# Patient Record
Sex: MALE | Race: WHITE | Employment: OTHER | URBAN - METROPOLITAN AREA
[De-identification: names, ages, dates, MRNs, and addresses within clinical notes are randomized per-mention and may not be internally consistent; named-entity substitution may affect disease eponyms.]

---

## 2017-01-02 ENCOUNTER — HOSPITAL ENCOUNTER (OUTPATIENT)
Dept: INFUSION THERAPY | Age: 77
End: 2017-01-02

## 2017-01-13 ENCOUNTER — TELEPHONE (OUTPATIENT)
Dept: ONCOLOGY | Age: 77
End: 2017-01-13

## 2017-01-13 NOTE — TELEPHONE ENCOUNTER
Call to patient. HIPAA verified     Dr Whittaker/urology had done [facility] and found cancer cells. He would like to do a bladder bx. May CT scan scheduled. Port removed successfully. [facility] He would like to have a discussion with [facility] prior to proceeding with bx. He states you have discussed this with Adi Gamez. He remains adamant about keeping his bladder. Advised patient, [facility] out of office until 1/17.

## 2017-01-13 NOTE — TELEPHONE ENCOUNTER
Romero Salas is requesting to speak with Dr. Huber Mcneil directly, specifics declined.    [phone]

## 2017-02-20 ENCOUNTER — HOSPITAL ENCOUNTER (OUTPATIENT)
Dept: PREADMISSION TESTING | Age: 77
Discharge: HOME | End: 2017-02-20
Payer: MEDICARE

## 2017-02-20 VITALS
HEIGHT: 69 IN | HEART RATE: 70 BPM | BODY MASS INDEX: 28.14 KG/M2 | WEIGHT: 190 LBS | DIASTOLIC BLOOD PRESSURE: 73 MMHG | SYSTOLIC BLOOD PRESSURE: 160 MMHG | TEMPERATURE: 97.6 F | RESPIRATION RATE: 20 BRPM

## 2017-02-20 LAB
ANION GAP BLD CALC-SCNC: 8 MMOL/L (ref 5–15)
APPEARANCE UR: CLEAR
ATRIAL RATE: 59 BPM
BACTERIA URNS QL MICRO: NEGATIVE /HPF
BASOPHILS # BLD AUTO: 0.1 K/UL (ref 0–0.1)
BASOPHILS # BLD: 1 % (ref 0–1)
BILIRUB UR QL: NEGATIVE
BUN SERPL-MCNC: 15 MG/DL (ref 6–20)
BUN/CREAT SERPL: 19 (ref 12–20)
CALCIUM SERPL-MCNC: 9.3 MG/DL (ref 8.5–10.1)
CALCULATED P AXIS, ECG09: 99 DEGREES
CALCULATED R AXIS, ECG10: -52 DEGREES
CALCULATED T AXIS, ECG11: -19 DEGREES
CHLORIDE SERPL-SCNC: 104 MMOL/L (ref 97–108)
CO2 SERPL-SCNC: 27 MMOL/L (ref 21–32)
COLOR UR: ABNORMAL
CREAT SERPL-MCNC: 0.81 MG/DL (ref 0.7–1.3)
DIAGNOSIS, 93000: NORMAL
DIFFERENTIAL METHOD BLD: ABNORMAL
EOSINOPHIL # BLD: 0.3 K/UL (ref 0–0.4)
EOSINOPHIL NFR BLD: 4 % (ref 0–7)
EPITH CASTS URNS QL MICRO: ABNORMAL /LPF
ERYTHROCYTE [DISTWIDTH] IN BLOOD BY AUTOMATED COUNT: 15.4 % (ref 11.5–14.5)
GLUCOSE SERPL-MCNC: 80 MG/DL (ref 65–100)
GLUCOSE UR STRIP.AUTO-MCNC: NEGATIVE MG/DL
HCT VFR BLD AUTO: 40 % (ref 36.6–50.3)
HGB BLD-MCNC: 12.9 G/DL (ref 12.1–17)
HGB UR QL STRIP: ABNORMAL
HYALINE CASTS URNS QL MICRO: ABNORMAL /LPF (ref 0–5)
KETONES UR QL STRIP.AUTO: NEGATIVE MG/DL
LEUKOCYTE ESTERASE UR QL STRIP.AUTO: NEGATIVE
LYMPHOCYTES # BLD AUTO: 10 % (ref 12–49)
LYMPHOCYTES # BLD: 0.7 K/UL (ref 0.8–3.5)
MCH RBC QN AUTO: 29.5 PG (ref 26–34)
MCHC RBC AUTO-ENTMCNC: 32.3 G/DL (ref 30–36.5)
MCV RBC AUTO: 91.5 FL (ref 80–99)
MONOCYTES # BLD: 0.8 K/UL (ref 0–1)
MONOCYTES NFR BLD AUTO: 12 % (ref 5–13)
NEUTS SEG # BLD: 5 K/UL (ref 1.8–8)
NEUTS SEG NFR BLD AUTO: 73 % (ref 32–75)
NITRITE UR QL STRIP.AUTO: NEGATIVE
P-R INTERVAL, ECG05: 212 MS
PH UR STRIP: 6 [PH] (ref 5–8)
PLATELET # BLD AUTO: 119 K/UL (ref 150–400)
POTASSIUM SERPL-SCNC: 4 MMOL/L (ref 3.5–5.1)
PROT UR STRIP-MCNC: NEGATIVE MG/DL
Q-T INTERVAL, ECG07: 398 MS
QRS DURATION, ECG06: 106 MS
QTC CALCULATION (BEZET), ECG08: 394 MS
RBC # BLD AUTO: 4.37 M/UL (ref 4.1–5.7)
RBC #/AREA URNS HPF: ABNORMAL /HPF (ref 0–5)
RBC MORPH BLD: ABNORMAL
SODIUM SERPL-SCNC: 139 MMOL/L (ref 136–145)
SP GR UR REFRACTOMETRY: 1.02 (ref 1–1.03)
UROBILINOGEN UR QL STRIP.AUTO: 0.2 EU/DL (ref 0.2–1)
VENTRICULAR RATE, ECG03: 59 BPM
WBC # BLD AUTO: 6.9 K/UL (ref 4.1–11.1)
WBC URNS QL MICRO: ABNORMAL /HPF (ref 0–4)

## 2017-02-20 PROCEDURE — 93005 ELECTROCARDIOGRAM TRACING: CPT

## 2017-02-20 PROCEDURE — 80048 BASIC METABOLIC PNL TOTAL CA: CPT | Performed by: UROLOGY

## 2017-02-20 PROCEDURE — 81001 URINALYSIS AUTO W/SCOPE: CPT | Performed by: UROLOGY

## 2017-02-20 PROCEDURE — 85025 COMPLETE CBC W/AUTO DIFF WBC: CPT | Performed by: UROLOGY

## 2017-02-20 PROCEDURE — 87086 URINE CULTURE/COLONY COUNT: CPT | Performed by: UROLOGY

## 2017-02-20 PROCEDURE — 36415 COLL VENOUS BLD VENIPUNCTURE: CPT | Performed by: UROLOGY

## 2017-02-20 NOTE — PERIOP NOTES
PATIENT GIVEN SURGICAL SITE INFECTION FAQ HANDOUT AND HAND WASHING TIP SHEET. PREOP INSTRUCTIONS REVIEWED AND PATIENT VERBALIZES UNDERSTANDING OF INSTRUCTIONS. PATIENT HAS BEEN GIVEN THE OPPORTUNITY TO ASK QUESTIONS.

## 2017-02-21 LAB
BACTERIA SPEC CULT: NORMAL
CC UR VC: NORMAL
SERVICE CMNT-IMP: NORMAL

## 2017-02-21 NOTE — PERIOP NOTES
ABN EKG SENT TO [facility] FOR REVIEW. EKG REVIEWED BY DR Adela Auguste, [facility] ; OKAY FOR SURGERY. SIGNED COPY FAXED TO DR Sundeep Bazan.

## 2017-03-06 NOTE — PERIOP NOTES
PT NAME CAME UP ON PAT PHONE LIST, BUT HE HAD PAT ON 2/20/17. PT DENIES ANY NEW MEDICATIONS OR CHANGES IN HEALTH HISTORY.

## 2017-03-07 ENCOUNTER — ANESTHESIA EVENT (OUTPATIENT)
Dept: SURGERY | Age: 77
End: 2017-03-07
Payer: MEDICARE

## 2017-03-07 ENCOUNTER — HOSPITAL ENCOUNTER (OUTPATIENT)
Age: 77
Setting detail: OBSERVATION
Discharge: HOME | End: 2017-03-08
Attending: UROLOGY | Admitting: UROLOGY
Payer: MEDICARE

## 2017-03-07 ENCOUNTER — ANESTHESIA (OUTPATIENT)
Dept: SURGERY | Age: 77
End: 2017-03-07
Payer: MEDICARE

## 2017-03-07 PROCEDURE — 77030012893

## 2017-03-07 PROCEDURE — 77030027138 HC INCENT SPIROMETER -A

## 2017-03-07 PROCEDURE — 76010000138 HC OR TIME 0.5 TO 1 HR: Performed by: UROLOGY

## 2017-03-07 PROCEDURE — 76060000032 HC ANESTHESIA 0.5 TO 1 HR: Performed by: UROLOGY

## 2017-03-07 PROCEDURE — 74011250636 HC RX REV CODE- 250/636: Performed by: ANESTHESIOLOGY

## 2017-03-07 PROCEDURE — 77030032490 HC SLV COMPR SCD KNE COVD -B: Performed by: UROLOGY

## 2017-03-07 PROCEDURE — 77030005529 HC CATH URETH FOL40 BARD -A: Performed by: UROLOGY

## 2017-03-07 PROCEDURE — 88305 TISSUE EXAM BY PATHOLOGIST: CPT | Performed by: UROLOGY

## 2017-03-07 PROCEDURE — 77030011640 HC PAD GRND REM COVD -A: Performed by: UROLOGY

## 2017-03-07 PROCEDURE — 76210000006 HC OR PH I REC 0.5 TO 1 HR: Performed by: UROLOGY

## 2017-03-07 PROCEDURE — 77030010509 HC AIRWY LMA MSK TELE -A: Performed by: ANESTHESIOLOGY

## 2017-03-07 PROCEDURE — 77030010545: Performed by: UROLOGY

## 2017-03-07 PROCEDURE — 74011250636 HC RX REV CODE- 250/636

## 2017-03-07 PROCEDURE — 74011250637 HC RX REV CODE- 250/637: Performed by: UROLOGY

## 2017-03-07 PROCEDURE — 74011000258 HC RX REV CODE- 258: Performed by: UROLOGY

## 2017-03-07 PROCEDURE — 77030019927 HC TBNG IRR CYSTO BAXT -A: Performed by: UROLOGY

## 2017-03-07 PROCEDURE — 99218 HC RM OBSERVATION: CPT

## 2017-03-07 PROCEDURE — 74011000250 HC RX REV CODE- 250

## 2017-03-07 PROCEDURE — 74011250636 HC RX REV CODE- 250/636: Performed by: UROLOGY

## 2017-03-07 PROCEDURE — 77030011274 HC ELECTRD CUT LP RWOL -F: Performed by: UROLOGY

## 2017-03-07 PROCEDURE — 77030013079 HC BLNKT BAIR HGGR 3M -A: Performed by: ANESTHESIOLOGY

## 2017-03-07 PROCEDURE — 77030018846 HC SOL IRR STRL H20 ICUM -A: Performed by: UROLOGY

## 2017-03-07 RX ORDER — CEFAZOLIN SODIUM IN 0.9 % NACL 2 G/50 ML
2 INTRAVENOUS SOLUTION, PIGGYBACK (ML) INTRAVENOUS
Status: COMPLETED | OUTPATIENT
Start: 2017-03-07 | End: 2017-03-07

## 2017-03-07 RX ORDER — LIDOCAINE HYDROCHLORIDE 20 MG/ML
INJECTION, SOLUTION EPIDURAL; INFILTRATION; INTRACAUDAL; PERINEURAL AS NEEDED
Status: DISCONTINUED | OUTPATIENT
Start: 2017-03-07 | End: 2017-03-07 | Stop reason: HOSPADM

## 2017-03-07 RX ORDER — MIDAZOLAM HYDROCHLORIDE 1 MG/ML
1 INJECTION, SOLUTION INTRAMUSCULAR; INTRAVENOUS AS NEEDED
Status: DISCONTINUED | OUTPATIENT
Start: 2017-03-07 | End: 2017-03-07 | Stop reason: HOSPADM

## 2017-03-07 RX ORDER — FENTANYL CITRATE 50 UG/ML
50 INJECTION, SOLUTION INTRAMUSCULAR; INTRAVENOUS AS NEEDED
Status: DISCONTINUED | OUTPATIENT
Start: 2017-03-07 | End: 2017-03-07 | Stop reason: HOSPADM

## 2017-03-07 RX ORDER — SODIUM CHLORIDE, SODIUM LACTATE, POTASSIUM CHLORIDE, CALCIUM CHLORIDE 600; 310; 30; 20 MG/100ML; MG/100ML; MG/100ML; MG/100ML
75 INJECTION, SOLUTION INTRAVENOUS CONTINUOUS
Status: DISCONTINUED | OUTPATIENT
Start: 2017-03-07 | End: 2017-03-07 | Stop reason: HOSPADM

## 2017-03-07 RX ORDER — SODIUM CHLORIDE 0.9 % (FLUSH) 0.9 %
5-10 SYRINGE (ML) INJECTION AS NEEDED
Status: DISCONTINUED | OUTPATIENT
Start: 2017-03-07 | End: 2017-03-08 | Stop reason: HOSPADM

## 2017-03-07 RX ORDER — AMLODIPINE BESYLATE 5 MG/1
5 TABLET ORAL
Status: DISCONTINUED | OUTPATIENT
Start: 2017-03-08 | End: 2017-03-08 | Stop reason: HOSPADM

## 2017-03-07 RX ORDER — MIDAZOLAM HYDROCHLORIDE 1 MG/ML
0.5 INJECTION, SOLUTION INTRAMUSCULAR; INTRAVENOUS
Status: DISCONTINUED | OUTPATIENT
Start: 2017-03-07 | End: 2017-03-07 | Stop reason: HOSPADM

## 2017-03-07 RX ORDER — HYDROMORPHONE HYDROCHLORIDE 1 MG/ML
1 INJECTION, SOLUTION INTRAMUSCULAR; INTRAVENOUS; SUBCUTANEOUS
Status: DISCONTINUED | OUTPATIENT
Start: 2017-03-07 | End: 2017-03-08 | Stop reason: HOSPADM

## 2017-03-07 RX ORDER — OXYCODONE AND ACETAMINOPHEN 5; 325 MG/1; MG/1
1 TABLET ORAL
Status: DISCONTINUED | OUTPATIENT
Start: 2017-03-07 | End: 2017-03-08 | Stop reason: HOSPADM

## 2017-03-07 RX ORDER — ONDANSETRON 2 MG/ML
4 INJECTION INTRAMUSCULAR; INTRAVENOUS
Status: DISCONTINUED | OUTPATIENT
Start: 2017-03-07 | End: 2017-03-08 | Stop reason: HOSPADM

## 2017-03-07 RX ORDER — SODIUM CHLORIDE 0.9 % (FLUSH) 0.9 %
5-10 SYRINGE (ML) INJECTION EVERY 8 HOURS
Status: DISCONTINUED | OUTPATIENT
Start: 2017-03-07 | End: 2017-03-08 | Stop reason: HOSPADM

## 2017-03-07 RX ORDER — FUROSEMIDE 20 MG/1
20 TABLET ORAL DAILY
Status: DISCONTINUED | OUTPATIENT
Start: 2017-03-08 | End: 2017-03-08 | Stop reason: HOSPADM

## 2017-03-07 RX ORDER — ZOLPIDEM TARTRATE 5 MG/1
5 TABLET ORAL
Status: DISCONTINUED | OUTPATIENT
Start: 2017-03-07 | End: 2017-03-08 | Stop reason: HOSPADM

## 2017-03-07 RX ORDER — CIPROFLOXACIN 500 MG/1
500 TABLET ORAL 2 TIMES DAILY
Qty: 6 TAB | Refills: 0 | Status: SHIPPED | OUTPATIENT
Start: 2017-03-07 | End: 2017-06-09 | Stop reason: ALTCHOICE

## 2017-03-07 RX ORDER — HYDROMORPHONE HYDROCHLORIDE 1 MG/ML
0.2 INJECTION, SOLUTION INTRAMUSCULAR; INTRAVENOUS; SUBCUTANEOUS
Status: DISCONTINUED | OUTPATIENT
Start: 2017-03-07 | End: 2017-03-07 | Stop reason: HOSPADM

## 2017-03-07 RX ORDER — MIDAZOLAM HYDROCHLORIDE 1 MG/ML
INJECTION, SOLUTION INTRAMUSCULAR; INTRAVENOUS AS NEEDED
Status: DISCONTINUED | OUTPATIENT
Start: 2017-03-07 | End: 2017-03-07 | Stop reason: HOSPADM

## 2017-03-07 RX ORDER — SODIUM CHLORIDE 0.9 % (FLUSH) 0.9 %
5-10 SYRINGE (ML) INJECTION EVERY 8 HOURS
Status: DISCONTINUED | OUTPATIENT
Start: 2017-03-07 | End: 2017-03-07 | Stop reason: HOSPADM

## 2017-03-07 RX ORDER — ONDANSETRON 2 MG/ML
4 INJECTION INTRAMUSCULAR; INTRAVENOUS AS NEEDED
Status: DISCONTINUED | OUTPATIENT
Start: 2017-03-07 | End: 2017-03-07 | Stop reason: HOSPADM

## 2017-03-07 RX ORDER — FENTANYL CITRATE 50 UG/ML
INJECTION, SOLUTION INTRAMUSCULAR; INTRAVENOUS AS NEEDED
Status: DISCONTINUED | OUTPATIENT
Start: 2017-03-07 | End: 2017-03-07 | Stop reason: HOSPADM

## 2017-03-07 RX ORDER — PHENAZOPYRIDINE HYDROCHLORIDE 200 MG/1
200 TABLET, FILM COATED ORAL
Qty: 20 TAB | Refills: 1 | Status: ON HOLD | OUTPATIENT
Start: 2017-03-07 | End: 2017-11-19

## 2017-03-07 RX ORDER — DIPHENHYDRAMINE HYDROCHLORIDE 50 MG/ML
12.5 INJECTION, SOLUTION INTRAMUSCULAR; INTRAVENOUS AS NEEDED
Status: DISCONTINUED | OUTPATIENT
Start: 2017-03-07 | End: 2017-03-07 | Stop reason: HOSPADM

## 2017-03-07 RX ORDER — DEXTROSE MONOHYDRATE AND SODIUM CHLORIDE 5; .9 G/100ML; G/100ML
100 INJECTION, SOLUTION INTRAVENOUS CONTINUOUS
Status: DISCONTINUED | OUTPATIENT
Start: 2017-03-07 | End: 2017-03-08 | Stop reason: HOSPADM

## 2017-03-07 RX ORDER — LIDOCAINE HYDROCHLORIDE 10 MG/ML
0.1 INJECTION, SOLUTION EPIDURAL; INFILTRATION; INTRACAUDAL; PERINEURAL AS NEEDED
Status: DISCONTINUED | OUTPATIENT
Start: 2017-03-07 | End: 2017-03-07 | Stop reason: HOSPADM

## 2017-03-07 RX ORDER — SODIUM CHLORIDE 0.9 % (FLUSH) 0.9 %
5-10 SYRINGE (ML) INJECTION AS NEEDED
Status: DISCONTINUED | OUTPATIENT
Start: 2017-03-07 | End: 2017-03-07 | Stop reason: HOSPADM

## 2017-03-07 RX ORDER — DEXAMETHASONE SODIUM PHOSPHATE 4 MG/ML
INJECTION, SOLUTION INTRA-ARTICULAR; INTRALESIONAL; INTRAMUSCULAR; INTRAVENOUS; SOFT TISSUE AS NEEDED
Status: DISCONTINUED | OUTPATIENT
Start: 2017-03-07 | End: 2017-03-07 | Stop reason: HOSPADM

## 2017-03-07 RX ORDER — FINASTERIDE 5 MG/1
5 TABLET, FILM COATED ORAL DAILY
Status: DISCONTINUED | OUTPATIENT
Start: 2017-03-08 | End: 2017-03-08 | Stop reason: HOSPADM

## 2017-03-07 RX ORDER — SODIUM CHLORIDE 9 MG/ML
50 INJECTION, SOLUTION INTRAVENOUS CONTINUOUS
Status: DISCONTINUED | OUTPATIENT
Start: 2017-03-07 | End: 2017-03-07 | Stop reason: HOSPADM

## 2017-03-07 RX ORDER — TAMSULOSIN HYDROCHLORIDE 0.4 MG/1
0.4 CAPSULE ORAL
Status: DISCONTINUED | OUTPATIENT
Start: 2017-03-07 | End: 2017-03-08 | Stop reason: HOSPADM

## 2017-03-07 RX ORDER — PANTOPRAZOLE SODIUM 40 MG/1
40 TABLET, DELAYED RELEASE ORAL
Status: DISCONTINUED | OUTPATIENT
Start: 2017-03-08 | End: 2017-03-08 | Stop reason: HOSPADM

## 2017-03-07 RX ORDER — PHENYLEPHRINE HCL IN 0.9% NACL 0.4MG/10ML
SYRINGE (ML) INTRAVENOUS AS NEEDED
Status: DISCONTINUED | OUTPATIENT
Start: 2017-03-07 | End: 2017-03-07 | Stop reason: HOSPADM

## 2017-03-07 RX ORDER — CETIRIZINE HCL 10 MG
10 TABLET ORAL DAILY
Status: DISCONTINUED | OUTPATIENT
Start: 2017-03-08 | End: 2017-03-08 | Stop reason: HOSPADM

## 2017-03-07 RX ORDER — ACETAMINOPHEN 325 MG/1
650 TABLET ORAL
Status: DISCONTINUED | OUTPATIENT
Start: 2017-03-07 | End: 2017-03-08 | Stop reason: HOSPADM

## 2017-03-07 RX ORDER — OXYCODONE AND ACETAMINOPHEN 5; 325 MG/1; MG/1
1 TABLET ORAL AS NEEDED
Status: DISCONTINUED | OUTPATIENT
Start: 2017-03-07 | End: 2017-03-07 | Stop reason: HOSPADM

## 2017-03-07 RX ORDER — FENTANYL CITRATE 50 UG/ML
25 INJECTION, SOLUTION INTRAMUSCULAR; INTRAVENOUS
Status: COMPLETED | OUTPATIENT
Start: 2017-03-07 | End: 2017-03-07

## 2017-03-07 RX ORDER — PROPOFOL 10 MG/ML
INJECTION, EMULSION INTRAVENOUS AS NEEDED
Status: DISCONTINUED | OUTPATIENT
Start: 2017-03-07 | End: 2017-03-07 | Stop reason: HOSPADM

## 2017-03-07 RX ORDER — ATORVASTATIN CALCIUM 20 MG/1
20 TABLET, FILM COATED ORAL
Status: DISCONTINUED | OUTPATIENT
Start: 2017-03-07 | End: 2017-03-08 | Stop reason: HOSPADM

## 2017-03-07 RX ORDER — POTASSIUM CHLORIDE 750 MG/1
10 TABLET, FILM COATED, EXTENDED RELEASE ORAL DAILY
Status: DISCONTINUED | OUTPATIENT
Start: 2017-03-07 | End: 2017-03-08 | Stop reason: HOSPADM

## 2017-03-07 RX ORDER — MORPHINE SULFATE 2 MG/ML
2 INJECTION, SOLUTION INTRAMUSCULAR; INTRAVENOUS
Status: DISCONTINUED | OUTPATIENT
Start: 2017-03-07 | End: 2017-03-07 | Stop reason: HOSPADM

## 2017-03-07 RX ORDER — ONDANSETRON 2 MG/ML
INJECTION INTRAMUSCULAR; INTRAVENOUS AS NEEDED
Status: DISCONTINUED | OUTPATIENT
Start: 2017-03-07 | End: 2017-03-07 | Stop reason: HOSPADM

## 2017-03-07 RX ADMIN — CEFAZOLIN 2 G: 1 INJECTION, POWDER, FOR SOLUTION INTRAMUSCULAR; INTRAVENOUS; PARENTERAL at 09:22

## 2017-03-07 RX ADMIN — FENTANYL CITRATE 12.5 MCG: 50 INJECTION, SOLUTION INTRAMUSCULAR; INTRAVENOUS at 09:42

## 2017-03-07 RX ADMIN — PROPOFOL 200 MG: 10 INJECTION, EMULSION INTRAVENOUS at 09:16

## 2017-03-07 RX ADMIN — FENTANYL CITRATE 25 MCG: 50 INJECTION, SOLUTION INTRAMUSCULAR; INTRAVENOUS at 09:29

## 2017-03-07 RX ADMIN — DEXAMETHASONE SODIUM PHOSPHATE 4 MG: 4 INJECTION, SOLUTION INTRA-ARTICULAR; INTRALESIONAL; INTRAMUSCULAR; INTRAVENOUS; SOFT TISSUE at 09:28

## 2017-03-07 RX ADMIN — Medication 10 ML: at 16:49

## 2017-03-07 RX ADMIN — Medication 80 MCG: at 09:34

## 2017-03-07 RX ADMIN — FENTANYL CITRATE 25 MCG: 50 INJECTION, SOLUTION INTRAMUSCULAR; INTRAVENOUS at 10:18

## 2017-03-07 RX ADMIN — FENTANYL CITRATE 25 MCG: 50 INJECTION, SOLUTION INTRAMUSCULAR; INTRAVENOUS at 10:40

## 2017-03-07 RX ADMIN — FENTANYL CITRATE 12.5 MCG: 50 INJECTION, SOLUTION INTRAMUSCULAR; INTRAVENOUS at 09:34

## 2017-03-07 RX ADMIN — POTASSIUM CHLORIDE 10 MEQ: 750 TABLET, FILM COATED, EXTENDED RELEASE ORAL at 16:49

## 2017-03-07 RX ADMIN — Medication 10 ML: at 21:43

## 2017-03-07 RX ADMIN — Medication 40 MCG: at 09:50

## 2017-03-07 RX ADMIN — Medication 80 MCG: at 09:42

## 2017-03-07 RX ADMIN — FENTANYL CITRATE 25 MCG: 50 INJECTION, SOLUTION INTRAMUSCULAR; INTRAVENOUS at 10:25

## 2017-03-07 RX ADMIN — LIDOCAINE HYDROCHLORIDE 80 MG: 20 INJECTION, SOLUTION EPIDURAL; INFILTRATION; INTRACAUDAL; PERINEURAL at 09:16

## 2017-03-07 RX ADMIN — MIDAZOLAM HYDROCHLORIDE 2 MG: 1 INJECTION, SOLUTION INTRAMUSCULAR; INTRAVENOUS at 09:06

## 2017-03-07 RX ADMIN — FENTANYL CITRATE 25 MCG: 50 INJECTION, SOLUTION INTRAMUSCULAR; INTRAVENOUS at 10:31

## 2017-03-07 RX ADMIN — FENTANYL CITRATE 12.5 MCG: 50 INJECTION, SOLUTION INTRAMUSCULAR; INTRAVENOUS at 09:33

## 2017-03-07 RX ADMIN — SODIUM CHLORIDE, POTASSIUM CHLORIDE, SODIUM LACTATE AND CALCIUM CHLORIDE: 600; 310; 30; 20 INJECTION, SOLUTION INTRAVENOUS at 08:27

## 2017-03-07 RX ADMIN — Medication 80 MCG: at 09:46

## 2017-03-07 RX ADMIN — DEXTROSE MONOHYDRATE AND SODIUM CHLORIDE 100 ML/HR: 5; .9 INJECTION, SOLUTION INTRAVENOUS at 11:40

## 2017-03-07 RX ADMIN — FENTANYL CITRATE 12.5 MCG: 50 INJECTION, SOLUTION INTRAMUSCULAR; INTRAVENOUS at 09:39

## 2017-03-07 RX ADMIN — DEXTROSE MONOHYDRATE AND SODIUM CHLORIDE 100 ML/HR: 5; .9 INJECTION, SOLUTION INTRAVENOUS at 21:41

## 2017-03-07 RX ADMIN — Medication 80 MCG: at 09:27

## 2017-03-07 RX ADMIN — ONDANSETRON 4 MG: 2 INJECTION INTRAMUSCULAR; INTRAVENOUS at 09:28

## 2017-03-07 RX ADMIN — Medication 40 MCG: at 09:24

## 2017-03-07 NOTE — ROUTINE PROCESS
Patient: Sofie Dunne MRN: [id number]  SSN: [id number]   YOB: 1940  Age: 68 y.o. Sex: male     Patient is status post Procedure(s):  CYSTOSCOPY WITH TRANSURETHRAL RESECTION OF BLADDER TUMOR.     Surgeon(s) and Role:     * Jeremias Crabtree MD - Primary    Irrigation: Glycine 1.5%              Venous Access Device (Active)       Venous Access Device (Active)      Peripheral IV 12/20/16 Right Antecubital (Active)       Peripheral IV 03/07/17 Right (Active)   Site Assessment Clean, dry, & intact 3/7/2017 10:00 AM   Phlebitis Assessment 0 3/7/2017 10:00 AM   Infiltration Assessment 0 3/7/2017 10:00 AM   Dressing Status Clean, dry, & intact 3/7/2017 10:00 AM

## 2017-03-07 NOTE — PROGRESS NOTES
TRANSFER - IN REPORT:    Verbal report received from [address] RN(name) on Jessica Oglesby  being received from [facility]) for routine post - op      Report consisted of patients Situation, Background, Assessment and   Recommendations(SBAR). Information from the following report(s) SBAR, Kardex, OR Summary, Procedure Summary, Intake/Output, MAR and Accordion was reviewed with the receiving nurse. Opportunity for questions and clarification was provided. Assessment completed upon patients arrival to unit and care assumed.

## 2017-03-07 NOTE — OP NOTES
[address]   [address] [place] [address], [address]   OP NOTE       Name:  João Marvin   MR#:  [id number]   :  1940   Account #:  [de-identified]    Surgery Date:  2017   Date of Adm:  2017       PREOPERATIVE DIAGNOSIS: History of bladder cancer with   abnormal fluorescent in situ hybridization test and concern of recurrent   bladder cancer. POSTOPERATIVE DIAGNOSIS/FINDINGS: There was some mild   hyperemia noted at the anterior bladder wall, but no obvious solid   tumor. PROCEDURES PERFORMED     1. Cystoscopy. 2. Biopsy of reddened area anterior bladder wall. ESTIMATED BLOOD LOSS: minimal     SPECIMENS REMOVED: Biopsies taken from the left and right   anterior bladder wall as well as biopsy from the dome. COMPLICATIONS: None. DRAINS: A 22 coude catheter. COMPLICATIONS: None. INDICATIONS FOR PROCEDURE: The patient is a 77-year-old   gentleman with history of bladder cancer. Cystectomy had been   recommended previously, the patient declined and instead decided on   radiation and chemotherapy. He has seemingly done well with no   obvious radiographic tumor, but we did urine FISH test which has   come back abnormal, raising the possibility of a tumor in the bladder. I   recommended cystoscopy with bladder biopsy versus TURBT. The   patient has been hesitant as he is known to have very friable and   easily irritated bladder, but eventually he decided he did want to go   ahead. DESCRIPTION OF PROCEDURE: He was identified, escorted into the   operating room, placed supine on the cystoscopy table. Anesthesia   was induced, and he was placed in dorsal lithotomy and prepped and   draped in standard fashion. Cystoscopy was performed with a 21-  Romanian sheath and both the 30 and 70-degree lenses.  The urethra was   normal. The prostate showed moderate lateral lobe hypertrophy, but   otherwise looked normal. The bladder was then closely inspected throughout. It was of a slightly small capacity. No discrete tumor was   seen, however, there was some mild nonspecific hyperemia of the   anterior bladder wall. Both ureteral orifices were easily identified. Both   lenses were used to closely inspect the bladder. I next used the cold   cup resection forceps to take representative areas of the hyperemic   area. Biopsies were separately taken from the left anterior, right   anterior bladder wall as well as the dome. Biopsies seemed to be full-  thickness, particularly the one at the dome and the left anterior bladder   wall. The biopsy sites were cauterized and hemostasis appeared to be   excellent. I emptied and then slowly refilled the bladder, confirming   hemostasis. I felt it best to leave the Culver catheter overnight. Initial   attempt to pass a 24-Serbian was a little tight at the meatus and I   switched to a 22-Serbian coude which passed easily. I hand irrigated   the catheter and it irrigated freely with no clots or hematuria. The   balloon was inflated to 10 mL and the catheter was connected to bag   drainage. Abdomen was soft and benign. I performed a rectal exam   revealing smooth prostate without any nodules appreciated. He   tolerated the procedure well, was awakened and transported to   recovery in good condition.         Aleks Ferguson MD DPM / Anderson Mueller   D:  03/07/2017   10:06   T:  03/07/2017   15:06   Job #:  [id number]

## 2017-03-07 NOTE — PERIOP NOTES
TRANSFER - OUT REPORT:    Verbal report given to Mariama(name) on Kenji Tang  being transferred to [facility](unit) for routine progression of care       Report consisted of patients Situation, Background, Assessment and   Recommendations(SBAR). Information from the following report(s) SBAR, OR Summary, Intake/Output and MAR was reviewed with the receiving nurse. Lines:   Venous Access Device (Active)       Venous Access Device (Active)       Peripheral IV 12/20/16 Right Antecubital (Active)       Peripheral IV 03/07/17 Right (Active)   Site Assessment Clean, dry, & intact 3/7/2017  2:00 PM   Phlebitis Assessment 0 3/7/2017  2:00 PM   Infiltration Assessment 0 3/7/2017  2:00 PM   Dressing Status Clean, dry, & intact 3/7/2017  2:00 PM   Hub Color/Line Status Infusing 3/7/2017 11:22 AM        Opportunity for questions and clarification was provided.       Patient transported with:   Opendisc

## 2017-03-07 NOTE — BRIEF OP NOTE
BRIEF OPERATIVE NOTE    Date of Procedure: 3/7/2017   Preoperative Diagnosis: BLADDER TRANSITIONAL CELL CARCINOMA  Postoperative Diagnosis: BLADDER TRANSITIONAL CELL CARCINOMA    Procedure(s):  CYSTOSCOPY WITH TRANSURETHRAL RESECTION OF BLADDER TUMOR  Surgeon(s) and Role:     * Phuong Kay MD - Primary            Surgical Staff:  Circ-1: Daniel Gil RN  Circ-Relief: Umer Esteban RN  Scrub RN-1: Maliha Ruelas RN  Event Time In   Incision Start 0541   Incision Close 0945     Anesthesia: General   Estimated Blood Loss: minimal  Specimens:   ID Type Source Tests Collected by Time Destination   1 : Left Anterior Bladder Wall Preservative Bladder  Phuong Kay MD 3/7/2017 5613 Pathology   2 : Right Anterior Bladder Wall Preservative Bladder  Phuong Kay MD 3/7/2017 0081 Pathology   3 : [address] Oksana Reeves MD 3/7/2017 8253 Pathology      Findings: hyperemia anterior bladder wall  Complications: none  Implants: * No implants in log *

## 2017-03-07 NOTE — IP AVS SNAPSHOT
[address] 
[phone] Patient: Pam Zeng MRN: [id number] YZP:[id number] You are allergic to the following No active allergies Recent Documentation Height Weight BMI Smoking Status 1.753 m 86.2 kg 28.06 kg/m2 Former Smoker Unresulted Labs Order Current Status SAMPLE TO BLOOD BANK In process Emergency Contacts Name Discharge Info Relation Home Work Mobile Milvia Santos CAREGIVER [3] Daughter [21] [phone] Martha Gibbs  Other Relative [6] [phone] About your hospitalization You were admitted on:  March 7, 2017 You last received care in the:  [place] [address] You were discharged on:  March 8, 2017 Unit phone number:  [phone] Why you were hospitalized Your primary diagnosis was:  Not on File Providers Seen During Your Hospitalizations Provider Role Specialty Primary office phone Chandana Morrison MD Attending Provider Urology [phone] Your Primary Care Physician (PCP) Primary Care Physician Office Phone Office Fax Mercedez Reji [phone] Follow-up Information Follow up With Details Comments Contact Info Dago Colby MD   [address] [place] [address] [phone] Current Discharge Medication List  
  
START taking these medications Dose & Instructions Dispensing Information Comments Morning Noon Evening Bedtime  
 ciprofloxacin HCl 500 mg tablet Commonly known as:  CIPRO Your next dose is: Today, Tomorrow Other:  _________ Dose:  500 mg Take 1 Tab by mouth two (2) times a day. Quantity:  6 Tab Refills:  0 phenazopyridine 200 mg tablet Commonly known as:  PYRIDIUM  
   
 Your next dose is: Today, Tomorrow Other:  _________ Dose:  200 mg Take 1 Tab by mouth three (3) times daily as needed for Pain. Quantity:  20 Tab Refills:  1 CONTINUE these medications which have NOT CHANGED Dose & Instructions Dispensing Information Comments Morning Noon Evening Bedtime AMBIEN 5 mg tablet Generic drug:  zolpidem Your next dose is: Today, Tomorrow Other:  _________ Take  by mouth nightly as needed for Sleep. Refills:  0  
     
   
   
   
  
 amLODIPine 5 mg tablet Commonly known as:  Sallye Solitario Your next dose is: Today, Tomorrow Other:  _________ Dose:  5 mg Take 5 mg by mouth every morning. Indications: HYPERTENSION Refills:  0  
     
   
   
   
  
 finasteride 5 mg tablet Commonly known as:  PROSCAR Your next dose is: Today, Tomorrow Other:  _________ Dose:  5 mg Take 5 mg by mouth daily. Refills:  0  
     
   
   
   
  
 FISH OIL EXTRA STRENGTH PO Your next dose is: Today, Tomorrow Other:  _________ Dose:  1 Tab Take 1 Tab by mouth daily. Refills:  0  
     
   
   
   
  
 furosemide 20 mg tablet Commonly known as:  LASIX Your next dose is: Today, Tomorrow Other:  _________ Refills:  0 HYDROcodone-acetaminophen 5-325 mg per tablet Commonly known as:  Virgilio Xavier Your next dose is: Today, Tomorrow Other:  _________  
   
   
 as needed. TAKES AS NEEDED FOR SLEEP Refills:  0 LIPITOR 20 mg tablet Generic drug:  atorvastatin Your next dose is: Today, Tomorrow Other:  _________ Dose:  20 mg Take 20 mg by mouth nightly. Refills:  0  
     
   
   
   
  
 omeprazole 20 mg capsule Commonly known as:  PRILOSEC Your next dose is: Today, Tomorrow Other:  _________  Dose:  20 mg  
 Take 20 mg by mouth every morning. Refills:  0  
     
   
   
   
  
 potassium chloride 10 mEq tablet Commonly known as:  K-DUR, KLOR-CON Your next dose is: Today, Tomorrow Other:  _________ Refills:  0 SALINE MIST 0.65 % nasal spray Generic drug:  sodium chloride Your next dose is: Today, Tomorrow Other:  _________ Dose:  1 Spray 1 Spray as needed for Congestion. Refills:  0  
     
   
   
   
  
 tamsulosin 0.4 mg capsule Commonly known as:  FLOMAX Your next dose is: Today, Tomorrow Other:  _________ Dose:  0.4 mg Take 0.4 mg by mouth nightly. Refills:  0  
     
   
   
   
  
 triamcinolone acetonide 0.025 % ointment Commonly known as:  KENALOG Your next dose is: Today, Tomorrow Other:  _________ Refills:  0 ZYRTEC PO Your next dose is: Today, Tomorrow Other:  _________ Dose:  10 mg Take 10 mg by mouth every morning. Refills:  0 Where to Get Your Medications Information on where to get these meds will be given to you by the nurse or doctor. ! Ask your nurse or doctor about these medications  
  ciprofloxacin HCl 500 mg tablet  
 phenazopyridine 200 mg tablet Discharge Instructions None Discharge Orders None Introducing [facility]! Dear Alan Delarosa: Thank you for requesting a [employer] account. Our records indicate that you already have an active [employer] account. You can access your account anytime at [email or web address] Did you know that you can access your hospital and ER discharge instructions at any time in [employer]? You can also review all of your test results from your hospital stay or ER visit. Additional Information If you have questions, please visit the Frequently Asked Questions section of the [facility] website at [email or web address] Remember, MyChart is NOT to be used for urgent needs. For medical emergencies, dial 911. Now available from your iPhone and Android! General Information Please provide this summary of care documentation to your next provider. Patient Signature:  ____________________________________________________________ Date:  ____________________________________________________________  
  
Laymon Valenzuela Provider Signature:  ____________________________________________________________ Date:  ____________________________________________________________

## 2017-03-07 NOTE — PROGRESS NOTES
Primary Nurse Sincere Ellison RN and Poncho Calixto RN performed a dual skin assessment on this patient Impairment noted- see wound doc flow sheet  David score is 22

## 2017-03-08 VITALS
WEIGHT: 190 LBS | SYSTOLIC BLOOD PRESSURE: 138 MMHG | DIASTOLIC BLOOD PRESSURE: 82 MMHG | RESPIRATION RATE: 16 BRPM | HEART RATE: 64 BPM | TEMPERATURE: 95.7 F | OXYGEN SATURATION: 95 % | BODY MASS INDEX: 28.14 KG/M2 | HEIGHT: 69 IN

## 2017-03-08 PROCEDURE — 99218 HC RM OBSERVATION: CPT

## 2017-03-08 PROCEDURE — 74011000258 HC RX REV CODE- 258: Performed by: UROLOGY

## 2017-03-08 RX ORDER — BACITRACIN 500 UNIT/G
PACKET (EA) TOPICAL
Status: DISCONTINUED
Start: 2017-03-08 | End: 2017-03-08 | Stop reason: HOSPADM

## 2017-03-08 RX ADMIN — Medication 10 ML: at 06:56

## 2017-03-08 RX ADMIN — DEXTROSE MONOHYDRATE AND SODIUM CHLORIDE 100 ML/HR: 5; .9 INJECTION, SOLUTION INTRAVENOUS at 06:57

## 2017-03-08 NOTE — PROGRESS NOTES
Bedside shift change report given to Carola Ruiz RN (oncoming nurse) by Steve Segovia RN (offgoing nurse). Report included the following information SBAR, Kardex, Intake/Output and MAR.

## 2017-03-08 NOTE — PROGRESS NOTES
Bedside and Verbal shift change report given to Renzo Jackson (oncoming nurse) by Amalia Guillory (offgoing nurse). Report included the following information SBAR, Kardex, OR Summary, Intake/Output and MAR.

## 2017-05-02 ENCOUNTER — TELEPHONE (OUTPATIENT)
Dept: ONCOLOGY | Age: 77
End: 2017-05-02

## 2017-05-02 NOTE — TELEPHONE ENCOUNTER
Call to patient. Pt schedule to have 6 BCG treatments with Dr Henrik Taylor last one scheduled 5/30/17. Advised to reschedule CT scan. Encouraged to make follow up appointment with [facility] post CT scan. Thanked for call.

## 2017-05-02 NOTE — TELEPHONE ENCOUNTER
Patient is calling with concerns regarding his BCG treatment and CT scan being the day after treatment. He is wondering if he should reschedule. He would like a phone call as soon as possible.   Thank you,  Esteban

## 2017-05-03 ENCOUNTER — TELEPHONE (OUTPATIENT)
Dept: ONCOLOGY | Age: 77
End: 2017-05-03

## 2017-05-03 DIAGNOSIS — C67.8 MALIGNANT NEOPLASM OF OVERLAPPING SITES OF BLADDER (HCC): Primary | ICD-10-CM

## 2017-05-03 NOTE — TELEPHONE ENCOUNTER
Pt inquiring about needed lab work for June appointment. Call to [facility] office to obtain recent labs. None available.

## 2017-05-03 NOTE — TELEPHONE ENCOUNTER
Jose Luis De Leon with [facility] called stating patient needs a new order for CT. The old order will expried this month but the patient rescheduled appt for June.

## 2017-05-12 ENCOUNTER — TELEPHONE (OUTPATIENT)
Dept: ONCOLOGY | Age: 77
End: 2017-05-12

## 2017-05-12 NOTE — TELEPHONE ENCOUNTER
Patient called stating he would like to speak with Dulce Maria Galarza about his upcoming CT Scan. He says he currently has pneumonia and would like to discuss this with her.  Please advise [phone]

## 2017-05-17 PROBLEM — R59.1 LYMPHADENOPATHY: Status: ACTIVE | Noted: 2017-05-17

## 2017-05-17 PROBLEM — M19.90 ARTHRITIS, DEGENERATIVE: Status: ACTIVE | Noted: 2017-05-17

## 2017-05-17 PROBLEM — R35.1 NOCTURIA: Status: ACTIVE | Noted: 2017-05-17

## 2017-05-17 PROBLEM — G47.00 INSOMNIA: Status: ACTIVE | Noted: 2017-05-17

## 2017-05-17 PROBLEM — D53.1 MEGALOBLASTIC ANEMIA DUE TO VITAMIN B12 DEFICIENCY: Status: ACTIVE | Noted: 2017-05-17

## 2017-05-17 PROBLEM — N40.0 BENIGN PROSTATIC HYPERPLASIA: Status: ACTIVE | Noted: 2017-05-17

## 2017-05-23 ENCOUNTER — TELEPHONE (OUTPATIENT)
Dept: ONCOLOGY | Age: 77
End: 2017-05-23

## 2017-05-23 DIAGNOSIS — C67.8 MALIGNANT NEOPLASM OF OVERLAPPING SITES OF BLADDER (HCC): ICD-10-CM

## 2017-05-23 NOTE — TELEPHONE ENCOUNTER
Patient would like to clarify his schedule and his paperwork. Please contact him advise. Leave VM if unavailable and call back a second time if possible.

## 2017-05-24 NOTE — TELEPHONE ENCOUNTER
HIPAA verified. Pneumonia is lingering. 2nd round of ABX completed 5/27. He still has 4 more treatment of BCG with urology he needs to complete after ABX complete. Advised patient to proceed with getting lab work done for RTC on 6/6/17. He is questioning again, the timing all appointments. Call to [place] [facility] to determine if OK to proceed with CT with contrast during BCG treatments. [phone]. They are to return call.       CT scan 6/6, RTC 6/9    22 minutes

## 2017-05-24 NOTE — TELEPHONE ENCOUNTER
Spoke with Tia at [facility]. BCG treatments should not interfere with any testing or treatment we have scheduled. They will contact patient and coordinate their continuing care. He will proceed with CT scan, labs and RTC as scheduled. Call to patient HIPAA verified. Advised. Pt agreeable to plan.

## 2017-05-26 ENCOUNTER — TELEPHONE (OUTPATIENT)
Dept: ONCOLOGY | Age: 77
End: 2017-05-26

## 2017-05-26 NOTE — TELEPHONE ENCOUNTER
Pt called and wants to know the name of a Pulmonologist he can go to.  Pt would like a call back as soon as possible

## 2017-05-26 NOTE — TELEPHONE ENCOUNTER
HIPPA verified. Patient informed of pulmonologist per NP message. Patient state he was driving and couldn't write information. Patient also stated he just left his PCP and was dx with pneumonia and the PCP informed him that he didn't need to see a pulmonologist; just take the ABX prescribed by PCP. Patient stated he would still like the information about the pulmonologist NP recommended; okay per patient to leave pulmonologist information on patient's cell phone voice mail; writer left information about pulmonologist on patient's voice mail per request by patient.

## 2017-05-31 ENCOUNTER — PATIENT MESSAGE (OUTPATIENT)
Dept: ONCOLOGY | Age: 77
End: 2017-05-31

## 2017-06-02 ENCOUNTER — HOSPITAL ENCOUNTER (OUTPATIENT)
Dept: LAB | Age: 77
Discharge: HOME | End: 2017-06-02
Payer: MEDICARE

## 2017-06-02 PROCEDURE — 36415 COLL VENOUS BLD VENIPUNCTURE: CPT

## 2017-06-02 PROCEDURE — 80053 COMPREHEN METABOLIC PANEL: CPT

## 2017-06-02 PROCEDURE — 85025 COMPLETE CBC W/AUTO DIFF WBC: CPT

## 2017-06-02 NOTE — TELEPHONE ENCOUNTER
From: Bernie Early  To: Kimberlee Thorpe MD  Sent: 5/31/2017 8:33 PM EDT  Subject: Test Results Question    I have been seen several times in the last month for pneumonia. I have had several X-rays. I keep being told there isn't much change in the last month between X-rays. I requested you be sent a copy and it's my understanding that you were faxed a copy on 5-24-17. Can you check if you got a copy and give me your opinion or advice. My CT scan for you is scheduled for the 6th. I don't know if I should see a pulmonary dr. and I haven't been able to complete the BCG treatment cycle. Please advise and thank you.

## 2017-06-02 NOTE — TELEPHONE ENCOUNTER
Patient stopped by the office to follow up on questions. He brought in hand copies of various chest xrays from [facility]. Appears to be some infiltrates, but no obvious pneumonia. Has been on Doxycycline for total period of 20 days for treatment of pneumonia. Is finished that now. No fever or chills. Some sinus congestion and drainage. No pain or pressure. Not feeling poorly. Wonders if he should be referred to pulmonary. Questions whether pneumonia vaccine will interfere with CT scan. Has CT scan scheduled on 6/6/17 and office f/u on 6/9/17. Advised will check CT scans first to evaluate disease and lung function and refer as appropriate following labs. [facility] from [facility] to front to scan.

## 2017-06-03 LAB
ALBUMIN SERPL-MCNC: 3.8 G/DL (ref 3.5–4.8)
ALBUMIN/GLOB SERPL: 1.1 {RATIO} (ref 1.2–2.2)
ALP SERPL-CCNC: 90 IU/L (ref 39–117)
ALT SERPL-CCNC: 10 IU/L (ref 0–44)
AST SERPL-CCNC: 19 IU/L (ref 0–40)
BASOPHILS # BLD AUTO: 0 X10E3/UL (ref 0–0.2)
BASOPHILS NFR BLD AUTO: 0 %
BILIRUB SERPL-MCNC: 1 MG/DL (ref 0–1.2)
BUN SERPL-MCNC: 10 MG/DL (ref 8–27)
BUN/CREAT SERPL: 14 (ref 10–24)
CALCIUM SERPL-MCNC: 8.8 MG/DL (ref 8.6–10.2)
CHLORIDE SERPL-SCNC: 105 MMOL/L (ref 96–106)
CO2 SERPL-SCNC: 23 MMOL/L (ref 18–29)
CREAT SERPL-MCNC: 0.72 MG/DL (ref 0.76–1.27)
EOSINOPHIL # BLD AUTO: 0.2 X10E3/UL (ref 0–0.4)
EOSINOPHIL NFR BLD AUTO: 3 %
ERYTHROCYTE [DISTWIDTH] IN BLOOD BY AUTOMATED COUNT: 15.2 % (ref 12.3–15.4)
GLOBULIN SER CALC-MCNC: 3.4 G/DL (ref 1.5–4.5)
GLUCOSE SERPL-MCNC: 96 MG/DL (ref 65–99)
HCT VFR BLD AUTO: 41.1 % (ref 37.5–51)
HGB BLD-MCNC: 12.7 G/DL (ref 12.6–17.7)
IMM GRANULOCYTES # BLD: 0 X10E3/UL (ref 0–0.1)
IMM GRANULOCYTES NFR BLD: 0 %
LYMPHOCYTES # BLD AUTO: 0.7 X10E3/UL (ref 0.7–3.1)
LYMPHOCYTES NFR BLD AUTO: 12 %
MCH RBC QN AUTO: 27.7 PG (ref 26.6–33)
MCHC RBC AUTO-ENTMCNC: 30.9 G/DL (ref 31.5–35.7)
MCV RBC AUTO: 90 FL (ref 79–97)
MONOCYTES # BLD AUTO: 0.6 X10E3/UL (ref 0.1–0.9)
MONOCYTES NFR BLD AUTO: 10 %
NEUTROPHILS # BLD AUTO: 4.3 X10E3/UL (ref 1.4–7)
NEUTROPHILS NFR BLD AUTO: 75 %
PLATELET # BLD AUTO: 122 X10E3/UL (ref 150–379)
POTASSIUM SERPL-SCNC: 4 MMOL/L (ref 3.5–5.2)
PROT SERPL-MCNC: 7.2 G/DL (ref 6–8.5)
RBC # BLD AUTO: 4.59 X10E6/UL (ref 4.14–5.8)
SODIUM SERPL-SCNC: 140 MMOL/L (ref 134–144)
WBC # BLD AUTO: 5.7 X10E3/UL (ref 3.4–10.8)

## 2017-06-06 ENCOUNTER — HOSPITAL ENCOUNTER (OUTPATIENT)
Dept: CT IMAGING | Age: 77
Discharge: HOME | End: 2017-06-06
Payer: MEDICARE

## 2017-06-06 DIAGNOSIS — C67.8 MALIGNANT NEOPLASM OF OVERLAPPING SITES OF BLADDER (HCC): ICD-10-CM

## 2017-06-06 PROCEDURE — 74177 CT ABD & PELVIS W/CONTRAST: CPT

## 2017-06-06 PROCEDURE — 71260 CT THORAX DX C+: CPT

## 2017-06-06 PROCEDURE — 74011636320 HC RX REV CODE- 636/320

## 2017-06-06 PROCEDURE — 74011000258 HC RX REV CODE- 258

## 2017-06-06 RX ORDER — SODIUM CHLORIDE 0.9 % (FLUSH) 0.9 %
10 SYRINGE (ML) INJECTION
Status: COMPLETED | OUTPATIENT
Start: 2017-06-06 | End: 2017-06-06

## 2017-06-06 RX ADMIN — SODIUM CHLORIDE 100 ML: 900 INJECTION, SOLUTION INTRAVENOUS at 10:03

## 2017-06-06 RX ADMIN — Medication 10 ML: at 10:03

## 2017-06-06 RX ADMIN — IOPAMIDOL 100 ML: 755 INJECTION, SOLUTION INTRAVENOUS at 10:03

## 2017-06-09 ENCOUNTER — TELEPHONE (OUTPATIENT)
Dept: ONCOLOGY | Age: 77
End: 2017-06-09

## 2017-06-09 ENCOUNTER — OFFICE VISIT (OUTPATIENT)
Dept: ONCOLOGY | Age: 77
End: 2017-06-09

## 2017-06-09 VITALS
TEMPERATURE: 98.4 F | RESPIRATION RATE: 16 BRPM | DIASTOLIC BLOOD PRESSURE: 72 MMHG | HEIGHT: 69 IN | SYSTOLIC BLOOD PRESSURE: 129 MMHG | BODY MASS INDEX: 27.71 KG/M2 | OXYGEN SATURATION: 94 % | WEIGHT: 187.1 LBS | HEART RATE: 74 BPM

## 2017-06-09 DIAGNOSIS — C67.8 MALIGNANT NEOPLASM OF OVERLAPPING SITES OF BLADDER (HCC): Primary | ICD-10-CM

## 2017-06-09 DIAGNOSIS — R06.02 SHORTNESS OF BREATH: ICD-10-CM

## 2017-06-09 DIAGNOSIS — D69.6 THROMBOCYTOPENIA (HCC): ICD-10-CM

## 2017-06-09 RX ORDER — PREDNISONE 5 MG/1
TABLET ORAL
Refills: 0 | COMMUNITY
Start: 2017-04-27 | End: 2017-12-05 | Stop reason: ALTCHOICE

## 2017-06-09 RX ORDER — DOXYCYCLINE 100 MG/1
CAPSULE ORAL
COMMUNITY
Start: 2017-05-17 | End: 2017-06-09 | Stop reason: ALTCHOICE

## 2017-06-09 RX ORDER — NEOMYCIN SULFATE, POLYMYXIN B SULFATE, HYDROCORTISONE 3.5; 10000; 1 MG/ML; [USP'U]/ML; MG/ML
SOLUTION/ DROPS AURICULAR (OTIC)
Refills: 0 | Status: ON HOLD | COMMUNITY
Start: 2017-03-16 | End: 2017-11-19

## 2017-06-09 RX ORDER — CETIRIZINE HCL 10 MG
10 TABLET ORAL DAILY
COMMUNITY
Start: 2017-05-09 | End: 2018-07-10

## 2017-06-09 RX ORDER — SILODOSIN 8 MG/1
8 CAPSULE ORAL
COMMUNITY
End: 2018-05-09

## 2017-06-09 RX ORDER — AZITHROMYCIN 250 MG/1
TABLET, FILM COATED ORAL
Refills: 0 | COMMUNITY
Start: 2017-04-27 | End: 2017-06-09 | Stop reason: ALTCHOICE

## 2017-06-09 RX ORDER — ZOLPIDEM TARTRATE 10 MG/1
5 TABLET ORAL
COMMUNITY
Start: 2017-05-24 | End: 2018-07-27

## 2017-06-09 NOTE — TELEPHONE ENCOUNTER
Provider referring patient a pulmonary disease; Dr. Poly Goldberg for SOB. Appointment scheduled for 7/20 at 1:30pm; arrive at 1pm. Patient's chest CT from 6/6/17 faxed to Dr. Pang Agent office.

## 2017-06-09 NOTE — PATIENT INSTRUCTIONS
For primary care, I recommend either [facility] or [facility].

## 2017-06-09 NOTE — TELEPHONE ENCOUNTER
HIPPA verified. Patient informed of referral appointment with Dr. Sofía Daniel on 7/20 at 1:30pm; arrive at 1pm. Patient verbalized understanding ad stated he told his daughter to bring the insurace card.

## 2017-06-09 NOTE — PROGRESS NOTES
Hematology/Oncology Progress Note    DIAGNOSIS: Bladder cancer with small cell features    TREATMENT:  Carboplatin and etoposide started on 4/7/15 x 4 cycles  Radiation under the care of Dr. Freddy Timmons from 10/30/15 to 12/3/15    HISTORY OF PRESENT ILLNESS: Mr. Júnior Srinivasan is a 68 y.o. male who presents for follow-up of bladder cancer. He has a long history of bladder cancer initially treated with transurethral resection followed by BCG treatments with initial good response. Has had a number of kidney stones and has had both cystoscopy for surveillance and for investigation of stones. In December 2014 he had a cystoscopy which revealed a mass in the bladder. On biopsy this revealed small cell features. He started chemotherapy on 4/7/15. Then underwent radiation. Presents for follow-up. He is currently undergoing BCG for abnormal FISH test in the urine. He has wanted to avoid cystectomy. He has had pneumonia since I saw him last.  No hospitalizations. Aside from acute illness with pneumonia, no fevers, chills, night sweats. No weight loss. No new aches or pains. No new lumps or bumps. No nausea, vomiting, diarrhea, or constipation. No bleeding. No Known Allergies    Current Outpatient Prescriptions   Medication Sig Dispense Refill    cetirizine (ZYRTEC) 10 mg tablet       silodosin (RAPAFLO) 8 mg capsule Take 8 mg by mouth nightly.  tamsulosin (FLOMAX) 0.4 mg capsule Take 0.4 mg by mouth nightly.  furosemide (LASIX) 20 mg tablet as needed.  sodium chloride (SALINE MIST) 0.65 % nasal spray 1 Spray as needed for Congestion.  omeprazole (PRILOSEC) 20 mg capsule Take 20 mg by mouth every morning.  triamcinolone acetonide (KENALOG) 0.025 % ointment       HYDROcodone-acetaminophen (NORCO) 5-325 mg per tablet as needed. TAKES AS NEEDED FOR SLEEP  0    zolpidem (AMBIEN) 5 mg tablet Take  by mouth nightly as needed for Sleep.       amLODIPine (NORVASC) 5 mg tablet Take 5 mg by mouth every morning. Indications: HYPERTENSION      finasteride (PROSCAR) 5 mg tablet Take 5 mg by mouth daily.  atorvastatin (LIPITOR) 20 mg tablet Take 20 mg by mouth nightly.  neomycin-polymyxin-hydrocortisone (CORTISPORIN) otic solution INSTILL 4 DROPS IN AFFECTED EAR(S) 4 TIMES A DAY FOR 7 DAYS  0    predniSONE (DELTASONE) 5 mg tablet STARTING ON 4/28 - TAKE 6 TABLETS THE FIRST DAY, THEN TAKE 1 TABLET LESS EACH DAY (6,5,4,3,2,1)  0    zolpidem (AMBIEN) 10 mg tablet       phenazopyridine (PYRIDIUM) 200 mg tablet Take 1 Tab by mouth three (3) times daily as needed for Pain. 20 Tab 1    OMEGA-3 FATTY ACIDS/FISH OIL (FISH OIL EXTRA STRENGTH PO) Take 1 Tab by mouth daily.  potassium chloride (K-DUR, KLOR-CON) 10 mEq tablet        ROS  As per the HPI, otherwise a comprehensive ROS is negative. ECOG PS is 1. Emotional well being addressed and patient is coping well.     Physical Examination:   Visit Vitals    /72 (BP 1 Location: Left arm, BP Patient Position: Sitting)    Pulse 74    Temp 98.4 °F (36.9 °C)    Resp 16    Ht 5' 9\" (1.753 m)    Wt 187 lb 1.6 oz (84.9 kg)    SpO2 94%    BMI 27.63 kg/m2     General appearance - alert, well appearing, and in no distress  Mental status - oriented to person, place, and time  Mouth - mucous membranes moist, pharynx normal without lesions  Neck - supple, no significant adenopathy  Lymphatics - no palpable lymphadenopathy, no hepatosplenomegaly  Chest - clear to auscultation, no wheezes, rales or rhonchi, symmetric air entry  Heart - normal rate, regular rhythm, normal S1, S2, no murmurs, rubs, clicks or gallops  Abdomen - soft, nontender, nondistended, no masses or organomegaly, bowel sounds present, defect noted at 12 o'clock at umbilicus, soft and nontender  Neurological - normal speech, no focal findings or movement disorder noted  Musculoskeletal - no joint tenderness, deformity or swelling  Extremities - peripheral pulses normal, no pedal edema, no clubbing or cyanosis  Skin - normal coloration and turgor, no rashes, no suspicious skin lesions noted  Chest wall - port site looks good. LABS  Lab Results   Component Value Date/Time    WBC 5.7 06/02/2017 12:04 PM    HGB 12.7 06/02/2017 12:04 PM    HCT 41.1 06/02/2017 12:04 PM    PLATELET 712 25/94/9975 12:04 PM    MCV 90 06/02/2017 12:04 PM    ABS. NEUTROPHILS 4.3 06/02/2017 12:04 PM     Lab Results   Component Value Date/Time    Sodium 140 06/02/2017 12:04 PM    Potassium 4.0 06/02/2017 12:04 PM    Chloride 105 06/02/2017 12:04 PM    CO2 23 06/02/2017 12:04 PM    Glucose 96 06/02/2017 12:04 PM    BUN 10 06/02/2017 12:04 PM    Creatinine 0.72 06/02/2017 12:04 PM    GFR est  06/02/2017 12:04 PM    GFR est non-AA 90 06/02/2017 12:04 PM    Calcium 8.8 06/02/2017 12:04 PM     Lab Results   Component Value Date/Time    AST (SGOT) 19 06/02/2017 12:04 PM    Alk. phosphatase 90 06/02/2017 12:04 PM    Protein, total 7.2 06/02/2017 12:04 PM    Albumin 3.8 06/02/2017 12:04 PM    Globulin 4.6 11/22/2016 11:50 AM    A-G Ratio 1.1 06/02/2017 12:04 PM     PATHOLOGY  FNA of mediastinal node at station 7 on 9/9/15 ([id number]) with atypical cells. 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 Invasion is not convincingly demonstrated. IMAGING  CT chest/abdomen/pelvis on 6/6/17 with no sign of tumor recurrence. CT chest/abdomen/pelvis on 11/21/16 with no sign of tumor recurrence. CT abdomen/pelvis on 2/9/15 with thickening of the left anterolateral bladder wall with stranding into the perivesical fat unchanged. No other evidence of metastatic disease. Emphysema. PET on 8/18/15 with hypermetabolic mediastinal and bilateral hilar lymph nodes. This is nonspecific and could represent any number of inflammatory or neoplastic etiologies. There is otherwise normal tracer distribution. CT Chest on 7/27/15 with no change. No new disease. CT Chest/abdomen/pelvis on 5/18/15 with no change. CT chest/abdomen/pelvis on 2/24/15 with severe emphysema, multiple borderline mediastinal nodes, thyroid cyst, left sided hydroureteronephrosis due to bladder thickening, increased abdominal lymphadenopathy. ASSESSMENT  Mr. Fabby Douglas is a 68 y.o. male with bladder cancer which on biopsy revealed high grade urothelial carcinoma with small cell features. CTs reveal multiple borderline enlarged nodes throughout the chest, abdomen, and pelvis. Currently on chemotherapy with carboplatin/etoposide. Has had 4 cycles followed by radiation. Presents for follow-up. DISCUSSION/PLAN  1. Bladder cancer. He was treated with chemo and radiation. We reviewed surveillance and patterns of recurrence. He should continue to follow with Dr. Garth Varela. Will check scans again in 6 months. CT scans reviewed in detail and they are negative for recurrence. We have reviewed the overall treatments today. He has disease in his bladder. We discussed surgery extensively in the past.  Now he is upset to hear that surgery will be more difficult after having radiation therapy. Though, admits that he would still not have done the surgery. 2. Thrombocytopenia. Chronic. 3. Shortness of breath. This is chronic. Has emphysematous changes on imaging. He'd like to see a pulmonologist.  Will make referral.     He should be seen back in 6 months, but sooner if needed. Unfortunately, I will not be with the practice at that time. He will follow up with Dr. Aure Burch.     Gemma Gee MD

## 2017-06-27 ENCOUNTER — TELEPHONE (OUTPATIENT)
Dept: ONCOLOGY | Age: 77
End: 2017-06-27

## 2017-06-27 NOTE — TELEPHONE ENCOUNTER
Pt called and have questions about his labs wants to know if he has been checked for Vitamin A deficiency.  Pt states he has seen 2 eye doctors one today, and one suggested he be checked for that

## 2017-06-28 ENCOUNTER — TELEPHONE (OUTPATIENT)
Dept: ONCOLOGY | Age: 77
End: 2017-06-28

## 2017-06-28 NOTE — TELEPHONE ENCOUNTER
Call placed to patient, message left to return call. Patient will need to be referred to PCP for Vitamin A testing.

## 2017-07-27 ENCOUNTER — TELEPHONE (OUTPATIENT)
Dept: ONCOLOGY | Age: 77
End: 2017-07-27

## 2017-07-27 NOTE — TELEPHONE ENCOUNTER
ERIN verified. Agustin placed to pt. Pt. Stated he has seen two eye doctors who stated he has 20/20 vision. He has cloudy vision all the time but his vision is worse at night. He stated the doctor wants to put him on AREDS but it has all types of side effects. Pt states he would rather take a multivitamin to build up his immune system. NP notified. Okay for pt to take a multivitamin per NP. Pt advised ok to take an over the counter multivitamin daily and to call us if he continues to have symptoms. Patient verbalized understanding.

## 2017-07-27 NOTE — TELEPHONE ENCOUNTER
Radha Nation (pt) called in stated he needs to speak w/ nurse in regards to his vision. Pt stated he's been to three eye dctrs and they've all said he has 20/20 vision but he can't see at night when driving. Pt wants to know what vitamin can he take to help or what should he do.  Pt contact # [phone]

## 2017-08-31 PROBLEM — M62.838 MUSCLE SPASM OF BOTH LOWER LEGS: Status: ACTIVE | Noted: 2017-08-31

## 2017-08-31 PROBLEM — M62.838 MUSCLE SPASMS OF NECK: Status: ACTIVE | Noted: 2017-08-31

## 2017-09-06 ENCOUNTER — TELEPHONE (OUTPATIENT)
Dept: ONCOLOGY | Age: 77
End: 2017-09-06

## 2017-09-06 NOTE — TELEPHONE ENCOUNTER
Call received from patient, HIPAA verified. Patient states that he has been unable to review records on [facility], patient requests that CT scan results and labs be printed and mailed to his home for review. Would like additional lab slips mailed to home as well. Documents printed and placed in mail to patient today. Thanked for assistance.

## 2017-09-06 NOTE — TELEPHONE ENCOUNTER
Hiro Starks (pt) called in about his results from CT scan and labs. Pt stated he normally can review results in [facility] but recently hasn't been able to review. Pt stated looks at if no results have been put in. Pt also would like to know what stage cancer is in and also what's the next step. Please give pt c/b @ [phone].

## 2017-09-11 ENCOUNTER — TELEPHONE (OUTPATIENT)
Dept: ONCOLOGY | Age: 77
End: 2017-09-11

## 2017-09-11 NOTE — TELEPHONE ENCOUNTER
Call returned to patient, HIPAA verified. Patient continues to have difficulty viewing My Chart information. Number provided to call for My Chart assistance. Reminded patient that requested information was placed in mail late last week. Writer informed patient that office would be happy to forward any information requested to any providers that patient needed. Patient states that he does not currently have any upcoming appointments at this time, with [facility] or anyone else. States that he just has multiple providers and is very concerned that he cannot access his records and would like to make sure that every provider is on the same page. Would like to make sure that this office has all of the information from his other providers. Pulmonology, etc. States that he will be contacting those offices to retrieve records to bring in. Support provided. Encouraged patient to contact office with any concerns or if any further assistance could be provided. Patient thankful for assistance and listening. Thanked for return call.

## 2017-09-11 NOTE — TELEPHONE ENCOUNTER
Patient states he is still unable to see anything on My Chart. Stating that \"nothing is being uploaded anymore\". Patient states he needs to make sure records are going to [facility] for his upcoming follow up there.      Patient can be reached at [phone]

## 2017-10-23 ENCOUNTER — HOSPITAL ENCOUNTER (OUTPATIENT)
Dept: GENERAL RADIOLOGY | Age: 77
Discharge: HOME | End: 2017-10-23
Payer: MEDICARE

## 2017-10-23 DIAGNOSIS — J18.9 PNEUMONIA: ICD-10-CM

## 2017-10-23 PROCEDURE — 71020 XR CHEST PA LAT: CPT

## 2017-11-10 ENCOUNTER — TELEPHONE (OUTPATIENT)
Dept: ONCOLOGY | Age: 77
End: 2017-11-10

## 2017-11-10 NOTE — TELEPHONE ENCOUNTER
Patient called requesting a return call. Patient stated he has some questions. Please return call to discuss [phone].   marcelino

## 2017-11-13 ENCOUNTER — HOSPITAL ENCOUNTER (OUTPATIENT)
Dept: GENERAL RADIOLOGY | Age: 77
Discharge: HOME | End: 2017-11-13
Payer: MEDICARE

## 2017-11-13 DIAGNOSIS — J11.00 INFLUENZA AND PNEUMONIA: ICD-10-CM

## 2017-11-13 PROCEDURE — 71020 XR CHEST PA LAT: CPT

## 2017-11-13 NOTE — TELEPHONE ENCOUNTER
Call returned to patient, HIPAA verified. Patient has concerns after recently being treated for pneumonia by pulmonologist, does not think that scheduling cystoscopy and endoscopy prior to having upcoming CT scans done. Patient feels that he is too compromised at this time to have \"anything radical done prior to having scans\". Patient would also like to know if there is anything that he could be doing that would build his immune system. Forwarded to provider to advise.

## 2017-11-13 NOTE — TELEPHONE ENCOUNTER
Call returned to patient, HIPAA verified. Patient advised per provider ok to have CT scan and follow up with provider prior to rescheduling cystoscopy and endoscopy. Patient thankful for update will keep scheduled appointment for CT scan and provider follow up. Thanked for call.

## 2017-11-18 ENCOUNTER — APPOINTMENT (OUTPATIENT)
Dept: CT IMAGING | Age: 77
DRG: 194 | End: 2017-11-18
Attending: EMERGENCY MEDICINE
Payer: MEDICARE

## 2017-11-18 ENCOUNTER — HOSPITAL ENCOUNTER (INPATIENT)
Age: 77
LOS: 4 days | Discharge: HOME | DRG: 194 | End: 2017-11-22
Attending: EMERGENCY MEDICINE | Admitting: HOSPITALIST
Payer: MEDICARE

## 2017-11-18 DIAGNOSIS — J18.9 PNEUMONIA OF BOTH LUNGS DUE TO INFECTIOUS ORGANISM, UNSPECIFIED PART OF LUNG: Primary | ICD-10-CM

## 2017-11-18 LAB
ALBUMIN SERPL-MCNC: 2.7 G/DL (ref 3.5–5)
ALBUMIN/GLOB SERPL: 0.6 {RATIO} (ref 1.1–2.2)
ALP SERPL-CCNC: 106 U/L (ref 45–117)
ALT SERPL-CCNC: 13 U/L (ref 12–78)
ANION GAP SERPL CALC-SCNC: 11 MMOL/L (ref 5–15)
AST SERPL-CCNC: 20 U/L (ref 15–37)
BASOPHILS # BLD: 0 K/UL (ref 0–0.1)
BASOPHILS NFR BLD: 1 % (ref 0–1)
BILIRUB SERPL-MCNC: 1.1 MG/DL (ref 0.2–1)
BNP SERPL-MCNC: 425 PG/ML (ref 0–450)
BUN SERPL-MCNC: 18 MG/DL (ref 6–20)
BUN/CREAT SERPL: 18 (ref 12–20)
CALCIUM SERPL-MCNC: 8.7 MG/DL (ref 8.5–10.1)
CHLORIDE SERPL-SCNC: 104 MMOL/L (ref 97–108)
CO2 SERPL-SCNC: 21 MMOL/L (ref 21–32)
CREAT SERPL-MCNC: 1 MG/DL (ref 0.7–1.3)
EOSINOPHIL # BLD: 0.3 K/UL (ref 0–0.4)
EOSINOPHIL NFR BLD: 5 % (ref 0–7)
ERYTHROCYTE [DISTWIDTH] IN BLOOD BY AUTOMATED COUNT: 17.9 % (ref 11.5–14.5)
GLOBULIN SER CALC-MCNC: 4.9 G/DL (ref 2–4)
GLUCOSE SERPL-MCNC: 119 MG/DL (ref 65–100)
HCT VFR BLD AUTO: 36.9 % (ref 36.6–50.3)
HGB BLD-MCNC: 11.8 G/DL (ref 12.1–17)
LACTATE SERPL-SCNC: 0.9 MMOL/L (ref 0.4–2)
LYMPHOCYTES # BLD: 1 K/UL (ref 0.8–3.5)
LYMPHOCYTES NFR BLD: 20 % (ref 12–49)
MCH RBC QN AUTO: 27.4 PG (ref 26–34)
MCHC RBC AUTO-ENTMCNC: 32 G/DL (ref 30–36.5)
MCV RBC AUTO: 85.6 FL (ref 80–99)
MONOCYTES # BLD: 0.8 K/UL (ref 0–1)
MONOCYTES NFR BLD: 17 % (ref 5–13)
NEUTS SEG # BLD: 2.8 K/UL (ref 1.8–8)
NEUTS SEG NFR BLD: 57 % (ref 32–75)
PLATELET # BLD AUTO: 200 K/UL (ref 150–400)
POTASSIUM SERPL-SCNC: 3.6 MMOL/L (ref 3.5–5.1)
PROT SERPL-MCNC: 7.6 G/DL (ref 6.4–8.2)
RBC # BLD AUTO: 4.31 M/UL (ref 4.1–5.7)
SODIUM SERPL-SCNC: 136 MMOL/L (ref 136–145)
TROPONIN I SERPL-MCNC: <0.04 NG/ML
WBC # BLD AUTO: 4.9 K/UL (ref 4.1–11.1)

## 2017-11-18 PROCEDURE — 65270000032 HC RM SEMIPRIVATE

## 2017-11-18 PROCEDURE — 80053 COMPREHEN METABOLIC PANEL: CPT | Performed by: EMERGENCY MEDICINE

## 2017-11-18 PROCEDURE — 36600 WITHDRAWAL OF ARTERIAL BLOOD: CPT

## 2017-11-18 PROCEDURE — 74011636320 HC RX REV CODE- 636/320: Performed by: EMERGENCY MEDICINE

## 2017-11-18 PROCEDURE — 99285 EMERGENCY DEPT VISIT HI MDM: CPT

## 2017-11-18 PROCEDURE — 74011250636 HC RX REV CODE- 250/636: Performed by: EMERGENCY MEDICINE

## 2017-11-18 PROCEDURE — 74011000258 HC RX REV CODE- 258: Performed by: EMERGENCY MEDICINE

## 2017-11-18 PROCEDURE — 83880 ASSAY OF NATRIURETIC PEPTIDE: CPT | Performed by: EMERGENCY MEDICINE

## 2017-11-18 PROCEDURE — 36415 COLL VENOUS BLD VENIPUNCTURE: CPT | Performed by: EMERGENCY MEDICINE

## 2017-11-18 PROCEDURE — 87040 BLOOD CULTURE FOR BACTERIA: CPT | Performed by: EMERGENCY MEDICINE

## 2017-11-18 PROCEDURE — 83605 ASSAY OF LACTIC ACID: CPT | Performed by: EMERGENCY MEDICINE

## 2017-11-18 PROCEDURE — 74177 CT ABD & PELVIS W/CONTRAST: CPT

## 2017-11-18 PROCEDURE — 84484 ASSAY OF TROPONIN QUANT: CPT | Performed by: EMERGENCY MEDICINE

## 2017-11-18 PROCEDURE — 96365 THER/PROPH/DIAG IV INF INIT: CPT

## 2017-11-18 PROCEDURE — 71275 CT ANGIOGRAPHY CHEST: CPT

## 2017-11-18 PROCEDURE — 82803 BLOOD GASES ANY COMBINATION: CPT

## 2017-11-18 PROCEDURE — 85025 COMPLETE CBC W/AUTO DIFF WBC: CPT | Performed by: EMERGENCY MEDICINE

## 2017-11-18 PROCEDURE — 93005 ELECTROCARDIOGRAM TRACING: CPT

## 2017-11-18 RX ORDER — SODIUM CHLORIDE 0.9 % (FLUSH) 0.9 %
10 SYRINGE (ML) INJECTION
Status: COMPLETED | OUTPATIENT
Start: 2017-11-18 | End: 2017-11-18

## 2017-11-18 RX ORDER — SODIUM CHLORIDE 0.9 % (FLUSH) 0.9 %
5-10 SYRINGE (ML) INJECTION EVERY 8 HOURS
Status: DISCONTINUED | OUTPATIENT
Start: 2017-11-18 | End: 2017-11-22 | Stop reason: HOSPADM

## 2017-11-18 RX ORDER — PREDNISONE 20 MG/1
40 TABLET ORAL
Status: DISCONTINUED | OUTPATIENT
Start: 2017-11-18 | End: 2017-11-22 | Stop reason: HOSPADM

## 2017-11-18 RX ORDER — AMLODIPINE BESYLATE 5 MG/1
5 TABLET ORAL
Status: DISCONTINUED | OUTPATIENT
Start: 2017-11-19 | End: 2017-11-22 | Stop reason: HOSPADM

## 2017-11-18 RX ORDER — ATORVASTATIN CALCIUM 20 MG/1
20 TABLET, FILM COATED ORAL
Status: DISCONTINUED | OUTPATIENT
Start: 2017-11-18 | End: 2017-11-22 | Stop reason: HOSPADM

## 2017-11-18 RX ORDER — ZOLPIDEM TARTRATE 10 MG/1
10 TABLET ORAL
Status: DISCONTINUED | OUTPATIENT
Start: 2017-11-18 | End: 2017-11-22 | Stop reason: HOSPADM

## 2017-11-18 RX ORDER — IPRATROPIUM BROMIDE AND ALBUTEROL SULFATE 2.5; .5 MG/3ML; MG/3ML
3 SOLUTION RESPIRATORY (INHALATION)
Status: DISCONTINUED | OUTPATIENT
Start: 2017-11-19 | End: 2017-11-21

## 2017-11-18 RX ORDER — FINASTERIDE 5 MG/1
5 TABLET, FILM COATED ORAL DAILY
Status: DISCONTINUED | OUTPATIENT
Start: 2017-11-19 | End: 2017-11-22 | Stop reason: HOSPADM

## 2017-11-18 RX ORDER — TAMSULOSIN HYDROCHLORIDE 0.4 MG/1
0.4 CAPSULE ORAL
Status: DISCONTINUED | OUTPATIENT
Start: 2017-11-18 | End: 2017-11-22 | Stop reason: HOSPADM

## 2017-11-18 RX ORDER — CETIRIZINE HCL 10 MG
10 TABLET ORAL DAILY
Status: DISCONTINUED | OUTPATIENT
Start: 2017-11-19 | End: 2017-11-22 | Stop reason: HOSPADM

## 2017-11-18 RX ORDER — PANTOPRAZOLE SODIUM 40 MG/1
40 TABLET, DELAYED RELEASE ORAL
Status: DISCONTINUED | OUTPATIENT
Start: 2017-11-19 | End: 2017-11-19 | Stop reason: ALTCHOICE

## 2017-11-18 RX ORDER — ENOXAPARIN SODIUM 100 MG/ML
40 INJECTION SUBCUTANEOUS EVERY 24 HOURS
Status: DISCONTINUED | OUTPATIENT
Start: 2017-11-18 | End: 2017-11-22 | Stop reason: HOSPADM

## 2017-11-18 RX ORDER — SODIUM CHLORIDE 0.9 % (FLUSH) 0.9 %
5-10 SYRINGE (ML) INJECTION AS NEEDED
Status: DISCONTINUED | OUTPATIENT
Start: 2017-11-18 | End: 2017-11-22 | Stop reason: HOSPADM

## 2017-11-18 RX ADMIN — IOHEXOL 50 ML: 240 INJECTION, SOLUTION INTRATHECAL; INTRAVASCULAR; INTRAVENOUS; ORAL at 20:04

## 2017-11-18 RX ADMIN — SODIUM CHLORIDE 100 ML: 900 INJECTION, SOLUTION INTRAVENOUS at 20:04

## 2017-11-18 RX ADMIN — IOPAMIDOL 100 ML: 755 INJECTION, SOLUTION INTRAVENOUS at 20:04

## 2017-11-18 RX ADMIN — AZITHROMYCIN MONOHYDRATE 500 MG: 500 INJECTION, POWDER, LYOPHILIZED, FOR SOLUTION INTRAVENOUS at 23:49

## 2017-11-18 RX ADMIN — Medication 10 ML: at 20:04

## 2017-11-18 RX ADMIN — CEFTRIAXONE SODIUM 1 G: 1 INJECTION, POWDER, FOR SOLUTION INTRAMUSCULAR; INTRAVENOUS at 21:52

## 2017-11-18 NOTE — IP AVS SNAPSHOT
[address] 
[phone] Patient: Sari Rosales MRN: [id number] UQD:[id number] About your hospitalization You were admitted on:  November 18, 2017 You last received care in the:  [facility] 2N MED SURG You were discharged on:  November 22, 2017 Why you were hospitalized Your primary diagnosis was:  Not on File Your diagnoses also included:  Pneumonia Things You Need To Do (next 8 weeks) Follow up with Tami Ramires NP in 1 week(s) Phone:  [phone] Where:  [address], [address] [facility] [address] Follow up with Pj Blanc MD in 2 week(s) Phone:  [phone] Where:  [address] [facility], [address] [facility], [address] [address] Friday Dec 01, 2017 CT CHEST ABD PELV W CONT with [facility] CT ER 1 at  9:30 AM  
CONTRAST STUDY: 1. The patient should not eat solid food four hours before the appointment but should be encouraged to drink clear liquids. 2.  If you have to drink oral contrast, please pick it up any weekday prior to your appointment, if you cannot please check in 2 hrs before appt time. 3.  The patient will require IV access for contrast administration. 4.  The patient should not take Ibuprofen (Advil, Motrin, etc.) and Naproxen Sodium (Aleve, etc.)  on the day of the exam. Stopping non-steroidal anti-inflammatory agents (NSAIDs) like Ibuprofen decreases the risk of kidney damage from the x-ray contrast (dye). 5.  [facility] films/images pertaining to the area of interest with you on the day of appointment. 6.  Bring current lab work if available (within last 90 days CMP) ***If scheduled at [facility], iSTAT is not available, labs will need to be done before appointment*** 7. Check in at registration at least 30 minutes before appt time unless you were instructed to do otherwise. Where:  [facility] RAD CT ([address]) Tuesday Dec 05, 2017 Follow Up with Rogelio Larkin MD at 10:00 AM  
Where: [address] [facility] Oncology at [facility]) Discharge Orders None A check max indicates which time of day the medication should be taken. My Medications TAKE these medications as instructed Instructions Each Dose to Equal  
 Morning Noon Evening Bedtime  
 albuterol sulfate 90 mcg/actuation Aepb Your last dose was: Your next dose is: Take 1 Puff by inhalation every six (6) hours as needed. Indications: Chronic Obstructive Pulmonary Disease 1 Puff * AMBIEN 5 mg tablet Generic drug:  zolpidem Your last dose was: Your next dose is: Take  by mouth nightly as needed for Sleep. * zolpidem 10 mg tablet Commonly known as:  AMBIEN Your last dose was: Your next dose is:    
   
   
      
   
   
   
  
 amLODIPine 5 mg tablet Commonly known as:  Neptali Tran Your last dose was: Your next dose is: Take 5 mg by mouth every morning. Indications: HYPERTENSION  
 5 mg  
    
   
   
   
  
 amoxicillin-clavulanate 875-125 mg per tablet Commonly known as:  AUGMENTIN Your last dose was: Your next dose is: Take 1 Tab by mouth every twelve (12) hours. 1 Tab  
    
   
   
   
  
 cetirizine 10 mg tablet Commonly known as:  ZYRTEC Your last dose was: Your next dose is:    
   
   
      
   
   
   
  
 finasteride 5 mg tablet Commonly known as:  PROSCAR Your last dose was: Your next dose is: Take 5 mg by mouth daily. 5 mg HYDROcodone-acetaminophen 5-325 mg per tablet Commonly known as:  Mehreen Dickens Your last dose was: Your next dose is:    
   
   
 as needed. TAKES AS NEEDED FOR SLEEP  
     
   
   
   
  
 LIPITOR 20 mg tablet Generic drug:  atorvastatin Your last dose was: Your next dose is: Take 20 mg by mouth nightly. 20 mg  
    
   
   
   
  
 omeprazole 20 mg capsule Commonly known as:  PRILOSEC Your last dose was: Your next dose is: Take 20 mg by mouth every morning. 20 mg  
    
   
   
   
  
 predniSONE 5 mg tablet Commonly known as:  Ginger Greek Your last dose was: Your next dose is: STARTING ON 4/28 - TAKE 6 TABLETS THE FIRST DAY, THEN TAKE 1 TABLET LESS EACH DAY (6,5,4,3,2,1) RAPAFLO 8 mg capsule Generic drug:  silodosin Your last dose was: Your next dose is: Take 8 mg by mouth nightly. 8 mg SALINE MIST 0.65 % nasal spray Generic drug:  sodium chloride Your last dose was: Your next dose is:    
   
   
 1 Spray as needed for Congestion. 1 Spray  
    
   
   
   
  
 tamsulosin 0.4 mg capsule Commonly known as:  FLOMAX Your last dose was: Your next dose is: Take 0.4 mg by mouth nightly. 0.4 mg  
    
   
   
   
  
 triamcinolone acetonide 0.025 % ointment Commonly known as:  KENALOG Your last dose was: Your next dose is: * Notice: This list has 2 medication(s) that are the same as other medications prescribed for you. Read the directions carefully, and ask your doctor or other care provider to review them with you. Where to Get Your Medications Information on where to get these meds will be given to you by the nurse or doctor. ! Ask your nurse or doctor about these medications  
  albuterol sulfate 90 mcg/actuation Aepb  
 amoxicillin-clavulanate 875-125 mg per tablet Discharge Instructions Monitor breathing Use rescue inhaler as needed basis. Introducing [facility]! Dear Leny Blanc: Thank you for requesting a hulu account. Our records indicate that you already have an active hulu account. You can access your account anytime at [email or web address] Did you know that you can access your hospital and ER discharge instructions at any time in hulu? You can also review all of your test results from your hospital stay or ER visit. Additional Information If you have questions, please visit the Frequently Asked Questions section of the hulu website at [email or web address] Remember, hulu is NOT to be used for urgent needs. For medical emergencies, dial 911. Now available from your iPhone and Android! Unresulted Labs-Please follow up with your PCP about these lab tests Order Current Status CULTURE, BLOOD, PAIRED Preliminary result Providers Seen During Your Hospitalization Provider Specialty Primary office phone Carley Phillips MD Emergency Medicine [phone] Sabina Woods MD Internal Medicine [phone] Angel Chakraborty MD Internal Medicine [phone] Your Primary Care Physician (PCP) Primary Care Physician Office Phone Office Fax Param Turner [phone] You are allergic to the following No active allergies Recent Documentation Height Weight BMI Smoking Status 1.753 m 79.8 kg 25.99 kg/m2 Former Smoker Emergency Contacts Name Discharge Info Relation Home Work Mobile Tanya Vides CAREGIVER [3] Daughter [21] [phone] Martha Motta ([facility])  Other Relative [6] [phone] Nova Gibbs  Child [2] Patient Belongings The following personal items are in your possession at time of discharge: 
  Dental Appliances:  At bedside, Lowers, Uppers  Visual Aid: None      Home Medications: Locked (in patient drawer)   Jewelry: Ring, Watch Clothing: At bedside    Other Valuables: Cell Phone, Eyeglasses, Personal electronic devices (comment) (Apple I-Pad)  Personal Items Sent to Safe: none Please provide this summary of care documentation to your next provider. Signatures-by signing, you are acknowledging that this After Visit Summary has been reviewed with you and you have received a copy. Patient Signature:  ____________________________________________________________ Date:  ____________________________________________________________  
  
Aloma Snellen Provider Signature:  ____________________________________________________________ Date:  ____________________________________________________________

## 2017-11-18 NOTE — ED PROVIDER NOTES
HPI Comments: 68 y.o. male with past medical history significant for calculus of the kidney, COPD, contact dermatitis, eczema, GERD, HTN, arrhythmia, PUD, skin cancer, bladder cancer, kidney stones, Shepherd's esophagus, hiatal hernia, and abdominal aneurysm who presents to the ED with chief complaint of SOB. Patient reports being diagnosed with pneumonia via chest X-Ray at [facility] in October 2017. Patient reports being referred to a pulmonologist, Dr. Samira Deng MD, for his pneumonia; reports being treated with antibiotics, Breo, Albuterol nebulizer treatments, and steroid medications. Patient reports being told his most recent chest X-Ray showed his pneumonia had improved; reports no longer being on any antibiotics. Patient reports continued SOB, cough productive of \"clear white\" mucous, and a \"low grade\" fever. Patient reports checking his SpO2 at a nearby [facility]; reports it was 88%. Patient denies being on oxygen therapy at home. Patient reports a history of COPD, Shepherd's esophagus, bladder cancer; reports recently undergoing his second round of BCG treatment for his bladder cancer \"a while back. \" Patient reports his gastroenterologist is Dr. Michael Tilley MD; reports his endoscopy for his Shepherd's esophagus was postponed due to his pneumonia. Patient reports his biopsy for his bladder cancer was postponed due to his pneumonia. Patient denies chest pain, leg pain, leg swelling, and fever. There are no other acute medical concerns at this time. PCP: Raffi Estrada NP    Note written by Tari Hilario, as dictated by Júnior Reed MD 5:01 PM     The history is provided by the patient and a relative.         Past Medical History:   Diagnosis Date    Aneurysm [facility])     ABDOMINAL     Arrhythmia 3/2/2014    Stated cardiac cath for abn EKG several years ago NEG    Calculus of kidney     Cancer ([facility] [address]     Skin/Bladder    Chronic kidney disease     STONES    Chronic obstructive pulmonary disease ([facility] [address]     Contact dermatitis and other eczema, due to unspecified cause     GERD (gastroesophageal reflux disease)     Hypertension     Ill-defined condition     HIATEL HERNIA    Ill-defined condition     ECZEMA    Other unknown and unspecified cause of morbidity or mortality     Shepherd's esophagus    PUD (peptic ulcer disease)     Baretts Esophagus/antral gastritis/       Past Surgical History:   Procedure Laterality Date    ABDOMEN SURGERY PROC UNLISTED      hernia     CARDIAC SURG PROCEDURE UNLIST      Cardiac cath    HX CATARACT REMOVAL Left 11/2016    HX COLONOSCOPY      HX GI      Colonoscopy x 2-3    HX GI      COLONOSCOPY    HX HEENT      Tonsils    HX HERNIA REPAIR  8/21/13    left inguinal hernia repair    HX HERNIA REPAIR  3/14/16    Incisional by     HX OTHER SURGICAL  3/31/15    port a cath    HX OTHER SURGICAL      REMOVAL OF SKIN CA R NECK    HX UROLOGICAL  2010    Bladder bx/kidney stone removal    HX UROLOGICAL  2015    CYSTO    HX VASCULAR ACCESS      PORT R CHEST WALL    HX VASCULAR ACCESS      REMOVAL OF PORT R CHEST WALL         Family History:   Problem Relation Age of Onset    Heart Disease Mother     MS Sister     Heart Disease Sister     Psychiatric Disorder Sister      DEPRESSION    No Known Problems Sister        Social History     Social History    Marital status:      Spouse name: N/A    Number of children: N/A    Years of education: N/A     Occupational History    Not on file. Social History Main Topics    Smoking status: Former Smoker     Quit date: 2/2/2009    Smokeless tobacco: Never Used    Alcohol use No      Comment: RARELY    Drug use: No    Sexual activity: Not Currently     Other Topics Concern    Not on file     Social History Narrative         ALLERGIES: Review of patient's allergies indicates no known allergies. Review of Systems   Constitutional: Positive for fever.    Respiratory: Positive for cough and shortness of breath. Cardiovascular: Negative for chest pain and leg swelling. Musculoskeletal: Negative for myalgias. All other systems reviewed and are negative.       Vitals:    11/18/17 1647   Pulse: 86   Resp: 22   Temp: 98.1 °F (36.7 °C)   SpO2: (!) 88%   Weight: 80 kg (176 lb 5.9 oz)   Height: 5' 9\" (1.753 m)            Physical Exam   Physical Examination: General appearance - alert, elderly, mild distress, oriented to person, place, and time and normal appearing weight  Eyes - pupils equal and reactive, extraocular eye movements intact  Neck - supple, no significant adenopathy  Chest - no increased wob or accessory muscle use, diminished breath sounds b/l with coarse breath sounds b/l  Heart - normal rate, regular rhythm, normal S1, S2, no murmurs, rubs, clicks or gallops  Abdomen - soft, nontender, nondistended, no masses or organomegaly  Back exam - full range of motion, no tenderness, palpable spasm or pain on motion  Neurological - alert, oriented, normal speech, no focal findings or movement disorder noted  Musculoskeletal - no joint tenderness, deformity or swelling  Extremities - peripheral pulses normal, no pedal edema, no clubbing or cyanosis  Skin - normal coloration and turgor, no rashes, no suspicious skin lesions noted  MDM  Number of Diagnoses or Management Options     Amount and/or Complexity of Data Reviewed  Clinical lab tests: ordered and reviewed  Tests in the radiology section of CPT®: ordered and reviewed  Decide to obtain previous medical records or to obtain history from someone other than the patient: yes  Obtain history from someone other than the patient: yes (daughter)  Review and summarize past medical records: yes  Discuss the patient with other providers: yes  Independent visualization of images, tracings, or specimens: yes    Patient Progress  Patient progress: stable    ED Course       Procedures  EKG interpretation: (Preliminary)  Rhythm: normal sinus rhythm; and irregular. Rate (approx.): 89; Axis: normal; KS interval: normal; QRS interval: normal ; ST/T wave: non-specific changes; no significant changes from previous EKG    CONSULT NOTE:  9:53 PM Vale aGrcia MD spoke with Dr. Cecy Arnold MD, Consult for Hospitalist. Discussed available diagnostic tests and clinical findings. Provider is in agreement with care plans as outlined. Dr. Cecy Arnold MD will see and admit the patient. Pt and family updated on test results and plan for admission.

## 2017-11-18 NOTE — IP AVS SNAPSHOT
[address] [facility] [address] [place] [address] 
[phone] Patient: Germaine Clark MRN: [id number] LAC:[birth year] My Medications TAKE these medications as instructed Instructions Each Dose to Equal  
 Morning Noon Evening Bedtime  
 albuterol sulfate 90 mcg/actuation Aepb Your last dose was: Your next dose is: Take 1 Puff by inhalation every six (6) hours as needed. Indications: Chronic Obstructive Pulmonary Disease 1 Puff * AMBIEN 5 mg tablet Generic drug:  zolpidem Your last dose was: Your next dose is: Take  by mouth nightly as needed for Sleep. * zolpidem 10 mg tablet Commonly known as:  AMBIEN Your last dose was: Your next dose is:    
   
   
      
   
   
   
  
 amLODIPine 5 mg tablet Commonly known as:  Neptali Tran Your last dose was: Your next dose is: Take 5 mg by mouth every morning. Indications: HYPERTENSION  
 5 mg  
    
   
   
   
  
 amoxicillin-clavulanate 875-125 mg per tablet Commonly known as:  AUGMENTIN Your last dose was: Your next dose is: Take 1 Tab by mouth every twelve (12) hours. 1 Tab  
    
   
   
   
  
 cetirizine 10 mg tablet Commonly known as:  ZYRTEC Your last dose was: Your next dose is:    
   
   
      
   
   
   
  
 finasteride 5 mg tablet Commonly known as:  PROSCAR Your last dose was: Your next dose is: Take 5 mg by mouth daily. 5 mg HYDROcodone-acetaminophen 5-325 mg per tablet Commonly known as:  Mehreen Dickens Your last dose was: Your next dose is:    
   
   
 as needed. TAKES AS NEEDED FOR SLEEP  
     
   
   
   
  
 LIPITOR 20 mg tablet Generic drug:  atorvastatin Your last dose was: Your next dose is: Take 20 mg by mouth nightly. 20 mg  
    
   
   
   
  
 omeprazole 20 mg capsule Commonly known as:  PRILOSEC Your last dose was: Your next dose is: Take 20 mg by mouth every morning. 20 mg  
    
   
   
   
  
 predniSONE 5 mg tablet Commonly known as:  Janay Chill Your last dose was: Your next dose is: STARTING ON 4/28 - TAKE 6 TABLETS THE FIRST DAY, THEN TAKE 1 TABLET LESS EACH DAY (6,5,4,3,2,1) RAPAFLO 8 mg capsule Generic drug:  silodosin Your last dose was: Your next dose is: Take 8 mg by mouth nightly. 8 mg SALINE MIST 0.65 % nasal spray Generic drug:  sodium chloride Your last dose was: Your next dose is:    
   
   
 1 Spray as needed for Congestion. 1 Spray  
    
   
   
   
  
 tamsulosin 0.4 mg capsule Commonly known as:  FLOMAX Your last dose was: Your next dose is: Take 0.4 mg by mouth nightly. 0.4 mg  
    
   
   
   
  
 triamcinolone acetonide 0.025 % ointment Commonly known as:  KENALOG Your last dose was: Your next dose is: * Notice: This list has 2 medication(s) that are the same as other medications prescribed for you. Read the directions carefully, and ask your doctor or other care provider to review them with you. Where to Get Your Medications Information on where to get these meds will be given to you by the nurse or doctor. ! Ask your nurse or doctor about these medications  
  albuterol sulfate 90 mcg/actuation Aepb  
 amoxicillin-clavulanate 875-125 mg per tablet

## 2017-11-18 NOTE — ED TRIAGE NOTES
Referred from [facility] after being dx and treated for pneumonia since 10/7/17. SOB progressively getting worse with O2 sats in the high 80s.

## 2017-11-19 ENCOUNTER — APPOINTMENT (OUTPATIENT)
Dept: GENERAL RADIOLOGY | Age: 77
DRG: 194 | End: 2017-11-19
Attending: INTERNAL MEDICINE
Payer: MEDICARE

## 2017-11-19 LAB
ANION GAP SERPL CALC-SCNC: 8 MMOL/L (ref 5–15)
ARTERIAL PATENCY WRIST A: YES
ATRIAL RATE: 89 BPM
BASE DEFICIT BLD-SCNC: 3 MMOL/L
BDY SITE: ABNORMAL
BUN SERPL-MCNC: 11 MG/DL (ref 6–20)
BUN/CREAT SERPL: 15 (ref 12–20)
CALCIUM SERPL-MCNC: 8 MG/DL (ref 8.5–10.1)
CALCULATED P AXIS, ECG09: 35 DEGREES
CALCULATED R AXIS, ECG10: -55 DEGREES
CALCULATED T AXIS, ECG11: 33 DEGREES
CHLORIDE SERPL-SCNC: 106 MMOL/L (ref 97–108)
CO2 SERPL-SCNC: 24 MMOL/L (ref 21–32)
CREAT SERPL-MCNC: 0.73 MG/DL (ref 0.7–1.3)
DIAGNOSIS, 93000: NORMAL
ERYTHROCYTE [DISTWIDTH] IN BLOOD BY AUTOMATED COUNT: 18 % (ref 11.5–14.5)
GAS FLOW.O2 O2 DELIVERY SYS: ABNORMAL L/MIN
GAS FLOW.O2 SETTING OXYMISER: 4 L/M
GLUCOSE SERPL-MCNC: 103 MG/DL (ref 65–100)
HCO3 BLD-SCNC: 20.6 MMOL/L (ref 22–26)
HCT VFR BLD AUTO: 32.6 % (ref 36.6–50.3)
HGB BLD-MCNC: 10.5 G/DL (ref 12.1–17)
MCH RBC QN AUTO: 27.1 PG (ref 26–34)
MCHC RBC AUTO-ENTMCNC: 32.2 G/DL (ref 30–36.5)
MCV RBC AUTO: 84.2 FL (ref 80–99)
P-R INTERVAL, ECG05: 154 MS
PCO2 BLD: 26.8 MMHG (ref 35–45)
PH BLD: 7.49 [PH] (ref 7.35–7.45)
PLATELET # BLD AUTO: 163 K/UL (ref 150–400)
PO2 BLD: 48 MMHG (ref 80–100)
POTASSIUM SERPL-SCNC: 3.5 MMOL/L (ref 3.5–5.1)
Q-T INTERVAL, ECG07: 356 MS
QRS DURATION, ECG06: 118 MS
QTC CALCULATION (BEZET), ECG08: 433 MS
RBC # BLD AUTO: 3.87 M/UL (ref 4.1–5.7)
SAO2 % BLD: 88 % (ref 92–97)
SODIUM SERPL-SCNC: 138 MMOL/L (ref 136–145)
SPECIMEN TYPE: ABNORMAL
VENTRICULAR RATE, ECG03: 89 BPM
WBC # BLD AUTO: 4.3 K/UL (ref 4.1–11.1)

## 2017-11-19 PROCEDURE — 36415 COLL VENOUS BLD VENIPUNCTURE: CPT | Performed by: HOSPITALIST

## 2017-11-19 PROCEDURE — 87899 AGENT NOS ASSAY W/OPTIC: CPT | Performed by: HOSPITALIST

## 2017-11-19 PROCEDURE — 74011000250 HC RX REV CODE- 250: Performed by: HOSPITALIST

## 2017-11-19 PROCEDURE — 77030032490 HC SLV COMPR SCD KNE COVD -B

## 2017-11-19 PROCEDURE — 74011250636 HC RX REV CODE- 250/636: Performed by: INTERNAL MEDICINE

## 2017-11-19 PROCEDURE — 87070 CULTURE OTHR SPECIMN AEROBIC: CPT | Performed by: HOSPITALIST

## 2017-11-19 PROCEDURE — 77030029684 HC NEB SM VOL KT MONA -A

## 2017-11-19 PROCEDURE — 65270000032 HC RM SEMIPRIVATE

## 2017-11-19 PROCEDURE — 74011636637 HC RX REV CODE- 636/637: Performed by: HOSPITALIST

## 2017-11-19 PROCEDURE — 94640 AIRWAY INHALATION TREATMENT: CPT

## 2017-11-19 PROCEDURE — 80048 BASIC METABOLIC PNL TOTAL CA: CPT | Performed by: HOSPITALIST

## 2017-11-19 PROCEDURE — 77010033678 HC OXYGEN DAILY

## 2017-11-19 PROCEDURE — 85027 COMPLETE CBC AUTOMATED: CPT | Performed by: HOSPITALIST

## 2017-11-19 PROCEDURE — 74011250636 HC RX REV CODE- 250/636: Performed by: HOSPITALIST

## 2017-11-19 PROCEDURE — 71010 XR CHEST PORT: CPT

## 2017-11-19 RX ORDER — SODIUM CHLORIDE 9 MG/ML
75 INJECTION, SOLUTION INTRAVENOUS CONTINUOUS
Status: DISPENSED | OUTPATIENT
Start: 2017-11-19 | End: 2017-11-19

## 2017-11-19 RX ORDER — OMEPRAZOLE 20 MG/1
20 CAPSULE, DELAYED RELEASE ORAL DAILY
Status: DISCONTINUED | OUTPATIENT
Start: 2017-11-19 | End: 2017-11-22 | Stop reason: HOSPADM

## 2017-11-19 RX ADMIN — AMLODIPINE BESYLATE 5 MG: 5 TABLET ORAL at 07:34

## 2017-11-19 RX ADMIN — ATORVASTATIN CALCIUM 20 MG: 20 TABLET, FILM COATED ORAL at 22:07

## 2017-11-19 RX ADMIN — IPRATROPIUM BROMIDE AND ALBUTEROL SULFATE 3 ML: .5; 3 SOLUTION RESPIRATORY (INHALATION) at 20:40

## 2017-11-19 RX ADMIN — PIPERACILLIN AND TAZOBACTAM 10.12 G: 3; .375 INJECTION, POWDER, FOR SOLUTION INTRAVENOUS at 00:59

## 2017-11-19 RX ADMIN — TAMSULOSIN HYDROCHLORIDE 0.4 MG: 0.4 CAPSULE ORAL at 04:59

## 2017-11-19 RX ADMIN — IPRATROPIUM BROMIDE AND ALBUTEROL SULFATE 3 ML: .5; 3 SOLUTION RESPIRATORY (INHALATION) at 09:24

## 2017-11-19 RX ADMIN — IPRATROPIUM BROMIDE AND ALBUTEROL SULFATE 3 ML: .5; 3 SOLUTION RESPIRATORY (INHALATION) at 02:04

## 2017-11-19 RX ADMIN — TAMSULOSIN HYDROCHLORIDE 0.4 MG: 0.4 CAPSULE ORAL at 22:07

## 2017-11-19 RX ADMIN — PREDNISONE 40 MG: 20 TABLET ORAL at 07:35

## 2017-11-19 RX ADMIN — Medication 10 MG: at 08:17

## 2017-11-19 RX ADMIN — ATORVASTATIN CALCIUM 20 MG: 20 TABLET, FILM COATED ORAL at 05:00

## 2017-11-19 RX ADMIN — OMEPRAZOLE 20 MG: 20 CAPSULE, DELAYED RELEASE ORAL at 07:34

## 2017-11-19 RX ADMIN — PIPERACILLIN SODIUM AND TAZOBACTAM SODIUM 3.38 G: .375; 3 INJECTION, POWDER, LYOPHILIZED, FOR SOLUTION INTRAVENOUS at 00:17

## 2017-11-19 RX ADMIN — SODIUM CHLORIDE 75 ML/HR: 900 INJECTION, SOLUTION INTRAVENOUS at 01:00

## 2017-11-19 NOTE — PROGRESS NOTES
Pulmonary    Pt with bladder cancer, COPD and chronic interstitial lung disease followed by Dr Snehal Yu who presented with continued shortness of breath and need for O2  Chest CT with chronic changes and possible worsening infiltrates   Has been started on zosyn and zithromax as well as prednisone  He is on O2 at 4 L/min   Sputum and blood cultures are pending  He is afebrile with a normal WBC    --will check bnp, esr and LDH  --continue O2 / abx and steroids    Dr Snehal Yu to follow on 11/20    Stephanie Ceja MD

## 2017-11-19 NOTE — ROUTINE PROCESS
{[facility] BEDSIDE_Verbal report:  shift change report given  by kristen (offgoing nurse). Report included the following information Kardex, Intake/Output, MAR, Accordion, Recent Results and Med Rec Status.

## 2017-11-19 NOTE — H&P
[address]   [address], [address]   HISTORY AND PHYSICAL       Name:  Violetta Jaime   MR#:  [id number]   :  1940   Account #:  [de-identified]        Date of Adm:  2017       PRIMARY CARE PHYSICIAN: Sultana Malloy, SHAHID     PULMONOLOGIST: Dr. Devi Vega: Cough, shortness of breath. HISTORY OF PRESENT ILLNESS: The patient is a 66-year-old   gentleman who has previous history significant for esophageal   stricture, bladder cancer, status post cystoscopy and BCG treatment in   . He started feeling sick a few months ago. The patient says that he   started having cough. He was seen by his primary care physician and   was given antibiotics, which did not help. Later on he was seen at   [facility] and had another course of antibiotics, without any   significant improvement. The patient does not remember the name of   the antibiotic, but he took 2 course of Levaquin, one course of Z-  JOVON, and 1 more course of another antibiotic. He has been followed up by Dr. Linda Thorpe, who according to him, cleared him recently. However, he has   been still coughing and feels short of breath. He checked his pulse   oximetry and it was low. Finally, the patient was seen at [facility]   and was later on sent to the emergency room as his chest x-ray   revealed pneumonia. The patient denies having any choking or   aspiration. In the emergency room, a CTA was done, which revealed   worsening of bilateral lung infiltrate superimposed on severe chronic   lung disease. The patient says he takes nebulizers on as-needed   basis. His other complaint is thick postnasal discharge. The patient   says he is supposed to see an ENT physician for that. He denies   having any high-grade fever. PAST MEDICAL HISTORY     1. Significant for esophageal stricture. 2. History of bladder cancer. 3. History of COPD.    4. 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   5. History of Shepherd esophagus. 6. History of hiatal hernia. SOCIOECONOMIC HISTORY: The patient is an ex-smoker. He quit   many years ago. He was a heavy smoker. He does not drink alcohol. He lives alone. CODE STATUS: FULL CODE. MEDICATIONS PRIOR TO ADMISSION   Include   1. Prilosec 20 mg daily. 2. Amlodipine 5 mg daily. 3. He also takes finasteride 5 mg daily. 4. Lipitor 20 mg daily. 5. Claritin 10 mg daily. 6. Zolpidem 5 mg as needed. 7. Lasix p.r.n.   8. Potassium p.r.n. REVIEW OF SYSTEMS: Negative except as mentioned in history of   present illness. All systems were reviewed, no other positive finding   was noticed. PHYSICAL EXAMINATION   GENERAL APPEARANCE: The patient is a 72-year-old gentleman,   not in any acute distress. VITAL SIGNS: Reveal temperature of 98.1, blood pressure is 115/52,   pulse is 74, respiratory rate is 20, saturation on room air was 88%. HEENT: Examination reveals pupils equally reacting to light and   accommodation. NECK: Supple. There is no lymphadenopathy or JVD. CHEST: Distant air entry. No wheezing or crackles. CARDIOVASCULAR: S1 and S2 regular. No murmur. No S3.   ABDOMEN: No tenderness, no guarding, no rigidity. Bowel sounds are   active. EXTREMITIES: No pedal edema. Good peripheral pulses. No cyanosis   or clubbing. CENTRAL NERVOUS SYSTEM: Reveals the patient to be alert,   oriented, has normal strength, normal reflexes. Plantars are   downgoing. Cranial nerves are normal.   SKIN: Unremarkable. MUSCULOSKELETAL: Unremarkable. LABORATORY DATA: Revealed a white count of 4.9, hemoglobin of   11.8, hematocrit 36.9, MCV is 85.6, platelet count is 352,958. Chemistries reveal sodium 136, potassium is 3.6, chloride is 104,   bicarbonate is 21, gap of 11, glucose 119, BUN is 18, creatinine is 1,   calcium is 8.7, bilirubin is 1.1, protein 7.6, albumin is 2.7, globulin is   4.9.  ALT is 13, AST is 20, alkaline phosphatase is 106. Troponin I is   less than 0.04. ProBNP is 425. EKG shows sinus rhythm with premature supraventricular complexes   and left anterior fascicular block with LVH. CTA of the chest was done, which reveals worsening bilateral lung   infiltrates superimposed on severe chronic lung disease. Adenopathy   in the mediastinum was also seen. ASSESSMENT AND PLAN: The patient is a 60-year-old gentleman   with previous history significant for chronic obstructive pulmonary   disease diagnosed recently. He is admitted due to    1. Worsening shortness of breath with cough. CT shows bilateral  pneumonia. The patient will be started on Zosyn. He has taken multiple antibiotics   recently. Will consult Pulmonology. Add DuoNebs. Consider evaluation   for aspiration, although the patient says he does not have any choking   episodes, but can have silent microaspiration due to past history of esophegal stricture. Will ask Speech to   evaluate. 2. History of bladder cancer. 3. Will continue DuoNebs and check ABG. 4. Deep venous thrombosis prophylaxis. 5. Hypertension on Norvasc. 6. Add Prednisone for COPD. 7. Add supplemental Oxygen till ABG done.         Fantasma Curtis MD      [facility]   D:  11/18/2017   22:23   T:  11/19/2017   07:56   Job #:  [id number]

## 2017-11-19 NOTE — PROGRESS NOTES
Primary Nurse Frankie Nguyen RN and Amando Whipple RN performed a dual skin assessment on this patient No impairment noted  David score is 20

## 2017-11-19 NOTE — PROGRESS NOTES
Pt requested to have some snacks. I brought some servando crackers and peanut butter into the room for the patient.

## 2017-11-19 NOTE — PROGRESS NOTES
Hospitalist Progress Note  Davy Thomas MD  Office: [phone]      Date of Service:  2017  NAME:  Raymond Rae  :  1940  MRN:  [id number]      Admission Summary:    66-year-old gentleman who has previous history significant for esophageal   stricture, bladder cancer, status post cystoscopy and BCG treatment in   . He started feeling sick a few months ago. The patient says that he   started having cough. He was seen by his primary care physician and   was given antibiotics. The patient does not remember the name of the antibiotic, but he took 2 course of Levaquin, one course of Z-  JOVON, and 1 more course of another antibiotic. Interval history / Subjective:    Patient does not voice any concern     Assessment & Plan:     1. Bilateral Pneumonia:       Patient  Admitted for pneumonia after been seen and received treatment in outpatient basis, is here because he develop SOB associated with hypoxemia, which is this patient with copd the hypoxemia could be exacerbated. Already in rocephin/zithromax. Will replace the zithromax for a different medication  2. Bladder Ca:       Patient has been follow up as outpatient by urology  3. Hypertension. Stable clinically  4. COPD. Will further therapy, probably patient will need to oxygen, when he is discharge. Code status: full  DVT prophylaxis: heparin    Care Plan discussed with: Patient/Family and Nurse  Disposition: TBD     Hospital Problems  Date Reviewed: 2017          Codes Class Noted POA    Pneumonia ICD-10-CM: J18.9  ICD-9-CM: 486  2017 Unknown                Review of Systems:   Pertinent items are noted in HPI. Vital Signs:    Last 24hrs VS reviewed since prior progress note.  Most recent are:  Visit Vitals    /61 (BP 1 Location: Right arm, BP Patient Position: At rest)    Pulse 88    Temp 98.4 °F (36.9 °C)    Resp 16    Ht 5' 9\" (1.753 m)    Wt 79.8 kg (176 lb)    SpO2 (!) 84%    BMI 25.99 kg/m2       No intake or output data in the 24 hours ending 11/19/17 0956     Physical Examination:             Constitutional:  No acute distress, cooperative, pleasant    ENT:  Oral mucous moist, oropharynx benign. Neck supple,    Resp:  Poor area circulation. No wheezing/rhonchi/rales in right base. No accessory muscle use   CV:  Regular rhythm, normal rate, no murmurs, gallops, rubs    GI:  Soft, non distended, non tender. normoactive bowel sounds, no hepatosplenomegaly     Musculoskeletal:  No edema, warm, 2+ pulses throughout    Neurologic:  Moves all extremities. AAOx3, CN II-XII reviewed            Data Review:    Review and/or order of clinical lab test      Labs:     Recent Labs      11/19/17 0523 11/18/17   1651   WBC  4.3  4.9   HGB  10.5*  11.8*   HCT  32.6*  36.9   PLT  163  200     Recent Labs      11/19/17 0523 11/18/17   1651   NA  138  136   K  3.5  3.6   CL  106  104   CO2  24  21   BUN  11  18   CREA  0.73  1.00   GLU  103*  119*   CA  8.0*  8.7     Recent Labs      11/18/17   1651   SGOT  20   ALT  13   AP  106   TBILI  1.1*   TP  7.6   ALB  2.7*   GLOB  4.9*     No results for input(s): INR, PTP, APTT in the last 72 hours. No lab exists for component: INREXT   No results for input(s): FE, TIBC, PSAT, FERR in the last 72 hours. No results found for: FOL, RBCF   No results for input(s): PH, PCO2, PO2 in the last 72 hours.   Recent Labs      11/18/17   1651   TROIQ  <0.04     No results found for: CHOL, CHOLX, CHLST, CHOLV, HDL, LDL, LDLC, DLDLP, TGLX, TRIGL, TRIGP, CHHD, CHHDX  No results found for: [facility]  Lab Results   Component Value Date/Time    Color YELLOW/STRAW 02/20/2017 11:49 AM    Appearance CLEAR 02/20/2017 11:49 AM    Specific gravity 1.019 02/20/2017 11:49 AM    Specific gravity 1.015 04/18/2012 03:55 AM    pH (UA) 6.0 02/20/2017 11:49 AM    Protein NEGATIVE  02/20/2017 11:49 AM    Glucose NEGATIVE  02/20/2017 11:49 AM    Ketone NEGATIVE  02/20/2017 11:49 AM    Bilirubin NEGATIVE  02/20/2017 11:49 AM    Urobilinogen 0.2 02/20/2017 11:49 AM    Nitrites NEGATIVE  02/20/2017 11:49 AM    Leukocyte Esterase NEGATIVE  02/20/2017 11:49 AM    Epithelial cells FEW 02/20/2017 11:49 AM    Bacteria NEGATIVE  02/20/2017 11:49 AM    WBC 0-4 02/20/2017 11:49 AM    RBC  02/20/2017 11:49 AM         Medications Reviewed:     Current Facility-Administered Medications   Medication Dose Route Frequency    0.9% sodium chloride infusion  75 mL/hr IntraVENous CONTINUOUS    omeprazole (PRILOSEC) capsule 20 mg (Patient Supplied)  20 mg Oral DAILY    sodium chloride (NS) flush 5-10 mL  5-10 mL IntraVENous Q8H    sodium chloride (NS) flush 5-10 mL  5-10 mL IntraVENous PRN    enoxaparin (LOVENOX) injection 40 mg  40 mg SubCUTAneous Q24H    amLODIPine (NORVASC) tablet 5 mg (Patient Supplied)  5 mg Oral 7am    atorvastatin (LIPITOR) tablet 20 mg (Patient Supplied)  20 mg Oral QHS    cetirizine (ZYRTEC) tablet 10 mg (Patient Supplied)  10 mg Oral DAILY    finasteride (PROSCAR) tablet 5 mg (Patient Supplied)  5 mg Oral DAILY    zolpidem (AMBIEN) tablet 10 mg (Patient Supplied)  10 mg Oral QHS PRN    albuterol-ipratropium (DUO-NEB) 2.5 MG-0.5 MG/3 ML  3 mL Nebulization Q6H RT    tamsulosin (FLOMAX) capsule 0.4 mg (Patient Supplied)  0.4 mg Oral QHS    sodium chloride (OCEAN) 0.65 % nasal spray 1 Spray  1 Spray Both Nostrils PRN    predniSONE (DELTASONE) tablet 40 mg  40 mg Oral DAILY WITH BREAKFAST    piperacillin-tazobactam (ZOSYN) 10.125 g in NS infusion  10.125 g IntraVENous Q24H     ______________________________________________________________________  EXPECTED LENGTH OF STAY: - - -  ACTUAL LENGTH OF STAY:          1                 Donovan Johnson MD

## 2017-11-19 NOTE — ROUTINE PROCESS
TRANSFER - OUT REPORT:    Verbal report given to Omid Morillo RN(name) on Karina Kennedy  being transferred to (unit) for routine progression of care       Report consisted of patients Situation, Background, Assessment and   Recommendations(SBAR). Information from the following report(s) SBAR, Kardex, ED Summary and MAR was reviewed with the receiving nurse. Lines:   Peripheral IV 11/18/17 Left Antecubital (Active)   Site Assessment Clean, dry, & intact 11/18/2017  8:13 PM   Phlebitis Assessment 0 11/18/2017  8:13 PM   Infiltration Assessment 0 11/18/2017  8:13 PM   Dressing Status Clean, dry, & intact 11/18/2017  8:13 PM   Dressing Type Transparent 11/18/2017  8:13 PM       Peripheral IV 11/18/17 Left Forearm (Active)   Site Assessment Clean, dry, & intact 11/18/2017 11:01 PM   Phlebitis Assessment 0 11/18/2017 11:01 PM   Infiltration Assessment 0 11/18/2017 11:01 PM   Dressing Status Clean, dry, & intact 11/18/2017 11:01 PM        Opportunity for questions and clarification was provided.       Patient transported with:   Dymant

## 2017-11-20 ENCOUNTER — TELEPHONE (OUTPATIENT)
Dept: ONCOLOGY | Age: 77
End: 2017-11-20

## 2017-11-20 LAB
BNP SERPL-MCNC: 504 PG/ML (ref 0–450)
ERYTHROCYTE [SEDIMENTATION RATE] IN BLOOD: 56 MM/HR (ref 0–20)
FLUID CULTURE, SPNG2: NORMAL
LDH SERPL L TO P-CCNC: 298 U/L (ref 85–241)
ORGANISM ID, SPNG3: NORMAL
PLEASE NOTE, SPNG4: NORMAL
S PNEUM AG SPEC QL LA: NEGATIVE
SPECIMEN SOURCE: NORMAL
SPECIMEN, SPNG1: NORMAL

## 2017-11-20 PROCEDURE — 74011636637 HC RX REV CODE- 636/637: Performed by: HOSPITALIST

## 2017-11-20 PROCEDURE — 92610 EVALUATE SWALLOWING FUNCTION: CPT

## 2017-11-20 PROCEDURE — 36415 COLL VENOUS BLD VENIPUNCTURE: CPT | Performed by: INTERNAL MEDICINE

## 2017-11-20 PROCEDURE — 65270000032 HC RM SEMIPRIVATE

## 2017-11-20 PROCEDURE — 77030029684 HC NEB SM VOL KT MONA -A

## 2017-11-20 PROCEDURE — 94640 AIRWAY INHALATION TREATMENT: CPT

## 2017-11-20 PROCEDURE — 74011250636 HC RX REV CODE- 250/636: Performed by: HOSPITALIST

## 2017-11-20 PROCEDURE — G8997 SWALLOW GOAL STATUS: HCPCS

## 2017-11-20 PROCEDURE — G8996 SWALLOW CURRENT STATUS: HCPCS

## 2017-11-20 PROCEDURE — 83880 ASSAY OF NATRIURETIC PEPTIDE: CPT | Performed by: INTERNAL MEDICINE

## 2017-11-20 PROCEDURE — 77030018729 HC ELECTRD DEFIB PAD CARD -B

## 2017-11-20 PROCEDURE — 77010033678 HC OXYGEN DAILY

## 2017-11-20 PROCEDURE — 74011000250 HC RX REV CODE- 250: Performed by: HOSPITALIST

## 2017-11-20 PROCEDURE — 83615 LACTATE (LD) (LDH) ENZYME: CPT | Performed by: INTERNAL MEDICINE

## 2017-11-20 PROCEDURE — 85652 RBC SED RATE AUTOMATED: CPT | Performed by: INTERNAL MEDICINE

## 2017-11-20 PROCEDURE — G8998 SWALLOW D/C STATUS: HCPCS

## 2017-11-20 RX ORDER — FLUTICASONE PROPIONATE 50 MCG
2 SPRAY, SUSPENSION (ML) NASAL DAILY
Status: DISCONTINUED | OUTPATIENT
Start: 2017-11-20 | End: 2017-11-22 | Stop reason: HOSPADM

## 2017-11-20 RX ADMIN — IPRATROPIUM BROMIDE AND ALBUTEROL SULFATE 3 ML: .5; 3 SOLUTION RESPIRATORY (INHALATION) at 13:05

## 2017-11-20 RX ADMIN — PIPERACILLIN AND TAZOBACTAM 10.12 G: 3; .375 INJECTION, POWDER, FOR SOLUTION INTRAVENOUS at 23:40

## 2017-11-20 RX ADMIN — IPRATROPIUM BROMIDE AND ALBUTEROL SULFATE 3 ML: .5; 3 SOLUTION RESPIRATORY (INHALATION) at 01:43

## 2017-11-20 RX ADMIN — ATORVASTATIN CALCIUM 20 MG: 20 TABLET, FILM COATED ORAL at 22:29

## 2017-11-20 RX ADMIN — FINASTERIDE 5 MG: 5 TABLET, FILM COATED ORAL at 22:29

## 2017-11-20 RX ADMIN — Medication 10 MG: at 10:04

## 2017-11-20 RX ADMIN — PIPERACILLIN AND TAZOBACTAM 10.12 G: 3; .375 INJECTION, POWDER, FOR SOLUTION INTRAVENOUS at 01:16

## 2017-11-20 RX ADMIN — Medication 10 ML: at 22:30

## 2017-11-20 RX ADMIN — OMEPRAZOLE 20 MG: 20 CAPSULE, DELAYED RELEASE ORAL at 07:19

## 2017-11-20 RX ADMIN — IPRATROPIUM BROMIDE AND ALBUTEROL SULFATE 3 ML: .5; 3 SOLUTION RESPIRATORY (INHALATION) at 21:22

## 2017-11-20 RX ADMIN — Medication 10 ML: at 16:02

## 2017-11-20 RX ADMIN — PREDNISONE 40 MG: 20 TABLET ORAL at 07:19

## 2017-11-20 RX ADMIN — TAMSULOSIN HYDROCHLORIDE 0.4 MG: 0.4 CAPSULE ORAL at 22:29

## 2017-11-20 RX ADMIN — AMLODIPINE BESYLATE 5 MG: 5 TABLET ORAL at 07:19

## 2017-11-20 RX ADMIN — IPRATROPIUM BROMIDE AND ALBUTEROL SULFATE 3 ML: .5; 3 SOLUTION RESPIRATORY (INHALATION) at 07:15

## 2017-11-20 NOTE — PROGRESS NOTES
CM noted pt's admission over weekend and reviewed chart. Met with pt at the bedside and reviewed role of CM in transitions of care planning. Pt confirmed he does see NP Clive Gauthier in [place], [place] as primary care doctor but also has specialty doctors including pulmonology, gastroenterology and oncology. He expects to have a procedure soon for current diagnosis of bladder CA and biopsy needed. He confirmed he lives in his own home in [facility] and his daughter lives about a quarter mile away and his adult granddaughter, whom is a nurse here at [facility] on [address] lives next door to her. He expressed some sadness when discussing family and loss of his wife, unsure how recent this was. He describes himself as very independent, active in exercise program at gym, drives self, doesn't use any mobility equipment or home DME. He has a nebulizer that was borrowed from his daughter but he expressed if he continues to need these neb treatments he prefers to have one ordered for him for home use. He has medicare primary and [employer] from employment with [employer]. He expects to discharge home when medically cleared. He did have some questions regarding testing and CM communicated with resource nurse. CM can assist as needed. Héctor Lynn, MSW    Care Management Interventions  PCP Verified by CM: Yes (confirmed with pt)  Mode of Transport at Discharge:  Other (see comment) (family or self)  MyChart Signup: Yes  Physical Therapy Consult: No  Occupational Therapy Consult: No  Speech Therapy Consult: Yes  Current Support Network: Lives Alone, Family Lives Nearby  Confirm Follow Up Transport: Self  Plan discussed with Pt/Family/Caregiver: Yes  Freedom of Choice Offered: Yes  Discharge Location  Discharge Placement: Home

## 2017-11-20 NOTE — TELEPHONE ENCOUNTER
Call returned to patient, HIPAA verified. Patient advised of note by provider. Patient also wanted to verify that CT done in hospital would be sufficient for upcoming visit, reviewed with provider. Patient advised CT as ordered, would be sufficient for visit with provider. Thanked for return call.

## 2017-11-20 NOTE — PROGRESS NOTES
Problem: Falls - Risk of  Goal: *Absence of Falls  Document Harpal Fall Risk and appropriate interventions in the flowsheet.    Outcome: Progressing Towards Goal  Fall Risk Interventions:  Mobility Interventions: Bed/chair exit alarm

## 2017-11-20 NOTE — PROGRESS NOTES
Bedside shift change report given to Shanique Matthews (oncoming nurse) by Geeta Rivera RN (offgoing nurse). Report included the following information SBAR, MAR and Recent Results.

## 2017-11-20 NOTE — TELEPHONE ENCOUNTER
Pt is in [facility] wanted to inform Dr. Sierra Hinton he has had A ct on 11/18/2017 and if he will need anymore labs prior to his appointment on 12/05/2017.   Would like a call back at [phone]

## 2017-11-20 NOTE — PROGRESS NOTES
Hospitalist Progress Note  Norbert Kaur MD  Office: [phone]        Date of Service:  2017  NAME:  Mirza Irene  :  1940  MRN:  [id number]      Admission Summary:   72-year-old gentleman who has previous history significant for esophageal   stricture, bladder cancer, status post cystoscopy and BCG treatment in   . He started feeling sick a few months ago. The patient says that he   started having cough. He was seen by his primary care physician and   was given antibiotics. The patient does not remember the name of the antibiotic, but he took 2 course of Levaquin, one course of Z    Interval history / Subjective:     Patient very anxious, still c/o about the pulmonary physician,      Assessment & Plan:     1. Bilateral Pneumonia:       Patient remain stable, with current antibiotics treatment, with supplemental oxygen. Sputum culture with multiple microorganism, none that has been finalized   2. Bladder Ca:       Patient has been follow up as outpatient by urology  3. Hypertension. Stable clinically  4. COPD. Will further therapy, probably patient will need to oxygen, when he is discharge.     Code status: full  DVT prophylaxis: heparin    Care Plan discussed with: Patient/Family and Nurse  Disposition: TBD     Hospital Problems  Date Reviewed: 2017          Codes Class Noted POA    Pneumonia ICD-10-CM: J18.9  ICD-9-CM: 486  2017 Unknown                Review of Systems:   Pertinent items are noted in HPI. Vital Signs:    Last 24hrs VS reviewed since prior progress note.  Most recent are:  Visit Vitals    /57 (BP 1 Location: Right arm, BP Patient Position: At rest)    Pulse 85    Temp 97.9 °F (36.6 °C)    Resp 16    Ht 5' 9\" (1.753 m)    Wt 79.8 kg (176 lb)    SpO2 91%    BMI 25.99 kg/m2         Intake/Output Summary (Last 24 hours) at 17 0883  Last data filed at 17 0100   Gross per 24 hour   Intake              580 ml   Output                0 ml   Net              580 ml        Physical Examination:             Constitutional:  No acute distress, cooperative, pleasant    ENT:  Oral mucous moist, oropharynx benign. Neck supple,    Resp:  CTA bilaterally. No wheezing/rhonchi/rales. No accessory muscle use   CV:  Regular rhythm, normal rate, no murmurs, gallops, rubs    GI:  Soft, non distended, non tender. normoactive bowel sounds, no hepatosplenomegaly     Musculoskeletal:  No edema, warm, 2+ pulses throughout    Neurologic:  Moves all extremities. AAOx3, CN II-XII reviewed     Psych:  very anxious        Data Review:    Review and/or order of clinical lab test      Labs:     Recent Labs      11/19/17 0523 11/18/17   1651   WBC  4.3  4.9   HGB  10.5*  11.8*   HCT  32.6*  36.9   PLT  163  200     Recent Labs      11/19/17   0523 11/18/17   1651   NA  138  136   K  3.5  3.6   CL  106  104   CO2  24  21   BUN  11  18   CREA  0.73  1.00   GLU  103*  119*   CA  8.0*  8.7     Recent Labs      11/18/17   1651   SGOT  20   ALT  13   AP  106   TBILI  1.1*   TP  7.6   ALB  2.7*   GLOB  4.9*     No results for input(s): INR, PTP, APTT in the last 72 hours. No lab exists for component: INREXT   No results for input(s): FE, TIBC, PSAT, FERR in the last 72 hours. No results found for: FOL, RBCF   No results for input(s): PH, PCO2, PO2 in the last 72 hours.   Recent Labs      11/18/17   1651   TROIQ  <0.04     No results found for: CHOL, CHOLX, CHLST, CHOLV, HDL, LDL, LDLC, DLDLP, TGLX, TRIGL, TRIGP, CHHD, CHHDX  No results found for: [facility]  Lab Results   Component Value Date/Time    Color YELLOW/STRAW 02/20/2017 11:49 AM    Appearance CLEAR 02/20/2017 11:49 AM    Specific gravity 1.019 02/20/2017 11:49 AM    Specific gravity 1.015 04/18/2012 03:55 AM    pH (UA) 6.0 02/20/2017 11:49 AM    Protein NEGATIVE  02/20/2017 11:49 AM    Glucose NEGATIVE  02/20/2017 11:49 AM    Ketone NEGATIVE  02/20/2017 11:49 AM    Bilirubin NEGATIVE  02/20/2017 11:49 AM    Urobilinogen 0.2 02/20/2017 11:49 AM    Nitrites NEGATIVE  02/20/2017 11:49 AM    Leukocyte Esterase NEGATIVE  02/20/2017 11:49 AM    Epithelial cells FEW 02/20/2017 11:49 AM    Bacteria NEGATIVE  02/20/2017 11:49 AM    WBC 0-4 02/20/2017 11:49 AM    RBC  02/20/2017 11:49 AM         Medications Reviewed:     Current Facility-Administered Medications   Medication Dose Route Frequency    fluticasone (FLONASE) 50 mcg/actuation nasal spray 2 Spray  2 Spray Both Nostrils DAILY    omeprazole (PRILOSEC) capsule 20 mg (Patient Supplied)  20 mg Oral DAILY    sodium chloride (NS) flush 5-10 mL  5-10 mL IntraVENous Q8H    sodium chloride (NS) flush 5-10 mL  5-10 mL IntraVENous PRN    enoxaparin (LOVENOX) injection 40 mg  40 mg SubCUTAneous Q24H    amLODIPine (NORVASC) tablet 5 mg (Patient Supplied)  5 mg Oral 7am    atorvastatin (LIPITOR) tablet 20 mg (Patient Supplied)  20 mg Oral QHS    cetirizine (ZYRTEC) tablet 10 mg (Patient Supplied)  10 mg Oral DAILY    finasteride (PROSCAR) tablet 5 mg (Patient Supplied)  5 mg Oral DAILY    zolpidem (AMBIEN) tablet 10 mg (Patient Supplied)  10 mg Oral QHS PRN    albuterol-ipratropium (DUO-NEB) 2.5 MG-0.5 MG/3 ML  3 mL Nebulization Q6H RT    tamsulosin (FLOMAX) capsule 0.4 mg (Patient Supplied)  0.4 mg Oral QHS    sodium chloride (OCEAN) 0.65 % nasal spray 1 Spray  1 Spray Both Nostrils PRN    predniSONE (DELTASONE) tablet 40 mg  40 mg Oral DAILY WITH BREAKFAST    piperacillin-tazobactam (ZOSYN) 10.125 g in NS infusion  10.125 g IntraVENous Q24H     ______________________________________________________________________  EXPECTED LENGTH OF STAY: - - -  ACTUAL LENGTH OF STAY:          2                 Ronni Lozano MD

## 2017-11-20 NOTE — PROGRESS NOTES
NUTRITION COMPLETE ASSESSMENT    RECOMMENDATIONS:   1. Continue current diet order  2. Weekly weights on standing scale     Interventions/Plan:   Food/Nutrient Delivery:   (2gm Na, snacks BID) Commercial supplement (Ensure daily)        Assessment:   Reason for Assessment: [x]BPA/MST Referral (14-23# wt loss, poor appetite)    Diet: Cardiac  Supplements: none  Nutritionally Significant Medications: [x] Reviewed & Includes: azithromycin, ceftriaxone, zosyn, prednisone   Meal Intake:   Patient Vitals for the past 100 hrs:   % Diet Eaten   11/20/17 0849 100 %   11/19/17 1700 100 %     Pre-Hospitalization:  Usual Appetite: Good  Diet at Home: regular + snacks  Vitamins/Supplements: Yes (Boost 1x/day)    Current Hospitalization:   Appetite: Good  PO Ability: Independent Average po intake:%  Average supplements intake:        Subjective:  \"well with all this going on everyone is telling me to take it easy so I haven't been to the gym. I think the weight loss is muscle, I am always snacking and have a good appetite. \"    Objective:  Pt admitted for PNA. PMHx: COPD, GERD, HTN, bladder CA, kidney stones, Shepherd's esophagus, hiatal hernia, esophageal stricture. IV abx and steroids rx. Concerns for possible microaspiration with hx of stricture noted per MD. Seen by SLP today, no aspiration noted. Notable wt loss of 7% x 8 months, but not severe. Pt attributed to decreased physical activity with recurrent PNA. Wt Readings from Last 5 Encounters:   11/19/17 79.8 kg (176 lb)   06/09/17 84.9 kg (187 lb 1.6 oz)   03/07/17 86.2 kg (190 lb)   02/20/17 86.2 kg (190 lb)   12/20/16 86.2 kg (190 lb)     Feeling poorly for the past month. Pt reports eating well at home and always having snacks available. No GI issues with cancer treatment. Discussed oncology dietitian as outpatient resources as needed. Family also bringing in snacks from home, will add extra snacks between meals.  Also will liberalize diet to 2gm Na and add Ensure @ B (strawberry or vanilla - 350kcal, 20g protein) since pt drinks Boost 1x/day at home. With pt eating well will rescreen per protocol. Estimated Nutrition Needs:   Kcals/day: 2125 Kcals/day (1975-2125kcal)  Protein: 88 g (88-104g (1.1-1.3g/kg))  Fluid: 2000 ml (1ml/kcal)  Based On: [place] (x 1.3-1.4)  Weight Used: Actual wt (79.8kg)    Pt expected to meet estimated nutrient needs:  [x]   Yes     []  No [] Unable to predict at this time  Nutrition Diagnosis:   1. Unintended weight loss related to physical inactivity, increased energy expenditure as evidenced by pt reports, PNA, bladder CA     Goals:     Continued consumption of at least 75% of meals and 1-2 snacks/supplements     Monitoring & Evaluation:    - Total energy intake, Liquid meal replacement   - Weight/weight change    Previous Nutrition Goals Met:   N/A  Previous Recommendations:    N/A    Education & Discharge Needs:   [] None Identified   [x] Identified and addressed    [] Participated in care plan, discharge planning, and/or interdisciplinary rounds        Cultural, Samaritan and ethnic food preferences identified: None    Skin Integrity: [x]Intact  []Other  Edema: [x]None []Other  Last BM: 11/18  Food Allergies: [x]None []Other  Diet Restrictions: Cultural/Yarsanism Preference(s): None     Anthropometrics:    Weight Loss Metrics 11/19/2017 6/9/2017 3/7/2017 2/20/2017 12/20/2016 11/23/2016 11/16/2016   Today's Wt 176 lb 187 lb 1.6 oz 190 lb 190 lb 190 lb 196 lb 192 lb 4 oz   BMI 25.99 kg/m2 27.63 kg/m2 28.06 kg/m2 28.06 kg/m2 28.06 kg/m2 28.94 kg/m2 28.39 kg/m2      Weight Source: Other (comment) (not documented)  Height: 5' 9\" (175.3 cm),    Body mass index is 25.99 kg/(m^2).   IBW : 72.6 kg (160 lb), % IBW (Calculated): 110 %  Usual Body Weight: 86.2 kg (190 lb),      Labs:    Lab Results   Component Value Date/Time    Sodium 138 11/19/2017 05:23 AM    Potassium 3.5 11/19/2017 05:23 AM    Chloride 106 11/19/2017 05:23 AM CO2 24 11/19/2017 05:23 AM    Glucose 103 11/19/2017 05:23 AM    BUN 11 11/19/2017 05:23 AM    Creatinine 0.73 11/19/2017 05:23 AM    Calcium 8.0 11/19/2017 05:23 AM    Albumin 2.7 11/18/2017 04:51 PM     Christina Castellanos RD

## 2017-11-20 NOTE — PROGRESS NOTES
Speech Pathology bedside swallow evaluation/discharge  Patient: Aleksander Maddox (89 y.o. male)  Date: 11/20/2017  Primary Diagnosis: Pneumonia        Precautions:        ASSESSMENT :  Patient on 4L NC currently. He denies home 02 use. Based on the objective data described below, the patient presents with functional oropharyngeal swallow. Patient with timely and complete mastication os solids. Pharyngeal swallow initiation is suspected to be timely and hyolaryngeal elevation/excursion functional via palpation. Patient with no overt s/s aspiration with successive cup/straw sips of thin, puree or solids. Frequent belching noted during session. If concern is for aspiration, would consider possibility of post-prandial aspiration given history of Shepherd's esophagus and GERD. He reports he takes Prilosec for GERD. Skilled therapy provided by a speech-language pathologist is not indicated at this time. PLAN :  Recommendations:  -- Continue on a regular diet/ thin liquids. Straws ok- encourage small sips, avoid successive sips  -- strict upright positioning and remain upright after meals  -- meds 1 at a time as tolerated  -- Suspect pharyngeal swallow is wfl. Possibility that there could be post-prandial aspiration given hx of Shepherd's esophagus and GERD. Discharge Recommendations: None     SUBJECTIVE:   Patient stated I never had any trouble swallowing.     OBJECTIVE:     Past Medical History:   Diagnosis Date    Aneurysm [facility])     ABDOMINAL     Arrhythmia 3/2/2014    Stated cardiac cath for abn EKG several years ago NEG    Calculus of kidney     Cancer ([facility] [address]     Skin/Bladder    Chronic kidney disease     STONES    Chronic obstructive pulmonary disease (HCC)     Contact dermatitis and other eczema, due to unspecified cause     GERD (gastroesophageal reflux disease)     Hypertension     Ill-defined condition     HIATEL HERNIA    Ill-defined condition     ECZEMA    Other unknown and unspecified cause of morbidity or mortality     Shepherd's esophagus    PUD (peptic ulcer disease)     Baretts Esophagus/antral gastritis/     Past Surgical History:   Procedure Laterality Date    ABDOMEN SURGERY PROC UNLISTED      hernia     CARDIAC SURG PROCEDURE UNLIST      Cardiac cath    HX CATARACT REMOVAL Left 11/2016    HX COLONOSCOPY      HX GI      Colonoscopy x 2-3    HX GI      COLONOSCOPY    HX HEENT      Tonsils    HX HERNIA REPAIR  8/21/13    left inguinal hernia repair    HX HERNIA REPAIR  3/14/16    Incisional by     HX OTHER SURGICAL  3/31/15    port a cath    HX OTHER SURGICAL      REMOVAL OF SKIN CA R NECK    HX UROLOGICAL  2010    Bladder bx/kidney stone removal    HX UROLOGICAL  2015    CYSTO    HX VASCULAR ACCESS      PORT R CHEST WALL    HX VASCULAR ACCESS      REMOVAL OF PORT R CHEST WALL     Prior Level of Function/Home Situation:   Home Situation  Home Environment: Private residence  # Steps to Enter: 2 (2)  One/Two Story Residence: One story  Living Alone: Yes  Support Systems: Child(samm)  Patient Expects to be Discharged to[de-identified] Private residence  Current DME Used/Available at Home: None  Diet prior to admission: regular/thin   Current Diet:  Regular/thin    Cognitive and Communication Status:  Neurologic State: Alert  Orientation Level: Oriented X4  Cognition: Appropriate decision making  Perception: Appears intact  Perseveration: No perseveration noted  Safety/Judgement: Awareness of environment  Oral Assessment:  Oral Assessment  Labial: No impairment  Dentition: Upper & lower dentures  Oral Hygiene:  (clean, moist)  Lingual: No impairment  Velum: Unable to visualize  Mandible: No impairment  P.O. Trials:  Patient Position:  (upright in bed)  Vocal quality prior to P.O.: No impairment  Consistency Presented: Thin liquid; Solid;Puree; Ice chips  How Presented: Successive swallows;Straw;Cup/sip; Self-fed/presented     Bolus Acceptance: No impairment  Bolus Formation/Control: No impairment     Propulsion: No impairment  Oral Residue: None  Initiation of Swallow: No impairment     Aspiration Signs/Symptoms: None  Pharyngeal Phase Characteristics: No impairment, issues, or problems   Effective Modifications: None  Cues for Modifications: None       Oral Phase Severity: No impairment     NOMS:   The NOMS functional outcome measure was used to quantify this patient's level of swallowing impairment. Based on the NOMS, the patient was determined to be at level 7 for swallow function     G Codes: In compliance with CMSs Claims Based Outcome Reporting, the following G-code set was chosen for this patient based the use of the NOMS functional outcome to quantify this patient's level of swallowing impairment. Using the NOMS, the patient was determined to be at level 7 for swallow function which correlates with the CH= 0% level of severity. Based on the objective assessment provided within this note, the current, goal, and discharge g-codes are as follows:    Swallow  Swallowing:   Swallow Current Status CH= 0%   Swallow Goal Status CH= 0%   Swallow D/C Status CH= 0%        NOMS Swallowing Levels:  Level 1 (CN): NPO  Level 2 (CM): NPO but takes consistency in therapy  Level 3 (CL): Takes less than 50% of nutrition p.o. and continues with nonoral feedings; and/or safe with mod cues; and/or max diet restriction  Level 4 (CK): Safe swallow but needs mod cues; and/or mod diet restriction; and/or still requires some nonoral feeding/supplements  Level 5 (CJ): Safe swallow with min diet restriction; and/or needs min cues  Level 6 (CI): Independent with p.o.; rare cues; usually self cues; may need to avoid some foods or needs extra time  Level 7 ([address] [place] [address]): Independent for all p.o.  AGNIESZKA. (2003). National Outcomes Measurement System (NOMS): Adult Speech-Language Pathology User's Guide.        Pain:Pain Scale 1: Numeric (0 - 10)  Pain Intensity 1: 0     After treatment:   [] Patient left in no apparent distress sitting up in chair  [x] Patient left in no apparent distress in bed  [x] Call bell left within reach  [x] Nursing notified  [] Caregiver present  [] Bed alarm activated    COMMUNICATION/EDUCATION:   The patients plan of care including findings, recommendations, and recommended diet changes were discussed with: Registered Nurse.  [] Patient/family have participated as able and agree with findings and recommendations. [] Patient is unable to participate in plan of care at this time.     Thank you for this referral.  Alisa Belcher, SLP  Time Calculation: 17 mins

## 2017-11-20 NOTE — PROGRESS NOTES
Bedside shift change report given to [address] [place] [address] Avenue (oncoming nurse) by Kalin Darden RN (offgoing nurse). Report included the following information SBAR, Kardex, Intake/Output, MAR and Recent Results.

## 2017-11-21 LAB
BACTERIA SPEC CULT: NORMAL
GRAM STN SPEC: NORMAL
SERVICE CMNT-IMP: NORMAL

## 2017-11-21 PROCEDURE — 77010033678 HC OXYGEN DAILY

## 2017-11-21 PROCEDURE — 74011636637 HC RX REV CODE- 636/637: Performed by: HOSPITALIST

## 2017-11-21 PROCEDURE — 74011250636 HC RX REV CODE- 250/636: Performed by: HOSPITALIST

## 2017-11-21 PROCEDURE — 65270000032 HC RM SEMIPRIVATE

## 2017-11-21 PROCEDURE — 74011250637 HC RX REV CODE- 250/637: Performed by: INTERNAL MEDICINE

## 2017-11-21 PROCEDURE — 74011000250 HC RX REV CODE- 250: Performed by: HOSPITALIST

## 2017-11-21 PROCEDURE — 94640 AIRWAY INHALATION TREATMENT: CPT

## 2017-11-21 RX ORDER — IPRATROPIUM BROMIDE AND ALBUTEROL SULFATE 2.5; .5 MG/3ML; MG/3ML
3 SOLUTION RESPIRATORY (INHALATION)
Status: DISCONTINUED | OUTPATIENT
Start: 2017-11-21 | End: 2017-11-22 | Stop reason: HOSPADM

## 2017-11-21 RX ADMIN — PIPERACILLIN AND TAZOBACTAM 10.12 G: 3; .375 INJECTION, POWDER, FOR SOLUTION INTRAVENOUS at 23:56

## 2017-11-21 RX ADMIN — Medication 10 ML: at 22:06

## 2017-11-21 RX ADMIN — OMEPRAZOLE 20 MG: 20 CAPSULE, DELAYED RELEASE ORAL at 07:06

## 2017-11-21 RX ADMIN — ATORVASTATIN CALCIUM 20 MG: 20 TABLET, FILM COATED ORAL at 22:05

## 2017-11-21 RX ADMIN — Medication 10 MG: at 09:46

## 2017-11-21 RX ADMIN — PREDNISONE 40 MG: 20 TABLET ORAL at 07:03

## 2017-11-21 RX ADMIN — FINASTERIDE 5 MG: 5 TABLET, FILM COATED ORAL at 22:05

## 2017-11-21 RX ADMIN — IPRATROPIUM BROMIDE AND ALBUTEROL SULFATE 3 ML: .5; 3 SOLUTION RESPIRATORY (INHALATION) at 14:20

## 2017-11-21 RX ADMIN — FLUTICASONE PROPIONATE 2 SPRAY: 50 SPRAY, METERED NASAL at 09:46

## 2017-11-21 RX ADMIN — TAMSULOSIN HYDROCHLORIDE 0.4 MG: 0.4 CAPSULE ORAL at 22:05

## 2017-11-21 RX ADMIN — AMLODIPINE BESYLATE 5 MG: 5 TABLET ORAL at 07:05

## 2017-11-21 NOTE — PROGRESS NOTES
Bedside and Verbal shift change report given to [address] [place] [address] (oncoming nurse) by Evelia Blake (offgoing nurse). Report included the following information SBAR, Kardex, Intake/Output, MAR and Recent Results.

## 2017-11-21 NOTE — PROGRESS NOTES
Spiritual Care Partner Volunteer visited patient in Rm 203 on 11/21/17.   Documented by:  Chaplain Falk MDiv, MS, [place] [id number] PRA ([id number])

## 2017-11-21 NOTE — CONSULTS
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 · GERD  · HH  · Barretts  · Bladder cancer h/o BCG      RECOMMENDATIONS:   · Continue breo on discharge  · Suggest outpt pulm rehab  · Nebs PRN  · pred taper over 3-4 days  · Change zosyn to augmentin  · From PCCM standpoint can d/c  Home. Will follow up in office. Subjective: This patient has been seen and evaluated at the request of Dr. Regulo Quezada for SOB/COPD. Patient is a 68 y.o. male with h/o severe COPD on breo who was admitted  from ED with SOB and cough. Dx'd with AECOPD and PNA started on zosyn. Pt on RA and states cough better. No CP or abd pain.  No n/v/d. C/O PATRICK worse than usual.       Past Medical History:   Diagnosis Date    Aneurysm ([address]     ABDOMINAL     Arrhythmia 3/2/2014    Stated cardiac cath for abn EKG several years ago NEG    Calculus of kidney     Cancer ([facility] [address]     Skin/Bladder    Chronic kidney disease     STONES    Chronic obstructive pulmonary disease (HCC)     Contact dermatitis and other eczema, due to unspecified cause     GERD (gastroesophageal reflux disease)     Hypertension     Ill-defined condition     HIATEL HERNIA    Ill-defined condition     ECZEMA    Other unknown and unspecified cause of morbidity or mortality     Shepherd's esophagus    PUD (peptic ulcer disease)     Baretts Esophagus/antral gastritis/      Past Surgical History:   Procedure Laterality Date    ABDOMEN SURGERY PROC UNLISTED      hernia     CARDIAC SURG PROCEDURE UNLIST      Cardiac cath    HX CATARACT REMOVAL Left 2016    HX COLONOSCOPY      HX GI      Colonoscopy x 2-3    HX GI      COLONOSCOPY    HX HEENT Tonsils    HX HERNIA REPAIR  8/21/13    left inguinal hernia repair    HX HERNIA REPAIR  3/14/16    Incisional by     HX OTHER SURGICAL  3/31/15    port a cath    HX OTHER SURGICAL      REMOVAL OF SKIN CA R NECK    HX UROLOGICAL  2010    Bladder bx/kidney stone removal    HX UROLOGICAL  2015    CYSTO    HX VASCULAR ACCESS      PORT R CHEST WALL    HX VASCULAR ACCESS      REMOVAL OF PORT R CHEST WALL      Prior to Admission medications    Medication Sig Start Date End Date Taking? Authorizing Provider   cetirizine (ZYRTEC) 10 mg tablet  5/9/17   Historical Provider   predniSONE (DELTASONE) 5 mg tablet STARTING ON 4/28 - TAKE 6 TABLETS THE FIRST DAY, THEN TAKE 1 TABLET LESS EACH DAY (4,3,5,8,5,5) 4/27/17   Historical Provider   zolpidem (AMBIEN) 10 mg tablet  5/24/17   Historical Provider   silodosin (RAPAFLO) 8 mg capsule Take 8 mg by mouth nightly. Historical Provider   tamsulosin (FLOMAX) 0.4 mg capsule Take 0.4 mg by mouth nightly. Historical Provider   sodium chloride (SALINE MIST) 0.65 % nasal spray 1 Spray as needed for Congestion. Historical Provider   omeprazole (PRILOSEC) 20 mg capsule Take 20 mg by mouth every morning. 5/13/15   Historical Provider   triamcinolone acetonide (KENALOG) 0.025 % ointment  2/27/15   Historical Provider   HYDROcodone-acetaminophen (NORCO) 5-325 mg per tablet as needed. TAKES AS NEEDED FOR SLEEP 12/23/14   Historical Provider   zolpidem (AMBIEN) 5 mg tablet Take  by mouth nightly as needed for Sleep. Historical Provider   amLODIPine (NORVASC) 5 mg tablet Take 5 mg by mouth every morning. Indications: HYPERTENSION    Historical Provider   finasteride (PROSCAR) 5 mg tablet Take 5 mg by mouth daily. Historical Provider   atorvastatin (LIPITOR) 20 mg tablet Take 20 mg by mouth nightly.     Historical Provider     No Known Allergies   Social History   Substance Use Topics    Smoking status: Former Smoker     Quit date: 2/2/2009    Smokeless tobacco: Never Used    Alcohol use No      Comment: RARELY      Family History   Problem Relation Age of Onset    Heart Disease Mother     MS Sister     Heart Disease Sister     Psychiatric Disorder Sister      DEPRESSION    No Known Problems Sister         Current Facility-Administered Medications   Medication Dose Route Frequency    fluticasone (FLONASE) 50 mcg/actuation nasal spray 2 Spray  2 Spray Both Nostrils DAILY    omeprazole (PRILOSEC) capsule 20 mg (Patient Supplied)  20 mg Oral DAILY    sodium chloride (NS) flush 5-10 mL  5-10 mL IntraVENous Q8H    enoxaparin (LOVENOX) injection 40 mg  40 mg SubCUTAneous Q24H    amLODIPine (NORVASC) tablet 5 mg (Patient Supplied)  5 mg Oral 7am    atorvastatin (LIPITOR) tablet 20 mg (Patient Supplied)  20 mg Oral QHS    cetirizine (ZYRTEC) tablet 10 mg (Patient Supplied)  10 mg Oral DAILY    finasteride (PROSCAR) tablet 5 mg (Patient Supplied)  5 mg Oral DAILY    albuterol-ipratropium (DUO-NEB) 2.5 MG-0.5 MG/3 ML  3 mL Nebulization Q6H RT    tamsulosin (FLOMAX) capsule 0.4 mg (Patient Supplied)  0.4 mg Oral QHS    predniSONE (DELTASONE) tablet 40 mg  40 mg Oral DAILY WITH BREAKFAST    piperacillin-tazobactam (ZOSYN) 10.125 g in  mL infusion  10.125 g IntraVENous Q24H       Review of Systems:  A comprehensive review of systems was negative except for that written in the HPI.     Objective:   Vital Signs:    Visit Vitals    /71 (BP 1 Location: Left arm)    Pulse 64    Temp 97.9 °F (36.6 °C)    Resp 16    Ht 5' 9\" (1.753 m)    Wt 79.8 kg (176 lb)    SpO2 95%    BMI 25.99 kg/m2       O2 Device: Room air   O2 Flow Rate (L/min): 4 l/min   Temp (24hrs), Av.8 °F (36.6 °C), Min:97.8 °F (36.6 °C), Max:97.9 °F (36.6 °C)       Intake/Output:   Last shift:      701 - 1900  In: 360 [P.O.:360]  Out: 325 [Urine:325]  Last 3 shifts: 1901 - 11/21 0700  In: 480 [P.O.:480]  Out: -     Intake/Output Summary (Last 24 hours) at [address] filed at 11/21/17 0948   Gross per 24 hour   Intake              360 ml   Output              325 ml   Net               35 ml      Physical Exam:   General:  Alert, cooperative, no distress, appears stated age. Head:  Normocephalic, without obvious abnormality, atraumatic. Eyes:  Conjunctivae/corneas clear. PERRL, EOMs intact. Nose: Nares normal. Septum midline. Mucosa normal. No drainage or sinus tenderness. Throat: Lips, mucosa, and tongue normal. Teeth and gums normal.   Neck: Supple, symmetrical, trachea midline, no adenopathy, thyroid: no enlargment/tenderness/nodules, no carotid bruit and no JVD. Back:   Symmetric, no curvature. ROM normal.   Lungs:   crackles at bases no wheeze   Chest wall:  No tenderness or deformity. Heart:  Regular rate and rhythm, S1, S2 normal, no murmur, click, rub or gallop. Abdomen:   Soft, non-tender. Bowel sounds normal. No masses,  No organomegaly. Extremities: Extremities normal, atraumatic, no cyanosis or edema. Pulses: 2+ and symmetric all extremities. Skin: Skin color, texture, turgor normal. No rashes or lesions       Neurologic: Grossly nonfocal     Data review:   No results found for this or any previous visit (from the past 24 hour(s)).     Imaging:  I have personally reviewed the patients radiographs and have reviewed the reports:  CT chest with basilar fibrosis and upper and mid lung zone severe bullous emphysema        Gatito Mccauley MD

## 2017-11-21 NOTE — PROGRESS NOTES
Six Minute Walk Test Report    - Nick Broussard underwent a 6 min walk test. His initial O2  saturation was 91 % and his baseline heart rate was 97 BPM. He walked from in a circular path around the unit at a distance of 236.46 meters.  His post O2  saturation was 92 % and his post walk heart rate was 85 BPM.    - Questions should be directed Respiratory Pulmonary Services    - Shaun Tate, RRT, ACCS, NPS

## 2017-11-21 NOTE — PROGRESS NOTES
Physical Therapy Screening:  Services are not indicated at this time. An InBasket screening referral was triggered for physical therapy based on results obtained during the nursing admission assessment. The patients chart was reviewed and the patient is not appropriate for a skilled therapy evaluation at this time. Please consult physical therapy if any therapy needs arise. Thank you.     Katelyn Wolff, PT

## 2017-11-21 NOTE — PROGRESS NOTES
PNA Education    Introduced self to pat and explained my role in his care. Provided patient with educational material on pneumonia and incentive to use for airway clearance. Patient reaches volumes up to 2500 and uses good technique. Discussed signs and symptoms to report to Dr after discharge and importance of taking medication as prescribed. Informed patient I would call after discharge to follow up, patient in agreement with calls.     Will follow

## 2017-11-21 NOTE — PROGRESS NOTES
Hospitalist Progress Note  Amita Peralta MD  Office: [phone]        Date of Service:  2017  NAME:  Jian Alfaro  :  1940  MRN:  [id number]      Admission Summary:   49-year-old gentleman who has previous history significant for esophageal   stricture, bladder cancer, status post cystoscopy and BCG treatment in   . He started feeling sick a few months ago. The patient says that he   started having cough. He was seen by his primary care physician and   was given antibiotics.   The patient does not remember the name of the antibiotic, but he took 2 course of Levaquin, one course of Z    Interval history / Subjective:     Patient refer a bit better, but also c/o of his nurse been dry     Assessment & Plan:     1. Bilateral Pneumonia:       Patient remain stable, with some improvement with current antibiotics, pulse oxymetry done in several finger measure 87-90 at room air. 2. Bladder Ca:       Patient has been follow up as outpatient by urology  3. Hypertension.       Stable clinically  4. COPD.       Will do a walking oxymetry, if oxygen is needed will arrange    Code status: full  DVT prophylaxis: heparin    Care Plan discussed with: Patient/Family and Nurse  Disposition: TBD     Hospital Problems  Date Reviewed: 2017          Codes Class Noted POA    Pneumonia ICD-10-CM: J18.9  ICD-9-CM: 852  2017 Unknown                Review of Systems:   Pertinent items are noted in HPI. Vital Signs:    Last 24hrs VS reviewed since prior progress note.  Most recent are:  Visit Vitals    /71 (BP 1 Location: Left arm)    Pulse 64    Temp 97.9 °F (36.6 °C)    Resp 16    Ht 5' 9\" (1.753 m)    Wt 79.8 kg (176 lb)    SpO2 95%    BMI 25.99 kg/m2         Intake/Output Summary (Last 24 hours) at 17 0925  Last data filed at 17 1247   Gross per 24 hour   Intake              240 ml   Output                0 ml Net              240 ml        Physical Examination:             Constitutional:  No acute distress, cooperative, pleasant    ENT:  Oral mucous moist, oropharynx benign. Neck supple,    Resp:  CTA bilaterally. No wheezing/rhonchi/rales. No accessory muscle use   CV:  Regular rhythm, normal rate, no murmurs, gallops, rubs    GI:  Soft, non distended, non tender. normoactive bowel sounds, no hepatosplenomegaly     Musculoskeletal:  No edema, warm, 2+ pulses throughout    Neurologic:  Moves all extremities. AAOx3, CN II-XII reviewed            Data Review:    Review and/or order of clinical lab test      Labs:     Recent Labs      11/19/17 0523 11/18/17   1651   WBC  4.3  4.9   HGB  10.5*  11.8*   HCT  32.6*  36.9   PLT  163  200     Recent Labs      11/19/17 0523 11/18/17   1651   NA  138  136   K  3.5  3.6   CL  106  104   CO2  24  21   BUN  11  18   CREA  0.73  1.00   GLU  103*  119*   CA  8.0*  8.7     Recent Labs      11/18/17   1651   SGOT  20   ALT  13   AP  106   TBILI  1.1*   TP  7.6   ALB  2.7*   GLOB  4.9*     No results for input(s): INR, PTP, APTT in the last 72 hours. No lab exists for component: INREXT   No results for input(s): FE, TIBC, PSAT, FERR in the last 72 hours. No results found for: FOL, RBCF   No results for input(s): PH, PCO2, PO2 in the last 72 hours.   Recent Labs      11/18/17   1651   TROIQ  <0.04     No results found for: CHOL, CHOLX, CHLST, CHOLV, HDL, LDL, LDLC, DLDLP, TGLX, TRIGL, TRIGP, CHHD, CHHDX  No results found for: [facility]  Lab Results   Component Value Date/Time    Color YELLOW/STRAW 02/20/2017 11:49 AM    Appearance CLEAR 02/20/2017 11:49 AM    Specific gravity 1.019 02/20/2017 11:49 AM    Specific gravity 1.015 04/18/2012 03:55 AM    pH (UA) 6.0 02/20/2017 11:49 AM    Protein NEGATIVE  02/20/2017 11:49 AM    Glucose NEGATIVE  02/20/2017 11:49 AM    Ketone NEGATIVE  02/20/2017 11:49 AM    Bilirubin NEGATIVE  02/20/2017 11:49 AM    Urobilinogen 0.2 02/20/2017 11:49 AM    Nitrites NEGATIVE  02/20/2017 11:49 AM    Leukocyte Esterase NEGATIVE  02/20/2017 11:49 AM    Epithelial cells FEW 02/20/2017 11:49 AM    Bacteria NEGATIVE  02/20/2017 11:49 AM    WBC 0-4 02/20/2017 11:49 AM    RBC  02/20/2017 11:49 AM         Medications Reviewed:     Current Facility-Administered Medications   Medication Dose Route Frequency    fluticasone (FLONASE) 50 mcg/actuation nasal spray 2 Spray  2 Spray Both Nostrils DAILY    omeprazole (PRILOSEC) capsule 20 mg (Patient Supplied)  20 mg Oral DAILY    sodium chloride (NS) flush 5-10 mL  5-10 mL IntraVENous Q8H    sodium chloride (NS) flush 5-10 mL  5-10 mL IntraVENous PRN    enoxaparin (LOVENOX) injection 40 mg  40 mg SubCUTAneous Q24H    amLODIPine (NORVASC) tablet 5 mg (Patient Supplied)  5 mg Oral 7am    atorvastatin (LIPITOR) tablet 20 mg (Patient Supplied)  20 mg Oral QHS    cetirizine (ZYRTEC) tablet 10 mg (Patient Supplied)  10 mg Oral DAILY    finasteride (PROSCAR) tablet 5 mg (Patient Supplied)  5 mg Oral DAILY    zolpidem (AMBIEN) tablet 10 mg (Patient Supplied)  10 mg Oral QHS PRN    albuterol-ipratropium (DUO-NEB) 2.5 MG-0.5 MG/3 ML  3 mL Nebulization Q6H RT    tamsulosin (FLOMAX) capsule 0.4 mg (Patient Supplied)  0.4 mg Oral QHS    sodium chloride (OCEAN) 0.65 % nasal spray 1 Spray  1 Spray Both Nostrils PRN    predniSONE (DELTASONE) tablet 40 mg  40 mg Oral DAILY WITH BREAKFAST    piperacillin-tazobactam (ZOSYN) 10.125 g in NS infusion  10.125 g IntraVENous Q24H     ______________________________________________________________________  EXPECTED LENGTH OF STAY: 4d 12h  ACTUAL LENGTH OF STAY:          3                 Cierra Preston MD

## 2017-11-21 NOTE — PROGRESS NOTES
Problem: Falls - Risk of  Goal: *Absence of Falls  Document Harpal Fall Risk and appropriate interventions in the flowsheet.    Outcome: Progressing Towards Goal  Fall Risk Interventions:  Mobility Interventions: Communicate number of staff needed for ambulation/transfer         Medication Interventions: Teach patient to arise slowly

## 2017-11-21 NOTE — PROGRESS NOTES
Bedside shift change report given to [address] (oncoming nurse) by Marcella Garcia RN (offgoing nurse). Report included the following information SBAR, Kardex, MAR and Recent Results.

## 2017-11-22 VITALS
TEMPERATURE: 97.3 F | SYSTOLIC BLOOD PRESSURE: 147 MMHG | RESPIRATION RATE: 18 BRPM | HEART RATE: 71 BPM | BODY MASS INDEX: 26.07 KG/M2 | DIASTOLIC BLOOD PRESSURE: 81 MMHG | OXYGEN SATURATION: 99 % | HEIGHT: 69 IN | WEIGHT: 176 LBS

## 2017-11-22 PROCEDURE — 74011250637 HC RX REV CODE- 250/637: Performed by: INTERNAL MEDICINE

## 2017-11-22 PROCEDURE — 74011636637 HC RX REV CODE- 636/637: Performed by: HOSPITALIST

## 2017-11-22 RX ORDER — AMOXICILLIN AND CLAVULANATE POTASSIUM 875; 125 MG/1; MG/1
1 TABLET, FILM COATED ORAL EVERY 12 HOURS
Qty: 28 TAB | Refills: 0 | Status: SHIPPED | OUTPATIENT
Start: 2017-11-22 | End: 2018-05-09

## 2017-11-22 RX ORDER — AMOXICILLIN AND CLAVULANATE POTASSIUM 875; 125 MG/1; MG/1
1 TABLET, FILM COATED ORAL EVERY 12 HOURS
Status: DISCONTINUED | OUTPATIENT
Start: 2017-11-22 | End: 2017-11-22 | Stop reason: HOSPADM

## 2017-11-22 RX ADMIN — Medication 10 MG: at 09:12

## 2017-11-22 RX ADMIN — FLUTICASONE PROPIONATE 2 SPRAY: 50 SPRAY, METERED NASAL at 09:13

## 2017-11-22 RX ADMIN — PREDNISONE 40 MG: 20 TABLET ORAL at 07:06

## 2017-11-22 RX ADMIN — AMLODIPINE BESYLATE 5 MG: 5 TABLET ORAL at 07:06

## 2017-11-22 RX ADMIN — AMOXICILLIN AND CLAVULANATE POTASSIUM 1 TABLET: 875; 125 TABLET, FILM COATED ORAL at 10:15

## 2017-11-22 RX ADMIN — OMEPRAZOLE 20 MG: 20 CAPSULE, DELAYED RELEASE ORAL at 07:06

## 2017-11-22 RX ADMIN — Medication 10 ML: at 07:08

## 2017-11-22 NOTE — PROGRESS NOTES
All patient medications packed and returned to patient, IV removed, discharge instructions and follow up appointments discussed, patient will follow up for medical records. Prescriptions given, no questions at this time. Patient states he already received flu shot.

## 2017-11-22 NOTE — PROGRESS NOTES
Bedside shift change report given to Osman Perkins RN (oncoming nurse) by Richerd Goldmann, RN (offgoing nurse). Report included the following information SBAR, Kardex and MAR.

## 2017-11-22 NOTE — PROGRESS NOTES
Hospitalist Progress Note  Jenifer Antony MD  Office: [phone]      Date of Service:  2017  NAME:  Sid Villegas  :  1940  MRN:  [id number]      Admission Summary:   55-year-old gentleman who has previous history significant for esophageal   stricture, bladder cancer, status post cystoscopy and BCG treatment in   . He started feeling sick a few months ago. The patient says that he   started having cough. He was seen by his primary care physician and   was given antibiotics.   The patient does not remember the name of the antibiotic, but he took 2 course of Levaquin, one course of Z    Interval history / Subjective:     Patient feels better today, also patient want a machine for the nebulization, advice to speak to PCP. Assessment & Plan:     1. Bilateral Pneumonia:       Patient remain stable, patient yesterday walk with respiratory therapy keeping the oxygen above 90.   2. Bladder Ca:       Patient has been follow up as outpatient by urology  3. Hypertension.       Stable clinically  4. COPD.       Will do a walking oxymetry, if oxygen is needed will arrange    Code status: full  DVT prophylaxis: heparin        Care Plan discussed with: Patient/Family  Disposition: Home w/Family and TBD     Hospital Problems  Date Reviewed: 2017          Codes Class Noted POA    Pneumonia ICD-10-CM: J18.9  ICD-9-CM: 984  2017 Unknown                Review of Systems:   Pertinent items are noted in HPI. Vital Signs:    Last 24hrs VS reviewed since prior progress note.  Most recent are:  Visit Vitals    /81 (BP 1 Location: Right arm, BP Patient Position: At rest)    Pulse 71    Temp 97.3 °F (36.3 °C)    Resp 18    Ht 5' 9\" (1.753 m)    Wt 79.8 kg (176 lb)    SpO2 99%    BMI 25.99 kg/m2         Intake/Output Summary (Last 24 hours) at 17 0911  Last data filed at 17 0908   Gross per 24 hour   Intake 860 ml   Output                0 ml   Net              860 ml        Physical Examination:             Constitutional:  No acute distress, cooperative, pleasant    ENT:  Oral mucous moist, oropharynx benign. Neck supple,    Resp:  CTA bilaterally. No wheezing/rhonchi/rales. No accessory muscle use   CV:  Regular rhythm, normal rate, no murmurs, gallops, rubs    GI:  Soft, non distended, non tender. normoactive bowel sounds, no hepatosplenomegaly     Musculoskeletal:  No edema, warm, 2+ pulses throughout    Neurologic:  Moves all extremities. AAOx3, CN II-XII reviewed     Psych:  anxious,        Data Review:          Labs:   No results for input(s): WBC, HGB, HCT, PLT, HGBEXT, HCTEXT, PLTEXT in the last 72 hours. No results for input(s): NA, K, CL, CO2, BUN, CREA, GLU, CA, MG, PHOS, URICA in the last 72 hours. No results for input(s): SGOT, GPT, ALT, AP, TBIL, TBILI, TP, ALB, GLOB, GGT, AML, LPSE in the last 72 hours. No lab exists for component: AMYP, HLPSE  No results for input(s): INR, PTP, APTT in the last 72 hours. No lab exists for component: INREXT   No results for input(s): FE, TIBC, PSAT, FERR in the last 72 hours. No results found for: FOL, RBCF   No results for input(s): PH, PCO2, PO2 in the last 72 hours. No results for input(s): CPK, CKNDX, TROIQ in the last 72 hours.     No lab exists for component: CPKMB  No results found for: CHOL, CHOLX, CHLST, CHOLV, HDL, LDL, LDLC, DLDLP, TGLX, TRIGL, TRIGP, CHHD, CHHDX  No results found for: [facility]  Lab Results   Component Value Date/Time    Color YELLOW/STRAW 02/20/2017 11:49 AM    Appearance CLEAR 02/20/2017 11:49 AM    Specific gravity 1.019 02/20/2017 11:49 AM    Specific gravity 1.015 04/18/2012 03:55 AM    pH (UA) 6.0 02/20/2017 11:49 AM    Protein NEGATIVE  02/20/2017 11:49 AM    Glucose NEGATIVE  02/20/2017 11:49 AM    Ketone NEGATIVE  02/20/2017 11:49 AM    Bilirubin NEGATIVE  02/20/2017 11:49 AM    Urobilinogen 0.2 02/20/2017 11:49 AM    Nitrites NEGATIVE  02/20/2017 11:49 AM    Leukocyte Esterase NEGATIVE  02/20/2017 11:49 AM    Epithelial cells FEW 02/20/2017 11:49 AM    Bacteria NEGATIVE  02/20/2017 11:49 AM    WBC 0-4 02/20/2017 11:49 AM    RBC  02/20/2017 11:49 AM         Medications Reviewed:     Current Facility-Administered Medications   Medication Dose Route Frequency    amoxicillin-clavulanate (AUGMENTIN) 875-125 mg per tablet 1 Tab  1 Tab Oral Q12H    albuterol-ipratropium (DUO-NEB) 2.5 MG-0.5 MG/3 ML  3 mL Nebulization Q6H PRN    fluticasone (FLONASE) 50 mcg/actuation nasal spray 2 Spray  2 Spray Both Nostrils DAILY    omeprazole (PRILOSEC) capsule 20 mg (Patient Supplied)  20 mg Oral DAILY    sodium chloride (NS) flush 5-10 mL  5-10 mL IntraVENous Q8H    sodium chloride (NS) flush 5-10 mL  5-10 mL IntraVENous PRN    enoxaparin (LOVENOX) injection 40 mg  40 mg SubCUTAneous Q24H    amLODIPine (NORVASC) tablet 5 mg (Patient Supplied)  5 mg Oral 7am    atorvastatin (LIPITOR) tablet 20 mg (Patient Supplied)  20 mg Oral QHS    cetirizine (ZYRTEC) tablet 10 mg (Patient Supplied)  10 mg Oral DAILY    finasteride (PROSCAR) tablet 5 mg (Patient Supplied)  5 mg Oral DAILY    zolpidem (AMBIEN) tablet 10 mg (Patient Supplied)  10 mg Oral QHS PRN    tamsulosin (FLOMAX) capsule 0.4 mg (Patient Supplied)  0.4 mg Oral QHS    sodium chloride (OCEAN) 0.65 % nasal spray 1 Spray  1 Spray Both Nostrils PRN    predniSONE (DELTASONE) tablet 40 mg  40 mg Oral DAILY WITH BREAKFAST     ______________________________________________________________________  EXPECTED LENGTH OF STAY: 4d 12h  ACTUAL LENGTH OF STAY:          4                 Marii Tamayo MD

## 2017-11-22 NOTE — PROGRESS NOTES
Brief review of chart. Met with patient to address his questions about getting discs of his MRI, etc.  Referred to Ms. Fly Skelton, who will investigate. Patient is very self-responsible regarding coordinating his care. Daughter will transport home. No unmet needs identified.   Honey Elizalde, MAXIMO, CCM

## 2017-11-22 NOTE — PROGRESS NOTES
Problem: Falls - Risk of  Goal: *Absence of Falls  Document Harpal Fall Risk and appropriate interventions in the flowsheet.    Outcome: Progressing Towards Goal  Fall Risk Interventions:  Mobility Interventions: Communicate number of staff needed for ambulation/transfer         Medication Interventions: Patient to call before getting OOB         History of Falls Interventions: Evaluate medications/consider consulting pharmacy

## 2017-11-22 NOTE — PROGRESS NOTES
Problem: Falls - Risk of  Goal: *Absence of Falls  Document Harpal Fall Risk and appropriate interventions in the flowsheet.    Outcome: Progressing Towards Goal  Fall Risk Interventions:  Mobility Interventions: Communicate number of staff needed for ambulation/transfer         Medication Interventions: Teach patient to arise slowly         History of Falls Interventions: Evaluate medications/consider consulting pharmacy

## 2017-11-23 LAB
BACTERIA SPEC CULT: NORMAL
SERVICE CMNT-IMP: NORMAL

## 2017-11-23 NOTE — DISCHARGE SUMMARY
[address]   [facility] [place] [address], [address]   DISCHARGE SUMMARY       Name:  Stacey Sauceda   MR#:  [id number]   :  1940   Account #:  [de-identified]        Date of Adm:  2017       DIAGNOSES:   1. Bilateral pneumonia. 2. Bladder cancer. 3. Hypertension. 4. Chronic obstructive pulmonary disease. 5. Anxiety disorder. 6. Gastroesophageal reflux disease. 7. History of Shepherd esophageal.     CONSULT DONE: Pulmonary. STUDIES/PROCEDURES DONE: CT abdomen and pelvis. HOSPITAL COURSE: The patient is a 27-year-old patient who was   admitted on  after an episode of shortness of breath. The patient has been followed and treated on outpatient basis   receiving 2 courses of antibiotics, one of them was Levaquin and the   other Z- pack. The patient was admitted to the hospital with   shortness of breath and hypoxemia, for which the patient was started   on IV antibiotics. For the first 48 hours, the patient maintained   hypoxemic with poor response to the medical treatment. DuoNebs,   incentive spirometer, and supportive care with hydration. The patient   improved slowly, having his oxygen saturation within normal limits. I   did a 6-minute walk with the patient. During this period of time, the   patient kept the oxygen saturation at 92%. I changed the patient from   Levaquin to Augmentin with no complications. DISCHARGE MEDICATIONS:   1. Albuterol 90 mcg inhalation as needed. 2. Ambien 5 mg p.o. daily. 3. Norvasc 5 mg p.o. daily. 4. Augmentin 875 b.i.d. for 14 days. 5. Zyrtec 10 mg daily. 6. Proscar 5 mg daily. 7. Finasteride. 8. Hydrocodone 5/325 as needed for pain. 9. Lipitor 20 mg daily. 10. Saline mist.   11. RAPAFLO 8 mg daily. 12. Flomax 0.4 mg daily. DIET: Regular diet. ACTIVITY: As tolerated. FOLLOWUP: The patient to follow up with primary care physician and   Pulmonary. CONDITION AT DISCHARGE: Stable. DISPOSITION: Home with family. The patient has been advised to   monitor his shortness of breath. Follow up: with primary care physician and   be compliant with medications.          MD LISA Nunes / Anish Owens   D:  11/22/2017   17:13   T:  11/23/2017   01:09   Job #:  [id number]

## 2017-11-28 ENCOUNTER — HOSPITAL ENCOUNTER (OUTPATIENT)
Dept: LAB | Age: 77
Discharge: HOME | End: 2017-11-28
Payer: MEDICARE

## 2017-11-28 PROCEDURE — 36415 COLL VENOUS BLD VENIPUNCTURE: CPT

## 2017-11-28 PROCEDURE — 85025 COMPLETE CBC W/AUTO DIFF WBC: CPT

## 2017-11-28 PROCEDURE — 80053 COMPREHEN METABOLIC PANEL: CPT

## 2017-11-29 LAB
ALBUMIN SERPL-MCNC: 3.4 G/DL (ref 3.5–4.8)
ALBUMIN/GLOB SERPL: 1 {RATIO} (ref 1.2–2.2)
ALP SERPL-CCNC: 88 IU/L (ref 39–117)
ALT SERPL-CCNC: 9 IU/L (ref 0–44)
AST SERPL-CCNC: 16 IU/L (ref 0–40)
BASOPHILS # BLD AUTO: 0 X10E3/UL (ref 0–0.2)
BASOPHILS NFR BLD AUTO: 0 %
BILIRUB SERPL-MCNC: 1 MG/DL (ref 0–1.2)
BUN SERPL-MCNC: 15 MG/DL (ref 8–27)
BUN/CREAT SERPL: 21 (ref 10–24)
CALCIUM SERPL-MCNC: 8.7 MG/DL (ref 8.6–10.2)
CHLORIDE SERPL-SCNC: 103 MMOL/L (ref 96–106)
CO2 SERPL-SCNC: 22 MMOL/L (ref 18–29)
CREAT SERPL-MCNC: 0.73 MG/DL (ref 0.76–1.27)
EOSINOPHIL # BLD AUTO: 0.2 X10E3/UL (ref 0–0.4)
EOSINOPHIL NFR BLD AUTO: 3 %
ERYTHROCYTE [DISTWIDTH] IN BLOOD BY AUTOMATED COUNT: 18 % (ref 12.3–15.4)
GFR SERPLBLD CREATININE-BSD FMLA CKD-EPI: 103 ML/MIN/1.73
GFR SERPLBLD CREATININE-BSD FMLA CKD-EPI: 89 ML/MIN/1.73
GLOBULIN SER CALC-MCNC: 3.4 G/DL (ref 1.5–4.5)
GLUCOSE SERPL-MCNC: 82 MG/DL (ref 65–99)
HCT VFR BLD AUTO: 39 % (ref 37.5–51)
HGB BLD-MCNC: 12 G/DL (ref 12.6–17.7)
IMM GRANULOCYTES # BLD: 0 X10E3/UL (ref 0–0.1)
IMM GRANULOCYTES NFR BLD: 1 %
LYMPHOCYTES # BLD AUTO: 0.9 X10E3/UL (ref 0.7–3.1)
LYMPHOCYTES NFR BLD AUTO: 15 %
MCH RBC QN AUTO: 26.7 PG (ref 26.6–33)
MCHC RBC AUTO-ENTMCNC: 30.8 G/DL (ref 31.5–35.7)
MCV RBC AUTO: 87 FL (ref 79–97)
MONOCYTES # BLD AUTO: 0.8 X10E3/UL (ref 0.1–0.9)
MONOCYTES NFR BLD AUTO: 13 %
NEUTROPHILS # BLD AUTO: 4.2 X10E3/UL (ref 1.4–7)
NEUTROPHILS NFR BLD AUTO: 68 %
PLATELET # BLD AUTO: 171 X10E3/UL (ref 150–379)
POTASSIUM SERPL-SCNC: 4.6 MMOL/L (ref 3.5–5.2)
PROT SERPL-MCNC: 6.8 G/DL (ref 6–8.5)
RBC # BLD AUTO: 4.49 X10E6/UL (ref 4.14–5.8)
SODIUM SERPL-SCNC: 140 MMOL/L (ref 134–144)
WBC # BLD AUTO: 6.1 X10E3/UL (ref 3.4–10.8)

## 2017-12-05 ENCOUNTER — OFFICE VISIT (OUTPATIENT)
Dept: ONCOLOGY | Age: 77
End: 2017-12-05

## 2017-12-05 VITALS
HEART RATE: 83 BPM | HEIGHT: 69 IN | BODY MASS INDEX: 27.19 KG/M2 | TEMPERATURE: 98.1 F | SYSTOLIC BLOOD PRESSURE: 113 MMHG | WEIGHT: 183.6 LBS | DIASTOLIC BLOOD PRESSURE: 68 MMHG | OXYGEN SATURATION: 93 % | RESPIRATION RATE: 16 BRPM

## 2017-12-05 DIAGNOSIS — D69.6 THROMBOCYTOPENIA (HCC): ICD-10-CM

## 2017-12-05 DIAGNOSIS — C67.9 MALIGNANT NEOPLASM OF URINARY BLADDER, UNSPECIFIED SITE (HCC): Primary | ICD-10-CM

## 2017-12-05 DIAGNOSIS — R06.02 SHORTNESS OF BREATH: ICD-10-CM

## 2017-12-05 RX ORDER — PREDNISONE 10 MG/1
TABLET ORAL
Refills: 0 | COMMUNITY
Start: 2017-10-16 | End: 2017-12-05 | Stop reason: ALTCHOICE

## 2017-12-05 RX ORDER — FLUTICASONE FUROATE AND VILANTEROL TRIFENATATE 200; 25 UG/1; UG/1
POWDER RESPIRATORY (INHALATION)
COMMUNITY
Start: 2017-09-23 | End: 2018-05-09

## 2017-12-05 RX ORDER — MONTELUKAST SODIUM 10 MG/1
10 TABLET ORAL DAILY
COMMUNITY
Start: 2017-11-16

## 2017-12-05 RX ORDER — IPRATROPIUM BROMIDE AND ALBUTEROL SULFATE 2.5; .5 MG/3ML; MG/3ML
SOLUTION RESPIRATORY (INHALATION)
Refills: 1 | COMMUNITY
Start: 2017-11-09 | End: 2021-02-24

## 2017-12-05 RX ORDER — LEVOFLOXACIN 500 MG/1
TABLET, FILM COATED ORAL
Refills: 0 | COMMUNITY
Start: 2017-10-24 | End: 2017-12-05 | Stop reason: ALTCHOICE

## 2017-12-05 RX ORDER — ALBUTEROL SULFATE 90 UG/1
AEROSOL, METERED RESPIRATORY (INHALATION)
Refills: 6 | COMMUNITY
Start: 2017-11-22 | End: 2018-05-09

## 2017-12-05 NOTE — MR AVS SNAPSHOT
Visit Information Date & Time Provider Department Dept. Phone Encounter #  
 12/5/2017 10:00 AM Real Harrison  [facility] [phone] [id number] Upcoming Health Maintenance Date Due DTaP/Tdap/Td series (1 - Tdap) 3/21/1961 GLAUCOMA SCREENING Q2Y 3/21/2005 MEDICARE YEARLY EXAM 3/21/2005 Influenza Age 5 to Adult 8/1/2017 Pneumococcal 65+ High/Highest Risk (2 of 2 - PPSV23) 11/6/2018 Allergies as of 12/5/2017  Review Complete On: 12/5/2017 By: Natalya Roblero No Known Allergies Current Immunizations  Reviewed on 11/23/2016 Name Date Influenza Vaccine 10/1/2014 Pneumococcal Vaccine (Unspecified Type) 11/6/2013 Varicella Virus Vaccine 5/8/2013 Not reviewed this visit You Were Diagnosed With   
  
 Codes Comments Malignant neoplasm of urinary bladder, unspecified site [facility])    -  Primary ICD-10-CM: C67.9 ICD-9-CM: 188. 9 Vitals BP Pulse Temp Resp Height(growth percentile) Weight(growth percentile) 113/68 83 98.1 °F (36.7 °C) 16 5' 9\" (1.753 m) 183 lb 9.6 oz (83.3 kg) SpO2 BMI Smoking Status 93% 27.11 kg/m2 Former Smoker Vitals History BMI and BSA Data Body Mass Index Body Surface Area  
 27.11 kg/m 2 2.01 m 2 Preferred Pharmacy Pharmacy Name Phone [facility] [facility] [phone] Your Updated Medication List  
  
   
This list is accurate as of: 12/5/17 11:10 AM.  Always use your most recent med list.  
  
  
  
  
 * albuterol sulfate 90 mcg/actuation Aepb Take 1 Puff by inhalation every six (6) hours as needed. Indications: Chronic Obstructive Pulmonary Disease * VENTOLIN HFA 90 mcg/actuation inhaler Generic drug:  albuterol INHALE ONE PUFF INTO LUNGS EVERY SIX HOURS AS NEEDED  
  
 albuterol-ipratropium 2.5 mg-0.5 mg/3 ml Nebu Commonly known as:  DUO-NEB  
UE 1 VIAL VIA NEBULIZER EVERY 6 HOURS  
 * AMBIEN 5 mg tablet Generic drug:  zolpidem Take  by mouth nightly as needed for Sleep. * zolpidem 10 mg tablet Commonly known as:  AMBIEN  
  
 amLODIPine 5 mg tablet Commonly known as:  Haylie Ellissamra Take 5 mg by mouth every morning. Indications: HYPERTENSION  
  
 amoxicillin-clavulanate 875-125 mg per tablet Commonly known as:  AUGMENTIN Take 1 Tab by mouth every twelve (12) hours. BREO ELLIPTA 200-25 mcg/dose inhaler Generic drug:  fluticasone-vilanterol  
  
 cetirizine 10 mg tablet Commonly known as:  ZYRTEC  
  
 finasteride 5 mg tablet Commonly known as:  PROSCAR Take 5 mg by mouth daily. FLUZONE HIGH-DOSE 2017-18 (PF) Syrg injection Generic drug:  influenza vaccine 2017-18 (65 yrs+)(PF)  
TO BE ADMINISTERED BY PHARMACIST FOR IMMUNIZATION  
  
 HYDROcodone-acetaminophen 5-325 mg per tablet Commonly known as:  Faheem Dolphin  
as needed. TAKES AS NEEDED FOR SLEEP  
  
 LIPITOR 20 mg tablet Generic drug:  atorvastatin Take 20 mg by mouth nightly. montelukast 10 mg tablet Commonly known as:  SINGULAIR  
  
 omeprazole 20 mg capsule Commonly known as:  PRILOSEC Take 20 mg by mouth every morning. RAPAFLO 8 mg capsule Generic drug:  silodosin Take 8 mg by mouth nightly. SALINE MIST 0.65 % nasal spray Generic drug:  sodium chloride 1 Spray as needed for Congestion. tamsulosin 0.4 mg capsule Commonly known as:  FLOMAX Take 0.4 mg by mouth nightly. triamcinolone acetonide 0.025 % ointment Commonly known as:  KENALOG * Notice: This list has 4 medication(s) that are the same as other medications prescribed for you. Read the directions carefully, and ask your doctor or other care provider to review them with you. We Performed the Following CBC WITH AUTOMATED DIFF [18460 CPT(R)]   
 LD [67381 CPT(R)] METABOLIC PANEL, COMPREHENSIVE [10256 CPT(R)] To-Do List   
 05/05/2018 Imaging:  CT CHEST ABD PELV W CONT Introducing [facility]! Dear Buddy Smyth: Thank you for requesting a [employer] account. Our records indicate that you already have an active [employer] account. You can access your account anytime at [email or web address] Did you know that you can access your hospital and ER discharge instructions at any time in [employer]? You can also review all of your test results from your hospital stay or ER visit. Additional Information If you have questions, please visit the Frequently Asked Questions section of the [employer] website at [email or web address] Remember, [employer] is NOT to be used for urgent needs. For medical emergencies, dial 911. Now available from your iPhone and Android! Please provide this summary of care documentation to your next provider. Your primary care clinician is listed as Tami Ramires. If you have any questions after today's visit, please call [phone].

## 2017-12-05 NOTE — PROGRESS NOTES
Hematology/Oncology Progress Note    DIAGNOSIS: Bladder cancer with small cell features    TREATMENT:  Carboplatin and etoposide started on 4/7/15 x 4 cycles  Radiation under the care of Dr. Boo Henderson from 10/30/15 to 12/3/15    HISTORY OF PRESENT ILLNESS: Mr. Neha Prescott is a 68 y.o. male who presents for follow-up of bladder cancer. He has a long history of bladder cancer initially treated with transurethral resection followed by BCG treatments with initial good response. Has had a number of kidney stones and has had both cystoscopy for surveillance and for investigation of stones. In December 2014 he had a cystoscopy which revealed a mass in the bladder. On biopsy this revealed small cell features. He started chemotherapy on 4/7/15. Then underwent radiation. He had been admitted 11/18/17 with SOB. He saw Dr. Cheryle Chimera - thought to be a COPD exacerbation+ possible fibrosis. He still has a cough and congestion. He is to see Dr. London Perla with ENT for chronic sinusitis. He gets tired easy. He has a CIS found on surveillance 3/2017 and saw Dr. Selena Dinh - recommended against cystectomy and received intravesical BCG. Has completed this and is back on surveillance . He has no gross hematuria but states that he always has microscopic hematuria. He has no pain. Has no fevers, no new rashes, no new diarrhea, no HA. Aside from acute illness with pneumonia, no fevers, chills, night sweats. No weight loss. No new aches or pains. No new lumps or bumps. No nausea, vomiting, diarrhea, or constipation. No bleeding.     No Known Allergies    Current Outpatient Prescriptions   Medication Sig Dispense Refill    VENTOLIN HFA 90 mcg/actuation inhaler INHALE ONE PUFF INTO LUNGS EVERY SIX HOURS AS NEEDED  6    BREO ELLIPTA 200-25 mcg/dose inhaler       albuterol-ipratropium (DUO-NEB) 2.5 mg-0.5 mg/3 ml nebu UE 1 VIAL VIA NEBULIZER EVERY 6 HOURS  1    montelukast (SINGULAIR) 10 mg tablet       amoxicillin-clavulanate (AUGMENTIN) 875-125 mg per tablet Take 1 Tab by mouth every twelve (12) hours. 28 Tab 0    albuterol sulfate 90 mcg/actuation aepb Take 1 Puff by inhalation every six (6) hours as needed. Indications: Chronic Obstructive Pulmonary Disease 1 Inhaler 6    cetirizine (ZYRTEC) 10 mg tablet       tamsulosin (FLOMAX) 0.4 mg capsule Take 0.4 mg by mouth nightly.  sodium chloride (SALINE MIST) 0.65 % nasal spray 1 Spray as needed for Congestion.  omeprazole (PRILOSEC) 20 mg capsule Take 20 mg by mouth every morning.  triamcinolone acetonide (KENALOG) 0.025 % ointment       HYDROcodone-acetaminophen (NORCO) 5-325 mg per tablet as needed. TAKES AS NEEDED FOR SLEEP  0    zolpidem (AMBIEN) 5 mg tablet Take  by mouth nightly as needed for Sleep.  amLODIPine (NORVASC) 5 mg tablet Take 5 mg by mouth every morning. Indications: HYPERTENSION      finasteride (PROSCAR) 5 mg tablet Take 5 mg by mouth daily.  atorvastatin (LIPITOR) 20 mg tablet Take 20 mg by mouth nightly.  FLUZONE HIGH-DOSE 2017-18, PF, syrg injection TO BE ADMINISTERED BY PHARMACIST FOR IMMUNIZATION  0    zolpidem (AMBIEN) 10 mg tablet       silodosin (RAPAFLO) 8 mg capsule Take 8 mg by mouth nightly. ROS  As per the HPI, otherwise a comprehensive ROS is negative. ECOG PS is 1. Emotional well being addressed and patient is coping well.     Physical Examination:   Visit Vitals    /68    Pulse 83    Temp 98.1 °F (36.7 °C)    Resp 16    Ht 5' 9\" (1.753 m)    Wt 183 lb 9.6 oz (83.3 kg)    SpO2 93%    BMI 27.11 kg/m2     General appearance - alert, well appearing but has a dry cough, and in no distress  Mental status - oriented to person, place, and time  Mouth - mucous membranes moist, pharynx normal without lesions, has false teeth  Neck - supple, no significant adenopathy  Lymphatics - no palpable lymphadenopathy, no hepatosplenomegaly  Chest - Decreased bilaterally, no added sounds   Heart - normal rate, regular rhythm, normal S1, S2, no murmurs, rubs, clicks or gallops  Abdomen - soft, nontender, nondistended, no masses or organomegaly, bowel sounds present, defect noted at 12 o'clock at umbilicus, soft and nontender  Neurological - normal speech, no focal findings or movement disorder noted  Musculoskeletal - no joint tenderness, deformity or swelling  Extremities - peripheral pulses normal, no pedal edema, no clubbing or cyanosis  Skin - normal coloration and turgor, no rashes, no suspicious skin lesions noted  Chest wall - port site looks good. LABS  Lab Results   Component Value Date/Time    WBC 6.1 11/28/2017 01:08 PM    HGB 12.0 11/28/2017 01:08 PM    HCT 39.0 11/28/2017 01:08 PM    PLATELET 126 60/96/4911 01:08 PM    MCV 87 11/28/2017 01:08 PM    ABS. NEUTROPHILS 4.2 11/28/2017 01:08 PM     Lab Results   Component Value Date/Time    Sodium 140 11/28/2017 01:08 PM    Potassium 4.6 11/28/2017 01:08 PM    Chloride 103 11/28/2017 01:08 PM    CO2 22 11/28/2017 01:08 PM    Glucose 82 11/28/2017 01:08 PM    BUN 15 11/28/2017 01:08 PM    Creatinine 0.73 11/28/2017 01:08 PM    GFR est  11/28/2017 01:08 PM    GFR est non-AA 89 11/28/2017 01:08 PM    Calcium 8.7 11/28/2017 01:08 PM     Lab Results   Component Value Date/Time    AST (SGOT) 16 11/28/2017 01:08 PM    Alk. phosphatase 88 11/28/2017 01:08 PM    Protein, total 6.8 11/28/2017 01:08 PM    Albumin 3.4 11/28/2017 01:08 PM    Globulin 4.9 11/18/2017 04:51 PM    A-G Ratio 1.0 11/28/2017 01:08 PM     PATHOLOGY  FNA of mediastinal node at station 7 on 9/9/15 ([id number]) with atypical cells. 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 Invasion is not convincingly demonstrated. IMAGING  CT C/A/P on 11/18/17: No recurrence, worsening bilateral airspace disease  CT chest/abdomen/pelvis on 6/6/17 with no sign of tumor recurrence.    CT chest/abdomen/pelvis on 11/21/16 with no sign of tumor recurrence. CT abdomen/pelvis on 2/9/15 with thickening of the left anterolateral bladder wall with stranding into the perivesical fat unchanged. No other evidence of metastatic disease. Emphysema. PET on 8/18/15 with hypermetabolic mediastinal and bilateral hilar lymph nodes. This is nonspecific and could represent any number of inflammatory or neoplastic etiologies. There is otherwise normal tracer distribution. CT Chest on 7/27/15 with no change. No new disease. CT Chest/abdomen/pelvis on 5/18/15 with no change. CT chest/abdomen/pelvis on 2/24/15 with severe emphysema, multiple borderline mediastinal nodes, thyroid cyst, left sided hydroureteronephrosis due to bladder thickening, increased abdominal lymphadenopathy. ASSESSMENT  Mr. Josie Sheth is a 68 y.o. male with bladder cancer which on biopsy revealed high grade urothelial carcinoma with small cell features. CTs reveal multiple borderline enlarged nodes throughout the chest, abdomen, and pelvis. Currently on chemotherapy with carboplatin/etoposide. Has had 4 cycles followed by radiation. Presents for follow-up. DISCUSSION/PLAN  1. Bladder cancer with small cell features. He was treated with chemo and radiation. We reviewed surveillance and patterns of recurrence. He should continue to follow with Dr. Bar Clement. Had recurrent CIS detected in March 2017. No cystectomy recommended and received intravesical BCG. Discussed for a long time follow up and management    High chance of recurrence , important to continue both systemic and local surveillance. Will check scans again in 6 months. CT scans reviewed in detail and they are negative for recurrence. Encouraged him to set up an appointment with Dr. Bar Clement fro surveillance Cysto    2. Thrombocytopenia. resolved    3. Shortness of breath and ILD, has underlying COPD.      CT with worsening ILD    Intravesical BCG as been associated with pneumonitis though this is preceeded by a febrile illness hence does not fit clinically. Wonder if a diagnostic Bronch will help    I will reach out to Dr. Raymond Hawkins- Pulmonology    4. Shepherd's esophagus. Follow with GI as scheduled    He should be seen back in 6 months, but sooner if needed.      Candelaria Gonzáles MD

## 2017-12-07 ENCOUNTER — HOSPITAL ENCOUNTER (OUTPATIENT)
Dept: CT IMAGING | Age: 77
Discharge: HOME | End: 2017-12-07
Attending: OTOLARYNGOLOGY
Payer: MEDICARE

## 2017-12-07 DIAGNOSIS — J32.4 CHRONIC PANSINUSITIS: ICD-10-CM

## 2017-12-07 PROCEDURE — 70486 CT MAXILLOFACIAL W/O DYE: CPT

## 2017-12-08 ENCOUNTER — TELEPHONE (OUTPATIENT)
Dept: RESPIRATORY THERAPY | Age: 77
End: 2017-12-08

## 2017-12-09 DIAGNOSIS — D69.6 THROMBOCYTOPENIA (HCC): ICD-10-CM

## 2017-12-09 DIAGNOSIS — C67.8 MALIGNANT NEOPLASM OF OVERLAPPING SITES OF BLADDER (HCC): ICD-10-CM

## 2018-04-12 ENCOUNTER — TELEPHONE (OUTPATIENT)
Dept: ONCOLOGY | Age: 78
End: 2018-04-12

## 2018-04-12 NOTE — TELEPHONE ENCOUNTER
Patient called requesting a return call. Patient wanted to talk to someone about why his case is not being discussed during cancer conference. He is upset with Dr. Bea Casas office (urology) not scheduling a cystoscopy. Please return call to discuss [phone].   marcelino

## 2018-04-23 ENCOUNTER — TELEPHONE (OUTPATIENT)
Dept: ONCOLOGY | Age: 78
End: 2018-04-23

## 2018-04-23 NOTE — TELEPHONE ENCOUNTER
Received call from Dr. Sharath Lainez office, they would like to move up CT scheduled for 5/29. Wanted to check with our office to see if this was ok to schedule within the next two weeks. Per provider note, patient to have repeat scans in 6 months. Ok to reschedule. Thanked for assistance.

## 2018-05-03 ENCOUNTER — HOSPITAL ENCOUNTER (OUTPATIENT)
Dept: CT IMAGING | Age: 78
Discharge: HOME | End: 2018-05-03
Attending: INTERNAL MEDICINE
Payer: MEDICARE

## 2018-05-03 DIAGNOSIS — C67.9 MALIGNANT NEOPLASM OF URINARY BLADDER, UNSPECIFIED SITE (HCC): ICD-10-CM

## 2018-05-03 LAB — CREAT BLD-MCNC: 0.7 MG/DL (ref 0.6–1.3)

## 2018-05-03 PROCEDURE — 82565 ASSAY OF CREATININE: CPT

## 2018-05-03 PROCEDURE — 71260 CT THORAX DX C+: CPT

## 2018-05-03 PROCEDURE — 74011000258 HC RX REV CODE- 258: Performed by: INTERNAL MEDICINE

## 2018-05-03 PROCEDURE — 74177 CT ABD & PELVIS W/CONTRAST: CPT

## 2018-05-03 PROCEDURE — 74011636320 HC RX REV CODE- 636/320: Performed by: INTERNAL MEDICINE

## 2018-05-03 RX ORDER — SODIUM CHLORIDE 0.9 % (FLUSH) 0.9 %
10 SYRINGE (ML) INJECTION
Status: COMPLETED | OUTPATIENT
Start: 2018-05-03 | End: 2018-05-03

## 2018-05-03 RX ADMIN — IOPAMIDOL 100 ML: 755 INJECTION, SOLUTION INTRAVENOUS at 12:20

## 2018-05-03 RX ADMIN — SODIUM CHLORIDE 100 ML: 900 INJECTION, SOLUTION INTRAVENOUS at 12:20

## 2018-05-03 RX ADMIN — Medication 10 ML: at 12:20

## 2018-05-09 ENCOUNTER — HOSPITAL ENCOUNTER (OUTPATIENT)
Dept: PREADMISSION TESTING | Age: 78
Discharge: HOME | End: 2018-05-09
Payer: MEDICARE

## 2018-05-09 VITALS
WEIGHT: 186 LBS | SYSTOLIC BLOOD PRESSURE: 139 MMHG | HEART RATE: 90 BPM | DIASTOLIC BLOOD PRESSURE: 60 MMHG | TEMPERATURE: 98 F | BODY MASS INDEX: 27.55 KG/M2 | HEIGHT: 69 IN

## 2018-05-09 LAB
ANION GAP SERPL CALC-SCNC: 7 MMOL/L (ref 5–15)
APPEARANCE UR: CLEAR
BACTERIA URNS QL MICRO: NEGATIVE /HPF
BASOPHILS # BLD: 0.1 K/UL (ref 0–0.1)
BASOPHILS NFR BLD: 1 % (ref 0–1)
BILIRUB UR QL: NEGATIVE
BUN SERPL-MCNC: 15 MG/DL (ref 6–20)
BUN/CREAT SERPL: 18 (ref 12–20)
CALCIUM SERPL-MCNC: 8.3 MG/DL (ref 8.5–10.1)
CHLORIDE SERPL-SCNC: 109 MMOL/L (ref 97–108)
CO2 SERPL-SCNC: 24 MMOL/L (ref 21–32)
COLOR UR: NORMAL
CREAT SERPL-MCNC: 0.84 MG/DL (ref 0.7–1.3)
DIFFERENTIAL METHOD BLD: ABNORMAL
EOSINOPHIL # BLD: 0.4 K/UL (ref 0–0.4)
EOSINOPHIL NFR BLD: 7 % (ref 0–7)
EPITH CASTS URNS QL MICRO: NORMAL /LPF
ERYTHROCYTE [DISTWIDTH] IN BLOOD BY AUTOMATED COUNT: 19.3 % (ref 11.5–14.5)
GLUCOSE SERPL-MCNC: 102 MG/DL (ref 65–100)
GLUCOSE UR STRIP.AUTO-MCNC: NEGATIVE MG/DL
HCT VFR BLD AUTO: 35.9 % (ref 36.6–50.3)
HGB BLD-MCNC: 10.9 G/DL (ref 12.1–17)
HGB UR QL STRIP: NEGATIVE
HYALINE CASTS URNS QL MICRO: NORMAL /LPF (ref 0–5)
IMM GRANULOCYTES # BLD: 0 K/UL (ref 0–0.04)
IMM GRANULOCYTES NFR BLD AUTO: 0 % (ref 0–0.5)
KETONES UR QL STRIP.AUTO: NEGATIVE MG/DL
LEUKOCYTE ESTERASE UR QL STRIP.AUTO: NEGATIVE
LYMPHOCYTES # BLD: 0.9 K/UL (ref 0.8–3.5)
LYMPHOCYTES NFR BLD: 16 % (ref 12–49)
MCH RBC QN AUTO: 26.1 PG (ref 26–34)
MCHC RBC AUTO-ENTMCNC: 30.4 G/DL (ref 30–36.5)
MCV RBC AUTO: 85.9 FL (ref 80–99)
MONOCYTES # BLD: 0.6 K/UL (ref 0–1)
MONOCYTES NFR BLD: 10 % (ref 5–13)
NEUTS SEG # BLD: 3.6 K/UL (ref 1.8–8)
NEUTS SEG NFR BLD: 66 % (ref 32–75)
NITRITE UR QL STRIP.AUTO: NEGATIVE
NRBC # BLD: 0 K/UL (ref 0–0.01)
NRBC BLD-RTO: 0 PER 100 WBC
PH UR STRIP: 6 [PH] (ref 5–8)
PLATELET # BLD AUTO: 162 K/UL (ref 150–400)
PMV BLD AUTO: 11.5 FL (ref 8.9–12.9)
POTASSIUM SERPL-SCNC: 4.1 MMOL/L (ref 3.5–5.1)
PROT UR STRIP-MCNC: NEGATIVE MG/DL
RBC # BLD AUTO: 4.18 M/UL (ref 4.1–5.7)
RBC #/AREA URNS HPF: NORMAL /HPF (ref 0–5)
SODIUM SERPL-SCNC: 140 MMOL/L (ref 136–145)
SP GR UR REFRACTOMETRY: 1.01 (ref 1–1.03)
UROBILINOGEN UR QL STRIP.AUTO: 0.2 EU/DL (ref 0.2–1)
WBC # BLD AUTO: 5.5 K/UL (ref 4.1–11.1)
WBC URNS QL MICRO: NORMAL /HPF (ref 0–4)

## 2018-05-09 PROCEDURE — 85025 COMPLETE CBC W/AUTO DIFF WBC: CPT | Performed by: UROLOGY

## 2018-05-09 PROCEDURE — 87086 URINE CULTURE/COLONY COUNT: CPT | Performed by: UROLOGY

## 2018-05-09 PROCEDURE — 80048 BASIC METABOLIC PNL TOTAL CA: CPT | Performed by: UROLOGY

## 2018-05-09 PROCEDURE — 81001 URINALYSIS AUTO W/SCOPE: CPT | Performed by: UROLOGY

## 2018-05-09 RX ORDER — POTASSIUM CHLORIDE 750 MG/1
10 TABLET, FILM COATED, EXTENDED RELEASE ORAL AS NEEDED
COMMUNITY
End: 2018-08-09

## 2018-05-09 RX ORDER — FUROSEMIDE 20 MG/1
10 TABLET ORAL DAILY
COMMUNITY
End: 2018-07-27

## 2018-05-09 NOTE — PERIOP NOTES
PAT INTERVIEW COMPLETED WITH PT.  INFECTION PREVENTION INFORMATION GIVEN TO PT.  PT WAS GIVEN THE OPPORTUNITY TO ASK ADDITIONAL QUESTIONS.

## 2018-05-11 LAB
BACTERIA SPEC CULT: NORMAL
CC UR VC: NORMAL
SERVICE CMNT-IMP: NORMAL

## 2018-05-22 ENCOUNTER — ANESTHESIA EVENT (OUTPATIENT)
Dept: SURGERY | Age: 78
End: 2018-05-22
Payer: MEDICARE

## 2018-05-22 ENCOUNTER — ANESTHESIA (OUTPATIENT)
Dept: SURGERY | Age: 78
End: 2018-05-22
Payer: MEDICARE

## 2018-05-22 ENCOUNTER — HOSPITAL ENCOUNTER (OUTPATIENT)
Age: 78
Setting detail: OBSERVATION
Discharge: HOME | End: 2018-05-23
Attending: UROLOGY | Admitting: UROLOGY
Payer: MEDICARE

## 2018-05-22 DIAGNOSIS — C67.9 MALIGNANT NEOPLASM OF URINARY BLADDER, UNSPECIFIED SITE (HCC): Primary | ICD-10-CM

## 2018-05-22 PROCEDURE — 36415 COLL VENOUS BLD VENIPUNCTURE: CPT | Performed by: UROLOGY

## 2018-05-22 PROCEDURE — 77030005546 HC CATH URETH FOL 3W BARD -A: Performed by: UROLOGY

## 2018-05-22 PROCEDURE — 76210000006 HC OR PH I REC 0.5 TO 1 HR: Performed by: UROLOGY

## 2018-05-22 PROCEDURE — 74011250637 HC RX REV CODE- 250/637: Performed by: UROLOGY

## 2018-05-22 PROCEDURE — 99218 HC RM OBSERVATION: CPT

## 2018-05-22 PROCEDURE — 74011250636 HC RX REV CODE- 250/636

## 2018-05-22 PROCEDURE — 74011250637 HC RX REV CODE- 250/637

## 2018-05-22 PROCEDURE — 77030020782 HC GWN BAIR PAWS FLX 3M -B

## 2018-05-22 PROCEDURE — 94640 AIRWAY INHALATION TREATMENT: CPT

## 2018-05-22 PROCEDURE — 88305 TISSUE EXAM BY PATHOLOGIST: CPT | Performed by: UROLOGY

## 2018-05-22 PROCEDURE — 74011250636 HC RX REV CODE- 250/636: Performed by: ANESTHESIOLOGY

## 2018-05-22 PROCEDURE — 76010000149 HC OR TIME 1 TO 1.5 HR: Performed by: UROLOGY

## 2018-05-22 PROCEDURE — 74011250636 HC RX REV CODE- 250/636: Performed by: UROLOGY

## 2018-05-22 PROCEDURE — 77030011274 HC ELECTRD CUT LP RWOL -F: Performed by: UROLOGY

## 2018-05-22 PROCEDURE — 74011000250 HC RX REV CODE- 250

## 2018-05-22 PROCEDURE — 77030011640 HC PAD GRND REM COVD -A: Performed by: UROLOGY

## 2018-05-22 PROCEDURE — 74011000258 HC RX REV CODE- 258: Performed by: UROLOGY

## 2018-05-22 PROCEDURE — 76060000033 HC ANESTHESIA 1 TO 1.5 HR: Performed by: UROLOGY

## 2018-05-22 PROCEDURE — 77030032490 HC SLV COMPR SCD KNE COVD -B: Performed by: UROLOGY

## 2018-05-22 PROCEDURE — 77030018830 HC SOL IRR GLYC ICUM-A: Performed by: UROLOGY

## 2018-05-22 PROCEDURE — 77030018846 HC SOL IRR STRL H20 ICUM -A: Performed by: UROLOGY

## 2018-05-22 PROCEDURE — 74011000250 HC RX REV CODE- 250: Performed by: UROLOGY

## 2018-05-22 RX ORDER — FUROSEMIDE 20 MG/1
10 TABLET ORAL DAILY
Status: DISCONTINUED | OUTPATIENT
Start: 2018-05-23 | End: 2018-05-23 | Stop reason: HOSPADM

## 2018-05-22 RX ORDER — PHENAZOPYRIDINE HYDROCHLORIDE 200 MG/1
200 TABLET, FILM COATED ORAL
Qty: 20 TAB | Refills: 1 | Status: SHIPPED | OUTPATIENT
Start: 2018-05-22 | End: 2018-07-10

## 2018-05-22 RX ORDER — TAMSULOSIN HYDROCHLORIDE 0.4 MG/1
0.4 CAPSULE ORAL
Status: DISCONTINUED | OUTPATIENT
Start: 2018-05-22 | End: 2018-05-23 | Stop reason: HOSPADM

## 2018-05-22 RX ORDER — IPRATROPIUM BROMIDE AND ALBUTEROL SULFATE 2.5; .5 MG/3ML; MG/3ML
3 SOLUTION RESPIRATORY (INHALATION)
Status: DISCONTINUED | OUTPATIENT
Start: 2018-05-22 | End: 2018-05-23 | Stop reason: HOSPADM

## 2018-05-22 RX ORDER — DIPHENHYDRAMINE HYDROCHLORIDE 50 MG/ML
12.5 INJECTION, SOLUTION INTRAMUSCULAR; INTRAVENOUS AS NEEDED
Status: DISCONTINUED | OUTPATIENT
Start: 2018-05-22 | End: 2018-05-22 | Stop reason: HOSPADM

## 2018-05-22 RX ORDER — SODIUM CHLORIDE 0.9 % (FLUSH) 0.9 %
5-10 SYRINGE (ML) INJECTION AS NEEDED
Status: DISCONTINUED | OUTPATIENT
Start: 2018-05-22 | End: 2018-05-22 | Stop reason: HOSPADM

## 2018-05-22 RX ORDER — MIDAZOLAM HYDROCHLORIDE 1 MG/ML
0.5 INJECTION, SOLUTION INTRAMUSCULAR; INTRAVENOUS
Status: DISCONTINUED | OUTPATIENT
Start: 2018-05-22 | End: 2018-05-22 | Stop reason: HOSPADM

## 2018-05-22 RX ORDER — FENTANYL CITRATE 50 UG/ML
50 INJECTION, SOLUTION INTRAMUSCULAR; INTRAVENOUS AS NEEDED
Status: DISCONTINUED | OUTPATIENT
Start: 2018-05-22 | End: 2018-05-22 | Stop reason: HOSPADM

## 2018-05-22 RX ORDER — CEFAZOLIN SODIUM 2 G/50ML
2 SOLUTION INTRAVENOUS
Status: COMPLETED | OUTPATIENT
Start: 2018-05-22 | End: 2018-05-22

## 2018-05-22 RX ORDER — SODIUM CHLORIDE 0.9 % (FLUSH) 0.9 %
5-10 SYRINGE (ML) INJECTION EVERY 8 HOURS
Status: DISCONTINUED | OUTPATIENT
Start: 2018-05-22 | End: 2018-05-22 | Stop reason: HOSPADM

## 2018-05-22 RX ORDER — ATORVASTATIN CALCIUM 20 MG/1
20 TABLET, FILM COATED ORAL
Status: DISCONTINUED | OUTPATIENT
Start: 2018-05-22 | End: 2018-05-23 | Stop reason: HOSPADM

## 2018-05-22 RX ORDER — MORPHINE SULFATE 10 MG/ML
2 INJECTION, SOLUTION INTRAMUSCULAR; INTRAVENOUS
Status: DISCONTINUED | OUTPATIENT
Start: 2018-05-22 | End: 2018-05-22 | Stop reason: HOSPADM

## 2018-05-22 RX ORDER — SODIUM CHLORIDE 0.9 % (FLUSH) 0.9 %
5-10 SYRINGE (ML) INJECTION AS NEEDED
Status: DISCONTINUED | OUTPATIENT
Start: 2018-05-22 | End: 2018-05-23 | Stop reason: HOSPADM

## 2018-05-22 RX ORDER — SUCCINYLCHOLINE CHLORIDE 20 MG/ML
INJECTION INTRAMUSCULAR; INTRAVENOUS AS NEEDED
Status: DISCONTINUED | OUTPATIENT
Start: 2018-05-22 | End: 2018-05-22 | Stop reason: HOSPADM

## 2018-05-22 RX ORDER — ROPIVACAINE HYDROCHLORIDE 5 MG/ML
30 INJECTION, SOLUTION EPIDURAL; INFILTRATION; PERINEURAL ONCE
Status: DISCONTINUED | OUTPATIENT
Start: 2018-05-22 | End: 2018-05-22 | Stop reason: HOSPADM

## 2018-05-22 RX ORDER — MIDAZOLAM HYDROCHLORIDE 1 MG/ML
1 INJECTION, SOLUTION INTRAMUSCULAR; INTRAVENOUS AS NEEDED
Status: DISCONTINUED | OUTPATIENT
Start: 2018-05-22 | End: 2018-05-22 | Stop reason: HOSPADM

## 2018-05-22 RX ORDER — PROPOFOL 10 MG/ML
INJECTION, EMULSION INTRAVENOUS AS NEEDED
Status: DISCONTINUED | OUTPATIENT
Start: 2018-05-22 | End: 2018-05-22 | Stop reason: HOSPADM

## 2018-05-22 RX ORDER — SODIUM CHLORIDE 0.9 % (FLUSH) 0.9 %
5-10 SYRINGE (ML) INJECTION EVERY 8 HOURS
Status: DISCONTINUED | OUTPATIENT
Start: 2018-05-22 | End: 2018-05-23 | Stop reason: HOSPADM

## 2018-05-22 RX ORDER — ZOLPIDEM TARTRATE 5 MG/1
5 TABLET ORAL
Status: DISCONTINUED | OUTPATIENT
Start: 2018-05-22 | End: 2018-05-23 | Stop reason: HOSPADM

## 2018-05-22 RX ORDER — AMLODIPINE BESYLATE 5 MG/1
5 TABLET ORAL
Status: DISCONTINUED | OUTPATIENT
Start: 2018-05-22 | End: 2018-05-23 | Stop reason: HOSPADM

## 2018-05-22 RX ORDER — HYDROMORPHONE HYDROCHLORIDE 1 MG/ML
0.2 INJECTION, SOLUTION INTRAMUSCULAR; INTRAVENOUS; SUBCUTANEOUS
Status: DISCONTINUED | OUTPATIENT
Start: 2018-05-22 | End: 2018-05-22 | Stop reason: HOSPADM

## 2018-05-22 RX ORDER — FENTANYL CITRATE 50 UG/ML
INJECTION, SOLUTION INTRAMUSCULAR; INTRAVENOUS AS NEEDED
Status: DISCONTINUED | OUTPATIENT
Start: 2018-05-22 | End: 2018-05-22 | Stop reason: HOSPADM

## 2018-05-22 RX ORDER — SODIUM CHLORIDE, SODIUM LACTATE, POTASSIUM CHLORIDE, CALCIUM CHLORIDE 600; 310; 30; 20 MG/100ML; MG/100ML; MG/100ML; MG/100ML
125 INJECTION, SOLUTION INTRAVENOUS CONTINUOUS
Status: DISCONTINUED | OUTPATIENT
Start: 2018-05-22 | End: 2018-05-22 | Stop reason: HOSPADM

## 2018-05-22 RX ORDER — PHENYLEPHRINE HCL IN 0.9% NACL 0.4MG/10ML
SYRINGE (ML) INTRAVENOUS AS NEEDED
Status: DISCONTINUED | OUTPATIENT
Start: 2018-05-22 | End: 2018-05-22 | Stop reason: HOSPADM

## 2018-05-22 RX ORDER — OMEPRAZOLE 20 MG/1
20 CAPSULE, DELAYED RELEASE ORAL
Status: DISCONTINUED | OUTPATIENT
Start: 2018-05-23 | End: 2018-05-22 | Stop reason: CLARIF

## 2018-05-22 RX ORDER — FENTANYL CITRATE 50 UG/ML
25 INJECTION, SOLUTION INTRAMUSCULAR; INTRAVENOUS
Status: DISCONTINUED | OUTPATIENT
Start: 2018-05-22 | End: 2018-05-22 | Stop reason: HOSPADM

## 2018-05-22 RX ORDER — LIDOCAINE HYDROCHLORIDE 10 MG/ML
0.1 INJECTION, SOLUTION EPIDURAL; INFILTRATION; INTRACAUDAL; PERINEURAL AS NEEDED
Status: DISCONTINUED | OUTPATIENT
Start: 2018-05-22 | End: 2018-05-22 | Stop reason: HOSPADM

## 2018-05-22 RX ORDER — POTASSIUM CHLORIDE 750 MG/1
10 TABLET, FILM COATED, EXTENDED RELEASE ORAL DAILY
Status: DISCONTINUED | OUTPATIENT
Start: 2018-05-23 | End: 2018-05-23 | Stop reason: HOSPADM

## 2018-05-22 RX ORDER — ARFORMOTEROL TARTRATE 15 UG/2ML
15 SOLUTION RESPIRATORY (INHALATION)
Status: DISCONTINUED | OUTPATIENT
Start: 2018-05-22 | End: 2018-05-23 | Stop reason: HOSPADM

## 2018-05-22 RX ORDER — MONTELUKAST SODIUM 10 MG/1
10 TABLET ORAL DAILY
Status: DISCONTINUED | OUTPATIENT
Start: 2018-05-22 | End: 2018-05-23 | Stop reason: HOSPADM

## 2018-05-22 RX ORDER — SODIUM CHLORIDE, SODIUM LACTATE, POTASSIUM CHLORIDE, CALCIUM CHLORIDE 600; 310; 30; 20 MG/100ML; MG/100ML; MG/100ML; MG/100ML
75 INJECTION, SOLUTION INTRAVENOUS CONTINUOUS
Status: DISCONTINUED | OUTPATIENT
Start: 2018-05-22 | End: 2018-05-22 | Stop reason: HOSPADM

## 2018-05-22 RX ORDER — ALBUTEROL SULFATE 0.83 MG/ML
2.5 SOLUTION RESPIRATORY (INHALATION)
Status: DISCONTINUED | OUTPATIENT
Start: 2018-05-22 | End: 2018-05-23 | Stop reason: HOSPADM

## 2018-05-22 RX ORDER — FINASTERIDE 5 MG/1
5 TABLET, FILM COATED ORAL DAILY
Status: DISCONTINUED | OUTPATIENT
Start: 2018-05-22 | End: 2018-05-23 | Stop reason: HOSPADM

## 2018-05-22 RX ORDER — ATROPA BELLADONNA AND OPIUM 16.2; 6 MG/1; MG/1
SUPPOSITORY RECTAL
Status: COMPLETED
Start: 2018-05-22 | End: 2018-05-22

## 2018-05-22 RX ORDER — IPRATROPIUM BROMIDE 0.5 MG/2.5ML
0.5 SOLUTION RESPIRATORY (INHALATION)
Status: DISCONTINUED | OUTPATIENT
Start: 2018-05-22 | End: 2018-05-23 | Stop reason: HOSPADM

## 2018-05-22 RX ORDER — SODIUM CHLORIDE 9 MG/ML
25 INJECTION, SOLUTION INTRAVENOUS CONTINUOUS
Status: DISCONTINUED | OUTPATIENT
Start: 2018-05-22 | End: 2018-05-22 | Stop reason: HOSPADM

## 2018-05-22 RX ORDER — LIDOCAINE HYDROCHLORIDE 20 MG/ML
INJECTION, SOLUTION EPIDURAL; INFILTRATION; INTRACAUDAL; PERINEURAL AS NEEDED
Status: DISCONTINUED | OUTPATIENT
Start: 2018-05-22 | End: 2018-05-22 | Stop reason: HOSPADM

## 2018-05-22 RX ORDER — ONDANSETRON 2 MG/ML
4 INJECTION INTRAMUSCULAR; INTRAVENOUS AS NEEDED
Status: DISCONTINUED | OUTPATIENT
Start: 2018-05-22 | End: 2018-05-22 | Stop reason: HOSPADM

## 2018-05-22 RX ORDER — HYDROCODONE BITARTRATE AND ACETAMINOPHEN 5; 325 MG/1; MG/1
1 TABLET ORAL
Status: DISCONTINUED | OUTPATIENT
Start: 2018-05-22 | End: 2018-05-23 | Stop reason: HOSPADM

## 2018-05-22 RX ORDER — PHENAZOPYRIDINE HYDROCHLORIDE 100 MG/1
200 TABLET, FILM COATED ORAL
Status: DISCONTINUED | OUTPATIENT
Start: 2018-05-22 | End: 2018-05-23 | Stop reason: HOSPADM

## 2018-05-22 RX ORDER — CETIRIZINE HCL 10 MG
10 TABLET ORAL DAILY
Status: DISCONTINUED | OUTPATIENT
Start: 2018-05-22 | End: 2018-05-23 | Stop reason: HOSPADM

## 2018-05-22 RX ORDER — HYDROCODONE BITARTRATE AND ACETAMINOPHEN 5; 325 MG/1; MG/1
1 TABLET ORAL
Qty: 12 TAB | Refills: 0 | Status: SHIPPED | OUTPATIENT
Start: 2018-05-22 | End: 2018-07-10

## 2018-05-22 RX ORDER — SODIUM CHLORIDE, SODIUM LACTATE, POTASSIUM CHLORIDE, CALCIUM CHLORIDE 600; 310; 30; 20 MG/100ML; MG/100ML; MG/100ML; MG/100ML
INJECTION, SOLUTION INTRAVENOUS
Status: DISCONTINUED | OUTPATIENT
Start: 2018-05-22 | End: 2018-05-22 | Stop reason: HOSPADM

## 2018-05-22 RX ORDER — ATROPA BELLADONNA AND OPIUM 16.2; 6 MG/1; MG/1
1 SUPPOSITORY RECTAL ONCE
Status: COMPLETED | OUTPATIENT
Start: 2018-05-22 | End: 2018-05-22

## 2018-05-22 RX ORDER — PANTOPRAZOLE SODIUM 40 MG/1
40 TABLET, DELAYED RELEASE ORAL
Status: DISCONTINUED | OUTPATIENT
Start: 2018-05-23 | End: 2018-05-23 | Stop reason: HOSPADM

## 2018-05-22 RX ADMIN — ATROPA BELLADONNA AND OPIUM 1 SUPPOSITORY: 16.2; 6 SUPPOSITORY RECTAL at 11:29

## 2018-05-22 RX ADMIN — LIDOCAINE HYDROCHLORIDE 60 MG: 20 INJECTION, SOLUTION EPIDURAL; INFILTRATION; INTRACAUDAL; PERINEURAL at 09:51

## 2018-05-22 RX ADMIN — FENTANYL CITRATE 25 MCG: 50 INJECTION, SOLUTION INTRAMUSCULAR; INTRAVENOUS at 11:50

## 2018-05-22 RX ADMIN — CEFAZOLIN SODIUM 1 G: 1 INJECTION, POWDER, FOR SOLUTION INTRAMUSCULAR; INTRAVENOUS at 19:08

## 2018-05-22 RX ADMIN — PROPOFOL 100 MG: 10 INJECTION, EMULSION INTRAVENOUS at 09:51

## 2018-05-22 RX ADMIN — AMLODIPINE BESYLATE 5 MG: 5 TABLET ORAL at 19:00

## 2018-05-22 RX ADMIN — MONTELUKAST SODIUM 10 MG: 10 TABLET, FILM COATED ORAL at 19:00

## 2018-05-22 RX ADMIN — Medication 10 ML: at 19:09

## 2018-05-22 RX ADMIN — ONDANSETRON 4 MG: 2 INJECTION INTRAMUSCULAR; INTRAVENOUS at 11:44

## 2018-05-22 RX ADMIN — CEFAZOLIN SODIUM 2 G: 2 SOLUTION INTRAVENOUS at 09:59

## 2018-05-22 RX ADMIN — SUCCINYLCHOLINE CHLORIDE 100 MG: 20 INJECTION INTRAMUSCULAR; INTRAVENOUS at 09:52

## 2018-05-22 RX ADMIN — Medication 40 MCG: at 09:51

## 2018-05-22 RX ADMIN — LIDOCAINE HYDROCHLORIDE 100 MG: 20 INJECTION, SOLUTION EPIDURAL; INFILTRATION; INTRACAUDAL; PERINEURAL at 10:32

## 2018-05-22 RX ADMIN — FINASTERIDE 5 MG: 5 TABLET, FILM COATED ORAL at 19:00

## 2018-05-22 RX ADMIN — TAMSULOSIN HYDROCHLORIDE 0.4 MG: 0.4 CAPSULE ORAL at 22:36

## 2018-05-22 RX ADMIN — PHENAZOPYRIDINE HYDROCHLORIDE 200 MG: 100 TABLET ORAL at 11:45

## 2018-05-22 RX ADMIN — FENTANYL CITRATE 100 MCG: 50 INJECTION, SOLUTION INTRAMUSCULAR; INTRAVENOUS at 09:45

## 2018-05-22 RX ADMIN — FENTANYL CITRATE 50 MCG: 50 INJECTION, SOLUTION INTRAMUSCULAR; INTRAVENOUS at 10:06

## 2018-05-22 RX ADMIN — IPRATROPIUM BROMIDE AND ALBUTEROL SULFATE 3 ML: .5; 3 SOLUTION RESPIRATORY (INHALATION) at 20:01

## 2018-05-22 RX ADMIN — SODIUM CHLORIDE, SODIUM LACTATE, POTASSIUM CHLORIDE, CALCIUM CHLORIDE: 600; 310; 30; 20 INJECTION, SOLUTION INTRAVENOUS at 09:45

## 2018-05-22 RX ADMIN — ATORVASTATIN CALCIUM 20 MG: 20 TABLET, FILM COATED ORAL at 22:36

## 2018-05-22 RX ADMIN — IPRATROPIUM BROMIDE 0.5 MG: 0.5 SOLUTION RESPIRATORY (INHALATION) at 20:01

## 2018-05-22 RX ADMIN — Medication 40 MCG: at 10:00

## 2018-05-22 RX ADMIN — FENTANYL CITRATE 25 MCG: 50 INJECTION, SOLUTION INTRAMUSCULAR; INTRAVENOUS at 11:44

## 2018-05-22 RX ADMIN — CETIRIZINE HYDROCHLORIDE 10 MG: 10 TABLET, FILM COATED ORAL at 19:00

## 2018-05-22 NOTE — ANESTHESIA PREPROCEDURE EVALUATION
Anesthetic History   No history of anesthetic complications            Review of Systems / Medical History  Patient summary reviewed, nursing notes reviewed and pertinent labs reviewed    Pulmonary    COPD: mild               Neuro/Psych   Within defined limits           Cardiovascular    Hypertension: well controlled            Pertinent negatives: No dysrhythmias       GI/Hepatic/Renal     GERD: well controlled      PUD     Endo/Other  Within defined limits           Other Findings   Comments: bladder cancer with chemo and radiation         Physical Exam    Airway  Mallampati: II  TM Distance: > 6 cm  Neck ROM: normal range of motion   Mouth opening: Normal     Cardiovascular  Regular rate and rhythm,  S1 and S2 normal,  no murmur, click, rub, or gallop             Dental  No notable dental hx       Pulmonary  Breath sounds clear to auscultation               Abdominal  GI exam deferred       Other Findings            Anesthetic Plan    ASA: 3  Anesthesia type: general          Induction: Intravenous  Anesthetic plan and risks discussed with: Patient

## 2018-05-22 NOTE — PERIOP NOTES
TRANSFER - OUT REPORT:    Verbal report given to [facility] (name) on Frederick Goldberg  being transferred to [address] [facility] (unit) for routine post - op       Report consisted of patients Situation, Background, Assessment and   Recommendations(SBAR). Time Pre op antibiotic CFTQE:6928  Anesthesia Stop time: 2464  Culver Present on Transfer to floor: YES  Order for Culver on Chart:Yes   Discharge Prescriptions with Chart:yes    Information from the following report(s) SBAR, Kardex, OR Summary, Intake/Output, MAR, Med Rec Status and Cardiac Rhythm SR was reviewed with the receiving nurse. Opportunity for questions and clarification was provided. Is the patient on 02? NO        Is the patient on a monitor? NO    Is the nurse transporting with the patient? NO    Surgical Waiting Area notified of patient's transfer from PACU? YES      The following personal items collected during your admission accompanied patient upon transfer:   Dental Appliance: Dental Appliances: Uppers, Lowers, With patient  Vision: Visual Aid: Glasses  Hearing Aid:    Jewelry:    Clothing: Clothing:  (bag of clothes with patient)  Other Valuables:  Other Valuables: Eyeglasses (returned to patient)  Valuables sent to safe:

## 2018-05-22 NOTE — BRIEF OP NOTE
BRIEF OPERATIVE NOTE    Date of Procedure: 5/22/2018   Preoperative Diagnosis: BLADDER CANCER   Postoperative Diagnosis: BLADDER CANCER     Procedure(s):  CYSTOSCOPY, BLADDER BIOPSY X4 WITH RESECTOSCOPE  TRANSURETHRAL RESECTION OF BLADDER TUMOR  Surgeon(s) and Role:     * Conor Bynum MD - Primary         Surgical Assistant: none    Surgical Staff:  Circ-1: Amalia Matta, RN  Scrub RN-1: Rosalind Hillman RN  Event Time In   Incision Start 1002   Incision Close 1048     Anesthesia: General   Estimated Blood Loss: minimal  Specimens:   ID Type Source Tests Collected by Time Destination   1 : right lateral wall of bladder Fresh Bladder  Conor Bynum MD 5/22/2018 1010 Pathology   2 : posterior wall of bladder Fresh Bladder  Conor Bynum MD 5/22/2018 1014 Pathology   3 : dome of bladder Fresh Bladder  Conor Bynum MD 5/22/2018 1015 Pathology   4 : bladder lesion left lateral wall Fresh Bladder  Conor Bynum MD 5/22/2018 1026 Pathology      Findings: bladder lesion   Complications: none  Implants: * No implants in log *     [id number]

## 2018-05-22 NOTE — IP AVS SNAPSHOT
[facility] 
[phone] Patient: Sylvester Peña MRN: [id number] JYQ:[birth year] A check max indicates which time of day the medication should be taken. My Medications START taking these medications Instructions Each Dose to Equal  
 Morning Noon Evening Bedtime  
 phenazopyridine 200 mg tablet Commonly known as:  PYRIDIUM Your last dose was: Your next dose is: Take 1 Tab by mouth three (3) times daily as needed for Pain. 200 mg CHANGE how you take these medications Instructions Each Dose to Equal  
 Morning Noon Evening Bedtime * HYDROCODONE-ACETAMINOPHEN PO What changed:  Another medication with the same name was added. Make sure you understand how and when to take each. Your last dose was: Your next dose is: Take  by mouth. 7.5/500  
     
   
   
   
  
 * HYDROcodone-acetaminophen 5-325 mg per tablet Commonly known as:  Hurman Grist What changed: You were already taking a medication with the same name, and this prescription was added. Make sure you understand how and when to take each. Your last dose was: Your next dose is: Take 1 Tab by mouth every six (6) hours as needed for Pain. Max Daily Amount: 4 Tabs. Indications: Pain 1 Tab * Notice: This list has 2 medication(s) that are the same as other medications prescribed for you. Read the directions carefully, and ask your doctor or other care provider to review them with you. CONTINUE taking these medications Instructions Each Dose to Equal  
 Morning Noon Evening Bedtime  
 albuterol sulfate 90 mcg/actuation Aepb Your last dose was: Your next dose is: Take 1 Puff by inhalation every six (6) hours as needed. Indications: Chronic Obstructive Pulmonary Disease 1 Puff albuterol-ipratropium 2.5 mg-0.5 mg/3 ml Nebu Commonly known as:  Alcides Mckeon Your last dose was: Your next dose is:    
   
   
 UE 1 VIAL VIA NEBULIZER EVERY 6 HOURS  
     
   
   
   
  
 amLODIPine 5 mg tablet Commonly known as:  Miley Leroy Your last dose was: Your next dose is: Take 5 mg by mouth every morning. Indications: HYPERTENSION  
 5 mg  
    
   
   
   
  
 cetirizine 10 mg tablet Commonly known as:  ZYRTEC Your last dose was: Your next dose is:    
   
   
 10 mg daily. 10 mg  
    
   
   
   
  
 finasteride 5 mg tablet Commonly known as:  PROSCAR Your last dose was: Your next dose is: Take 5 mg by mouth nightly as needed. 5 mg LASIX 20 mg tablet Generic drug:  furosemide Your last dose was: Your next dose is: Take 10 mg by mouth daily. 10-20 mg as needed 10 mg  
    
   
   
   
  
 LIPITOR 20 mg tablet Generic drug:  atorvastatin Your last dose was: Your next dose is: Take 20 mg by mouth nightly. 20 mg  
    
   
   
   
  
 montelukast 10 mg tablet Commonly known as:  SINGULAIR Your last dose was: Your next dose is: Take 10 mg by mouth daily. 10 mg  
    
   
   
   
  
 omeprazole 20 mg capsule Commonly known as:  PRILOSEC Your last dose was: Your next dose is: Take 20 mg by mouth every morning. 20 mg  
    
   
   
   
  
 potassium chloride SR 10 mEq tablet Commonly known as:  KLOR-CON 10 Your last dose was: Your next dose is: Take 10 mEq by mouth as needed. Takes with lasix as needed 10 mEq SALINE MIST 0.65 % nasal squeeze bottle Generic drug:  sodium chloride Your last dose was: Your next dose is:    
   
   
 1 Spray as needed for Congestion. 1 Spray STIOLTO RESPIMAT 2.5-2.5 mcg/actuation Mist  
Generic drug:  tiotropium-olodaterol Your last dose was: Your next dose is: Take  by inhalation every morning. tamsulosin 0.4 mg capsule Commonly known as:  FLOMAX Your last dose was: Your next dose is: Take 0.4 mg by mouth nightly. 0.4 mg  
    
   
   
   
  
 triamcinolone acetonide 0.025 % ointment Commonly known as:  KENALOG Your last dose was: Your next dose is:    
   
   
      
   
   
   
  
 zolpidem 10 mg tablet Commonly known as:  AMBIEN Your last dose was: Your next dose is:    
   
   
 5 mg nightly as needed. 5 mg Where to Get Your Medications Information on where to get these meds will be given to you by the nurse or doctor. ! Ask your nurse or doctor about these medications HYDROcodone-acetaminophen 5-325 mg per tablet  
 phenazopyridine 200 mg tablet

## 2018-05-22 NOTE — IP AVS SNAPSHOT
[facility] [address] 
[phone] Patient: Chapis Santos MRN: [id number] CNI:[id number] About your hospitalization You were admitted on:  May 22, 2018 You last received care in the:  [facility] 5 You were discharged on:  May 23, 2018 Why you were hospitalized Your primary diagnosis was:  Not on File Your diagnoses also included:  Bladder Cancer (Hcc) Follow-up Information Follow up With Details Comments Contact Info Henry Lopez NP   [address] [place] [address] 
[phone] Your Scheduled Appointments Tuesday June 05, 2018 10:30 AM EDT Follow Up with Az Ji MD  
[facility] [place] [address] [address]  
[phone] Discharge Orders None A check max indicates which time of day the medication should be taken. My Medications START taking these medications Instructions Each Dose to Equal  
 Morning Noon Evening Bedtime  
 phenazopyridine 200 mg tablet Commonly known as:  PYRIDIUM Your last dose was: Your next dose is: Take 1 Tab by mouth three (3) times daily as needed for Pain. 200 mg CHANGE how you take these medications Instructions Each Dose to Equal  
 Morning Noon Evening Bedtime * HYDROCODONE-ACETAMINOPHEN PO What changed:  Another medication with the same name was added. Make sure you understand how and when to take each. Your last dose was: Your next dose is: Take  by mouth. 7.5/500  
     
   
   
   
  
 * HYDROcodone-acetaminophen 5-325 mg per tablet Commonly known as:  Charline Samuels What changed: You were already taking a medication with the same name, and this prescription was added. Make sure you understand how and when to take each. Your last dose was: Your next dose is: Take 1 Tab by mouth every six (6) hours as needed for Pain. Max Daily Amount: 4 Tabs. Indications: Pain 1 Tab * Notice: This list has 2 medication(s) that are the same as other medications prescribed for you. Read the directions carefully, and ask your doctor or other care provider to review them with you. CONTINUE taking these medications Instructions Each Dose to Equal  
 Morning Noon Evening Bedtime  
 albuterol sulfate 90 mcg/actuation Aepb Your last dose was: Your next dose is: Take 1 Puff by inhalation every six (6) hours as needed. Indications: Chronic Obstructive Pulmonary Disease 1 Puff  
    
   
   
   
  
 albuterol-ipratropium 2.5 mg-0.5 mg/3 ml Nebu Commonly known as:  Giselle Pradhan Your last dose was: Your next dose is:    
   
   
 UE 1 VIAL VIA NEBULIZER EVERY 6 HOURS  
     
   
   
   
  
 amLODIPine 5 mg tablet Commonly known as:  Ben Schuler Your last dose was: Your next dose is: Take 5 mg by mouth every morning. Indications: HYPERTENSION  
 5 mg  
    
   
   
   
  
 cetirizine 10 mg tablet Commonly known as:  ZYRTEC Your last dose was: Your next dose is:    
   
   
 10 mg daily. 10 mg  
    
   
   
   
  
 finasteride 5 mg tablet Commonly known as:  PROSCAR Your last dose was: Your next dose is: Take 5 mg by mouth nightly as needed. 5 mg LASIX 20 mg tablet Generic drug:  furosemide Your last dose was: Your next dose is: Take 10 mg by mouth daily. 10-20 mg as needed 10 mg  
    
   
   
   
  
 LIPITOR 20 mg tablet Generic drug:  atorvastatin Your last dose was: Your next dose is: Take 20 mg by mouth nightly. 20 mg  
    
   
   
   
  
 montelukast 10 mg tablet Commonly known as:  SINGULAIR  
 Your last dose was: Your next dose is: Take 10 mg by mouth daily. 10 mg  
    
   
   
   
  
 omeprazole 20 mg capsule Commonly known as:  PRILOSEC Your last dose was: Your next dose is: Take 20 mg by mouth every morning. 20 mg  
    
   
   
   
  
 potassium chloride SR 10 mEq tablet Commonly known as:  KLOR-CON 10 Your last dose was: Your next dose is: Take 10 mEq by mouth as needed. Takes with lasix as needed 10 mEq SALINE MIST 0.65 % nasal squeeze bottle Generic drug:  sodium chloride Your last dose was: Your next dose is:    
   
   
 1 Spray as needed for Congestion. 1 [place] STIOLTO RESPIMAT 2.5-2.5 mcg/actuation Mist  
Generic drug:  tiotropium-olodaterol Your last dose was: Your next dose is: Take  by inhalation every morning. tamsulosin 0.4 mg capsule Commonly known as:  FLOMAX Your last dose was: Your next dose is: Take 0.4 mg by mouth nightly. 0.4 mg  
    
   
   
   
  
 triamcinolone acetonide 0.025 % ointment Commonly known as:  KENALOG Your last dose was: Your next dose is:    
   
   
      
   
   
   
  
 zolpidem 10 mg tablet Commonly known as:  AMBIEN Your last dose was: Your next dose is:    
   
   
 5 mg nightly as needed. 5 mg Where to Get Your Medications Information on where to get these meds will be given to you by the nurse or doctor. ! Ask your nurse or doctor about these medications HYDROcodone-acetaminophen 5-325 mg per tablet  
 phenazopyridine 200 mg tablet Opioid Education  Prescription Opioids: What You Need to Know: 
 
Prescription opioids can be used to help relieve moderate-to-severe pain and are often prescribed following a surgery or injury, or for certain health conditions. These medications can be an important part of treatment but also come with serious risks. Opioids are strong pain medicines. Examples include hydrocodone, oxycodone, fentanyl, and morphine. Heroin is an example of an illegal opioid. It is important to work with your health care provider to make sure you are getting the safest, most effective care. WHAT ARE THE RISKS AND SIDE EFFECTS OF OPIOID USE? Prescription opioids carry serious risks of addiction and overdose, especially with prolonged use. An opioid overdose, often marked by slow breathing, can cause sudden death. The use of prescription opioids can have a number of side effects as well, even when taken as directed. · Tolerance-meaning you might need to take more of a medication for the same pain relief · Physical dependence-meaning you have symptoms of withdrawal when the medication is stopped. Withdrawal symptoms can include nausea, sweating, chills, diarrhea, stomach cramps, and muscle aches. Withdrawal can last up to several weeks, depending on which drug you took and how long you took it. · Increased sensitivity to pain · Constipation · Nausea, vomiting, and dry mouth · Sleepiness and dizziness · Confusion · Depression · Low levels of testosterone that can result in lower sex drive, energy, and strength · Itching and sweating RISKS ARE GREATER WITH:      
· History of drug misuse, substance use disorder, or overdose · Mental health conditions (such as depression or anxiety) · Sleep apnea · Older age (72 years or older) · Pregnancy Avoid alcohol while taking prescription opioids. Also, unless specifically advised by your health care provider, medications to avoid include: · Benzodiazepines (such as Xanax or Valium) · Muscle relaxants (such as Soma or Flexeril) · Hypnotics (such as Ambien or Lunesta) · Other prescription opioids KNOW YOUR OPTIONS Talk to your health care provider about ways to manage your pain that don't involve prescription opioids. Some of these options may actually work better and have fewer risks and side effects. Options may include: 
· Pain relievers such as acetaminophen, ibuprofen, and naproxen · Some medications that are also used for depression or seizures · Physical therapy and exercise · Counseling to help patients learn how to cope better with triggers of pain and stress. · Application of heat or cold compress · Massage therapy · Relaxation techniques Be Informed Make sure you know the name of your medication, how much and how often to take it, and its potential risks & side effects. IF YOU ARE PRESCRIBED OPIOIDS FOR PAIN: 
· Never take opioids in greater amounts or more often than prescribed. Remember the goal is not to be pain-free but to manage your pain at a tolerable level. · Follow up with your primary care provider to: · Work together to create a plan on how to manage your pain. · Talk about ways to help manage your pain that don't involve prescription opioids. · Talk about any and all concerns and side effects. · Help prevent misuse and abuse. · Never sell or share prescription opioids · Help prevent misuse and abuse. · Store prescription opioids in a secure place and out of reach of others (this may include visitors, children, friends, and family). · Safely dispose of unused/unwanted prescription opioids: Find your community drug take-back program or your pharmacy mail-back program, or flush them down the toilet, following guidance from the Food and Drug Administration (www.fda.gov/Drugs/ResourcesForYou). · Visit www.cdc.gov/drugoverdose to learn about the risks of opioid abuse and overdose. · If you believe you may be struggling with addiction, tell your health care provider and ask for guidance or call [facility] at 5-711-349-UWCI. Discharge Instructions Call Rosangela Oates ([phone]) to schedule follow-up visit in 1 - 2 weeks. No strenuous activity. Call for any questions or problems. Some blood in the urine is expected. Call if heavy bleeding or unable to urinate. Introducing [facility]! Dear Digna Leggett: Thank you for requesting a Lelong account. Our records indicate that you already have an active Lelong account. You can access your account anytime at [email or web address] Did you know that you can access your hospital and ER discharge instructions at any time in Lelong? You can also review all of your test results from your hospital stay or ER visit. Additional Information If you have questions, please visit the Frequently Asked Questions section of the Lelong website at [email or web address] Remember, Lelong is NOT to be used for urgent needs. For medical emergencies, dial 911. Now available from your iPhone and Android! Introducing Ricky Gonzalez As a [employer] patient, I wanted to make you aware of our electronic visit tool called Ricky LesterThing5. [employer] 24/7 allows you to connect within minutes with a medical provider 24 hours a day, seven days a week via a mobile device or tablet or logging into a secure website from your computer. You can access Ricky Gonzalez from anywhere in the [place]. A virtual visit might be right for you when you have a simple condition and feel like you just dont want to get out of bed, or cant get away from work for an appointment, when your regular [employer] provider is not available (evenings, weekends or holidays), or when youre out of town and need minor care.   Electronic visits cost only $49 and if the Ricky Gonzalez provider determines a prescription is needed to treat your condition, one can be electronically transmitted to a nearby pharmacy*. Please take a moment to enroll today if you have not already done so. The enrollment process is free and takes just a few minutes. To enroll, please download the InMyRoom 24/7 madison to your tablet or phone, or visit [email or web address] to enroll on your computer. And, as an [address] [place] [address] patient with a [employer] account, the results of your visits will be scanned into your electronic medical record and your primary care provider will be able to view the scanned results. We urge you to continue to see your regular InMy[facility] provider for your ongoing medical care. And while your primary care provider may not be the one available when you seek a [facility] virtual visit, the peace of mind you get from getting a real diagnosis real time can be priceless. For more information on [employer]otics, view our Frequently Asked Questions (FAQs) at [email or web address]. Sincerely, 
 
Valentino Mast MD 
Chief Medical Officer [address] [place] [address] *:  certain medications cannot be prescribed via [facility] Providers Seen During Your Hospitalization Provider Specialty Primary office phone Maria Isabel Pineda MD Urology [phone] Your Primary Care Physician (PCP) Primary Care Physician Office Phone Office Fax Tran Quiroz [phone] You are allergic to the following No active allergies Recent Documentation Weight BMI Smoking Status 84.4 kg 27.47 kg/m2 Former Smoker Emergency Contacts Name Discharge Info Relation Home Work Mobile Zainab Beard CAREGIVER [3] Daughter [[phone] Martha Motta ([facility]) N/A  AT THIS TIME [6] Other Relative [6] [phone] Patient Belongings The following personal items are in your possession at time of discharge: 
  Dental Appliances: Uppers, Lowers, With patient  Visual Aid: None             Clothing:  (bag of clothes with patient)    Other Valuables: Eyeglasses (returned to patient) Please provide this summary of care documentation to your next provider. Signatures-by signing, you are acknowledging that this After Visit Summary has been reviewed with you and you have received a copy. Patient Signature:  ____________________________________________________________ Date:  ____________________________________________________________  
  
[facility] Provider Signature:  ____________________________________________________________ Date:  ____________________________________________________________

## 2018-05-22 NOTE — DISCHARGE INSTRUCTIONS
Call Bradley Morillo ([phone]) to schedule follow-up visit in 1 - 2 weeks. No strenuous activity. Call for any questions or problems. Some blood in the urine is expected. Call if heavy bleeding or unable to urinate.

## 2018-05-22 NOTE — OP NOTES
[address]   OPERATIVE REPORT    Jose Morris  MR#: [id number]  : 1940  ACCOUNT #: [de-identified]   DATE OF SERVICE: 2018    PREOPERATIVE DIAGNOSIS:  Bladder cancer. POSTOPERATIVE DIAGNOSIS/FINDINGS:  The bladder demonstrated diffuse hyperemia and erythematous changes. The left lateral bladder wall is particularly concerning showing dystrophic calcifications and raised reddened mucosa. PROCEDURES PERFORMED:  Cystoscopy, select bladder biopsies and transurethral resection of bladder lesion measuring approximately 3 cm. SURGEON:  Annabelle Mitchell MD    ASSISTANT:  None. ANESTHESIA:  General.    ESTIMATED BLOOD LOSS:  Minimal.    SPECIMENS REMOVED:   1. Biopsy from right lateral wall. 2.  Biopsy from posterior bladder wall. 3.  Biopsy from bladder dome. 4.  Transurethral resection of a lesion in the left lateral bladder wall. COMPLICATIONS:  None. DRAINS:  22 [place] 3-way Culver catheter with the CBI port plugged. INDICATION FOR PROCEDURE:  The patient is a 77-year-old gentleman with a long history of bladder cancer. 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 Cystectomy has been offered and recommended multiple times. Instead, he opted for chemoradiation, which he finished years ago. He most recently underwent a BCG treatments almost a year ago. His postoperative biopsy/resection was delayed for various reasons and he comes in today for cystoscopic evaluation of the bladder with possible biopsy or TURBT depending on the findings. He has had a recent CT just a few weeks ago, which shows thick-walled bladder which is nonspecific, but no definitive suggestion of cancer beyond the bladder or metastases. His recent urine FISH test in the office was concerning for malignant cells. The procedure was reviewed in detail.   He has been through multiple similar procedures in the past.  We discussed things in detail and he agreed to proceed. DESCRIPTION OF PROCEDURE:  He was identified and brought to the operating room and placed supine on the cystoscopy table. Anesthesia was induced. He was placed in dorsal lithotomy and prepped and draped in standard fashion. Cystoscopy was performed with a 21-Iraqi sheath and both a 30 and 70 degree lenses. The anterior urethra was normal.  The prostate showed mild lateral lobe hypertrophy, but otherwise was normal.  The cystoscope was passed into the bladder. The bladder was closely examined with both lenses. The bladder was of relatively small capacity. There was scattered nonspecific hyperemia and erythema throughout the bladder. The left lateral bladder wall extending up to the anterior wall was most concerning. There was significant amount of embedded dystrophic calcifications and the underlying mucosa was raised and reddened, the appearance was concerning for malignancy, but could potentially just represent postinflammatory changes from all his prior treatments. I first used the cold cup resection forceps to biopsy representative areas of the right lateral posterior and dome of the bladder. These were individually labeled. All these did represent somewhat erythematous and hyperemic areas. Next, I calibrated the urethra up to 32 [place] A[place] with [employer] sounds and then passed a 24 [place] A[place] resectoscope. Initially using the cautery, I cauterized the above described biopsy sites. My attention was then turned to the lesion at the left lateral bladder wall. Using just the cold loop, I was able to dislodge some of overlying dystrophic calcifications and these were allowed to drain out of the bladder. Next, using the cutting current, I resected the above described area in its entirety. I felt as though the resection was down into muscle and close to fat in some areas.   Both ureteral orifices were a little difficult to identify, but I seemed to be well away from the region of the trigone. The resulting bladder tumor chips were cleared with the [facility] evacuator and sent for pathology. I used the cautery to cauterize the base of the lesion and surrounding mucosa and hemostasis was achieved. I emptied and then slowly refilled the bladder again, confirming hemostasis. Although there was no significant active bleeding, I did elect to place a 3-way catheter. A 22 Syrian Culver was selected. The CBI port was kept plugged. Urine return was fairly clear and the catheter irrigated freely with a Luzmaria syringe. The abdomen was soft and benign at the conclusion of the procedure.     He was awakened and transported to Recovery in good condition      Yaneth Varghese MD DPM / COURTNEY  D: 05/22/2018 11:08     T: 05/22/2018 11:50  JOB #: [id number]

## 2018-05-23 VITALS
WEIGHT: 186 LBS | HEART RATE: 86 BPM | SYSTOLIC BLOOD PRESSURE: 163 MMHG | OXYGEN SATURATION: 96 % | RESPIRATION RATE: 18 BRPM | DIASTOLIC BLOOD PRESSURE: 83 MMHG | BODY MASS INDEX: 27.47 KG/M2 | TEMPERATURE: 97.8 F

## 2018-05-23 PROCEDURE — 94664 DEMO&/EVAL PT USE INHALER: CPT

## 2018-05-23 PROCEDURE — 74011000250 HC RX REV CODE- 250: Performed by: UROLOGY

## 2018-05-23 PROCEDURE — 51798 US URINE CAPACITY MEASURE: CPT

## 2018-05-23 PROCEDURE — 74011250637 HC RX REV CODE- 250/637: Performed by: UROLOGY

## 2018-05-23 PROCEDURE — 99218 HC RM OBSERVATION: CPT

## 2018-05-23 PROCEDURE — 74011250636 HC RX REV CODE- 250/636: Performed by: UROLOGY

## 2018-05-23 PROCEDURE — 77010033678 HC OXYGEN DAILY

## 2018-05-23 PROCEDURE — 74011000258 HC RX REV CODE- 258: Performed by: UROLOGY

## 2018-05-23 PROCEDURE — 94640 AIRWAY INHALATION TREATMENT: CPT

## 2018-05-23 RX ADMIN — CEFAZOLIN SODIUM 1 G: 1 INJECTION, POWDER, FOR SOLUTION INTRAMUSCULAR; INTRAVENOUS at 09:10

## 2018-05-23 RX ADMIN — IPRATROPIUM BROMIDE AND ALBUTEROL SULFATE 3 ML: .5; 3 SOLUTION RESPIRATORY (INHALATION) at 07:40

## 2018-05-23 RX ADMIN — PHENAZOPYRIDINE HYDROCHLORIDE 200 MG: 100 TABLET ORAL at 06:25

## 2018-05-23 RX ADMIN — Medication 10 ML: at 06:26

## 2018-05-23 RX ADMIN — FINASTERIDE 5 MG: 5 TABLET, FILM COATED ORAL at 09:04

## 2018-05-23 RX ADMIN — PANTOPRAZOLE SODIUM 40 MG: 40 TABLET, DELAYED RELEASE ORAL at 06:28

## 2018-05-23 RX ADMIN — AMLODIPINE BESYLATE 5 MG: 5 TABLET ORAL at 06:26

## 2018-05-23 RX ADMIN — CEFAZOLIN SODIUM 1 G: 1 INJECTION, POWDER, FOR SOLUTION INTRAMUSCULAR; INTRAVENOUS at 02:58

## 2018-05-23 RX ADMIN — IPRATROPIUM BROMIDE AND ALBUTEROL SULFATE 3 ML: .5; 3 SOLUTION RESPIRATORY (INHALATION) at 18:14

## 2018-05-23 RX ADMIN — MONTELUKAST SODIUM 10 MG: 10 TABLET, FILM COATED ORAL at 09:04

## 2018-05-23 RX ADMIN — IPRATROPIUM BROMIDE 0.5 MG: 0.5 SOLUTION RESPIRATORY (INHALATION) at 07:40

## 2018-05-23 RX ADMIN — CETIRIZINE HYDROCHLORIDE 10 MG: 10 TABLET, FILM COATED ORAL at 09:03

## 2018-05-23 NOTE — PROGRESS NOTES
Patient refused Lasix and Potassium this a.m. States he does not take these regularly at home and does not want them now. Vital signs stable at :   Visit Vitals    /56    Pulse 83    Temp 98.2 °F (36.8 °C)    Resp 16    Wt 84.4 kg (186 lb)    SpO2 91%    BMI 27.47 kg/m2       Will continue to monitor.

## 2018-05-23 NOTE — PROGRESS NOTES
Primary Nurse Danny Bueno RN and Cresencio Brock RN performed a dual skin assessment on this patient No impairment noted  David score is 22

## 2018-05-23 NOTE — ANESTHESIA POSTPROCEDURE EVALUATION
Post-Anesthesia Evaluation and Assessment    Patient: Brown Bill MRN: [id number]  SSN: [id number]    YOB: 1940  Age: 66 y.o. Sex: male       Cardiovascular Function/Vital Signs  Visit Vitals    /56    Pulse 83    Temp 36.8 °C (98.2 °F)    Resp 16    Wt 84.4 kg (186 lb)    SpO2 91%    BMI 27.47 kg/m2       Patient is status post general anesthesia for Procedure(s):  CYSTOSCOPY, BLADDER BIOPSY X4 WITH RESECTOSCOPE  TRANSURETHRAL RESECTION OF BLADDER TUMOR. Nausea/Vomiting: None    Postoperative hydration reviewed and adequate. Pain:  Pain Scale 1: Numeric (0 - 10) (05/23/18 0300)  Pain Intensity 1: 0 (05/23/18 0300)   Managed    Neurological Status:   Neuro (WDL): Within Defined Limits (05/22/18 1600)  Neuro  LUE Motor Response: Purposeful (05/22/18 1600)  LLE Motor Response: Purposeful (05/22/18 1600)  RUE Motor Response: Purposeful (05/22/18 1600)  RLE Motor Response: Purposeful (05/22/18 1600)   At baseline    Mental Status and Level of Consciousness: Arousable    Pulmonary Status:   O2 Device: Nasal cannula (05/23/18 0737)   Adequate oxygenation and airway patent    Complications related to anesthesia: None    Post-anesthesia assessment completed.  No concerns    Signed By: Giacomo Day MD     May 23, 2018

## 2018-05-23 NOTE — PROGRESS NOTES
Spiritual Care Partner Volunteer visited patient in Rm 522 on 5/23/18. Documented by:   Chaplain Lema MDiv, MACE  [phone] PRAY ([phone])

## 2018-05-23 NOTE — PROGRESS NOTES
Culver removed around 9:15a.m. as ordered. Patient voided minimal amounts x4 over remainder of shift, with total urine output 175mL 6hrs after Culver removal.  Bladder scan completed and post-void residual = 324. RN paged Dr. Shamir Yanez to notify. Also notified evening shift RN for further follow up. Bedside shift change report given to Kyle Almonte RN (oncoming nurse) by Jamey Mauricio RN (offgoing nurse). Report included the following information SBAR, Intake/Output and MAR.

## 2018-05-23 NOTE — PROGRESS NOTES
Urology Progress Note    Patient: Kimberly Dick MRN: [id number]  SSN: [id number]    YOB: 1940  Age: 66 y.o. Sex: male        ADMITTED: 2018 to Travis Meyer MD for BLADDER CANCER   Bladder cancer [facility])  POD# 1 Day Post-Op Procedure(s):  CYSTOSCOPY, BLADDER BIOPSY X4 WITH RESECTOSCOPE  TRANSURETHRAL RESECTION OF BLADDER TUMOR    Having occasional bladder spasms. No other complaints. Good urine output - pyridium colored. Vitals: Temp (24hrs), Av °F (36.7 °C), Min:97.5 °F (36.4 °C), Max:98.3 °F (36.8 °C)                   Blood pressure 127/63, pulse (!) 103, temperature 98 °F (36.7 °C), resp. rate 16, weight 84.4 kg (186 lb), SpO2 91 %. Intake and Output:   1901 -  0700  In: 3373 [P.O.:650; I.V.:1100]  Out: 2800 [Urine:2800]            Labs:  Labs:   Lab Results   Component Value Date/Time    WBC 5.5 2018 03:55 PM    HGB 10.9 (L) 2018 03:55 PM    Creatinine 0.84 2018 03:55 PM         Assessment/Plan:   D/C esquivel. D/C home after voiding.        Signed By: Travis Meyer MD - May 23, 2018

## 2018-05-23 NOTE — PROGRESS NOTES
Bedside and Verbal shift change report given to Justin Sharma (oncoming nurse) by BRITTNY Rudolph RN (offgoing nurse). Report given with SBAR, Kardex, Intake/Output, MAR and Recent Results.

## 2018-05-23 NOTE — PROGRESS NOTES
Bedside shift change report given to Ajith (oncoming nurse) by Lenell Hashimoto (offgoing nurse). Report included the following information SBAR.

## 2018-05-24 NOTE — ROUTINE PROCESS
Bladder scanned pt post void of 50 ml for 323 ml. Dr Mckenzie Hugo called. Pt given option of having esquivel cath inserted for his comfort for discharge. If pt refuses esquivel cath, he can be discharged. If pt is unable to void in AM, pt can return to doctor's office. Pt refused esquivel cath and was discharged with instructions and prescriptions.

## 2018-05-24 NOTE — PROGRESS NOTES
Pt stated felt gas pressure, so he took his own Gas-X and stated he felt better after a small bowel movement.

## 2018-05-31 ENCOUNTER — TELEPHONE (OUTPATIENT)
Dept: ONCOLOGY | Age: 78
End: 2018-05-31

## 2018-05-31 NOTE — TELEPHONE ENCOUNTER
Pt would like a call back  To discuss his procedure done at [facility] and he need lab orders for his new appointment

## 2018-06-04 ENCOUNTER — HOSPITAL ENCOUNTER (OUTPATIENT)
Dept: LAB | Age: 78
Discharge: HOME | End: 2018-06-04
Payer: MEDICARE

## 2018-06-04 PROCEDURE — 80053 COMPREHEN METABOLIC PANEL: CPT

## 2018-06-04 PROCEDURE — 83615 LACTATE (LD) (LDH) ENZYME: CPT

## 2018-06-04 PROCEDURE — 85025 COMPLETE CBC W/AUTO DIFF WBC: CPT

## 2018-06-04 PROCEDURE — 36415 COLL VENOUS BLD VENIPUNCTURE: CPT

## 2018-06-04 NOTE — TELEPHONE ENCOUNTER
Call to patient, patient will be coming in tomorrow to discuss recent cystoscopy results from Dr. Camarillo Listen. Will have labs drawn today for tomorrows appointment.

## 2018-06-05 ENCOUNTER — OFFICE VISIT (OUTPATIENT)
Dept: ONCOLOGY | Age: 78
End: 2018-06-05

## 2018-06-05 ENCOUNTER — PATIENT OUTREACH (OUTPATIENT)
Dept: ONCOLOGY | Age: 78
End: 2018-06-05

## 2018-06-05 VITALS
HEART RATE: 83 BPM | SYSTOLIC BLOOD PRESSURE: 104 MMHG | OXYGEN SATURATION: 93 % | WEIGHT: 185 LBS | BODY MASS INDEX: 27.4 KG/M2 | DIASTOLIC BLOOD PRESSURE: 63 MMHG | HEIGHT: 69 IN | RESPIRATION RATE: 16 BRPM | TEMPERATURE: 97.9 F

## 2018-06-05 DIAGNOSIS — C67.9 MALIGNANT NEOPLASM OF URINARY BLADDER, UNSPECIFIED SITE (HCC): Primary | ICD-10-CM

## 2018-06-05 DIAGNOSIS — C67.2 MALIGNANT NEOPLASM OF LATERAL WALL OF URINARY BLADDER (HCC): ICD-10-CM

## 2018-06-05 DIAGNOSIS — R06.02 SHORTNESS OF BREATH: ICD-10-CM

## 2018-06-05 LAB
ALBUMIN SERPL-MCNC: 3.2 G/DL (ref 3.5–4.8)
ALBUMIN/GLOB SERPL: 0.7 {RATIO} (ref 1.2–2.2)
ALP SERPL-CCNC: 86 IU/L (ref 39–117)
ALT SERPL-CCNC: 8 IU/L (ref 0–44)
AST SERPL-CCNC: 16 IU/L (ref 0–40)
BASOPHILS # BLD AUTO: 0.1 X10E3/UL (ref 0–0.2)
BASOPHILS NFR BLD AUTO: 1 %
BILIRUB SERPL-MCNC: 0.6 MG/DL (ref 0–1.2)
BUN SERPL-MCNC: 17 MG/DL (ref 8–27)
BUN/CREAT SERPL: 24 (ref 10–24)
CALCIUM SERPL-MCNC: 8.7 MG/DL (ref 8.6–10.2)
CHLORIDE SERPL-SCNC: 105 MMOL/L (ref 96–106)
CO2 SERPL-SCNC: 25 MMOL/L (ref 18–29)
CREAT SERPL-MCNC: 0.7 MG/DL (ref 0.76–1.27)
EOSINOPHIL # BLD AUTO: 0.2 X10E3/UL (ref 0–0.4)
EOSINOPHIL NFR BLD AUTO: 4 %
ERYTHROCYTE [DISTWIDTH] IN BLOOD BY AUTOMATED COUNT: 18.3 % (ref 12.3–15.4)
GFR SERPLBLD CREATININE-BSD FMLA CKD-EPI: 104 ML/MIN/1.73
GFR SERPLBLD CREATININE-BSD FMLA CKD-EPI: 90 ML/MIN/1.73
GLOBULIN SER CALC-MCNC: 4.4 G/DL (ref 1.5–4.5)
GLUCOSE SERPL-MCNC: 95 MG/DL (ref 65–99)
HCT VFR BLD AUTO: 35.8 % (ref 37.5–51)
HGB BLD-MCNC: 11.4 G/DL (ref 13–17.7)
IMM GRANULOCYTES # BLD: 0 X10E3/UL (ref 0–0.1)
IMM GRANULOCYTES NFR BLD: 0 %
LDH SERPL-CCNC: 215 IU/L (ref 121–224)
LYMPHOCYTES # BLD AUTO: 0.7 X10E3/UL (ref 0.7–3.1)
LYMPHOCYTES NFR BLD AUTO: 12 %
MCH RBC QN AUTO: 26.1 PG (ref 26.6–33)
MCHC RBC AUTO-ENTMCNC: 31.8 G/DL (ref 31.5–35.7)
MCV RBC AUTO: 82 FL (ref 79–97)
MONOCYTES # BLD AUTO: 0.6 X10E3/UL (ref 0.1–0.9)
MONOCYTES NFR BLD AUTO: 9 %
NEUTROPHILS # BLD AUTO: 4.5 X10E3/UL (ref 1.4–7)
NEUTROPHILS NFR BLD AUTO: 74 %
PLATELET # BLD AUTO: 189 X10E3/UL (ref 150–379)
POTASSIUM SERPL-SCNC: 4.1 MMOL/L (ref 3.5–5.2)
PROT SERPL-MCNC: 7.6 G/DL (ref 6–8.5)
RBC # BLD AUTO: 4.36 X10E6/UL (ref 4.14–5.8)
SODIUM SERPL-SCNC: 140 MMOL/L (ref 134–144)
WBC # BLD AUTO: 6 X10E3/UL (ref 3.4–10.8)

## 2018-06-05 RX ORDER — ZOSTER VACCINE RECOMBINANT, ADJUVANTED 50 MCG/0.5
KIT INTRAMUSCULAR
Refills: 0 | COMMUNITY
Start: 2018-03-06 | End: 2018-07-10

## 2018-06-05 RX ORDER — FLUTICASONE PROPIONATE 0.5 MG/G
CREAM TOPICAL
COMMUNITY
Start: 2018-04-06 | End: 2018-07-10

## 2018-06-05 RX ORDER — AZELASTINE 1 MG/ML
SPRAY, METERED NASAL
Refills: 11 | COMMUNITY
Start: 2018-04-13 | End: 2018-07-10

## 2018-06-05 RX ORDER — IPRATROPIUM BROMIDE 21 UG/1
SPRAY, METERED NASAL
Refills: 3 | COMMUNITY
Start: 2018-04-13 | End: 2018-07-10

## 2018-06-05 RX ORDER — LEVOFLOXACIN 500 MG/1
TABLET, FILM COATED ORAL
COMMUNITY
Start: 2018-04-26 | End: 2018-06-05 | Stop reason: ALTCHOICE

## 2018-06-05 NOTE — MR AVS SNAPSHOT
[address] [address] [address] [place] [address] 
[phone] Patient: Ana Leonard MRN: [id number] Visit Information Date & Time Provider Department Dept. Phone Encounter #  
 6/5/2018 10:30 AM John Hicks  [facility] Oncology at [address] [id number] Upcoming Health Maintenance Date Due DTaP/Tdap/Td series (1 - Tdap) 3/21/1961 GLAUCOMA SCREENING Q2Y 3/21/2005 MEDICARE YEARLY EXAM 3/14/2018 Influenza Age 5 to Adult 8/1/2018 Pneumococcal 65+ High/Highest Risk (2 of 2 - PPSV23) 11/6/2018 Allergies as of 6/5/2018  Review Complete On: 6/5/2018 By: Javier Neff No Known Allergies Current Immunizations  Reviewed on 11/23/2016 Name Date Influenza Vaccine 10/1/2014 Pneumococcal Vaccine (Unspecified Type) 11/6/2013 Varicella Virus Vaccine 5/8/2013 Not reviewed this visit Vitals BP Pulse Temp Resp Height(growth percentile) Weight(growth percentile) 104/63 83 97.9 °F (36.6 °C) 16 5' 9\" (1.753 m) 185 lb (83.9 kg) SpO2 BMI Smoking Status 93% 27.32 kg/m2 Former Smoker Vitals History BMI and BSA Data Body Mass Index Body Surface Area  
 27.32 kg/m 2 2.02 m 2 Preferred Pharmacy Pharmacy Name Phone [id number] [phone] Your Updated Medication List  
  
   
This list is accurate as of 6/5/18 12:04 PM.  Always use your most recent med list.  
  
  
  
  
 albuterol sulfate 90 mcg/actuation Aepb Take 1 Puff by inhalation every six (6) hours as needed. Indications: Chronic Obstructive Pulmonary Disease  
  
 albuterol-ipratropium 2.5 mg-0.5 mg/3 ml Nebu Commonly known as:  DUO-NEB  
UE 1 VIAL VIA NEBULIZER EVERY 6 HOURS  
  
 amLODIPine 5 mg tablet Commonly known as:  Orval Sylwia Take 5 mg by mouth every morning.  Indications: HYPERTENSION  
  
 azelastine 137 mcg (0.1 %) nasal spray Commonly known as:  ASTELIN  
USE 2 SPRAYS IN EACH NOSTRIL EVERY DAY  
  
 cetirizine 10 mg tablet Commonly known as:  ZYRTEC 10 mg daily. finasteride 5 mg tablet Commonly known as:  PROSCAR Take 5 mg by mouth nightly as needed. fluticasone 0.05 % topical cream  
Commonly known as:  CUTIVATE  
  
 * HYDROCODONE-ACETAMINOPHEN PO Take  by mouth. 7.5/500  
  
 * HYDROcodone-acetaminophen 5-325 mg per tablet Commonly known as:  Janas Bern Take 1 Tab by mouth every six (6) hours as needed for Pain. Max Daily Amount: 4 Tabs. Indications: Pain  
  
 ipratropium 0.03 % nasal spray Commonly known as:  ATROVENT  
USE 2 SPRAYS IN THE NOSTRILS THREE TIMES A DAY AS NEEDED FOR RUNNY NOSE  
  
 LASIX 20 mg tablet Generic drug:  furosemide Take 10 mg by mouth daily. 10-20 mg as needed LIPITOR 20 mg tablet Generic drug:  atorvastatin Take 20 mg by mouth nightly. montelukast 10 mg tablet Commonly known as:  SINGULAIR Take 10 mg by mouth daily. omeprazole 20 mg capsule Commonly known as:  PRILOSEC Take 20 mg by mouth every morning. phenazopyridine 200 mg tablet Commonly known as:  PYRIDIUM Take 1 Tab by mouth three (3) times daily as needed for Pain.  
  
 potassium chloride SR 10 mEq tablet Commonly known as:  KLOR-CON 10 Take 10 mEq by mouth as needed. Takes with lasix as needed SALINE MIST 0.65 % nasal squeeze bottle Generic drug:  sodium chloride 1 Spray as needed for Congestion. SHINGRIX (PF) 50 mcg/0.5 mL Susr injection Generic drug:  varicella-zoster recombinant (PF)  
TO BE ADMINISTERED BY PHARMACIST FOR IMMUNIZATION  
  
 STIOLTO RESPIMAT 2.5-2.5 mcg/actuation Mist  
Generic drug:  tiotropium-olodaterol Take  by inhalation every morning. tamsulosin 0.4 mg capsule Commonly known as:  FLOMAX Take 0.4 mg by mouth nightly. triamcinolone acetonide 0.025 % ointment Commonly known as:  KENALOG  
  
 zolpidem 10 mg tablet Commonly known as:  AMBIEN  
5 mg nightly as needed. * Notice: This list has 2 medication(s) that are the same as other medications prescribed for you. Read the directions carefully, and ask your doctor or other care provider to review them with you. Introducing [facility]! Dear Nicolasa Rosa: Thank you for requesting a Taplister account. Our records indicate that you already have an active Taplister account. You can access your account anytime at [email or web address] Did you know that you can access your hospital and ER discharge instructions at any time in Taplister? You can also review all of your test results from your hospital stay or ER visit. Additional Information If you have questions, please visit the Frequently Asked Questions section of the Taplister website at [email or web address] Remember, Taplister is NOT to be used for urgent needs. For medical emergencies, dial 911. Now available from your iPhone and Android! Please provide this summary of care documentation to your next provider. Your primary care clinician is listed as Tami Ramires. If you have any questions after today's visit, please call [phone].

## 2018-06-05 NOTE — PROGRESS NOTES
[address] [facility]   Medical Oncology at [facility]   Nurse Navigator Note      Patient seen in consultation with Dr. Annmarie Nelson. His daughter is present. He verbalizes frustrations from the past medical interactions. Reports his providers do not communicate well with one another. When asked to provide an example he talks about feeling he should have had a better understanding about radiation causing bladder surgery to be more involved. Patient understands he pushed for bladder sparing treatment in the past which required radiation. He is anxious about treatment options and verbalizing his displeasure with the options being discussed as each leads to removal of his bladder. He shared he has one living child and he worries that she will be overwhelmed should he become unable to care for himself as he does now. Actions/Plan:    Active listening provided. Encouraged patient and daughter to have a discussion regarding his concerns with providers and learn more about the benefit and burden of his options so he can make the best decision for himself. Dr. Annmarie Nelson to speak with Dr. Damien Lang regarding her recommendations. NN called Dr. Jim Land office to facilitate an appointment as they have not heard back from Dr. Jim Land office. A message was sent to Dr. Jim Land , Rosendo Brown to request an appointment ASAP. NN will follow up to be sure appointment is appropriately timed.

## 2018-06-05 NOTE — PROGRESS NOTES
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 Cha Cárdenas from 10/30/15 to 12/3/15    HISTORY OF PRESENT ILLNESS: Mr. Patti Maier is a 66 y.o. male who presents for follow-up of bladder cancer. He has a long history of bladder cancer initially treated with transurethral resection followed by BCG treatments with initial good response. Has had a number of kidney stones and has had both cystoscopy for surveillance and for investigation of stones. In December 2014 he had a cystoscopy which revealed a mass in the bladder. On biopsy this revealed small cell features. He started chemotherapy on 4/7/15. Then underwent radiation. He had been admitted 11/18/17 with SOB. He saw Dr. Charl Meigs - thought to be a COPD exacerbation+ possible fibrosis. He still has a cough and congestion. He is to see Dr. Jenifer Black with ENT for chronic sinusitis. 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 Criselda Frias - recommended against cystectomy and received intravesical BCG. Has completed this and  back on surveillance . Had recurrence noted 5/2018 and TURBT was done 5/22/18. Path showed HG Urothelial cancer. No bleeding. He still has cough and SOB. He is tired. He has no gross hematuria but states that he always has microscopic hematuria. He has no pain. Has no fevers, no new rashes, no new diarrhea, no HA. No nausea, vomiting, diarrhea, or constipation. Really does not want to have a cystectomy    No Known Allergies    Current Outpatient Prescriptions   Medication Sig Dispense Refill    fluticasone (CUTIVATE) 0.05 % topical cream       HYDROCODONE-ACETAMINOPHEN PO Take  by mouth. 7.5/500      tiotropium-olodaterol (STIOLTO RESPIMAT) 2.5-2.5 mcg/actuation mist Take  by inhalation every morning.       albuterol-ipratropium (DUO-NEB) 2.5 mg-0.5 mg/3 ml nebu UE 1 VIAL VIA NEBULIZER EVERY 6 HOURS  1    montelukast (SINGULAIR) 10 mg tablet Take 10 mg by mouth daily.  cetirizine (ZYRTEC) 10 mg tablet 10 mg daily.  zolpidem (AMBIEN) 10 mg tablet 5 mg nightly as needed.  tamsulosin (FLOMAX) 0.4 mg capsule Take 0.4 mg by mouth nightly.  sodium chloride (SALINE MIST) 0.65 % nasal spray 1 Spray as needed for Congestion.  omeprazole (PRILOSEC) 20 mg capsule Take 20 mg by mouth every morning.  amLODIPine (NORVASC) 5 mg tablet Take 5 mg by mouth every morning. Indications: HYPERTENSION      finasteride (PROSCAR) 5 mg tablet Take 5 mg by mouth nightly as needed.  atorvastatin (LIPITOR) 20 mg tablet Take 20 mg by mouth nightly.  azelastine (ASTELIN) 137 mcg (0.1 %) nasal spray USE 2 SPRAYS IN EACH NOSTRIL EVERY DAY  11    ipratropium (ATROVENT) 0.03 % nasal spray USE 2 SPRAYS IN THE NOSTRILS THREE TIMES A DAY AS NEEDED FOR RUNNY NOSE  3    SHINGRIX, PF, 50 mcg/0.5 mL susr injection TO BE ADMINISTERED BY PHARMACIST FOR IMMUNIZATION  0    phenazopyridine (PYRIDIUM) 200 mg tablet Take 1 Tab by mouth three (3) times daily as needed for Pain. 20 Tab 1    HYDROcodone-acetaminophen (NORCO) 5-325 mg per tablet Take 1 Tab by mouth every six (6) hours as needed for Pain. Max Daily Amount: 4 Tabs. Indications: Pain 12 Tab 0    furosemide (LASIX) 20 mg tablet Take 10 mg by mouth daily. 10-20 mg as needed      potassium chloride SR (KLOR-CON 10) 10 mEq tablet Take 10 mEq by mouth as needed. Takes with lasix as needed      albuterol sulfate 90 mcg/actuation aepb Take 1 Puff by inhalation every six (6) hours as needed. Indications: Chronic Obstructive Pulmonary Disease 1 Inhaler 6    triamcinolone acetonide (KENALOG) 0.025 % ointment        ROS  As per the HPI, otherwise a comprehensive ROS is negative. ECOG PS is 1. Emotional well being addressed and patient is coping well.     Physical Examination:   Visit Vitals    Ht 5' 9\" (1.753 m)   Dipak 185 lb (83.9 kg)    BMI 27.32 kg/m2     General appearance - alert, well appearing  Mental status - oriented to person, place, and time  Mouth - mucous membranes moist, pharynx normal without lesions, has false teeth  Neck - supple, no significant adenopathy  Lymphatics - no palpable lymphadenopathy, no hepatosplenomegaly  Musculoskeletal - no joint tenderness, deformity or swelling  Extremities - peripheral pulses normal, no pedal edema, no clubbing or cyanosis  Skin - normal coloration and turgor, no rashes, no suspicious skin lesions noted      LABS  Lab Results   Component Value Date/Time    WBC 6.0 06/04/2018 01:01 PM    HGB 11.4 (L) 06/04/2018 01:01 PM    HCT 35.8 (L) 06/04/2018 01:01 PM    PLATELET 719 81/09/1470 01:01 PM    MCV 82 06/04/2018 01:01 PM    ABS. NEUTROPHILS 4.5 06/04/2018 01:01 PM     Lab Results   Component Value Date/Time    Sodium 140 06/04/2018 01:01 PM    Potassium 4.1 06/04/2018 01:01 PM    Chloride 105 06/04/2018 01:01 PM    CO2 25 06/04/2018 01:01 PM    Glucose 95 06/04/2018 01:01 PM    BUN 17 06/04/2018 01:01 PM    Creatinine 0.70 (L) 06/04/2018 01:01 PM    GFR est  06/04/2018 01:01 PM    GFR est non-AA 90 06/04/2018 01:01 PM    Calcium 8.7 06/04/2018 01:01 PM     Lab Results   Component Value Date/Time    AST (SGOT) 16 06/04/2018 01:01 PM    Alk. phosphatase 86 06/04/2018 01:01 PM    Protein, total 7.6 06/04/2018 01:01 PM    Albumin 3.2 (L) 06/04/2018 01:01 PM    Globulin 4.9 (H) 11/18/2017 04:51 PM    A-G Ratio 0.7 (L) 06/04/2018 01:01 PM     PATHOLOGY  FNA of mediastinal node at station 7 on 9/9/15 ([id number]) with atypical cells. 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 Invasion is not convincingly demonstrated. TURBT 5/22/18     1. Urinary bladder, right lateral wall, biopsy:   No evidence for malignancy. 2. Urinary bladder, posterior wall, biopsy:   No evidence for malignancy.    3. Urinary bladder, dome, biopsy:   Scant atypical urothelial cells, favor carcinoma in situ. 4. Urinary bladder, left lateral wall, excision:   Invasive high-grade urothelial carcinoma, invading muscularis propria. IMAGING  CT C/A/P on 5/3/18  IMPRESSION  IMPRESSION:   1. Left urinary bladder wall thickening and enhancement slightly increased. 2. Enhancing nodule in the right lobe of the prostate gland unchanged. 3. No evidence of new metastatic disease. 4. Severe emphysema with pulmonary fibrosis. 5. Calcified left lower lobe granuloma and mediastinal lymph nodes indicative of  old granulomatous disease. 6. Mediastinal adenopathy unchanged. 7. Atherosclerotic abdominal aorta with aneurysm unchanged. 8. Right renal cyst.  9. Status post left inguinal hernia repair. 10. Thoracolumbar spondylosis. ASSESSMENT  Mr. Anne Marie Theodore is a 66 y.o. male with bladder cancer which on biopsy revealed high grade urothelial carcinoma with small cell features. CTs reveal multiple borderline enlarged nodes throughout the chest, abdomen, and pelvis. Currently on chemotherapy with carboplatin/etoposide. Has had 4 cycles followed by radiation. Presents for follow-up. DISCUSSION/PLAN  1. Bladder cancer with small cell features. He was treated with chemo and radiation. Had recurrent CIS detected in March 2017. No cystectomy recommended and received intravesical BCG. Now with recurrence in the form of HGMIBC-Urothelial as diagnosed by what appears to be a complete TURBT by Dr. Danny Love on 5/22/18. See below    2. Recurrent bladder cancer- MIBC urothelial- [id number]    Reviewed pathology  I agree that he needs to undergo a cystectomy though he is quite reluctant  Discussed that having undergone prior ChemoRT he is not a candidate for bladder preservation. Indeed- the standard of care for MIBC is to start with neoadjuvant chemotherapy followed by cystectomy.    Randomized trials have demonstrated an improvement of 5% in OS and 7% in DFS at 5 years for combined modality therapy (i.e: neoadjuvant chemotherapy followed by surgery). I explained that though the absolute percentage of benefit appears modest, the original study that established this approach as the standard of care for muscle invasive disease and tested MVAC in this setting had a remarkable difference in the median OS 77 months, as compared with 46 months  [place] NEJ 2003: MVAC vs none). However there are several caveats to proceeding with multimodality care- one that his overall life expectancy is limited by his initial diagnosis of small cell cancer and two that cumulative toxicities from platinum including nerve, kidney and BM damage are a concern. There are ph II studies that have demonstrated 20% PCR rates with 2 cycles of Keytruda and if we could get that approved may be safer/ more rational alternative. I will reach out to Dr. Delicia Valdez and review those thoughts    3. Thrombocytopenia. resolved    4. Shortness of breath and ILD, has underlying COPD.     5. Shepherd's esophagus. Follow with GI as scheduled    RTC 1 week or so after he has been seen by Dr. Delicia Valdez.      Hayes Anglin MD

## 2018-06-07 ENCOUNTER — PATIENT OUTREACH (OUTPATIENT)
Dept: ONCOLOGY | Age: 78
End: 2018-06-07

## 2018-06-07 NOTE — PROGRESS NOTES
[address] [facility]   Medical Oncology at [facility]   Nurse Navigator Note      Spoke with Jeremy Payton,  for Dr. Jessa Sheriff at [facility]. Dr. Jessa Sheriff is away the rest of this week. Reviewed patient's most recent visit and Dr. Edel Estrada plan to discuss with Dr. Jessa Sheriff. Angelita Gross will print Dr. Edel Estrada notes and Dr. Iraida Rhodes notes for Dr. Jessa Sheriff. She will call patient today to schedule for office visit with Dr. Jessa Sheriff.

## 2018-06-08 ENCOUNTER — TELEPHONE (OUTPATIENT)
Dept: ONCOLOGY | Age: 78
End: 2018-06-08

## 2018-06-08 NOTE — TELEPHONE ENCOUNTER
Call to patient, HIPAA verified. Per Dr. Deidre Rao. Would like to keep scheduled appointment at this time. Patient updated and verbalized understanding. Advised our office would update if need to reschedule.

## 2018-06-08 NOTE — TELEPHONE ENCOUNTER
Pt wants to know if he still needs to keep his appointment with Dr. Kimberly Bauer on 6/15/ because he can not see Dr. Janet Loving the surgeon until 6/21/2018.  Pt would like a call back to discuss

## 2018-06-13 ENCOUNTER — TELEPHONE (OUTPATIENT)
Dept: ONCOLOGY | Age: 78
End: 2018-06-13

## 2018-06-15 ENCOUNTER — DOCUMENTATION ONLY (OUTPATIENT)
Dept: ONCOLOGY | Age: 78
End: 2018-06-15

## 2018-06-15 ENCOUNTER — OFFICE VISIT (OUTPATIENT)
Dept: ONCOLOGY | Age: 78
End: 2018-06-15

## 2018-06-15 VITALS
SYSTOLIC BLOOD PRESSURE: 135 MMHG | HEART RATE: 73 BPM | OXYGEN SATURATION: 93 % | WEIGHT: 181 LBS | RESPIRATION RATE: 16 BRPM | TEMPERATURE: 97.7 F | HEIGHT: 69 IN | DIASTOLIC BLOOD PRESSURE: 74 MMHG | BODY MASS INDEX: 26.81 KG/M2

## 2018-06-15 DIAGNOSIS — C67.2 MALIGNANT NEOPLASM OF LATERAL WALL OF URINARY BLADDER (HCC): Primary | ICD-10-CM

## 2018-06-15 DIAGNOSIS — J42 CHRONIC BRONCHITIS, UNSPECIFIED CHRONIC BRONCHITIS TYPE (HCC): ICD-10-CM

## 2018-06-15 NOTE — PROGRESS NOTES
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 Yelitza Monteiro from 10/30/15 to 12/3/15    HISTORY OF PRESENT ILLNESS: Mr. Tammy Nguyen is a 66 y.o. male who presents for follow-up of bladder cancer. He has a long history of bladder cancer initially treated with transurethral resection followed by BCG treatments with initial good response. Has had a number of kidney stones and has had both cystoscopy for surveillance and for investigation of stones. In December 2014 he had a cystoscopy which revealed a mass in the bladder. On biopsy this revealed small cell features. He started chemotherapy on 4/7/15. Then underwent radiation. He had been admitted 11/18/17 with SOB. He saw Dr. Rocco Houston - thought to be a COPD exacerbation+ possible fibrosis. He still has a cough and congestion. He is to see Dr. Maddy Elena with ENT for chronic sinusitis. 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 Lotus Ferrera - recommended against cystectomy and received intravesical BCG. Has completed this and  back on surveillance . Had recurrence noted 5/2018 and TURBT was done 5/22/18. Path showed HG Urothelial cancer. He comes again today to discuss treatment options. He is continued to have no symptoms whatsoever. No hematuria no pain during micturition. He is tired. No ongoing cough or new shortness of breath. Wonders if he needs to do anything since he feels a good. Has no fevers, no new rashes, no new diarrhea, no HA. No nausea, vomiting, diarrhea, or constipation. Daughter with him today    No Known Allergies    Current Outpatient Prescriptions   Medication Sig Dispense Refill    fluticasone (CUTIVATE) 0.05 % topical cream       tiotropium-olodaterol (STIOLTO RESPIMAT) 2.5-2.5 mcg/actuation mist Take  by inhalation every morning.       montelukast (SINGULAIR) 10 mg tablet Take 10 mg by mouth daily.  zolpidem (AMBIEN) 10 mg tablet 5 mg nightly as needed.  tamsulosin (FLOMAX) 0.4 mg capsule Take 0.4 mg by mouth nightly.  sodium chloride (SALINE MIST) 0.65 % nasal spray 1 Spray as needed for Congestion.  omeprazole (PRILOSEC) 20 mg capsule Take 20 mg by mouth every morning.  amLODIPine (NORVASC) 5 mg tablet Take 5 mg by mouth every morning. Indications: HYPERTENSION      finasteride (PROSCAR) 5 mg tablet Take 5 mg by mouth nightly as needed.  atorvastatin (LIPITOR) 20 mg tablet Take 20 mg by mouth nightly.  azelastine (ASTELIN) 137 mcg (0.1 %) nasal spray USE 2 SPRAYS IN EACH NOSTRIL EVERY DAY  11    ipratropium (ATROVENT) 0.03 % nasal spray USE 2 SPRAYS IN THE NOSTRILS THREE TIMES A DAY AS NEEDED FOR RUNNY NOSE  3    SHINGRIX, PF, 50 mcg/0.5 mL susr injection TO BE ADMINISTERED BY PHARMACIST FOR IMMUNIZATION  0    phenazopyridine (PYRIDIUM) 200 mg tablet Take 1 Tab by mouth three (3) times daily as needed for Pain. 20 Tab 1    HYDROcodone-acetaminophen (NORCO) 5-325 mg per tablet Take 1 Tab by mouth every six (6) hours as needed for Pain. Max Daily Amount: 4 Tabs. Indications: Pain 12 Tab 0    HYDROCODONE-ACETAMINOPHEN PO Take  by mouth. 7.5/500      furosemide (LASIX) 20 mg tablet Take 10 mg by mouth daily. 10-20 mg as needed      potassium chloride SR (KLOR-CON 10) 10 mEq tablet Take 10 mEq by mouth as needed. Takes with lasix as needed      albuterol-ipratropium (DUO-NEB) 2.5 mg-0.5 mg/3 ml nebu UE 1 VIAL VIA NEBULIZER EVERY 6 HOURS  1    albuterol sulfate 90 mcg/actuation aepb Take 1 Puff by inhalation every six (6) hours as needed. Indications: Chronic Obstructive Pulmonary Disease 1 Inhaler 6    cetirizine (ZYRTEC) 10 mg tablet 10 mg daily.  triamcinolone acetonide (KENALOG) 0.025 % ointment        ROS  As per the HPI, otherwise a comprehensive ROS is negative. ECOG PS is 1.   Emotional well being addressed and patient is coping well. Physical Examination:   Visit Vitals    /74    Pulse 73    Resp 16    Ht 5' 9\" (1.753 m)    Wt 181 lb (82.1 kg)    SpO2 93%    BMI 26.73 kg/m2     General appearance - alert, well appearing  Mental status - oriented to person, place, and time  Mouth - mucous membranes moist, pharynx normal without lesions, has false teeth  Neck - supple, no significant adenopathy  Lymphatics - no palpable lymphadenopathy, no hepatosplenomegaly  Musculoskeletal - no joint tenderness, deformity or swelling  Extremities - peripheral pulses normal, no pedal edema, no clubbing or cyanosis  Skin - normal coloration and turgor, no rashes, no suspicious skin lesions noted      LABS  Lab Results   Component Value Date/Time    WBC 6.0 06/04/2018 01:01 PM    HGB 11.4 (L) 06/04/2018 01:01 PM    HCT 35.8 (L) 06/04/2018 01:01 PM    PLATELET 214 46/82/8347 01:01 PM    MCV 82 06/04/2018 01:01 PM    ABS. NEUTROPHILS 4.5 06/04/2018 01:01 PM     Lab Results   Component Value Date/Time    Sodium 140 06/04/2018 01:01 PM    Potassium 4.1 06/04/2018 01:01 PM    Chloride 105 06/04/2018 01:01 PM    CO2 25 06/04/2018 01:01 PM    Glucose 95 06/04/2018 01:01 PM    BUN 17 06/04/2018 01:01 PM    Creatinine 0.70 (L) 06/04/2018 01:01 PM    GFR est  06/04/2018 01:01 PM    GFR est non-AA 90 06/04/2018 01:01 PM    Calcium 8.7 06/04/2018 01:01 PM     Lab Results   Component Value Date/Time    AST (SGOT) 16 06/04/2018 01:01 PM    Alk. phosphatase 86 06/04/2018 01:01 PM    Protein, total 7.6 06/04/2018 01:01 PM    Albumin 3.2 (L) 06/04/2018 01:01 PM    Globulin 4.9 (H) 11/18/2017 04:51 PM    A-G Ratio 0.7 (L) 06/04/2018 01:01 PM     PATHOLOGY  FNA of mediastinal node at station 7 on 9/9/15 ([id number]) with atypical cells. 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 Invasion is not convincingly demonstrated. TURBT 5/22/18     1. Urinary bladder, right lateral wall, biopsy:   No evidence for malignancy. 2. Urinary bladder, posterior wall, biopsy:   No evidence for malignancy. 3. Urinary bladder, dome, biopsy:   Scant atypical urothelial cells, favor carcinoma in situ. 4. Urinary bladder, left lateral wall, excision:   Invasive high-grade urothelial carcinoma, invading muscularis propria. IMAGING  CT C/A/P on 5/3/18  IMPRESSION  IMPRESSION:   1. Left urinary bladder wall thickening and enhancement slightly increased. 2. Enhancing nodule in the right lobe of the prostate gland unchanged. 3. No evidence of new metastatic disease. 4. Severe emphysema with pulmonary fibrosis. 5. Calcified left lower lobe granuloma and mediastinal lymph nodes indicative of  old granulomatous disease. 6. Mediastinal adenopathy unchanged. 7. Atherosclerotic abdominal aorta with aneurysm unchanged. 8. Right renal cyst.  9. Status post left inguinal hernia repair. 10. Thoracolumbar spondylosis. ASSESSMENT  Mr. Jorge Law is a 66 y.o. male with bladder cancer which on biopsy revealed high grade urothelial carcinoma with small cell features. CTs reveal multiple borderline enlarged nodes throughout the chest, abdomen, and pelvis. Received carboplatin/etoposide followed by radiation. Had been on surveillance and now has a muscle invasive transitional carcinoma of bladder    DISCUSSION/PLAN  1. Bladder cancer with small cell features. He was treated with chemo and radiation. Had recurrent CIS detected in March 2017. No cystectomy recommended and received intravesical BCG. Now with recurrence in the form of HGMIBC-Urothelial as diagnosed by what appears to be a complete TURBT by Dr. Yocasta Rodriguez on 5/22/18. See below    2.  Recurrent bladder cancer- MIBC urothelial- [id number]    Reviewed pathology and scans  I agree that he needs to undergo a cystectomy though he is quite reluctant  Discussed that having undergone prior ChemoRT he is not a candidate for bladder preservation. Indeed- the standard of care for MIBC is to start with neoadjuvant chemotherapy followed by cystectomy. Randomized trials have demonstrated an improvement of 5% in OS and 7% in DFS at 5 years for combined modality therapy (i.e: neoadjuvant chemotherapy followed by surgery). I explained that though the absolute percentage of benefit appears modest, the original study that established this approach as the standard of care for muscle invasive disease and tested MVAC in this setting had a remarkable difference in the median OS 77 months, as compared with 46 months  Ariane Butler [facility] 2003: MVAC vs none). However there are several caveats to proceeding with multimodality care- one that his overall life expectancy is limited by his initial diagnosis of small cell cancer and two that cumulative toxicities from platinum including nerve, kidney and BM damage are a concern. There are ph II studies that have demonstrated 20% PCR rates with 2 cycles of Keytruda and may be another alternative. He does not qualify for the adjuvant AMBASSADOR study due to a small cell cancer diagnosis in < 5 years. He has struggled to make decisions understandably as it is not an easy one. He has previously expressed anger at \" not having had all his options laid out\" and hence we talked about all possibilities. I stressed that whether or not he decides to go for neoadjuvant chemotherapy- I would strongly encourage him to not forgo a cystectomy. Will concerns for toxicity with recurrent exposure to Platinums it is also reasonable to just consider a cyclectomy (understanding that outcomes may be inferior)    I answered  Several questions regarding various approaches he decided to pursue NAC     I will consider split dose Cisplatin with Gemzar for 4 cycles in that case as nephro and ototoxicity are less likely    We discussed the chemotherapy regimen, it's logistics, and potential toxicities in detail.  Potential side effects include, but are not limited to, nausea, vomiting, diarrhea, taste changes, myelosuppression, infection, fatigue, allergic reactions, rash, edema, neuropathy, and rarely, death. The patient asked several well thought out questions which I answered to the best of my ability and to their apparent satisfaction. The patient has given consent for chemotherapy. He wants to meet with Dr. Riccardo Leong before he proceeds     3. Thrombocytopenia. resolved    4. Shortness of breath and ILD, has underlying C OPD. 5. Shepherd's esophagus.      Follow with GI as scheduled    RTC 1 week or so after he has been seen by Dr. Riccardo Leong for chemotherapy consenting and proceeding with NAC (Split dose Cisplatin)     Port placement once he decides    RTC C1D1 with Renzo Neighbours    I spent > 50% of this 40 min face to face encounter in counseling and care co Shola Angelo MD

## 2018-06-15 NOTE — PROGRESS NOTES
Had the opportunity to meet with patient and daughter to discuss port placement, OPIC and chemotherapy (split dose gem/cis q21 days) expectations including length of treatment. Minor Petty He is familiar to me as he has had chemotherapy in the past.  2001 [facility] educational material and chemo and you pamphlet provided and discussed. Reviewed the importance of hydration, nutrition and appropriate diet. Encouraged and discussed management of possible side effects including N/V, diarrhea, constipation, mouth tenderness, fatigue. Reviewed infection control. At home medications reviewed. Chemo consent obtained. Encourage to call with any additional questions or concerns. Contact information provided.

## 2018-06-18 ENCOUNTER — TELEPHONE (OUTPATIENT)
Dept: ONCOLOGY | Age: 78
End: 2018-06-18

## 2018-06-18 DIAGNOSIS — R11.2 CHEMOTHERAPY INDUCED NAUSEA AND VOMITING: Primary | ICD-10-CM

## 2018-06-18 DIAGNOSIS — T45.1X5A CHEMOTHERAPY INDUCED NAUSEA AND VOMITING: Primary | ICD-10-CM

## 2018-06-18 DIAGNOSIS — Z95.828 PORT CATHETER IN PLACE: ICD-10-CM

## 2018-06-18 RX ORDER — LIDOCAINE AND PRILOCAINE 25; 25 MG/G; MG/G
CREAM TOPICAL
Qty: 30 G | Refills: 0 | Status: SHIPPED | OUTPATIENT
Start: 2018-06-18 | End: 2018-07-10

## 2018-06-18 RX ORDER — DEXAMETHASONE 4 MG/1
TABLET ORAL
Qty: 30 TAB | Refills: 1 | Status: SHIPPED | OUTPATIENT
Start: 2018-06-18 | End: 2018-07-10

## 2018-06-18 RX ORDER — ONDANSETRON HYDROCHLORIDE 8 MG/1
8 TABLET, FILM COATED ORAL
Qty: 20 TAB | Refills: 2 | Status: SHIPPED | OUTPATIENT
Start: 2018-06-18 | End: 2018-07-10

## 2018-06-18 NOTE — TELEPHONE ENCOUNTER
Isidor Davidson called to schedule pt for chemo, Pt does not want to start chemo until he talks to his surgeon

## 2018-06-20 ENCOUNTER — TELEPHONE (OUTPATIENT)
Dept: ONCOLOGY | Age: 78
End: 2018-06-20

## 2018-06-20 PROBLEM — J42 CHRONIC BRONCHITIS (HCC): Status: ACTIVE | Noted: 2018-06-20

## 2018-06-20 NOTE — TELEPHONE ENCOUNTER
Patient returned call, HIPAA verified. Requested that information be printed and left for him at  for  along with most recent lab results. Requested information left for patient at . Thanked for assistance.

## 2018-06-20 NOTE — TELEPHONE ENCOUNTER
Call from patient. HIPAA verified. He would like to know what chemotherapy he has been on and what chemotherapy was recommended at this time. He was unable to write these down at this time. He will return call. Carboplatin and etoposide starting 4/7/15 for 4 cycles. Radiation in Oct - Dec 2015  We would now recommend gemcitabine and cisplatin split dose for 4 cycles. His appointment with Dr Cliff Saldana is tomorrow.

## 2018-06-22 ENCOUNTER — TELEPHONE (OUTPATIENT)
Dept: ONCOLOGY | Age: 78
End: 2018-06-22

## 2018-06-22 NOTE — TELEPHONE ENCOUNTER
Advised scheduling to cancel port placement request.  Order placed to [facility] to cancel chemotherapy at this time.

## 2018-06-22 NOTE — TELEPHONE ENCOUNTER
Pt is calling to let to Langley Collet, pt wants to cancel chemo and port placement. Pt has decided to have bladder removed.  Can call with any questions

## 2018-06-26 PROBLEM — R59.1 LYMPHADENOPATHY: Status: RESOLVED | Noted: 2017-05-17 | Resolved: 2018-06-26

## 2018-07-06 ENCOUNTER — HOSPITAL ENCOUNTER (EMERGENCY)
Age: 78
Discharge: HOME | End: 2018-07-07
Payer: MEDICARE

## 2018-07-06 VITALS
RESPIRATION RATE: 18 BRPM | DIASTOLIC BLOOD PRESSURE: 77 MMHG | HEIGHT: 70 IN | HEART RATE: 108 BPM | SYSTOLIC BLOOD PRESSURE: 160 MMHG | WEIGHT: 210 LBS | OXYGEN SATURATION: 97 % | BODY MASS INDEX: 30.06 KG/M2 | TEMPERATURE: 98.5 F

## 2018-07-06 DIAGNOSIS — M54.32 SCIATICA OF LEFT SIDE: Primary | ICD-10-CM

## 2018-07-06 PROCEDURE — 6370000000 HC RX 637 (ALT 250 FOR IP): Performed by: NURSE PRACTITIONER

## 2018-07-06 PROCEDURE — 96372 THER/PROPH/DIAG INJ SC/IM: CPT

## 2018-07-06 PROCEDURE — 99283 EMERGENCY DEPT VISIT LOW MDM: CPT

## 2018-07-06 PROCEDURE — 6360000002 HC RX W HCPCS: Performed by: NURSE PRACTITIONER

## 2018-07-06 RX ORDER — HYDROCODONE BITARTRATE AND ACETAMINOPHEN 5; 325 MG/1; MG/1
1 TABLET ORAL ONCE
Status: COMPLETED | OUTPATIENT
Start: 2018-07-07 | End: 2018-07-06

## 2018-07-06 RX ORDER — HYDROCODONE BITARTRATE AND ACETAMINOPHEN 5; 325 MG/1; MG/1
1 TABLET ORAL EVERY 6 HOURS PRN
Qty: 12 TABLET | Refills: 0 | Status: SHIPPED | OUTPATIENT
Start: 2018-07-06 | End: 2018-07-09

## 2018-07-06 RX ORDER — KETOROLAC TROMETHAMINE 30 MG/ML
30 INJECTION, SOLUTION INTRAMUSCULAR; INTRAVENOUS ONCE
Status: COMPLETED | OUTPATIENT
Start: 2018-07-07 | End: 2018-07-06

## 2018-07-06 RX ADMIN — KETOROLAC TROMETHAMINE 30 MG: 30 INJECTION, SOLUTION INTRAMUSCULAR at 23:56

## 2018-07-06 RX ADMIN — HYDROCODONE BITARTRATE AND ACETAMINOPHEN 1 TABLET: 5; 325 TABLET ORAL at 23:56

## 2018-07-06 ASSESSMENT — PAIN DESCRIPTION - PAIN TYPE: TYPE: ACUTE PAIN

## 2018-07-06 ASSESSMENT — PAIN DESCRIPTION - DESCRIPTORS: DESCRIPTORS: CONSTANT

## 2018-07-06 ASSESSMENT — PAIN DESCRIPTION - ORIENTATION: ORIENTATION: MID;LOWER

## 2018-07-06 ASSESSMENT — PAIN DESCRIPTION - LOCATION: LOCATION: BACK

## 2018-07-06 ASSESSMENT — PAIN SCALES - GENERAL
PAINLEVEL_OUTOF10: 9
PAINLEVEL_OUTOF10: 10

## 2018-07-07 NOTE — ED PROVIDER NOTES
Diabetes in his mother; Hypertension in his father, mother, and sister. The patients home medications have been reviewed. Allergies: Patient has no known allergies. ------------------------- NURSING NOTES AND VITALS REVIEWED ---------------------------   The nursing notes within the ED encounter and vital signs as below have been reviewed by myself. BP (!) 160/77   Pulse 108   Temp 98.5 °F (36.9 °C) (Oral)   Resp 18   Ht 5' 10\" (1.778 m)   Wt 210 lb (95.3 kg)   SpO2 97%   BMI 30.13 kg/m²   Oxygen Saturation Interpretation: Normal    The patients available past medical records and past encounters were reviewed. Physical exam:  Constitutional:  The patient is comfortable, well appearing, non toxic in NAD. Patient is alert and oriented x3. Head: Head is atraumatic and normocephalic. Eyes: There is no discharge from the eyes. Sclerae are normal.  ENT: The oropharynx is normal. The mouth is normal to inspection. Neck: Normal range of motion of the neck is present there is no JVD present no meningeal signs are present. Respiratory/chest: The chest is nontender breath sounds are normal  Cardiovascular: Regular rate and rhythm is noted. No murmurs. No rubs or gallops. Skin: Skin is warm and dry, Skin exam normal  Neurologic exam: The patient's [place] Coma Scale is 15. No focal motor deficits. There are no focal sensory deficits. Deep tendon reflexes are intact and present bilaterally in the lower extremities. Babinski is absent. Gait is normal. Symmetric strength and sensation in the lower extremities bilaterally. Back exam: The patient has reproducible tenderness to palpation in the paravertebral lumbar region on the left SI. There is no evidence of localized erythema nor is there any focal warmth to the back. The patient has no evidence of single vertebral tenderness or any single area of interspace tenderness.  There are no palpable deformities, lacerations, abrasions, or

## 2018-07-10 ENCOUNTER — HOSPITAL ENCOUNTER (OUTPATIENT)
Dept: PREADMISSION TESTING | Age: 78
Discharge: HOME | End: 2018-07-10
Payer: MEDICARE

## 2018-07-10 VITALS
HEIGHT: 70 IN | BODY MASS INDEX: 25.48 KG/M2 | SYSTOLIC BLOOD PRESSURE: 102 MMHG | WEIGHT: 178 LBS | DIASTOLIC BLOOD PRESSURE: 59 MMHG | HEART RATE: 74 BPM | TEMPERATURE: 97.8 F

## 2018-07-10 LAB
ABO + RH BLD: NORMAL
ALBUMIN SERPL-MCNC: 2.7 G/DL (ref 3.5–5)
ALBUMIN/GLOB SERPL: 0.5 {RATIO} (ref 1.1–2.2)
ALP SERPL-CCNC: 97 U/L (ref 45–117)
ALT SERPL-CCNC: 13 U/L (ref 12–78)
ANION GAP SERPL CALC-SCNC: 7 MMOL/L (ref 5–15)
APPEARANCE UR: ABNORMAL
APTT PPP: 26.3 SEC (ref 22.1–32)
AST SERPL-CCNC: 16 U/L (ref 15–37)
BACTERIA URNS QL MICRO: NEGATIVE /HPF
BILIRUB SERPL-MCNC: 0.8 MG/DL (ref 0.2–1)
BILIRUB UR QL: NEGATIVE
BLOOD GROUP ANTIBODIES SERPL: NORMAL
BUN SERPL-MCNC: 17 MG/DL (ref 6–20)
BUN/CREAT SERPL: 21 (ref 12–20)
CALCIUM SERPL-MCNC: 8.4 MG/DL (ref 8.5–10.1)
CAOX CRY URNS QL MICRO: ABNORMAL
CEA SERPL-MCNC: 1.5 NG/ML
CHLORIDE SERPL-SCNC: 107 MMOL/L (ref 97–108)
CO2 SERPL-SCNC: 26 MMOL/L (ref 21–32)
COLOR UR: ABNORMAL
CREAT SERPL-MCNC: 0.8 MG/DL (ref 0.7–1.3)
EPITH CASTS URNS QL MICRO: ABNORMAL /LPF
ERYTHROCYTE [DISTWIDTH] IN BLOOD BY AUTOMATED COUNT: 18.5 % (ref 11.5–14.5)
EST. AVERAGE GLUCOSE BLD GHB EST-MCNC: 108 MG/DL
GLOBULIN SER CALC-MCNC: 5.1 G/DL (ref 2–4)
GLUCOSE SERPL-MCNC: 84 MG/DL (ref 65–100)
GLUCOSE UR STRIP.AUTO-MCNC: NEGATIVE MG/DL
HBA1C MFR BLD: 5.4 % (ref 4.2–6.3)
HCT VFR BLD AUTO: 36.2 % (ref 36.6–50.3)
HGB BLD-MCNC: 11 G/DL (ref 12.1–17)
HGB UR QL STRIP: NEGATIVE
INR PPP: 1 (ref 0.9–1.1)
KETONES UR QL STRIP.AUTO: NEGATIVE MG/DL
LEUKOCYTE ESTERASE UR QL STRIP.AUTO: ABNORMAL
MAGNESIUM SERPL-MCNC: 2.3 MG/DL (ref 1.6–2.4)
MCH RBC QN AUTO: 26.8 PG (ref 26–34)
MCHC RBC AUTO-ENTMCNC: 30.4 G/DL (ref 30–36.5)
MCV RBC AUTO: 88.1 FL (ref 80–99)
NITRITE UR QL STRIP.AUTO: NEGATIVE
NRBC # BLD: 0 K/UL (ref 0–0.01)
NRBC BLD-RTO: 0 PER 100 WBC
PH UR STRIP: 6.5 [PH] (ref 5–8)
PHOSPHATE SERPL-MCNC: 3.2 MG/DL (ref 2.6–4.7)
PLATELET # BLD AUTO: 208 K/UL (ref 150–400)
PMV BLD AUTO: 12 FL (ref 8.9–12.9)
POTASSIUM SERPL-SCNC: 4.3 MMOL/L (ref 3.5–5.1)
PROT SERPL-MCNC: 7.8 G/DL (ref 6.4–8.2)
PROT UR STRIP-MCNC: ABNORMAL MG/DL
PROTHROMBIN TIME: 10.4 SEC (ref 9–11.1)
RBC # BLD AUTO: 4.11 M/UL (ref 4.1–5.7)
RBC #/AREA URNS HPF: ABNORMAL /HPF (ref 0–5)
SODIUM SERPL-SCNC: 140 MMOL/L (ref 136–145)
SP GR UR REFRACTOMETRY: 1.02 (ref 1–1.03)
SPECIMEN EXP DATE BLD: NORMAL
THERAPEUTIC RANGE,PTTT: NORMAL SECS (ref 58–77)
UROBILINOGEN UR QL STRIP.AUTO: 0.2 EU/DL (ref 0.2–1)
WBC # BLD AUTO: 7.2 K/UL (ref 4.1–11.1)
WBC URNS QL MICRO: ABNORMAL /HPF (ref 0–4)
YEAST URNS QL MICRO: PRESENT

## 2018-07-10 PROCEDURE — 86900 BLOOD TYPING SEROLOGIC ABO: CPT | Performed by: UROLOGY

## 2018-07-10 PROCEDURE — 85027 COMPLETE CBC AUTOMATED: CPT | Performed by: UROLOGY

## 2018-07-10 PROCEDURE — 82378 CARCINOEMBRYONIC ANTIGEN: CPT | Performed by: UROLOGY

## 2018-07-10 PROCEDURE — 83735 ASSAY OF MAGNESIUM: CPT | Performed by: UROLOGY

## 2018-07-10 PROCEDURE — 85610 PROTHROMBIN TIME: CPT | Performed by: UROLOGY

## 2018-07-10 PROCEDURE — 83036 HEMOGLOBIN GLYCOSYLATED A1C: CPT | Performed by: UROLOGY

## 2018-07-10 PROCEDURE — 85730 THROMBOPLASTIN TIME PARTIAL: CPT | Performed by: UROLOGY

## 2018-07-10 PROCEDURE — 80053 COMPREHEN METABOLIC PANEL: CPT | Performed by: UROLOGY

## 2018-07-10 PROCEDURE — 84100 ASSAY OF PHOSPHORUS: CPT | Performed by: UROLOGY

## 2018-07-10 PROCEDURE — 81001 URINALYSIS AUTO W/SCOPE: CPT | Performed by: UROLOGY

## 2018-07-10 PROCEDURE — 87086 URINE CULTURE/COLONY COUNT: CPT | Performed by: UROLOGY

## 2018-07-10 RX ORDER — SODIUM CHLORIDE 9 MG/ML
250 INJECTION, SOLUTION INTRAVENOUS AS NEEDED
Status: DISCONTINUED | OUTPATIENT
Start: 2018-07-10 | End: 2018-07-14 | Stop reason: HOSPADM

## 2018-07-10 RX ORDER — FLUTICASONE PROPIONATE 50 MCG
2 SPRAY, SUSPENSION (ML) NASAL DAILY
COMMUNITY
End: 2022-08-15

## 2018-07-10 NOTE — PERIOP NOTES
PATIENT GIVEN 2 PACKS OF 6 HCG WIPES  WITH INSTRUCTIONS BOTH WRITTEN AND VERBAL FOR THEIR USE. PATIENT VOICED UNDERSTANDING OF SAME AND HAD ALL QUESTIONS ADDRESSED AND ANSWERED TO THEIR SATISFACTION. PATIENT ALSO GIVEN PRE-OP INSTRUCTIONS AND VOICED UNDERSTANDING OF SAME. PATIENT GIVEN INCENTIVE SPIROMETER. INSTRUCTIONS FOR ITS USE GIVEN VERBALLY AND IN WRITING. PATIENT DEMONSTRATED UNDERSTANDING OF SAME. ERAS BOOKLET REVIEWED AND PATIENT APPEARED TO HAVE A GOOD UNDERSTANDING OF INSTRUCTIONS. DENIED ANY QUESTIONS AT THIS TIME.     Called (LM) ostomy nurse re: request for stoma marking day of surgery.

## 2018-07-11 LAB
BACTERIA SPEC CULT: ABNORMAL
CC UR VC: ABNORMAL
SERVICE CMNT-IMP: ABNORMAL

## 2018-07-12 NOTE — PERIOP NOTES
Faxed PAT testing reports to Dr. Lennox Likes. Notified [place] office RE: abnormal urine culture  Shows 8,000 colonies Alpha Streptococcus.

## 2018-07-23 ENCOUNTER — APPOINTMENT (OUTPATIENT)
Dept: GENERAL RADIOLOGY | Age: 78
DRG: 655 | End: 2018-07-23
Attending: UROLOGY
Payer: MEDICARE

## 2018-07-23 ENCOUNTER — HOSPITAL ENCOUNTER (INPATIENT)
Age: 78
LOS: 3 days | Discharge: HOME HEALTH | DRG: 655 | End: 2018-07-26
Attending: UROLOGY | Admitting: UROLOGY
Payer: MEDICARE

## 2018-07-23 ENCOUNTER — ANESTHESIA EVENT (OUTPATIENT)
Dept: SURGERY | Age: 78
DRG: 655 | End: 2018-07-23
Payer: MEDICARE

## 2018-07-23 ENCOUNTER — ANESTHESIA (OUTPATIENT)
Dept: SURGERY | Age: 78
DRG: 655 | End: 2018-07-23
Payer: MEDICARE

## 2018-07-23 DIAGNOSIS — L76.82 PAIN AT SURGICAL INCISION: Primary | ICD-10-CM

## 2018-07-23 PROCEDURE — 74011000258 HC RX REV CODE- 258

## 2018-07-23 PROCEDURE — 77030011278 HC ELECTRD LIG IMPT COVD -F: Performed by: UROLOGY

## 2018-07-23 PROCEDURE — 36556 INSERT NON-TUNNEL CV CATH: CPT

## 2018-07-23 PROCEDURE — 88331 PATH CONSLTJ SURG 1 BLK 1SPC: CPT | Performed by: UROLOGY

## 2018-07-23 PROCEDURE — 76060000039 HC ANESTHESIA 4 TO 4.5 HR: Performed by: UROLOGY

## 2018-07-23 PROCEDURE — 77030026438 HC STYL ET INTUB CARD -A: Performed by: NURSE ANESTHETIST, CERTIFIED REGISTERED

## 2018-07-23 PROCEDURE — 0TTB0ZZ RESECTION OF BLADDER, OPEN APPROACH: ICD-10-PCS | Performed by: UROLOGY

## 2018-07-23 PROCEDURE — 77030034818 HC STPLR INT GIA COVD -C: Performed by: UROLOGY

## 2018-07-23 PROCEDURE — 76010000175 HC OR TIME 4 TO 4.5 HR INTENSV-TIER 1: Performed by: UROLOGY

## 2018-07-23 PROCEDURE — A4300 CATH IMPL VASC ACCESS PORTAL: HCPCS

## 2018-07-23 PROCEDURE — 77030031139 HC SUT VCRL2 J&J -A: Performed by: UROLOGY

## 2018-07-23 PROCEDURE — 77030025240 HC RELD STPL GIA 2 COVD -C: Performed by: UROLOGY

## 2018-07-23 PROCEDURE — 77030002916 HC SUT ETHLN J&J -A: Performed by: UROLOGY

## 2018-07-23 PROCEDURE — 77030020782 HC GWN BAIR PAWS FLX 3M -B

## 2018-07-23 PROCEDURE — 74011000250 HC RX REV CODE- 250

## 2018-07-23 PROCEDURE — 0VTQ0ZZ RESECTION OF BILATERAL VAS DEFERENS, OPEN APPROACH: ICD-10-PCS | Performed by: UROLOGY

## 2018-07-23 PROCEDURE — P9045 ALBUMIN (HUMAN), 5%, 250 ML: HCPCS

## 2018-07-23 PROCEDURE — 77030002888 HC SUT CHRMC J&J -A: Performed by: UROLOGY

## 2018-07-23 PROCEDURE — C1769 GUIDE WIRE: HCPCS | Performed by: UROLOGY

## 2018-07-23 PROCEDURE — 77030011244 HC DRN WND HUBLS J&J -B: Performed by: UROLOGY

## 2018-07-23 PROCEDURE — 77030020061 HC IV BLD WRMR ADMIN SET 3M -B: Performed by: ANESTHESIOLOGY

## 2018-07-23 PROCEDURE — 74011000272 HC RX REV CODE- 272: Performed by: UROLOGY

## 2018-07-23 PROCEDURE — 88307 TISSUE EXAM BY PATHOLOGIST: CPT | Performed by: UROLOGY

## 2018-07-23 PROCEDURE — 77030018846 HC SOL IRR STRL H20 ICUM -A: Performed by: UROLOGY

## 2018-07-23 PROCEDURE — 77030002996 HC SUT SLK J&J -A: Performed by: UROLOGY

## 2018-07-23 PROCEDURE — 77030011640 HC PAD GRND REM COVD -A: Performed by: UROLOGY

## 2018-07-23 PROCEDURE — 88309 TISSUE EXAM BY PATHOLOGIST: CPT | Performed by: UROLOGY

## 2018-07-23 PROCEDURE — 77030031128 HC SUT MCRYL J&J -D: Performed by: UROLOGY

## 2018-07-23 PROCEDURE — 74011250637 HC RX REV CODE- 250/637: Performed by: UROLOGY

## 2018-07-23 PROCEDURE — 77030008684 HC TU ET CUF COVD -B: Performed by: NURSE ANESTHETIST, CERTIFIED REGISTERED

## 2018-07-23 PROCEDURE — 71045 X-RAY EXAM CHEST 1 VIEW: CPT

## 2018-07-23 PROCEDURE — 0DNB0ZZ RELEASE ILEUM, OPEN APPROACH: ICD-10-PCS | Performed by: UROLOGY

## 2018-07-23 PROCEDURE — 94760 N-INVAS EAR/PLS OXIMETRY 1: CPT

## 2018-07-23 PROCEDURE — 77030002966 HC SUT PDS J&J -A: Performed by: UROLOGY

## 2018-07-23 PROCEDURE — 07BC0ZX EXCISION OF PELVIS LYMPHATIC, OPEN APPROACH, DIAGNOSTIC: ICD-10-PCS | Performed by: UROLOGY

## 2018-07-23 PROCEDURE — 77030018836 HC SOL IRR NACL ICUM -A: Performed by: UROLOGY

## 2018-07-23 PROCEDURE — 76210000016 HC OR PH I REC 1 TO 1.5 HR: Performed by: UROLOGY

## 2018-07-23 PROCEDURE — 77030008771 HC TU NG SALEM SUMP -A: Performed by: ANESTHESIOLOGY

## 2018-07-23 PROCEDURE — 77030010516 HC APPL HEMA CLP TELE -B: Performed by: UROLOGY

## 2018-07-23 PROCEDURE — 77030005537 HC CATH URETH BARD -A: Performed by: UROLOGY

## 2018-07-23 PROCEDURE — 77030032490 HC SLV COMPR SCD KNE COVD -B: Performed by: UROLOGY

## 2018-07-23 PROCEDURE — 74011250636 HC RX REV CODE- 250/636

## 2018-07-23 PROCEDURE — 74011000250 HC RX REV CODE- 250: Performed by: ANESTHESIOLOGY

## 2018-07-23 PROCEDURE — 74011250636 HC RX REV CODE- 250/636: Performed by: UROLOGY

## 2018-07-23 PROCEDURE — 3E0T3BZ INTRODUCTION OF ANESTHETIC AGENT INTO PERIPHERAL NERVES AND PLEXI, PERCUTANEOUS APPROACH: ICD-10-PCS | Performed by: ANESTHESIOLOGY

## 2018-07-23 PROCEDURE — 77030013259 HC BG UROSTMY HOLL -A: Performed by: UROLOGY

## 2018-07-23 PROCEDURE — 0VT00ZZ RESECTION OF PROSTATE, OPEN APPROACH: ICD-10-PCS | Performed by: UROLOGY

## 2018-07-23 PROCEDURE — 77030010939 HC CLP LIG TELE -B: Performed by: UROLOGY

## 2018-07-23 PROCEDURE — 77030008467 HC STPLR SKN COVD -B: Performed by: UROLOGY

## 2018-07-23 PROCEDURE — 02HV33Z INSERTION OF INFUSION DEVICE INTO SUPERIOR VENA CAVA, PERCUTANEOUS APPROACH: ICD-10-PCS | Performed by: ANESTHESIOLOGY

## 2018-07-23 PROCEDURE — 77030019702 HC WRP THER MENM -C: Performed by: UROLOGY

## 2018-07-23 PROCEDURE — 77030018723 HC ELCTRD BLD COVD -A: Performed by: UROLOGY

## 2018-07-23 PROCEDURE — 85018 HEMOGLOBIN: CPT

## 2018-07-23 PROCEDURE — 77030013079 HC BLNKT BAIR HGGR 3M -A: Performed by: ANESTHESIOLOGY

## 2018-07-23 PROCEDURE — 88305 TISSUE EXAM BY PATHOLOGIST: CPT | Performed by: UROLOGY

## 2018-07-23 PROCEDURE — 77030013567 HC DRN WND RESERV BARD -A: Performed by: UROLOGY

## 2018-07-23 PROCEDURE — 77030012893

## 2018-07-23 PROCEDURE — 0T180ZC BYPASS BILATERAL URETERS TO ILEOCUTANEOUS, OPEN APPROACH: ICD-10-PCS | Performed by: UROLOGY

## 2018-07-23 PROCEDURE — 94762 N-INVAS EAR/PLS OXIMTRY CONT: CPT

## 2018-07-23 PROCEDURE — 0VT30ZZ RESECTION OF BILATERAL SEMINAL VESICLES, OPEN APPROACH: ICD-10-PCS | Performed by: UROLOGY

## 2018-07-23 PROCEDURE — C1751 CATH, INF, PER/CENT/MIDLINE: HCPCS

## 2018-07-23 PROCEDURE — 74011000250 HC RX REV CODE- 250: Performed by: UROLOGY

## 2018-07-23 PROCEDURE — 65270000029 HC RM PRIVATE

## 2018-07-23 PROCEDURE — 77030034696 HC CATH URETH FOL 2W BARD -A: Performed by: UROLOGY

## 2018-07-23 RX ORDER — SODIUM CHLORIDE 9 MG/ML
50 INJECTION, SOLUTION INTRAVENOUS CONTINUOUS
Status: CANCELLED | OUTPATIENT
Start: 2018-07-23 | End: 2018-07-24

## 2018-07-23 RX ORDER — MIDAZOLAM HYDROCHLORIDE 1 MG/ML
0.5 INJECTION, SOLUTION INTRAMUSCULAR; INTRAVENOUS
Status: CANCELLED | OUTPATIENT
Start: 2018-07-23

## 2018-07-23 RX ORDER — ALBUTEROL SULFATE 0.83 MG/ML
2.5 SOLUTION RESPIRATORY (INHALATION)
Status: DISCONTINUED | OUTPATIENT
Start: 2018-07-23 | End: 2018-07-26 | Stop reason: HOSPADM

## 2018-07-23 RX ORDER — KETOROLAC TROMETHAMINE 30 MG/ML
15 INJECTION, SOLUTION INTRAMUSCULAR; INTRAVENOUS EVERY 6 HOURS
Status: DISPENSED | OUTPATIENT
Start: 2018-07-23 | End: 2018-07-24

## 2018-07-23 RX ORDER — PROPOFOL 10 MG/ML
INJECTION, EMULSION INTRAVENOUS AS NEEDED
Status: DISCONTINUED | OUTPATIENT
Start: 2018-07-23 | End: 2018-07-23 | Stop reason: HOSPADM

## 2018-07-23 RX ORDER — ROCURONIUM BROMIDE 10 MG/ML
INJECTION, SOLUTION INTRAVENOUS AS NEEDED
Status: DISCONTINUED | OUTPATIENT
Start: 2018-07-23 | End: 2018-07-23 | Stop reason: HOSPADM

## 2018-07-23 RX ORDER — AMLODIPINE BESYLATE 5 MG/1
5 TABLET ORAL
Status: DISCONTINUED | OUTPATIENT
Start: 2018-07-24 | End: 2018-07-26 | Stop reason: HOSPADM

## 2018-07-23 RX ORDER — SODIUM CHLORIDE 9 MG/ML
50 INJECTION, SOLUTION INTRAVENOUS CONTINUOUS
Status: CANCELLED | OUTPATIENT
Start: 2018-07-23

## 2018-07-23 RX ORDER — GABAPENTIN 300 MG/1
600 CAPSULE ORAL ONCE
Status: COMPLETED | OUTPATIENT
Start: 2018-07-23 | End: 2018-07-23

## 2018-07-23 RX ORDER — ONDANSETRON 2 MG/ML
4 INJECTION INTRAMUSCULAR; INTRAVENOUS AS NEEDED
Status: CANCELLED | OUTPATIENT
Start: 2018-07-23

## 2018-07-23 RX ORDER — SODIUM CHLORIDE 0.9 % (FLUSH) 0.9 %
5-10 SYRINGE (ML) INJECTION EVERY 8 HOURS
Status: DISCONTINUED | OUTPATIENT
Start: 2018-07-23 | End: 2018-07-23 | Stop reason: HOSPADM

## 2018-07-23 RX ORDER — ACETAMINOPHEN 500 MG
1000 TABLET ORAL EVERY 6 HOURS
Status: DISCONTINUED | OUTPATIENT
Start: 2018-07-23 | End: 2018-07-26 | Stop reason: HOSPADM

## 2018-07-23 RX ORDER — MAGNESIUM SULFATE HEPTAHYDRATE 40 MG/ML
INJECTION, SOLUTION INTRAVENOUS AS NEEDED
Status: DISCONTINUED | OUTPATIENT
Start: 2018-07-23 | End: 2018-07-23 | Stop reason: HOSPADM

## 2018-07-23 RX ORDER — SODIUM CHLORIDE 0.9 % (FLUSH) 0.9 %
5-10 SYRINGE (ML) INJECTION AS NEEDED
Status: CANCELLED | OUTPATIENT
Start: 2018-07-23

## 2018-07-23 RX ORDER — NALOXONE HYDROCHLORIDE 0.4 MG/ML
0.4 INJECTION, SOLUTION INTRAMUSCULAR; INTRAVENOUS; SUBCUTANEOUS AS NEEDED
Status: DISCONTINUED | OUTPATIENT
Start: 2018-07-23 | End: 2018-07-26 | Stop reason: HOSPADM

## 2018-07-23 RX ORDER — FENTANYL CITRATE 50 UG/ML
25 INJECTION, SOLUTION INTRAMUSCULAR; INTRAVENOUS
Status: DISCONTINUED | OUTPATIENT
Start: 2018-07-23 | End: 2018-07-23 | Stop reason: HOSPADM

## 2018-07-23 RX ORDER — MORPHINE SULFATE 10 MG/ML
2 INJECTION, SOLUTION INTRAMUSCULAR; INTRAVENOUS
Status: CANCELLED | OUTPATIENT
Start: 2018-07-23

## 2018-07-23 RX ORDER — ALBUMIN HUMAN 50 G/1000ML
SOLUTION INTRAVENOUS AS NEEDED
Status: DISCONTINUED | OUTPATIENT
Start: 2018-07-23 | End: 2018-07-23 | Stop reason: HOSPADM

## 2018-07-23 RX ORDER — SODIUM CHLORIDE 9 MG/ML
1000 INJECTION, SOLUTION INTRAVENOUS CONTINUOUS
Status: DISCONTINUED | OUTPATIENT
Start: 2018-07-23 | End: 2018-07-23 | Stop reason: HOSPADM

## 2018-07-23 RX ORDER — ONDANSETRON 2 MG/ML
INJECTION INTRAMUSCULAR; INTRAVENOUS AS NEEDED
Status: DISCONTINUED | OUTPATIENT
Start: 2018-07-23 | End: 2018-07-23 | Stop reason: HOSPADM

## 2018-07-23 RX ORDER — PANTOPRAZOLE SODIUM 40 MG/1
40 TABLET, DELAYED RELEASE ORAL
Status: DISCONTINUED | OUTPATIENT
Start: 2018-07-24 | End: 2018-07-25

## 2018-07-23 RX ORDER — ONDANSETRON 2 MG/ML
4 INJECTION INTRAMUSCULAR; INTRAVENOUS
Status: DISCONTINUED | OUTPATIENT
Start: 2018-07-23 | End: 2018-07-26 | Stop reason: HOSPADM

## 2018-07-23 RX ORDER — IPRATROPIUM BROMIDE AND ALBUTEROL SULFATE 2.5; .5 MG/3ML; MG/3ML
3 SOLUTION RESPIRATORY (INHALATION)
Status: DISCONTINUED | OUTPATIENT
Start: 2018-07-23 | End: 2018-07-26 | Stop reason: HOSPADM

## 2018-07-23 RX ORDER — CELECOXIB 100 MG/1
100 CAPSULE ORAL 2 TIMES DAILY
Status: DISCONTINUED | OUTPATIENT
Start: 2018-07-24 | End: 2018-07-26 | Stop reason: HOSPADM

## 2018-07-23 RX ORDER — ALVIMOPAN 12 MG/1
12 CAPSULE ORAL 2 TIMES DAILY
Status: DISCONTINUED | OUTPATIENT
Start: 2018-07-24 | End: 2018-07-25

## 2018-07-23 RX ORDER — CELECOXIB 200 MG/1
200 CAPSULE ORAL ONCE
Status: COMPLETED | OUTPATIENT
Start: 2018-07-23 | End: 2018-07-23

## 2018-07-23 RX ORDER — FENTANYL CITRATE 50 UG/ML
50 INJECTION, SOLUTION INTRAMUSCULAR; INTRAVENOUS AS NEEDED
Status: CANCELLED | OUTPATIENT
Start: 2018-07-23

## 2018-07-23 RX ORDER — SODIUM CHLORIDE, SODIUM LACTATE, POTASSIUM CHLORIDE, CALCIUM CHLORIDE 600; 310; 30; 20 MG/100ML; MG/100ML; MG/100ML; MG/100ML
INJECTION, SOLUTION INTRAVENOUS
Status: DISCONTINUED | OUTPATIENT
Start: 2018-07-23 | End: 2018-07-23 | Stop reason: HOSPADM

## 2018-07-23 RX ORDER — DEXMEDETOMIDINE HYDROCHLORIDE 4 UG/ML
INJECTION, SOLUTION INTRAVENOUS AS NEEDED
Status: DISCONTINUED | OUTPATIENT
Start: 2018-07-23 | End: 2018-07-23 | Stop reason: HOSPADM

## 2018-07-23 RX ORDER — SODIUM CHLORIDE, SODIUM LACTATE, POTASSIUM CHLORIDE, CALCIUM CHLORIDE 600; 310; 30; 20 MG/100ML; MG/100ML; MG/100ML; MG/100ML
75 INJECTION, SOLUTION INTRAVENOUS CONTINUOUS
Status: CANCELLED | OUTPATIENT
Start: 2018-07-23 | End: 2018-07-24

## 2018-07-23 RX ORDER — DIPHENHYDRAMINE HCL 25 MG
25 CAPSULE ORAL
Status: DISCONTINUED | OUTPATIENT
Start: 2018-07-23 | End: 2018-07-23

## 2018-07-23 RX ORDER — ALVIMOPAN 12 MG/1
12 CAPSULE ORAL ONCE
Status: COMPLETED | OUTPATIENT
Start: 2018-07-23 | End: 2018-07-23

## 2018-07-23 RX ORDER — HEPARIN SODIUM 5000 [USP'U]/ML
5000 INJECTION, SOLUTION INTRAVENOUS; SUBCUTANEOUS EVERY 12 HOURS
Status: DISCONTINUED | OUTPATIENT
Start: 2018-07-23 | End: 2018-07-26 | Stop reason: HOSPADM

## 2018-07-23 RX ORDER — SODIUM CHLORIDE 9 MG/ML
INJECTION, SOLUTION INTRAVENOUS
Status: DISCONTINUED | OUTPATIENT
Start: 2018-07-23 | End: 2018-07-23 | Stop reason: HOSPADM

## 2018-07-23 RX ORDER — ONDANSETRON 2 MG/ML
4 INJECTION INTRAMUSCULAR; INTRAVENOUS
Status: DISCONTINUED | OUTPATIENT
Start: 2018-07-23 | End: 2018-07-23

## 2018-07-23 RX ORDER — ONDANSETRON 2 MG/ML
4 INJECTION INTRAMUSCULAR; INTRAVENOUS AS NEEDED
Status: DISCONTINUED | OUTPATIENT
Start: 2018-07-23 | End: 2018-07-23 | Stop reason: HOSPADM

## 2018-07-23 RX ORDER — OXYCODONE AND ACETAMINOPHEN 5; 325 MG/1; MG/1
1 TABLET ORAL AS NEEDED
Status: CANCELLED | OUTPATIENT
Start: 2018-07-23

## 2018-07-23 RX ORDER — SODIUM CHLORIDE 0.9 % (FLUSH) 0.9 %
5-10 SYRINGE (ML) INJECTION AS NEEDED
Status: DISCONTINUED | OUTPATIENT
Start: 2018-07-23 | End: 2018-07-23 | Stop reason: HOSPADM

## 2018-07-23 RX ORDER — SODIUM CHLORIDE 0.9 % (FLUSH) 0.9 %
5-10 SYRINGE (ML) INJECTION AS NEEDED
Status: DISCONTINUED | OUTPATIENT
Start: 2018-07-23 | End: 2018-07-23

## 2018-07-23 RX ORDER — MIDAZOLAM HYDROCHLORIDE 1 MG/ML
1 INJECTION, SOLUTION INTRAMUSCULAR; INTRAVENOUS AS NEEDED
Status: DISCONTINUED | OUTPATIENT
Start: 2018-07-23 | End: 2018-07-23 | Stop reason: HOSPADM

## 2018-07-23 RX ORDER — DEXAMETHASONE SODIUM PHOSPHATE 4 MG/ML
INJECTION, SOLUTION INTRA-ARTICULAR; INTRALESIONAL; INTRAMUSCULAR; INTRAVENOUS; SOFT TISSUE AS NEEDED
Status: DISCONTINUED | OUTPATIENT
Start: 2018-07-23 | End: 2018-07-23 | Stop reason: HOSPADM

## 2018-07-23 RX ORDER — LIDOCAINE HYDROCHLORIDE 10 MG/ML
0.1 INJECTION, SOLUTION EPIDURAL; INFILTRATION; INTRACAUDAL; PERINEURAL AS NEEDED
Status: CANCELLED | OUTPATIENT
Start: 2018-07-23

## 2018-07-23 RX ORDER — MIDAZOLAM HYDROCHLORIDE 1 MG/ML
1 INJECTION, SOLUTION INTRAMUSCULAR; INTRAVENOUS AS NEEDED
Status: CANCELLED | OUTPATIENT
Start: 2018-07-23

## 2018-07-23 RX ORDER — NEOSTIGMINE METHYLSULFATE 1 MG/ML
INJECTION INTRAVENOUS AS NEEDED
Status: DISCONTINUED | OUTPATIENT
Start: 2018-07-23 | End: 2018-07-23 | Stop reason: HOSPADM

## 2018-07-23 RX ORDER — FENTANYL CITRATE 50 UG/ML
25 INJECTION, SOLUTION INTRAMUSCULAR; INTRAVENOUS
Status: CANCELLED | OUTPATIENT
Start: 2018-07-23

## 2018-07-23 RX ORDER — OMEPRAZOLE 20 MG/1
20 CAPSULE, DELAYED RELEASE ORAL
Status: DISCONTINUED | OUTPATIENT
Start: 2018-07-24 | End: 2018-07-23 | Stop reason: CLARIF

## 2018-07-23 RX ORDER — ZOLPIDEM TARTRATE 5 MG/1
5 TABLET ORAL
Status: DISCONTINUED | OUTPATIENT
Start: 2018-07-23 | End: 2018-07-26 | Stop reason: HOSPADM

## 2018-07-23 RX ORDER — SODIUM CHLORIDE 0.9 % (FLUSH) 0.9 %
5-10 SYRINGE (ML) INJECTION EVERY 8 HOURS
Status: CANCELLED | OUTPATIENT
Start: 2018-07-23

## 2018-07-23 RX ORDER — SODIUM CHLORIDE, SODIUM LACTATE, POTASSIUM CHLORIDE, CALCIUM CHLORIDE 600; 310; 30; 20 MG/100ML; MG/100ML; MG/100ML; MG/100ML
75 INJECTION, SOLUTION INTRAVENOUS CONTINUOUS
Status: DISPENSED | OUTPATIENT
Start: 2018-07-23 | End: 2018-07-26

## 2018-07-23 RX ORDER — SODIUM CHLORIDE 0.9 % (FLUSH) 0.9 %
5-10 SYRINGE (ML) INJECTION EVERY 8 HOURS
Status: DISCONTINUED | OUTPATIENT
Start: 2018-07-23 | End: 2018-07-23

## 2018-07-23 RX ORDER — KETAMINE HYDROCHLORIDE 10 MG/ML
INJECTION, SOLUTION INTRAMUSCULAR; INTRAVENOUS AS NEEDED
Status: DISCONTINUED | OUTPATIENT
Start: 2018-07-23 | End: 2018-07-23 | Stop reason: HOSPADM

## 2018-07-23 RX ORDER — SODIUM CHLORIDE, SODIUM LACTATE, POTASSIUM CHLORIDE, CALCIUM CHLORIDE 600; 310; 30; 20 MG/100ML; MG/100ML; MG/100ML; MG/100ML
75 INJECTION, SOLUTION INTRAVENOUS CONTINUOUS
Status: DISCONTINUED | OUTPATIENT
Start: 2018-07-23 | End: 2018-07-23 | Stop reason: HOSPADM

## 2018-07-23 RX ORDER — TRAMADOL HYDROCHLORIDE 50 MG/1
50 TABLET ORAL
Status: DISCONTINUED | OUTPATIENT
Start: 2018-07-23 | End: 2018-07-26 | Stop reason: HOSPADM

## 2018-07-23 RX ORDER — SODIUM CHLORIDE, SODIUM LACTATE, POTASSIUM CHLORIDE, CALCIUM CHLORIDE 600; 310; 30; 20 MG/100ML; MG/100ML; MG/100ML; MG/100ML
75 INJECTION, SOLUTION INTRAVENOUS CONTINUOUS
Status: CANCELLED | OUTPATIENT
Start: 2018-07-23

## 2018-07-23 RX ORDER — FENTANYL CITRATE 50 UG/ML
50 INJECTION, SOLUTION INTRAMUSCULAR; INTRAVENOUS AS NEEDED
Status: DISCONTINUED | OUTPATIENT
Start: 2018-07-23 | End: 2018-07-23 | Stop reason: HOSPADM

## 2018-07-23 RX ORDER — LORAZEPAM 0.5 MG/1
0.5 TABLET ORAL
Status: DISCONTINUED | OUTPATIENT
Start: 2018-07-23 | End: 2018-07-26 | Stop reason: HOSPADM

## 2018-07-23 RX ORDER — DIPHENHYDRAMINE HYDROCHLORIDE 50 MG/ML
12.5 INJECTION, SOLUTION INTRAMUSCULAR; INTRAVENOUS AS NEEDED
Status: CANCELLED | OUTPATIENT
Start: 2018-07-23 | End: 2018-07-23

## 2018-07-23 RX ORDER — FUROSEMIDE 20 MG/1
10 TABLET ORAL DAILY
Status: DISCONTINUED | OUTPATIENT
Start: 2018-07-24 | End: 2018-07-25

## 2018-07-23 RX ORDER — GLYCOPYRROLATE 0.2 MG/ML
INJECTION INTRAMUSCULAR; INTRAVENOUS AS NEEDED
Status: DISCONTINUED | OUTPATIENT
Start: 2018-07-23 | End: 2018-07-23 | Stop reason: HOSPADM

## 2018-07-23 RX ORDER — ACETAMINOPHEN 500 MG
1000 TABLET ORAL ONCE
Status: COMPLETED | OUTPATIENT
Start: 2018-07-23 | End: 2018-07-23

## 2018-07-23 RX ORDER — SUCCINYLCHOLINE CHLORIDE 20 MG/ML
INJECTION INTRAMUSCULAR; INTRAVENOUS AS NEEDED
Status: DISCONTINUED | OUTPATIENT
Start: 2018-07-23 | End: 2018-07-23 | Stop reason: HOSPADM

## 2018-07-23 RX ORDER — LIDOCAINE HYDROCHLORIDE 20 MG/ML
INJECTION, SOLUTION EPIDURAL; INFILTRATION; INTRACAUDAL; PERINEURAL AS NEEDED
Status: DISCONTINUED | OUTPATIENT
Start: 2018-07-23 | End: 2018-07-23 | Stop reason: HOSPADM

## 2018-07-23 RX ORDER — OXYCODONE HYDROCHLORIDE 5 MG/1
5 TABLET ORAL
Status: DISCONTINUED | OUTPATIENT
Start: 2018-07-23 | End: 2018-07-25

## 2018-07-23 RX ADMIN — ROCURONIUM BROMIDE 20 MG: 10 INJECTION, SOLUTION INTRAVENOUS at 15:06

## 2018-07-23 RX ADMIN — MIDAZOLAM HYDROCHLORIDE 2 MG: 1 INJECTION, SOLUTION INTRAMUSCULAR; INTRAVENOUS at 11:22

## 2018-07-23 RX ADMIN — ACETAMINOPHEN 1000 MG: 500 TABLET, FILM COATED ORAL at 10:59

## 2018-07-23 RX ADMIN — SUCCINYLCHOLINE CHLORIDE 120 MG: 20 INJECTION INTRAMUSCULAR; INTRAVENOUS at 12:06

## 2018-07-23 RX ADMIN — GABAPENTIN 600 MG: 300 CAPSULE ORAL at 11:00

## 2018-07-23 RX ADMIN — Medication 10 ML: at 16:26

## 2018-07-23 RX ADMIN — SODIUM CHLORIDE: 9 INJECTION, SOLUTION INTRAVENOUS at 15:49

## 2018-07-23 RX ADMIN — DEXAMETHASONE SODIUM PHOSPHATE 8 MG: 4 INJECTION, SOLUTION INTRA-ARTICULAR; INTRALESIONAL; INTRAMUSCULAR; INTRAVENOUS; SOFT TISSUE at 12:20

## 2018-07-23 RX ADMIN — KETAMINE HYDROCHLORIDE 40 MG: 10 INJECTION, SOLUTION INTRAMUSCULAR; INTRAVENOUS at 12:22

## 2018-07-23 RX ADMIN — PROPOFOL 100 MG: 10 INJECTION, EMULSION INTRAVENOUS at 12:06

## 2018-07-23 RX ADMIN — GLYCOPYRROLATE 0.6 MG: 0.2 INJECTION INTRAMUSCULAR; INTRAVENOUS at 15:35

## 2018-07-23 RX ADMIN — ALVIMOPAN 12 MG: 12 CAPSULE ORAL at 11:00

## 2018-07-23 RX ADMIN — ROCURONIUM BROMIDE 20 MG: 10 INJECTION, SOLUTION INTRAVENOUS at 14:10

## 2018-07-23 RX ADMIN — MAGNESIUM SULFATE HEPTAHYDRATE 2 G: 40 INJECTION, SOLUTION INTRAVENOUS at 12:14

## 2018-07-23 RX ADMIN — FENTANYL CITRATE 100 MCG: 50 INJECTION, SOLUTION INTRAMUSCULAR; INTRAVENOUS at 11:22

## 2018-07-23 RX ADMIN — DEXMEDETOMIDINE HYDROCHLORIDE 20 MCG: 4 INJECTION, SOLUTION INTRAVENOUS at 15:29

## 2018-07-23 RX ADMIN — SODIUM CHLORIDE, SODIUM LACTATE, POTASSIUM CHLORIDE, AND CALCIUM CHLORIDE 75 ML/HR: 600; 310; 30; 20 INJECTION, SOLUTION INTRAVENOUS at 17:36

## 2018-07-23 RX ADMIN — Medication 5 ML/HR: at 12:45

## 2018-07-23 RX ADMIN — ALBUMIN HUMAN 250 ML: 50 SOLUTION INTRAVENOUS at 12:20

## 2018-07-23 RX ADMIN — NEOSTIGMINE METHYLSULFATE 4 MG: 1 INJECTION INTRAVENOUS at 15:35

## 2018-07-23 RX ADMIN — HEPARIN SODIUM 5000 UNITS: 5000 INJECTION INTRAVENOUS; SUBCUTANEOUS at 17:45

## 2018-07-23 RX ADMIN — SODIUM CHLORIDE, SODIUM LACTATE, POTASSIUM CHLORIDE, CALCIUM CHLORIDE: 600; 310; 30; 20 INJECTION, SOLUTION INTRAVENOUS at 11:47

## 2018-07-23 RX ADMIN — ONDANSETRON 4 MG: 2 INJECTION INTRAMUSCULAR; INTRAVENOUS at 15:33

## 2018-07-23 RX ADMIN — ROCURONIUM BROMIDE 10 MG: 10 INJECTION, SOLUTION INTRAVENOUS at 13:23

## 2018-07-23 RX ADMIN — ROCURONIUM BROMIDE 35 MG: 10 INJECTION, SOLUTION INTRAVENOUS at 12:20

## 2018-07-23 RX ADMIN — ROCURONIUM BROMIDE 5 MG: 10 INJECTION, SOLUTION INTRAVENOUS at 12:06

## 2018-07-23 RX ADMIN — ALBUMIN HUMAN 250 ML: 50 SOLUTION INTRAVENOUS at 15:00

## 2018-07-23 RX ADMIN — SODIUM CHLORIDE, SODIUM LACTATE, POTASSIUM CHLORIDE, AND CALCIUM CHLORIDE 75 ML/HR: 600; 310; 30; 20 INJECTION, SOLUTION INTRAVENOUS at 10:55

## 2018-07-23 RX ADMIN — CELECOXIB 200 MG: 200 CAPSULE ORAL at 11:00

## 2018-07-23 RX ADMIN — Medication 8 ML/HR: at 16:26

## 2018-07-23 RX ADMIN — LIDOCAINE HYDROCHLORIDE 60 MG: 20 INJECTION, SOLUTION EPIDURAL; INFILTRATION; INTRACAUDAL; PERINEURAL at 12:06

## 2018-07-23 NOTE — ANESTHESIA PROCEDURE NOTES
Epidural Block    Performed by: Celestine Steinberg  Authorized by: Celestine Steinberg     Pre-Procedure  Indication: at surgeon's request and post-op pain management    Preanesthetic Checklist: patient identified, risks and benefits discussed, anesthesia consent, site marked, patient being monitored, timeout performed and anesthesia consent      Epidural:   Patient position:  Seated  Prep region:  Thoracic  Prep: Chlorhexidine    Location:  T8-9    Needle and Epidural Catheter:   Needle Type:  Tuohy  Needle Gauge:  17 G  Injection Technique:  Loss of resistance using air  Attempts:  1  Catheter Size:  19 G  Events: no blood with aspiration, no cerebrospinal fluid with aspiration, no paresthesia and negative aspiration test    Test Dose:  Negative and lidocaine 1.5% w/ epi    Assessment:   Catheter Secured:  Tegaderm and tape  Insertion:  Uncomplicated  Patient tolerance:  Patient tolerated the procedure well with no immediate complications

## 2018-07-23 NOTE — ANESTHESIA PREPROCEDURE EVALUATION
Anesthetic History   No history of anesthetic complications            Review of Systems / Medical History  Patient summary reviewed, nursing notes reviewed and pertinent labs reviewed    Pulmonary    COPD      Shortness of breath      Comments: Smoking Status: Former Smoker - 36 pack years      Quit Smokin09     Neuro/Psych             Comments: Combined forms of age-related cataract of left eye       Floppy iris syndrome        Cardiovascular    Hypertension              Exercise tolerance: >4 METS     GI/Hepatic/Renal     GERD: well controlled    Renal disease: stones       Endo/Other        Cancer     Other Findings              Physical Exam    Airway  Mallampati: II  TM Distance: > 6 cm  Neck ROM: normal range of motion   Mouth opening: Normal     Cardiovascular  Regular rate and rhythm,  S1 and S2 normal,  no murmur, click, rub, or gallop  Rhythm: regular  Rate: normal         Dental    Dentition: Full upper dentures and Full lower dentures     Pulmonary  Breath sounds clear to auscultation          Prolonged expiration     Abdominal  GI exam deferred       Other Findings            Anesthetic Plan    ASA: 3  Anesthesia type: general    Monitoring Plan: CVP  Post-op pain plan if not by surgeon: indwelling epidural catheter    Induction: Intravenous  Anesthetic plan and risks discussed with: Patient      DAVINA

## 2018-07-23 NOTE — ROUTINE PROCESS
Patient: Bushra Avendaño MRN: [id number]  SSN: [id number]   YOB: 1940  Age: 66 y.o. Sex: male     Patient is status post Procedure(s):  SALVAGE CYSTECTOMY, PROSTATECTOMY WITH ILEAL CONDUIT DIVERSION  (E R A S). Surgeon(s) and Role:     * Terese Granger MD - Primary    Local/Dose/Irrigation:  50,000 units bacitracin with normal saline for irrigation              Quad Lumen 07/23/18 Left Internal jugular (Active)        Peripheral IV 07/23/18 Left Wrist (Active)          Urinary Ostomy 07/23/18 Upper;Right  Abdomen (Active)   Drainage Color Serosanguinous 7/23/2018  3:27 PM   Dressing Status New 7/23/2018  3:27 PM   Treatment Bag change; Other (Comment) 7/23/2018  3:27 PM       [facility] 07/23/18 Anterior; Lower Abdomen (Active)      Airway - Endotracheal Tube 07/23/18 (Active)        Epidural/Intrathecal 07/23/18 Thoracic spine (comment) (Active)               Dressing/Packing:  Wound Abdomen-DRESSING TYPE: 4 x 4;Special tape (comment); [place] (Medipore tape) (07/23/18 [id number])  Splint/Cast:  ]    Other:  Urostomy RUQ

## 2018-07-23 NOTE — PERIOP NOTES
Wound care nurse into max patient. Epidural and central line inserted by Dr. Piter Callejas using sterile technique. Continuously on heart monitor and pulse ox and oxygen for line insertion. Dr. Julee Stephens was in and stated no clipping needed.

## 2018-07-23 NOTE — IP AVS SNAPSHOT
[address] [facility] [address] [place] 
[phone] Patient: Cedric Johnson MRN: [id number] About your hospitalization You were admitted on:  July 23, 2018 You last received care in the:  [place] [address] You were discharged on:  July 26, 2018 Why you were hospitalized Your primary diagnosis was:  Not on File Your diagnoses also included:  Bladder Cancer (Hcc) Follow-up Information Follow up With Details Comments Contact Info [address] [facility] nurse for ostomy care. Call agency office if you have not been contacted by a liaison within 24 hours of hospital discharge. [address] 
1st Floor [facility] [address] 
[phone] Juan Pablo Shelby, SHAHID   [address] [facility] [address] 
[phone] Discharge Orders None A check max indicates which time of day the medication should be taken. My Medications START taking these medications Instructions Each Dose to Equal  
 Morning Noon Evening Bedtime  
 cephALEXin 500 mg capsule Commonly known as:  Ritesh Mujica Your last dose was: Your next dose is: Take 1 Cap by mouth four (4) times daily for 5 days. 500 mg CHANGE how you take these medications Instructions Each Dose to Equal  
 Morning Noon Evening Bedtime * HYDROCODONE-ACETAMINOPHEN PO What changed:  Another medication with the same name was added. Make sure you understand how and when to take each. Your last dose was: Your next dose is: Take  by mouth as needed. 7.5/500  
     
   
   
   
  
 * HYDROcodone-acetaminophen 5-325 mg per tablet Commonly known as:  Azeb Figueroa What changed: You were already taking a medication with the same name, and this prescription was added. Make sure you understand how and when to take each. Your last dose was: Your next dose is: Take 1 Tab by mouth every six (6) hours as needed for Pain. Max Daily Amount: 4 Tabs. Indications: Pain 1 Tab * Notice: This list has 2 medication(s) that are the same as other medications prescribed for you. Read the directions carefully, and ask your doctor or other care provider to review them with you. CONTINUE taking these medications Instructions Each Dose to Equal  
 Morning Noon Evening Bedtime  
 albuterol sulfate 90 mcg/actuation Aepb Your last dose was: Your next dose is: Take 1 Puff by inhalation every six (6) hours as needed. Indications: Chronic Obstructive Pulmonary Disease 1 Puff  
    
   
   
   
  
 albuterol-ipratropium 2.5 mg-0.5 mg/3 ml Nebu Commonly known as:  Andrew Lyle Your last dose was: Your next dose is:    
   
   
 UE 1 VIAL VIA NEBULIZER EVERY 6 HOURS  
     
   
   
   
  
 amLODIPine 5 mg tablet Commonly known as:  Ana Maria Frye Your last dose was: Your next dose is: Take 5 mg by mouth every morning. Indications: HYPERTENSION  
 5 mg FLONASE ALLERGY RELIEF 50 mcg/actuation nasal spray Generic drug:  fluticasone Your last dose was: Your next dose is: 2 Sprays by Both Nostrils route daily. 2 Spray LASIX 20 mg tablet Generic drug:  furosemide Your last dose was: Your next dose is: Take 10 mg by mouth daily. 10-20 mg as needed 10 mg  
    
   
   
   
  
 LIPITOR 20 mg tablet Generic drug:  atorvastatin Your last dose was: Your next dose is: Take 20 mg by mouth nightly. 20 mg  
    
   
   
   
  
 montelukast 10 mg tablet Commonly known as:  SINGULAIR Your last dose was: Your next dose is: Take 10 mg by mouth daily.   
 10 mg  
    
   
   
   
  
 omeprazole 20 mg capsule Commonly known as:  PRILOSEC Your last dose was: Your next dose is: Take 20 mg by mouth every morning. 20 mg  
    
   
   
   
  
 potassium chloride SR 10 mEq tablet Commonly known as:  KLOR-CON 10 Your last dose was: Your next dose is: Take 10 mEq by mouth as needed. Takes with lasix as needed 10 mEq SALINE MIST 0.65 % nasal squeeze bottle Generic drug:  sodium chloride Your last dose was: Your next dose is:    
   
   
 1 Spray as needed for Congestion. 1 Letona STIOLTO RESPIMAT 2.5-2.5 mcg/actuation Mist  
Generic drug:  tiotropium-olodaterol Your last dose was: Your next dose is: Take 2 Puffs by inhalation every morning. 2 Puff  
    
   
   
   
  
 triamcinolone acetonide 0.025 % ointment Commonly known as:  KENALOG Your last dose was: Your next dose is:    
   
   
 as needed. zolpidem 10 mg tablet Commonly known as:  AMBIEN Your last dose was: Your next dose is:    
   
   
 5 mg nightly as needed. 5 mg STOP taking these medications   
 finasteride 5 mg tablet Commonly known as:  PROSCAR  
   
  
 tamsulosin 0.4 mg capsule Commonly known as:  FLOMAX Where to Get Your Medications Information on where to get these meds will be given to you by the nurse or doctor. ! Ask your nurse or doctor about these medications  
  cephALEXin 500 mg capsule HYDROcodone-acetaminophen 5-325 mg per tablet Opioid Education Prescription Opioids: What You Need to Know: 
 
Prescription opioids can be used to help relieve moderate-to-severe pain and are often prescribed following a surgery or injury, or for certain health conditions.   These medications can be an important part of treatment but also come with serious risks. Opioids are strong pain medicines. Examples include hydrocodone, oxycodone, fentanyl, and morphine. Heroin is an example of an illegal opioid. It is important to work with your health care provider to make sure you are getting the safest, most effective care. WHAT ARE THE RISKS AND SIDE EFFECTS OF OPIOID USE? Prescription opioids carry serious risks of addiction and overdose, especially with prolonged use. An opioid overdose, often marked by slow breathing, can cause sudden death. The use of prescription opioids can have a number of side effects as well, even when taken as directed. · Tolerance-meaning you might need to take more of a medication for the same pain relief · Physical dependence-meaning you have symptoms of withdrawal when the medication is stopped. Withdrawal symptoms can include nausea, sweating, chills, diarrhea, stomach cramps, and muscle aches. Withdrawal can last up to several weeks, depending on which drug you took and how long you took it. · Increased sensitivity to pain · Constipation · Nausea, vomiting, and dry mouth · Sleepiness and dizziness · Confusion · Depression · Low levels of testosterone that can result in lower sex drive, energy, and strength · Itching and sweating RISKS ARE GREATER WITH:      
· History of drug misuse, substance use disorder, or overdose · Mental health conditions (such as depression or anxiety) · Sleep apnea · Older age (72 years or older) · Pregnancy Avoid alcohol while taking prescription opioids. Also, unless specifically advised by your health care provider, medications to avoid include: · Benzodiazepines (such as Xanax or Valium) · Muscle relaxants (such as Soma or Flexeril) · Hypnotics (such as Ambien or Lunesta) · Other prescription opioids KNOW YOUR OPTIONS Talk to your health care provider about ways to manage your pain that don't involve prescription opioids. Some of these options may actually work better and have fewer risks and side effects. Options may include: 
· Pain relievers such as acetaminophen, ibuprofen, and naproxen · Some medications that are also used for depression or seizures · Physical therapy and exercise · Counseling to help patients learn how to cope better with triggers of pain and stress. · Application of heat or cold compress · Massage therapy · Relaxation techniques Be Informed Make sure you know the name of your medication, how much and how often to take it, and its potential risks & side effects. IF YOU ARE PRESCRIBED OPIOIDS FOR PAIN: 
· Never take opioids in greater amounts or more often than prescribed. Remember the goal is not to be pain-free but to manage your pain at a tolerable level. · Follow up with your primary care provider to: · Work together to create a plan on how to manage your pain. · Talk about ways to help manage your pain that don't involve prescription opioids. · Talk about any and all concerns and side effects. · Help prevent misuse and abuse. · Never sell or share prescription opioids · Help prevent misuse and abuse. · Store prescription opioids in a secure place and out of reach of others (this may include visitors, children, friends, and family). · Safely dispose of unused/unwanted prescription opioids: Find your community drug take-back program or your pharmacy mail-back program, or flush them down the toilet, following guidance from the Food and Drug Administration (www.fda.gov/Drugs/ResourcesForYou). · Visit www.cdc.gov/drugoverdose to learn about the risks of opioid abuse and overdose. · If you believe you may be struggling with addiction, tell your health care provider and ask for guidance or call [address] [place], [place] [address]Dispatch at [phone]-VCHL. Discharge Instructions Cystectomy With Ileal Conduit: What to Expect at Home Your Recovery A cystectomy is surgery to remove part or all of the bladder. The surgery is mainly used to treat bladder cancer. After surgery, your belly will be sore, and you will probably need pain medicine for 1 to 2 weeks. You can expect your urostomy (stoma) to be swollen and tender at first. This usually improves after 2 to 3 weeks. You may notice some blood in your urine or that your urine is light pink for the first 3 weeks after surgery. This is normal. 
While you are recovering from surgery, you will also be learning to care for your stoma. You may find it helpful to meet several times with a wound ostomy continence nurse (WOCN). This nurse can teach you how to care for your stoma and use a urostomy pouch. Many people can return to work or their usual activities 4 to 6 weeks after surgery. But you will probably need 6 to 8 weeks to fully recover from the surgery. Bladder cancer surgery may affect sexual function. If a woman's uterus and ovaries are removed during the surgery, she will not be able to get pregnant, and menopause may start. She may have hot flashes and other symptoms of menopause. And if a man's prostate gland and seminal vesicles are removed, he may have problems getting an erection and will not be able to make a woman pregnant. You may feel sad or depressed, or you may worry about how your body will look after surgery. You may worry about whether the surgery will affect your ability to have sex. These concerns are common. Ask your doctor about support groups or other resources that can help you with this. Call the [facility] Michaelle Stevenson (7-954.578.4606) or visit its website at [address] [employer] for more information. This care sheet gives you a general idea about how long it will take for you to recover. But each person recovers at a different pace. Follow the steps below to get better as quickly as possible. How can you care for yourself at home? Activity 
  · Rest when you feel tired. Getting enough sleep will help you recover.  
  · Try to walk each day. Start by walking a little more than you did the day before. Bit by bit, increase the amount you walk. Walking boosts blood flow and helps prevent pneumonia and constipation.  
  · Avoid strenuous activities, such as bicycle riding, jogging, weight lifting, or aerobic exercise, until your doctor says it is okay.  
  · Avoid lifting more than 5 pounds for about 4 weeks or until your doctor says it is okay. This may include a child, grocery bags and milk containers, a heavy briefcase or backpack, cat litter or dog food bags, or a vacuum .  
  · Ask your doctor when you can drive again.  
  · You will probably be able to go back to work or your normal routine in 4 to 6 weeks. This depends on the type of work you do and if you have any further treatment.  
  · You may take a bath or shower as usual. You can bathe with the pouch on or off. Gently pat the skin around your stoma dry after bathing. Diet 
  · You can eat your normal diet. If your stomach is upset, try bland, low-fat foods like plain rice, broiled chicken, toast, and yogurt.  
  · Drink plenty of fluids to avoid becoming dehydrated. Medicines 
  · Your doctor will tell you if and when you can restart your medicines. He or she will also give you instructions about taking any new medicines.  
  · If you take blood thinners, such as warfarin (Coumadin), clopidogrel (Plavix), or aspirin, be sure to talk to your doctor. He or she will tell you if and when to start taking those medicines again. Make sure that you understand exactly what your doctor wants you to do.  
  · Take pain medicines exactly as directed. ¨ If the doctor gave you a prescription medicine for pain, take it as prescribed.  
¨ If you are not taking a prescription pain medicine, ask your doctor if you can take an over-the-counter medicine.  
  · If you think your pain medicine is making you sick to your stomach: 
¨ Take your medicine after meals (unless your doctor has told you not to). ¨ Ask your doctor for a different pain medicine.  
  · If your doctor prescribed antibiotics, take them as directed. Do not stop taking them just because you feel better. You need to take the full course of antibiotics. Incision care 
  · If you have strips of tape on the cut (incision) the doctor made, leave the tape on for a week or until it falls off.  
  · Wash the area daily with warm, soapy water, and pat it dry. Don't use hydrogen peroxide or alcohol, which can slow healing. You may cover the area with a gauze bandage if it weeps or rubs against clothing. Change the bandage every day.  
  · Keep the area clean and dry. Other instructions 
  · You may notice that your bowel movements are not regular right after your surgery. This is common. Try to avoid constipation and straining with bowel movements. You may want to take a fiber supplement every day. If you have not had a bowel movement after a couple of days, ask your doctor about taking a mild laxative.  
  · Follow your doctor's or WOCN's instructions for caring for your stoma.  
  · To control pain when you cough or sneeze, hold a pillow over your incision. Follow-up care is a key part of your treatment and safety. Be sure to make and go to all appointments, and call your doctor if you are having problems. It's also a good idea to know your test results and keep a list of the medicines you take. When should you call for help? Call 911 anytime you think you may need emergency care. For example, call if: 
  · You passed out (lost consciousness).  
  · You have chest pain, are short of breath, or cough up blood.  
 Call your doctor now or seek immediate medical care if: 
  · You have pain that does not get better after you take pain medicine.   · You have loose stitches, or your incision comes open.  
  · You are bleeding from the incision.  
  · You have signs of infection, such as: 
¨ Increased pain, swelling, warmth, or redness. ¨ Red streaks leading from the area. ¨ Pus draining from the area. ¨ A fever.  
  · You are unable to urinate.  
  · You have symptoms of a urinary tract infection. These may include: 
¨ Pain or burning when you urinate. ¨ A frequent need to urinate without being able to pass much urine. ¨ Pain in the flank, which is just below the rib cage and above the waist on either side of the back. ¨ Blood in your urine. ¨ A fever.  
  · You are sick to your stomach or cannot drink fluids.  
  · You have signs of a blood clot in your leg (called a deep vein thrombosis), such as: 
¨ Pain in your calf, back of the knee, thigh, or groin. ¨ Redness or swelling in your leg.  
 Watch closely for changes in your health, and be sure to contact your doctor if you have any problems. Where can you learn more? Go to http://jt-smita.info/. Enter O431 in the search box to learn more about \"Cystectomy With Ileal Conduit: What to Expect at Home. \" Current as of: May 12, 2017 Content Version: 11.7 © 4221-1318 [employer]. Care instructions adapted under license by [employer] (which disclaims liability or warranty for this information). If you have questions about a medical condition or this instruction, always ask your healthcare professional. [place] [address] any warranty or liability for your use of this information. Patient Discharge Instructions Ml Vega / [id number] : 1940 Admitted 2018 Discharged: 2018 Take Home Medications · It is important that you take the medication exactly as they are prescribed.   
· Keep your medication in the bottles provided by the pharmacist and keep a list of the medication names, dosages, and times to be taken in your wallet. · Do not take other medications without consulting your doctor. What to do at [facility] Recommended activity: No Lifting for 6 weeks. Follow-up with [facility]. Call for an appointment (if not already scheduled)            [phone]. Information obtained by : 
I understand that if any problems occur once I am at home I am to contact my physician. I understand and acknowledge receipt of the instructions indicated above. Physician's or R.N.'s Signature                                                                  Date/Time Patient or Representative Signature                                                          Date/Time Introducing [facility]! Dear Lisseth Barrier: Thank you for requesting a [employer] account. Our records indicate that you already have an active [employer] account. You can access your account anytime at [email or web address] Did you know that you can access your hospital and ER discharge instructions at any time in [employer]? You can also review all of your test results from your hospital stay or ER visit. Additional Information If you have questions, please visit the Frequently Asked Questions section of the [employer] website at [email or web address] Remember, [employer] is NOT to be used for urgent needs. For medical emergencies, dial 911. Now available from your iPhone and Android! Introducing Ricky Gonzalez As a [employer] patient, I wanted to make you aware of our electronic visit tool called Ricky Gonzalez. [employer] 24/7 allows you to connect within minutes with a medical provider 24 hours a day, seven days a week via a mobile device or tablet or logging into a secure website from your computer. You can access [employer] from anywhere in the [place]. A virtual visit might be right for you when you have a simple condition and feel like you just dont want to get out of bed, or cant get away from work for an appointment, when your regular [employer] provider is not available (evenings, weekends or holidays), or when youre out of town and need minor care. Electronic visits cost only $49 and if the [employer] 24/7 provider determines a prescription is needed to treat your condition, one can be electronically transmitted to a nearby pharmacy*. Please take a moment to enroll today if you have not already done so. The enrollment process is free and takes just a few minutes. To enroll, please download the "[employer]/Go[employer] madison to your tablet or phone, or visit [email or web address] to enroll on your computer. And, as an [address] [place] [address] patient with a [employer] account, the results of your visits will be scanned into your electronic medical record and your primary care provider will be able to view the scanned results. We urge you to continue to see your regular [employer] provider for your ongoing medical care. And while your primary care provider may not be the one available when you seek a [employer] virtual visit, the peace of mind you get from getting a real diagnosis real time can be priceless. For more information on [employer], view our Frequently Asked Questions (FAQs) at [email or web address]. Sincerely, 
 
Jael Hubbard MD 
Chief Medical Officer Hazel Peterson *:  certain medications cannot be prescribed via [employer] Providers Seen During Your Hospitalization Provider Specialty Primary office phone Louise Owens MD Urology [phone] Your Primary Care Physician (PCP) Primary Care Physician Office Phone Office Fax Timo Bonilla [phone] You are allergic to the following No active allergies Recent Documentation Height Weight BMI Smoking Status 1.765 m 80.4 kg 25.79 kg/m2 Former Smoker Emergency Contacts Name Discharge Info Relation Home Work Mobile Geetha White CAREGIVER [3] Daughter [[phone] Martha Motta ([facility])  Other Relative [6] [phone] Patient Belongings The following personal items are in your possession at time of discharge: 
  Dental Appliances: Lowers, Uppers  Visual Aid: Glasses             Clothing: Other (comment) (clothing) Please provide this summary of care documentation to your next provider. Signatures-by signing, you are acknowledging that this After Visit Summary has been reviewed with you and you have received a copy. Patient Signature:  ____________________________________________________________ Date:  ____________________________________________________________  
  
[facility] Provider Signature:  ____________________________________________________________ Date:  ____________________________________________________________

## 2018-07-23 NOTE — OP NOTES
[address]  OPERATIVE REPORT    Rosario Lisa  MR#: [id number]  : 1940  ACCOUNT #: [de-identified]   DATE OF SERVICE: 2018    PREOPERATIVE DIAGNOSES:  Bladder cancer. POSTOPERATIVE DIAGNOSIS:  Bladder cancer. PROCEDURE PERFORMED:  Salvage cystoprostatectomy, bilateral pelvic lymphadenectomy, ileal conduit urinary diversion, extensive lysis of adhesions. BRIEF SUMMARY:  This was a more difficult operation because of previous radiation therapy, hernia repairs and adhesions. IMPLANTS:  None. SPECIMENS REMOVED:  Bladder, ureters, lymph nodes. COMPLICATIONS:  None. SURGEON:  Kiel Rossi MD    ASSISTANT:  Adán Prescott     ESTIMATED BLOOD LOSS:  400 mL. PROCEDURE:  After anesthesia, the patient was prepped and draped in supine position. An incision was made below the umbilicus. The abdomen was entered and explored. There was a mesh hernia repair palpable beneath the umbilicus. There was a mesh hernia repair in the left inguinal region. The liver was smooth. The incision was extended to the level of the pubis. The retropubic space was dissected. The lateral pedicles were taken down with the LigaSure device. The ureters were clipped and divided at the level of the bladder and sent for frozen section. Frozens were negative. The ureters were later trimmed to try and remove as much of the radiated tissue as possible. The endopelvic fascia was cleared and opened on both sides. The dorsal venous bundle was suture ligated with 0 chromic and divided. The apex of the prostate was dissected and the urethra was freed deep in the pelvis. It was transected distal to the external sphincter and then a plane was created on the surface of the prostate gland, and this was used to dissect the prostate off the surface of the rectum, avoiding any rectal injury.   The rest of the pedicles were then taken with Hemoclips and the specimen was passed off the field.  The left ureter was brought beneath the sigmoid colon, trimmed 2 inches and spatulated. There were dense adhesions of the small intestine related to previous radiation therapy and previous intra-abdominal procedures. Lysis of adhesions was done to release the ileum from scar tissue and then a 20 cm segment of terminal ileum was isolated. The bowel was reconnected with the JANIE stapling device. The corners of the anastomosis were buried with interrupted 3-0 silk ties. The anastomosis was reinforced with silk sutures. The trap was closed with 3-0 chromic running. The ureters were connected to the conduit using a long spatulation and a running 4-0 Monocryl over 7-Irish single-J stents. The conduit was then filled under pressure and the connections did not leak. The conduit was brought through a circular incision in the right mid abdomen. Extended pelvic lymphadenectomy was done and the limits included the lateral surface of the iliac arteries, the bifurcation of the iliac artery proximally, the obturator structures inferiorly and the femoral canal distally. The abdomen was irrigated with antibiotic solution. A drain was brought out on the left side and placed in the pelvis, sutured in position. The fascia was closed with a running #1 PDS. Skin edges were closed with skin staples. The stoma was matured in a rosebud manner with interrupted 4-0 Vicryl sutures. Stents were secured with 3-0 nylon. Operating time was just under 3 hours.       MD GLADIS Torres /   D: 07/23/2018 16:33     T: 07/23/2018 17:35  JOB #: [id number]

## 2018-07-23 NOTE — PERIOP NOTES
TRANSFER - OUT REPORT:    Verbal report given to [address] RN(name) on Jesus Abreu  being transferred to [facility](unit) for routine post - op       Report consisted of patients Situation, Background, Assessment and   Recommendations(SBAR). Time Pre op antibiotic given:Y  Anesthesia Stop time: N  Culver Present on Transfer to floor:N  Order for Culver on Chart:N  Discharge Prescriptions with Chart:N    Information from the following report(s) SBAR was reviewed with the receiving nurse. Opportunity for questions and clarification was provided. Is the patient on 02? YES       L/Min 3       Other LITERS    Is the patient on a monitor? NO    Is the nurse transporting with the patient? NO    Surgical Waiting Area notified of patient's transfer from PACU?  YES      The following personal items collected during your admission accompanied patient upon transfer:   Dental Appliance: Dental Appliances: Lowers, Uppers  Vision:    Hearing Aid:    Jewelry:    Clothing: Clothing: Other (comment) (clothing)  Other Valuables:    Valuables sent to safe:

## 2018-07-23 NOTE — ANESTHESIA PROCEDURE NOTES
Central Line Placement    Performed by: Steve Campuzano  Authorized by: Steve Campuzano     Indications: vascular access, central pressure monitoring and need for vasopressors  Preanesthetic Checklist: patient identified, risks and benefits discussed, anesthesia consent, site marked, patient being monitored and timeout performed      Pre-procedure: All elements of maximal sterile barrier technique followed?  Yes    2% Chlorhexidine for cutaneous antisepsis, Hand hygiene performed prior to catheter insertion and Ultrasound guidance              Procedure:   Prep:  ChloraPrep  Location:  Internal jugular  Orientation:  Left  Patient position:  Trendelenburg  Catheter type:  Quad lumen  Catheter size:  8.5 Fr  Catheter length:  16 cm  Number of attempts:  1  Successful placement: Yes      Assessment:   Post-procedure:  Catheter secured, sterile dressing applied and sterile dressing with CHG applied  Assessment:  Blood return through all ports, guidewire removal verified and free fluid flow  Insertion:  Uncomplicated  Patient tolerance:  Patient tolerated the procedure well with no immediate complications  Placed on left because right IJ diminutive in size

## 2018-07-23 NOTE — BRIEF OP NOTE
BRIEF OPERATIVE NOTE    Date of Procedure: 7/23/2018   Preoperative Diagnosis: BLADDER CANCER  Postoperative Diagnosis: BLADDER CANCER    Procedure(s):  SALVAGE CYSTECTOMY, PROSTATECTOMY WITH ILEAL CONDUIT DIVERSION  (E R A S)  Surgeon(s) and Role:     * Kaylynn Christopher MD - Primary         Surgical Assistant: jak thayer    Surgical Staff:  Circ-1: Niyah Neil RN  Circ-Relief: David Garcia RN; Moni Casey  Circ-Intern: Javier Gil  Scrub Tech-1: Luna Velazquez  Scrub RN-Relief: Denise Camarena RN  Surg Asst-1: Zoe Salazar  Event Time In   Incision Start 1235   Incision Close 1537     Anesthesia: General   Estimated Blood Loss: 400  Specimens:   ID Type Source Tests Collected by Time Destination   1 : Right ureter ink = proximal, freeze proximal Frozen Section URETER, RIGHT  Kaylynn Christopher MD 7/23/2018 1255 Pathology   2 : Left ureter ink = proximal, freeze proximal Frozen Section URETER, LEFT  Kaylynn Christopher MD 7/23/2018 1307 Pathology   3 : Bladder & Prostate Fresh OTHER (use comments)  Kaylynn Christopher MD 7/23/2018 1335 Pathology   4 : right pelvic lymph nodes Fresh Lymph Node  Kaylynn Christopher MD 7/23/2018 1340 Pathology   5 : Left pelvic lymph nodes Fresh Lymph Node  Kaylynn Christopher MD 7/23/2018 1350 Pathology   6 : additional left uretrer Fresh URETER, LEFT  Kaylynn Christopher MD 7/23/2018 1423 Pathology   7 : Additional right ureter Fresh URETER, RIGHT  Kaylynn Christopher MD 7/23/2018 1444 Pathology      Findings: no evid mets   Complications: 0  Implants: * No implants in log *

## 2018-07-23 NOTE — WOUND CARE
Stoma Marking Note:     Type of ostomy scheduled: Ileal Conduit by Dr. Barbara Lopez    Introduced self and services to patient. Patient able to verbalize knowledge of procedure consistent with consent. Stoma site marked with surgical marker in right upper quadrant. Stoma site chosen is in the patients visual field and within the rectus muscle while avoiding the following: umbilicus, wrinkles, dips, skin folds, rib cage, iliac crest and belt/pants/underwear line. Site was assessed in the lying, sitting and standing positions. Abdominal topography have multiple folds and creases. Patient understands that the final location will be determined by the surgeon and the site is only a guideline. Pt concerns discussed: none    Plan to follow after surgery for teaching. Will likely need home health care for further teaching after discharge.     Juan M DEL CID RN [facility]  Wound Care   Office [phone]  Pager [phone]

## 2018-07-24 LAB
ANION GAP SERPL CALC-SCNC: 6 MMOL/L (ref 5–15)
BUN SERPL-MCNC: 19 MG/DL (ref 6–20)
BUN/CREAT SERPL: 22 (ref 12–20)
CALCIUM SERPL-MCNC: 8.4 MG/DL (ref 8.5–10.1)
CHLORIDE SERPL-SCNC: 108 MMOL/L (ref 97–108)
CO2 SERPL-SCNC: 23 MMOL/L (ref 21–32)
CREAT SERPL-MCNC: 0.87 MG/DL (ref 0.7–1.3)
GLUCOSE SERPL-MCNC: 128 MG/DL (ref 65–100)
HGB BLD-MCNC: 9.7 G/DL (ref 12.1–17)
MAGNESIUM SERPL-MCNC: 2.4 MG/DL (ref 1.6–2.4)
POTASSIUM SERPL-SCNC: 4.9 MMOL/L (ref 3.5–5.1)
SODIUM SERPL-SCNC: 137 MMOL/L (ref 136–145)

## 2018-07-24 PROCEDURE — 77030010541

## 2018-07-24 PROCEDURE — 74011250636 HC RX REV CODE- 250/636: Performed by: UROLOGY

## 2018-07-24 PROCEDURE — 74011000250 HC RX REV CODE- 250: Performed by: ANESTHESIOLOGY

## 2018-07-24 PROCEDURE — 85018 HEMOGLOBIN: CPT | Performed by: UROLOGY

## 2018-07-24 PROCEDURE — 74011250637 HC RX REV CODE- 250/637: Performed by: UROLOGY

## 2018-07-24 PROCEDURE — 80048 BASIC METABOLIC PNL TOTAL CA: CPT | Performed by: UROLOGY

## 2018-07-24 PROCEDURE — 77030010522

## 2018-07-24 PROCEDURE — 36415 COLL VENOUS BLD VENIPUNCTURE: CPT | Performed by: UROLOGY

## 2018-07-24 PROCEDURE — 77010033678 HC OXYGEN DAILY

## 2018-07-24 PROCEDURE — 65270000029 HC RM PRIVATE

## 2018-07-24 PROCEDURE — 77030027138 HC INCENT SPIROMETER -A

## 2018-07-24 PROCEDURE — 94760 N-INVAS EAR/PLS OXIMETRY 1: CPT

## 2018-07-24 PROCEDURE — 83735 ASSAY OF MAGNESIUM: CPT | Performed by: UROLOGY

## 2018-07-24 RX ORDER — SODIUM CHLORIDE 0.9 % (FLUSH) 0.9 %
10-30 SYRINGE (ML) INJECTION AS NEEDED
Status: DISCONTINUED | OUTPATIENT
Start: 2018-07-24 | End: 2018-07-26 | Stop reason: HOSPADM

## 2018-07-24 RX ORDER — SODIUM CHLORIDE 0.9 % (FLUSH) 0.9 %
10 SYRINGE (ML) INJECTION EVERY 24 HOURS
Status: DISCONTINUED | OUTPATIENT
Start: 2018-07-24 | End: 2018-07-26 | Stop reason: HOSPADM

## 2018-07-24 RX ORDER — SODIUM CHLORIDE 0.9 % (FLUSH) 0.9 %
10 SYRINGE (ML) INJECTION AS NEEDED
Status: DISCONTINUED | OUTPATIENT
Start: 2018-07-24 | End: 2018-07-26 | Stop reason: HOSPADM

## 2018-07-24 RX ORDER — SODIUM CHLORIDE 0.9 % (FLUSH) 0.9 %
20 SYRINGE (ML) INJECTION EVERY 24 HOURS
Status: DISCONTINUED | OUTPATIENT
Start: 2018-07-24 | End: 2018-07-26 | Stop reason: HOSPADM

## 2018-07-24 RX ORDER — SODIUM CHLORIDE 0.9 % (FLUSH) 0.9 %
10-40 SYRINGE (ML) INJECTION EVERY 8 HOURS
Status: DISCONTINUED | OUTPATIENT
Start: 2018-07-24 | End: 2018-07-26 | Stop reason: HOSPADM

## 2018-07-24 RX ADMIN — Medication 6 ML/HR: at 22:20

## 2018-07-24 RX ADMIN — ACETAMINOPHEN 1000 MG: 500 TABLET, FILM COATED ORAL at 17:03

## 2018-07-24 RX ADMIN — SODIUM CHLORIDE, SODIUM LACTATE, POTASSIUM CHLORIDE, AND CALCIUM CHLORIDE 75 ML/HR: 600; 310; 30; 20 INJECTION, SOLUTION INTRAVENOUS at 21:10

## 2018-07-24 RX ADMIN — ALVIMOPAN 12 MG: 12 CAPSULE ORAL at 08:00

## 2018-07-24 RX ADMIN — SODIUM CHLORIDE, SODIUM LACTATE, POTASSIUM CHLORIDE, AND CALCIUM CHLORIDE 75 ML/HR: 600; 310; 30; 20 INJECTION, SOLUTION INTRAVENOUS at 07:56

## 2018-07-24 RX ADMIN — Medication 10 ML: at 07:56

## 2018-07-24 RX ADMIN — ALVIMOPAN 12 MG: 12 CAPSULE ORAL at 17:04

## 2018-07-24 RX ADMIN — HEPARIN SODIUM 5000 UNITS: 5000 INJECTION INTRAVENOUS; SUBCUTANEOUS at 17:03

## 2018-07-24 RX ADMIN — FUROSEMIDE 10 MG: 20 TABLET ORAL at 08:00

## 2018-07-24 RX ADMIN — KETOROLAC TROMETHAMINE 15 MG: 30 INJECTION, SOLUTION INTRAMUSCULAR at 11:38

## 2018-07-24 RX ADMIN — ACETAMINOPHEN 1000 MG: 500 TABLET, FILM COATED ORAL at 11:38

## 2018-07-24 RX ADMIN — CELECOXIB 100 MG: 100 CAPSULE ORAL at 21:34

## 2018-07-24 RX ADMIN — Medication 20 ML: at 07:56

## 2018-07-24 RX ADMIN — ACETAMINOPHEN 1000 MG: 500 TABLET, FILM COATED ORAL at 01:48

## 2018-07-24 RX ADMIN — KETOROLAC TROMETHAMINE 15 MG: 30 INJECTION, SOLUTION INTRAMUSCULAR at 07:47

## 2018-07-24 RX ADMIN — ACETAMINOPHEN 1000 MG: 500 TABLET, FILM COATED ORAL at 07:44

## 2018-07-24 RX ADMIN — AMLODIPINE BESYLATE 5 MG: 5 TABLET ORAL at 07:47

## 2018-07-24 RX ADMIN — KETOROLAC TROMETHAMINE 15 MG: 30 INJECTION, SOLUTION INTRAMUSCULAR at 01:47

## 2018-07-24 RX ADMIN — PANTOPRAZOLE SODIUM 40 MG: 40 TABLET, DELAYED RELEASE ORAL at 07:46

## 2018-07-24 RX ADMIN — ACETAMINOPHEN 1000 MG: 500 TABLET, FILM COATED ORAL at 23:18

## 2018-07-24 NOTE — PROGRESS NOTES
Bedside and Verbal shift change report given to CAMMIE Kauffman (oncoming nurse) by Krish Conn (offgoing nurse). Report included the following information SBAR and Kardex.

## 2018-07-24 NOTE — PERIOP NOTES
Anesthesia post op pain 1 Day Post-Op   Patient is s/p surgery and with an epidural for post operative pain. Epidural site is in intact and site is dry and infusing well. Current rate is 8cc's/hr. The patient relates no pain/HA, minimal itching and no nausea/motor/sensory abnormalities. All symptoms were treated with medications per protocol. Patient currently has minimal complaints and is doing well. Plan: Continue the epidural and treat breakthrough symptoms as needed with protocol medications.

## 2018-07-24 NOTE — PROGRESS NOTES
Problem: Falls - Risk of  Goal: *Absence of Falls  Document Harpal Fall Risk and appropriate interventions in the flowsheet. Outcome: Progressing Towards Goal  Fall Risk Interventions:            Medication Interventions: Patient to call before getting OOB                  Problem: Patient Education: Go to Patient Education Activity  Goal: Patient/Family Education  Outcome: Progressing Towards Goal  Received patient from previous RN via bedside shift report. Patient is resting comfortably; A&Ox4. AVSS, NAD noted at this time. Urostomy draining. Abd sites remain CDI. Pt to get OOB more today. Epidural infusing at 6ml/hr and adjusted this aM by Dr. Liliya Young. Full assessment into epic. Patient updated on current POC- verbalized understanding. All safety precautions in place- safety maintained. Will continue to monitor.

## 2018-07-24 NOTE — WOUND CARE
WOCN Ostomy Progress Note:     First postoperative visit. Type of Ostomy: ileal conduit   Location: right upper quadrant  Date of Surgery: 7.23.18      Surgeon: Dr. Barbara Lopez    Findings:  Current appliance: 2 piece 2 1/4   Stoma color: red   Characteristics: moist and budded with 2 stents  Output: clear yellow    Teaching/Subjects covered today:  Encouraged pt to review handout given to patient/family and ask questions regarding material on next ostomy nurse visit. - General anatomy and expectations of output  - Normal & abnormal stoma characteristics  - Normal & abnormal peristomal characteristics & changes over time  - When/how to clean stoma & peristomal skin  - When/how to empty pouch    Recommendations:  1. Empty appliance when 1/3 full and PRN. Encourage patient/family to notify nurse and  assist w/ pouch emptying to promote self-care. Change appliance twice weekly and PRN for leaking ASAP. DO NOT REINFORCE LEAKS to avoid skin breakdown. Transition of Care:  Ostomy nurse to return tomorrow for teaching and pouch change. Patient will likely need home health care for further teaching after discharge. Home Health consult placed.      Juan M SÁNCHEZN RN  Wound Care  Office [phone]

## 2018-07-25 LAB
ANION GAP SERPL CALC-SCNC: 8 MMOL/L (ref 5–15)
BUN SERPL-MCNC: 24 MG/DL (ref 6–20)
BUN/CREAT SERPL: 31 (ref 12–20)
CALCIUM SERPL-MCNC: 8.2 MG/DL (ref 8.5–10.1)
CHLORIDE SERPL-SCNC: 107 MMOL/L (ref 97–108)
CO2 SERPL-SCNC: 23 MMOL/L (ref 21–32)
CREAT FLD-MCNC: 0.74 MG/DL
CREAT SERPL-MCNC: 0.78 MG/DL (ref 0.7–1.3)
GLUCOSE SERPL-MCNC: 82 MG/DL (ref 65–100)
HGB BLD-MCNC: 10 G/DL (ref 12.1–17)
HGB BLD-MCNC: 9.7 G/DL (ref 12.1–17)
MAGNESIUM SERPL-MCNC: 2 MG/DL (ref 1.6–2.4)
POTASSIUM SERPL-SCNC: 4.1 MMOL/L (ref 3.5–5.1)
SODIUM SERPL-SCNC: 138 MMOL/L (ref 136–145)
SPECIMEN SOURCE FLD: NORMAL

## 2018-07-25 PROCEDURE — 36415 COLL VENOUS BLD VENIPUNCTURE: CPT | Performed by: UROLOGY

## 2018-07-25 PROCEDURE — 82570 ASSAY OF URINE CREATININE: CPT | Performed by: UROLOGY

## 2018-07-25 PROCEDURE — 77030010520

## 2018-07-25 PROCEDURE — 74011000250 HC RX REV CODE- 250

## 2018-07-25 PROCEDURE — 83735 ASSAY OF MAGNESIUM: CPT | Performed by: UROLOGY

## 2018-07-25 PROCEDURE — 87493 C DIFF AMPLIFIED PROBE: CPT | Performed by: UROLOGY

## 2018-07-25 PROCEDURE — 85018 HEMOGLOBIN: CPT | Performed by: UROLOGY

## 2018-07-25 PROCEDURE — 74011250636 HC RX REV CODE- 250/636: Performed by: UROLOGY

## 2018-07-25 PROCEDURE — 80048 BASIC METABOLIC PNL TOTAL CA: CPT | Performed by: UROLOGY

## 2018-07-25 PROCEDURE — 74011250637 HC RX REV CODE- 250/637: Performed by: UROLOGY

## 2018-07-25 PROCEDURE — 65270000029 HC RM PRIVATE

## 2018-07-25 RX ORDER — SODIUM CHLORIDE 9 MG/ML
INJECTION INTRAMUSCULAR; INTRAVENOUS; SUBCUTANEOUS
Status: COMPLETED
Start: 2018-07-25 | End: 2018-07-25

## 2018-07-25 RX ORDER — FUROSEMIDE 20 MG/1
10 TABLET ORAL DAILY PRN
Status: DISCONTINUED | OUTPATIENT
Start: 2018-07-25 | End: 2018-07-26 | Stop reason: HOSPADM

## 2018-07-25 RX ADMIN — CELECOXIB 100 MG: 100 CAPSULE ORAL at 18:01

## 2018-07-25 RX ADMIN — Medication 20 ML: at 06:13

## 2018-07-25 RX ADMIN — ACETAMINOPHEN 1000 MG: 500 TABLET, FILM COATED ORAL at 06:11

## 2018-07-25 RX ADMIN — SODIUM CHLORIDE, SODIUM LACTATE, POTASSIUM CHLORIDE, AND CALCIUM CHLORIDE 75 ML/HR: 600; 310; 30; 20 INJECTION, SOLUTION INTRAVENOUS at 11:32

## 2018-07-25 RX ADMIN — HEPARIN SODIUM 5000 UNITS: 5000 INJECTION INTRAVENOUS; SUBCUTANEOUS at 06:12

## 2018-07-25 RX ADMIN — ACETAMINOPHEN 1000 MG: 500 TABLET, FILM COATED ORAL at 23:32

## 2018-07-25 RX ADMIN — AMLODIPINE BESYLATE 5 MG: 5 TABLET ORAL at 06:11

## 2018-07-25 RX ADMIN — Medication 40 ML: at 21:41

## 2018-07-25 RX ADMIN — PANTOPRAZOLE SODIUM 40 MG: 40 TABLET, DELAYED RELEASE ORAL at 07:07

## 2018-07-25 RX ADMIN — ACETAMINOPHEN 1000 MG: 500 TABLET, FILM COATED ORAL at 18:01

## 2018-07-25 RX ADMIN — SODIUM CHLORIDE 10 ML: 9 INJECTION INTRAMUSCULAR; INTRAVENOUS; SUBCUTANEOUS at 12:00

## 2018-07-25 RX ADMIN — CELECOXIB 100 MG: 100 CAPSULE ORAL at 11:28

## 2018-07-25 RX ADMIN — ACETAMINOPHEN 1000 MG: 500 TABLET, FILM COATED ORAL at 14:07

## 2018-07-25 RX ADMIN — HEPARIN SODIUM 5000 UNITS: 5000 INJECTION INTRAVENOUS; SUBCUTANEOUS at 18:02

## 2018-07-25 RX ADMIN — Medication 20 ML: at 07:07

## 2018-07-25 RX ADMIN — Medication 40 ML: at 14:00

## 2018-07-25 NOTE — PROGRESS NOTES
Reason for Admission:  Bladder CA. S/p salvage cystoprostatectomy and ileal  Conduit urinary diversion. RRAT Score:    16/mod              Do you (patient/family) have any concerns for transition/discharge? Pt not sure if he will go home or to his friend's home in [place] for a few days immediately after discharge. Pt is independent in self care. Plan for utilizing home health:  CM consult for home health for ostomy teaching. Pt indecisive at this time as to whether he will need home health for that reason. CM provide contact information for self and usual assigned CM. Likelihood of readmission?   low            Transition of Care Plan:  Pt and his daughter, Pool Ontiveros, [phone], will make decision later today or tomorrow about home health and where pt will be after discharge. Care Management Interventions  PCP Verified by CM: Yes (Margaret Chino NP, last office visit not known. Pt uses [facility])  Palliative Care Criteria Met (RRAT>21 & CHF Dx)?: No (RRAT score 16)  Mode of Transport at Discharge:  Other (see comment) (family)  Transition of Care Consult (CM Consult):  (Received consult for home health but pt unsure at this time which location he will be after discharge)  Current Support Network: Lives Alone  Confirm Follow Up Transport: Self  Plan discussed with Pt/Family/Caregiver: Yes  Freedom of Choice Offered: Yes  [address] Provided?: No  Discharge Location  Discharge Placement: Frank Romero RN ACM CRM

## 2018-07-25 NOTE — PROGRESS NOTES
Bedside and Verbal shift change report given to Sherry Ross RN (oncoming nurse) by Alonso Resendiz RN (offgoing nurse). Report included the following information SBAR, Kardex, OR Summary, Procedure Summary, Intake/Output, MAR and Recent Results.

## 2018-07-25 NOTE — PROGRESS NOTES
Anesthesia post op pain 2 Days Post-Op    Patient is s/p surgery  with an epidural for post operative pain. Epidural site is in intact and site is dry and infusing well. Current rate is 6cc's/hr. The patient relates no pain,  no itching and no nausea. Gait may be modestly affected, epidural being decreased to 3 cc/hr. All sympyoms were treated with medications per protocol. Patient currently has minimal complaints and is doing well. Plan: Continue the epidural and treat breakthrough symptoms as needed with protocol medictions.

## 2018-07-25 NOTE — WOUND CARE
WOCN Ostomy Progress Note:     Second postoperative visit. Type of Ostomy: Ileal Conduit  Location: right upper quadrant  Date of Surgery: 7.23.18      Surgeon: Dr. Anjana Sanz  Daughter at the bedside for ostomy education. Treatments:  Pouch removed, stoma and peristomal skin cleaned and assessed, new pouch prepared and applied. Findings:  Current appliance: 2 1/4 flat 2 piece  Stoma size: 1 1/8  Stoma color: red  Characteristics: red and moist with 2 stents  Peristomal skin: intact without erythema  Abdomen: tai drain to left quadrant and midline surgical wound dressing covering staples  Output: clear yellow with mucous    Teaching/Subjects covered today:  Encouraged pt to review handout given to patient/family and ask questions regarding material on next ostomy nurse visit. - General anatomy and expectations of output  - Normal & abnormal stoma characteristics  - Normal & abnormal peristomal characteristics & changes over time  - When/how to clean stoma & peristomal skin  - When/how to empty pouch  - Crusting technique; shaving around stoma;   - When/how to change appliance  - When to notify nurse/physician  - Dietary guidelines  - Where/how to obtain supplies  - Manipulated/practiced with clean pouch and clip  - Reviewed ADL's  - Pharmacy notification re: meds  - S&S of urinary tract infection & encouraged fluid intake  - Night-time drainage to bedside bag    Recommendations:  1. Empty appliance when 1/3 full and PRN. Encourage patient/family to notify nurse and  assist w/ pouch emptying to promote self-care. Change appliance twice weekly and PRN for leaking ASAP. DO NOT REINFORCE LEAKS to avoid skin breakdown. Transition of Care:  Ostomy nurse to return and reinforce teaching. Home Health consult placed. Discharge plan is to go home.     Pennie SÁNCHEZN RN [facility]  Wound Care  Office [phone]  Pager [phone]

## 2018-07-25 NOTE — PROGRESS NOTES
Problem: Falls - Risk of  Goal: *Absence of Falls  Document Harpal Fall Risk and appropriate interventions in the flowsheet. Fall Risk Interventions:  Mobility Interventions: Patient to call before getting OOB (Patient refused bed alarm. )   Mobility Interventions: Describe the role of a bed alarm to patient.           Medication Interventions: Patient to call before getting OOB, Teach patient to arise slowly

## 2018-07-25 NOTE — PROGRESS NOTES
Bedside shift change report given to Nohelia (oncoming nurse) by Jania Orta (offgoing nurse). Report included the following information SBAR.

## 2018-07-25 NOTE — ADVANCED PRACTICE NURSE
Distal port of centra line did not flush with ease and had no blood return. Checked line as pump was beeping occlusion. Switched IV fluid to medial 1 port.   Informed Nohelia of need for activase

## 2018-07-26 ENCOUNTER — HOME HEALTH ADMISSION (OUTPATIENT)
Dept: HOME HEALTH SERVICES | Facility: HOME HEALTH | Age: 78
End: 2018-07-26
Payer: MEDICARE

## 2018-07-26 VITALS
HEART RATE: 76 BPM | TEMPERATURE: 97.6 F | BODY MASS INDEX: 25.37 KG/M2 | HEIGHT: 70 IN | DIASTOLIC BLOOD PRESSURE: 66 MMHG | RESPIRATION RATE: 18 BRPM | OXYGEN SATURATION: 90 % | WEIGHT: 177.2 LBS | SYSTOLIC BLOOD PRESSURE: 142 MMHG

## 2018-07-26 LAB
ANION GAP SERPL CALC-SCNC: 9 MMOL/L (ref 5–15)
BUN SERPL-MCNC: 18 MG/DL (ref 6–20)
BUN/CREAT SERPL: 26 (ref 12–20)
C DIFF TOX GENS STL QL NAA+PROBE: NEGATIVE
CALCIUM SERPL-MCNC: 8.4 MG/DL (ref 8.5–10.1)
CHLORIDE SERPL-SCNC: 109 MMOL/L (ref 97–108)
CO2 SERPL-SCNC: 24 MMOL/L (ref 21–32)
CREAT SERPL-MCNC: 0.7 MG/DL (ref 0.7–1.3)
ERYTHROCYTE [DISTWIDTH] IN BLOOD BY AUTOMATED COUNT: 18.8 % (ref 11.5–14.5)
GLUCOSE SERPL-MCNC: 83 MG/DL (ref 65–100)
HCT VFR BLD AUTO: 29.7 % (ref 36.6–50.3)
HGB BLD-MCNC: 9.6 G/DL (ref 12.1–17)
MAGNESIUM SERPL-MCNC: 2.1 MG/DL (ref 1.6–2.4)
MCH RBC QN AUTO: 28.1 PG (ref 26–34)
MCHC RBC AUTO-ENTMCNC: 32.3 G/DL (ref 30–36.5)
MCV RBC AUTO: 86.8 FL (ref 80–99)
NRBC # BLD: 0 K/UL (ref 0–0.01)
NRBC BLD-RTO: 0 PER 100 WBC
PLATELET # BLD AUTO: 145 K/UL (ref 150–400)
PMV BLD AUTO: 11.1 FL (ref 8.9–12.9)
POTASSIUM SERPL-SCNC: 3.8 MMOL/L (ref 3.5–5.1)
RBC # BLD AUTO: 3.42 M/UL (ref 4.1–5.7)
SODIUM SERPL-SCNC: 142 MMOL/L (ref 136–145)
WBC # BLD AUTO: 9.3 K/UL (ref 4.1–11.1)

## 2018-07-26 PROCEDURE — 94760 N-INVAS EAR/PLS OXIMETRY 1: CPT

## 2018-07-26 PROCEDURE — 77030010541

## 2018-07-26 PROCEDURE — 74011250636 HC RX REV CODE- 250/636: Performed by: UROLOGY

## 2018-07-26 PROCEDURE — 77030011641 HC PASTE OST ADH BMS -A

## 2018-07-26 PROCEDURE — 77030010545

## 2018-07-26 PROCEDURE — 83735 ASSAY OF MAGNESIUM: CPT | Performed by: UROLOGY

## 2018-07-26 PROCEDURE — 77030008895 HC ADPT UROSTMY TU HOLL -A

## 2018-07-26 PROCEDURE — 77030010520

## 2018-07-26 PROCEDURE — 85027 COMPLETE CBC AUTOMATED: CPT | Performed by: UROLOGY

## 2018-07-26 PROCEDURE — 80048 BASIC METABOLIC PNL TOTAL CA: CPT | Performed by: UROLOGY

## 2018-07-26 PROCEDURE — 36415 COLL VENOUS BLD VENIPUNCTURE: CPT | Performed by: UROLOGY

## 2018-07-26 PROCEDURE — 74011250637 HC RX REV CODE- 250/637: Performed by: UROLOGY

## 2018-07-26 PROCEDURE — 77010033678 HC OXYGEN DAILY

## 2018-07-26 PROCEDURE — 77030010522

## 2018-07-26 RX ORDER — HYDROCODONE BITARTRATE AND ACETAMINOPHEN 5; 325 MG/1; MG/1
1 TABLET ORAL
Qty: 25 TAB | Refills: 0 | Status: SHIPPED | OUTPATIENT
Start: 2018-07-26

## 2018-07-26 RX ORDER — BACITRACIN 500 UNIT/G
PACKET (EA) TOPICAL
Status: DISCONTINUED
Start: 2018-07-26 | End: 2018-07-26 | Stop reason: HOSPADM

## 2018-07-26 RX ORDER — CEPHALEXIN 500 MG/1
500 CAPSULE ORAL 4 TIMES DAILY
Qty: 20 CAP | Refills: 0 | Status: SHIPPED | OUTPATIENT
Start: 2018-07-26 | End: 2018-07-31

## 2018-07-26 RX ADMIN — CELECOXIB 100 MG: 100 CAPSULE ORAL at 11:42

## 2018-07-26 RX ADMIN — CELECOXIB 100 MG: 100 CAPSULE ORAL at 18:35

## 2018-07-26 RX ADMIN — Medication 10 ML: at 07:01

## 2018-07-26 RX ADMIN — Medication 20 ML: at 07:01

## 2018-07-26 RX ADMIN — AMLODIPINE BESYLATE 5 MG: 5 TABLET ORAL at 06:58

## 2018-07-26 RX ADMIN — HEPARIN SODIUM 5000 UNITS: 5000 INJECTION INTRAVENOUS; SUBCUTANEOUS at 05:52

## 2018-07-26 RX ADMIN — ACETAMINOPHEN 1000 MG: 500 TABLET, FILM COATED ORAL at 16:23

## 2018-07-26 RX ADMIN — ACETAMINOPHEN 1000 MG: 500 TABLET, FILM COATED ORAL at 05:53

## 2018-07-26 NOTE — WOUND CARE
WOCN Ostomy Progress Note:      Third postoperative visit. Type of Ostomy: Ileal Conduit  Location: right upper quadrant  Date of Surgery: 7.23.18      Surgeon: Dr. Haley Zhao     Treatments:  Nickola Fling removed, stoma and peristomal skin cleaned and assessed, new pouch prepared and applied. Patient assisted.     Findings:  Current appliance: 2 1/4 flat 2 piece  Stoma size: 1 1/8  Stoma color: red  Characteristics: red and moist with 2 stents  Peristomal skin: intact without erythema  Abdomen: midline surgical wound dressing covering staples  Output: clear yellow with mucous     Teaching/Subjects covered today:   - Normal & abnormal stoma characteristics  - Normal & abnormal peristomal characteristics & changes over time  - When/how to clean stoma & peristomal skin  - When/how to empty pouch  - Crusting technique; shaving around stoma;   - When/how to change appliance  - Manipulated/practiced with clean pouch and clip     Recommendations:  1. Empty appliance when 1/3 full and PRN. Encourage patient/family to notify nurse and  assist w/ pouch emptying to promote self-care. Change appliance twice weekly and PRN for leaking ASAP. DO NOT REINFORCE LEAKS to avoid skin breakdown.     Transition of Care:  Discharge plan is to go home with home health.   Supplies provided to patient.     Trang DEL CID RN [address]  Office [phone]  Pager [phone]

## 2018-07-26 NOTE — PROGRESS NOTES
7/26/18 1110: Confirmed discharge address [address] [facility], [address] and patient cell [phone]. Sent address info to Jacinto Fair. [facility] has accepted case. Agency info added to AVS. Discharge to friend's home with no further CM needs. ----------------------------  7/26/18 0842  CM noted outstanding HH order, patient reportedly undecided about accepting services when offered yesterday. CM revisited patient today, explained need for [facility] decision in order to CM to secure agency in timely manner. Patient states that he is discharging to his friend Breanna's house in [place] ([address]), is willing to accept [facility], no agency preference. Patient to obtain exact street address and provide to this writer shortly. CM sent [facility] referral to [address]

## 2018-07-26 NOTE — PROGRESS NOTES
Bedside shift change report given to Nohelia (oncoming nurse) by Catalina Burnham (offgoing nurse). Report included the following information SBAR, Kardex and MAR.

## 2018-07-26 NOTE — PROGRESS NOTES
Patient is POD #3 under epidural with duramorph. Patient doing relatively well . Lazaro Calderon Pain under good control. none nausea and/or vomiting. Block site reveals normal exam; no erythema, swelling or tenderness. Currrent rate 3cc/hr.     Continue current postop pain regimen through today and then d/c

## 2018-07-26 NOTE — PROGRESS NOTES
vss af abd soft bowel  Open. Some formed stool today. c diff pending. Creat tai ok. Path T2. Favorable. Plan dc.

## 2018-07-26 NOTE — DISCHARGE INSTRUCTIONS
Cystectomy With Ileal Conduit: What to Expect at [address]  A cystectomy is surgery to remove part or all of the bladder. The surgery is mainly used to treat bladder cancer. After surgery, your belly will be sore, and you will probably need pain medicine for 1 to 2 weeks. You can expect your urostomy (stoma) to be swollen and tender at first. This usually improves after 2 to 3 weeks. You may notice some blood in your urine or that your urine is light pink for the first 3 weeks after surgery. This is normal.  While you are recovering from surgery, you will also be learning to care for your stoma. You may find it helpful to meet several times with a wound ostomy continence nurse (WOCN). This nurse can teach you how to care for your stoma and use a urostomy pouch. Many people can return to work or their usual activities 4 to 6 weeks after surgery. But you will probably need 6 to 8 weeks to fully recover from the surgery. Bladder cancer surgery may affect sexual function. If a woman's uterus and ovaries are removed during the surgery, she will not be able to get pregnant, and menopause may start. She may have hot flashes and other symptoms of menopause. And if a man's prostate gland and seminal vesicles are removed, he may have problems getting an erection and will not be able to make a woman pregnant. You may feel sad or depressed, or you may worry about how your body will look after surgery. You may worry about whether the surgery will affect your ability to have sex. These concerns are common. Ask your doctor about support groups or other resources that can help you with this. Call the [facility] Stevenson (2-210.645.9648) or visit its website at [address] [employer] for more information. This care sheet gives you a general idea about how long it will take for you to recover. But each person recovers at a different pace. Follow the steps below to get better as quickly as possible.   How can you care for yourself at home? Activity    · Rest when you feel tired. Getting enough sleep will help you recover.     · Try to walk each day. Start by walking a little more than you did the day before. Bit by bit, increase the amount you walk. Walking boosts blood flow and helps prevent pneumonia and constipation.     · Avoid strenuous activities, such as bicycle riding, jogging, weight lifting, or aerobic exercise, until your doctor says it is okay.     · Avoid lifting more than 5 pounds for about 4 weeks or until your doctor says it is okay. This may include a child, grocery bags and milk containers, a heavy briefcase or backpack, cat litter or dog food bags, or a vacuum .     · Ask your doctor when you can drive again.     · You will probably be able to go back to work or your normal routine in 4 to 6 weeks. This depends on the type of work you do and if you have any further treatment.     · You may take a bath or shower as usual. You can bathe with the pouch on or off. Gently pat the skin around your stoma dry after bathing. Diet    · You can eat your normal diet. If your stomach is upset, try bland, low-fat foods like plain rice, broiled chicken, toast, and yogurt.     · Drink plenty of fluids to avoid becoming dehydrated. Medicines    · Your doctor will tell you if and when you can restart your medicines. He or she will also give you instructions about taking any new medicines.     · If you take blood thinners, such as warfarin (Coumadin), clopidogrel (Plavix), or aspirin, be sure to talk to your doctor. He or she will tell you if and when to start taking those medicines again. Make sure that you understand exactly what your doctor wants you to do.     · Take pain medicines exactly as directed. ¨ If the doctor gave you a prescription medicine for pain, take it as prescribed.   ¨ If you are not taking a prescription pain medicine, ask your doctor if you can take an over-the-counter medicine.     · If you think your pain medicine is making you sick to your stomach:  ¨ Take your medicine after meals (unless your doctor has told you not to). ¨ Ask your doctor for a different pain medicine.     · If your doctor prescribed antibiotics, take them as directed. Do not stop taking them just because you feel better. You need to take the full course of antibiotics. Incision care    · If you have strips of tape on the cut (incision) the doctor made, leave the tape on for a week or until it falls off.     · Wash the area daily with warm, soapy water, and pat it dry. Don't use hydrogen peroxide or alcohol, which can slow healing. You may cover the area with a gauze bandage if it weeps or rubs against clothing. Change the bandage every day.     · Keep the area clean and dry. Other instructions    · You may notice that your bowel movements are not regular right after your surgery. This is common. Try to avoid constipation and straining with bowel movements. You may want to take a fiber supplement every day. If you have not had a bowel movement after a couple of days, ask your doctor about taking a mild laxative.     · Follow your doctor's or WOCN's instructions for caring for your stoma.     · To control pain when you cough or sneeze, hold a pillow over your incision. Follow-up care is a key part of your treatment and safety. Be sure to make and go to all appointments, and call your doctor if you are having problems. It's also a good idea to know your test results and keep a list of the medicines you take. When should you call for help? Call 911 anytime you think you may need emergency care.  For example, call if:    · You passed out (lost consciousness).     · You have chest pain, are short of breath, or cough up blood.    Call your doctor now or seek immediate medical care if:    · You have pain that does not get better after you take pain medicine.     · You have loose stitches, or your incision comes open.     · You are bleeding from the incision.     · You have signs of infection, such as:  ¨ Increased pain, swelling, warmth, or redness. ¨ Red streaks leading from the area. ¨ Pus draining from the area. ¨ A fever.     · You are unable to urinate.     · You have symptoms of a urinary tract infection. These may include:  ¨ Pain or burning when you urinate. ¨ A frequent need to urinate without being able to pass much urine. ¨ Pain in the flank, which is just below the rib cage and above the waist on either side of the back. ¨ Blood in your urine. ¨ A fever.     · You are sick to your stomach or cannot drink fluids.     · You have signs of a blood clot in your leg (called a deep vein thrombosis), such as:  ¨ Pain in your calf, back of the knee, thigh, or groin. ¨ Redness or swelling in your leg.    Watch closely for changes in your health, and be sure to contact your doctor if you have any problems. Where can you learn more? Go to http://jt-smita.info/. Enter R353 in the search box to learn more about \"Cystectomy With Ileal Conduit: What to Expect at Home. \"  Current as of: May 12, 2017  Content Version: 11.7  © 1074-3905 [employer]. Care instructions adapted under license by [employer] (which disclaims liability or warranty for this information). If you have questions about a medical condition or this instruction, always ask your healthcare professional. [place] [address] any warranty or liability for your use of this information. Patient Discharge Instructions    Michelle Castle / [id number] : 1940    Admitted 2018 Discharged: 2018     Take Home Medications       · It is important that you take the medication exactly as they are prescribed. · Keep your medication in the bottles provided by the pharmacist and keep a list of the medication names, dosages, and times to be taken in your wallet.    · Do not take other medications without consulting your doctor. What to do at Home      Recommended activity: No Lifting for 6 weeks. Follow-up with [address] [place] [address]  Urology. Call for an appointment (if not already scheduled)            [phone]. Information obtained by :  I understand that if any problems occur once I am at home I am to contact my physician. I understand and acknowledge receipt of the instructions indicated above.                                                                                                                                            Physician's or R.N.'s Signature                                                                  Date/Time                                                                                                                                              Patient or Representative Signature                                                          Date/Time

## 2018-07-26 NOTE — PROGRESS NOTES
Bedside and Verbal shift change report given to Hina Wheeler RN (oncoming nurse) by Claudia Kaufman RN and Cortney Chong RN (offgoing nurse). Report included the following information SBAR, Kardex, OR Summary, Procedure Summary, Intake/Output, MAR and Recent Results.

## 2018-07-27 ENCOUNTER — HOME CARE VISIT (OUTPATIENT)
Dept: SCHEDULING | Facility: HOME HEALTH | Age: 78
End: 2018-07-27
Payer: MEDICARE

## 2018-07-27 VITALS
DIASTOLIC BLOOD PRESSURE: 70 MMHG | HEART RATE: 73 BPM | RESPIRATION RATE: 16 BRPM | SYSTOLIC BLOOD PRESSURE: 130 MMHG | OXYGEN SATURATION: 97 % | TEMPERATURE: 98.1 F

## 2018-07-27 PROCEDURE — 3331090002 HH PPS REVENUE DEBIT

## 2018-07-27 PROCEDURE — 3331090001 HH PPS REVENUE CREDIT

## 2018-07-27 PROCEDURE — G0299 HHS/HOSPICE OF RN EA 15 MIN: HCPCS

## 2018-07-27 PROCEDURE — 400013 HH SOC

## 2018-07-28 PROCEDURE — 3331090001 HH PPS REVENUE CREDIT

## 2018-07-28 PROCEDURE — 3331090002 HH PPS REVENUE DEBIT

## 2018-07-29 PROCEDURE — 3331090001 HH PPS REVENUE CREDIT

## 2018-07-29 PROCEDURE — 3331090002 HH PPS REVENUE DEBIT

## 2018-07-30 PROCEDURE — 3331090001 HH PPS REVENUE CREDIT

## 2018-07-30 PROCEDURE — 3331090002 HH PPS REVENUE DEBIT

## 2018-07-31 ENCOUNTER — HOME CARE VISIT (OUTPATIENT)
Dept: SCHEDULING | Facility: HOME HEALTH | Age: 78
End: 2018-07-31
Payer: MEDICARE

## 2018-07-31 VITALS
HEART RATE: 74 BPM | SYSTOLIC BLOOD PRESSURE: 126 MMHG | RESPIRATION RATE: 18 BRPM | TEMPERATURE: 97.9 F | DIASTOLIC BLOOD PRESSURE: 72 MMHG | OXYGEN SATURATION: 92 %

## 2018-07-31 PROCEDURE — A4430 OST URINE PCH W B/BLTIN CONV: HCPCS

## 2018-07-31 PROCEDURE — A4406 PECTIN BASED OSTOMY PASTE: HCPCS

## 2018-07-31 PROCEDURE — 3331090001 HH PPS REVENUE CREDIT

## 2018-07-31 PROCEDURE — A4452 WATERPROOF TAPE: HCPCS

## 2018-07-31 PROCEDURE — 3331090002 HH PPS REVENUE DEBIT

## 2018-07-31 PROCEDURE — G0299 HHS/HOSPICE OF RN EA 15 MIN: HCPCS

## 2018-07-31 PROCEDURE — A4367 OSTOMY BELT: HCPCS

## 2018-08-01 PROCEDURE — 3331090001 HH PPS REVENUE CREDIT

## 2018-08-01 PROCEDURE — 3331090002 HH PPS REVENUE DEBIT

## 2018-08-02 PROCEDURE — 3331090001 HH PPS REVENUE CREDIT

## 2018-08-02 PROCEDURE — 3331090002 HH PPS REVENUE DEBIT

## 2018-08-03 ENCOUNTER — HOME CARE VISIT (OUTPATIENT)
Dept: SCHEDULING | Facility: HOME HEALTH | Age: 78
End: 2018-08-03
Payer: MEDICARE

## 2018-08-03 VITALS
SYSTOLIC BLOOD PRESSURE: 124 MMHG | OXYGEN SATURATION: 97 % | HEART RATE: 78 BPM | TEMPERATURE: 97.3 F | DIASTOLIC BLOOD PRESSURE: 70 MMHG | RESPIRATION RATE: 18 BRPM

## 2018-08-03 PROCEDURE — 3331090001 HH PPS REVENUE CREDIT

## 2018-08-03 PROCEDURE — A4432 OS PCH URINE W BAR/FANGE/TAP: HCPCS

## 2018-08-03 PROCEDURE — A4385 OST SKN BARRIER SLD EXT WEAR: HCPCS

## 2018-08-03 PROCEDURE — G0299 HHS/HOSPICE OF RN EA 15 MIN: HCPCS

## 2018-08-03 PROCEDURE — A4430 OST URINE PCH W B/BLTIN CONV: HCPCS

## 2018-08-03 PROCEDURE — 3331090002 HH PPS REVENUE DEBIT

## 2018-08-03 PROCEDURE — A4409 OST SKN BARR CONVEX <=4 SQ I: HCPCS

## 2018-08-04 PROCEDURE — 3331090001 HH PPS REVENUE CREDIT

## 2018-08-04 PROCEDURE — 3331090002 HH PPS REVENUE DEBIT

## 2018-08-05 PROCEDURE — 3331090002 HH PPS REVENUE DEBIT

## 2018-08-05 PROCEDURE — 3331090001 HH PPS REVENUE CREDIT

## 2018-08-06 PROCEDURE — 3331090001 HH PPS REVENUE CREDIT

## 2018-08-06 PROCEDURE — 3331090002 HH PPS REVENUE DEBIT

## 2018-08-07 PROCEDURE — 3331090002 HH PPS REVENUE DEBIT

## 2018-08-07 PROCEDURE — 3331090001 HH PPS REVENUE CREDIT

## 2018-08-08 ENCOUNTER — HOME CARE VISIT (OUTPATIENT)
Dept: SCHEDULING | Facility: HOME HEALTH | Age: 78
End: 2018-08-08
Payer: MEDICARE

## 2018-08-08 VITALS
OXYGEN SATURATION: 94 % | HEART RATE: 86 BPM | RESPIRATION RATE: 18 BRPM | SYSTOLIC BLOOD PRESSURE: 126 MMHG | DIASTOLIC BLOOD PRESSURE: 62 MMHG | TEMPERATURE: 98.5 F

## 2018-08-08 PROCEDURE — G0299 HHS/HOSPICE OF RN EA 15 MIN: HCPCS

## 2018-08-08 PROCEDURE — 3331090001 HH PPS REVENUE CREDIT

## 2018-08-08 PROCEDURE — 3331090002 HH PPS REVENUE DEBIT

## 2018-08-09 PROCEDURE — 3331090002 HH PPS REVENUE DEBIT

## 2018-08-09 PROCEDURE — 3331090001 HH PPS REVENUE CREDIT

## 2018-08-10 PROCEDURE — 3331090002 HH PPS REVENUE DEBIT

## 2018-08-10 PROCEDURE — 3331090001 HH PPS REVENUE CREDIT

## 2018-08-11 PROCEDURE — 3331090002 HH PPS REVENUE DEBIT

## 2018-08-11 PROCEDURE — 3331090001 HH PPS REVENUE CREDIT

## 2018-08-12 PROCEDURE — 3331090002 HH PPS REVENUE DEBIT

## 2018-08-12 PROCEDURE — 3331090001 HH PPS REVENUE CREDIT

## 2018-08-13 PROCEDURE — 3331090001 HH PPS REVENUE CREDIT

## 2018-08-13 PROCEDURE — 3331090002 HH PPS REVENUE DEBIT

## 2018-08-14 PROCEDURE — 3331090002 HH PPS REVENUE DEBIT

## 2018-08-14 PROCEDURE — 3331090001 HH PPS REVENUE CREDIT

## 2018-08-15 ENCOUNTER — HOME CARE VISIT (OUTPATIENT)
Dept: SCHEDULING | Facility: HOME HEALTH | Age: 78
End: 2018-08-15
Payer: MEDICARE

## 2018-08-15 VITALS
DIASTOLIC BLOOD PRESSURE: 66 MMHG | OXYGEN SATURATION: 95 % | TEMPERATURE: 98 F | RESPIRATION RATE: 18 BRPM | SYSTOLIC BLOOD PRESSURE: 124 MMHG | HEART RATE: 76 BPM

## 2018-08-15 PROCEDURE — G0299 HHS/HOSPICE OF RN EA 15 MIN: HCPCS

## 2018-08-15 PROCEDURE — 3331090002 HH PPS REVENUE DEBIT

## 2018-08-15 PROCEDURE — 3331090001 HH PPS REVENUE CREDIT

## 2018-08-22 NOTE — DISCHARGE SUMMARY
[address]    Ru Loser  MR#: [id number]  : 1940  ACCOUNT #: [de-identified]   ADMIT DATE: 2018  DISCHARGE DATE: 2018    ADMITTING DIAGNOSIS:  Bladder cancer. DISCHARGE DIAGNOSIS:  Bladder cancer. PROCEDURE:  Salvage cystectomy, prostatectomy, ileal conduit urinary diversion. BRIEF SUMMARY:  This  gentleman underwent the above operation on 2018. Postoperatively, his pain was well controlled. His bowel function returned on . He was started on a diet. He was considered to be ready for discharge on  for followup in the office for staple and stent removal.    CONDITION AT DISCHARGE:  Good. He was discharged on oral pain medication and followup office visit had been scheduled.     DISPOSITION:  home      MD GLADIS Greenwood/KRISTOPHER  D: 2018 12:47     T: 2018 08:15  JOB #: [id number]  CC: [address] [place] [address]

## 2018-09-24 ENCOUNTER — TELEPHONE (OUTPATIENT)
Dept: SURGERY | Age: 78
End: 2018-09-24

## 2018-09-24 NOTE — TELEPHONE ENCOUNTER
Returned patients call. HIPPA verified. Patient wanted to know where to purchase an abdominal binder. Patient stated he has checked several places and the place he did come across stated it would be 6-8 weeks before one would arrive. Patient stated he has had his bladder and prostate removed and needed a binder with the hole in the middle. Patient to order.

## 2018-09-24 NOTE — TELEPHONE ENCOUNTER
Patient said he needs a belt, to help with hernia. He said Dr. Adrian Aguila helped him the last time.    -[phone] hernia repair w/mesh

## 2018-10-15 ENCOUNTER — HOSPITAL ENCOUNTER (OUTPATIENT)
Dept: CT IMAGING | Age: 78
Discharge: HOME | End: 2018-10-15
Attending: UROLOGY
Payer: MEDICARE

## 2018-10-15 DIAGNOSIS — C67.9 BLADDER CANCER (HCC): ICD-10-CM

## 2018-10-15 PROCEDURE — 74011636320 HC RX REV CODE- 636/320: Performed by: UROLOGY

## 2018-10-15 PROCEDURE — 74011000258 HC RX REV CODE- 258: Performed by: UROLOGY

## 2018-10-15 PROCEDURE — 74177 CT ABD & PELVIS W/CONTRAST: CPT

## 2018-10-15 RX ORDER — SODIUM CHLORIDE 0.9 % (FLUSH) 0.9 %
10 SYRINGE (ML) INJECTION
Status: COMPLETED | OUTPATIENT
Start: 2018-10-15 | End: 2018-10-15

## 2018-10-15 RX ADMIN — IOPAMIDOL 100 ML: 755 INJECTION, SOLUTION INTRAVENOUS at 10:19

## 2018-10-15 RX ADMIN — SODIUM CHLORIDE 100 ML: 900 INJECTION, SOLUTION INTRAVENOUS at 10:19

## 2018-10-15 RX ADMIN — Medication 10 ML: at 10:19

## 2018-10-17 ENCOUNTER — TELEPHONE (OUTPATIENT)
Dept: SURGERY | Age: 78
End: 2018-10-17

## 2018-10-17 NOTE — TELEPHONE ENCOUNTER
I called the patient and he said he wanted to know if Dr Lili Hammonds would fix his Ileo conduit stoma, he said he trusts Dr Lili Hammonds. He said he is seeing Dr Farris tomorrow and he had a ct scan done yesterday and I let him know that the urologist usually fix them, he said he will let us know if he needs anything further. Pt in agreement.

## 2018-10-19 ENCOUNTER — HOSPITAL ENCOUNTER (OUTPATIENT)
Age: 78
Discharge: HOME | End: 2018-10-21
Payer: MEDICARE

## 2018-10-19 ENCOUNTER — HOSPITAL ENCOUNTER (OUTPATIENT)
Dept: NUCLEAR MEDICINE | Age: 78
Discharge: HOME | End: 2018-10-19
Payer: MEDICARE

## 2018-10-19 ENCOUNTER — HOSPITAL ENCOUNTER (OUTPATIENT)
Dept: GENERAL RADIOLOGY | Age: 78
Discharge: HOME | End: 2018-10-21
Payer: MEDICARE

## 2018-10-19 ENCOUNTER — HOSPITAL ENCOUNTER (OUTPATIENT)
Age: 78
Discharge: HOME | End: 2018-10-19
Payer: MEDICARE

## 2018-10-19 ENCOUNTER — HOSPITAL ENCOUNTER (OUTPATIENT)
Dept: NON INVASIVE DIAGNOSTICS | Age: 78
Discharge: HOME | End: 2018-10-19
Payer: MEDICARE

## 2018-10-19 VITALS — HEIGHT: 70 IN | BODY MASS INDEX: 32.86 KG/M2

## 2018-10-19 DIAGNOSIS — F17.200 SMOKING: ICD-10-CM

## 2018-10-19 DIAGNOSIS — Z87.891 PERSONAL HISTORY OF TOBACCO USE: ICD-10-CM

## 2018-10-19 DIAGNOSIS — Z23 IMMUNIZATION DUE: ICD-10-CM

## 2018-10-19 DIAGNOSIS — R06.02 SHORT OF BREATH ON EXERTION: ICD-10-CM

## 2018-10-19 DIAGNOSIS — E11.9 TYPE 2 DIABETES MELLITUS WITHOUT COMPLICATION, WITHOUT LONG-TERM CURRENT USE OF INSULIN (HCC): ICD-10-CM

## 2018-10-19 LAB
ALBUMIN SERPL-MCNC: 4.4 G/DL (ref 3.5–5.2)
ALP BLD-CCNC: 74 U/L (ref 40–129)
ALT SERPL-CCNC: 20 U/L (ref 0–40)
ANION GAP SERPL CALCULATED.3IONS-SCNC: 11 MMOL/L (ref 7–16)
AST SERPL-CCNC: 21 U/L (ref 0–39)
BACTERIA: NORMAL /HPF
BASOPHILS ABSOLUTE: 0.02 E9/L (ref 0–0.2)
BASOPHILS RELATIVE PERCENT: 0.4 % (ref 0–2)
BILIRUB SERPL-MCNC: 0.7 MG/DL (ref 0–1.2)
BILIRUBIN URINE: NEGATIVE
BLOOD, URINE: ABNORMAL
BUN BLDV-MCNC: 10 MG/DL (ref 8–23)
CALCIUM SERPL-MCNC: 9.3 MG/DL (ref 8.6–10.2)
CHLORIDE BLD-SCNC: 103 MMOL/L (ref 98–107)
CHOLESTEROL, TOTAL: 125 MG/DL (ref 0–199)
CLARITY: CLEAR
CO2: 31 MMOL/L (ref 22–29)
COLOR: YELLOW
CREAT SERPL-MCNC: 0.9 MG/DL (ref 0.7–1.2)
EOSINOPHILS ABSOLUTE: 0.07 E9/L (ref 0.05–0.5)
EOSINOPHILS RELATIVE PERCENT: 1.5 % (ref 0–6)
EPITHELIAL CELLS, UA: NORMAL /HPF
GFR AFRICAN AMERICAN: >60
GFR NON-AFRICAN AMERICAN: >60 ML/MIN/1.73
GLUCOSE BLD-MCNC: 114 MG/DL (ref 74–109)
GLUCOSE URINE: NEGATIVE MG/DL
HBA1C MFR BLD: 6.3 % (ref 4–5.6)
HCT VFR BLD CALC: 37.3 % (ref 37–54)
HDLC SERPL-MCNC: 60 MG/DL
HEMOGLOBIN: 12.4 G/DL (ref 12.5–16.5)
IMMATURE GRANULOCYTES #: 0.01 E9/L
IMMATURE GRANULOCYTES %: 0.2 % (ref 0–5)
KETONES, URINE: NEGATIVE MG/DL
LDL CHOLESTEROL CALCULATED: 48 MG/DL (ref 0–99)
LEUKOCYTE ESTERASE, URINE: NEGATIVE
LV EF: 55 %
LV EF: 58 %
LVEF MODALITY: NORMAL
LVEF MODALITY: NORMAL
LYMPHOCYTES ABSOLUTE: 1.56 E9/L (ref 1.5–4)
LYMPHOCYTES RELATIVE PERCENT: 33.8 % (ref 20–42)
MCH RBC QN AUTO: 30.4 PG (ref 26–35)
MCHC RBC AUTO-ENTMCNC: 33.2 % (ref 32–34.5)
MCV RBC AUTO: 91.4 FL (ref 80–99.9)
MICROALBUMIN UR-MCNC: 20.2 MG/L
MONOCYTES ABSOLUTE: 0.43 E9/L (ref 0.1–0.95)
MONOCYTES RELATIVE PERCENT: 9.3 % (ref 2–12)
NEUTROPHILS ABSOLUTE: 2.53 E9/L (ref 1.8–7.3)
NEUTROPHILS RELATIVE PERCENT: 54.8 % (ref 43–80)
NITRITE, URINE: NEGATIVE
PDW BLD-RTO: 14.2 FL (ref 11.5–15)
PH UA: 6 (ref 5–9)
PLATELET # BLD: 176 E9/L (ref 130–450)
PMV BLD AUTO: 11.3 FL (ref 7–12)
POTASSIUM SERPL-SCNC: 3.7 MMOL/L (ref 3.5–5)
PROSTATE SPECIFIC ANTIGEN: 4.02 NG/ML (ref 0–4)
PROTEIN UA: NEGATIVE MG/DL
RBC # BLD: 4.08 E12/L (ref 3.8–5.8)
RBC UA: NORMAL /HPF (ref 0–2)
SODIUM BLD-SCNC: 145 MMOL/L (ref 132–146)
SPECIFIC GRAVITY UA: 1.02 (ref 1–1.03)
TOTAL PROTEIN: 7.7 G/DL (ref 6.4–8.3)
TRIGL SERPL-MCNC: 83 MG/DL (ref 0–149)
TSH SERPL DL<=0.05 MIU/L-ACNC: 1.8 UIU/ML (ref 0.27–4.2)
URIC ACID, SERUM: 6.1 MG/DL (ref 3.4–7)
UROBILINOGEN, URINE: 0.2 E.U./DL
VITAMIN D 25-HYDROXY: 27 NG/ML (ref 30–100)
VLDLC SERPL CALC-MCNC: 17 MG/DL
WBC # BLD: 4.6 E9/L (ref 4.5–11.5)
WBC UA: NORMAL /HPF (ref 0–5)

## 2018-10-19 PROCEDURE — 93017 CV STRESS TEST TRACING ONLY: CPT

## 2018-10-19 PROCEDURE — 84443 ASSAY THYROID STIM HORMONE: CPT

## 2018-10-19 PROCEDURE — 83036 HEMOGLOBIN GLYCOSYLATED A1C: CPT

## 2018-10-19 PROCEDURE — 85025 COMPLETE CBC W/AUTO DIFF WBC: CPT

## 2018-10-19 PROCEDURE — 82306 VITAMIN D 25 HYDROXY: CPT

## 2018-10-19 PROCEDURE — G0103 PSA SCREENING: HCPCS

## 2018-10-19 PROCEDURE — A9500 TC99M SESTAMIBI: HCPCS | Performed by: RADIOLOGY

## 2018-10-19 PROCEDURE — 78452 HT MUSCLE IMAGE SPECT MULT: CPT

## 2018-10-19 PROCEDURE — 82044 UR ALBUMIN SEMIQUANTITATIVE: CPT

## 2018-10-19 PROCEDURE — 3430000000 HC RX DIAGNOSTIC RADIOPHARMACEUTICAL: Performed by: RADIOLOGY

## 2018-10-19 PROCEDURE — 80053 COMPREHEN METABOLIC PANEL: CPT

## 2018-10-19 PROCEDURE — 93306 TTE W/DOPPLER COMPLETE: CPT

## 2018-10-19 PROCEDURE — 80061 LIPID PANEL: CPT

## 2018-10-19 PROCEDURE — 6360000002 HC RX W HCPCS: Performed by: INTERNAL MEDICINE

## 2018-10-19 PROCEDURE — 36415 COLL VENOUS BLD VENIPUNCTURE: CPT

## 2018-10-19 PROCEDURE — 71046 X-RAY EXAM CHEST 2 VIEWS: CPT

## 2018-10-19 PROCEDURE — 84550 ASSAY OF BLOOD/URIC ACID: CPT

## 2018-10-19 PROCEDURE — 81001 URINALYSIS AUTO W/SCOPE: CPT

## 2018-10-19 RX ADMIN — REGADENOSON 0.4 MG: 0.08 INJECTION, SOLUTION INTRAVENOUS at 10:53

## 2018-10-19 RX ADMIN — Medication 10 MILLICURIE: at 08:41

## 2018-10-19 RX ADMIN — Medication 30 MILLICURIE: at 10:52

## 2018-11-27 ENCOUNTER — HOSPITAL ENCOUNTER (EMERGENCY)
Age: 78
Discharge: HOME | End: 2018-11-27
Attending: EMERGENCY MEDICINE
Payer: MEDICARE

## 2018-11-27 ENCOUNTER — APPOINTMENT (OUTPATIENT)
Dept: CT IMAGING | Age: 78
End: 2018-11-27
Attending: EMERGENCY MEDICINE
Payer: MEDICARE

## 2018-11-27 VITALS
DIASTOLIC BLOOD PRESSURE: 77 MMHG | SYSTOLIC BLOOD PRESSURE: 135 MMHG | TEMPERATURE: 98 F | OXYGEN SATURATION: 96 % | BODY MASS INDEX: 24.71 KG/M2 | WEIGHT: 169.75 LBS | RESPIRATION RATE: 16 BRPM | HEART RATE: 71 BPM

## 2018-11-27 DIAGNOSIS — J41.8 MIXED SIMPLE AND MUCOPURULENT CHRONIC BRONCHITIS (HCC): Primary | ICD-10-CM

## 2018-11-27 LAB
ANION GAP BLD CALC-SCNC: 16 MMOL/L (ref 10–20)
BUN BLD-MCNC: 20 MG/DL (ref 9–20)
CA-I BLD-MCNC: 1.18 MMOL/L (ref 1.12–1.32)
CHLORIDE BLD-SCNC: 109 MMOL/L (ref 98–107)
CO2 BLD-SCNC: 19 MMOL/L (ref 21–32)
CREAT BLD-MCNC: 0.8 MG/DL (ref 0.6–1.3)
GLUCOSE BLD-MCNC: 101 MG/DL (ref 65–100)
HCT VFR BLD CALC: 34 % (ref 36.6–50.3)
POTASSIUM BLD-SCNC: 4.1 MMOL/L (ref 3.5–5.1)
SERVICE CMNT-IMP: ABNORMAL
SODIUM BLD-SCNC: 139 MMOL/L (ref 136–145)

## 2018-11-27 PROCEDURE — 71250 CT THORAX DX C-: CPT

## 2018-11-27 PROCEDURE — 99283 EMERGENCY DEPT VISIT LOW MDM: CPT

## 2018-11-27 PROCEDURE — 80047 BASIC METABLC PNL IONIZED CA: CPT

## 2018-11-27 RX ORDER — AMOXICILLIN 500 MG/1
500 TABLET, FILM COATED ORAL 3 TIMES DAILY
Qty: 21 TAB | Refills: 0 | Status: SHIPPED | OUTPATIENT
Start: 2018-11-27 | End: 2018-12-31

## 2018-11-27 RX ORDER — SODIUM CHLORIDE 0.9 % (FLUSH) 0.9 %
10 SYRINGE (ML) INJECTION
Status: DISCONTINUED | OUTPATIENT
Start: 2018-11-27 | End: 2018-11-27

## 2018-11-27 NOTE — ED TRIAGE NOTES
TRIAGE:[facility], pt has c/o congestion and thick mucous x couple weeks. Pt arrives with blood work and chest xray. Wbc 6.9. Denies pain or 'feeling bad'.

## 2018-11-28 NOTE — ED PROVIDER NOTES
This is a 65 yo male with a history of a cough over about a month that is sometime productive of a thick phlegm. He has not had any chest pain, fever or chills and no night sweats. There has been no significant shortness of breath. He has had some sinus congestion over the past few weeks and states that he went to [facility] to try and figure out if he should see his own MD or the ENT MD. A CBC was done and was unremarkable. The CXR demonstrated some old findings in the right lower lung and perhaps a pneumonia or increased interstitial markings on the left. He was sent here for further evaluation. Past Medical History:  
Diagnosis Date  Aneurysm ([facility] [address] ABDOMINAL  Arrhythmia 3/2/2014 Stated cardiac cath for abn EKG several years ago NEG  
 CAD (coronary artery disease)  Calculus of kidney  Cancer ([facility] [address] Skin/Bladder  Chronic obstructive pulmonary disease ([facility] [address]  Contact dermatitis and other eczema, due to unspecified cause  GERD (gastroesophageal reflux disease) ORTIZ'S ESOPH  
 Hyperlipemia  Hypertension  Ill-defined condition HIATEL HERNIA  Ill-defined condition ECZEMA  PUD (peptic ulcer disease) Baretts Esophagus/antral gastritis/  
 
 
Past Surgical History:  
Procedure Laterality Date [address] [place] [address] UNLISTED UMBILICAL hernia  HX ADENOIDECTOMY  HX CATARACT REMOVAL Left 11/2016  HX COLONOSCOPY    
 HX GI Colonoscopy x 2-3  HX GI    
 COLONOSCOPY  
 HX HERNIA REPAIR  8/21/13  
 left inguinal hernia repair, LAPAROSCOPIC  (DR JAQUEZ)  HX HERNIA REPAIR  3/14/16 LAPAROSCOPIC Incisional W/ MESH by [place] Officer  HX OTHER SURGICAL    
 REMOVAL OF SKIN CA R NECK  HX TONSILLECTOMY  HX UROLOGICAL  2010 Bladder bx/kidney stone removal  
 HX UROLOGICAL  2015 CYSTO  
 HX VASCULAR ACCESS  03/31/2015 PORT R CHEST WALL  
 HX VASCULAR ACCESS    
 REMOVAL OF PORT R CHEST WALL Family History:  
Problem Relation Age of Onset  Heart Disease Mother  MS Sister  Heart Disease Sister  Psychiatric Disorder Sister DEPRESSION  
 No Known Problems Sister  Anesth Problems Neg Hx Social History Socioeconomic History  Marital status:  Spouse name: Not on file  Number of children: Not on file  Years of education: Not on file  Highest education level: Not on file Social Needs  Financial resource strain: Not on file  Food insecurity - worry: Not on file  Food insecurity - inability: Not on file  Transportation needs - medical: Not on file  Transportation needs - non-medical: Not on file Occupational History  Not on file Tobacco Use  Smoking status: Former Smoker Packs/day: 1.00 Years: 40.00 Pack years: 40.00 Last attempt to quit: 2009 Years since quittin.8  Smokeless tobacco: Never Used Substance and Sexual Activity  Alcohol use: No  
  Alcohol/week: 0.0 oz  
  Comment: RARELY  Drug use: No  
 Sexual activity: Not Currently Other Topics Concern  Not on file Social History Narrative  Not on file ALLERGIES: Patient has no known allergies. Review of Systems Constitutional: Negative for activity change, appetite change and fatigue. HENT: Positive for congestion. Negative for ear pain, facial swelling, sore throat and trouble swallowing. Eyes: Negative for pain, discharge and visual disturbance. Respiratory: Positive for cough (Occasional). Negative for chest tightness, shortness of breath and wheezing. Cardiovascular: Negative for chest pain and palpitations. Gastrointestinal: Negative for abdominal pain, blood in stool, nausea and vomiting. Genitourinary: Negative for difficulty urinating, flank pain and hematuria. Musculoskeletal: Negative for arthralgias, joint swelling, myalgias and neck pain. Skin: Negative for color change and rash. Neurological: Negative for dizziness, weakness, numbness and headaches. Hematological: Negative for adenopathy. Does not bruise/bleed easily. Psychiatric/Behavioral: Negative for behavioral problems, confusion and sleep disturbance. All other systems reviewed and are negative. Vitals:  
 11/27/18 1551 11/27/18 1743 BP:  124/76 Pulse: 78 78 Resp:  16 Temp:  97.6 °F (36.4 °C) SpO2: 94% 93% Weight:  77 kg (169 lb 12.1 oz) Physical Exam  
Constitutional: He is oriented to person, place, and time. He appears well-developed and well-nourished. No distress. HENT:  
Head: Normocephalic and atraumatic. Nose: Nose normal.  
Mouth/Throat: Oropharynx is clear and moist.  
Eyes: Conjunctivae and EOM are normal. Pupils are equal, round, and reactive to light. No scleral icterus. Neck: Normal range of motion. Neck supple. No JVD present. No tracheal deviation present. No thyromegaly present. No carotid bruits noted. Cardiovascular: Normal rate, regular rhythm, normal heart sounds and intact distal pulses. Exam reveals no gallop and no friction rub. No murmur heard. Pulmonary/Chest: Effort normal. No respiratory distress. He has no wheezes. He has rales (Few rales in the right and left base). He exhibits no tenderness. Abdominal: Soft. Bowel sounds are normal. He exhibits no distension and no mass. There is no tenderness. There is no rebound and no guarding. Musculoskeletal: Normal range of motion. He exhibits no edema or tenderness. Lymphadenopathy:  
  He has no cervical adenopathy. Neurological: He is alert and oriented to person, place, and time. He has normal reflexes. No cranial nerve deficit. Coordination normal.  
Skin: Skin is warm and dry. No rash noted. No erythema. Psychiatric: He has a normal mood and affect. His behavior is normal. Judgment and thought content normal.  
Nursing note and vitals reviewed. MDM Number of Diagnoses or Management Options Mixed simple and mucopurulent chronic bronchitis ([facility] [address]): Amount and/or Complexity of Data Reviewed Clinical lab tests: ordered and reviewed Tests in the radiology section of CPT®: ordered and reviewed Decide to obtain previous medical records or to obtain history from someone other than the patient: yes Review and summarize past medical records: yes Independent visualization of images, tracings, or specimens: yes Risk of Complications, Morbidity, and/or Mortality Presenting problems: moderate Diagnostic procedures: moderate Management options: moderate Patient Progress Patient progress: stable Procedures 7:30 PM 
Still awaiting CT. 
8:15 PM 
Still no CT. Has not even been done. Will sign patient out to Dr. Be Delgado pending CT. The patient has no symptoms of consequence and was sent to the ED by [facility] for abnormal CXR. Patient is ambulatory and without significant SOB, cough or other symptoms out of the ordinary for him over the past month. Chest ct favors bibasilar atelectasis. No other acjute abnormality is noted. Patient did not want iv contrast with his renal history. Plain CT done. Discharge to follow up with own MD for further evaluation and care.

## 2018-11-28 NOTE — CALL BACK NOTE
[facility] Emergency Department Follow Up Call Record Discharged to : Home/Family Home/Home Health/Skilled Facility/Rehab/Assisted Living/Other_Home______ 1) Did you receive your discharge instructions? Yes This writer spoke with KASH Hughes verfied. Mr. Rosita Robbins reports feeling well, but confused about his DX. Discussed CT results with this patient and need for antibiotic therapy. 2) Do you understand them? Yes        
3) Are you able to follow them? Yes If NO, what can I clarify for you? 4) Do you understand your diagnosis? Yes        
5) Do you know which symptoms should prompt you to call the doctor? Yes    
6) Were you able to fill and  any medications that were prescribed? No. Patient has not picked up this medication as yet, he states he will later today. 7) You were prescribed _amoxicillin__________for __chest congestion/ateletasis__________________. Common side effects of this medication are____rash, ________________. This is not a complete list so please review the forms given from the pharmacy for a complete list. 8) Are there any questions about your medications? Yes Have you scheduled any recommended doctors appointments (specialty, PCP) NO. Encouraged that this patient follow up with his PCP. If NO, what barriers are you encountering (transportation/lost contact info/cost/ 
didnt think necessary/no PCP 
9) If discharged with Home Health, has the agency contacted you to schedule visit? Not applicable 10) Is there anyone available to help you at home (meals, errands, transportation   
monitoring) (adult children, neighbors, private duty companions) Not applicable 11) Are you on a special diet? No        
If YES, do you understand the requirements for this diet? Education provided? 12) If presented with cough, bronchitis, COPD, asthma, is it ok to ask that the 
 respiratory disease management educator call you? Not applicable 13)  A) If presented with fall, were you issued an assistive device in the ED Are you using? No 
B) If given RX for device, have you obtained? Not applicable If NO, barriers? C) Therapist recommended: 
 Are you able to implement the suggestions? Not applicable If NO, barriers to implementation? D) Are you having any difficulties with mobility inside your home?   
 (steps, bed, tub)No 
 If YES, ask if the [facility] PT can contact patient and good time and number? 
14)  At the end of your discharge instructions, there is information about accessing [facility] SERVICES, have you had a chance to review those? No     
 
 Do you have any questions about signing up for this service? We encourage our patients to be active participants in their healthcare and this site is one of the ways to do that. It will allow you to access parts of your medical record, email your doctors office, schedule appointments, and request medications refills . 15) Are there any other questions that I can answer for you regarding  
 your Emergency department visit? NO Estimated Call Time:____2:32 PM 
_______________ Date/Time:_______________

## 2018-12-26 ENCOUNTER — TELEPHONE (OUTPATIENT)
Dept: SURGERY | Age: 78
End: 2018-12-26

## 2018-12-26 NOTE — TELEPHONE ENCOUNTER
I called the patient and he said he said he wants Dr Nitza Jones opinion on a \"hernia\" he said he has at his ileo-concuit stoma, He said he has mentioned it to dr Whitney Diallo and he tells him not to worry about it but he said he is worried about it and trusts Dr Michi Montero opinion and wants Dr Michi Montero opinion on it. He is calling Dr Nicolasa Coon office again to discuss with him and will call back if he wants to schedule an appointment to see Dr Nitza Jones for another opinion. Pt has called in the past in regards to this hernia so I feel like it is best if we see him in our office as well.mPt in agreement.

## 2018-12-26 NOTE — TELEPHONE ENCOUNTER
Pt had a CT scan completed and was wondering if Dr. Rashad Church would look at his results. He would like a second opinion to see if his hernia should be repaired.

## 2018-12-31 ENCOUNTER — APPOINTMENT (OUTPATIENT)
Dept: GENERAL RADIOLOGY | Age: 78
End: 2018-12-31
Attending: EMERGENCY MEDICINE
Payer: MEDICARE

## 2018-12-31 ENCOUNTER — HOSPITAL ENCOUNTER (EMERGENCY)
Age: 78
Discharge: HOME | End: 2018-12-31
Attending: EMERGENCY MEDICINE
Payer: MEDICARE

## 2018-12-31 VITALS
HEART RATE: 91 BPM | SYSTOLIC BLOOD PRESSURE: 124 MMHG | TEMPERATURE: 97.4 F | DIASTOLIC BLOOD PRESSURE: 62 MMHG | OXYGEN SATURATION: 95 % | RESPIRATION RATE: 22 BRPM

## 2018-12-31 DIAGNOSIS — J42 CHRONIC BRONCHITIS, UNSPECIFIED CHRONIC BRONCHITIS TYPE (HCC): ICD-10-CM

## 2018-12-31 DIAGNOSIS — R05.9 COUGH: Primary | ICD-10-CM

## 2018-12-31 DIAGNOSIS — L76.82 PAIN AT SURGICAL INCISION: ICD-10-CM

## 2018-12-31 PROCEDURE — 74022 RADEX COMPL AQT ABD SERIES: CPT

## 2018-12-31 PROCEDURE — 99282 EMERGENCY DEPT VISIT SF MDM: CPT

## 2018-12-31 RX ORDER — DOXYCYCLINE HYCLATE 100 MG
100 TABLET ORAL 2 TIMES DAILY
Qty: 14 TAB | Refills: 0 | Status: SHIPPED | OUTPATIENT
Start: 2018-12-31 | End: 2019-01-07

## 2018-12-31 RX ORDER — BENZONATATE 100 MG/1
100 CAPSULE ORAL
Qty: 30 CAP | Refills: 0 | Status: SHIPPED | OUTPATIENT
Start: 2018-12-31 | End: 2019-01-07

## 2018-12-31 RX ORDER — PREDNISONE 20 MG/1
40 TABLET ORAL DAILY
Qty: 10 TAB | Refills: 0 | Status: SHIPPED | OUTPATIENT
Start: 2018-12-31 | End: 2019-01-05

## 2018-12-31 RX ORDER — CETIRIZINE HCL 10 MG
10 TABLET ORAL
COMMUNITY
End: 2021-02-24

## 2018-12-31 NOTE — DISCHARGE INSTRUCTIONS
Chronic Obstructive Pulmonary Disease (COPD): Care Instructions  Your Care Instructions    Chronic obstructive pulmonary disease (COPD) is a general term for a group of lung diseases, including emphysema and chronic bronchitis. People with COPD have decreased airflow in and out of the lungs, which makes it hard to breathe. The airways also can get clogged with thick mucus. Cigarette smoking is a major cause of COPD. Although there is no cure for COPD, you can slow its progress. Following your treatment plan and taking care of yourself can help you feel better and live longer. Follow-up care is a key part of your treatment and safety. Be sure to make and go to all appointments, and call your doctor if you are having problems. It's also a good idea to know your test results and keep a list of the medicines you take. How can you care for yourself at home?   Staying healthy    · Do not smoke. This is the most important step you can take to prevent more damage to your lungs. If you need help quitting, talk to your doctor about stop-smoking programs and medicines. These can increase your chances of quitting for good.     · Avoid colds and flu. Get a pneumococcal vaccine shot. If you have had one before, ask your doctor whether you need a second dose. Get the flu vaccine every fall. If you must be around people with colds or the flu, wash your hands often.     · Avoid secondhand smoke, air pollution, and high altitudes. Also avoid cold, dry air and hot, humid air. Stay at home with your windows closed when air pollution is bad.    Medicines and oxygen therapy    · Take your medicines exactly as prescribed. Call your doctor if you think you are having a problem with your medicine.     · You may be taking medicines such as:  ? Bronchodilators. These help open your airways and make breathing easier. Bronchodilators are either short-acting (work for 6 to 9 hours) or long-acting (work for 24 hours).  You inhale most bronchodilators, so they start to act quickly. Always carry your quick-relief inhaler with you in case you need it while you are away from home. ? Corticosteroids (prednisone, budesonide). These reduce airway inflammation. They come in pill or inhaled form. You must take these medicines every day for them to work well.     · A spacer may help you get more inhaled medicine to your lungs. Ask your doctor or pharmacist if a spacer is right for you. If it is, ask how to use it properly.     · Do not take any vitamins, over-the-counter medicine, or herbal products without talking to your doctor first.     · If your doctor prescribed antibiotics, take them as directed. Do not stop taking them just because you feel better. You need to take the full course of antibiotics.     · Oxygen therapy boosts the amount of oxygen in your blood and helps you breathe easier. Use the flow rate your doctor has recommended, and do not change it without talking to your doctor first.   Activity    · Get regular exercise. Walking is an easy way to get exercise. Start out slowly, and walk a little more each day.     · Pay attention to your breathing. You are exercising too hard if you cannot talk while you are exercising.     · Take short rest breaks when doing household chores and other activities.     · Learn breathing methods--such as breathing through pursed lips--to help you become less short of breath.     · If your doctor has not set you up with a pulmonary rehabilitation program, talk to him or her about whether rehab is right for you. Rehab includes exercise programs, education about your disease and how to manage it, help with diet and other changes, and emotional support. Diet    · Eat regular, healthy meals. Use bronchodilators about 1 hour before you eat to make it easier to eat. Eat several small meals instead of three large ones.  Drink beverages at the end of the meal. Avoid foods that are hard to chew.     · Eat foods that contain protein so that you do not lose muscle mass.     · Talk with your doctor if you gain too much weight or if you lose weight without trying.    Mental health    · Talk to your family, friends, or a therapist about your feelings. It is normal to feel frightened, angry, hopeless, helpless, and even guilty. Talking openly about bad feelings can help you cope. If these feelings last, talk to your doctor. When should you call for help? Call 911 anytime you think you may need emergency care. For example, call if:    · You have severe trouble breathing.    Call your doctor now or seek immediate medical care if:    · You have new or worse trouble breathing.     · You cough up blood.     · You have a fever.    Watch closely for changes in your health, and be sure to contact your doctor if:    · You cough more deeply or more often, especially if you notice more mucus or a change in the color of your mucus.     · You have new or worse swelling in your legs or belly.     · You are not getting better as expected. Where can you learn more? Go to http://jt-smita.info/. Valeri Gerardo in the search box to learn more about \"Chronic Obstructive Pulmonary Disease (COPD): Care Instructions. \"  Current as of: December 6, 2017  Content Version: 11.8  © 9556-2896 [employer]. Care instructions adapted under license by [employer] (which disclaims liability or warranty for this information). If you have questions about a medical condition or this instruction, always ask your healthcare professional. [place] [address] any warranty or liability for your use of this information.

## 2018-12-31 NOTE — ED PROVIDER NOTES
The history is provided by the patient. Cough This is a new problem. Episode onset: 4-5 days ago. The problem occurs constantly. The problem has been gradually worsening. The cough is productive of sputum (white with a little color to it starting today). There has been no fever. Associated symptoms include wheezing. Pertinent negatives include no chest pain, no chills, no sore throat, no shortness of breath and no nausea. Treatments tried: muccinex. The treatment provided no relief. Smoker: 40 pack year hx, quit in 2009. His past medical history is significant for bronchitis. Past Medical History:  
Diagnosis Date  Aneurysm ([facility] [address] ABDOMINAL  Arrhythmia 3/2/2014 Stated cardiac cath for abn EKG several years ago NEG  
 CAD (coronary artery disease)  Calculus of kidney  Cancer ([facility] [address] Skin/Bladder  Chronic obstructive pulmonary disease ([facility] [address]  Contact dermatitis and other eczema, due to unspecified cause  GERD (gastroesophageal reflux disease) ORTIZ'S ESOPH  
 Hyperlipemia  Hypertension  Ill-defined condition HIATEL HERNIA  Ill-defined condition ECZEMA  PUD (peptic ulcer disease) Baretts Esophagus/antral gastritis/  
 
 
Past Surgical History:  
Procedure Laterality Date [address] [place] [address] UNLISTED UMBILICAL hernia  HX ADENOIDECTOMY  HX CATARACT REMOVAL Left 11/2016  HX COLONOSCOPY    
 HX GI Colonoscopy x 2-3  HX GI    
 COLONOSCOPY  
 HX HERNIA REPAIR  8/21/13  
 left inguinal hernia repair, LAPAROSCOPIC  (DR JAQUEZ)  HX HERNIA REPAIR  3/14/16 LAPAROSCOPIC Incisional W/ MESH by Khanh Wadsworth  HX OTHER SURGICAL    
 REMOVAL OF SKIN CA R NECK  HX TONSILLECTOMY  HX UROLOGICAL  2010 Bladder bx/kidney stone removal  
 HX UROLOGICAL  2015 CYSTO  
 HX VASCULAR ACCESS  03/31/2015 PORT R CHEST WALL  
 HX VASCULAR ACCESS    
 REMOVAL OF PORT R CHEST WALL Family History: Problem Relation Age of Onset  Heart Disease Mother  MS Sister  Heart Disease Sister  Psychiatric Disorder Sister DEPRESSION  
 No Known Problems Sister  Anesth Problems Neg Hx Social History Socioeconomic History  Marital status:  Spouse name: Not on file  Number of children: Not on file  Years of education: Not on file  Highest education level: Not on file Social Needs  Financial resource strain: Not on file  Food insecurity - worry: Not on file  Food insecurity - inability: Not on file  Transportation needs - medical: Not on file  Transportation needs - non-medical: Not on file Occupational History  Not on file Tobacco Use  Smoking status: Former Smoker Packs/day: 1.00 Years: 40.00 Pack years: 40.00 Last attempt to quit: 2009 Years since quittin.9  Smokeless tobacco: Never Used Substance and Sexual Activity  Alcohol use: No  
  Alcohol/week: 0.0 oz  
  Comment: RARELY  Drug use: No  
 Sexual activity: Not Currently Other Topics Concern  Not on file Social History Narrative  Not on file ALLERGIES: Patient has no known allergies. Review of Systems Constitutional: Negative for chills and fever. HENT: Positive for congestion and postnasal drip. Negative for sore throat. Respiratory: Positive for cough and wheezing. Negative for shortness of breath. Cardiovascular: Negative for chest pain. Gastrointestinal: Negative for abdominal pain and nausea. Genitourinary: Negative. Musculoskeletal: Negative. Skin: Negative. Vitals:  
 18 1157 BP: 124/62 Pulse: 91  
Resp: 22 Temp: 97.4 °F (36.3 °C) SpO2: 95% Physical Exam  
Constitutional: He is oriented to person, place, and time. He appears well-developed and well-nourished. HENT:  
Head: Normocephalic and atraumatic.   
Eyes: Conjunctivae and EOM are normal.  
 Cardiovascular: Normal rate, regular rhythm and normal heart sounds. No murmur heard. Pulmonary/Chest: Effort normal. No respiratory distress. He has wheezes. He has no rales. Left lower lung field Musculoskeletal: Normal range of motion. He exhibits no tenderness or deformity. Neurological: He is alert and oriented to person, place, and time. Coordination normal.  
Psychiatric: He has a normal mood and affect. Nursing note and vitals reviewed. MDM Number of Diagnoses or Management Options Diagnosis management comments: Cough - check CXR for possible pneumonia vs flare of his COPD If CXr neg will treat with steroids and ABX and have f/u with his PCP next week, return to the ED if symptoms worsen Amount and/or Complexity of Data Reviewed Tests in the radiology section of CPT®: ordered and reviewed Procedures VITALS:  
Patient Vitals for the past 8 hrs: 
 Temp Pulse Resp BP SpO2  
12/31/18 1157 97.4 °F (36.3 °C) 91 22 124/62 95 % No results found for this or any previous visit (from the past 24 hour(s)). Xr Abd Acute W 1 V Chest 
 
Result Date: 12/31/2018 EXAM:  XR ABD ACUTE W 1 V CHEST. INDICATION:  Abdominal pain from coughing and possible pneumonia. COMPARISON: 7/23/2018. FINDINGS: The upright chest radiograph demonstrates interstitial disease in the mid and lower lung zones with bullous changes in the right lung apex. The aorta is atherosclerotic. There is no pleural effusion or free air under the diaphragm. Supine and upright views of the abdomen demonstrate surgical clips in the pelvis bilaterally. The gas pattern is nonobstructive. There is no free intraperitoneal air. No soft tissue masses or pathologic calcifications are identified. The bones are within normal limits. IMPRESSION: Interstitial lung disease. No acute abdominal abnormality identified. [phone] No EMC, will treat as mild COPD flare with doxycycline and steroids and tessalon perles

## 2018-12-31 NOTE — ED NOTES
Dr. ASHFORD and I have reviewed discharge instructions with the patient. The patient verbalized understanding.

## 2018-12-31 NOTE — ED TRIAGE NOTES
Triage: pt c/o constant nonproductive cough x 4 days ago; referred here by [facility]. Denies fever, chills, SOB or chest pain.

## 2019-02-28 ENCOUNTER — HOSPITAL ENCOUNTER (OUTPATIENT)
Dept: WOUND CARE | Age: 79
Discharge: HOME | End: 2019-02-28
Payer: MEDICARE

## 2019-02-28 VITALS
HEART RATE: 85 BPM | TEMPERATURE: 96.8 F | DIASTOLIC BLOOD PRESSURE: 78 MMHG | SYSTOLIC BLOOD PRESSURE: 142 MMHG | RESPIRATION RATE: 16 BRPM

## 2019-02-28 PROBLEM — K46.9 PERISTOMAL HERNIA: Status: ACTIVE | Noted: 2019-02-28

## 2019-02-28 PROBLEM — L98.499 ULCER OF ABDOMEN WALL (HCC): Status: ACTIVE | Noted: 2019-02-28

## 2019-02-28 PROCEDURE — 11042 DBRDMT SUBQ TIS 1ST 20SQCM/<: CPT

## 2019-02-28 NOTE — WOUND CARE
Patient discharged to home ambulatory, has written instructions for ostomy care. Will return to clinic for nurse visits in 2 weeks.

## 2019-02-28 NOTE — PROGRESS NOTES
Monica Bond, RN Registered Nurse Wound Care Wound Care Signed Date of Service:  02/28/19 1415 []Hide copied text []Hover for details 
 
 
  
  02/28/19 1401 Wound Peristomal Medial;Right Date First Assessed: 02/21/19   POA: Yes  Location: Peristomal  Orientation: Medial;Right Dressing Status  (none) Wound Width (cm) 1.2 cm Wound Depth (cm) 0.6 Condition of Base Pink Condition of Edges Open Drainage Amount  Scant Drainage Color Tan Wound Odor None Periwound Skin Condition Erythema, blanchable Cleansing and Cleansing Agents  (N) Dressing Type Applied (HFB ring, convex 1 piece urostomy bag) Wound Procedure Type Debridement- Surgical  
Procedure Time Out 1400 Consent Obtained  Yes Procedure Bleeding Minimal  
Procedure Hemostasis  Pressure Procedure Instrument  Curette Procedure Tolerated Well  
  
Visit Vitals /78 (BP 1 Location: Left arm) Pulse 85 Temp 96.8 °F (36 °C) Resp 16  
  
 
  
Discharged from wound clinic, ambulatory, to return in 2 weeks. Liliane Jason RN Registered Nurse Wound Care Wound Care Signed Date of Service:  02/28/19 1415 []Hide copied text []Hover for details 
 
 
  
  02/28/19 1401 Wound Peristomal Medial;Right Date First Assessed: 02/21/19   POA: Yes  Location: Peristomal  Orientation: Medial;Right Dressing Status  (none) Wound Width (cm) 1.2 cm Wound Depth (cm) 0.6 Condition of Base Pink Condition of Edges Open Drainage Amount  Scant Drainage Color Tan Wound Odor None Periwound Skin Condition Erythema, blanchable Cleansing and Cleansing Agents  (N) Dressing Type Applied (HFB ring, convex 1 piece urostomy bag) Wound Procedure Type Debridement- Surgical  
Procedure Time Out 1400 Consent Obtained  Yes Procedure Bleeding Minimal  
Procedure Hemostasis  Pressure Procedure Instrument  Curette Procedure Tolerated Well  
  
 Visit Vitals /78 (BP 1 Location: Left arm) Pulse 85 Temp 96.8 °F (36 °C) Resp 16  
  
 
  
Discharged from wound clinic, ambulatory, to return in 2 weeks. Marcella Alberto Chief Complaint (CC): painful wound next to urostomy. Marcella Alberto Present Illness (PI): Patient with known bladder cancer treated with chemo, radiation up to 9-10 years ago had a ileal conduit/pouch with stoma fashioned more than a year ago and has had some problem with a wound formed just R lateral to the ileal exit. Marcella Alberto Pertinent Past History (PMedHx): as above, also note eye changes requiring IOLI-OS. Also noted:                       
 Medications and Allergies: as per [facility]. I have reviewed and concur. Illnesses: as per '[facility]' recorded data noted today. Surgeries and Injuries: as per '[facility]' recorded data noted today. As above, . Review of Systems (ROS): 
                      Integumentary: Other than as noted in 'PI'; skin hair and nails normal for age, with no new rash, lumps, bumps, eruptions or bleeding. Lymph: no new prominent nodes or drainage near lymph nodes. Bones, Joints, and Muscles: Other than as noted in 'PI' no new fractures, dislocations, weakness or pain. . 
                      Hematopoietic: no new bleeding or bruising or anemia changes. Thrombocytopenia, stable. Eyes: no recent trauma or inflammation. 0. Eye glasses. OS. Intra Occular Lens Implants (IOLI) Ears: Hearing is unchanged and usually good. Nose: no new drainage, rhinorhea or epistaxis. Mouth, and throat: no soreness, drainage or lesions. 0. Dentures. Neck: no new masses, drainage or pain Respiratory; no hemoptysis, current shortness of breath or pain with breath. Cardiovascular: No chest pain, palpitation or tachycardia. . 
                      Gastrointestinal: no recent change in appetite, stools or food tolerance. No jaundice, hematemesis, vomiting or diarrhea Genito-Urinary: urine color, frequency, sensation unchanged Neurologic: no syncope, dizzyness or unusual sensations. Psychologic and Mental Status: no change in mood, sleep or memory recently Social History: '[facility]' data today is noted. Lives at home, daughter helps with care. Family History: '[facility]' data today is noted. Izzy Butcher Physical Exam:  
  
General:  alert, fussy, NAD, . Head, Eyes, Ears, Nose and Throat: normocephalic, PERRLA but L with an area of irregular surface, note IOLI-OS only, edentulous. Neck: supple without masses or adenopathy. No bruit noted. Chest: full excursion without deformity, . Lungs: clear to ausc. Heart: RSR no M. Abdomen: soft flat, note ileostomy R lateral to umbilicus, mucosal surface is clean and protrudes over stoma edge. R latera peristomal skin ~1cm from epithelial edge of stoma a 5MM shallow ulcer with crusting, some slough and areas of very thin epithelial covering. . 
Neurology: Cranial nerves 2-12 grossly intact . Reflexes: BJ, CBJ, KJ, AJ 2+ and =. 
Vascular:  
                      Pulses:                                            R                    L  
                                            Radial                        +.                 +. 
                                            Femoral                     ++. ++. 
                                            DP                             +.                 +. 
                                            PT                             +.                 +. 
Extremities: supple, thin with good ROM, . Other: alert reduced mid and short memory. Vital signs and data recorded in '[facility]' for this patient today is noted, reviewed and considered. Patient notes today: pain in that wound only. .  
  
Procedure Note Name of Procedure: sharp excisional debridement. PreOp diagnosis: peristomal ulcer in skin. Michaelnimo Daily Anaesthesia: Lidocaine; topical  
Description: using a #7 curette I excised non viable tissue in the ulcer base to identify intact skin border, no heaped up aberrant appearing surface. Most extreme level of debridement: subQ. Post debridement dimensions changed as noted:  
 Depth, add 1.0 mm;  
 Width, add 0.0 mm Length, add 0.0 mm. Blood Loss: 1. CCs. Bleeding abated post treatment . Post Op Diagnosis, and condition: small ulcer should respond to avoiding wet, and good stomal care. Follow Up Plan: RTC 2 weeks for NV. Specimens: 0. Counseled regarding/Discussed: as above, small ulcer mostly needs local care and stoma support. Clinical considerations: alert to infection. . 
Future: expect progressive closure.

## 2019-02-28 NOTE — WOUND CARE
02/28/19 1401 Wound Peristomal Medial;Right Date First Assessed: 02/21/19   POA: Yes  Location: Peristomal  Orientation: Medial;Right Dressing Status  (none) Wound Width (cm) 1.2 cm Wound Depth (cm) 0.6 Condition of Base Pink Condition of Edges Open Drainage Amount  Scant Drainage Color Tan Wound Odor None Periwound Skin Condition Erythema, blanchable Cleansing and Cleansing Agents  (N) Dressing Type Applied (HFB ring, convex 1 piece urostomy bag) Wound Procedure Type Debridement- Surgical  
Procedure Time Out 1400 Consent Obtained  Yes Procedure Bleeding Minimal  
Procedure Hemostasis  Pressure Procedure Instrument  Curette Procedure Tolerated Well Visit Vitals /78 (BP 1 Location: Left arm) Pulse 85 Temp 96.8 °F (36 °C) Resp 16 Discharged from wound clinic, ambulatory, to return in 2 weeks. Radha Beaver

## 2019-03-14 ENCOUNTER — HOSPITAL ENCOUNTER (OUTPATIENT)
Dept: WOUND CARE | Age: 79
Discharge: HOME | End: 2019-03-14
Payer: MEDICARE

## 2019-03-14 VITALS
TEMPERATURE: 96.9 F | RESPIRATION RATE: 16 BRPM | HEART RATE: 71 BPM | SYSTOLIC BLOOD PRESSURE: 118 MMHG | DIASTOLIC BLOOD PRESSURE: 68 MMHG

## 2019-03-14 PROCEDURE — 99211 OFF/OP EST MAY X REQ PHY/QHP: CPT

## 2019-03-14 NOTE — WOUND CARE
Patient discharged to home ambulatory, denies pain. He will continue to change urostomy appliance 2 x week using  Centenary 1 piece cut to fit soft convex appliance and Centenary hydrofera blue ostomy ring to the open wound. He will return to clinic in 2 weeks for nurse visit at which time we will review his supply orders and determine if he needs a slightly larger pouch opening (this will require him to order a different pouch since the one he is currently using can only be but to one inch).

## 2019-03-29 ENCOUNTER — HOSPITAL ENCOUNTER (OUTPATIENT)
Dept: WOUND CARE | Age: 79
Discharge: HOME | End: 2019-03-29
Payer: MEDICARE

## 2019-03-29 VITALS
SYSTOLIC BLOOD PRESSURE: 120 MMHG | TEMPERATURE: 97.2 F | DIASTOLIC BLOOD PRESSURE: 70 MMHG | HEART RATE: 72 BPM | RESPIRATION RATE: 16 BRPM

## 2019-03-29 PROCEDURE — 99211 OFF/OP EST MAY X REQ PHY/QHP: CPT

## 2019-05-22 ENCOUNTER — HOSPITAL ENCOUNTER (OUTPATIENT)
Age: 79
Discharge: HOME | End: 2019-05-24
Payer: MEDICARE

## 2019-05-22 DIAGNOSIS — E11.9 TYPE 2 DIABETES MELLITUS WITHOUT COMPLICATION, WITHOUT LONG-TERM CURRENT USE OF INSULIN (HCC): ICD-10-CM

## 2019-05-22 PROBLEM — M62.838 MUSCLE SPASMS OF NECK: Status: RESOLVED | Noted: 2017-08-31 | Resolved: 2019-05-22

## 2019-05-22 PROBLEM — D53.1 MEGALOBLASTIC ANEMIA DUE TO VITAMIN B12 DEFICIENCY: Status: RESOLVED | Noted: 2017-05-17 | Resolved: 2019-05-22

## 2019-05-22 PROBLEM — R09.89 BILATERAL CAROTID BRUITS: Status: ACTIVE | Noted: 2019-05-22

## 2019-05-22 PROBLEM — I35.0 AORTIC STENOSIS, MODERATE: Status: ACTIVE | Noted: 2018-10-01

## 2019-05-22 PROBLEM — M62.838 MUSCLE SPASM OF BOTH LOWER LEGS: Status: RESOLVED | Noted: 2017-08-31 | Resolved: 2019-05-22

## 2019-05-22 LAB
ALBUMIN SERPL-MCNC: 4.3 G/DL (ref 3.5–5.2)
ALP BLD-CCNC: 72 U/L (ref 40–129)
ALT SERPL-CCNC: 10 U/L (ref 0–40)
ANION GAP SERPL CALCULATED.3IONS-SCNC: 17 MMOL/L (ref 7–16)
AST SERPL-CCNC: 22 U/L (ref 0–39)
BASOPHILS ABSOLUTE: 0.04 E9/L (ref 0–0.2)
BASOPHILS RELATIVE PERCENT: 1 % (ref 0–2)
BILIRUB SERPL-MCNC: 0.4 MG/DL (ref 0–1.2)
BUN BLDV-MCNC: 11 MG/DL (ref 8–23)
CALCIUM SERPL-MCNC: 9.7 MG/DL (ref 8.6–10.2)
CHLORIDE BLD-SCNC: 102 MMOL/L (ref 98–107)
CO2: 25 MMOL/L (ref 22–29)
CREAT SERPL-MCNC: 1 MG/DL (ref 0.7–1.2)
EOSINOPHILS ABSOLUTE: 0.13 E9/L (ref 0.05–0.5)
EOSINOPHILS RELATIVE PERCENT: 3.2 % (ref 0–6)
GFR AFRICAN AMERICAN: >60
GFR NON-AFRICAN AMERICAN: >60 ML/MIN/1.73
GLUCOSE BLD-MCNC: 117 MG/DL (ref 74–99)
HBA1C MFR BLD: 6.1 % (ref 4–5.6)
HCT VFR BLD CALC: 40.1 % (ref 37–54)
HEMOGLOBIN: 12.5 G/DL (ref 12.5–16.5)
IMMATURE GRANULOCYTES #: 0 E9/L
IMMATURE GRANULOCYTES %: 0 % (ref 0–5)
LYMPHOCYTES ABSOLUTE: 1.15 E9/L (ref 1.5–4)
LYMPHOCYTES RELATIVE PERCENT: 27.9 % (ref 20–42)
MCH RBC QN AUTO: 29.3 PG (ref 26–35)
MCHC RBC AUTO-ENTMCNC: 31.2 % (ref 32–34.5)
MCV RBC AUTO: 93.9 FL (ref 80–99.9)
MONOCYTES ABSOLUTE: 0.3 E9/L (ref 0.1–0.95)
MONOCYTES RELATIVE PERCENT: 7.3 % (ref 2–12)
NEUTROPHILS ABSOLUTE: 2.5 E9/L (ref 1.8–7.3)
NEUTROPHILS RELATIVE PERCENT: 60.6 % (ref 43–80)
PDW BLD-RTO: 14.1 FL (ref 11.5–15)
PLATELET # BLD: 181 E9/L (ref 130–450)
PMV BLD AUTO: 12.4 FL (ref 7–12)
POTASSIUM SERPL-SCNC: 3.9 MMOL/L (ref 3.5–5)
RBC # BLD: 4.27 E12/L (ref 3.8–5.8)
SODIUM BLD-SCNC: 144 MMOL/L (ref 132–146)
TOTAL PROTEIN: 8 G/DL (ref 6.4–8.3)
WBC # BLD: 4.1 E9/L (ref 4.5–11.5)

## 2019-05-22 PROCEDURE — 83036 HEMOGLOBIN GLYCOSYLATED A1C: CPT

## 2019-05-22 PROCEDURE — 80053 COMPREHEN METABOLIC PANEL: CPT

## 2019-05-22 PROCEDURE — 85025 COMPLETE CBC W/AUTO DIFF WBC: CPT

## 2019-05-23 ENCOUNTER — HOSPITAL ENCOUNTER (OUTPATIENT)
Dept: INTERVENTIONAL RADIOLOGY/VASCULAR | Age: 79
Discharge: HOME | End: 2019-05-25
Payer: MEDICARE

## 2019-05-23 DIAGNOSIS — R09.89 BILATERAL CAROTID BRUITS: ICD-10-CM

## 2019-05-23 PROCEDURE — 93880 EXTRACRANIAL BILAT STUDY: CPT

## 2019-08-20 ENCOUNTER — OFFICE VISIT (OUTPATIENT)
Dept: ONCOLOGY | Age: 79
End: 2019-08-20

## 2019-08-20 ENCOUNTER — DOCUMENTATION ONLY (OUTPATIENT)
Dept: ONCOLOGY | Age: 79
End: 2019-08-20

## 2019-08-20 VITALS
DIASTOLIC BLOOD PRESSURE: 73 MMHG | SYSTOLIC BLOOD PRESSURE: 132 MMHG | BODY MASS INDEX: 24.91 KG/M2 | HEART RATE: 69 BPM | RESPIRATION RATE: 17 BRPM | HEIGHT: 70 IN | TEMPERATURE: 97.8 F | WEIGHT: 174 LBS | OXYGEN SATURATION: 94 %

## 2019-08-20 DIAGNOSIS — R68.89 OTHER GENERAL SYMPTOMS AND SIGNS: ICD-10-CM

## 2019-08-20 DIAGNOSIS — D64.9 ANEMIA, UNSPECIFIED TYPE: ICD-10-CM

## 2019-08-20 DIAGNOSIS — C67.2 MALIGNANT NEOPLASM OF LATERAL WALL OF URINARY BLADDER (HCC): Primary | ICD-10-CM
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
## 2019-08-20 NOTE — PROGRESS NOTES
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 Adal Cerrato from 10/30/15 to 12/3/15    Cystectomy - 7/23/19    HISTORY OF PRESENT ILLNESS: Mr. Beryle Overland is a 78 y.o. male who presents for follow-up of bladder cancer. He has a long history of bladder cancer initially treated with transurethral resection followed by BCG treatments with initial good response. Has had a number of kidney stones and has had both cystoscopy for surveillance and for investigation of stones. In December 2014 he had a cystoscopy which revealed a mass in the bladder. On biopsy this revealed small cell features. He started chemotherapy on 4/7/15. Then underwent radiation. He had been admitted 11/18/17 with SOB. He saw Dr. Zainab Gomez - thought to be a COPD exacerbation+ possible fibrosis. He still has a cough and congestion. He is to see Dr. Jessica Bonds with ENT for chronic sinusitis. 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 Arenas Peer - recommended against cystectomy and received intravesical BCG. Has completed this and  back on surveillance . Had recurrence noted 5/2018 and TURBT was done 5/22/18. Path showed HG Urothelial cancer. He had a cystectomy on 7/23/19. Did not come for a follow up until now. He has been well. He has been following with Dr. Pattie Mayes. He is having no new pain, no HA, no blood in urine. He has no fevers, no new rashes, no new diarrhea. No nausea, vomiting, diarrhea, or constipation. Daughter with him today. He thinks he has been sent here because if anemia. His still has occasional SOB and cough. He follows with Dr. Zainab Gomez. Loosing hair and still a little tired. No Known Allergies    Current Outpatient Medications   Medication Sig Dispense Refill    cetirizine (ZYRTEC) 10 mg tablet Take 10 mg by mouth nightly.       potassium chloride (KLOR-CON) 10 mEq tablet Take 10 mEq by mouth as needed (take 1 tab by mouth when lasix are taken). take with lasix to prevent low potassium level      furosemide (LASIX) 20 mg tablet Take 20 mg by mouth daily as needed (swelling). for swelling      zolpidem (AMBIEN) 10 mg tablet Take 5 mg by mouth nightly as needed for Sleep. for sleep      HYDROcodone-acetaminophen (NORCO) 5-325 mg per tablet Take 1 Tab by mouth every six (6) hours as needed for Pain. Max Daily Amount: 4 Tabs. Indications: Pain 25 Tab 0    fluticasone (FLONASE ALLERGY RELIEF) 50 mcg/actuation nasal spray 2 Sprays by Both Nostrils route daily.  tiotropium-olodaterol (STIOLTO RESPIMAT) 2.5-2.5 mcg/actuation mist Take 2 Puffs by inhalation every morning.  albuterol-ipratropium (DUO-NEB) 2.5 mg-0.5 mg/3 ml nebu UE 1 VIAL VIA NEBULIZER EVERY 6 HOURS  1    montelukast (SINGULAIR) 10 mg tablet Take 10 mg by mouth daily.  albuterol sulfate 90 mcg/actuation aepb Take 1 Puff by inhalation every six (6) hours as needed. Indications: Chronic Obstructive Pulmonary Disease (Patient taking differently: Take 1 Puff by inhalation every six (6) hours as needed (for wheezing). for wheezing  Indications: Chronic Obstructive Pulmonary Disease) 1 Inhaler 6    sodium chloride (SALINE MIST) 0.65 % nasal spray 1 Spray as needed for Congestion.  omeprazole (PRILOSEC) 20 mg capsule Take 20 mg by mouth every morning.  amLODIPine (NORVASC) 5 mg tablet Take 5 mg by mouth every morning. Indications: HYPERTENSION      atorvastatin (LIPITOR) 20 mg tablet Take 20 mg by mouth nightly. ROS  As per the HPI, otherwise a comprehensive ROS is negative. ECOG PS is 1. Emotional well being addressed and patient is coping well.     Physical Examination:   Visit Vitals  /73   Pulse 69   Temp 97.8 °F (36.6 °C) (Oral)   Resp 17   Ht 5' 9.5\" (1.765 m)   Wt 174 lb (78.9 kg)   SpO2 94%   BMI 25.33 kg/m²     General appearance - alert, well appearing  Mental status - oriented to person, place, and time  Mouth - mucous membranes moist, pharynx normal without lesions, has false teeth  Neck - supple, no significant adenopathy  Lymphatics - no palpable lymphadenopathy, no hepatosplenomegaly  Musculoskeletal - no joint tenderness, deformity or swelling  Extremities - peripheral pulses normal, no pedal edema, no clubbing or cyanosis  Skin - normal coloration and turgor, no rashes, no suspicious skin lesions noted      LABS  Lab Results   Component Value Date/Time    WBC 9.3 07/26/2018 04:17 AM    HGB 9.6 (L) 07/26/2018 04:17 AM    HCT 29.7 (L) 07/26/2018 04:17 AM    PLATELET 147 (L) 27/18/9362 04:17 AM    MCV 86.8 07/26/2018 04:17 AM    ABS. NEUTROPHILS 4.5 06/04/2018 01:01 PM     Lab Results   Component Value Date/Time    Sodium 142 07/26/2018 04:17 AM    Potassium 3.8 07/26/2018 04:17 AM    Chloride 109 (H) 07/26/2018 04:17 AM    CO2 24 07/26/2018 04:17 AM    Glucose 83 07/26/2018 04:17 AM    BUN 18 07/26/2018 04:17 AM    Creatinine 0.70 07/26/2018 04:17 AM    GFR est AA >60 07/26/2018 04:17 AM    GFR est non-AA >60 07/26/2018 04:17 AM    Calcium 8.4 (L) 07/26/2018 04:17 AM    BUN (POC) 20 11/27/2018 07:49 PM    Calcium, ionized (POC) 1.18 11/27/2018 07:49 PM     Lab Results   Component Value Date/Time    AST (SGOT) 16 07/10/2018 12:56 PM    Alk. phosphatase 97 07/10/2018 12:56 PM    Protein, total 7.8 07/10/2018 12:56 PM    Albumin 2.7 (L) 07/10/2018 12:56 PM    Globulin 5.1 (H) 07/10/2018 12:56 PM    A-G Ratio 0.5 (L) 07/10/2018 12:56 PM     PATHOLOGY  FNA of mediastinal node at station 7 on 9/9/15 ([id number]) with atypical cells. 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 Invasion is not convincingly demonstrated. TURBT 5/22/18     1. Urinary bladder, right lateral wall, biopsy:   No evidence for malignancy.    2. Urinary bladder, posterior wall, biopsy:   No evidence for malignancy. 3. Urinary bladder, dome, biopsy:   Scant atypical urothelial cells, favor carcinoma in situ. 4. Urinary bladder, left lateral wall, excision:   Invasive high-grade urothelial carcinoma, invading muscularis propria. IMAGING  CT C/A/P on 5/3/18  IMPRESSION  IMPRESSION:   1. Left urinary bladder wall thickening and enhancement slightly increased. 2. Enhancing nodule in the right lobe of the prostate gland unchanged. 3. No evidence of new metastatic disease. 4. Severe emphysema with pulmonary fibrosis. 5. Calcified left lower lobe granuloma and mediastinal lymph nodes indicative of  old granulomatous disease. 6. Mediastinal adenopathy unchanged. 7. Atherosclerotic abdominal aorta with aneurysm unchanged. 8. Right renal cyst.  9. Status post left inguinal hernia repair. 10. Thoracolumbar spondylosis. ASSESSMENT  Mr. Sera Miles is a 78 y.o. male with bladder cancer which on biopsy revealed high grade urothelial carcinoma with small cell features. CTs reveal multiple borderline enlarged nodes throughout the chest, abdomen, and pelvis. Received carboplatin/etoposide followed by radiation. Had been on surveillance and now has a muscle invasive transitional carcinoma of bladder    DISCUSSION/PLAN  1. Bladder cancer with small cell features. He was treated with chemo and radiation. Had recurrent CIS detected in March 2017. No cystectomy recommended and received intravesical BCG. Recurrence in the form of HGMIBC-Urothelial as diagnosed by what appears to be a complete TURBT by Dr. Efrain George on 5/22/18. See below    2. Recurrent bladder cancer- MIBC urothelial- T2N0M0- 2018    See extensive prior discussions  He ultimately had a cystectomy on 7/23/18- pT2N0 HG MIBC . Lost to follow up  He has been doing well    He understands the need for surveillance. He follows with Dr. Vee Gomez periodically. I would continue to get CT scans every 6 months       3. Thrombocytopenia. resolved    4. Shortness of breath and ILD, has underlying C OPD. Follows with Dr. Cassandra Avilez    5. Shepherd's esophagus.      6. Anemia    His Hb post op was 9.6 g/dl  Baseline 11-12 g/dl  Now 10.5 g/dl  Will check iron studies and B12  If deficient will replace  Otherwise will monitor    I will call him with results of his cbc  Otherwise see in 6 months with CT  He has refused IV contrast    I spent > 50% of this 25 min face to face encounter in counseling and care co Lala Fernandez MD

## 2019-08-20 NOTE — PROGRESS NOTES
Donna Hart is a 78 y.o. male  Chief Complaint   Patient presents with    Follow-up    Bladder Cancer     1. Have you been to the ER, urgent care clinic since your last visit? Hospitalized since your last visit? No    2. Have you seen or consulted any other health care providers outside of the [address] [place] [address] since your last visit? Include any pap smears or colon screening.  No

## 2019-09-04 RX ORDER — SODIUM CHLORIDE 9 MG/ML
10 INJECTION INTRAMUSCULAR; INTRAVENOUS; SUBCUTANEOUS AS NEEDED
Status: CANCELLED | OUTPATIENT
Start: 2019-09-13

## 2019-09-04 RX ORDER — DIPHENHYDRAMINE HYDROCHLORIDE 50 MG/ML
50 INJECTION, SOLUTION INTRAMUSCULAR; INTRAVENOUS AS NEEDED
Status: CANCELLED
Start: 2019-09-20

## 2019-09-04 RX ORDER — SODIUM CHLORIDE 0.9 % (FLUSH) 0.9 %
10 SYRINGE (ML) INJECTION AS NEEDED
Status: CANCELLED
Start: 2019-09-13

## 2019-09-04 RX ORDER — ACETAMINOPHEN 325 MG/1
650 TABLET ORAL AS NEEDED
Status: CANCELLED
Start: 2019-09-20

## 2019-09-04 RX ORDER — ACETAMINOPHEN 325 MG/1
650 TABLET ORAL AS NEEDED
Status: CANCELLED
Start: 2019-09-13

## 2019-09-04 RX ORDER — ALBUTEROL SULFATE 0.83 MG/ML
2.5 SOLUTION RESPIRATORY (INHALATION) AS NEEDED
Status: CANCELLED
Start: 2019-09-20

## 2019-09-04 RX ORDER — EPINEPHRINE 1 MG/ML
0.3 INJECTION, SOLUTION, CONCENTRATE INTRAVENOUS AS NEEDED
Status: CANCELLED | OUTPATIENT
Start: 2019-09-13

## 2019-09-04 RX ORDER — ONDANSETRON 2 MG/ML
8 INJECTION INTRAMUSCULAR; INTRAVENOUS AS NEEDED
Status: CANCELLED | OUTPATIENT
Start: 2019-09-20

## 2019-09-04 RX ORDER — DIPHENHYDRAMINE HYDROCHLORIDE 50 MG/ML
50 INJECTION, SOLUTION INTRAMUSCULAR; INTRAVENOUS AS NEEDED
Status: CANCELLED
Start: 2019-09-13

## 2019-09-04 RX ORDER — HEPARIN 100 UNIT/ML
300-500 SYRINGE INTRAVENOUS AS NEEDED
Status: CANCELLED
Start: 2019-09-13

## 2019-09-04 RX ORDER — HYDROCORTISONE SODIUM SUCCINATE 100 MG/2ML
100 INJECTION, POWDER, FOR SOLUTION INTRAMUSCULAR; INTRAVENOUS AS NEEDED
Status: CANCELLED | OUTPATIENT
Start: 2019-09-13

## 2019-09-04 RX ORDER — ALBUTEROL SULFATE 0.83 MG/ML
2.5 SOLUTION RESPIRATORY (INHALATION) AS NEEDED
Status: CANCELLED
Start: 2019-09-13

## 2019-09-04 RX ORDER — HEPARIN 100 UNIT/ML
300-500 SYRINGE INTRAVENOUS AS NEEDED
Status: CANCELLED
Start: 2019-09-20

## 2019-09-04 RX ORDER — EPINEPHRINE 1 MG/ML
0.3 INJECTION, SOLUTION, CONCENTRATE INTRAVENOUS AS NEEDED
Status: CANCELLED | OUTPATIENT
Start: 2019-09-20

## 2019-09-04 RX ORDER — HYDROCORTISONE SODIUM SUCCINATE 100 MG/2ML
100 INJECTION, POWDER, FOR SOLUTION INTRAMUSCULAR; INTRAVENOUS AS NEEDED
Status: CANCELLED | OUTPATIENT
Start: 2019-09-20

## 2019-09-04 RX ORDER — SODIUM CHLORIDE 0.9 % (FLUSH) 0.9 %
10 SYRINGE (ML) INJECTION AS NEEDED
Status: CANCELLED
Start: 2019-09-20

## 2019-09-04 RX ORDER — SODIUM CHLORIDE 9 MG/ML
10 INJECTION INTRAMUSCULAR; INTRAVENOUS; SUBCUTANEOUS AS NEEDED
Status: CANCELLED | OUTPATIENT
Start: 2019-09-20

## 2019-09-04 RX ORDER — ONDANSETRON 2 MG/ML
8 INJECTION INTRAMUSCULAR; INTRAVENOUS AS NEEDED
Status: CANCELLED | OUTPATIENT
Start: 2019-09-13

## 2019-09-13 ENCOUNTER — HOSPITAL ENCOUNTER (OUTPATIENT)
Dept: INFUSION THERAPY | Age: 79
Discharge: HOME | End: 2019-09-13
Payer: MEDICARE

## 2019-09-13 VITALS
TEMPERATURE: 97.9 F | RESPIRATION RATE: 16 BRPM | SYSTOLIC BLOOD PRESSURE: 142 MMHG | HEART RATE: 65 BPM | DIASTOLIC BLOOD PRESSURE: 65 MMHG

## 2019-09-13 DIAGNOSIS — D64.9 ANEMIA, UNSPECIFIED TYPE: Primary | ICD-10-CM

## 2019-09-13 PROCEDURE — 96365 THER/PROPH/DIAG IV INF INIT: CPT

## 2019-09-13 PROCEDURE — 74011250636 HC RX REV CODE- 250/636: Performed by: REGISTERED NURSE

## 2019-09-13 PROCEDURE — 74011000258 HC RX REV CODE- 258: Performed by: REGISTERED NURSE

## 2019-09-13 RX ORDER — HEPARIN 100 UNIT/ML
300-500 SYRINGE INTRAVENOUS AS NEEDED
Status: DISCONTINUED | OUTPATIENT
Start: 2019-09-13 | End: 2019-09-14 | Stop reason: HOSPADM

## 2019-09-13 RX ORDER — SODIUM CHLORIDE 9 MG/ML
500 INJECTION, SOLUTION INTRAVENOUS ONCE
Status: COMPLETED | OUTPATIENT
Start: 2019-09-13 | End: 2019-09-13

## 2019-09-13 RX ORDER — SODIUM CHLORIDE 0.9 % (FLUSH) 0.9 %
10 SYRINGE (ML) INJECTION AS NEEDED
Status: DISCONTINUED | OUTPATIENT
Start: 2019-09-13 | End: 2019-09-14 | Stop reason: HOSPADM

## 2019-09-13 RX ORDER — SODIUM CHLORIDE 9 MG/ML
10 INJECTION INTRAMUSCULAR; INTRAVENOUS; SUBCUTANEOUS AS NEEDED
Status: DISCONTINUED | OUTPATIENT
Start: 2019-09-13 | End: 2019-09-14 | Stop reason: HOSPADM

## 2019-09-13 RX ADMIN — Medication 10 ML: at 15:49

## 2019-09-13 RX ADMIN — SODIUM CHLORIDE 100 ML: 9 INJECTION, SOLUTION INTRAVENOUS at 15:50

## 2019-09-13 RX ADMIN — SODIUM CHLORIDE 750 MG: 900 INJECTION, SOLUTION INTRAVENOUS at 15:52

## 2019-09-13 NOTE — PROGRESS NOTES
OPIC Short Note                       Date: 2019    Name: Lior Romo    MRN: [id number]         : 1940      1410 Pt admit to [facility] for Injectafer /2. Pt ambulatory in stable condition. Assessment completed. No new concerns voiced. Peripheral IV established with positive blood. Line connected to NS at [facility]. Mr. Peg Douglass vitals were reviewed prior to and after treatment. Patient Vitals for the past 12 hrs:   Temp Pulse Resp BP   19 1616 -- 65 16 142/65   19 1412 97.9 °F (36.6 °C) 75 18 149/68     Medications given:   Medications Administered     0.9% sodium chloride infusion 500 mL     Admin Date  2019 Action  New Bag Dose  100 mL Rate  25 mL/hr Route  IntraVENous Administered By  Gurpreet Toth RN          ferric carboxymaltose (INJECTAFER) 750 mg in 0.9% sodium chloride 250 mL, overfill volume 25 mL IVPB     Admin Date  2019 Action  Given Dose  750 mg Rate  870 mL/hr Route  IntraVENous Administered By  Gurpreet Toth RN          saline peripheral flush soln 10 mL     Admin Date  2019 Action  Given Dose  10 mL Route  InterCATHeter Administered By  Gurpreet Toth RN                PIV maintained positive blood return. Line flushed and removed per protocol. Mr. Kennedy Lawrence tolerated the infusion, and had no complaints. Mr. Kennedy Lawrence was discharged from [place] [address] in stable condition at 1620.      Future Appointments   Date Time Provider Yaa Rodriguez   2019  2:00 PM [facility] INFUSION NURSE 4 [facility]   10/14/2019  1:00 PM [facility] PSYCHIATRIC [place] [facility] ER [address] [facility]   2020 12:00 PM [facility] PSYCHIATRIC [place] [facility] MAIN 1 [facility]   2020  1:30 PM Jean Paul Estrada MD [address] Marie RN  2019  5:16 PM

## 2019-09-20 ENCOUNTER — HOSPITAL ENCOUNTER (OUTPATIENT)
Dept: INFUSION THERAPY | Age: 79
Discharge: HOME | End: 2019-09-20
Payer: MEDICARE

## 2019-09-20 VITALS
SYSTOLIC BLOOD PRESSURE: 140 MMHG | RESPIRATION RATE: 16 BRPM | HEART RATE: 62 BPM | DIASTOLIC BLOOD PRESSURE: 62 MMHG | TEMPERATURE: 97.6 F

## 2019-09-20 DIAGNOSIS — D64.9 ANEMIA, UNSPECIFIED TYPE: Primary | ICD-10-CM

## 2019-09-20 PROCEDURE — 74011250636 HC RX REV CODE- 250/636: Performed by: REGISTERED NURSE

## 2019-09-20 PROCEDURE — 96365 THER/PROPH/DIAG IV INF INIT: CPT

## 2019-09-20 RX ORDER — SODIUM CHLORIDE 0.9 % (FLUSH) 0.9 %
10 SYRINGE (ML) INJECTION AS NEEDED
Status: DISCONTINUED | OUTPATIENT
Start: 2019-09-20 | End: 2019-09-21 | Stop reason: HOSPADM

## 2019-09-20 RX ORDER — SODIUM CHLORIDE 9 MG/ML
10 INJECTION INTRAMUSCULAR; INTRAVENOUS; SUBCUTANEOUS AS NEEDED
Status: DISCONTINUED | OUTPATIENT
Start: 2019-09-20 | End: 2019-09-21 | Stop reason: HOSPADM

## 2019-09-20 RX ORDER — HEPARIN 100 UNIT/ML
300-500 SYRINGE INTRAVENOUS AS NEEDED
Status: DISCONTINUED | OUTPATIENT
Start: 2019-09-20 | End: 2019-09-21 | Stop reason: HOSPADM

## 2019-09-20 RX ADMIN — SODIUM CHLORIDE 10 ML: 9 INJECTION INTRAMUSCULAR; INTRAVENOUS; SUBCUTANEOUS at 14:20

## 2019-09-20 RX ADMIN — SODIUM CHLORIDE 750 MG: 900 INJECTION, SOLUTION INTRAVENOUS at 15:30

## 2019-09-20 RX ADMIN — SODIUM CHLORIDE 500 ML: 900 INJECTION, SOLUTION INTRAVENOUS at 15:36

## 2019-09-20 NOTE — PROGRESS NOTES
Outpatient Infusion Center Progress Note    7826 Pt admit to [facility] ambulatory in stable condition. Assessment completed. No new concerns voiced. Peripheral IV established and meds ordered from pharmacy. Visit Vitals  /62 (BP 1 Location: Left arm, BP Patient Position: At rest)   Pulse 62   Temp 97.6 °F (36.4 °C)   Resp 16       Medications Administered     ferric carboxymaltose (INJECTAFER) 750 mg in 0.9% sodium chloride 250 mL, overfill volume 25 mL IVPB     Admin Date  09/20/2019 Action  Given Dose  750 mg Rate  870 mL/hr Route  IntraVENous Administered By  Posterous Finely          sodium chloride 0.9 % bolus infusion 500 mL     Admin Date  09/20/2019 Action  New Bag Dose  500 mL Route  IntraVENous Administered By  Posterous Finely          sodium chloride 0.9% injection 10 mL     Admin Date  09/20/2019 Action  Given Dose  10 mL Route  IntraVENous Administered By  Posterous Finely                1610 Pt tolerated treatment well. IV removed. D/c home ambulatory in no distress.

## 2019-09-24 DIAGNOSIS — D64.9 ANEMIA, UNSPECIFIED TYPE: Primary | ICD-10-CM

## 2019-09-26 ENCOUNTER — TELEPHONE (OUTPATIENT)
Dept: ONCOLOGY | Age: 79
End: 2019-09-26

## 2019-09-27 ENCOUNTER — DOCUMENTATION ONLY (OUTPATIENT)
Dept: ONCOLOGY | Age: 79
End: 2019-09-27

## 2019-09-27 NOTE — PROGRESS NOTES
Please look at my last note  I dont understand the reason for confusion  Dr. Sally Shelley labs are independent of what I do    I only needed him to come back 6 months from august with CT scans  I will order labs at that time  I have asked him to call Dr. Sally Shelley office and discuss what labs they need    He apologized for his frustration earlier and stated he appreciated us

## 2019-10-07 ENCOUNTER — HOSPITAL ENCOUNTER (OUTPATIENT)
Dept: LAB | Age: 79
Discharge: HOME | End: 2019-10-07
Payer: MEDICARE

## 2019-10-07 PROCEDURE — 36415 COLL VENOUS BLD VENIPUNCTURE: CPT

## 2019-10-07 PROCEDURE — 83550 IRON BINDING TEST: CPT

## 2019-10-07 PROCEDURE — 85025 COMPLETE CBC W/AUTO DIFF WBC: CPT

## 2019-10-07 PROCEDURE — 80053 COMPREHEN METABOLIC PANEL: CPT

## 2019-10-07 PROCEDURE — 82728 ASSAY OF FERRITIN: CPT

## 2019-10-08 LAB
ALBUMIN SERPL-MCNC: 4 G/DL (ref 3.5–4.8)
ALBUMIN/GLOB SERPL: 1.3 {RATIO} (ref 1.2–2.2)
ALP SERPL-CCNC: 98 IU/L (ref 39–117)
ALT SERPL-CCNC: 12 IU/L (ref 0–44)
AST SERPL-CCNC: 21 IU/L (ref 0–40)
BASOPHILS # BLD AUTO: 0.1 X10E3/UL (ref 0–0.2)
BASOPHILS NFR BLD AUTO: 1 %
BILIRUB SERPL-MCNC: 0.7 MG/DL (ref 0–1.2)
BUN SERPL-MCNC: 19 MG/DL (ref 8–27)
BUN/CREAT SERPL: 27 (ref 10–24)
CALCIUM SERPL-MCNC: 8.6 MG/DL (ref 8.6–10.2)
CHLORIDE SERPL-SCNC: 108 MMOL/L (ref 96–106)
CO2 SERPL-SCNC: 20 MMOL/L (ref 20–29)
CREAT SERPL-MCNC: 0.71 MG/DL (ref 0.76–1.27)
EOSINOPHIL # BLD AUTO: 0.3 X10E3/UL (ref 0–0.4)
EOSINOPHIL NFR BLD AUTO: 3 %
ERYTHROCYTE [DISTWIDTH] IN BLOOD BY AUTOMATED COUNT: 20.2 % (ref 12.3–15.4)
FERRITIN SERPL-MCNC: 559 NG/ML (ref 30–400)
GLOBULIN SER CALC-MCNC: 3.1 G/DL (ref 1.5–4.5)
GLUCOSE SERPL-MCNC: 91 MG/DL (ref 65–99)
HCT VFR BLD AUTO: 40 % (ref 37.5–51)
HGB BLD-MCNC: 12.6 G/DL (ref 13–17.7)
IMM GRANULOCYTES # BLD AUTO: 0 X10E3/UL (ref 0–0.1)
IMM GRANULOCYTES NFR BLD AUTO: 0 %
IRON SATN MFR SERPL: 25 % (ref 15–55)
IRON SERPL-MCNC: 58 UG/DL (ref 38–169)
LYMPHOCYTES # BLD AUTO: 0.9 X10E3/UL (ref 0.7–3.1)
LYMPHOCYTES NFR BLD AUTO: 12 %
MCH RBC QN AUTO: 26.8 PG (ref 26.6–33)
MCHC RBC AUTO-ENTMCNC: 31.5 G/DL (ref 31.5–35.7)
MCV RBC AUTO: 85 FL (ref 79–97)
MONOCYTES # BLD AUTO: 0.8 X10E3/UL (ref 0.1–0.9)
MONOCYTES NFR BLD AUTO: 11 %
NEUTROPHILS # BLD AUTO: 5.5 X10E3/UL (ref 1.4–7)
NEUTROPHILS NFR BLD AUTO: 73 %
PLATELET # BLD AUTO: 153 X10E3/UL (ref 150–450)
POTASSIUM SERPL-SCNC: 4 MMOL/L (ref 3.5–5.2)
PROT SERPL-MCNC: 7.1 G/DL (ref 6–8.5)
RBC # BLD AUTO: 4.7 X10E6/UL (ref 4.14–5.8)
SODIUM SERPL-SCNC: 140 MMOL/L (ref 134–144)
TIBC SERPL-MCNC: 236 UG/DL (ref 250–450)
UIBC SERPL-MCNC: 178 UG/DL (ref 111–343)
WBC # BLD AUTO: 7.5 X10E3/UL (ref 3.4–10.8)

## 2019-10-14 ENCOUNTER — HOSPITAL ENCOUNTER (OUTPATIENT)
Dept: CT IMAGING | Age: 79
Discharge: HOME | End: 2019-10-14
Attending: UROLOGY
Payer: MEDICARE

## 2019-10-14 DIAGNOSIS — C67.9 BLADDER CANCER (HCC): ICD-10-CM

## 2019-10-14 DIAGNOSIS — R91.8 MASS OF UPPER LOBE OF RIGHT LUNG: ICD-10-CM

## 2019-10-14 PROCEDURE — 74177 CT ABD & PELVIS W/CONTRAST: CPT

## 2019-10-14 PROCEDURE — 74011636320 HC RX REV CODE- 636/320: Performed by: RADIOLOGY

## 2019-10-14 PROCEDURE — 74011000258 HC RX REV CODE- 258: Performed by: RADIOLOGY

## 2019-10-14 PROCEDURE — 71260 CT THORAX DX C+: CPT

## 2019-10-14 RX ORDER — SODIUM CHLORIDE 0.9 % (FLUSH) 0.9 %
10 SYRINGE (ML) INJECTION
Status: COMPLETED | OUTPATIENT
Start: 2019-10-14 | End: 2019-10-14

## 2019-10-14 RX ADMIN — SODIUM CHLORIDE 100 ML: 900 INJECTION, SOLUTION INTRAVENOUS at 14:21

## 2019-10-14 RX ADMIN — Medication 10 ML: at 14:21

## 2019-10-14 RX ADMIN — IOPAMIDOL 100 ML: 755 INJECTION, SOLUTION INTRAVENOUS at 14:21

## 2019-12-24 DIAGNOSIS — D64.9 ANEMIA, UNSPECIFIED TYPE: ICD-10-CM

## 2020-01-09 DIAGNOSIS — C67.2 MALIGNANT NEOPLASM OF LATERAL WALL OF URINARY BLADDER (HCC): Primary | ICD-10-CM

## 2020-02-04 ENCOUNTER — HOSPITAL ENCOUNTER (OUTPATIENT)
Dept: CT IMAGING | Age: 80
Discharge: HOME | End: 2020-02-04
Attending: INTERNAL MEDICINE
Payer: MEDICARE

## 2020-02-04 DIAGNOSIS — C67.2 MALIGNANT NEOPLASM OF LATERAL WALL OF URINARY BLADDER (HCC): ICD-10-CM

## 2020-02-04 DIAGNOSIS — D64.9 ANEMIA, UNSPECIFIED TYPE: ICD-10-CM

## 2020-02-04 PROCEDURE — 71250 CT THORAX DX C-: CPT

## 2020-02-04 PROCEDURE — 74176 CT ABD & PELVIS W/O CONTRAST: CPT

## 2020-02-10 ENCOUNTER — OFFICE VISIT (OUTPATIENT)
Dept: ONCOLOGY | Age: 80
End: 2020-02-10

## 2020-02-10 VITALS
TEMPERATURE: 98 F | HEART RATE: 76 BPM | WEIGHT: 179 LBS | DIASTOLIC BLOOD PRESSURE: 80 MMHG | OXYGEN SATURATION: 76 % | BODY MASS INDEX: 26.51 KG/M2 | HEIGHT: 69 IN | SYSTOLIC BLOOD PRESSURE: 147 MMHG

## 2020-02-10 DIAGNOSIS — C67.9 MALIGNANT NEOPLASM OF URINARY BLADDER, UNSPECIFIED SITE (HCC): Primary | ICD-10-CM

## 2020-02-10 DIAGNOSIS — C67.2 MALIGNANT NEOPLASM OF LATERAL WALL OF URINARY BLADDER (HCC): ICD-10-CM

## 2020-02-10 DIAGNOSIS — D69.6 THROMBOCYTOPENIA (HCC): ICD-10-CM

## 2020-02-10 NOTE — PROGRESS NOTES
Jian Alfaro is a 78 y.o. male  Chief Complaint   Patient presents with    Follow-up    Bladder Cancer   1. Have you been to the ER, urgent care clinic since your last visit? Hospitalized since your last visit? 2. Have you seen or consulted any other health care providers outside of the [address] [place] [address] since your last visit? Include any pap smears or colon screening.

## 2020-02-10 NOTE — PROGRESS NOTES
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 Sofi Mitchell from 10/30/15 to 12/3/15    Cystectomy - 7/23/19    HISTORY OF PRESENT ILLNESS: Mr. Kianna Moar is a 78 y.o. male who presents for follow-up of bladder cancer. He has a long history of bladder cancer initially treated with transurethral resection followed by BCG treatments with initial good response. Has had a number of kidney stones and has had both cystoscopy for surveillance and for investigation of stones. In December 2014 he had a cystoscopy which revealed a mass in the bladder. On biopsy this revealed small cell features. He started chemotherapy on 4/7/15. Then underwent radiation. He had been admitted 11/18/17 with SOB. He saw Dr. Anuel Buckley - thought to be a COPD exacerbation+ possible fibrosis. He still has a cough and congestion. He is to see Dr. Mervin Hein with ENT for chronic sinusitis. 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 Jennifer Sebastian - recommended against cystectomy and received intravesical BCG. Completed this and  back on surveillance . Had recurrence noted 5/2018 and TURBT was done 5/22/18. Path showed HG Urothelial cancer. He had a cystectomy on 7/23/18. Did not come for a follow up until 8/2019. At that time we discussed surveillance. He was also treated for post op iron deficiency anemia and received Injectafer x 2 in 2019. He comes for a follow up. He has no bleeding. He does not think his stools are not dark. He has no blood in his urine. He has stable SOB, he has a parastomal hernia. He has had no HA. He has no falls. He has no fevers, no new rashes, no new diarrhea. No nausea, vomiting, diarrhea, or constipation. He follows with Dr. Anuel Buckley.     No Known Allergies    Current Outpatient Medications   Medication Sig Dispense Refill    cetirizine (ZYRTEC) 10 mg tablet Take 10 mg by mouth nightly.  zolpidem (AMBIEN) 10 mg tablet Take 5 mg by mouth nightly as needed for Sleep. for sleep      albuterol-ipratropium (DUO-NEB) 2.5 mg-0.5 mg/3 ml nebu UE 1 VIAL VIA NEBULIZER EVERY 6 HOURS  1    omeprazole (PRILOSEC) 20 mg capsule Take 20 mg by mouth every morning.  atorvastatin (LIPITOR) 20 mg tablet Take 20 mg by mouth nightly.  potassium chloride (KLOR-CON) 10 mEq tablet Take 10 mEq by mouth as needed (take 1 tab by mouth when lasix are taken). take with lasix to prevent low potassium level      furosemide (LASIX) 20 mg tablet Take 20 mg by mouth daily as needed (swelling). for swelling      HYDROcodone-acetaminophen (NORCO) 5-325 mg per tablet Take 1 Tab by mouth every six (6) hours as needed for Pain. Max Daily Amount: 4 Tabs. Indications: Pain 25 Tab 0    fluticasone (FLONASE ALLERGY RELIEF) 50 mcg/actuation nasal spray 2 Sprays by Both Nostrils route daily.  tiotropium-olodaterol (STIOLTO RESPIMAT) 2.5-2.5 mcg/actuation mist Take 2 Puffs by inhalation every morning.  montelukast (SINGULAIR) 10 mg tablet Take 10 mg by mouth daily.  albuterol sulfate 90 mcg/actuation aepb Take 1 Puff by inhalation every six (6) hours as needed. Indications: Chronic Obstructive Pulmonary Disease (Patient taking differently: Take 1 Puff by inhalation every six (6) hours as needed (for wheezing). for wheezing  Indications: Chronic Obstructive Pulmonary Disease) 1 Inhaler 6    sodium chloride (SALINE MIST) 0.65 % nasal spray 1 Spray as needed for Congestion.  amLODIPine (NORVASC) 5 mg tablet Take 5 mg by mouth every morning. Indications: HYPERTENSION       ROS  As per the HPI, otherwise a comprehensive ROS is negative. ECOG PS is 1. Emotional well being addressed and patient is coping well.     Physical Examination:   Visit Vitals  /80 (BP 1 Location: Right arm)   Pulse 76   Temp 98 °F (36.7 °C)   Ht 5' 9\" (1.753 m)   Wt 179 lb (81.2 kg) SpO2 (!) 76%   BMI 26.43 kg/m²     General appearance - alert, well appearing  Mental status - oriented to person, place, and time  Mouth - mucous membranes moist, pharynx normal without lesions, has false teeth  Neck - supple, no significant adenopathy  Lymphatics - no palpable lymphadenopathy, no hepatosplenomegaly  Musculoskeletal - no joint tenderness, deformity or swelling  Extremities - peripheral pulses normal, no pedal edema, no clubbing or cyanosis  Skin - normal coloration and turgor, no rashes, no suspicious skin lesions noted      LABS  Lab Results   Component Value Date/Time    WBC 7.0 02/03/2020 12:34 PM    HGB 12.6 (L) 02/03/2020 12:34 PM    HCT 39.5 02/03/2020 12:34 PM    PLATELET 219 (L) 17/71/5940 12:34 PM    MCV 95 02/03/2020 12:34 PM    ABS. NEUTROPHILS 4.8 02/03/2020 12:34 PM     Lab Results   Component Value Date/Time    Sodium 140 10/07/2019 12:03 PM    Potassium 4.0 10/07/2019 12:03 PM    Chloride 108 (H) 10/07/2019 12:03 PM    CO2 20 10/07/2019 12:03 PM    Glucose 91 10/07/2019 12:03 PM    BUN 19 10/07/2019 12:03 PM    Creatinine 0.71 (L) 10/07/2019 12:03 PM    GFR est  10/07/2019 12:03 PM    GFR est non-AA 89 10/07/2019 12:03 PM    Calcium 8.6 10/07/2019 12:03 PM    BUN (POC) 20 11/27/2018 07:49 PM    Calcium, ionized (POC) 1.18 11/27/2018 07:49 PM     Lab Results   Component Value Date/Time    AST (SGOT) 21 10/07/2019 12:03 PM    Alk. phosphatase 98 10/07/2019 12:03 PM    Protein, total 7.1 10/07/2019 12:03 PM    Albumin 4.0 10/07/2019 12:03 PM    Globulin 5.1 (H) 07/10/2018 12:56 PM    A-G Ratio 1.3 10/07/2019 12:03 PM     PATHOLOGY  FNA of mediastinal node at station 7 on 9/9/15 ([id number]) with atypical cells. 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 Invasion is not convincingly demonstrated. TURBT 5/22/18     1. Urinary bladder, right lateral wall, biopsy:   No evidence for malignancy.    2. Urinary bladder, posterior wall, biopsy:   No evidence for malignancy. 3. Urinary bladder, dome, biopsy:   Scant atypical urothelial cells, favor carcinoma in situ. 4. Urinary bladder, left lateral wall, excision:   Invasive high-grade urothelial carcinoma, invading muscularis propria. Cystectomy 7/2018       URINARY BLADDER, Cystectomy   SPECIMEN   Procedure: Radical cystoprostatectomy   TUMOR   Tumor Site: Left lateral wall   Histologic Type: Urothelial carcinoma with squamous differentiation   Histologic Grade: High-grade   Tumor Size: 5.5 Centimeters (cm)   Tumor Extent   Tumor Extension: Tumor invades muscularis propria - Tumor invades   deep muscularis propria (outer half)   Accessory Findings   Lymphovascular Invasion: Not identified   MARGINS   Margins: Uninvolved by invasive carcinoma and carcinoma in situ /   noninvasive urothelial carcinoma   LYMPH NODES   Number of Lymph Nodes Involved: 0   Number of Lymph Nodes Examined: 8   PATHOLOGIC STAGE CLASSIFICATION (pTNM, AJCC 8th Edition)   TNM Descriptors: r (recurrent), y (post-treatment)   Primary Tumor (pT): pT2b   Regional Lymph Nodes (pN): pN0     IMAGING  CT C/A/P on 5/3/18  IMPRESSION  IMPRESSION:   1. Left urinary bladder wall thickening and enhancement slightly increased. 2. Enhancing nodule in the right lobe of the prostate gland unchanged. 3. No evidence of new metastatic disease. 4. Severe emphysema with pulmonary fibrosis. 5. Calcified left lower lobe granuloma and mediastinal lymph nodes indicative of  old granulomatous disease. 6. Mediastinal adenopathy unchanged. 7. Atherosclerotic abdominal aorta with aneurysm unchanged. 8. Right renal cyst.  9. Status post left inguinal hernia repair. 10. Thoracolumbar spondylosis. 2/4/2020 CT   IMPRESSION  IMPRESSION:  1. No evidence of metastatic disease in the chest, abdomen, or pelvis. 2.  Slightly smaller cavitary mass in the right upper lobe with a thick rind.   3.  Severe emphysema. 4.  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 ASSESSMENT  Mr. Lenna Canavan is a 78 y.o. male with bladder cancer which on biopsy revealed high grade urothelial carcinoma with small cell features. CTs reveal multiple borderline enlarged nodes throughout the chest, abdomen, and pelvis. Received carboplatin/etoposide followed by radiation. Had been on surveillance and now has a muscle invasive transitional carcinoma of bladder    DISCUSSION/PLAN  1. Bladder cancer with small cell features. He was treated with chemo and radiation. Had recurrent CIS detected in March 2017. No cystectomy recommended and received intravesical BCG. Recurrence in the form of HGMIBC-Urothelial as diagnosed by what appears to be a complete TURBT by Dr. Homero Dance on 5/22/18. See below    2. Recurrent bladder cancer- MIBC urothelial- T2N0M0- 2018    See extensive prior discussions  He ultimately had a cystectomy on 7/23/18- pT2N0 HG MIBC . Lost to follow up  He has been doing well. No adjuvant chemotherapy    He understands the need for surveillance. He follows with Dr. Maliha López periodically. I would continue to get CT scans every 6 months   Scans from 2/2020 reviewed and WILFRIDO  He does not want to get contrast- understands that without that Liver lesions may be missed    3. Thrombocytopenia. Intermittent - will follow    4. Shortness of breath and ILD, has underlying C OPD. Follows with Dr. Russ Gutiérrez    5. Shepherd's esophagus. 6. Anemia    His Hb post op was 9.6 g/dl  Baseline 11-12 g/dl  Was iron deficient and received 1500 mg IV. Now with correction of anemia.        Otherwise see in 6 months with CT    I spent > 50% of this 25 min face to face encounter in [facility] and care [facility] Viktoriya Urbina MD

## 2020-05-14 ENCOUNTER — HOSPITAL ENCOUNTER (OUTPATIENT)
Dept: GENERAL RADIOLOGY | Age: 80
Discharge: HOME | End: 2020-05-14
Payer: MEDICARE

## 2020-05-14 DIAGNOSIS — J44.9 CHRONIC OBSTRUCTIVE PULMONARY DISEASE (COPD) (HCC): ICD-10-CM

## 2020-05-14 PROCEDURE — 71046 X-RAY EXAM CHEST 2 VIEWS: CPT

## 2020-06-02 ENCOUNTER — HOSPITAL ENCOUNTER (INPATIENT)
Age: 80
LOS: 4 days | Discharge: HOME | DRG: 867 | End: 2020-06-06
Attending: EMERGENCY MEDICINE | Admitting: INTERNAL MEDICINE
Payer: MEDICARE

## 2020-06-02 ENCOUNTER — APPOINTMENT (OUTPATIENT)
Dept: GENERAL RADIOLOGY | Age: 80
DRG: 867 | End: 2020-06-02
Attending: NURSE PRACTITIONER
Payer: MEDICARE

## 2020-06-02 ENCOUNTER — APPOINTMENT (OUTPATIENT)
Dept: CT IMAGING | Age: 80
DRG: 867 | End: 2020-06-02
Attending: NURSE PRACTITIONER
Payer: MEDICARE

## 2020-06-02 DIAGNOSIS — J98.4 CAVITARY PNEUMONIA: Primary | ICD-10-CM

## 2020-06-02 DIAGNOSIS — J18.9 CAVITARY PNEUMONIA: Primary | ICD-10-CM

## 2020-06-02 LAB
ALBUMIN SERPL-MCNC: 2.5 G/DL (ref 3.5–5)
ALBUMIN/GLOB SERPL: 0.4 {RATIO} (ref 1.1–2.2)
ALP SERPL-CCNC: 91 U/L (ref 45–117)
ALT SERPL-CCNC: 22 U/L (ref 12–78)
ANION GAP SERPL CALC-SCNC: 7 MMOL/L (ref 5–15)
AST SERPL-CCNC: 21 U/L (ref 15–37)
BASOPHILS # BLD: 0.1 K/UL (ref 0–0.1)
BASOPHILS NFR BLD: 1 % (ref 0–1)
BILIRUB SERPL-MCNC: 0.8 MG/DL (ref 0.2–1)
BNP SERPL-MCNC: 393 PG/ML
BUN SERPL-MCNC: 19 MG/DL (ref 6–20)
BUN/CREAT SERPL: 23 (ref 12–20)
CALCIUM SERPL-MCNC: 9.6 MG/DL (ref 8.5–10.1)
CHLORIDE SERPL-SCNC: 106 MMOL/L (ref 97–108)
CHOLEST SERPL-MCNC: 124 MG/DL
CO2 SERPL-SCNC: 25 MMOL/L (ref 21–32)
COMMENT, HOLDF: NORMAL
CREAT SERPL-MCNC: 0.81 MG/DL (ref 0.7–1.3)
DIFFERENTIAL METHOD BLD: ABNORMAL
EOSINOPHIL # BLD: 0.2 K/UL (ref 0–0.4)
EOSINOPHIL NFR BLD: 2 % (ref 0–7)
ERYTHROCYTE [DISTWIDTH] IN BLOOD BY AUTOMATED COUNT: 17.2 % (ref 11.5–14.5)
GLOBULIN SER CALC-MCNC: 5.7 G/DL (ref 2–4)
GLUCOSE SERPL-MCNC: 120 MG/DL (ref 65–100)
HCT VFR BLD AUTO: 34.6 % (ref 36.6–50.3)
HDLC SERPL-MCNC: 26 MG/DL
HDLC SERPL: 4.8 {RATIO} (ref 0–5)
HGB BLD-MCNC: 10.9 G/DL (ref 12.1–17)
IMM GRANULOCYTES # BLD AUTO: 0 K/UL (ref 0–0.04)
IMM GRANULOCYTES NFR BLD AUTO: 1 % (ref 0–0.5)
LACTATE SERPL-SCNC: 1.2 MMOL/L (ref 0.4–2)
LDLC SERPL CALC-MCNC: 80.8 MG/DL (ref 0–100)
LIPID PROFILE,FLP: NORMAL
LYMPHOCYTES # BLD: 1.1 K/UL (ref 0.8–3.5)
LYMPHOCYTES NFR BLD: 14 % (ref 12–49)
MCH RBC QN AUTO: 28.5 PG (ref 26–34)
MCHC RBC AUTO-ENTMCNC: 31.5 G/DL (ref 30–36.5)
MCV RBC AUTO: 90.3 FL (ref 80–99)
MONOCYTES # BLD: 0.7 K/UL (ref 0–1)
MONOCYTES NFR BLD: 9 % (ref 5–13)
NEUTS SEG # BLD: 5.9 K/UL (ref 1.8–8)
NEUTS SEG NFR BLD: 73 % (ref 32–75)
NRBC # BLD: 0 K/UL (ref 0–0.01)
NRBC BLD-RTO: 0 PER 100 WBC
PLATELET # BLD AUTO: 193 K/UL (ref 150–400)
PMV BLD AUTO: 11 FL (ref 8.9–12.9)
POTASSIUM SERPL-SCNC: 4.2 MMOL/L (ref 3.5–5.1)
PROT SERPL-MCNC: 8.2 G/DL (ref 6.4–8.2)
RBC # BLD AUTO: 3.83 M/UL (ref 4.1–5.7)
SAMPLES BEING HELD,HOLD: NORMAL
SODIUM SERPL-SCNC: 138 MMOL/L (ref 136–145)
TRIGL SERPL-MCNC: 86 MG/DL (ref ?–150)
TROPONIN I SERPL-MCNC: <0.05 NG/ML
VLDLC SERPL CALC-MCNC: 17.2 MG/DL
WBC # BLD AUTO: 7.9 K/UL (ref 4.1–11.1)

## 2020-06-02 PROCEDURE — 74011250637 HC RX REV CODE- 250/637: Performed by: INTERNAL MEDICINE

## 2020-06-02 PROCEDURE — 85025 COMPLETE CBC W/AUTO DIFF WBC: CPT

## 2020-06-02 PROCEDURE — 74011250636 HC RX REV CODE- 250/636: Performed by: INTERNAL MEDICINE

## 2020-06-02 PROCEDURE — 65660000000 HC RM CCU STEPDOWN

## 2020-06-02 PROCEDURE — 87040 BLOOD CULTURE FOR BACTERIA: CPT

## 2020-06-02 PROCEDURE — 80053 COMPREHEN METABOLIC PANEL: CPT

## 2020-06-02 PROCEDURE — 74011636320 HC RX REV CODE- 636/320: Performed by: RADIOLOGY

## 2020-06-02 PROCEDURE — 93005 ELECTROCARDIOGRAM TRACING: CPT

## 2020-06-02 PROCEDURE — 99284 EMERGENCY DEPT VISIT MOD MDM: CPT

## 2020-06-02 PROCEDURE — 74011000258 HC RX REV CODE- 258: Performed by: RADIOLOGY

## 2020-06-02 PROCEDURE — 84484 ASSAY OF TROPONIN QUANT: CPT

## 2020-06-02 PROCEDURE — 83880 ASSAY OF NATRIURETIC PEPTIDE: CPT

## 2020-06-02 PROCEDURE — 74011000258 HC RX REV CODE- 258: Performed by: NURSE PRACTITIONER

## 2020-06-02 PROCEDURE — 80061 LIPID PANEL: CPT

## 2020-06-02 PROCEDURE — 71260 CT THORAX DX C+: CPT

## 2020-06-02 PROCEDURE — 94760 N-INVAS EAR/PLS OXIMETRY 1: CPT

## 2020-06-02 PROCEDURE — 74011250636 HC RX REV CODE- 250/636: Performed by: NURSE PRACTITIONER

## 2020-06-02 PROCEDURE — 36415 COLL VENOUS BLD VENIPUNCTURE: CPT

## 2020-06-02 PROCEDURE — 83605 ASSAY OF LACTIC ACID: CPT

## 2020-06-02 PROCEDURE — 71046 X-RAY EXAM CHEST 2 VIEWS: CPT

## 2020-06-02 PROCEDURE — 87635 SARS-COV-2 COVID-19 AMP PRB: CPT

## 2020-06-02 RX ORDER — SODIUM CHLORIDE 0.9 % (FLUSH) 0.9 %
5-40 SYRINGE (ML) INJECTION AS NEEDED
Status: DISCONTINUED | OUTPATIENT
Start: 2020-06-02 | End: 2020-06-06 | Stop reason: HOSPADM

## 2020-06-02 RX ORDER — ACETAMINOPHEN 325 MG/1
650 TABLET ORAL
Status: DISCONTINUED | OUTPATIENT
Start: 2020-06-02 | End: 2020-06-06 | Stop reason: HOSPADM

## 2020-06-02 RX ORDER — SODIUM CHLORIDE 0.9 % (FLUSH) 0.9 %
10 SYRINGE (ML) INJECTION
Status: COMPLETED | OUTPATIENT
Start: 2020-06-02 | End: 2020-06-02

## 2020-06-02 RX ORDER — SODIUM CHLORIDE 9 MG/ML
75 INJECTION, SOLUTION INTRAVENOUS CONTINUOUS
Status: DISCONTINUED | OUTPATIENT
Start: 2020-06-02 | End: 2020-06-04

## 2020-06-02 RX ORDER — MONTELUKAST SODIUM 10 MG/1
10 TABLET ORAL DAILY
Status: DISCONTINUED | OUTPATIENT
Start: 2020-06-03 | End: 2020-06-04

## 2020-06-02 RX ORDER — HYDROCODONE BITARTRATE AND ACETAMINOPHEN 5; 325 MG/1; MG/1
1 TABLET ORAL
Status: DISCONTINUED | OUTPATIENT
Start: 2020-06-02 | End: 2020-06-06 | Stop reason: HOSPADM

## 2020-06-02 RX ORDER — ONDANSETRON 2 MG/ML
4 INJECTION INTRAMUSCULAR; INTRAVENOUS
Status: DISCONTINUED | OUTPATIENT
Start: 2020-06-02 | End: 2020-06-06 | Stop reason: HOSPADM

## 2020-06-02 RX ORDER — FLUTICASONE PROPIONATE 50 MCG
2 SPRAY, SUSPENSION (ML) NASAL DAILY
Status: DISCONTINUED | OUTPATIENT
Start: 2020-06-03 | End: 2020-06-06 | Stop reason: HOSPADM

## 2020-06-02 RX ORDER — ALBUTEROL SULFATE 90 UG/1
2 AEROSOL, METERED RESPIRATORY (INHALATION) EVERY 6 HOURS
Status: DISCONTINUED | OUTPATIENT
Start: 2020-06-03 | End: 2020-06-06 | Stop reason: HOSPADM

## 2020-06-02 RX ORDER — CETIRIZINE HCL 10 MG
10 TABLET ORAL
Status: DISCONTINUED | OUTPATIENT
Start: 2020-06-02 | End: 2020-06-06 | Stop reason: HOSPADM

## 2020-06-02 RX ORDER — ZOLPIDEM TARTRATE 5 MG/1
5 TABLET ORAL
Status: DISCONTINUED | OUTPATIENT
Start: 2020-06-02 | End: 2020-06-06 | Stop reason: HOSPADM

## 2020-06-02 RX ORDER — ATORVASTATIN CALCIUM 20 MG/1
20 TABLET, FILM COATED ORAL
Status: DISCONTINUED | OUTPATIENT
Start: 2020-06-02 | End: 2020-06-06 | Stop reason: HOSPADM

## 2020-06-02 RX ORDER — IPRATROPIUM BROMIDE AND ALBUTEROL SULFATE 2.5; .5 MG/3ML; MG/3ML
3 SOLUTION RESPIRATORY (INHALATION)
Status: DISCONTINUED | OUTPATIENT
Start: 2020-06-02 | End: 2020-06-02

## 2020-06-02 RX ORDER — ALBUTEROL SULFATE 0.83 MG/ML
2.5 SOLUTION RESPIRATORY (INHALATION)
Status: CANCELLED | OUTPATIENT
Start: 2020-06-02

## 2020-06-02 RX ORDER — HEPARIN SODIUM 5000 [USP'U]/ML
5000 INJECTION, SOLUTION INTRAVENOUS; SUBCUTANEOUS EVERY 8 HOURS
Status: DISCONTINUED | OUTPATIENT
Start: 2020-06-02 | End: 2020-06-06 | Stop reason: HOSPADM

## 2020-06-02 RX ORDER — AMLODIPINE BESYLATE 5 MG/1
5 TABLET ORAL
Status: DISCONTINUED | OUTPATIENT
Start: 2020-06-03 | End: 2020-06-04

## 2020-06-02 RX ORDER — SODIUM CHLORIDE 0.9 % (FLUSH) 0.9 %
5-40 SYRINGE (ML) INJECTION EVERY 8 HOURS
Status: DISCONTINUED | OUTPATIENT
Start: 2020-06-02 | End: 2020-06-06 | Stop reason: HOSPADM

## 2020-06-02 RX ORDER — PANTOPRAZOLE SODIUM 40 MG/1
40 TABLET, DELAYED RELEASE ORAL
Status: DISCONTINUED | OUTPATIENT
Start: 2020-06-03 | End: 2020-06-06 | Stop reason: HOSPADM

## 2020-06-02 RX ORDER — VANCOMYCIN 2 GRAM/500 ML IN 0.9 % SODIUM CHLORIDE INTRAVENOUS
2000
Status: COMPLETED | OUTPATIENT
Start: 2020-06-02 | End: 2020-06-03

## 2020-06-02 RX ADMIN — CETIRIZINE HYDROCHLORIDE 10 MG: 10 TABLET, FILM COATED ORAL at 21:56

## 2020-06-02 RX ADMIN — ATORVASTATIN CALCIUM 20 MG: 20 TABLET, FILM COATED ORAL at 21:56

## 2020-06-02 RX ADMIN — VANCOMYCIN HYDROCHLORIDE 2000 MG: 10 INJECTION, POWDER, LYOPHILIZED, FOR SOLUTION INTRAVENOUS at 22:06

## 2020-06-02 RX ADMIN — IOPAMIDOL 100 ML: 612 INJECTION, SOLUTION INTRAVENOUS at 17:07

## 2020-06-02 RX ADMIN — PIPERACILLIN AND TAZOBACTAM 3.38 G: 3; .375 INJECTION, POWDER, LYOPHILIZED, FOR SOLUTION INTRAVENOUS at 18:00

## 2020-06-02 RX ADMIN — Medication 10 ML: at 17:07

## 2020-06-02 RX ADMIN — SODIUM CHLORIDE 75 ML/HR: 900 INJECTION, SOLUTION INTRAVENOUS at 21:49

## 2020-06-02 RX ADMIN — SODIUM CHLORIDE 100 ML: 900 INJECTION, SOLUTION INTRAVENOUS at 17:07

## 2020-06-02 NOTE — ED PROVIDER NOTES
This is a 80-year-old male (with an extensive past medical history listed below) who presents the ER today \"because Dr. Sita Mireles wanted me to come here and get a CT scan and get admitted\". He articulates the reason for the admission being for a chronic nocturnal cough w/ \"white foamy phlegm\", progressive shortness of breath, weight loss, and fatigue which has not responded to several rounds of antibiotics and corticosteroids. The patient's daughter, Davion Arriaza, states that they have been scheduling tele visits with Dr. Ferris Seen frequently without a clear explanation for the worsening of his symptoms. He was referred here in hopes of admission with an infectious disease consultation. Mr. Tania Orr reports being compliant with his home medications. He specifically denies chest pain, palpitations, lower extremity swelling, myalgias, arthralgias, abdominal pain, skin rash, or any recent insect bites. He is a prior smoker, however, he has been told that he has excessive scarring in his lungs from radiation and chemotherapy related to bladder cancer years ago.          Past Medical History:   Diagnosis Date    Aneurysm [facility])     ABDOMINAL     Arrhythmia 3/2/2014    Stated cardiac cath for abn EKG several years ago NEG    CAD (coronary artery disease)     Calculus of kidney     Cancer ([facility] [address]     Skin/Bladder    Chronic obstructive pulmonary disease ([facility] [address]     Contact dermatitis and other eczema, due to unspecified cause     GERD (gastroesophageal reflux disease)     ORTIZ'S ESOPH    Hyperlipemia     Hypertension     Ill-defined condition     HIATEL HERNIA    Ill-defined condition     ECZEMA    PUD (peptic ulcer disease)     Baretts Esophagus/antral gastritis/       Past Surgical History:   Procedure Laterality Date    ABDOMEN SURGERY PROC UNLISTED      UMBILICAL hernia     HX ADENOIDECTOMY      HX CATARACT REMOVAL Left 11/2016    HX COLONOSCOPY      HX GI      Colonoscopy x 2-3    HX GI      COLONOSCOPY    HX HERNIA REPAIR  13    left inguinal hernia repair, LAPAROSCOPIC  (DR JAQUEZ)    HX HERNIA REPAIR  3/14/16    LAPAROSCOPIC Incisional W/ MESH by     HX OTHER SURGICAL      REMOVAL OF SKIN CA R NECK    HX TONSILLECTOMY      HX UROLOGICAL      Bladder bx/kidney stone removal    HX UROLOGICAL  2015    CYSTO    HX VASCULAR ACCESS  2015    PORT R CHEST WALL    HX VASCULAR ACCESS      REMOVAL OF PORT R CHEST WALL         Family History:   Problem Relation Age of Onset    Heart Disease Mother     MS Sister     Heart Disease Sister     Psychiatric Disorder Sister         DEPRESSION    No Known Problems Sister     Anesth Problems Neg Hx        Social History     Socioeconomic History    Marital status:      Spouse name: Not on file    Number of children: Not on file    Years of education: Not on file    Highest education level: Not on file   Occupational History    Not on file   Social Needs    Financial resource strain: Not on file    Food insecurity     Worry: Not on file     Inability: Not on file    Transportation needs     Medical: Not on file     Non-medical: Not on file   Tobacco Use    Smoking status: Former Smoker     Packs/day: 1.00     Years: 40.00     Pack years: 40.00     Last attempt to quit: 2009     Years since quittin.3    Smokeless tobacco: Never Used   Substance and Sexual Activity    Alcohol use: No     Alcohol/week: 0.0 standard drinks     Comment: RARELY    Drug use: No    Sexual activity: Not Currently   Lifestyle    Physical activity     Days per week: Not on file     Minutes per session: Not on file    Stress: Not on file   Relationships    Social connections     Talks on phone: Not on file     Gets together: Not on file     Attends Hoahaoism service: Not on file     Active member of club or organization: Not on file     Attends meetings of clubs or organizations: Not on file     Relationship status: Not on file    Intimate partner violence     Fear of current or ex partner: Not on file     Emotionally abused: Not on file     Physically abused: Not on file     Forced sexual activity: Not on file   Other Topics Concern    Not on file   Social History Narrative    Not on file         ALLERGIES: Patient has no known allergies. Review of Systems   Constitutional: Positive for activity change, appetite change, fever and unexpected weight change. Negative for chills and fatigue. HENT: Negative for rhinorrhea, sinus pain and sore throat. Eyes: Negative for photophobia, pain and redness. Respiratory: Positive for cough and shortness of breath. Cardiovascular: Negative for chest pain, palpitations and leg swelling. Gastrointestinal: Negative for abdominal pain and vomiting. Genitourinary: Negative for dysuria. Musculoskeletal: Negative for arthralgias and myalgias. Neurological: Negative for dizziness and headaches. Hematological: Negative for adenopathy. Psychiatric/Behavioral: Negative for confusion. Vitals:    06/02/20 1419   BP: 102/60   Pulse: 94   Resp: 18   Temp: 98 °F (36.7 °C)   SpO2: 93%   Weight: 74.8 kg (165 lb)   Height: 5' 9\" (1.753 m)            Physical Exam  Constitutional:       General: He is not in acute distress. Appearance: He is not toxic-appearing. HENT:      Head: Normocephalic. Eyes:      Extraocular Movements: Extraocular movements intact. Neck:      Musculoskeletal: Normal range of motion and neck supple. Vascular: No JVD. Cardiovascular:      Rate and Rhythm: Normal rate and regular rhythm. Pulses: Normal pulses. Heart sounds: Normal heart sounds. Pulmonary:      Effort: Tachypnea present. Breath sounds: Decreased air movement present. Comments: Coarse lung sounds  Abdominal:      General: Bowel sounds are normal.      Palpations: Abdomen is soft. Musculoskeletal: Normal range of motion. Skin:     General: Skin is warm and dry.    Neurological: Mental Status: He is alert and oriented to person, place, and time. Psychiatric:         Mood and Affect: Mood normal.         Behavior: Behavior normal.          MDM    VITAL SIGNS:  Patient Vitals for the past 4 hrs:   Temp Pulse Resp BP SpO2   06/02/20 1419 98 °F (36.7 °C) 94 18 102/60 93 %         LABS:  Recent Results (from the past 6 hour(s))   CBC WITH AUTOMATED DIFF    Collection Time: 06/02/20  3:04 PM   Result Value Ref Range    WBC 7.9 4.1 - 11.1 K/uL    RBC 3.83 (L) 4.10 - 5.70 M/uL    HGB 10.9 (L) 12.1 - 17.0 g/dL    HCT 34.6 (L) 36.6 - 50.3 %    MCV 90.3 80.0 - 99.0 FL    MCH 28.5 26.0 - 34.0 PG    MCHC 31.5 30.0 - 36.5 g/dL    RDW 17.2 (H) 11.5 - 14.5 %    PLATELET 637 483 - 010 K/uL    MPV 11.0 8.9 - 12.9 FL    NRBC 0.0 0  WBC    ABSOLUTE NRBC 0.00 0.00 - 0.01 K/uL    NEUTROPHILS 73 32 - 75 %    LYMPHOCYTES 14 12 - 49 %    MONOCYTES 9 5 - 13 %    EOSINOPHILS 2 0 - 7 %    BASOPHILS 1 0 - 1 %    IMMATURE GRANULOCYTES 1 (H) 0.0 - 0.5 %    ABS. NEUTROPHILS 5.9 1.8 - 8.0 K/UL    ABS. LYMPHOCYTES 1.1 0.8 - 3.5 K/UL    ABS. MONOCYTES 0.7 0.0 - 1.0 K/UL    ABS. EOSINOPHILS 0.2 0.0 - 0.4 K/UL    ABS. BASOPHILS 0.1 0.0 - 0.1 K/UL    ABS. IMM. GRANS. 0.0 0.00 - 0.04 K/UL    DF AUTOMATED     METABOLIC PANEL, COMPREHENSIVE    Collection Time: 06/02/20  3:04 PM   Result Value Ref Range    Sodium 138 136 - 145 mmol/L    Potassium 4.2 3.5 - 5.1 mmol/L    Chloride 106 97 - 108 mmol/L    CO2 25 21 - 32 mmol/L    Anion gap 7 5 - 15 mmol/L    Glucose 120 (H) 65 - 100 mg/dL    BUN 19 6 - 20 MG/DL    Creatinine 0.81 0.70 - 1.30 MG/DL    BUN/Creatinine ratio 23 (H) 12 - 20      GFR est AA >60 >60 ml/min/1.73m2    GFR est non-AA >60 >60 ml/min/1.73m2    Calcium 9.6 8.5 - 10.1 MG/DL    Bilirubin, total 0.8 0.2 - 1.0 MG/DL    ALT (SGPT) 22 12 - 78 U/L    AST (SGOT) 21 15 - 37 U/L    Alk.  phosphatase 91 45 - 117 U/L    Protein, total 8.2 6.4 - 8.2 g/dL    Albumin 2.5 (L) 3.5 - 5.0 g/dL    Globulin 5.7 (H) 2.0 - 4.0 g/dL    A-G Ratio 0.4 (L) 1.1 - 2.2     SAMPLES BEING HELD    Collection Time: 06/02/20  3:04 PM   Result Value Ref Range    SAMPLES BEING HELD 1 red, 1 blue      COMMENT        Add-on orders for these samples will be processed based on acceptable specimen integrity and analyte stability, which may vary by analyte. TROPONIN I    Collection Time: 06/02/20  3:04 PM   Result Value Ref Range    Troponin-I, Qt. <0.05 <0.05 ng/mL   NT-PRO BNP    Collection Time: 06/02/20  3:04 PM   Result Value Ref Range    NT pro- <450 PG/ML   EKG, 12 LEAD, INITIAL    Collection Time: 06/02/20  3:13 PM   Result Value Ref Range    Ventricular Rate 79 BPM    Atrial Rate 79 BPM    P-R Interval 208 ms    QRS Duration 100 ms    Q-T Interval 366 ms    QTC Calculation (Bezet) 419 ms    Calculated P Axis 79 degrees    Calculated R Axis -57 degrees    Calculated T Axis 14 degrees    Diagnosis       Normal sinus rhythm  Left anterior fascicular block  When compared with ECG of 18-NOV-2017 16:46,  No significant change was found          IMAGING:  XR CHEST PA LAT   Final Result   IMPRESSION:   1. Cavitary lesion in the right upper lobe unchanged. Prominent official changes   at the lung bases unchanged as well. CT CHEST W CONT    (Results Pending)         Medications During Visit:  Medications   vancomycin (VANCOCIN) 2000 mg in  ml infusion (has no administration in time range)   piperacillin-tazobactam (ZOSYN) 3.375 g in 0.9% sodium chloride (MBP/ADV) 100 mL (has no administration in time range)   sodium chloride 0.9 % bolus infusion 100 mL (has no administration in time range)   iopamidoL (ISOVUE 300) 61 % contrast injection 100 mL (has no administration in time range)   sodium chloride (NS) flush 10 mL (has no administration in time range)         DECISION MAKING:  Venkat Pabon is a [de-identified] y.o. male who comes in as above. His overall physical examination is largely unremarkable.   He is able to speak in prolonged sentences without feeling dyspneic. He also reports that a significant amount of his symptoms occur at night, which could suggest more of a COPD/asthmatic component. However, the patient stated that steroids have not helped his cough, fatigue, or shortness of breath. Patient started on intravenous vancomycin and Zosyn for cavitary pneumonia. Discussed antibiotic choice with on-call pulmonologist.    He exhibits slight peripheral edema, which could be due to hypo-albuminemia. Cardiac work-up unremarkable. CBC within normal limits. 6year-old today shows Selestine Cordial that is great    4:00 PM  Stat page to Dr. Jeannie Brown per patient's request at this time. 4:45 PM  Discussed case with on call pulmonologist (Dr. Jhon Toscano), who has been in contact with Dr. Jeannie Brown about this patient. He stated that he was aware of this patient's referral to the emergency department, and that he is to be admitted for cavitary pneumonia detected on a prior CT scan. He specifically recommended that I start broad-spectrum intravenous antibiotics, for which we discussed vancomycin and Zosyn being sufficient, and obtain a CT scan of the chest with contrast.  He stated that infectious disease consult was not needed at this time and that he would see the patient tomorrow. IMPRESSION:  1. Cavitary pneumonia        DISPOSITION:  Admitted    Hospitalist Perfect Serve for Admission  4:56 PM    ED Room Number: FT5/RRE  Patient Name and age:  Uri Oro [de-identified] y.o.  male  Working Diagnosis:   1. Cavitary pneumonia        COVID-19 Suspicion:  no    Code Status:  Do Not Resuscitate  Readmission: no  Isolation Requirements:  Yes  Recommended Level of Care:  telemetry  Department:[facility] - 21   Other: Requested admission from patient's pulmonologist for cavitary pneumonia.

## 2020-06-02 NOTE — H&P
[address] [facility] Adult  Hospitalist Group  History and Physical    Primary Care Provider: Nasima Gamez NP  Date of Service:  6/2/2020    Subjective:     Chief complaint: Cough, shortness of breath, sputum and right cavitary lesion of the lung. Stacy Westbrook is a [de-identified] y.o. male who presents to the [facility] ED on recommendation of his pulmonologist, Dr. Francisco Ramirez for evaluation of right lung cavitary lesion with constitutional symptoms despite of 3 rounds of antibiotic courses, steroids, for further evaluation and management. Patient suggested to have the lesion noted on earlier CT scan of the chest without contrast in February 2020. Patient has been following with his pulmonologist and hemato-oncologist.  Patient has cough with thick mucoid whitish/yellowish sputum in moderate amount, with exertional shortness of breath with reduced capacity, occasional drenching night sweats without feverish feeling, mostly around lower abdomen/groin area, poor taste and appetite, and about 10 pounds loss of weight in the last about 4-6 weeks. Otherwise he is feeling fine. On today's visit with his pulmonologist, chest x-ray was done after which he was advised to come to ER for further evaluation. ED physician has discussed case at length with on-call pulmonologist, Dr. Viktoriya Gary. Patient was started on IV Zosyn and vancomycin empirically, CT chest with contrast ordered. Patient repeatedly said to have infectious disease consultation as recommended by his primary pulmonologist.  However on call pulmonologist recommended to wait for the CT scan results and that infectious disease consult is not warranted at present until further evaluation by him. Patient is agreeable with the plan.   Patient has history of bladder cancer and subsequently neobladder formation, for which she follows with urologist.     Patient denied any abdominal pain, nausea, vomiting, dizziness, lightheadedness, chest pain, palpitation, focal weakness, tingling or numbness, leg swelling, urinary trouble, diarrhea, constipation. Patient did mention presence of aspiration as evaluated by his gastroenterologist.  However he is not able to tolerate and be compliant with thickening as recommended. Review of Systems:    A comprehensive review of systems was negative except for that written in the History of Present Illness. Past Medical History:   Diagnosis Date    Aneurysm [facility])     ABDOMINAL     Arrhythmia 3/2/2014    Stated cardiac cath for abn EKG several years ago NEG    CAD (coronary artery disease)     Calculus of kidney     Cancer ([facility] [address]     Skin/Bladder    Chronic obstructive pulmonary disease ([facility] [address]     Contact dermatitis and other eczema, due to unspecified cause     GERD (gastroesophageal reflux disease)     ORTIZ'S ESOPH    Hyperlipemia     Hypertension     Ill-defined condition     HIATEL HERNIA    Ill-defined condition     ECZEMA    PUD (peptic ulcer disease)     Baretts Esophagus/antral gastritis/      Past Surgical History:   Procedure Laterality Date    ABDOMEN SURGERY PROC UNLISTED      UMBILICAL hernia     HX ADENOIDECTOMY      HX CATARACT REMOVAL Left 11/2016    HX COLONOSCOPY      HX GI      Colonoscopy x 2-3    HX GI      COLONOSCOPY    HX HERNIA REPAIR  8/21/13    left inguinal hernia repair, LAPAROSCOPIC  (DR JAQUEZ)    HX HERNIA REPAIR  3/14/16    LAPAROSCOPIC Incisional W/ MESH by     HX OTHER SURGICAL      REMOVAL OF SKIN CA R NECK    HX TONSILLECTOMY      HX UROLOGICAL  2010    Bladder bx/kidney stone removal    HX UROLOGICAL  2015    CYSTO    HX VASCULAR ACCESS  03/31/2015    PORT R CHEST WALL    HX VASCULAR ACCESS      REMOVAL OF PORT R CHEST WALL     Prior to Admission medications    Medication Sig Start Date End Date Taking? Authorizing Provider   cetirizine (ZYRTEC) 10 mg tablet Take 10 mg by mouth nightly.     Other, MD Mert   potassium chloride (KLOR-CON) 10 mEq tablet Take 10 mEq by mouth as needed (take 1 tab by mouth when lasix are taken). take with lasix to prevent low potassium level    Provider, Historical   furosemide (LASIX) 20 mg tablet Take 20 mg by mouth daily as needed (swelling). for swelling    Provider, Historical   zolpidem (AMBIEN) 10 mg tablet Take 5 mg by mouth nightly as needed for Sleep. for sleep 5/24/17   Provider, Historical   HYDROcodone-acetaminophen (NORCO) 5-325 mg per tablet Take 1 Tab by mouth every six (6) hours as needed for Pain. Max Daily Amount: 4 Tabs. Indications: Pain 7/26/18   Katie Wagner MD   fluticasone [facility] ALLERGY RELIEF) 50 mcg/actuation nasal spray 2 Sprays by Both Nostrils route daily. Provider, Historical   tiotropium-olodaterol (STIOLTO RESPIMAT) 2.5-2.5 mcg/actuation mist Take 2 Puffs by inhalation every morning. Provider, Historical   albuterol-ipratropium (DUO-NEB) 2.5 mg-0.5 mg/3 ml nebu UE 1 VIAL VIA NEBULIZER EVERY 6 HOURS 11/9/17   Provider, Historical   montelukast (SINGULAIR) 10 mg tablet Take 10 mg by mouth daily. 11/16/17   Provider, Historical   albuterol sulfate 90 mcg/actuation aepb Take 1 Puff by inhalation every six (6) hours as needed. Indications: Chronic Obstructive Pulmonary Disease  Patient taking differently: Take 1 Puff by inhalation every six (6) hours as needed (for wheezing). for wheezing  Indications: Chronic Obstructive Pulmonary Disease 11/22/17   Sunday LUCIANO MD   sodium chloride (SALINE MIST) 0.65 % nasal spray 1 Spray as needed for Congestion. Provider, Historical   omeprazole (PRILOSEC) 20 mg capsule Take 20 mg by mouth every morning. 5/13/15   Provider, Historical   amLODIPine (NORVASC) 5 mg tablet Take 5 mg by mouth every morning. Indications: HYPERTENSION    Provider, Historical   atorvastatin (LIPITOR) 20 mg tablet Take 20 mg by mouth nightly.     Provider, Historical     No Known Allergies   Family History   Problem Relation Age of Onset    Heart Disease Mother     MS Sister     Heart Disease Sister     Psychiatric Disorder Sister         DEPRESSION    No Known Problems Sister     Anesth Problems Neg Hx         SOCIAL HISTORY:  Patient resides at Home. Patient ambulates with no assistance. Smoking history: Former smoker, 40-pack-year history, quit for last about 11 years  Alcohol history: None        Objective:       Physical Exam:   General:          Alert, cooperative, no distress, appears stated age. HEENT:           Atraumatic, anicteric sclerae, pink conjunctivae                          No oral ulcers, mucosa moist, throat clear, dentition fair  Neck:               Supple, symmetrical, no JVD, no carotid bruit  Lungs:             Clear to auscultation bilaterally. Coarse breathing, no Wheezing or Rhonchi. No rales. Chest wall:      No tenderness  No Accessory muscle use. Heart:              Regular  rhythm,  No  murmur   No edema  Abdomen:        Soft, non-tender. Not distended. Bowel sounds normal  Extremities:     No cyanosis. No clubbing,                            Skin turgor normal, Capillary refill normal  Skin:                Not pale. Not Jaundiced  No rashes   Psych:             Not anxious or agitated. Neurologic:      Alert, moves all extremities, answers questions appropriately and responds to commands     Cap refill: Brisk, less than 3 seconds  Pulses: 2+, symmetric in all extremities    ECG: NSR, LAFB, unchanged from previous, no acute ST-T changes    Data Review: All diagnostic labs and studies have been reviewed. Radiology  Xr Chest Pa Lat    Result Date: 6/2/2020  IMPRESSION: 1. Cavitary lesion in the right upper lobe unchanged. Prominent official changes at the lung bases unchanged as well. Ct Chest W Cont    Result Date: 6/2/2020  IMPRESSION: 1. Persistent but decreased size of cavitary opacity in the right upper lobe. Increased multiple nodular opacities around the cavitary lesion. 2.  Mediastinal adenopathy without significant change. Increased size of right hilar lymph node. This may be reactive. Recent Results (from the past 24 hour(s))   CBC WITH AUTOMATED DIFF    Collection Time: 06/02/20  3:04 PM   Result Value Ref Range    WBC 7.9 4.1 - 11.1 K/uL    RBC 3.83 (L) 4.10 - 5.70 M/uL    HGB 10.9 (L) 12.1 - 17.0 g/dL    HCT 34.6 (L) 36.6 - 50.3 %    MCV 90.3 80.0 - 99.0 FL    MCH 28.5 26.0 - 34.0 PG    MCHC 31.5 30.0 - 36.5 g/dL    RDW 17.2 (H) 11.5 - 14.5 %    PLATELET 206 112 - 887 K/uL    MPV 11.0 8.9 - 12.9 FL    NRBC 0.0 0  WBC    ABSOLUTE NRBC 0.00 0.00 - 0.01 K/uL    NEUTROPHILS 73 32 - 75 %    LYMPHOCYTES 14 12 - 49 %    MONOCYTES 9 5 - 13 %    EOSINOPHILS 2 0 - 7 %    BASOPHILS 1 0 - 1 %    IMMATURE GRANULOCYTES 1 (H) 0.0 - 0.5 %    ABS. NEUTROPHILS 5.9 1.8 - 8.0 K/UL    ABS. LYMPHOCYTES 1.1 0.8 - 3.5 K/UL    ABS. MONOCYTES 0.7 0.0 - 1.0 K/UL    ABS. EOSINOPHILS 0.2 0.0 - 0.4 K/UL    ABS. BASOPHILS 0.1 0.0 - 0.1 K/UL    ABS. IMM. GRANS. 0.0 0.00 - 0.04 K/UL    DF AUTOMATED     METABOLIC PANEL, COMPREHENSIVE    Collection Time: 06/02/20  3:04 PM   Result Value Ref Range    Sodium 138 136 - 145 mmol/L    Potassium 4.2 3.5 - 5.1 mmol/L    Chloride 106 97 - 108 mmol/L    CO2 25 21 - 32 mmol/L    Anion gap 7 5 - 15 mmol/L    Glucose 120 (H) 65 - 100 mg/dL    BUN 19 6 - 20 MG/DL    Creatinine 0.81 0.70 - 1.30 MG/DL    BUN/Creatinine ratio 23 (H) 12 - 20      GFR est AA >60 >60 ml/min/1.73m2    GFR est non-AA >60 >60 ml/min/1.73m2    Calcium 9.6 8.5 - 10.1 MG/DL    Bilirubin, total 0.8 0.2 - 1.0 MG/DL    ALT (SGPT) 22 12 - 78 U/L    AST (SGOT) 21 15 - 37 U/L    Alk.  phosphatase 91 45 - 117 U/L    Protein, total 8.2 6.4 - 8.2 g/dL    Albumin 2.5 (L) 3.5 - 5.0 g/dL    Globulin 5.7 (H) 2.0 - 4.0 g/dL    A-G Ratio 0.4 (L) 1.1 - 2.2     SAMPLES BEING HELD    Collection Time: 06/02/20  3:04 PM   Result Value Ref Range    SAMPLES BEING HELD 1 red, 1 blue      COMMENT        Add-on orders for these samples will be processed based on acceptable specimen integrity and analyte stability, which may vary by analyte. TROPONIN I    Collection Time: 06/02/20  3:04 PM   Result Value Ref Range    Troponin-I, Qt. <0.05 <0.05 ng/mL   NT-PRO BNP    Collection Time: 06/02/20  3:04 PM   Result Value Ref Range    NT pro- <450 PG/ML   EKG, 12 LEAD, INITIAL    Collection Time: 06/02/20  3:13 PM   Result Value Ref Range    Ventricular Rate 79 BPM    Atrial Rate 79 BPM    P-R Interval 208 ms    QRS Duration 100 ms    Q-T Interval 366 ms    QTC Calculation (Bezet) 419 ms    Calculated P Axis 79 degrees    Calculated R Axis -57 degrees    Calculated T Axis 14 degrees    Diagnosis       Normal sinus rhythm  Left anterior fascicular block  When compared with ECG of 18-NOV-2017 16:46,  No significant change was found     LACTIC ACID    Collection Time: 06/02/20  5:29 PM   Result Value Ref Range    Lactic acid 1.2 0.4 - 2.0 MMOL/L         Assessment:     Active Problems:    Cavitary lesion of lung (6/2/2020)      PNA (pneumonia) (6/2/2020)      #Right upper lobe cavitary lung lesion with multiple nodular opacities in surrounding with cough/sputum/constitutional symptoms, likely  Pneumonia. Differential: Infectionbacterial/fungal/granulomatous versus chronic inflammatory cause versus neoplastic etiology  -Admit to inpatient, remote telemetry, ALOS> 2 MN  -Empiric Zosyn and vancomycin, pharmacy consult for dosing  -CT chest with contrastalready ordered by ED per pulmonology recommendation  -Pulmonology consultalready ordered by ED and discussion as noted  -Consider ID consult as needed and if pulmonology recommendation  -? Aspiration causing recurrent aggravation not a typical location for the same. Consider ST eval, if recommended by pulmonology or clinical concerns. -CBC, CMP, ESR, CRP  -Sputum culture. Defer PPD/AFB/QuantiFERON gold etc. to pulmonology  -Other specific inflammatory markers/serological work-up defer to pulmonology  -?   Need for biopsy consideration. Defer to pulmonology  -Droplet plus isolation. Less likely, but COVID-19 testing ordered by ED.  -NS IVF for 12-24 hours for 12L, given use of contrast.    #Mediastinal lymphadenopathy with increased right hilar lymph node compared to previous study  -Defer to pulmonology    #COPD, not in exacerbation  -Albuterol inhaler until COVID-19 ruled out, subsequently transitioned to nebulizer treatment with DuoNeb's, PRN albuterol nebs  -Supportive care, supplemental O2 as needed    #History of recurrent urinary bladder cancer, status post radical cystoprostatectomy with neobladder formation, history of chemo XRT  -Follows with Dr. Antonio Ramirez  -Consider hemato-oncology consultation as needed    #Chronic anemia, baseline 1112 g/dL  -Hb 10.9  -CBC monitoring  -Consider iron panel, L64, folic acid levels, if Hb drops further    #Thrombocytopenia, intermittent, stable    Continue other home medications. Hold Lasix given use of contrast.  Hold potassium supplementation. Resume as appropriate. Will add home Norco as needed.     CODE STATUS: Full code  DVT prophylaxis: Heparin SQ  Emergency contact: DaughterRoro: [phone]        Plan:     As noted above  FUNCTIONAL STATUS PRIOR TO HOSPITALIZATION Ambulates Independently (including history of recent falls)     Signed By: Sheridan Cleaning MD     June 2, 2020

## 2020-06-02 NOTE — ED TRIAGE NOTES
Patient arrives ambulatory complaining of shortness of breath, cough and weight loss for about a month. Reports calling Dr. Eliane Opitz today and told to come to ED.

## 2020-06-03 ENCOUNTER — ANESTHESIA (OUTPATIENT)
Dept: ENDOSCOPY | Age: 80
DRG: 867 | End: 2020-06-03
Payer: MEDICARE

## 2020-06-03 ENCOUNTER — ANESTHESIA EVENT (OUTPATIENT)
Dept: ENDOSCOPY | Age: 80
DRG: 867 | End: 2020-06-03
Payer: MEDICARE

## 2020-06-03 LAB
ALBUMIN SERPL-MCNC: 2.2 G/DL (ref 3.5–5)
ALBUMIN/GLOB SERPL: 0.4 {RATIO} (ref 1.1–2.2)
ALP SERPL-CCNC: 84 U/L (ref 45–117)
ALT SERPL-CCNC: 19 U/L (ref 12–78)
ANION GAP SERPL CALC-SCNC: 8 MMOL/L (ref 5–15)
AST SERPL-CCNC: 30 U/L (ref 15–37)
ATRIAL RATE: 79 BPM
BASOPHILS # BLD: 0.1 K/UL (ref 0–0.1)
BASOPHILS NFR BLD: 1 % (ref 0–1)
BILIRUB SERPL-MCNC: 0.6 MG/DL (ref 0.2–1)
BUN SERPL-MCNC: 14 MG/DL (ref 6–20)
BUN/CREAT SERPL: 22 (ref 12–20)
CALCIUM SERPL-MCNC: 8.6 MG/DL (ref 8.5–10.1)
CALCULATED P AXIS, ECG09: 79 DEGREES
CALCULATED R AXIS, ECG10: -57 DEGREES
CALCULATED T AXIS, ECG11: 14 DEGREES
CHLORIDE SERPL-SCNC: 107 MMOL/L (ref 97–108)
CO2 SERPL-SCNC: 22 MMOL/L (ref 21–32)
CREAT SERPL-MCNC: 0.63 MG/DL (ref 0.7–1.3)
CRP SERPL-MCNC: 5.47 MG/DL (ref 0–0.6)
DIAGNOSIS, 93000: NORMAL
DIFFERENTIAL METHOD BLD: ABNORMAL
EOSINOPHIL # BLD: 0.2 K/UL (ref 0–0.4)
EOSINOPHIL NFR BLD: 4 % (ref 0–7)
ERYTHROCYTE [DISTWIDTH] IN BLOOD BY AUTOMATED COUNT: 17.4 % (ref 11.5–14.5)
ERYTHROCYTE [SEDIMENTATION RATE] IN BLOOD: >140 MM/HR (ref 0–20)
FERRITIN SERPL-MCNC: 496 NG/ML (ref 26–388)
FOLATE SERPL-MCNC: 26.2 NG/ML (ref 5–21)
GLOBULIN SER CALC-MCNC: 5.3 G/DL (ref 2–4)
GLUCOSE SERPL-MCNC: 88 MG/DL (ref 65–100)
HCT VFR BLD AUTO: 34.2 % (ref 36.6–50.3)
HGB BLD-MCNC: 10.6 G/DL (ref 12.1–17)
IMM GRANULOCYTES # BLD AUTO: 0 K/UL (ref 0–0.04)
IMM GRANULOCYTES NFR BLD AUTO: 0 % (ref 0–0.5)
IRON SATN MFR SERPL: 19 % (ref 20–50)
IRON SERPL-MCNC: 38 UG/DL (ref 35–150)
LYMPHOCYTES # BLD: 1.1 K/UL (ref 0.8–3.5)
LYMPHOCYTES NFR BLD: 19 % (ref 12–49)
MCH RBC QN AUTO: 28.2 PG (ref 26–34)
MCHC RBC AUTO-ENTMCNC: 31 G/DL (ref 30–36.5)
MCV RBC AUTO: 91 FL (ref 80–99)
MONOCYTES # BLD: 0.6 K/UL (ref 0–1)
MONOCYTES NFR BLD: 11 % (ref 5–13)
NEUTS SEG # BLD: 3.7 K/UL (ref 1.8–8)
NEUTS SEG NFR BLD: 65 % (ref 32–75)
NRBC # BLD: 0 K/UL (ref 0–0.01)
NRBC BLD-RTO: 0 PER 100 WBC
P-R INTERVAL, ECG05: 208 MS
PLATELET # BLD AUTO: 170 K/UL (ref 150–400)
PMV BLD AUTO: 11.2 FL (ref 8.9–12.9)
POTASSIUM SERPL-SCNC: 3.6 MMOL/L (ref 3.5–5.1)
PROT SERPL-MCNC: 7.5 G/DL (ref 6.4–8.2)
Q-T INTERVAL, ECG07: 366 MS
QRS DURATION, ECG06: 100 MS
QTC CALCULATION (BEZET), ECG08: 419 MS
RBC # BLD AUTO: 3.76 M/UL (ref 4.1–5.7)
SARS-COV-2, COV2: NOT DETECTED
SODIUM SERPL-SCNC: 137 MMOL/L (ref 136–145)
SPECIMEN SOURCE, FCOV2M: NORMAL
TIBC SERPL-MCNC: 202 UG/DL (ref 250–450)
VENTRICULAR RATE, ECG03: 79 BPM
VIT B12 SERPL-MCNC: 752 PG/ML (ref 193–986)
WBC # BLD AUTO: 5.7 K/UL (ref 4.1–11.1)

## 2020-06-03 PROCEDURE — 07D78ZX EXTRACTION OF THORAX LYMPHATIC, VIA NATURAL OR ARTIFICIAL OPENING ENDOSCOPIC, DIAGNOSTIC: ICD-10-PCS | Performed by: INTERNAL MEDICINE

## 2020-06-03 PROCEDURE — 83540 ASSAY OF IRON: CPT

## 2020-06-03 PROCEDURE — 77030008684 HC TU ET CUF COVD -B: Performed by: ANESTHESIOLOGY

## 2020-06-03 PROCEDURE — 94760 N-INVAS EAR/PLS OXIMETRY 1: CPT

## 2020-06-03 PROCEDURE — 74011000258 HC RX REV CODE- 258: Performed by: INTERNAL MEDICINE

## 2020-06-03 PROCEDURE — 85025 COMPLETE CBC W/AUTO DIFF WBC: CPT

## 2020-06-03 PROCEDURE — 77030012341 HC CHMB SPCR OPTC MDI VYRM -A

## 2020-06-03 PROCEDURE — 74011250637 HC RX REV CODE- 250/637: Performed by: INTERNAL MEDICINE

## 2020-06-03 PROCEDURE — 82607 VITAMIN B-12: CPT

## 2020-06-03 PROCEDURE — 74011000250 HC RX REV CODE- 250: Performed by: NURSE ANESTHETIST, CERTIFIED REGISTERED

## 2020-06-03 PROCEDURE — 87252 VIRUS INOCULATION TISSUE: CPT

## 2020-06-03 PROCEDURE — 86140 C-REACTIVE PROTEIN: CPT

## 2020-06-03 PROCEDURE — 87102 FUNGUS ISOLATION CULTURE: CPT

## 2020-06-03 PROCEDURE — 76040000019: Performed by: INTERNAL MEDICINE

## 2020-06-03 PROCEDURE — 0B9C8ZX DRAINAGE OF RIGHT UPPER LUNG LOBE, VIA NATURAL OR ARTIFICIAL OPENING ENDOSCOPIC, DIAGNOSTIC: ICD-10-PCS | Performed by: INTERNAL MEDICINE

## 2020-06-03 PROCEDURE — 94640 AIRWAY INHALATION TREATMENT: CPT

## 2020-06-03 PROCEDURE — 74011250636 HC RX REV CODE- 250/636: Performed by: NURSE ANESTHETIST, CERTIFIED REGISTERED

## 2020-06-03 PROCEDURE — 65270000029 HC RM PRIVATE

## 2020-06-03 PROCEDURE — 82728 ASSAY OF FERRITIN: CPT

## 2020-06-03 PROCEDURE — 87070 CULTURE OTHR SPECIMN AEROBIC: CPT

## 2020-06-03 PROCEDURE — 36415 COLL VENOUS BLD VENIPUNCTURE: CPT

## 2020-06-03 PROCEDURE — 77030026438 HC STYL ET INTUB CARD -A: Performed by: ANESTHESIOLOGY

## 2020-06-03 PROCEDURE — 85652 RBC SED RATE AUTOMATED: CPT

## 2020-06-03 PROCEDURE — 76040000020: Performed by: INTERNAL MEDICINE

## 2020-06-03 PROCEDURE — 88305 TISSUE EXAM BY PATHOLOGIST: CPT

## 2020-06-03 PROCEDURE — 0BD48ZX EXTRACTION OF RIGHT UPPER LOBE BRONCHUS, VIA NATURAL OR ARTIFICIAL OPENING ENDOSCOPIC, DIAGNOSTIC: ICD-10-PCS | Performed by: INTERNAL MEDICINE

## 2020-06-03 PROCEDURE — 74011250636 HC RX REV CODE- 250/636: Performed by: INTERNAL MEDICINE

## 2020-06-03 PROCEDURE — 80053 COMPREHEN METABOLIC PANEL: CPT

## 2020-06-03 PROCEDURE — 88173 CYTOPATH EVAL FNA REPORT: CPT

## 2020-06-03 PROCEDURE — 74011250637 HC RX REV CODE- 250/637: Performed by: NURSE PRACTITIONER

## 2020-06-03 PROCEDURE — 82746 ASSAY OF FOLIC ACID SERUM: CPT

## 2020-06-03 PROCEDURE — 76060000034 HC ANESTHESIA 1.5 TO 2 HR: Performed by: INTERNAL MEDICINE

## 2020-06-03 PROCEDURE — 87116 MYCOBACTERIA CULTURE: CPT

## 2020-06-03 PROCEDURE — 88112 CYTOPATH CELL ENHANCE TECH: CPT

## 2020-06-03 RX ORDER — PROPOFOL 10 MG/ML
INJECTION, EMULSION INTRAVENOUS AS NEEDED
Status: DISCONTINUED | OUTPATIENT
Start: 2020-06-03 | End: 2020-06-03 | Stop reason: HOSPADM

## 2020-06-03 RX ORDER — SUCCINYLCHOLINE CHLORIDE 20 MG/ML
INJECTION INTRAMUSCULAR; INTRAVENOUS AS NEEDED
Status: DISCONTINUED | OUTPATIENT
Start: 2020-06-03 | End: 2020-06-03 | Stop reason: HOSPADM

## 2020-06-03 RX ORDER — LIDOCAINE HYDROCHLORIDE 20 MG/ML
INJECTION, SOLUTION EPIDURAL; INFILTRATION; INTRACAUDAL; PERINEURAL AS NEEDED
Status: DISCONTINUED | OUTPATIENT
Start: 2020-06-03 | End: 2020-06-03 | Stop reason: HOSPADM

## 2020-06-03 RX ORDER — PHENYLEPHRINE HCL IN 0.9% NACL 0.4MG/10ML
SYRINGE (ML) INTRAVENOUS AS NEEDED
Status: DISCONTINUED | OUTPATIENT
Start: 2020-06-03 | End: 2020-06-03 | Stop reason: HOSPADM

## 2020-06-03 RX ORDER — DEXAMETHASONE SODIUM PHOSPHATE 4 MG/ML
INJECTION, SOLUTION INTRA-ARTICULAR; INTRALESIONAL; INTRAMUSCULAR; INTRAVENOUS; SOFT TISSUE AS NEEDED
Status: DISCONTINUED | OUTPATIENT
Start: 2020-06-03 | End: 2020-06-03 | Stop reason: HOSPADM

## 2020-06-03 RX ORDER — SODIUM CHLORIDE 9 MG/ML
INJECTION, SOLUTION INTRAVENOUS
Status: DISCONTINUED | OUTPATIENT
Start: 2020-06-03 | End: 2020-06-03 | Stop reason: HOSPADM

## 2020-06-03 RX ORDER — ONDANSETRON 2 MG/ML
INJECTION INTRAMUSCULAR; INTRAVENOUS AS NEEDED
Status: DISCONTINUED | OUTPATIENT
Start: 2020-06-03 | End: 2020-06-03 | Stop reason: HOSPADM

## 2020-06-03 RX ORDER — LIDOCAINE HYDROCHLORIDE AND EPINEPHRINE 10; 10 MG/ML; UG/ML
INJECTION, SOLUTION INFILTRATION; PERINEURAL
Status: DISCONTINUED
Start: 2020-06-03 | End: 2020-06-03 | Stop reason: WASHOUT

## 2020-06-03 RX ORDER — ROCURONIUM BROMIDE 10 MG/ML
INJECTION, SOLUTION INTRAVENOUS AS NEEDED
Status: DISCONTINUED | OUTPATIENT
Start: 2020-06-03 | End: 2020-06-03 | Stop reason: HOSPADM

## 2020-06-03 RX ORDER — SODIUM CHLORIDE 0.9 % (FLUSH) 0.9 %
5-40 SYRINGE (ML) INJECTION EVERY 8 HOURS
Status: CANCELLED | OUTPATIENT
Start: 2020-06-03

## 2020-06-03 RX ORDER — SODIUM CHLORIDE 0.9 % (FLUSH) 0.9 %
5-40 SYRINGE (ML) INJECTION AS NEEDED
Status: CANCELLED | OUTPATIENT
Start: 2020-06-03

## 2020-06-03 RX ADMIN — ATORVASTATIN CALCIUM 20 MG: 20 TABLET, FILM COATED ORAL at 21:42

## 2020-06-03 RX ADMIN — Medication 80 MCG: at 14:16

## 2020-06-03 RX ADMIN — ROCURONIUM BROMIDE 5 MG: 10 SOLUTION INTRAVENOUS at 13:39

## 2020-06-03 RX ADMIN — Medication 10 ML: at 18:29

## 2020-06-03 RX ADMIN — PIPERACILLIN AND TAZOBACTAM 3.38 G: 3; .375 INJECTION, POWDER, LYOPHILIZED, FOR SOLUTION INTRAVENOUS at 09:32

## 2020-06-03 RX ADMIN — PIPERACILLIN AND TAZOBACTAM 3.38 G: 3; .375 INJECTION, POWDER, LYOPHILIZED, FOR SOLUTION INTRAVENOUS at 02:26

## 2020-06-03 RX ADMIN — ONDANSETRON HYDROCHLORIDE 4 MG: 2 INJECTION, SOLUTION INTRAMUSCULAR; INTRAVENOUS at 13:44

## 2020-06-03 RX ADMIN — VANCOMYCIN HYDROCHLORIDE 1000 MG: 1 INJECTION, POWDER, LYOPHILIZED, FOR SOLUTION INTRAVENOUS at 23:39

## 2020-06-03 RX ADMIN — Medication 80 MCG: at 14:33

## 2020-06-03 RX ADMIN — MONTELUKAST SODIUM 10 MG: 10 TABLET, COATED ORAL at 09:32

## 2020-06-03 RX ADMIN — SODIUM CHLORIDE: 900 INJECTION, SOLUTION INTRAVENOUS at 13:32

## 2020-06-03 RX ADMIN — SUCCINYLCHOLINE CHLORIDE 120 MG: 20 INJECTION, SOLUTION INTRAMUSCULAR; INTRAVENOUS at 13:39

## 2020-06-03 RX ADMIN — HEPARIN SODIUM 5000 UNITS: 5000 INJECTION INTRAVENOUS; SUBCUTANEOUS at 02:25

## 2020-06-03 RX ADMIN — ALBUTEROL SULFATE 2 PUFF: 90 AEROSOL, METERED RESPIRATORY (INHALATION) at 18:39

## 2020-06-03 RX ADMIN — Medication 100 MCG: at 13:50

## 2020-06-03 RX ADMIN — DEXAMETHASONE SODIUM PHOSPHATE 8 MG: 4 INJECTION, SOLUTION INTRAMUSCULAR; INTRAVENOUS at 13:44

## 2020-06-03 RX ADMIN — Medication 80 MCG: at 14:25

## 2020-06-03 RX ADMIN — PANTOPRAZOLE SODIUM 40 MG: 40 TABLET, DELAYED RELEASE ORAL at 06:23

## 2020-06-03 RX ADMIN — Medication 100 MCG: at 14:10

## 2020-06-03 RX ADMIN — Medication 100 MCG: at 13:58

## 2020-06-03 RX ADMIN — PROPOFOL 150 MG: 10 INJECTION, EMULSION INTRAVENOUS at 13:39

## 2020-06-03 RX ADMIN — VANCOMYCIN HYDROCHLORIDE 1000 MG: 1 INJECTION, POWDER, LYOPHILIZED, FOR SOLUTION INTRAVENOUS at 11:44

## 2020-06-03 RX ADMIN — ALBUTEROL SULFATE 2 PUFF: 90 AEROSOL, METERED RESPIRATORY (INHALATION) at 02:19

## 2020-06-03 RX ADMIN — Medication 100 MCG: at 14:04

## 2020-06-03 RX ADMIN — ALBUTEROL SULFATE 2 PUFF: 90 AEROSOL, METERED RESPIRATORY (INHALATION) at 09:18

## 2020-06-03 RX ADMIN — PIPERACILLIN AND TAZOBACTAM 3.38 G: 3; .375 INJECTION, POWDER, LYOPHILIZED, FOR SOLUTION INTRAVENOUS at 18:23

## 2020-06-03 RX ADMIN — LIDOCAINE HYDROCHLORIDE 60 MG: 20 INJECTION, SOLUTION EPIDURAL; INFILTRATION; INTRACAUDAL; PERINEURAL at 13:39

## 2020-06-03 RX ADMIN — Medication 80 MCG: at 14:28

## 2020-06-03 RX ADMIN — Medication 80 MCG: at 14:21

## 2020-06-03 NOTE — PROGRESS NOTES
Rubén Bahena  1940  [id number]    Situation:    Scheduled Procedure: Procedure(s):  ENDOSCOPIC BRONCHOSCOPY ULTRASOUND (EBUS)  Verbal report received from: Mendel Kayser, RN  Preoperative diagnosis: abnormal chest xray  Admitted from pulmonologist  PNA / swollen nodules/ lymph nodes  Hx of COPD  Urostomy     Background:    Procedure: Procedure(s):  ENDOSCOPIC BRONCHOSCOPY ULTRASOUND (EBUS)  Physician performing procedure; Dr. Kimber Hernandez RN    NPO Status/Last PO Intake: since midnight    Pregnancy Test:Not applicable If yes, result: none    Is the patient taking Blood Thinners: YES If yes, list: heparin and last taken 6/2/2020  Is the patient diabetic:no   Does the patient have a Pacemaker/Defibrillator in place?: no   Does the patient need antibiotics before/during/after procedure: no   Does the patient have SCD in place:no   Is patient on CONTACT precautions:no        Assessment:  Are the vital signs stable prior to patient coming to ENDO?  yes  Is the patient alert/oriented and able to sign consent for the procedures:yes  Does the patient have a patient IV in place?  yes     Recommendation:  Family or Friend present no     Permission to share finding with Family or Friend n/a

## 2020-06-03 NOTE — PROGRESS NOTES

## 2020-06-03 NOTE — ANESTHESIA PREPROCEDURE EVALUATION
Relevant Problems   No relevant active problems       Anesthetic History   No history of anesthetic complications            Review of Systems / Medical History  Patient summary reviewed, nursing notes reviewed and pertinent labs reviewed    Pulmonary    COPD               Neuro/Psych   Within defined limits           Cardiovascular    Hypertension        Dysrhythmias   CAD         GI/Hepatic/Renal     GERD      PUD     Endo/Other  Within defined limits           Other Findings              Physical Exam    Airway  Mallampati: II  TM Distance: > 6 cm  Neck ROM: normal range of motion   Mouth opening: Normal     Cardiovascular  Regular rate and rhythm,  S1 and S2 normal,  no murmur, click, rub, or gallop             Dental  No notable dental hx       Pulmonary  Breath sounds clear to auscultation               Abdominal  GI exam deferred       Other Findings            Anesthetic Plan    ASA: 4  Anesthesia type: general          Induction: Intravenous  Anesthetic plan and risks discussed with: Patient

## 2020-06-03 NOTE — PROGRESS NOTES
Name: TODD [address]: [address]   : 1940 Admit Date: 2020   Phone: [phone]  Room: 204/01   PCP: Al Lew NP  MRN: [id number]   Date: 6/3/2020  Code: Full Code        HPI:    6/3  covid negative  He is hungry       8:13 PM       History was obtained from patient. .      I was asked by Nnamdi Green MD to see Tanner Grandchild in consultation for a chief complaint of shortness of breath. History of Present Illness: [de-identified]year old male with past medical history as given below presented to [address] [place] [address] with increased cough with thick yellow sputum and increased shortness of breath for the past 5-6 months. Has tried multiple abx and prednisone but nothing seems to help. Denies fever, chills but has some night sweats. No chest pain. Weight loss of 10 pounds over last 4 weeks. Has good appetite. CT chest reviewed -  RUL cavitary infiltrate/ mass. Right hilar, 4R and 4L LN enlargement. Old chart reviewed -  Hx of sub carinal LN bx - 2015 - atypical cells. Hx of urothelial cancer and intravesicular BCG.        Past Medical History:   Diagnosis Date    Aneurysm [facility])     ABDOMINAL     Arrhythmia 3/2/2014    Stated cardiac cath for abn EKG several years ago NEG    CAD (coronary artery disease)     Calculus of kidney     Cancer ([facility] [address]     Skin/Bladder    Chronic obstructive pulmonary disease ([facility] [address]     Contact dermatitis and other eczema, due to unspecified cause     GERD (gastroesophageal reflux disease)     ORTIZ'S ESOPH    Hyperlipemia     Hypertension     Ill-defined condition     HIATEL HERNIA    Ill-defined condition     ECZEMA    PUD (peptic ulcer disease)     Baretts Esophagus/antral gastritis/       Past Surgical History:   Procedure Laterality Date    ABDOMEN SURGERY PROC UNLISTED      UMBILICAL hernia     HX ADENOIDECTOMY      HX CATARACT REMOVAL Left 2016    HX COLONOSCOPY      HX GI      Colonoscopy x 2-3    HX GI      COLONOSCOPY    HX HERNIA REPAIR  13    left inguinal hernia repair, LAPAROSCOPIC  (DR JAQUEZ)    HX HERNIA REPAIR  3/14/16    LAPAROSCOPIC Incisional W/ MESH by     HX OTHER SURGICAL      REMOVAL OF SKIN CA R NECK    HX TONSILLECTOMY      HX UROLOGICAL      Bladder bx/kidney stone removal    HX UROLOGICAL  2015    CYSTO    HX VASCULAR ACCESS  2015    PORT R CHEST WALL    HX VASCULAR ACCESS      REMOVAL OF PORT R CHEST WALL       Family History   Problem Relation Age of Onset    Heart Disease Mother     MS Sister     Heart Disease Sister     Psychiatric Disorder Sister         DEPRESSION    No Known Problems Sister     Anesth Problems Neg Hx        Social History     Tobacco Use    Smoking status: Former Smoker     Packs/day: 1.00     Years: 40.00     Pack years: 40.00     Last attempt to quit: 2009     Years since quittin.3    Smokeless tobacco: Never Used   Substance Use Topics    Alcohol use: No     Alcohol/week: 0.0 standard drinks     Comment: RARELY       No Known Allergies    Current Facility-Administered Medications   Medication Dose Route Frequency    sodium chloride (NS) flush 5-40 mL  5-40 mL IntraVENous Q8H    sodium chloride (NS) flush 5-40 mL  5-40 mL IntraVENous PRN    acetaminophen (TYLENOL) tablet 650 mg  650 mg Oral Q4H PRN    ondansetron (ZOFRAN) injection 4 mg  4 mg IntraVENous Q4H PRN    heparin (porcine) injection 5,000 Units  5,000 Units SubCUTAneous Q8H    piperacillin-tazobactam (ZOSYN) 3.375 g in 0.9% sodium chloride (MBP/ADV) 100 mL  3.375 g IntraVENous Q8H    0.9% sodium chloride infusion  75 mL/hr IntraVENous CONTINUOUS    amLODIPine (NORVASC) tablet 5 mg  5 mg Oral 7am    atorvastatin (LIPITOR) tablet 20 mg  20 mg Oral QHS    cetirizine (ZYRTEC) tablet 10 mg  10 mg Oral QHS    fluticasone propionate (FLONASE) 50 mcg/actuation nasal spray 2 Spray  2 Spray Both Nostrils DAILY    montelukast (SINGULAIR) tablet 10 mg  10 mg Oral DAILY    pantoprazole (PROTONIX) tablet 40 mg  40 mg Oral ACB    zolpidem (AMBIEN) tablet 5 mg  5 mg Oral QHS PRN    HYDROcodone-acetaminophen (NORCO) 5-325 mg per tablet 1 Tab  1 Tab Oral Q6H PRN    albuterol (PROVENTIL HFA, VENTOLIN HFA, PROAIR HFA) inhaler 2 Puff  2 Puff Inhalation Q6H    Vancomycin Pharmacy Dosing   Other Rx Dosing/Monitoring    vancomycin (VANCOCIN) 1,000 mg in 0.9% sodium chloride (MBP/ADV) 250 mL  1,000 mg IntraVENous Q12H         REVIEW OF SYSTEMS   Negative except as stated in the HPI. Physical Exam:   Visit Vitals  /71   Pulse 67   Temp 98.8 °F (37.1 °C)   Resp 18   Ht 5' 9\" (1.753 m)   Wt 74.8 kg (165 lb)   SpO2 92%   BMI 24.37 kg/m²       General:  Alert, cooperative, no distress, appears stated age. Head:  Normocephalic, without obvious abnormality, atraumatic. Skin: Skin color, texture, turgor normal. No rashes or lesions       Neurologic: Grossly nonfocal       Lab Results   Component Value Date/Time    Sodium 137 06/03/2020 02:19 AM    Potassium 3.6 06/03/2020 02:19 AM    Chloride 107 06/03/2020 02:19 AM    CO2 22 06/03/2020 02:19 AM    BUN 14 06/03/2020 02:19 AM    Creatinine 0.63 (L) 06/03/2020 02:19 AM    Glucose 88 06/03/2020 02:19 AM    Calcium 8.6 06/03/2020 02:19 AM    Magnesium 2.1 07/26/2018 04:17 AM    Phosphorus 3.2 07/10/2018 12:56 PM    Lactic acid 1.2 06/02/2020 05:29 PM       Lab Results   Component Value Date/Time    WBC 5.7 06/03/2020 02:19 AM    HGB 10.6 (L) 06/03/2020 02:19 AM    PLATELET 563 22/08/3786 02:19 AM    MCV 91.0 06/03/2020 02:19 AM       Lab Results   Component Value Date/Time    INR 1.0 07/10/2018 12:56 PM    aPTT 26.3 07/10/2018 12:56 PM    Alk.  phosphatase 84 06/03/2020 02:19 AM    Protein, total 7.5 06/03/2020 02:19 AM    Albumin 2.2 (L) 06/03/2020 02:19 AM    Globulin 5.3 (H) 06/03/2020 02:19 AM       Lab Results   Component Value Date/Time    Iron 38 06/03/2020 02:19 AM    TIBC 202 (L) 06/03/2020 02:19 AM Iron % saturation 19 (L) 06/03/2020 02:19 AM    Ferritin 496 (H) 06/03/2020 02:19 AM       Lab Results   Component Value Date/Time    Troponin-I, Qt. <0.05 06/02/2020 03:04 PM        Lab Results   Component Value Date/Time    Culture result: NO GROWTH AFTER 10 HOURS 06/02/2020 05:29 PM    Culture result: ALPHA STREPTOCOCCUS (A) 07/10/2018 12:56 PM    Culture result: NO GROWTH 2 DAYS 05/09/2018 03:55 PM       Lab Results   Component Value Date/Time    Color YELLOW/STRAW 07/10/2018 12:56 PM    Appearance CLOUDY (A) 07/10/2018 12:56 PM    Specific gravity 1.015 04/18/2012 03:55 AM    pH (UA) 6.5 07/10/2018 12:56 PM    Protein TRACE (A) 07/10/2018 12:56 PM    Glucose NEGATIVE  07/10/2018 12:56 PM    Ketone NEGATIVE  07/10/2018 12:56 PM    Bilirubin NEGATIVE  07/10/2018 12:56 PM    Blood NEGATIVE  07/10/2018 12:56 PM    Urobilinogen 0.2 07/10/2018 12:56 PM    Nitrites NEGATIVE  07/10/2018 12:56 PM    Leukocyte Esterase MODERATE (A) 07/10/2018 12:56 PM    WBC  07/10/2018 12:56 PM    RBC 0-5 07/10/2018 12:56 PM    Epithelial cells 0-10 04/20/2015 11:20 AM    Bacteria NEGATIVE  07/10/2018 12:56 PM       IMPRESSION:  ===========  -Abnormal CT Chest with RUL cavitary infiltrate/ mass; d/d mycobacterial infection vs lung abscess vs malignancy.  -History of recurrent urinary bladder cancer; s/p radical cystoprostatectomy with neobladder formation followed by chemo, XRT, also received intravesicular BCG.  -COPD. PLAN:  =====  -vanco/ zosyn.  -covid-19 negative, will proceed with bronch + bal RUL + right hilar and 4R/ 4L LN TBNA by EBUS. Discussed with patient in detail. Agreeable.   - Dr Jhon Toscano was informed   -currently NPO    Donald Strange PA-C

## 2020-06-03 NOTE — PROGRESS NOTES
Pharmacist Note - Vancomycin Dosing    Consult provided for this [de-identified] y.o. male for indication of CAP, lung cavity abscess. Antibiotic regimen(s): Vancomycin + Zosyn  Patient on vancomycin PTA? NO     Recent Labs     20  1504   WBC 7.9   CREA 0.81   BUN 19     Frequency of BMP: daily  Height: 175.3 cm  Weight: 74.8 kg  Est CrCl: 72.7 ml/min  Temp (24hrs), Av.4 °F (36.9 °C), Min:98 °F (36.7 °C), Max:98.7 °F (37.1 °C)    Cultures: blood      Goal trough = ~ 15     Therapy will be initiated with a loading dose of 2000 mg IV x 1 to be followed by a maintenance dose of 1000 mg IV every 12 hours. Pharmacy to follow patient daily and order levels / make dose adjustments as appropriate.

## 2020-06-03 NOTE — WOUND CARE
WOCN Note: patient leaving floor [phone]  for a bronchoscopy and then being transferred to 6th floor. Spoke with Alanna Chadwick / RN. New consult placed by RN for urostomy care. Spoke with nurse and pouch is intact with no leaks. Nurse states patient has a hernia next to stoma. No issues, pouch is intact. Patient leaving floor for above test and transporter waiting outside room. Transition of Care:  Re consult if pouching issues arise. Caitlyn DEL CID RN Wound Care Department Office: [phone] Pager: [phone]

## 2020-06-03 NOTE — PROGRESS NOTES
REECE:    RUR 15%    Patient lives alone but has a lot of family support in the area. His daughter is an RN at [employer]. Care Management Interventions  PCP Verified by CM: Yes  Mode of Transport at Discharge: Other (see comment)(Patient's daughter will drive home at discharge.)  Transition of Care Consult (CM Consult): Discharge Planning  MyChart Signup: Yes  Discharge Durable Medical Equipment: No  Physical Therapy Consult: Yes  Occupational Therapy Consult: Yes  Speech Therapy Consult: No  Current Support Network: Lives Alone  Confirm Follow Up Transport: Family  Discharge Location  Discharge Placement: Home    Reason for Admission:   SOB                   RUR Score:      15%               Plan for utilizing home health:      No orders    PCP: First and Last name:  Hernandez Hernandez   Name of Practice:    Are you a current patient: Yes/No: Yes   Approximate date of last visit:  5/20/2020   Can you participate in a virtual visit with your PCP: Yes                    Current Advanced Directive/Advance Care Plan: On file                         Transition of Care Plan:        CM met with patient to introduce CM role, verify demographics and begin discharge planning. Patient lives in a one story house alone. He does not own any DME. CM verified patient's insurance as Medicare A&B and [employer] Secondary.     Sean Ross, CAMMIE/CRM

## 2020-06-03 NOTE — PROGRESS NOTES
TRANSFER - OUT REPORT:    Verbal report given to Ange Smith (name) on Joe Norton  being transferred to (unit) for routine progression of care       Report consisted of patients Situation, Background, Assessment and   Recommendations(SBAR). Information from the following report(s) SBAR, Kardex and MAR was reviewed with the receiving nurse. Lines:   Peripheral IV 06/02/20 Left Antecubital (Active)   Site Assessment Clean, dry, & intact 6/3/2020  9:20 AM   Phlebitis Assessment 0 6/3/2020  9:20 AM   Infiltration Assessment 0 6/3/2020  9:20 AM   Dressing Status Clean, dry, & intact 6/3/2020  9:20 AM   Dressing Type Transparent 6/3/2020  9:20 AM   Hub Color/Line Status Pink; Infusing 6/3/2020  9:20 AM   Action Taken Open ports on tubing capped 6/2/2020  9:00 PM   Alcohol Cap Used Yes 6/3/2020  9:20 AM       Peripheral IV 06/02/20 Right Antecubital (Active)   Site Assessment Clean, dry, & intact 6/3/2020  9:20 AM   Phlebitis Assessment 0 6/3/2020  9:20 AM   Infiltration Assessment 0 6/3/2020  9:20 AM   Dressing Status Clean, dry, & intact 6/3/2020  9:20 AM   Dressing Type Transparent 6/3/2020  9:20 AM   Hub Color/Line Status Pink;Capped 6/3/2020  9:20 AM   Action Taken Blood drawn 6/2/2020  5:34 PM   Alcohol Cap Used Yes 6/3/2020  9:20 AM        Opportunity for questions and clarification was provided.       Patient transported with:   Transport after procedure

## 2020-06-03 NOTE — PROGRESS NOTES
Hospitalist Progress Note  Alan Ray MD  Answering service: [phone] from in house phone        Date of Service:  6/3/2020  NAME:  Zeynep Andujar  :  1940  MRN:  [id number]      Admission Summary:   As per initial admission summary  Chief complaint: Cough, shortness of breath, sputum and right cavitary lesion of the lung.     Zeynep Andujar is a [de-identified] y.o. male who presents to the [facility] ED on recommendation of his pulmonologist, Dr. Ferris Seen for evaluation of right lung cavitary lesion with constitutional symptoms despite of 3 rounds of antibiotic courses, steroids, for further evaluation and management. Patient suggested to have the lesion noted on earlier CT scan of the chest without contrast in 2020. Patient has been following with his pulmonologist and hemato-oncologist.  Patient has cough with thick mucoid whitish/yellowish sputum in moderate amount, with exertional shortness of breath with reduced capacity, occasional drenching night sweats without feverish feeling, mostly around lower abdomen/groin area, poor taste and appetite, and about 10 pounds loss of weight in the last about 4-6 weeks. Otherwise he is feeling fine. On today's visit with his pulmonologist, chest x-ray was done after which he was advised to come to ER for further evaluation. ED physician has discussed case at length with on-call pulmonologist, Dr. Rosanna Perez. Patient was started on IV Zosyn and vancomycin empirically, CT chest with contrast ordered. Patient repeatedly said to have infectious disease consultation as recommended by his primary pulmonologist.  However on call pulmonologist recommended to wait for the CT scan results and that infectious disease consult is not warranted at present until further evaluation by him. Patient is agreeable with the plan.   Patient has history of bladder cancer and subsequently neobladder formation, for which she follows with urologist.      Patient denied any abdominal pain, nausea, vomiting, dizziness, lightheadedness, chest pain, palpitation, focal weakness, tingling or numbness, leg swelling, urinary trouble, diarrhea, constipation. Patient did mention presence of aspiration as evaluated by his gastroenterologist.  However he is not able to tolerate and be compliant with thickening as recommended. Interval history / Subjective:     Patient seen for Follow up of cavitary lesion     Patient seen and examined by the bedside, Labs, images and notes reviewed  Patient is comfortable, denies any chest pain, SOB, abdominal pain, fevers, chills. No N/V/D  Discussed with nursing staff, no acute issues overnight, orders reviewed. No acute issues . Planned for bronch at 1 PM today. Assessment & Plan:        #Right upper lobe cavitary lung lesion with multiple nodular opacities in surrounding with cough/sputum/constitutional symptoms, likely  Pneumonia. Differential: Infectionbacterial/fungal/granulomatous versus chronic inflammatory cause versus neoplastic etiology  -Admit to inpatient, remote telemetry, ALOS> 2 MN  -Empiric Zosyn and vancomycin, pharmacy consult for dosing  -CT chest with contrastalready ordered by ED per pulmonology recommendation  -Pulmonology consultalready ordered by ED and discussion as noted  -Consider ID consult as needed and if pulmonology recommendation  -? Aspiration causing recurrent aggravation not a typical location for the same. Consider ST eval, if recommended by pulmonology or clinical concerns.  -Sputum culture. Defer PPD/AFB/QuantiFERON gold etc. to pulmonology  -Other specific inflammatory markers/serological work-up defer to pulmonology  -? Need for biopsy consideration. Defer to pulmonology  -Droplet plus isolation.   Less likely, but COVID-19 testing ordered by ED.  -NS IVF for 12-24 hours for 12L, given use of contrast.  Plan for bronch today at 1 PM     #COVID Negative      #Mediastinal lymphadenopathy with increased right hilar lymph node compared to previous study  -Defer to pulmonology     #COPD, not in exacerbation  -Albuterol inhaler until COVID-19 ruled out, subsequently transitioned to nebulizer treatment with DuoNeb's, PRN albuterol nebs  -Supportive care, supplemental O2 as needed     #History of recurrent urinary bladder cancer, status post radical cystoprostatectomy with neobladder formation, history of chemo XRT  -Follows with Dr. Terjo Persons  -Consider hemato-oncology consultation as needed     #Chronic anemia, baseline 1112 g/dL  -CBC monitoring     #Thrombocytopenia, intermittent, stable        CODE STATUS: Full code  DVT prophylaxis: Heparin SQ  Emergency contact: DaughterBernard: [phone]    Code status: Full   DVT prophylaxis: heparin SC    Care Plan discussed with: Patient/Family  Bry Yara Problems  Date Reviewed: 2/10/2020          Codes Class Noted POA    Cavitary lesion of lung ICD-10-CM: J98.4  ICD-9-CM: 518.89  6/2/2020 Unknown        PNA (pneumonia) ICD-10-CM: J18.9  ICD-9-CM: 780  6/2/2020 Unknown                Review of Systems:   A comprehensive review of systems was negative except for that written in the HPI. Vital Signs:    Last 24hrs VS reviewed since prior progress note. Most recent are:  Visit Vitals  /50   Pulse 64   Temp 98.7 °F (37.1 °C)   Resp 18   Ht 5' 9\" (1.753 m)   Wt 74.8 kg (165 lb)   SpO2 92%   BMI 24.37 kg/m²         Intake/Output Summary (Last 24 hours) at 6/3/2020 1441  Last data filed at 6/3/2020 1415  Gross per 24 hour   Intake 400 ml   Output 1500 ml   Net -1100 ml        Physical Examination:             Constitutional:  No acute distress, cooperative, pleasant    ENT:  Oral mucous moist, oropharynx benign. Neck supple,    Resp:  CTA bilaterally. No wheezing/rhonchi/rales.  No accessory muscle use   CV:  Regular rhythm, normal rate, no murmurs, gallops, rubs    GI:  Soft, non distended, non tender. normoactive bowel sounds, no hepatosplenomegaly     Musculoskeletal:  No edema, warm, 2+ pulses throughout    Neurologic:  Moves all extremities. AAOx3, CN II-XII reviewed     Psych:  Good insight, Not anxious nor agitated. Data Review:    Review and/or order of clinical lab test  Review and/or order of tests in the radiology section of CPT  Review and/or order of tests in the medicine section of [facility]      Labs:     Recent Labs     06/03/20 0219 06/02/20  1504   WBC 5.7 7.9   HGB 10.6* 10.9*   HCT 34.2* 34.6*    193     Recent Labs     06/03/20 0219 06/02/20  1504    138   K 3.6 4.2    106   CO2 22 25   BUN 14 19   CREA 0.63* 0.81   GLU 88 120*   CA 8.6 9.6     Recent Labs     06/03/20  0219 06/02/20  1504   ALT 19 22   AP 84 91   TBILI 0.6 0.8   TP 7.5 8.2   ALB 2.2* 2.5*   GLOB 5.3* 5.7*     No results for input(s): INR, PTP, APTT, INREXT in the last 72 hours. Recent Labs     06/03/20 0219   TIBC 202*   PSAT 19*   FERR 496*      Lab Results   Component Value Date/Time    Folate 26.2 (H) 06/03/2020 02:19 AM      No results for input(s): PH, PCO2, PO2 in the last 72 hours.   Recent Labs     06/02/20  1504   TROIQ <0.05     Lab Results   Component Value Date/Time    Cholesterol, total 124 06/02/2020 03:04 PM    HDL Cholesterol 26 06/02/2020 03:04 PM    LDL, calculated 80.8 06/02/2020 03:04 PM    Triglyceride 86 06/02/2020 03:04 PM    CHOL/HDL Ratio 4.8 06/02/2020 03:04 PM     Lab Results   Component Value Date/Time    Glucose (POC) 101 (H) 11/27/2018 07:49 PM     Lab Results   Component Value Date/Time    Color YELLOW/STRAW 07/10/2018 12:56 PM    Appearance CLOUDY (A) 07/10/2018 12:56 PM    Specific gravity 1.017 07/10/2018 12:56 PM    Specific gravity 1.015 04/18/2012 03:55 AM    pH (UA) 6.5 07/10/2018 12:56 PM    Protein TRACE (A) 07/10/2018 12:56 PM    Glucose NEGATIVE  07/10/2018 12:56 PM    Ketone NEGATIVE  07/10/2018 12:56 PM    Bilirubin NEGATIVE  07/10/2018 12:56 PM    Urobilinogen 0.2 07/10/2018 12:56 PM    Nitrites NEGATIVE  07/10/2018 12:56 PM    Leukocyte Esterase MODERATE (A) 07/10/2018 12:56 PM    Epithelial cells FEW 07/10/2018 12:56 PM    Bacteria NEGATIVE  07/10/2018 12:56 PM    WBC  07/10/2018 12:56 PM    RBC 0-5 07/10/2018 12:56 PM         Medications Reviewed:     Current Facility-Administered Medications   Medication Dose Route Frequency    lidocaine-EPINEPHrine (XYLOCAINE) 1 %-1:100,000 injection        sodium chloride (NS) flush 5-40 mL  5-40 mL IntraVENous Q8H    sodium chloride (NS) flush 5-40 mL  5-40 mL IntraVENous PRN    acetaminophen (TYLENOL) tablet 650 mg  650 mg Oral Q4H PRN    ondansetron (ZOFRAN) injection 4 mg  4 mg IntraVENous Q4H PRN    heparin (porcine) injection 5,000 Units  5,000 Units SubCUTAneous Q8H    piperacillin-tazobactam (ZOSYN) 3.375 g in 0.9% sodium chloride (MBP/ADV) 100 mL  3.375 g IntraVENous Q8H    0.9% sodium chloride infusion  75 mL/hr IntraVENous CONTINUOUS    amLODIPine (NORVASC) tablet 5 mg  5 mg Oral 7am    atorvastatin (LIPITOR) tablet 20 mg  20 mg Oral QHS    cetirizine (ZYRTEC) tablet 10 mg  10 mg Oral QHS    fluticasone propionate (FLONASE) 50 mcg/actuation nasal spray 2 Spray  2 Spray Both Nostrils DAILY    montelukast (SINGULAIR) tablet 10 mg  10 mg Oral DAILY    pantoprazole (PROTONIX) tablet 40 mg  40 mg Oral ACB    zolpidem (AMBIEN) tablet 5 mg  5 mg Oral QHS PRN    HYDROcodone-acetaminophen (NORCO) 5-325 mg per tablet 1 Tab  1 Tab Oral Q6H PRN    albuterol (PROVENTIL HFA, VENTOLIN HFA, PROAIR HFA) inhaler 2 Puff  2 Puff Inhalation Q6H    Vancomycin Pharmacy Dosing   Other Rx Dosing/Monitoring    vancomycin (VANCOCIN) 1,000 mg in 0.9% sodium chloride (MBP/ADV) 250 mL  1,000 mg IntraVENous Q12H     Facility-Administered Medications Ordered in Other Encounters   Medication Dose Route Frequency    propofoL (DIPRIVAN) 10 mg/mL injection   IntraVENous PRN    lidocaine (PF) (XYLOCAINE) 20 mg/mL (2 %) injection   IntraVENous PRN    rocuronium injection   IntraVENous PRN    succinylcholine (ANECTINE) injection   IntraVENous PRN    ondansetron (ZOFRAN) injection    PRN    dexamethasone (DECADRON) 4 mg/mL injection    PRN    0.9% sodium chloride infusion   IntraVENous CONTINUOUS    PHENYLephrine (NEOSYNEPHRINE) in NS syringe   IntraVENous PRN     ______________________________________________________________________  EXPECTED LENGTH OF STAY: 3d 4h  ACTUAL LENGTH OF STAY:          1                 Shazia Husain MD

## 2020-06-03 NOTE — CONSULTS
Name: [address]: [facility]   : 1940 Admit Date: 2020   Phone: [phone]  Room: 204/01   PCP: Ruslan Baeza NP  MRN: [id number]   Date: 2020  Code: Full Code        HPI:    8:13 PM       History was obtained from patient. .      I was asked by Kera Nguyen MD to see Kourtney Altamirano in consultation for a chief complaint of shortness of breath. History of Present Illness: [de-identified]year old male with past medical history as given below presented to [facility] with increased cough with thick yellow sputum and increased shortness of breath for the past 5-6 months. Has tried multiple abx and prednisone but nothing seems to help. Denies fever, chills but has some night sweats. No chest pain. Weight loss of 10 pounds over last 4 weeks. Has good appetite. CT chest reviewed -  RUL cavitary infiltrate/ mass. Right hilar, 4R and 4L LN enlargement. Old chart reviewed -  Hx of sub carinal LN bx - 2015 - atypical cells. Hx of urothelial cancer and intravesicular BCG.        Past Medical History:   Diagnosis Date    Aneurysm [facility])     ABDOMINAL     Arrhythmia 3/2/2014    Stated cardiac cath for abn EKG several years ago NEG    CAD (coronary artery disease)     Calculus of kidney     Cancer ([address]     Skin/Bladder    Chronic obstructive pulmonary disease [address] [address]     Contact dermatitis and other eczema, due to unspecified cause     GERD (gastroesophageal reflux disease)     ORTIZ'S ESOPH    Hyperlipemia     Hypertension     Ill-defined condition     HIATEL HERNIA    Ill-defined condition     ECZEMA    PUD (peptic ulcer disease)     Baretts Esophagus/antral gastritis/       Past Surgical History:   Procedure Laterality Date    ABDOMEN SURGERY PROC UNLISTED      UMBILICAL hernia     HX ADENOIDECTOMY      HX CATARACT REMOVAL Left 2016    HX COLONOSCOPY      HX GI      Colonoscopy x 2-3    HX GI      COLONOSCOPY    HX HERNIA REPAIR  13    left inguinal hernia repair, LAPAROSCOPIC  (DR JAQUEZ)    HX HERNIA REPAIR  3/14/16    LAPAROSCOPIC Incisional W/ MESH by     HX OTHER SURGICAL      REMOVAL OF SKIN CA R NECK    HX TONSILLECTOMY      HX UROLOGICAL      Bladder bx/kidney stone removal    HX UROLOGICAL      CYSTO    HX VASCULAR ACCESS  2015    PORT R CHEST WALL    HX VASCULAR ACCESS      REMOVAL OF PORT R CHEST WALL       Family History   Problem Relation Age of Onset    Heart Disease Mother     MS Sister     Heart Disease Sister     Psychiatric Disorder Sister         DEPRESSION    No Known Problems Sister     Anesth Problems Neg Hx        Social History     Tobacco Use    Smoking status: Former Smoker     Packs/day: 1.00     Years: 40.00     Pack years: 40.00     Last attempt to quit: 2009     Years since quittin.3    Smokeless tobacco: Never Used   Substance Use Topics    Alcohol use: No     Alcohol/week: 0.0 standard drinks     Comment: RARELY       No Known Allergies    Current Facility-Administered Medications   Medication Dose Route Frequency    vancomycin (VANCOCIN) 2000 mg in  ml infusion  2,000 mg IntraVENous NOW    sodium chloride 0.9 % bolus infusion 100 mL  100 mL IntraVENous RAD ONCE    iopamidoL (ISOVUE 300) 61 % contrast injection 100 mL  100 mL IntraVENous RAD ONCE    sodium chloride (NS) flush 10 mL  10 mL IntraVENous RAD ONCE    sodium chloride (NS) flush 5-40 mL  5-40 mL IntraVENous Q8H    sodium chloride (NS) flush 5-40 mL  5-40 mL IntraVENous PRN    acetaminophen (TYLENOL) tablet 650 mg  650 mg Oral Q4H PRN    ondansetron (ZOFRAN) injection 4 mg  4 mg IntraVENous Q4H PRN    heparin (porcine) injection 5,000 Units  5,000 Units SubCUTAneous Q8H    [START ON 6/3/2020] piperacillin-tazobactam (ZOSYN) 3.375 g in 0.9% sodium chloride (MBP/ADV) 100 mL  3.375 g IntraVENous Q8H    0.9% sodium chloride infusion  75 mL/hr IntraVENous CONTINUOUS         REVIEW OF SYSTEMS   Negative except as stated in the HPI.      Physical Exam:   Visit Vitals  /64 (BP 1 Location: Left arm, BP Patient Position: At rest)   Pulse 85   Temp 98.4 °F (36.9 °C)   Resp 18   Ht 5' 9\" (1.753 m)   Wt 74.8 kg (165 lb)   SpO2 93%   BMI 24.37 kg/m²       General:  Alert, cooperative, no distress, appears stated age. Head:  Normocephalic, without obvious abnormality, atraumatic. Skin: Skin color, texture, turgor normal. No rashes or lesions       Neurologic: Grossly nonfocal       Lab Results   Component Value Date/Time    Sodium 138 06/02/2020 03:04 PM    Potassium 4.2 06/02/2020 03:04 PM    Chloride 106 06/02/2020 03:04 PM    CO2 25 06/02/2020 03:04 PM    BUN 19 06/02/2020 03:04 PM    Creatinine 0.81 06/02/2020 03:04 PM    Glucose 120 (H) 06/02/2020 03:04 PM    Calcium 9.6 06/02/2020 03:04 PM    Magnesium 2.1 07/26/2018 04:17 AM    Phosphorus 3.2 07/10/2018 12:56 PM    Lactic acid 1.2 06/02/2020 05:29 PM       Lab Results   Component Value Date/Time    WBC 7.9 06/02/2020 03:04 PM    HGB 10.9 (L) 06/02/2020 03:04 PM    PLATELET 347 84/81/4684 03:04 PM    MCV 90.3 06/02/2020 03:04 PM       Lab Results   Component Value Date/Time    INR 1.0 07/10/2018 12:56 PM    aPTT 26.3 07/10/2018 12:56 PM    Alk.  phosphatase 91 06/02/2020 03:04 PM    Protein, total 8.2 06/02/2020 03:04 PM    Albumin 2.5 (L) 06/02/2020 03:04 PM    Globulin 5.7 (H) 06/02/2020 03:04 PM       Lab Results   Component Value Date/Time    Iron 58 10/07/2019 12:03 PM    TIBC 236 (L) 10/07/2019 12:03 PM    Iron % saturation 25 10/07/2019 12:03 PM    Ferritin 559 (H) 10/07/2019 12:03 PM       Lab Results   Component Value Date/Time    Troponin-I, Qt. <0.05 06/02/2020 03:04 PM        Lab Results   Component Value Date/Time    Culture result: ALPHA STREPTOCOCCUS (A) 07/10/2018 12:56 PM    Culture result: NO GROWTH 2 DAYS 05/09/2018 03:55 PM    Culture result: MODERATE NORMAL RESPIRATORY HELADIO 11/19/2017 12:49 AM       Lab Results Component Value Date/Time    Color YELLOW/STRAW 07/10/2018 12:56 PM    Appearance CLOUDY (A) 07/10/2018 12:56 PM    Specific gravity 1.015 04/18/2012 03:55 AM    pH (UA) 6.5 07/10/2018 12:56 PM    Protein TRACE (A) 07/10/2018 12:56 PM    Glucose NEGATIVE  07/10/2018 12:56 PM    Ketone NEGATIVE  07/10/2018 12:56 PM    Bilirubin NEGATIVE  07/10/2018 12:56 PM    Blood NEGATIVE  07/10/2018 12:56 PM    Urobilinogen 0.2 07/10/2018 12:56 PM    Nitrites NEGATIVE  07/10/2018 12:56 PM    Leukocyte Esterase MODERATE (A) 07/10/2018 12:56 PM    WBC  07/10/2018 12:56 PM    RBC 0-5 07/10/2018 12:56 PM    Epithelial cells 0-10 04/20/2015 11:20 AM    Bacteria NEGATIVE  07/10/2018 12:56 PM       IMPRESSION:  ===========  -Abnormal CT Chest with RUL cavitary infiltrate/ mass; d/d mycobacterial infection vs lung abscess vs malignancy.  -History of recurrent urinary bladder cancer; s/p radical cystoprostatectomy with neobladder formation followed by chemo, XRT, also received intravesicular BCG.  -COPD. PLAN:  =====  -vanco/ zosyn.  -covid-19.  -if covid-19 negative, will proceed with bronch + bal RUL + right hilar and 4R/ 4L LN TBNA by EBUS. Discussed with patient in detail. Agreeable. Thank you for the consult.     Matt Helms MD

## 2020-06-03 NOTE — ACP (ADVANCE CARE PLANNING)
Advance Care Planning     Advance Care Planning Activator (Inpatient)  Conversation Note      Date of ACP Conversation: 06/03/20     Conversation Conducted with:   Patient with Decision Making Capacity    ACP Activator: Frank Naqvi RN    *When Decision Maker makes decisions on behalf of the incapacitated patient: Decision Maker is asked to consider and make decisions based on patient values, known preferences, or best interests. Health Care Decision Maker:    Current Designated Health Care Decision Maker:   Primary Decision Maker: Andrea Solano - Daughter - [phone]    Secondary Decision Maker: Martha Motta (Tanisha Montez) - Other Relative - [phone]  (If there is a [address] named in the [address] [employer] Makers\" box in the ACP activity, but it is not visible above, be sure to open that field and then select the health care decision maker relationship (ie \"primary\") in the blank space to the right of the name.) Validate  this information as still accurate & up-to-date; edit [place] field as needed.)    Note: Assess and validate information in current ACP documents, as indicated. If no Decision Maker listed above or available through scanned documents, then:    \"Can this person be reached easily? \" YES    Note: If the relationship of these Decision-Makers to the patient does NOT follow your state's Next of Kin hierarchy, recommend that patient complete ACP document that meets state-specific requirements to allow them to act on the patient's behalf when appropriate. Care Preferences    Ventilation: \"If you were in your present state of health and suddenly became very ill and were unable to breathe on your own, what would your preference be about the use of a ventilator (breathing machine) if it were available to you? \"      If patient would desire the use of a ventilator (breathing machine), answer \"yes\", if not \"no\":yes    \"If your health worsens and it becomes clear that your chance of recovery is unlikely, what would your preference be about the use of a ventilator (breathing machine) if it were available to you? \"     Would the patient desire the use of a ventilator (breathing machine)? NO      Resuscitation  \"CPR works best to restart the heart when there is a sudden event, like a heart attack, in someone who is otherwise healthy. Unfortunately, CPR does not typically restart the heart for people who have serious health conditions or who are very sick. \"    \"In the event your heart stopped as a result of an underlying serious health condition, would you want attempts to be made to restart your heart (answer \"yes\" for attempt to resuscitate) or would you prefer a natural death (answer \"no\" for do not attempt to resuscitate)? \" yes      NOTE: If the patient has a valid advance directive AND now provides care preference(s) that are inconsistent with that prior directive, advise the patient to consider either: creating a new advance directive that complies with state-specific requirements; or, if that is not possible, orally revoking that prior directive in accordance with state-specific requirements, which must be documented in the EHR. [] Yes  [] No   Educated Patient / Jaclyn Vaca regarding differences between Advance Directives and portable DNR orders.     Length of ACP Conversation in minutes:      Conversation Outcomes:  [] ACP discussion completed  [x] Existing advance directive reviewed with patient; no changes to patient's previously recorded wishes     [] New Advance Directive completed   [] Portable Do Not Resuscitate prepared for Provider review and signature  [] POLST/POST/MOLST/MOST prepared for Provider review and signature      Follow-up plan:    [] Schedule follow-up conversation to continue planning  [] Referred individual to Provider for additional questions/concerns   [] Advised patient/agent/surrogate to review completed ACP document and update if needed with changes in condition, patient preferences or care setting     [] This note routed to one or more involved healthcare providers

## 2020-06-03 NOTE — PROCEDURES
Pre anesthesia assessment was performed. History and physical on the chart. Informed consent for bronchoscopy with general anesthesia was obtained from the patient. The proposed procedure and possible alternatives including the option of no procedure were discussed. The benefits of the proposed procedure and its alternatives were also discussed. The nature and probability of risks of the proposed procedure and its alternatives were discussed. After our discussion, the patient gave informed consent to undergo the procedure and wished to proceed.     A time out was performed prior to the start of procedure and the procedure was verified in the presence of bronchoscopist, RN and anesthesiologist. ASA assessment documented by anesthesiologist. Patients heart rate, BP, respiratory rate and oxygen saturations were monitored during the procedure. RSI was performed and patient was intubated by anesthesiologist.     [address], [place]     Inspection Bronchoscopy: After patient was adequately sedated the diagnostic bronchoscope was introduced through the ETT and advanced to the sushil. The left and right tracheobronchial tree was examined upto sub segmental level. No endobronchial mass seen. Their was narrowing of the RUL apical segment. BAL was performed of the RUL apical segment. Thick yellow secretions aspirated. EBUS and TBNA 4L and 11R : The EBUS was inserted and advanced to trachea.  LMS was intubated and the bronchoscope rotated to 10 O'clock and pulled back. The 4LN identified and 2 passes were performed. CAMDEN confirmed LN but no malignant cells identified. The Bronchoscope was than advanced to the sushil and scanned for 4R LN that was vascular and bx was deferred. The EBUS scope was inserted in the [place] and advanced to BI, rotated to 1 Oclock and 11R LN identified. With some difficulty tbna x 2 performed. Lymphocytes seen. No malignant cells noted.     The procedure was completed with RUL apical segment brush biopsy. Patient tolerated the procedure well. No immediate complications. Sample collected:  -BAL RUL. -EBUS assisted 4L LN 25G TBNA x 2,  -EBUS assisted 11R LN 25G TBNA x 2.  -RUL brush biopsy. -Bronchial wash for cytology.     Complication: None.

## 2020-06-03 NOTE — PROGRESS NOTES
TRANSFER - OUT REPORT:    Verbal report given to Floor RN(name) on Sylvia Sidhu  being transferred to (unit) for routine progression of care       Report consisted of patients Situation, Background, Assessment and   Recommendations(SBAR). Information from the following report(s) SBAR, Procedure Summary, Intake/Output and MAR was reviewed with the receiving nurse. Lines:   Peripheral IV 06/02/20 Left Antecubital (Active)   Site Assessment Clean, dry, & intact 6/3/2020  9:20 AM   Phlebitis Assessment 0 6/3/2020  9:20 AM   Infiltration Assessment 0 6/3/2020  9:20 AM   Dressing Status Clean, dry, & intact 6/3/2020  9:20 AM   Dressing Type Transparent 6/3/2020  9:20 AM   Hub Color/Line Status Pink; Infusing 6/3/2020  9:20 AM   Action Taken Open ports on tubing capped 6/2/2020  9:00 PM   Alcohol Cap Used Yes 6/3/2020  9:20 AM       Peripheral IV 06/02/20 Right Antecubital (Active)   Site Assessment Clean, dry, & intact 6/3/2020  9:20 AM   Phlebitis Assessment 0 6/3/2020  9:20 AM   Infiltration Assessment 0 6/3/2020  9:20 AM   Dressing Status Clean, dry, & intact 6/3/2020  9:20 AM   Dressing Type Transparent 6/3/2020  9:20 AM   Hub Color/Line Status Pink;Capped 6/3/2020  9:20 AM   Action Taken Blood drawn 6/2/2020  5:34 PM   Alcohol Cap Used Yes 6/3/2020  9:20 AM        Opportunity for questions and clarification was provided.

## 2020-06-04 LAB
ALBUMIN SERPL-MCNC: 2.3 G/DL (ref 3.5–5)
ALBUMIN/GLOB SERPL: 0.5 {RATIO} (ref 1.1–2.2)
ALP SERPL-CCNC: 89 U/L (ref 45–117)
ALT SERPL-CCNC: 18 U/L (ref 12–78)
ANION GAP SERPL CALC-SCNC: 7 MMOL/L (ref 5–15)
AST SERPL-CCNC: 25 U/L (ref 15–37)
BASOPHILS # BLD: 0 K/UL (ref 0–0.1)
BASOPHILS NFR BLD: 0 % (ref 0–1)
BILIRUB SERPL-MCNC: 0.5 MG/DL (ref 0.2–1)
BUN SERPL-MCNC: 13 MG/DL (ref 6–20)
BUN/CREAT SERPL: 16 (ref 12–20)
CALCIUM SERPL-MCNC: 8 MG/DL (ref 8.5–10.1)
CHLORIDE SERPL-SCNC: 111 MMOL/L (ref 97–108)
CO2 SERPL-SCNC: 20 MMOL/L (ref 21–32)
CREAT SERPL-MCNC: 0.81 MG/DL (ref 0.7–1.3)
DIFFERENTIAL METHOD BLD: ABNORMAL
EOSINOPHIL # BLD: 0 K/UL (ref 0–0.4)
EOSINOPHIL NFR BLD: 0 % (ref 0–7)
ERYTHROCYTE [DISTWIDTH] IN BLOOD BY AUTOMATED COUNT: 17.4 % (ref 11.5–14.5)
GLOBULIN SER CALC-MCNC: 4.6 G/DL (ref 2–4)
GLUCOSE SERPL-MCNC: 194 MG/DL (ref 65–100)
HCT VFR BLD AUTO: 32.3 % (ref 36.6–50.3)
HGB BLD-MCNC: 10.1 G/DL (ref 12.1–17)
IMM GRANULOCYTES # BLD AUTO: 0.2 K/UL (ref 0–0.04)
IMM GRANULOCYTES NFR BLD AUTO: 1 % (ref 0–0.5)
LYMPHOCYTES # BLD: 0.9 K/UL (ref 0.8–3.5)
LYMPHOCYTES NFR BLD: 5 % (ref 12–49)
MCH RBC QN AUTO: 28.5 PG (ref 26–34)
MCHC RBC AUTO-ENTMCNC: 31.3 G/DL (ref 30–36.5)
MCV RBC AUTO: 91.2 FL (ref 80–99)
MONOCYTES # BLD: 0.7 K/UL (ref 0–1)
MONOCYTES NFR BLD: 4 % (ref 5–13)
NEUTS SEG # BLD: 15.4 K/UL (ref 1.8–8)
NEUTS SEG NFR BLD: 90 % (ref 32–75)
NRBC # BLD: 0 K/UL (ref 0–0.01)
NRBC BLD-RTO: 0 PER 100 WBC
PLATELET # BLD AUTO: 173 K/UL (ref 150–400)
PLATELET COMMENTS,PCOM: ABNORMAL
PMV BLD AUTO: 11.3 FL (ref 8.9–12.9)
POTASSIUM SERPL-SCNC: 4.5 MMOL/L (ref 3.5–5.1)
PROT SERPL-MCNC: 6.9 G/DL (ref 6.4–8.2)
RBC # BLD AUTO: 3.54 M/UL (ref 4.1–5.7)
RBC MORPH BLD: ABNORMAL
SODIUM SERPL-SCNC: 138 MMOL/L (ref 136–145)
WBC # BLD AUTO: 17.2 K/UL (ref 4.1–11.1)

## 2020-06-04 PROCEDURE — 97161 PT EVAL LOW COMPLEX 20 MIN: CPT

## 2020-06-04 PROCEDURE — 85025 COMPLETE CBC W/AUTO DIFF WBC: CPT

## 2020-06-04 PROCEDURE — 94640 AIRWAY INHALATION TREATMENT: CPT

## 2020-06-04 PROCEDURE — 74011250637 HC RX REV CODE- 250/637: Performed by: NURSE PRACTITIONER

## 2020-06-04 PROCEDURE — 74011250637 HC RX REV CODE- 250/637: Performed by: INTERNAL MEDICINE

## 2020-06-04 PROCEDURE — 80053 COMPREHEN METABOLIC PANEL: CPT

## 2020-06-04 PROCEDURE — 74011000258 HC RX REV CODE- 258: Performed by: INTERNAL MEDICINE

## 2020-06-04 PROCEDURE — 65270000029 HC RM PRIVATE

## 2020-06-04 PROCEDURE — 36415 COLL VENOUS BLD VENIPUNCTURE: CPT

## 2020-06-04 PROCEDURE — 74011250636 HC RX REV CODE- 250/636: Performed by: INTERNAL MEDICINE

## 2020-06-04 RX ORDER — LORATADINE AND PSEUDOEPHEDRINE SULFATE 10; 240 MG/1; MG/1
1 TABLET, EXTENDED RELEASE ORAL DAILY
COMMUNITY

## 2020-06-04 RX ADMIN — ALBUTEROL SULFATE 2 PUFF: 90 AEROSOL, METERED RESPIRATORY (INHALATION) at 13:57

## 2020-06-04 RX ADMIN — PIPERACILLIN AND TAZOBACTAM 3.38 G: 3; .375 INJECTION, POWDER, LYOPHILIZED, FOR SOLUTION INTRAVENOUS at 17:41

## 2020-06-04 RX ADMIN — Medication 10 ML: at 12:45

## 2020-06-04 RX ADMIN — Medication 10 ML: at 21:36

## 2020-06-04 RX ADMIN — PANTOPRAZOLE SODIUM 40 MG: 40 TABLET, DELAYED RELEASE ORAL at 06:49

## 2020-06-04 RX ADMIN — ALBUTEROL SULFATE 2 PUFF: 90 AEROSOL, METERED RESPIRATORY (INHALATION) at 07:23

## 2020-06-04 RX ADMIN — HEPARIN SODIUM 5000 UNITS: 5000 INJECTION INTRAVENOUS; SUBCUTANEOUS at 17:51

## 2020-06-04 RX ADMIN — PIPERACILLIN AND TAZOBACTAM 3.38 G: 3; .375 INJECTION, POWDER, LYOPHILIZED, FOR SOLUTION INTRAVENOUS at 01:41

## 2020-06-04 RX ADMIN — HEPARIN SODIUM 5000 UNITS: 5000 INJECTION INTRAVENOUS; SUBCUTANEOUS at 01:42

## 2020-06-04 RX ADMIN — VANCOMYCIN HYDROCHLORIDE 1000 MG: 1 INJECTION, POWDER, LYOPHILIZED, FOR SOLUTION INTRAVENOUS at 12:39

## 2020-06-04 RX ADMIN — HEPARIN SODIUM 5000 UNITS: 5000 INJECTION INTRAVENOUS; SUBCUTANEOUS at 10:51

## 2020-06-04 RX ADMIN — PIPERACILLIN AND TAZOBACTAM 3.38 G: 3; .375 INJECTION, POWDER, LYOPHILIZED, FOR SOLUTION INTRAVENOUS at 10:50

## 2020-06-04 RX ADMIN — ALBUTEROL SULFATE 2 PUFF: 90 AEROSOL, METERED RESPIRATORY (INHALATION) at 21:28

## 2020-06-04 RX ADMIN — ATORVASTATIN CALCIUM 20 MG: 20 TABLET, FILM COATED ORAL at 21:35

## 2020-06-04 RX ADMIN — Medication 10 ML: at 15:19

## 2020-06-04 NOTE — PROGRESS NOTES
Spiritual Care Assessment/Progress Note  [facility]      NAME: Sylvia Sidhu      MRN: [id number]  AGE: [de-identified] y.o. SEX: male  Congregation Affiliation: Latter day   Language: English     6/4/2020     Total Time (in minutes): 5     Spiritual Assessment begun in [facility] through conversation with:         []Patient        [] Family    [] Friend(s)        Reason for Consult: Initial visit     Spiritual beliefs: (Please include comment if needed)     [] Identifies with a betsy tradition:         [] Supported by a betsy community:            [] Claims no spiritual orientation:           [] Seeking spiritual identity:                [] Adheres to an individual form of spirituality:           [x] Not able to assess:                           Identified resources for coping:      [] Prayer                               [] Music                  [] Guided Imagery     [] Family/friends                 [] Pet visits     [] Devotional reading                         [x] Unknown     [] Other:                                              Interventions offered during this visit: (See comments for more details)    Patient Interventions: Initial visit           Plan of Care:     [] Support spiritual and/or cultural needs    [] Support AMD and/or advance care planning process      [] Support grieving process   [] Coordinate Rites and/or Rituals    [] Coordination with community clergy   [] No spiritual needs identified at this time   [] Detailed Plan of Care below (See Comments)  [] Make referral to Music Therapy  [] Make referral to Pet Therapy     [] Make referral to Addiction services  [] Make referral to [facility]  [] Make referral to Spiritual Care Partner  [] No future visits requested        [x] Follow up visits as needed     Attempted to visit pt without success. Pt sleeping and no family present. Chaplains will continue to offer support as needed.   Chaplain Good MDiv, MS, [facility]  [phone])

## 2020-06-04 NOTE — PROGRESS NOTES
I received a critical lab from Ivan Morton regarding bronchial wash yesterday. 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 I have sent a message to Dr. Moy Stahl regarding this lab result.

## 2020-06-04 NOTE — PROGRESS NOTES
PHYSICAL THERAPY EVALUATION/DISCHARGE  Patient: Laila Marquez (22 y.o. male)  Date: 6/4/2020  Primary Diagnosis: Cavitary lesion of lung [J98.4]  PNA (pneumonia) [J18.9]  Cavitary lesion of lung [J98.4]  PNA (pneumonia) [J18.9]  Procedure(s) (LRB):  ENDOSCOPIC BRONCHOSCOPY ULTRASOUND (EBUS) (N/A)  BRONCHIAL ALVEOLAR LAVAGE (N/A)  BRONCHOSCOPY (N/A)  FINE NEEDLE ASPIRATION (N/A)  ENDOSCOPIC BRUSHING (N/A) 1 Day Post-Op   Precautions: fall         ASSESSMENT  Based on the objective data described below, the patient presents with within hannah limits for mobility and endurance, able to tolerate standing and ambulating in the hallway with IV pole. Pt states that he has everything he needs at home, educated regarding pacing and safety. Pt appears cleared to DC home at this time without assistance, will DC from PT standpoint. Functional Outcome Measure: The patient scored 100/100 on the barthel outcome measure which is indicative of no complexity. Other factors to consider for discharge:      Further skilled acute physical therapy is not indicated at this time. PLAN :  Recommendation for discharge: (in order for the patient to meet his/her long term goals)  No skilled physical therapy/ follow up rehabilitation needs identified at this time. This discharge recommendation:  Has been made in collaboration with the attending provider and/or case management    IF patient discharges home will need the following DME: none       SUBJECTIVE:   Patient stated I am doing just fine.     OBJECTIVE DATA SUMMARY:   HISTORY:    Past Medical History:   Diagnosis Date    Aneurysm ([facility] [address]     ABDOMINAL     Arrhythmia 3/2/2014    Stated cardiac cath for abn EKG several years ago NEG    CAD (coronary artery disease)     Calculus of kidney     Cancer ([address]     Skin/Bladder    Chronic obstructive pulmonary disease ([facility] [address]     Contact dermatitis and other eczema, due to unspecified cause     GERD (gastroesophageal reflux disease)     ORTIZ'S ESOPH    Hyperlipemia     Hypertension     Ill-defined condition     HIATEL HERNIA    Ill-defined condition     ECZEMA    PUD (peptic ulcer disease)     Baretts Esophagus/antral gastritis/     Past Surgical History:   Procedure Laterality Date    ABDOMEN SURGERY PROC UNLISTED      UMBILICAL hernia     HX ADENOIDECTOMY      HX CATARACT REMOVAL Left 11/2016    HX COLONOSCOPY      HX GI      Colonoscopy x 2-3    HX GI      COLONOSCOPY    HX HERNIA REPAIR  8/21/13    left inguinal hernia repair, LAPAROSCOPIC  (DR JAQUEZ)    HX HERNIA REPAIR  3/14/16    LAPAROSCOPIC Incisional W/ MESH by     HX OTHER SURGICAL      REMOVAL OF SKIN CA R NECK    HX TONSILLECTOMY      HX UROLOGICAL  2010    Bladder bx/kidney stone removal    HX UROLOGICAL  2015    CYSTO    HX VASCULAR ACCESS  03/31/2015    PORT R CHEST WALL    HX VASCULAR ACCESS      REMOVAL OF PORT R CHEST WALL       Prior level of function: independent  Personal factors and/or comorbidities impacting plan of care:     Home Situation  Home Environment: Private residence  One/Two Story Residence: One story  Living Alone: Yes  Support Systems: Family member(s)  Patient Expects to be Discharged to[de-identified] Private residence  Current DME Used/Available at Home: None    EXAMINATION/PRESENTATION/DECISION MAKING:   Critical Behavior:              Hearing: Auditory  Auditory Impairment: None  Skin:  intact  Edema: none  Range Of Motion:  AROM: Within functional limits           PROM: Within functional limits           Strength:    Strength:  Within functional limits                    Tone & Sensation:   Tone: Normal              Sensation: Intact               Coordination:  Coordination: Within functional limits  Vision:      Functional Mobility:  Bed Mobility:  Rolling: Independent  Supine to Sit: Independent  Sit to Supine: Independent  Scooting: Independent  Transfers:  Sit to Stand: Independent  Stand to Sit: Independent Balance:   Sitting: Intact  Standing: Intact  Ambulation/Gait Training:  Distance (ft): 300 Feet (ft)  Assistive Device: Other (comment)(IV pole)  Ambulation - Level of Assistance: Independent     Gait Description (WDL): Exceptions to WDL           Base of Support: Widened        Step Length: Left shortened;Right shortened   Functional Measure:  Barthel Index:    Bathin  Bladder: 10  Bowels: 10  Groomin  Dressing: 10  Feeding: 10  Mobility: 15  Stairs: 10  Toilet Use: 10  Transfer (Bed to Chair and Back): 15  Total: 100/100       The Barthel ADL Index: Guidelines  1. The index should be used as a record of what a patient does, not as a record of what a patient could do. 2. The main aim is to establish degree of independence from any help, physical or verbal, however minor and for whatever reason. 3. The need for supervision renders the patient not independent. 4. A patient's performance should be established using the best available evidence. Asking the patient, friends/relatives and nurses are the usual sources, but direct observation and common sense are also important. However direct testing is not needed. 5. Usually the patient's performance over the preceding 24-48 hours is important, but occasionally longer periods will be relevant. 6. Middle categories imply that the patient supplies over 50 per cent of the effort. 7. Use of aids to be independent is allowed. Brigida Galicia., Barthel, D.W. (2358). Functional evaluation: the Barthel Index. [facility] (14)2. IZABELLA Segovia, James Goode.[place].[facility], [address] [place] [address] (). Measuring the change indisability after inpatient rehabilitation; comparison of the responsiveness of the Barthel Index and Functional [place] Measure. Journal of Neurology, Neurosurgery, and Psychiatry, 66(4), 508-954.   Perla Kidd, N.J.A, ABY SahuJ.ADRIANNE, & Jose Contreras M.A. (2004.) Assessment of post-stroke quality of life in cost-effectiveness studies: The usefulness of the Barthel Index and the EuroQoL-5D. Quality of Life Research, 15, 444-31            Physical Therapy Evaluation Charge Determination   History Examination Presentation Decision-Making   MEDIUM  Complexity : 1-2 comorbidities / personal factors will impact the outcome/ POC  LOW Complexity : 1-2 Standardized tests and measures addressing body structure, function, activity limitation and / or participation in recreation  LOW Complexity : Stable, uncomplicated  Other outcome measures barthel  LOW       Based on the above components, the patient evaluation is determined to be of the following complexity level: LOW     Pain Rating:  none    Activity Tolerance:   WNL and Good  Please refer to the flowsheet for vital signs taken during this treatment. After treatment patient left in no apparent distress:   Supine in bed and Call bell within reach    COMMUNICATION/EDUCATION:   The patients plan of care was discussed with: Registered nurse and Case management. Fall prevention education was provided and the patient/caregiver indicated understanding. and Patient/family have participated as able in goal setting and plan of care.     Thank you for this referral.  Richa Marquez, PT   Time Calculation: 20 mins

## 2020-06-04 NOTE — PROGRESS NOTES
Problem: Falls - Risk of  Goal: *Absence of Falls  Description: Document Ollie Awad Fall Risk and appropriate interventions in the flowsheet.   Outcome: Progressing Towards Goal  Note: Fall Risk Interventions:            Medication Interventions: Evaluate medications/consider consulting pharmacy                   Problem: Patient Education: Go to Patient Education Activity  Goal: Patient/Family Education  Outcome: Progressing Towards Goal     Problem: Discharge Planning  Goal: *Discharge to safe environment  Outcome: Progressing Towards Goal

## 2020-06-04 NOTE — PROGRESS NOTES
2133 - spoke with pharmacy for refill of patient albuterol inhaler; 2N paged and unable to find on unit, not in patient locked  room

## 2020-06-04 NOTE — ANESTHESIA POSTPROCEDURE EVALUATION
Procedure(s):  ENDOSCOPIC BRONCHOSCOPY ULTRASOUND (EBUS)  BRONCHIAL ALVEOLAR LAVAGE  BRONCHOSCOPY  FINE NEEDLE ASPIRATION  ENDOSCOPIC BRUSHING. general    Anesthesia Post Evaluation      Multimodal analgesia: multimodal analgesia used between 6 hours prior to anesthesia start to PACU discharge  Patient location during evaluation: bedside  Patient participation: waiting for patient participation  Level of consciousness: awake  Pain management: adequate  Airway patency: patent  Anesthetic complications: no  Cardiovascular status: acceptable  Respiratory status: unassisted  Hydration status: acceptable  Comments: Post-Anesthesia Evaluation and Assessment    I have evaluated the patient and they are ready for PACU discharge. Patient: Zeynep Andujar MRN: [id number]  SSN: [id number]   YOB: 1940  Age: [de-identified] y.o. Sex: male      Cardiovascular Function/Vital Signs  /59 (BP 1 Location: Right leg, BP Patient Position: At rest)   Pulse 70   Temp 36.6 °C (97.9 °F)   Resp 19   Ht 5' 9\" (1.753 m)   Wt 74.8 kg (165 lb)   SpO2 92%   BMI 24.37 kg/m²     Patient is status post General anesthesia for Procedure(s):  ENDOSCOPIC BRONCHOSCOPY ULTRASOUND (EBUS)  BRONCHIAL ALVEOLAR LAVAGE  BRONCHOSCOPY  FINE NEEDLE ASPIRATION  ENDOSCOPIC BRUSHING. Nausea/Vomiting: None    Postoperative hydration reviewed and adequate. Pain:  Pain Scale 1: Numeric (0 - 10) (06/04/20 1054)  Pain Intensity 1: 0 (06/04/20 1054)   Managed    Neurological Status: At baseline    Mental Status, Level of Consciousness: Alert and  oriented to person, place, and time    Pulmonary Status:   O2 Device: Room air (06/04/20 1353)   Adequate oxygenation and airway patent    Complications related to anesthesia: None    Post-anesthesia assessment completed.  No concerns    Signed By: Ramon Cote MD    June 4, 2020                   INITIAL Post-op Vital signs:   Vitals Value Taken Time   /59 6/4/2020  2:18 PM   Temp 36.6 °C (97.9 °F) 6/4/2020  2:18 PM   Pulse 70 6/4/2020  2:18 PM   Resp 19 6/4/2020  2:18 PM   SpO2 92 % 6/4/2020  2:18 PM

## 2020-06-04 NOTE — PROGRESS NOTES
Hospitalist Progress Note  Roslyn Hernandez MD  Answering service: [phone] from in house phone        Date of Service:  2020  NAME:  Zeynep Andujar  :  1940  MRN:  [id number]      Admission Summary:   As per initial admission summary  Chief complaint: Cough, shortness of breath, sputum and right cavitary lesion of the lung.     Zeynep Andujar is a [de-identified] y.o. male who presents to the [facility] ED on recommendation of his pulmonologist, Dr. Ferris Seen for evaluation of right lung cavitary lesion with constitutional symptoms despite of 3 rounds of antibiotic courses, steroids, for further evaluation and management. Patient suggested to have the lesion noted on earlier CT scan of the chest without contrast in 2020. Patient has been following with his pulmonologist and hemato-oncologist.  Patient has cough with thick mucoid whitish/yellowish sputum in moderate amount, with exertional shortness of breath with reduced capacity, occasional drenching night sweats without feverish feeling, mostly around lower abdomen/groin area, poor taste and appetite, and about 10 pounds loss of weight in the last about 4-6 weeks. Otherwise he is feeling fine. On today's visit with his pulmonologist, chest x-ray was done after which he was advised to come to ER for further evaluation. ED physician has discussed case at length with on-call pulmonologist, Dr. Rosanna Perez. Patient was started on IV Zosyn and vancomycin empirically, CT chest with contrast ordered. Patient repeatedly said to have infectious disease consultation as recommended by his primary pulmonologist.  However on call pulmonologist recommended to wait for the CT scan results and that infectious disease consult is not warranted at present until further evaluation by him. Patient is agreeable with the plan.   Patient has history of bladder cancer and subsequently neobladder formation, for which she follows with urologist.          Interval history / Subjective:   Patient seen for Follow up of cavitary lesion     Patient seen and examined by the bedside. He feels ok and wants to go home. Explained that cx from bronchoscopy are still pending. Assessment & Plan:     Right upper lobe cavitary lung lesion   Differential: Infectionbacterial/fungal/granulomatous versus chronic inflammatory cause versus neoplastic etiology  -CT chest with contrast: Persistent but decreased size of cavitary opacity in the right upper lobe. Increased multiple nodular opacities around the cavitary lesion. Mediastinal adenopathy without significant change. Increased size of right hilar lymph node  -Pulmonology on board   - Blood cx: NGTD  - normal lactic, elevated C RP  - COVID Negative   - leucocytosis  Today - likely reactive   - Bronchoscopy done on 6/3: bal RUL + right hilar and 4R/ 4L LN TBNA by EBUS  - Cytology sent   - Bronch cx: prelim - filamentous fungus  - c/w  abx IV zosyn. Dced Vanco  - ID consult placed      # COPD, not in exacerbation -Albuterol inhaler      #History of recurrent urinary bladder cancer, status post radical cystoprostatectomy with neobladder formation, chem XRT  -Follows with oncology Dr. Aston Strong     #Chronic anemia - hb stable       CODE STATUS: Full code  DVT prophylaxis: Heparin SQ  Emergency contact: DaughterToby Pal: [phone]    Code status: Full   DVT prophylaxis: heparin SC    Care Plan discussed with: Patient/Family  Disposition:Home when ready - likely tomorrow      Hospital Problems  Date Reviewed: 2/10/2020          Codes Class Noted POA    Cavitary lesion of lung ICD-10-CM: J98.4  ICD-9-CM: 518.89  6/2/2020 Unknown        PNA (pneumonia) ICD-10-CM: J18.9  ICD-9-CM: 638  6/2/2020 Unknown                Review of Systems:   A comprehensive review of systems was negative except for that written in the HPI. Vital Signs:    Last 24hrs VS reviewed since prior progress note. Most recent are:  Visit Vitals  /59 (BP 1 Location: Right leg, BP Patient Position: At rest)   Pulse 70   Temp 97.9 °F (36.6 °C)   Resp 19   Ht 5' 9\" (1.753 m)   Wt 74.8 kg (165 lb)   SpO2 92%   BMI 24.37 kg/m²         Intake/Output Summary (Last 24 hours) at 6/4/2020 1459  Last data filed at 6/4/2020 3195  Gross per 24 hour   Intake 603 ml   Output 375 ml   Net 228 ml        Physical Examination:             Constitutional:  No acute distress, cooperative, pleasant    ENT:  Oral mucous moist, oropharynx benign. Neck supple,    Resp:  coarse breathing right side    CV:  Regular rhythm, normal rate, no murmurs, gallops, rubs    GI:  Soft, non distended, non tender. normoactive bowel sounds    Musculoskeletal:  No edema, warm, 2+ pulses throughout    Neurologic:  Moves all extremities. AAOx3     Psych:  Good insight, Not anxious nor agitated. Data Review:    Review and/or order of clinical lab test  Review and/or order of tests in the radiology section of CPT  Review and/or order of tests in the medicine section of CPT      Labs:     Recent Labs     06/04/20 0154 06/03/20 0219   WBC 17.2* 5.7   HGB 10.1* 10.6*   HCT 32.3* 34.2*    170     Recent Labs     06/04/20 0154 06/03/20 0219 06/02/20  1504    137 138   K 4.5 3.6 4.2   * 107 106   CO2 20* 22 25   BUN 13 14 19   CREA 0.81 0.63* 0.81   * 88 120*   CA 8.0* 8.6 9.6     Recent Labs     06/04/20  0154 06/03/20 0219 06/02/20  1504   ALT 18 19 22   AP 89 84 91   TBILI 0.5 0.6 0.8   TP 6.9 7.5 8.2   ALB 2.3* 2.2* 2.5*   GLOB 4.6* 5.3* 5.7*     No results for input(s): INR, PTP, APTT, INREXT, INREXT in the last 72 hours. Recent Labs     06/03/20 0219   TIBC 202*   PSAT 19*   FERR 496*      Lab Results   Component Value Date/Time    Folate 26.2 (H) 06/03/2020 02:19 AM      No results for input(s): PH, PCO2, PO2 in the last 72 hours.   Recent Labs     06/02/20  1504   TROIQ <0.05     Lab Results   Component Value Date/Time Cholesterol, total 124 06/02/2020 03:04 PM    HDL Cholesterol 26 06/02/2020 03:04 PM    LDL, calculated 80.8 06/02/2020 03:04 PM    Triglyceride 86 06/02/2020 03:04 PM    CHOL/HDL Ratio 4.8 06/02/2020 03:04 PM     Lab Results   Component Value Date/Time    Glucose (POC) 101 (H) 11/27/2018 07:49 PM     Lab Results   Component Value Date/Time    Color YELLOW/STRAW 07/10/2018 12:56 PM    Appearance CLOUDY (A) 07/10/2018 12:56 PM    Specific gravity 1.017 07/10/2018 12:56 PM    Specific gravity 1.015 04/18/2012 03:55 AM    pH (UA) 6.5 07/10/2018 12:56 PM    Protein TRACE (A) 07/10/2018 12:56 PM    Glucose NEGATIVE  07/10/2018 12:56 PM    Ketone NEGATIVE  07/10/2018 12:56 PM    Bilirubin NEGATIVE  07/10/2018 12:56 PM    Urobilinogen 0.2 07/10/2018 12:56 PM    Nitrites NEGATIVE  07/10/2018 12:56 PM    Leukocyte Esterase MODERATE (A) 07/10/2018 12:56 PM    Epithelial cells FEW 07/10/2018 12:56 PM    Bacteria NEGATIVE  07/10/2018 12:56 PM    WBC  07/10/2018 12:56 PM    RBC 0-5 07/10/2018 12:56 PM         Medications Reviewed:     Current Facility-Administered Medications   Medication Dose Route Frequency    sodium chloride (NS) flush 5-40 mL  5-40 mL IntraVENous Q8H    sodium chloride (NS) flush 5-40 mL  5-40 mL IntraVENous PRN    acetaminophen (TYLENOL) tablet 650 mg  650 mg Oral Q4H PRN    ondansetron (ZOFRAN) injection 4 mg  4 mg IntraVENous Q4H PRN    heparin (porcine) injection 5,000 Units  5,000 Units SubCUTAneous Q8H    piperacillin-tazobactam (ZOSYN) 3.375 g in 0.9% sodium chloride (MBP/ADV) 100 mL  3.375 g IntraVENous Q8H    atorvastatin (LIPITOR) tablet 20 mg  20 mg Oral QHS    cetirizine (ZYRTEC) tablet 10 mg  10 mg Oral QHS    fluticasone propionate (FLONASE) 50 mcg/actuation nasal spray 2 Spray  2 Spray Both Nostrils DAILY    pantoprazole (PROTONIX) tablet 40 mg  40 mg Oral ACB    zolpidem (AMBIEN) tablet 5 mg  5 mg Oral QHS PRN    HYDROcodone-acetaminophen (NORCO) 5-325 mg per tablet 1 Tab  1 Tab Oral Q6H PRN    albuterol (PROVENTIL HFA, VENTOLIN HFA, PROAIR HFA) inhaler 2 Puff  2 Puff Inhalation Q6H    Vancomycin Pharmacy Dosing   Other Rx Dosing/Monitoring    vancomycin (VANCOCIN) 1,000 mg in 0.9% sodium chloride (MBP/ADV) 250 mL  1,000 mg IntraVENous Q12H     ______________________________________________________________________  EXPECTED LENGTH OF STAY: 3d 4h  ACTUAL LENGTH OF STAY:          2                 Meme Callejas MD

## 2020-06-05 LAB
ALBUMIN SERPL-MCNC: 2.2 G/DL (ref 3.5–5)
ALBUMIN/GLOB SERPL: 0.4 {RATIO} (ref 1.1–2.2)
ALP SERPL-CCNC: 87 U/L (ref 45–117)
ALT SERPL-CCNC: 15 U/L (ref 12–78)
ANION GAP SERPL CALC-SCNC: 8 MMOL/L (ref 5–15)
AST SERPL-CCNC: 22 U/L (ref 15–37)
BACTERIA SPEC CULT: ABNORMAL
BACTERIA SPEC CULT: ABNORMAL
BACTERIA SPEC CULT: NORMAL
BILIRUB SERPL-MCNC: 0.5 MG/DL (ref 0.2–1)
BUN SERPL-MCNC: 10 MG/DL (ref 6–20)
BUN/CREAT SERPL: 15 (ref 12–20)
CALCIUM SERPL-MCNC: 8.1 MG/DL (ref 8.5–10.1)
CHLORIDE SERPL-SCNC: 111 MMOL/L (ref 97–108)
CO2 SERPL-SCNC: 21 MMOL/L (ref 21–32)
CREAT SERPL-MCNC: 0.68 MG/DL (ref 0.7–1.3)
GLOBULIN SER CALC-MCNC: 5.2 G/DL (ref 2–4)
GLUCOSE BLD STRIP.AUTO-MCNC: 108 MG/DL (ref 65–100)
GLUCOSE SERPL-MCNC: 100 MG/DL (ref 65–100)
GRAM STN SPEC: ABNORMAL
GRAM STN SPEC: NORMAL
POTASSIUM SERPL-SCNC: 3.2 MMOL/L (ref 3.5–5.1)
PROT SERPL-MCNC: 7.4 G/DL (ref 6.4–8.2)
SERVICE CMNT-IMP: ABNORMAL
SERVICE CMNT-IMP: ABNORMAL
SERVICE CMNT-IMP: NORMAL
SODIUM SERPL-SCNC: 140 MMOL/L (ref 136–145)

## 2020-06-05 PROCEDURE — 80048 BASIC METABOLIC PNL TOTAL CA: CPT

## 2020-06-05 PROCEDURE — 77030012341 HC CHMB SPCR OPTC MDI VYRM -A

## 2020-06-05 PROCEDURE — 94760 N-INVAS EAR/PLS OXIMETRY 1: CPT

## 2020-06-05 PROCEDURE — 74011250637 HC RX REV CODE- 250/637: Performed by: NURSE PRACTITIONER

## 2020-06-05 PROCEDURE — 85025 COMPLETE CBC W/AUTO DIFF WBC: CPT

## 2020-06-05 PROCEDURE — 74011250636 HC RX REV CODE- 250/636: Performed by: INTERNAL MEDICINE

## 2020-06-05 PROCEDURE — 74011250637 HC RX REV CODE- 250/637: Performed by: INTERNAL MEDICINE

## 2020-06-05 PROCEDURE — 80053 COMPREHEN METABOLIC PANEL: CPT

## 2020-06-05 PROCEDURE — 82962 GLUCOSE BLOOD TEST: CPT

## 2020-06-05 PROCEDURE — 94640 AIRWAY INHALATION TREATMENT: CPT

## 2020-06-05 PROCEDURE — 77010033678 HC OXYGEN DAILY

## 2020-06-05 PROCEDURE — 74011000258 HC RX REV CODE- 258: Performed by: INTERNAL MEDICINE

## 2020-06-05 PROCEDURE — 36415 COLL VENOUS BLD VENIPUNCTURE: CPT

## 2020-06-05 PROCEDURE — 65270000029 HC RM PRIVATE

## 2020-06-05 RX ADMIN — PANTOPRAZOLE SODIUM 40 MG: 40 TABLET, DELAYED RELEASE ORAL at 07:11

## 2020-06-05 RX ADMIN — ALBUTEROL SULFATE 2 PUFF: 90 AEROSOL, METERED RESPIRATORY (INHALATION) at 09:00

## 2020-06-05 RX ADMIN — ALBUTEROL SULFATE 2 PUFF: 90 AEROSOL, METERED RESPIRATORY (INHALATION) at 19:59

## 2020-06-05 RX ADMIN — Medication 10 ML: at 21:13

## 2020-06-05 RX ADMIN — Medication 10 ML: at 14:19

## 2020-06-05 RX ADMIN — Medication 10 ML: at 07:12

## 2020-06-05 RX ADMIN — PIPERACILLIN AND TAZOBACTAM 3.38 G: 3; .375 INJECTION, POWDER, LYOPHILIZED, FOR SOLUTION INTRAVENOUS at 17:40

## 2020-06-05 RX ADMIN — PIPERACILLIN AND TAZOBACTAM 3.38 G: 3; .375 INJECTION, POWDER, LYOPHILIZED, FOR SOLUTION INTRAVENOUS at 01:30

## 2020-06-05 NOTE — PROGRESS NOTES
6/5/2020 -   REECE:  - RUR: 17%  - Likely disposition includes discharge to own home with transport via family and home support via family    - Patient refused AM labs today, 6/5  - Cultures are pending  - ABX continue  - ID Consult pending  - Patient was cleared by therapies with no additional transition of care recommendations  CRM: Jose Carbajal, MPH, [facility]; Z: [phone]

## 2020-06-05 NOTE — ROUTINE PROCESS
Occupational Therapy [id number] -  
[phone] Orders acknowledged and chart reviewed in prep for OT evaluation. Discussed patient case with PT who reports patient is IND with ADLs and mobility and has good support at home. Patient has no further acute OT needs, will sign off. Thank you. Yue Lai, MS, OTR/L

## 2020-06-05 NOTE — PROGRESS NOTES
Hospitalist Progress Note  Meme Callejas MD  Answering service: [phone] from in house phone        Date of Service:  2020  NAME:  Kourtney Altamirano  :  1940  MRN:  [id number]      Admission Summary:   As per initial admission summary  Chief complaint: Cough, shortness of breath, sputum and right cavitary lesion of the lung.     Kourtney Altamirano is a [de-identified] y.o. male who presents to the [facility] ED on recommendation of his pulmonologist, Dr. Pavel Lu for evaluation of right lung cavitary lesion with constitutional symptoms despite of 3 rounds of antibiotic courses, steroids, for further evaluation and management. Patient suggested to have the lesion noted on earlier CT scan of the chest without contrast in 2020. Patient has been following with his pulmonologist and hemato-oncologist.  Patient has cough with thick mucoid whitish/yellowish sputum in moderate amount, with exertional shortness of breath with reduced capacity, occasional drenching night sweats without feverish feeling, mostly around lower abdomen/groin area, poor taste and appetite, and about 10 pounds loss of weight in the last about 4-6 weeks. Otherwise he is feeling fine. On today's visit with his pulmonologist, chest x-ray was done after which he was advised to come to ER for further evaluation. ED physician has discussed case at length with on-call pulmonologist, Dr. Salud Harris. Patient was started on IV Zosyn and vancomycin empirically, CT chest with contrast ordered. Patient repeatedly said to have infectious disease consultation as recommended by his primary pulmonologist.  However on call pulmonologist recommended to wait for the CT scan results and that infectious disease consult is not warranted at present until further evaluation by him. Patient is agreeable with the plan.   Patient has history of bladder cancer and subsequently neobladder formation, for which she follows with urologist.          Interval history / Subjective:   Patient seen for Follow up of cavitary lesion     Patient seen and examined by the bedside. He feels ok and wants to go home. He does not believe that he needs IV abx and is upset that has been in the hospital for 3 days now . Explained that  Bronch cx growing fungus and need ID recs for discharge. Spoke with daughter Darya Frank and updated patient's status - negative for malignancy, cx positive for fungus and waiting for ID recs. She understands and agrees, will talk to her  to calm him. Assessment & Plan:     Right upper lobe cavitary lung lesion   Differential: Infectionbacterial/fungal/granulomatous versus chronic inflammatory cause versus neoplastic etiology  -CT chest with contrast: Persistent but decreased size of cavitary opacity in the right upper lobe. Increased multiple nodular opacities around the cavitary lesion. Mediastinal adenopathy without significant change. Increased size of right hilar lymph node  -Pulmonology on board   - Blood cx: NGTD  - normal lactic, elevated C RP  - COVID Negative   - leucocytosis - rpt labs not done as pt refused   - Bronchoscopy done on 6/3: bal RUL + right hilar and 4R/ 4L LN TBNA by EBUS  - FNAC of lymph nodes: No cells diagnostic for malignancy. BAL Cytology:  Atypical, favor benign reactive process   - Bronch cx:  filamentous fungus - sent [facility] state [facility] for ID  - c/w abx IV zosyn.  Dced Vanco  - ID consult pending      # COPD, not in exacerbation -Albuterol inhaler      #History of recurrent urinary bladder cancer, status post radical cystoprostatectomy with neobladder formation, chem XRT  -Follows with oncology Dr. Karla Gonzalez     #Chronic anemia - hb stable       CODE STATUS: Full code  DVT prophylaxis: Heparin SQ  Emergency contact: Javi Benitez Esther: [phone]    Code status: Full   DVT prophylaxis: heparin SC    Care Plan discussed with: Patient/Family  Disposition:Home when ready     Hospital Problems  Date Reviewed: 2/10/2020          Codes Class Noted POA    Cavitary lesion of lung ICD-10-CM: J98.4  ICD-9-CM: 518.89  6/2/2020 Unknown        PNA (pneumonia) ICD-10-CM: J18.9  ICD-9-CM: 730  6/2/2020 Unknown                Review of Systems:   A comprehensive review of systems was negative except for that written in the HPI. Vital Signs:    Last 24hrs VS reviewed since prior progress note. Most recent are:  Visit Vitals  /42 (BP 1 Location: Left arm, BP Patient Position: Lying right side; At rest)   Pulse 63   Temp 98.2 °F (36.8 °C)   Resp 18   Ht 5' 9\" (1.753 m)   Wt 74.8 kg (165 lb)   SpO2 93%   BMI 24.37 kg/m²       No intake or output data in the 24 hours ending 06/05/20 1600     Physical Examination:             Constitutional:  No acute distress   ENT:  Oral mucous moist, oropharynx benign. Neck supple   Resp:  coarse breathing right side    CV:  Regular rhythm, normal rate, no murmurs, gallops, rubs    GI:  Soft, non distended, non tender. normoactive bowel sounds    Musculoskeletal:  No edema, warm, 2+ pulses throughout    Neurologic:  Moves all extremities. AAOx3          Data Review:    Review and/or order of clinical lab test  Review and/or order of tests in the radiology section of CPT  Review and/or order of tests in the medicine section of CPT      Labs:     Recent Labs     06/04/20  0154 06/03/20  0219   WBC 17.2* 5.7   HGB 10.1* 10.6*   HCT 32.3* 34.2*    170     Recent Labs     06/05/20  0200 06/04/20  0154 06/03/20  0219    138 137   K 3.2* 4.5 3.6   * 111* 107   CO2 21 20* 22   BUN 10 13 14   CREA 0.68* 0.81 0.63*    194* 88   CA 8.1* 8.0* 8.6     Recent Labs     06/05/20  0200 06/04/20  0154 06/03/20  0219   ALT 15 18 19   AP 87 89 84   TBILI 0.5 0.5 0.6   TP 7.4 6.9 7.5   ALB 2.2* 2.3* 2.2*   GLOB 5.2* 4.6* 5.3*     No results for input(s): INR, PTP, APTT, INREXT, INREXT in the last 72 hours.    Recent Labs     06/03/20  0212 TIBC 202*   PSAT 19*   FERR 496*      Lab Results   Component Value Date/Time    Folate 26.2 (H) 06/03/2020 02:19 AM      No results for input(s): PH, PCO2, PO2 in the last 72 hours. No results for input(s): CPK, CKNDX, TROIQ in the last 72 hours.     No lab exists for component: CPKMB  Lab Results   Component Value Date/Time    Cholesterol, total 124 06/02/2020 03:04 PM    HDL Cholesterol 26 06/02/2020 03:04 PM    LDL, calculated 80.8 06/02/2020 03:04 PM    Triglyceride 86 06/02/2020 03:04 PM    CHOL/HDL Ratio 4.8 06/02/2020 03:04 PM     Lab Results   Component Value Date/Time    Glucose (POC) 108 (H) 06/05/2020 03:52 PM    Glucose (POC) 101 (H) 11/27/2018 07:49 PM     Lab Results   Component Value Date/Time    Color YELLOW/STRAW 07/10/2018 12:56 PM    Appearance CLOUDY (A) 07/10/2018 12:56 PM    Specific gravity 1.017 07/10/2018 12:56 PM    Specific gravity 1.015 04/18/2012 03:55 AM    pH (UA) 6.5 07/10/2018 12:56 PM    Protein TRACE (A) 07/10/2018 12:56 PM    Glucose NEGATIVE  07/10/2018 12:56 PM    Ketone NEGATIVE  07/10/2018 12:56 PM    Bilirubin NEGATIVE  07/10/2018 12:56 PM    Urobilinogen 0.2 07/10/2018 12:56 PM    Nitrites NEGATIVE  07/10/2018 12:56 PM    Leukocyte Esterase MODERATE (A) 07/10/2018 12:56 PM    Epithelial cells FEW 07/10/2018 12:56 PM    Bacteria NEGATIVE  07/10/2018 12:56 PM    WBC  07/10/2018 12:56 PM    RBC 0-5 07/10/2018 12:56 PM         Medications Reviewed:     Current Facility-Administered Medications   Medication Dose Route Frequency    potassium bicarb-citric acid (EFFER-K) tablet 40 mEq  40 mEq Oral NOW    sodium chloride (NS) flush 5-40 mL  5-40 mL IntraVENous Q8H    sodium chloride (NS) flush 5-40 mL  5-40 mL IntraVENous PRN    acetaminophen (TYLENOL) tablet 650 mg  650 mg Oral Q4H PRN    ondansetron (ZOFRAN) injection 4 mg  4 mg IntraVENous Q4H PRN    heparin (porcine) injection 5,000 Units  5,000 Units SubCUTAneous Q8H    piperacillin-tazobactam (ZOSYN) 3.375 g in 0.9% sodium chloride (MBP/ADV) 100 mL  3.375 g IntraVENous Q8H    atorvastatin (LIPITOR) tablet 20 mg  20 mg Oral QHS    cetirizine (ZYRTEC) tablet 10 mg  10 mg Oral QHS    fluticasone propionate (FLONASE) 50 mcg/actuation nasal spray 2 Spray  2 Spray Both Nostrils DAILY    pantoprazole (PROTONIX) tablet 40 mg  40 mg Oral ACB    zolpidem (AMBIEN) tablet 5 mg  5 mg Oral QHS PRN    HYDROcodone-acetaminophen (NORCO) 5-325 mg per tablet 1 Tab  1 Tab Oral Q6H PRN    albuterol (PROVENTIL HFA, VENTOLIN HFA, PROAIR HFA) inhaler 2 Puff  2 Puff Inhalation Q6H     ______________________________________________________________________  EXPECTED LENGTH OF STAY: 3d 4h  ACTUAL LENGTH OF STAY:          3                 Silver Barajas MD

## 2020-06-06 VITALS
OXYGEN SATURATION: 93 % | HEIGHT: 69 IN | RESPIRATION RATE: 18 BRPM | TEMPERATURE: 97.8 F | DIASTOLIC BLOOD PRESSURE: 67 MMHG | HEART RATE: 83 BPM | BODY MASS INDEX: 24.44 KG/M2 | SYSTOLIC BLOOD PRESSURE: 145 MMHG | WEIGHT: 165 LBS

## 2020-06-06 LAB
ANION GAP SERPL CALC-SCNC: 9 MMOL/L (ref 5–15)
BASOPHILS # BLD: 0 K/UL (ref 0–0.1)
BASOPHILS NFR BLD: 1 % (ref 0–1)
BUN SERPL-MCNC: 9 MG/DL (ref 6–20)
BUN/CREAT SERPL: 16 (ref 12–20)
CALCIUM SERPL-MCNC: 8.1 MG/DL (ref 8.5–10.1)
CHLORIDE SERPL-SCNC: 108 MMOL/L (ref 97–108)
CO2 SERPL-SCNC: 23 MMOL/L (ref 21–32)
CREAT SERPL-MCNC: 0.55 MG/DL (ref 0.7–1.3)
DIFFERENTIAL METHOD BLD: ABNORMAL
EOSINOPHIL # BLD: 0.2 K/UL (ref 0–0.4)
EOSINOPHIL NFR BLD: 2 % (ref 0–7)
ERYTHROCYTE [DISTWIDTH] IN BLOOD BY AUTOMATED COUNT: 17.4 % (ref 11.5–14.5)
GLUCOSE SERPL-MCNC: 86 MG/DL (ref 65–100)
HCT VFR BLD AUTO: 33.9 % (ref 36.6–50.3)
HGB BLD-MCNC: 10.6 G/DL (ref 12.1–17)
IMM GRANULOCYTES # BLD AUTO: 0 K/UL (ref 0–0.04)
IMM GRANULOCYTES NFR BLD AUTO: 0 % (ref 0–0.5)
LYMPHOCYTES # BLD: 1.5 K/UL (ref 0.8–3.5)
LYMPHOCYTES NFR BLD: 19 % (ref 12–49)
MCH RBC QN AUTO: 28 PG (ref 26–34)
MCHC RBC AUTO-ENTMCNC: 31.3 G/DL (ref 30–36.5)
MCV RBC AUTO: 89.4 FL (ref 80–99)
MONOCYTES # BLD: 0.7 K/UL (ref 0–1)
MONOCYTES NFR BLD: 9 % (ref 5–13)
NEUTS SEG # BLD: 5.5 K/UL (ref 1.8–8)
NEUTS SEG NFR BLD: 69 % (ref 32–75)
NRBC # BLD: 0 K/UL (ref 0–0.01)
NRBC BLD-RTO: 0 PER 100 WBC
PLATELET # BLD AUTO: 169 K/UL (ref 150–400)
PMV BLD AUTO: 11.3 FL (ref 8.9–12.9)
POTASSIUM SERPL-SCNC: 3.2 MMOL/L (ref 3.5–5.1)
RBC # BLD AUTO: 3.79 M/UL (ref 4.1–5.7)
SODIUM SERPL-SCNC: 140 MMOL/L (ref 136–145)
WBC # BLD AUTO: 7.9 K/UL (ref 4.1–11.1)

## 2020-06-06 PROCEDURE — 86480 TB TEST CELL IMMUN MEASURE: CPT

## 2020-06-06 PROCEDURE — 94640 AIRWAY INHALATION TREATMENT: CPT

## 2020-06-06 PROCEDURE — 74011250637 HC RX REV CODE- 250/637: Performed by: NURSE PRACTITIONER

## 2020-06-06 PROCEDURE — 87305 ASPERGILLUS AG IA: CPT

## 2020-06-06 RX ADMIN — ALBUTEROL SULFATE 2 PUFF: 90 AEROSOL, METERED RESPIRATORY (INHALATION) at 07:40

## 2020-06-06 NOTE — DISCHARGE INSTRUCTIONS
Please bring this form with you to show your primary care provider at your follow-up appointment. Primary care provider:  Dr. Nasario Spatz, NP    Discharging provider:  Andie Hanks MD    You have been admitted to the hospital with the following diagnoses:  · Cavitary lesion of lung [J98.4]  · PNA (pneumonia) [J18.9]  · Cavitary lesion of lung [J98.4]  · PNA (pneumonia) [J18.9]    FOLLOW-UP CARE RECOMMENDATIONS:    Cultures growing filamentous fungus and sample sent to state laboratory for identification. Please follow up for final report     APPOINTMENTS:  · Follow-up with primary care provider, Dr. Nasario Spatz, NP  -  Please call [phone] shortly after discharge to set up an appointment to be seen in  1 week   · Pulmonology and Infectious disease in 1 week    FOLLOW-UP TESTS recommended: cbc in 3 days     PENDING TEST RESULTS:  At the time of your discharge the following test results are still pending: Ig E, Aspergillus galactomannan assay  Please make sure you review these results with your outpatient follow-up provider(s). SYMPTOMS to watch for: chest pain, shortness of breath, fever, chills, nausea, vomiting, diarrhea, change in mentation, falling, weakness, bleeding. DIET/what to eat:  Regular Diet    ACTIVITY:  Activity as tolerated    What to do if new or unexpected symptoms occur? If you experience any of the above symptoms (or should other concerns or questions arise after discharge) please call your primary care physician. Return to the emergency room if you cannot get hold of your doctor. · It is very important that you keep your follow-up appointment(s). · Please bring discharge papers, medication list (and/or medication bottles) to your follow-up appointments for review by your outpatient provider(s). · Please check the list of medications and be sure it includes every medication (even non-prescription medications) that your provider wants you to take.     · It is important that you take the medication exactly as they are prescribed. · Keep your medication in the bottles provided by the pharmacist and keep a list of the medication names, dosages, and times to be taken in your wallet. · Do not take other medications without consulting your doctor. · If you have any questions about your medications or other instructions, please talk to your nurse or care provider before you leave the hospital.    I understand that if any problems occur once I am at home I am to contact my physician. These instructions were explained to me and I had the opportunity to ask questions.

## 2020-06-06 NOTE — CONSULTS
[address]    Name:  Danny Flowers  MR#:  [id number]  :  1940  ACCOUNT #:  [de-identified]  DATE OF SERVICE:  2020    The patient is in room 603. ATTENDING PHYSICIAN:  Xenia Still MD    CHIEF COMPLAINT:  Cough. REASON FOR CONSULTATION:  Chronic-appearing cavitary lesion with surrounding parenchymal disease and scarring in the right upper lobe. The consultation was requested by Dr. Xenia Still. The history is obtained by interview of the patient. However, the patient has had an outpatient evaluation over the past several months by Dr. Raffy Harding. Those records are not currently available. HISTORY OF PRESENT ILLNESS:  This patient is an 80-year-old white male with a history of bullous emphysema. The patient has been having a chronic cough. Evaluation earlier this year showed a cavitary lesion in the right upper lobe with surrounding parenchymal disease and scarring. The patient has noted that his cough has been steadily getting worse. It is usually clear but occasionally has a yellowish color. He has had no fever or systemic symptoms. He has lost a few pounds. The patient was admitted to the hospital on 2020. I note that he underwent bronchoscopy on 2020. We are now asked to see him for further evaluation. Of note, the patient states that he was going to leave the hospital against medical advice yesterday. He agreed to stay today, but now states that he will leave the hospital in the morning. PAST MEDICAL HISTORY:  Tobacco, he used to smoke a pack per day but quit 20 years ago. Alcohol, occasional social use. MEDICATIONS PRIOR TO ADMISSION:  1. Albuterol inhaler. 2.  Albuterol and ipratropium by nebulizer. 3.  Amlodipine 5 mg q.a.m.  4.  Atorvastatin 20 mg p.o. nightly. 5.  Zyrtec 10 mg p.o. nightly. 6.  Flonase nasal spray. 7.  Lasix 20 mg p.o. daily. 8.  Norco 5/325 - one p.o. q.6 h. p.r.n. pain.   9.  Claritin D - one daily. 10.  Singulair 10 mg p.o. daily. 11.  Omeprazole 20 mg p.o. daily. 12.  KCl 10 mEq p.o. when needed. 13.  Stiolto Respimat - Ambien 10 mg at bedtime p.r.n. ALLERGIES:  NONE KNOWN. ILLNESSES:  1.  Bullous emphysema. 2.  Past history of bladder cancer, initially treated with chemotherapy and intravesical BCG and then the cystoprostatectomy with creation of an ileal conduit. 3.  History of Shepherd's esophagus. SURGERY:  1. Cystectomy and prostatectomy with creation of an ileal conduit. 2.  Tonsillectomy. 3.  Left cataract extraction. SOCIAL HISTORY:  The patient lives alone in [facility]. 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 FAMILY HISTORY:  Unremarkable. REVIEW OF SYSTEMS:  CONSTITUTIONAL:  He has lost weight from 172 pounds down to 168 pounds. No fever or chills. HEENT:  No headache, visual change, sore throat. LYMPHATIC no history of adenopathy. DERMATOLOGIC:  No rashes. RESPIRATORY:  As in the HPI. No hemoptysis. CARDIOVASCULAR:  No chest pains. GI:  Appetite is diminished. No nausea, vomiting, diarrhea. :  He has an ileal conduit. MUSCULOSKELETAL:  He has some chronic arthritic complaints, not recently changed. NEUROLOGIC:  His short-term memory seems to be poor. PHYSICAL EXAMINATION:  GENERAL:  Elderly male, in no acute distress. VITAL SIGNS:  Blood pressure is 118/42, heart rate 63, respiratory rate 18. He is afebrile. HEENT:  Sclerae and conjunctivae benign, oropharynx unremarkable. NECK:  Supple. NODES:  No adenopathy. SKIN:  No rashes. LUNGS:  Bilateral rales. CARDIOVASCULAR:  Regular rate and rhythm. ABDOMEN:  Soft, nontender, no masses, bowel sounds present. BACK:  No CVA tenderness. EXTREMITIES:  No edema. MUSCULOSKELETAL:  Muscles nontender and joints benign. NEUROLOGIC:  Alert, but his short-term memory is poor. LABORATORY DATA:  CBC reveals a hemoglobin of 10.1, white count 17,200 with 90% neutrophils.   Chemistry data reveals BUN 10, creatinine 0.68. Liver function tests were unremarkable. MICROBIOLOGY DATA:  One sputum culture is now growing a filamentous fungus that has been sent to the state lab for identification. Bronchoscopy results from 06/02/2020 are pending. RADIOLOGY DATA:  Chest CT scan shows a chronic or subacute cavitary lesion in the right upper lobe with surrounding rather dense parenchymal and fibrotic changes. IMPRESSION:  1. Right upper lobe cavitary lesion with chronic-appearing parenchymal disease - rule out chronic or subacute Aspergillus infection:  There is a true mold growing in one of the sputum cultures. If this turns out to be Aspergillus, it would raise concern for the possibility of chronic or subacute invasive pulmonary aspergillosis. To prove this diagnosis, one would really need lung biopsy. He is a poor candidate for that. If the organism is Aspergillus, we could consider treatment with voriconazole. 2.  History of bladder cancer with previous BCG treatments and chemotherapy, and then leading to cystoprostatectomy:  I doubt BCG is the cause of this. 3.  History of Shepherd's esophagus. PLAN:  1. I will send an Aspergillus galactomannan assay. If negative, it would perhaps argue against invasive aspergillosis. However, the patient has been on vancomycin and Zosyn. Zosyn can cause a falsely-positive galactomannan assay. Therefore, if it is positive, we may need to repeat it after he has been on Zosyn for a while. 2.  Await identification of fungus in the sputum culture. The patient plans to go home tomorrow. If so, then we need to follow him in the office. Thank you very much for allowing us to see this patient with you.       Sunshine Macdonald MD      [id number]  D:  06/06/2020 1:40  T:  06/06/2020 5:00  JOB #:  [id number]  CC:  MD Lesvia Mata Romaine Blanc, MD

## 2020-06-06 NOTE — PROGRESS NOTES
Patient refused heparin, IV ABT and blood work, education provided with patient voicing his understanding, coordinating nurse made aware.

## 2020-06-06 NOTE — PROGRESS NOTES
Infectious Diseases Consultation     Please see dictated note     Impression      1. RUL cavitary lesion with chronic appearing parenchymal disease -- R/O chronic or subacute Aspergillus infection    2. Bullous emphysems    3. Hx bladder cancer treated with chemotherapy and BCG -- then cystoprostatectomy    4. Shepherd's esophagus. Recommend:      1. Aspergillus galactomannan assay (however, Zosyn can cause false positive)    2. Await identification of fungus in culture (will likely take a couple of weeks)    3.  He plans to go home tomorrow      Note consult ordered 6/4 -- not called to us til 6/5      Thanks,   Aurelia Dougherty MD  6/5/2020  10:35 PM

## 2020-06-06 NOTE — DISCHARGE SUMMARY
Discharge Summary     Patient:  Orville Patton       MRN: [id number]       YOB: 1940       Age: [de-identified] y.o. Date of admission:  6/2/2020    Date of discharge:  6/6/2020    Primary care provider: Dr. Flavia Mcnally, NP    Admitting provider:  Darci Sprague MD    Discharging provider:  Suzan Smith [address] [phone]. If unavailable, call [phone] and ask the  to page the triage hospitalist.    Consultations  · IP CONSULT TO PULMONOLOGY  · IP CONSULT TO HOSPITALIST  · IP CONSULT TO INFECTIOUS DISEASES    Procedures  · Procedure(s):  · ENDOSCOPIC BRONCHOSCOPY ULTRASOUND (EBUS)  · BRONCHIAL ALVEOLAR LAVAGE  · BRONCHOSCOPY  · FINE NEEDLE ASPIRATION  · ENDOSCOPIC BRUSHING    Discharge destination: home. The patient is stable for discharge. Admission diagnosis  · Cavitary lesion of lung [J98.4]  · PNA (pneumonia) [J18.9]  · Cavitary lesion of lung [J98.4]  · PNA (pneumonia) [J18.9]    Current Discharge Medication List      CONTINUE these medications which have NOT CHANGED    Details   loratadine-pseudoephedrine (Claritin-D 24 Hour)  mg per tablet Take 1 Tab by mouth daily. fluticasone (FLONASE ALLERGY RELIEF) 50 mcg/actuation nasal spray 2 Sprays by Both Nostrils route daily. montelukast (SINGULAIR) 10 mg tablet Take 10 mg by mouth daily. albuterol sulfate 90 mcg/actuation aepb Take 1 Puff by inhalation every six (6) hours as needed. Indications: Chronic Obstructive Pulmonary Disease  Qty: 1 Inhaler, Refills: 6      sodium chloride (SALINE MIST) 0.65 % nasal spray 1 Spray as needed for Congestion. omeprazole (PRILOSEC) 20 mg capsule Take 20 mg by mouth every morning. amLODIPine (NORVASC) 5 mg tablet Take 5 mg by mouth every morning. Indications: HYPERTENSION      atorvastatin (LIPITOR) 20 mg tablet Take 20 mg by mouth nightly.       cetirizine (ZYRTEC) 10 mg tablet Take 10 mg by mouth nightly. potassium chloride (KLOR-CON) 10 mEq tablet Take 10 mEq by mouth as needed (take 1 tab by mouth when lasix are taken). take with lasix to prevent low potassium level      furosemide (LASIX) 20 mg tablet Take 20 mg by mouth daily as needed (swelling). for swelling      zolpidem (AMBIEN) 10 mg tablet Take 5 mg by mouth nightly as needed for Sleep. for sleep      HYDROcodone-acetaminophen (NORCO) 5-325 mg per tablet Take 1 Tab by mouth every six (6) hours as needed for Pain. Max Daily Amount: 4 Tabs. Indications: Pain  Qty: 25 Tab, Refills: 0    Associated Diagnoses: Pain at surgical incision      tiotropium-olodaterol (STIOLTO RESPIMAT) 2.5-2.5 mcg/actuation mist Take 2 Puffs by inhalation every morning. albuterol-ipratropium (DUO-NEB) 2.5 mg-0.5 mg/3 ml nebu UE 1 VIAL VIA NEBULIZER EVERY 6 HOURS  Refills: 1             Follow-up Information     Follow up With Specialties Details Why Contact Info    Yaritza Malloy NP Nurse Practitioner In 1 week  [address] [facility]  [phone]      Tyler Almeida MD Pulmonary Disease In 1 week  [address] [address] Rebecca Otero MD Infectious Diseases In 1 week  [address]  [phone]            Final discharge diagnoses and brief hospital course  Preethi Prabhakar a [de-identified] y. o. male who presents to the [facility] ED on recommendation of his pulmonologist, Dr. Daniel Owens for evaluation of right lung cavitary lesion with constitutional symptoms despite of 3 rounds of antibiotic courses, steroids, for further evaluation and management.   Patient suggested to have the lesion noted on earlier CT scan of the chest without contrast in February 2020. Urban Jon has been following with his pulmonologist and hemato-oncologist.  Patient has cough with thick mucoid whitish/yellowish sputum in moderate amount, with exertional shortness of breath with reduced capacity, occasional drenching night sweats without feverish feeling, mostly around lower abdomen/groin area, poor taste and appetite, and about 10 pounds loss of weight in the last about 4-6 weeks.  Otherwise he is feeling fine.  On today's visit with his pulmonologist, chest x-ray was done after which he was advised to come to ER for further evaluation. ED physician has discussed case at length with on-call pulmonologist, Dr. Triston Strickland was started on IV Zosyn and vancomycin empirically, CT chest with contrast ordered.  Patient repeatedly said to have infectious disease consultation as recommended by his primary pulmonologist. Tara Escalante on call pulmonologist recommended to wait for the CT scan results and that infectious disease consult is not warranted at present until further evaluation by him. Jenna Parra is agreeable with the plan. Patient has history of bladder cancer and subsequently neobladder formation, for which she follows with urologist.      Right upper lobe cavitary lung lesion 2/2 Fungal infection - ID pending   -CT chest with contrast: Persistent but decreased size of cavitary opacity in the right upper lobe. Increased multiple nodular opacities around the cavitary lesion.   Mediastinal adenopathy without significant change. Increased size of right hilar lymph node  -Pulmonology on board   - Blood cx: NGTD  - normal lactic, elevated CRP  - COVID Negative   - leucocytosis - resolved   - Bronchoscopy done on 6/3: bal RUL + right hilar and 4R/ 4L LN TBNA by EBUS  - FNAC of lymph nodes: No cells diagnostic for malignancy. BAL Cytology:  Atypical, favor benign reactive process   - Bronch cx:  filamentous fungus - sent to state lab for ID  - appreciate ID input \" R/O chronic or subacute Aspergillus infection.  Await identification of fungus in culture (will likely take a couple of weeks)\"       # COPD, not in exacerbation -Albuterol inhaler      #History of recurrent urinary bladder cancer, status post radical cystoprostatectomy with neobladder formation, chem XRT  -Follows with oncology Dr. Michael Garcia     #Chronic anemia - hb stable     Discharge recommendations:  PCP f/u in 1 week  Pulmonology Dr. Jordon Razo and ID Dr. Brigida Silva f/u    Discharge plan discussed with patient at bedside and daughter on phone. They agreed.      Physical examination at discharge  Visit Vitals  /67 (BP 1 Location: Right leg, BP Patient Position: At rest)   Pulse 83   Temp 97.8 °F (36.6 °C)   Resp 18   Ht 5' 9\" (1.753 m)   Wt 74.8 kg (165 lb)   SpO2 93%   BMI 24.37 kg/m²     Constitutional:  No acute distress   ENT:  Oral mucous moist, oropharynx benign. Neck supple   Resp:  coarse breathing right side    CV:  Regular rhythm, normal rate, no murmurs, gallops, rubs    GI:  Soft, non distended, non tender. normoactive bowel sounds    Musculoskeletal:  No edema, warm, 2+ pulses throughout    Neurologic:  Moves all extremities. AAOx3         Pertinent imaging studies:    Per EMR    Recent Labs     06/04/20  0154   WBC 17.2*   HGB 10.1*   HCT 32.3*        Recent Labs     06/05/20  0200 06/04/20  0154    138   K 3.2* 4.5   * 111*   CO2 21 20*   BUN 10 13   CREA 0.68* 0.81    194*   CA 8.1* 8.0*     Recent Labs     06/05/20  0200 06/04/20  0154   AP 87 89   TP 7.4 6.9   ALB 2.2* 2.3*   GLOB 5.2* 4.6*     No results for input(s): INR, PTP, APTT, INREXT in the last 72 hours. No results for input(s): FE, TIBC, PSAT, FERR in the last 72 hours. No results for input(s): PH, PCO2, PO2 in the last 72 hours. No results for input(s): CPK, CKMB in the last 72 hours.     No lab exists for component: TROPONINI  No components found for: [place]    Chronic Diagnoses:    Problem List as of 6/6/2020 Date Reviewed: 2/10/2020          Codes Class Noted - Resolved    Cavitary lesion of lung ICD-10-CM: J98.4  ICD-9-CM: 518.89  6/2/2020 - Present        PNA (pneumonia) ICD-10-CM: J18.9  ICD-9-CM: 685  6/2/2020 - Present        Other general symptoms and signs  ICD-10-CM: R68.89  ICD-9-CM: 780.99  8/20/2019 - Present        Anemia ICD-10-CM: D64.9  ICD-9-CM: 285.9  8/20/2019 - Present        Peristomal hernia ICD-10-CM: K46.9  ICD-9-CM: 553.9  2/28/2019 - Present        Ulcer of abdomen wall (HCC) ICD-10-CM: L98.499  ICD-9-CM: 707.8  2/28/2019 - Present        Chronic bronchitis ([address] ICD-10-CM: J42  ICD-9-CM: 491.9  6/20/2018 - Present        Thrombocytopenia ([facility] ICD-10-CM: D69.6  ICD-9-CM: 287.5  12/5/2017 - Present        Shortness of breath ICD-10-CM: R06.02  ICD-9-CM: 786.05  12/5/2017 - Present        Malignant neoplasm of lateral wall of urinary bladder (HCC) ICD-10-CM: C67.2  ICD-9-CM: 188.2  12/5/2017 - Present        Pneumonia ICD-10-CM: J18.9  ICD-9-CM: 486  11/18/2017 - Present        Combined forms of age-related cataract of left eye ICD-10-CM: H25.812  ICD-9-CM: 366.19  11/16/2016 - Present    Overview Signed 11/16/2016  9:32 AM by Meera Minor DO     Complex KPE/IOL OS             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  ICD-9-CM: 364.81  11/16/2016 - Present    Overview Signed 11/16/2016  9:32 AM by Meera Minor DO     Needs iris hooks             Incisional hernia ICD-10-CM: D33.6  ICD-9-CM: 553.21  3/15/2016 - Present        COPD (chronic obstructive pulmonary disease) ([facility] ICD-10-CM: J44.9  ICD-9-CM: 909  6/2/2015 - Present        Umbilical hernia [id number]-[id number]: K42.9  ICD-9-CM: 553.1  5/27/2015 - Present        Bladder cancer ([facility] ICD-10-CM: C67.9  ICD-9-CM: 188.9  3/26/2015 - Present              Time spent on discharge related activities today greater than 30 minutes.       Signed:  Osvaldo Mendes MD                 Hospitalist, Internal Medicine      Cc: Tamera Randolph NP

## 2020-06-07 LAB
BACTERIA SPEC CULT: NORMAL
SERVICE CMNT-IMP: NORMAL

## 2020-06-08 ENCOUNTER — PATIENT OUTREACH (OUTPATIENT)
Dept: CASE MANAGEMENT | Age: 80
End: 2020-06-08

## 2020-06-08 LAB
BACTERIA SPEC CULT: NORMAL
SERVICE CMNT-IMP: NORMAL

## 2020-06-08 NOTE — PROGRESS NOTES
Called pt to follow up on hospital to [facility] [place], discharged on 6/6/2020. Pt states this is not a good time to talk and to call back. Call disconnected.

## 2020-06-09 LAB — GALACTOMANNAN AG SPEC IA-ACNC: 0.18 INDEX (ref 0–0.49)

## 2020-06-10 LAB
M TB IFN-G BLD-IMP: NEGATIVE
QUANTIFERON CRITERIA, QFI1T: NORMAL
QUANTIFERON MITOGEN VALUE: >10 IU/ML
QUANTIFERON NIL VALUE: 0.02 IU/ML
QUANTIFERON TB1 AG: 0.03 IU/ML
QUANTIFERON TB2 AG: 0.02 IU/ML

## 2020-06-11 LAB
SPECIMEN SOURCE: NORMAL
VIRUS SPEC CULT: NORMAL

## 2020-06-12 LAB
LEGIONELLA SPEC CULT: NORMAL
SPECIMEN SOURCE: NORMAL

## 2020-06-22 ENCOUNTER — PATIENT OUTREACH (OUTPATIENT)
Dept: CASE MANAGEMENT | Age: 80
End: 2020-06-22

## 2020-06-22 NOTE — PROGRESS NOTES
Patient resolved from Transition of Care episode on 6/22/2020  Discussed COVID-19 related testing which was available at this time. Test results were negative. Patient informed of results, if available? yes     Patient/family has been provided the following resources and education related to COVID-19:                         Signs, symptoms and red flags related to COVID-19                          Patient currently reports that the following symptoms have improved:  shortness of breath and pt is waiting to hear results of biopsy. .    No further outreach scheduled with this CTN/ACM/LPN/HC/ MA. Episode of Care resolved. Patient has this CTN/ACM/LPN/HC/MA contact information if future needs arise.

## 2020-07-17 LAB
ACID FAST STN SPEC: NEGATIVE
MYCOBACTERIUM SPEC QL CULT: NEGATIVE
SPECIMEN PREPARATION: NORMAL
SPECIMEN SOURCE: NORMAL

## 2020-08-04 ENCOUNTER — HOSPITAL ENCOUNTER (OUTPATIENT)
Dept: CT IMAGING | Age: 80
Discharge: HOME | End: 2020-08-04
Attending: INTERNAL MEDICINE
Payer: MEDICARE

## 2020-08-04 DIAGNOSIS — C67.9 MALIGNANT NEOPLASM OF URINARY BLADDER, UNSPECIFIED SITE (HCC): ICD-10-CM

## 2020-08-04 PROCEDURE — 74176 CT ABD & PELVIS W/O CONTRAST: CPT

## 2020-08-04 PROCEDURE — 71250 CT THORAX DX C-: CPT

## 2020-08-21 ENCOUNTER — OFFICE VISIT (OUTPATIENT)
Dept: ONCOLOGY | Age: 80
End: 2020-08-21
Payer: MEDICARE

## 2020-08-21 VITALS
BODY MASS INDEX: 25.03 KG/M2 | HEIGHT: 69 IN | WEIGHT: 169 LBS | SYSTOLIC BLOOD PRESSURE: 190 MMHG | OXYGEN SATURATION: 93 % | DIASTOLIC BLOOD PRESSURE: 78 MMHG | TEMPERATURE: 97.8 F | HEART RATE: 108 BPM

## 2020-08-21 DIAGNOSIS — D64.9 ANEMIA, UNSPECIFIED TYPE: Primary | ICD-10-CM

## 2020-08-21 DIAGNOSIS — D69.6 THROMBOCYTOPENIA (HCC): ICD-10-CM

## 2020-08-21 DIAGNOSIS — C67.9 MALIGNANT NEOPLASM OF URINARY BLADDER, UNSPECIFIED SITE (HCC): ICD-10-CM

## 2020-08-21 PROCEDURE — 1101F PT FALLS ASSESS-DOCD LE1/YR: CPT | Performed by: INTERNAL MEDICINE

## 2020-08-21 PROCEDURE — G8536 NO DOC ELDER MAL SCRN: HCPCS | Performed by: INTERNAL MEDICINE

## 2020-08-21 PROCEDURE — G8420 CALC BMI NORM PARAMETERS: HCPCS | Performed by: INTERNAL MEDICINE

## 2020-08-21 PROCEDURE — G8427 DOCREV CUR MEDS BY ELIG CLIN: HCPCS | Performed by: INTERNAL MEDICINE

## 2020-08-21 PROCEDURE — G0463 HOSPITAL OUTPT CLINIC VISIT: HCPCS | Performed by: INTERNAL MEDICINE

## 2020-08-21 PROCEDURE — G8432 DEP SCR NOT DOC, RNG: HCPCS | Performed by: INTERNAL MEDICINE

## 2020-08-21 PROCEDURE — 99214 OFFICE O/P EST MOD 30 MIN: CPT | Performed by: INTERNAL MEDICINE

## 2020-08-21 NOTE — PROGRESS NOTES
Cynthia De La Cruz is a [de-identified] y.o. male  Chief Complaint   Patient presents with    Follow-up     Bladder cancer with small cell features 2015     1. Have you been to the ER, urgent care clinic since your last visit? Hospitalized since your last visit? No.    2. Have you seen or consulted any other health care providers outside of the [address] [place] [address] since your last visit? Include any pap smears or colon screening.  No.

## 2020-08-21 NOTE — PROGRESS NOTES
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 Ubaldo Parra from 10/30/15 to 12/3/15  Cystectomy - 7/23/19    HISTORY OF PRESENT ILLNESS: Mr. Cayla Marshall is a [de-identified] y.o. male who presents for follow-up of bladder cancer. He has a long history of bladder cancer initially treated with transurethral resection followed by BCG treatments with initial good response. Has had a number of kidney stones and has had both cystoscopy for surveillance and for investigation of stones. In December 2014 he had a cystoscopy which revealed a mass in the bladder. On biopsy this revealed small cell features. He started chemotherapy on 4/7/15. Then underwent radiation. He had been admitted 11/18/17 with SOB. He saw Dr. Cheng Bryant - thought to be a COPD exacerbation+ possible fibrosis. He still has a cough and congestion. He is to see Dr. Sheryl Peguero with ENT for chronic sinusitis. 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 Neyda Stanton - recommended against cystectomy and received intravesical BCG. Completed this and back on surveillance . Had recurrence noted 5/2018 and TURBT was done 5/22/18. Path showed HG Urothelial cancer. He had a cystectomy on 7/23/18. Did not come for a follow up until 8/2019. At that time we discussed surveillance. He was also treated for post op iron deficiency anemia and received Injectafer x 2 in 2019. He comes for a follow up with scans. He overall feels well. He denies bleeding. No blood in urine. Stable SOB. Denies headaches, dizziness, CP, cough, fevers/chills. Denies nausea/vomiting. No unexplained weight loss. Denies headaches or dizziness. No Known Allergies    Current Outpatient Medications   Medication Sig Dispense Refill    loratadine-pseudoephedrine (Claritin-D 24 Hour)  mg per tablet Take 1 Tab by mouth daily.       cetirizine (ZYRTEC) 10 mg tablet Take 10 mg by mouth nightly.  potassium chloride (KLOR-CON) 10 mEq tablet Take 10 mEq by mouth as needed (take 1 tab by mouth when lasix are taken). take with lasix to prevent low potassium level      furosemide (LASIX) 20 mg tablet Take 20 mg by mouth daily as needed (swelling). for swelling      zolpidem (AMBIEN) 10 mg tablet Take 5 mg by mouth nightly as needed for Sleep. for sleep      fluticasone (FLONASE ALLERGY RELIEF) 50 mcg/actuation nasal spray 2 Sprays by Both Nostrils route daily.  tiotropium-olodaterol (STIOLTO RESPIMAT) 2.5-2.5 mcg/actuation mist Take 2 Puffs by inhalation every morning.  albuterol-ipratropium (DUO-NEB) 2.5 mg-0.5 mg/3 ml nebu UE 1 VIAL VIA NEBULIZER EVERY 6 HOURS  1    montelukast (SINGULAIR) 10 mg tablet Take 10 mg by mouth daily.  sodium chloride (SALINE MIST) 0.65 % nasal spray 1 Spray as needed for Congestion.  omeprazole (PRILOSEC) 20 mg capsule Take 20 mg by mouth every morning.  amLODIPine (NORVASC) 5 mg tablet Take 5 mg by mouth every morning. Indications: HYPERTENSION      atorvastatin (LIPITOR) 20 mg tablet Take 20 mg by mouth nightly.  HYDROcodone-acetaminophen (NORCO) 5-325 mg per tablet Take 1 Tab by mouth every six (6) hours as needed for Pain. Max Daily Amount: 4 Tabs. Indications: Pain 25 Tab 0    albuterol sulfate 90 mcg/actuation aepb Take 1 Puff by inhalation every six (6) hours as needed. Indications: Chronic Obstructive Pulmonary Disease (Patient taking differently: Take 1 Puff by inhalation every six (6) hours as needed (for wheezing). for wheezing  Indications: Chronic Obstructive Pulmonary Disease) 1 Inhaler 6     ROS  As per the HPI, otherwise a comprehensive ROS is negative. ECOG PS is 1. Emotional well being addressed and patient is coping well.     Physical Examination:   Visit Vitals  /78 (BP 1 Location: Left arm, BP Patient Position: Sitting)   Pulse (!) 108   Temp 97.8 °F (36.6 °C) (Temporal)   Ht 5' 9\" (1.753 m)   Wt 169 lb (76.7 kg)   SpO2 93%   BMI 24.96 kg/m²     General appearance - alert, well appearing  Mental status - oriented to person, place, and time  Neck - supple, no significant adenopathy  Lymphatics - no palpable lymphadenopathy  Musculoskeletal - no joint tenderness, deformity or swelling  Extremities - peripheral pulses normal, no pedal edema, no clubbing or cyanosis  Skin - normal coloration and turgor, no rashes, no suspicious skin lesions noted      LABS  Lab Results   Component Value Date/Time    WBC 7.9 06/05/2020 12:11 PM    HGB 10.6 (L) 06/05/2020 12:11 PM    HCT 33.9 (L) 06/05/2020 12:11 PM    PLATELET 958 18/24/7932 12:11 PM    MCV 89.4 06/05/2020 12:11 PM    ABS. NEUTROPHILS 5.5 06/05/2020 12:11 PM     Lab Results   Component Value Date/Time    Sodium 140 06/05/2020 12:11 PM    Potassium 3.2 (L) 06/05/2020 12:11 PM    Chloride 108 06/05/2020 12:11 PM    CO2 23 06/05/2020 12:11 PM    Glucose 86 06/05/2020 12:11 PM    BUN 9 06/05/2020 12:11 PM    Creatinine 0.55 (L) 06/05/2020 12:11 PM    GFR est AA >60 06/05/2020 12:11 PM    GFR est non-AA >60 06/05/2020 12:11 PM    Calcium 8.1 (L) 06/05/2020 12:11 PM    BUN (POC) 20 11/27/2018 07:49 PM    Calcium, ionized (POC) 1.18 11/27/2018 07:49 PM     Lab Results   Component Value Date/Time    Alk. phosphatase 87 06/05/2020 02:00 AM    Protein, total 7.4 06/05/2020 02:00 AM    Albumin 2.2 (L) 06/05/2020 02:00 AM    Globulin 5.2 (H) 06/05/2020 02:00 AM    A-G Ratio 0.4 (L) 06/05/2020 02:00 AM     PATHOLOGY  FNA of mediastinal node at station 7 on 9/9/15 ([id number]) with atypical cells. 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 Invasion is not convincingly demonstrated. TURBT 5/22/18     1. Urinary bladder, right lateral wall, biopsy:   No evidence for malignancy. 2. Urinary bladder, posterior wall, biopsy:   No evidence for malignancy. 3. Urinary bladder, dome, biopsy:   Scant atypical urothelial cells, favor carcinoma in situ. 4. Urinary bladder, left lateral wall, excision:   Invasive high-grade urothelial carcinoma, invading muscularis propria. Cystectomy 7/2018       URINARY BLADDER, Cystectomy   SPECIMEN   Procedure: Radical cystoprostatectomy   TUMOR   Tumor Site: Left lateral wall   Histologic Type: Urothelial carcinoma with squamous differentiation   Histologic Grade: High-grade   Tumor Size: 5.5 Centimeters (cm)   Tumor Extent   Tumor Extension: Tumor invades muscularis propria - Tumor invades   deep muscularis propria (outer half)   Accessory Findings   Lymphovascular Invasion: Not identified   MARGINS   Margins: Uninvolved by invasive carcinoma and carcinoma in situ /   noninvasive urothelial carcinoma   LYMPH NODES   Number of Lymph Nodes Involved: 0   Number of Lymph Nodes Examined: 8   PATHOLOGIC STAGE CLASSIFICATION (pTNM, AJCC 8th Edition)   TNM Descriptors: r (recurrent), y (post-treatment)   Primary Tumor (pT): pT2b   Regional Lymph Nodes (pN): pN0     IMAGING  CT C/A/P on 5/3/18  IMPRESSION  IMPRESSION:   1. Left urinary bladder wall thickening and enhancement slightly increased. 2. Enhancing nodule in the right lobe of the prostate gland unchanged. 3. No evidence of new metastatic disease. 4. Severe emphysema with pulmonary fibrosis. 5. Calcified left lower lobe granuloma and mediastinal lymph nodes indicative of  old granulomatous disease. 6. Mediastinal adenopathy unchanged. 7. Atherosclerotic abdominal aorta with aneurysm unchanged. 8. Right renal cyst.  9. Status post left inguinal hernia repair. 10. Thoracolumbar spondylosis. 2/4/2020 CT   IMPRESSION  IMPRESSION:  1. No evidence of metastatic disease in the chest, abdomen, or pelvis. 2.  Slightly smaller cavitary mass in the right upper lobe with a thick rind. 3.  Severe emphysema.   4.  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 CT CAP 8/4/2020:  IMPRESSION:  1. Right upper lung cavitary lesion, decreased in size. Adjacent nodular  opacities, also diminished in size. 2. Right hilar and mediastinal lymphadenopathy, unchanged. 3. New, trace right pleural fluid. ASSESSMENT  Mr. Alyssa Montalvo is a [de-identified] y.o. male with bladder cancer which on biopsy revealed high grade urothelial carcinoma with small cell features. CTs reveal multiple borderline enlarged nodes throughout the chest, abdomen, and pelvis. Received carboplatin/etoposide followed by radiation. Had been on surveillance and now has a muscle invasive transitional carcinoma of bladder. S/p cystectomy in 2018 and lost to follow-up and did not receive adjuvant treatment. Now on surveillance. DISCUSSION/PLAN  1. Bladder cancer with small cell features. He was treated with chemo and radiation. Had recurrent CIS detected in March 2017. No cystectomy recommended and received intravesical BCG. Recurrence in the form of HGMIBC-Urothelial as diagnosed by what appears to be a complete TURBT by Dr. Ebenezer Floyd on 5/22/18. See below    2. Recurrent bladder cancer- MIBC urothelial- T2N0M0- 2018    See extensive prior discussions  He ultimately had a cystectomy on 7/23/18- pT2N0 HG MIBC . Lost to follow up  He has been doing well. No adjuvant chemotherapy    He understands the need for surveillance. He follows with Dr. Gini Lewis periodically. I would continue to get CT scans every 6 months   Scans from 8/2020 reviewed and WILFRIDO  He does not want to get contrast- understands that without that Liver lesions may be missed    3. Thrombocytopenia. Resolved as of most recent blood work. 4. Shortness of breath and ILD, has underlying COPD. Follows with Dr. Rubia Bright    5. Shepherd's esophagus. 6. Anemia  His Hb post op was 9.6 g/dl  Baseline 11-12 g/dl  Was iron deficient and received 1500 mg IV. Now with correction of anemia.    11.7 g/dL on most recent levels 7/2020     RTC in 6 months with imaging and labs   Thereafter can follow with his urologist     Amarilys De Leon MD

## 2020-09-28 ENCOUNTER — TELEPHONE (OUTPATIENT)
Dept: ONCOLOGY | Age: 80
End: 2020-09-28

## 2020-09-28 NOTE — TELEPHONE ENCOUNTER
Spoke to Pt, ERIN verified, Pt confirming appointments with MD and CT scans, he reports he needs labs done prior and usually get them drawn at his PCP office, per his request, lab req's mailed to Pt's home.

## 2020-10-30 ENCOUNTER — TRANSCRIBE ORDER (OUTPATIENT)
Dept: GENERAL RADIOLOGY | Age: 80
End: 2020-10-30

## 2020-10-30 ENCOUNTER — HOSPITAL ENCOUNTER (OUTPATIENT)
Dept: GENERAL RADIOLOGY | Age: 80
Discharge: HOME | End: 2020-10-30
Attending: INTERNAL MEDICINE
Payer: MEDICARE

## 2020-10-30 DIAGNOSIS — R06.02 SHORTNESS OF BREATH: ICD-10-CM

## 2020-10-30 DIAGNOSIS — R06.02 SHORTNESS OF BREATH: Primary | ICD-10-CM

## 2020-10-30 PROCEDURE — 71046 X-RAY EXAM CHEST 2 VIEWS: CPT

## 2020-11-03 PROBLEM — M19.90 OSTEOARTHRITIS: Status: RESOLVED | Noted: 2017-05-17 | Resolved: 2020-11-03

## 2020-12-07 ENCOUNTER — TELEPHONE (OUTPATIENT)
Dept: ONCOLOGY | Age: 80
End: 2020-12-07

## 2020-12-07 NOTE — TELEPHONE ENCOUNTER
Spoke with Pt, Pt confirming appointment times with OV and asking about CT's and lab work. Confirmed with Pt Appt with Dr Rolando Kimbrough 2/24, CT scan 2/19 and labs can be taken on the same day as CT scan. Pt reminded will get reminder call a few days prior to appointments.

## 2020-12-07 NOTE — TELEPHONE ENCOUNTER
Patient left a voicemail and stated that he would like a akilah back to discuss some things.  # [phone]

## 2021-01-22 ENCOUNTER — TELEPHONE (OUTPATIENT)
Dept: ONCOLOGY | Age: 81
End: 2021-01-22

## 2021-01-22 NOTE — TELEPHONE ENCOUNTER
Pt worried about getting COVID vaccine and CT on the same day, Pt reports he called the CT department and they states it would be fine but he wanted to run it by Dr Sepulveda Patient office also. Pt concerned about putting too many chemical in his body in one day, advised he could reschedule scans for another day, as long as it is before his appointment with Dr Erin Grier. Pt given scheduling number and he reports he will call and see if CT has any opening for another day.

## 2021-01-22 NOTE — TELEPHONE ENCOUNTER
Patient left a voicemail and stated that he would like a call back to discuss COVID vaccine and his scan.  # [phone]

## 2021-02-11 LAB
BASOPHILS # BLD AUTO: 0.1 X10E3/UL (ref 0–0.2)
BASOPHILS NFR BLD AUTO: 1 %
EOSINOPHIL # BLD AUTO: 0.2 X10E3/UL (ref 0–0.4)
EOSINOPHIL NFR BLD AUTO: 2 %
ERYTHROCYTE [DISTWIDTH] IN BLOOD BY AUTOMATED COUNT: 14.3 % (ref 11.6–15.4)
FERRITIN SERPL-MCNC: 298 NG/ML (ref 30–400)
HCT VFR BLD AUTO: 38.4 % (ref 37.5–51)
HGB BLD-MCNC: 12.7 G/DL (ref 13–17.7)
IMM GRANULOCYTES # BLD AUTO: 0 X10E3/UL (ref 0–0.1)
IMM GRANULOCYTES NFR BLD AUTO: 0 %
IRON SATN MFR SERPL: 17 % (ref 15–55)
IRON SERPL-MCNC: 38 UG/DL (ref 38–169)
LYMPHOCYTES # BLD AUTO: 0.9 X10E3/UL (ref 0.7–3.1)
LYMPHOCYTES NFR BLD AUTO: 12 %
MCH RBC QN AUTO: 30.5 PG (ref 26.6–33)
MCHC RBC AUTO-ENTMCNC: 33.1 G/DL (ref 31.5–35.7)
MCV RBC AUTO: 92 FL (ref 79–97)
MONOCYTES # BLD AUTO: 0.5 X10E3/UL (ref 0.1–0.9)
MONOCYTES NFR BLD AUTO: 7 %
NEUTROPHILS # BLD AUTO: 5.8 X10E3/UL (ref 1.4–7)
NEUTROPHILS NFR BLD AUTO: 78 %
PLATELET # BLD AUTO: 131 X10E3/UL (ref 150–450)
RBC # BLD AUTO: 4.16 X10E6/UL (ref 4.14–5.8)
TIBC SERPL-MCNC: 220 UG/DL (ref 250–450)
UIBC SERPL-MCNC: 182 UG/DL (ref 111–343)
WBC # BLD AUTO: 7.5 X10E3/UL (ref 3.4–10.8)

## 2021-02-16 ENCOUNTER — HOSPITAL ENCOUNTER (OUTPATIENT)
Dept: CT IMAGING | Age: 81
Discharge: HOME | End: 2021-02-16
Attending: REGISTERED NURSE
Payer: MEDICARE

## 2021-02-16 DIAGNOSIS — C67.9 MALIGNANT NEOPLASM OF URINARY BLADDER, UNSPECIFIED SITE (HCC): ICD-10-CM

## 2021-02-16 PROCEDURE — 74176 CT ABD & PELVIS W/O CONTRAST: CPT

## 2021-02-16 PROCEDURE — 71250 CT THORAX DX C-: CPT

## 2021-02-18 LAB
Lab: NORMAL
REFERENCE LAB,REFLB: NORMAL
TEST DESCRIPTION:,ATST: NORMAL

## 2021-02-23 ENCOUNTER — DOCUMENTATION ONLY (OUTPATIENT)
Dept: ONCOLOGY | Age: 81
End: 2021-02-23

## 2021-02-23 NOTE — PROGRESS NOTES
[address] [place] [address] Urology for most recent office notes and labs   Records in process to be sent to office

## 2021-02-24 ENCOUNTER — OFFICE VISIT (OUTPATIENT)
Dept: ONCOLOGY | Age: 81
End: 2021-02-24
Payer: MEDICARE

## 2021-02-24 VITALS
SYSTOLIC BLOOD PRESSURE: 133 MMHG | HEIGHT: 69 IN | BODY MASS INDEX: 25.48 KG/M2 | OXYGEN SATURATION: 92 % | DIASTOLIC BLOOD PRESSURE: 64 MMHG | TEMPERATURE: 96.7 F | HEART RATE: 74 BPM | WEIGHT: 172 LBS

## 2021-02-24 DIAGNOSIS — D64.9 ANEMIA, UNSPECIFIED TYPE: ICD-10-CM

## 2021-02-24 DIAGNOSIS — C67.9 MALIGNANT NEOPLASM OF URINARY BLADDER, UNSPECIFIED SITE (HCC): Primary | ICD-10-CM

## 2021-02-24 DIAGNOSIS — D69.6 THROMBOCYTOPENIA (HCC): ICD-10-CM

## 2021-02-24 PROCEDURE — 1101F PT FALLS ASSESS-DOCD LE1/YR: CPT | Performed by: INTERNAL MEDICINE

## 2021-02-24 PROCEDURE — G0463 HOSPITAL OUTPT CLINIC VISIT: HCPCS | Performed by: REGISTERED NURSE

## 2021-02-24 PROCEDURE — G8419 CALC BMI OUT NRM PARAM NOF/U: HCPCS | Performed by: INTERNAL MEDICINE

## 2021-02-24 PROCEDURE — G8427 DOCREV CUR MEDS BY ELIG CLIN: HCPCS | Performed by: INTERNAL MEDICINE

## 2021-02-24 PROCEDURE — 99214 OFFICE O/P EST MOD 30 MIN: CPT | Performed by: INTERNAL MEDICINE

## 2021-02-24 PROCEDURE — G8536 NO DOC ELDER MAL SCRN: HCPCS | Performed by: INTERNAL MEDICINE

## 2021-02-24 PROCEDURE — G8510 SCR DEP NEG, NO PLAN REQD: HCPCS | Performed by: INTERNAL MEDICINE

## 2021-02-24 NOTE — PROGRESS NOTES
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 Yoana Ramirez from 10/30/15 to 12/3/15  Cystectomy - 7/23/19    HISTORY OF PRESENT ILLNESS: Mr. Dorothy Rosa is a [de-identified] y.o. male who presents for follow-up of bladder cancer. He has a long history of bladder cancer initially treated with transurethral resection followed by BCG treatments with initial good response. Has had a number of kidney stones and has had both cystoscopy for surveillance and for investigation of stones. In December 2014 he had a cystoscopy which revealed a mass in the bladder. On biopsy this revealed small cell features. He started chemotherapy on 4/7/15. Then underwent radiation. He had been admitted 11/18/17 with SOB. He saw Dr. Brandon Chu - thought to be a COPD exacerbation+ possible fibrosis. He still has a cough and congestion. He is to see Dr. Davonte Guerra with ENT for chronic sinusitis. 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 Amparo Rodriguez - recommended against cystectomy and received intravesical BCG. Completed this and back on surveillance . Had recurrence noted 5/2018 and TURBT was done 5/22/18. Path showed HG Urothelial cancer. He had a cystectomy on 7/23/18. Did not come for a follow up until 8/2019. At that time we discussed surveillance. He was also treated for post op iron deficiency anemia and received Injectafer x 2 in 2019. He comes for a follow up with scans. He overall feels well. He received both of his COVID vaccines. He denies bleeding. No blood in urine. Stable SOB. Denies headaches, dizziness, CP, cough, fevers/chills. Denies nausea/vomiting. No unexplained weight loss. Denies headaches or dizziness.      No Known Allergies    Current Outpatient Medications   Medication Sig Dispense Refill    loratadine-pseudoephedrine (Claritin-D 24 Hour)  mg per tablet Take 1 Tab by mouth daily.  potassium chloride (KLOR-CON) 10 mEq tablet Take 10 mEq by mouth as needed (take 1 tab by mouth when lasix are taken). take with lasix to prevent low potassium level      furosemide (LASIX) 20 mg tablet Take 20 mg by mouth daily as needed (swelling). for swelling      HYDROcodone-acetaminophen (NORCO) 5-325 mg per tablet Take 1 Tab by mouth every six (6) hours as needed for Pain. Max Daily Amount: 4 Tabs. Indications: Pain 25 Tab 0    fluticasone (FLONASE ALLERGY RELIEF) 50 mcg/actuation nasal spray 2 Sprays by Both Nostrils route daily.  tiotropium-olodaterol (STIOLTO RESPIMAT) 2.5-2.5 mcg/actuation mist Take 2 Puffs by inhalation every morning.  montelukast (SINGULAIR) 10 mg tablet Take 10 mg by mouth daily.  sodium chloride (SALINE MIST) 0.65 % nasal spray 1 Spray as needed for Congestion.  omeprazole (PRILOSEC) 20 mg capsule Take 20 mg by mouth every morning.  amLODIPine (NORVASC) 5 mg tablet Take 5 mg by mouth every morning. Indications: HYPERTENSION      atorvastatin (LIPITOR) 20 mg tablet Take 20 mg by mouth nightly.  cetirizine (ZYRTEC) 10 mg tablet Take 10 mg by mouth nightly.  zolpidem (AMBIEN) 10 mg tablet Take 5 mg by mouth nightly as needed for Sleep. for sleep      albuterol-ipratropium (DUO-NEB) 2.5 mg-0.5 mg/3 ml nebu UE 1 VIAL VIA NEBULIZER EVERY 6 HOURS  1    albuterol sulfate 90 mcg/actuation aepb Take 1 Puff by inhalation every six (6) hours as needed. Indications: Chronic Obstructive Pulmonary Disease (Patient taking differently: Take 1 Puff by inhalation every six (6) hours as needed (for wheezing). for wheezing  Indications: Chronic Obstructive Pulmonary Disease) 1 Inhaler 6     ROS  As per the HPI, otherwise a comprehensive ROS is negative. ECOG PS is 1. Emotional well being addressed and patient is coping well.     Physical Examination:   Visit Vitals  /64 (BP 1 Location: Left upper arm, BP Patient Position: Sitting)   Pulse 74   Temp (!) 96.7 °F (35.9 °C) (Temporal)   Ht 5' 9\" (1.753 m)   Wt 172 lb (78 kg)   SpO2 92%   BMI 25.40 kg/m²     General appearance - alert, well appearing  Mental status - oriented to person, place, and time  Neck - supple  Musculoskeletal - no joint tenderness, deformity or swelling  Extremities - peripheral pulses normal, no pedal edema, no clubbing or cyanosis  Skin - normal coloration and turgor, no rashes, no suspicious skin lesions noted      LABS  Lab Results   Component Value Date/Time    WBC 7.5 02/09/2021 12:35 PM    HGB 12.7 (L) 02/09/2021 12:35 PM    HCT 38.4 02/09/2021 12:35 PM    PLATELET 735 (L) 75/53/7951 12:35 PM    MCV 92 02/09/2021 12:35 PM    ABS. NEUTROPHILS 5.8 02/09/2021 12:35 PM     Lab Results   Component Value Date/Time    Sodium 140 06/05/2020 12:11 PM    Potassium 3.2 (L) 06/05/2020 12:11 PM    Chloride 108 06/05/2020 12:11 PM    CO2 23 06/05/2020 12:11 PM    Glucose 86 06/05/2020 12:11 PM    BUN 9 06/05/2020 12:11 PM    Creatinine 0.55 (L) 06/05/2020 12:11 PM    GFR est AA >60 06/05/2020 12:11 PM    GFR est non-AA >60 06/05/2020 12:11 PM    Calcium 8.1 (L) 06/05/2020 12:11 PM    BUN (POC) 20 11/27/2018 07:49 PM    Calcium, ionized (POC) 1.18 11/27/2018 07:49 PM     Lab Results   Component Value Date/Time    Alk. phosphatase 87 06/05/2020 02:00 AM    Protein, total 7.4 06/05/2020 02:00 AM    Albumin 2.2 (L) 06/05/2020 02:00 AM    Globulin 5.2 (H) 06/05/2020 02:00 AM    A-G Ratio 0.4 (L) 06/05/2020 02:00 AM     PATHOLOGY  FNA of mediastinal node at station 7 on 9/9/15 ([id number]) with atypical cells. 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 Invasion is not convincingly demonstrated. TURBT 5/22/18     1. Urinary bladder, right lateral wall, biopsy:   No evidence for malignancy. 2. Urinary bladder, posterior wall, biopsy:   No evidence for malignancy.    3. Urinary bladder, dome, biopsy:   Scant atypical urothelial cells, favor carcinoma in situ. 4. Urinary bladder, left lateral wall, excision:   Invasive high-grade urothelial carcinoma, invading muscularis propria. Cystectomy 7/2018       URINARY BLADDER, Cystectomy   SPECIMEN   Procedure: Radical cystoprostatectomy   TUMOR   Tumor Site: Left lateral wall   Histologic Type: Urothelial carcinoma with squamous differentiation   Histologic Grade: High-grade   Tumor Size: 5.5 Centimeters (cm)   Tumor Extent   Tumor Extension: Tumor invades muscularis propria - Tumor invades   deep muscularis propria (outer half)   Accessory Findings   Lymphovascular Invasion: Not identified   MARGINS   Margins: Uninvolved by invasive carcinoma and carcinoma in situ /   noninvasive urothelial carcinoma   LYMPH NODES   Number of Lymph Nodes Involved: 0   Number of Lymph Nodes Examined: 8   PATHOLOGIC STAGE CLASSIFICATION (pTNM, AJCC 8th Edition)   TNM Descriptors: r (recurrent), y (post-treatment)   Primary Tumor (pT): pT2b   Regional Lymph Nodes (pN): pN0     IMAGING  CT C/A/P on 5/3/18  IMPRESSION  IMPRESSION:   1. Left urinary bladder wall thickening and enhancement slightly increased. 2. Enhancing nodule in the right lobe of the prostate gland unchanged. 3. No evidence of new metastatic disease. 4. Severe emphysema with pulmonary fibrosis. 5. Calcified left lower lobe granuloma and mediastinal lymph nodes indicative of  old granulomatous disease. 6. Mediastinal adenopathy unchanged. 7. Atherosclerotic abdominal aorta with aneurysm unchanged. 8. Right renal cyst.  9. Status post left inguinal hernia repair. 10. Thoracolumbar spondylosis. 2/4/2020 CT   IMPRESSION  IMPRESSION:  1. No evidence of metastatic disease in the chest, abdomen, or pelvis. 2.  Slightly smaller cavitary mass in the right upper lobe with a thick rind. 3.  Severe emphysema.   4.  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 CT CAP 8/4/2020:  IMPRESSION:  1. Right upper lung cavitary lesion, decreased in size. Adjacent nodular  opacities, also diminished in size. 2. Right hilar and mediastinal lymphadenopathy, unchanged. 3. New, trace right pleural fluid. CT CAP 2/16/21:  IMPRESSION  1. Continued interval decrease in size of right apical cavitary lesion, now  measuring 4.6 cm x 3.8 cm. ASSESSMENT  Mr. Adeline Claire is a [de-identified] y.o. male with bladder cancer which on biopsy revealed high grade urothelial carcinoma with small cell features. CTs reveal multiple borderline enlarged nodes throughout the chest, abdomen, and pelvis. Received carboplatin/etoposide followed by radiation. Had been on surveillance and now has a muscle invasive transitional carcinoma of bladder. S/p cystectomy in 2018 and lost to follow-up and did not receive adjuvant treatment. Now on surveillance. DISCUSSION/PLAN  1. Bladder cancer with small cell features. He was treated with chemo and radiation. Had recurrent CIS detected in March 2017. No cystectomy recommended and received intravesical BCG. Recurrence in the form of HGMIBC-Urothelial as diagnosed by what appears to be a complete TURBT by Dr. Belén Caballero on 5/22/18. See below    2. Recurrent bladder cancer- MIBC urothelial- T2N0M0- 2018    See extensive prior discussions  He ultimately had a cystectomy on 7/23/18- pT2N0 HG MIBC . Lost to follow up  He has been doing well. No adjuvant chemotherapy    He understands the need for surveillance. He follows with Dr. Samara Plascencia periodically. Scans from 2/16/21 reviewed and WILFRIDO  He does not want to get contrast- understands that without that Liver lesions may be missed    3. Thrombocytopenia. Grade 1 and fluctuates. Monitor. 4. Shortness of breath and ILD, has underlying COPD. Follows with Dr. Arnaud Cotter    5. Shepherd's esophagus.      6. Anemia  His Hb post op was 9.6 g/dl  Baseline 11-12 g/dl  Was iron deficient and received 1500 mg IV. Now with correction of anemia. Hgb has remained stable     He can now follow with urologist for imaging / follow-up and be seen PRN   Stressed importance of urology follow-up  I performed a history and physical examination of the patient and discussed his management with the NPP.  I reviewed the NPP note and agree with the documented findings and plan of care    Anemia and MIBC  Labs show resolution of anemia  Scans reviewed personally and there is no recurrence  He has no new findings on exam  Discussed importance of ongoing surveillance whch he wants to continue with Urology  Will see us prn  Signed by: Morro Renner MD                     February 24, 2021

## 2021-02-24 NOTE — PROGRESS NOTES
Kenia Vaca is a [de-identified] y.o. male  Chief Complaint   Patient presents with    Follow-up     Bladder Cancer     1. Have you been to the ER, urgent care clinic since your last visit? Hospitalized since your last visit? No.    2. Have you seen or consulted any other health care providers outside of the [address] [place] [address] since your last visit? Include any pap smears or colon screening. No.    Patient states he has received both his covid-19 shots.

## 2021-04-19 ENCOUNTER — TRANSCRIBE ORDER (OUTPATIENT)
Dept: SCHEDULING | Age: 81
End: 2021-04-19

## 2021-04-19 DIAGNOSIS — Z85.51 HX OF BLADDER CANCER: Primary | ICD-10-CM

## 2021-04-23 RX ORDER — ZOLPIDEM TARTRATE 10 MG/1
5 TABLET ORAL
COMMUNITY

## 2021-04-23 NOTE — PERIOP NOTES
PAT call to patient. Pt states that he is going to [facility] for COVID testing 5/1/21, has spoken with COVID deptAna Angeles in Dr. Davidson Grande' office notified that H+P >30days. She will notify Dr. Davidson Grande. Also notified that patient states he will not have anyone with him overnight. Pt advised per anesthesia requirements, responsible adult to be with pt overnight and drive to Dr. Claudio Hinton for f/u.

## 2021-04-23 NOTE — PERIOP NOTES
[facility]  Ambulatory Surgery Unit  Pre-operative Instructions    Surgery/Procedure Date  5/5            Tentative Arrival Time tbd      1. On the day of your surgery/procedure, please report to the Ambulatory Surgery Unit Registration Desk and sign in at your designated time. The Ambulatory Surgery Unit is located in [facility] on the [facility] side of the \[facility]\"" across from the [address] [place] [address]. Please have all of your health insurance cards and a photo ID. 2. You must have someone with you to drive you home, as you should not drive a car for 24 hours following anesthesia. Please make arrangements for a responsible adult friend or family member to stay with you for at least the first 24 hours after your surgery. 3. Do not have anything to eat or drink (including water, gum, mints, coffee, juice) after 11:59 PM  5/4. This may not apply to medications prescribed by your physician. (Please note below the special instructions with medications to take the morning of surgery, if applicable.)    4. We recommend you do not drink any alcoholic beverages for 24 hours before and after your surgery. 5. Contact your surgeons office for instructions on the following medications: non-steroidal anti-inflammatory drugs (i.e. Advil, Aleve), vitamins, and supplements. (Some surgeons will want you to stop these medications prior to surgery and others may allow you to take them)   **If you are currently taking Plavix, Coumadin, Aspirin and/or other blood-thinning agents, contact your surgeon for instructions. ** Your surgeon will partner with the physician prescribing these medications to determine if it is safe to stop or if you need to continue taking. Please do not stop taking these medications without instructions from your surgeon.     6. In an effort to help prevent surgical site infection, we ask that you shower with an anti-bacterial soap (i.e. Dial/Safeguard, or the soap provided to you at your preadmission testing appointment) for 3 days prior to and on the morning of surgery, using a fresh towel after each shower. (Please begin this process with fresh bed linens.) Do not apply any lotions, powders, or deodorants after the shower on the day of your procedure. If applicable, please do not shave the operative site for 48 hours prior to surgery. 7. Wear comfortable clothes. Wear glasses instead of contacts. Do not bring any jewelry or money (other than copays or fees as instructed). Do not wear make-up, particularly mascara, the morning of your surgery. Do not wear nail polish, particularly if you are having foot /hand surgery. Wear your hair loose or down, no ponytails, buns, misa pins or clips. All body piercings must be removed. 8. You should understand that if you do not follow these instructions your surgery may be cancelled. If your physical condition changes (i.e. fever, cold or flu) please contact your surgeon as soon as possible. 9. It is important that you be on time. If a situation occurs where you may be late, or if you have any questions or problems, please call [phone].    10. Your surgery time may be subject to change. You will receive a phone call the day prior to surgery to confirm your arrival time. Special Instructions: Take all medications and inhalers, as prescribed, on the morning of surgery with a sip of water EXCEPT: none      I understand a pre-operative phone call will be made to verify my surgery time. In the event that I am not available, I give permission for a message to be left on my answering service and/or with another person?       yes    Reviewed by phone with pt, verbalized understanding     ___________________      ___________________      ________________  (Signature of Patient)          (Witness)                   (Date and Time)

## 2021-04-29 NOTE — PERIOP NOTES
Dr Kaushal Youssef with anesthesia is OK to proceed with planned procedure as long as patient has stable symptoms. Per METS from Nurse Todd Cheney, patient does not have chest pain, does have +PATRICK that is unchanged. Called and spoke to patient and he confirmed that his symptoms are stable and unchanged. 0

## 2021-05-01 ENCOUNTER — HOSPITAL ENCOUNTER (OUTPATIENT)
Dept: PREADMISSION TESTING | Age: 81
Discharge: HOME | End: 2021-05-01
Payer: MEDICARE

## 2021-05-01 ENCOUNTER — TRANSCRIBE ORDER (OUTPATIENT)
Dept: REGISTRATION | Age: 81
End: 2021-05-01

## 2021-05-01 DIAGNOSIS — Z01.812 PRE-PROCEDURE LAB EXAM: Primary | ICD-10-CM

## 2021-05-01 DIAGNOSIS — Z01.812 PRE-PROCEDURE LAB EXAM: ICD-10-CM

## 2021-05-01 PROCEDURE — U0003 INFECTIOUS AGENT DETECTION BY NUCLEIC ACID (DNA OR RNA); SEVERE ACUTE RESPIRATORY SYNDROME CORONAVIRUS 2 (SARS-COV-2) (CORONAVIRUS DISEASE [COVID-19]), AMPLIFIED PROBE TECHNIQUE, MAKING USE OF HIGH THROUGHPUT TECHNOLOGIES AS DESCRIBED BY CMS-2020-01-R: HCPCS

## 2021-05-02 LAB — SARS-COV-2, COV2NT: NOT DETECTED

## 2021-05-04 ENCOUNTER — ANESTHESIA EVENT (OUTPATIENT)
Dept: SURGERY | Age: 81
End: 2021-05-04
Payer: MEDICARE

## 2021-05-04 PROBLEM — H25.811 COMBINED FORMS OF AGE-RELATED CATARACT OF RIGHT EYE: Status: ACTIVE | Noted: 2021-05-04

## 2021-05-04 RX ORDER — FENTANYL CITRATE 50 UG/ML
25 INJECTION, SOLUTION INTRAMUSCULAR; INTRAVENOUS
Status: CANCELLED | OUTPATIENT
Start: 2021-05-04

## 2021-05-04 RX ORDER — ONDANSETRON 2 MG/ML
4 INJECTION INTRAMUSCULAR; INTRAVENOUS AS NEEDED
Status: CANCELLED | OUTPATIENT
Start: 2021-05-04

## 2021-05-04 RX ORDER — DIPHENHYDRAMINE HYDROCHLORIDE 50 MG/ML
12.5 INJECTION, SOLUTION INTRAMUSCULAR; INTRAVENOUS AS NEEDED
Status: CANCELLED | OUTPATIENT
Start: 2021-05-04 | End: 2021-05-04

## 2021-05-04 RX ORDER — SODIUM CHLORIDE 0.9 % (FLUSH) 0.9 %
5-40 SYRINGE (ML) INJECTION EVERY 8 HOURS
Status: CANCELLED | OUTPATIENT
Start: 2021-05-04

## 2021-05-04 RX ORDER — SODIUM CHLORIDE, SODIUM LACTATE, POTASSIUM CHLORIDE, CALCIUM CHLORIDE 600; 310; 30; 20 MG/100ML; MG/100ML; MG/100ML; MG/100ML
25 INJECTION, SOLUTION INTRAVENOUS CONTINUOUS
Status: CANCELLED | OUTPATIENT
Start: 2021-05-04

## 2021-05-04 RX ORDER — SODIUM CHLORIDE 0.9 % (FLUSH) 0.9 %
5-40 SYRINGE (ML) INJECTION AS NEEDED
Status: CANCELLED | OUTPATIENT
Start: 2021-05-04

## 2021-05-05 ENCOUNTER — HOSPITAL ENCOUNTER (OUTPATIENT)
Age: 81
Setting detail: OUTPATIENT SURGERY
Discharge: HOME | End: 2021-05-05
Attending: OPHTHALMOLOGY | Admitting: OPHTHALMOLOGY
Payer: MEDICARE

## 2021-05-05 ENCOUNTER — ANESTHESIA (OUTPATIENT)
Dept: SURGERY | Age: 81
End: 2021-05-05
Payer: MEDICARE

## 2021-05-05 VITALS
HEIGHT: 69 IN | TEMPERATURE: 97.2 F | RESPIRATION RATE: 17 BRPM | OXYGEN SATURATION: 96 % | WEIGHT: 174 LBS | SYSTOLIC BLOOD PRESSURE: 110 MMHG | BODY MASS INDEX: 25.77 KG/M2 | HEART RATE: 63 BPM | DIASTOLIC BLOOD PRESSURE: 76 MMHG

## 2021-05-05 PROCEDURE — 2709999900 HC NON-CHARGEABLE SUPPLY: Performed by: OPHTHALMOLOGY

## 2021-05-05 PROCEDURE — 74011000250 HC RX REV CODE- 250: Performed by: OPHTHALMOLOGY

## 2021-05-05 PROCEDURE — V2632 POST CHMBR INTRAOCULAR LENS: HCPCS | Performed by: OPHTHALMOLOGY

## 2021-05-05 PROCEDURE — 76210000034 HC AMBSU PH I REC 0.5 TO 1 HR: Performed by: OPHTHALMOLOGY

## 2021-05-05 PROCEDURE — 76060000073 HC AMB SURG ANES FIRST 0.5 HR: Performed by: OPHTHALMOLOGY

## 2021-05-05 PROCEDURE — 74011250636 HC RX REV CODE- 250/636: Performed by: OPHTHALMOLOGY

## 2021-05-05 PROCEDURE — 74011250637 HC RX REV CODE- 250/637: Performed by: OPHTHALMOLOGY

## 2021-05-05 PROCEDURE — 74011000250 HC RX REV CODE- 250

## 2021-05-05 PROCEDURE — 76030000002 HC AMB SURG OR TIME FIRST 0.: Performed by: OPHTHALMOLOGY

## 2021-05-05 PROCEDURE — 74011250636 HC RX REV CODE- 250/636: Performed by: NURSE ANESTHETIST, CERTIFIED REGISTERED

## 2021-05-05 PROCEDURE — 76210000046 HC AMBSU PH II REC FIRST 0.5 HR: Performed by: OPHTHALMOLOGY

## 2021-05-05 PROCEDURE — 77030018846 HC SOL IRR STRL H20 ICUM -A: Performed by: OPHTHALMOLOGY

## 2021-05-05 PROCEDURE — 74011000250 HC RX REV CODE- 250: Performed by: NURSE ANESTHETIST, CERTIFIED REGISTERED

## 2021-05-05 DEVICE — LENS IOL POST 1-PC 6X13 19.5 -- ACRYSOF: Type: IMPLANTABLE DEVICE | Site: EYE | Status: FUNCTIONAL

## 2021-05-05 RX ORDER — PREDNISOLONE ACETATE 10 MG/ML
1 SUSPENSION/ DROPS OPHTHALMIC 2 TIMES DAILY
Status: DISCONTINUED | OUTPATIENT
Start: 2021-05-05 | End: 2021-05-05 | Stop reason: HOSPADM

## 2021-05-05 RX ORDER — TROPICAMIDE 10 MG/ML
SOLUTION/ DROPS OPHTHALMIC
Status: COMPLETED
Start: 2021-05-05 | End: 2021-05-05

## 2021-05-05 RX ORDER — SODIUM CHLORIDE 0.9 % (FLUSH) 0.9 %
5-40 SYRINGE (ML) INJECTION EVERY 8 HOURS
Status: DISCONTINUED | OUTPATIENT
Start: 2021-05-05 | End: 2021-05-05 | Stop reason: HOSPADM

## 2021-05-05 RX ORDER — LIDOCAINE HYDROCHLORIDE 40 MG/ML
3 INJECTION, SOLUTION RETROBULBAR; TOPICAL ONCE
Status: COMPLETED | OUTPATIENT
Start: 2021-05-05 | End: 2021-05-05

## 2021-05-05 RX ORDER — MIDAZOLAM HYDROCHLORIDE 1 MG/ML
INJECTION, SOLUTION INTRAMUSCULAR; INTRAVENOUS AS NEEDED
Status: DISCONTINUED | OUTPATIENT
Start: 2021-05-05 | End: 2021-05-05 | Stop reason: HOSPADM

## 2021-05-05 RX ORDER — DICLOFENAC SODIUM 1 MG/ML
SOLUTION/ DROPS OPHTHALMIC
Status: COMPLETED
Start: 2021-05-05 | End: 2021-05-05

## 2021-05-05 RX ORDER — ONDANSETRON 2 MG/ML
INJECTION INTRAMUSCULAR; INTRAVENOUS AS NEEDED
Status: DISCONTINUED | OUTPATIENT
Start: 2021-05-05 | End: 2021-05-05 | Stop reason: HOSPADM

## 2021-05-05 RX ORDER — SODIUM CHLORIDE, SODIUM LACTATE, POTASSIUM CHLORIDE, CALCIUM CHLORIDE 600; 310; 30; 20 MG/100ML; MG/100ML; MG/100ML; MG/100ML
25 INJECTION, SOLUTION INTRAVENOUS CONTINUOUS
Status: DISCONTINUED | OUTPATIENT
Start: 2021-05-05 | End: 2021-05-05 | Stop reason: HOSPADM

## 2021-05-05 RX ORDER — SODIUM CHLORIDE 0.9 % (FLUSH) 0.9 %
5-40 SYRINGE (ML) INJECTION AS NEEDED
Status: DISCONTINUED | OUTPATIENT
Start: 2021-05-05 | End: 2021-05-05 | Stop reason: HOSPADM

## 2021-05-05 RX ORDER — POVIDONE-IODINE 10 %
SOLUTION, NON-ORAL TOPICAL
Status: COMPLETED | OUTPATIENT
Start: 2021-05-05 | End: 2021-05-05

## 2021-05-05 RX ORDER — DICLOFENAC SODIUM 1 MG/ML
1 SOLUTION/ DROPS OPHTHALMIC ONCE
Status: COMPLETED | OUTPATIENT
Start: 2021-05-05 | End: 2021-05-05

## 2021-05-05 RX ORDER — TROPICAMIDE 10 MG/ML
1 SOLUTION/ DROPS OPHTHALMIC ONCE
Status: COMPLETED | OUTPATIENT
Start: 2021-05-05 | End: 2021-05-05

## 2021-05-05 RX ORDER — SODIUM CHLORIDE 9 MG/ML
25 INJECTION, SOLUTION INTRAVENOUS CONTINUOUS
Status: DISCONTINUED | OUTPATIENT
Start: 2021-05-05 | End: 2021-05-05 | Stop reason: HOSPADM

## 2021-05-05 RX ORDER — TETRACAINE HYDROCHLORIDE 5 MG/ML
1 SOLUTION OPHTHALMIC
Status: DISCONTINUED | OUTPATIENT
Start: 2021-05-05 | End: 2021-05-05 | Stop reason: HOSPADM

## 2021-05-05 RX ORDER — PROPARACAINE HYDROCHLORIDE 5 MG/ML
SOLUTION/ DROPS OPHTHALMIC
Status: COMPLETED
Start: 2021-05-05 | End: 2021-05-05

## 2021-05-05 RX ORDER — PROPARACAINE HYDROCHLORIDE 5 MG/ML
1 SOLUTION/ DROPS OPHTHALMIC ONCE
Status: COMPLETED | OUTPATIENT
Start: 2021-05-05 | End: 2021-05-05

## 2021-05-05 RX ORDER — ERYTHROMYCIN 5 MG/G
OINTMENT OPHTHALMIC ONCE
Status: COMPLETED | OUTPATIENT
Start: 2021-05-05 | End: 2021-05-05

## 2021-05-05 RX ORDER — LIDOCAINE HYDROCHLORIDE 10 MG/ML
0.1 INJECTION, SOLUTION EPIDURAL; INFILTRATION; INTRACAUDAL; PERINEURAL AS NEEDED
Status: DISCONTINUED | OUTPATIENT
Start: 2021-05-05 | End: 2021-05-05 | Stop reason: HOSPADM

## 2021-05-05 RX ADMIN — MIDAZOLAM HYDROCHLORIDE 0.5 MG: 1 INJECTION, SOLUTION INTRAMUSCULAR; INTRAVENOUS at 08:44

## 2021-05-05 RX ADMIN — TROPICAMIDE 1 DROP: 10 SOLUTION/ DROPS OPHTHALMIC at 08:06

## 2021-05-05 RX ADMIN — DICLOFENAC SODIUM 1 DROP: 1 SOLUTION/ DROPS OPHTHALMIC at 08:06

## 2021-05-05 RX ADMIN — MIDAZOLAM HYDROCHLORIDE 0.5 MG: 1 INJECTION, SOLUTION INTRAMUSCULAR; INTRAVENOUS at 08:43

## 2021-05-05 RX ADMIN — SODIUM CHLORIDE 25 ML/HR: 9 INJECTION, SOLUTION INTRAVENOUS at 08:06

## 2021-05-05 RX ADMIN — ONDANSETRON HYDROCHLORIDE 4 MG: 2 INJECTION, SOLUTION INTRAMUSCULAR; INTRAVENOUS at 08:43

## 2021-05-05 RX ADMIN — MIDAZOLAM HYDROCHLORIDE 0.5 MG: 1 INJECTION, SOLUTION INTRAMUSCULAR; INTRAVENOUS at 08:40

## 2021-05-05 RX ADMIN — PROPARACAINE HYDROCHLORIDE 1 DROP: 5 SOLUTION/ DROPS OPHTHALMIC at 08:06

## 2021-05-05 RX ADMIN — SODIUM CHLORIDE 8 MCG: 900 INJECTION, SOLUTION INTRAVENOUS at 08:36

## 2021-05-05 RX ADMIN — MIDAZOLAM HYDROCHLORIDE 0.5 MG: 1 INJECTION, SOLUTION INTRAMUSCULAR; INTRAVENOUS at 08:36

## 2021-05-05 NOTE — ADDENDUM NOTE
Addendum  created 05/05/21 1004 by Claudio Cervantes CRNA    Flowsheet accepted, Intraprocedure Flowsheets edited

## 2021-05-05 NOTE — OP NOTES
Name: Dakota Trevino MASC-4        updated 3/1/2013  Description:  Zenaida Mckeon with intraocular lens implant    PREOPERATIVE DIAGNOSIS: Visually impairing combined cataract, Right eye. POSTOPERATIVE DIAGNOSIS: Visually impairing combined   cataract,Right eye. OPERATION: Procedure(s):  RIGHT EYE ONLY REFRACTIVE CATARACT REMOVAL WITH LENS IMPLANTATION    ANESTHESIA: Topical with intravenous sedation    TYPE OF LENS IMPLANT USED:   Implant Name Type Inv. Item Serial No.  Lot No. LRB No. Used Action   LENS IOL POST 1-PC 6X13 19.5 -- [employer] - [id number] 025 Other LENS IOL POST 1-PC 6X13 19.5 -- [employer] [id number] 025 [employer] N/A Right 1 Implanted       PHACO TIME:   49.4 seconds at an average power of 16.5%. Estimated blood loss: None    Complications:None    Specimen removed: None    DESCRIPTION OF PROCEDURE:  The patient was brought to the holding area where an IV was begun at a  keep open rate. The patient was connected to cardiovascular monitoring. The patient received 1 drop of mydriacyl 1% and proparacaine 0.5%. The patient was then brought back to the operating suite and cardiovascular monitoring was reestablished. The patient was  prepped and draped in the usual manner for ocular surgery. The patient received 1 drop of Proparacaine followed by 2 drops of 5% Betadine solution in the inferior conjunctival cul-de-sac The operating microscope was brought into position and 4% Xylocaine drops were instilled onto the eye. The lid speculum was set into position. A paracentesis was created and Sugarcane solution was instilled into the anterior chamber for adequate anesthesia and pupillary dilation. Viscoelastic was instilled into the anterior chamber. The 2.4 mm keratome was then utilized to create a clear corneal incision, and a circular capsulorrhexis was performed. BSS was utilized to perform careful hydrodissection of the lens.  Viscoelastic was then instilled into the anterior chamber to protect the corneal endothelium. Phacoemulsification was then carried out. Any remaining cortical material was removed in irrigation/aspiration mode. Viscoelastic was then instilled into the capsular bag. The lens implant was inspected for any defects. After finding none, the lens was placed in its injector and inserted into the capsular bag. The lens implant was centered on the patient's visual/astigmatic axis. The viscoelastic was then removed from the eye. Miochol was instilled posterior to the iris plane to facilitate pupillary miosis. The wound was checked for any leaks, after finding none, Iopidine and Pred Forte drops were instilled into the inferior cul-de-sac, and erythromycin ointment was placed over the cornea. A semi-pressure dressing and shield were then placed for the patient. The patient tolerated the procedure very well, and was brought to the recovery room in an alert and stable and satisfactory postoperative condition. Instructions were given to the patient and their family for their immediate postoperative care.   [address] [place] [address]  5/5/2021

## 2021-05-05 NOTE — PERIOP NOTES
Patient: Bi Avelar MRN: [id number]  SSN: [id number]   YOB: 1940  Age: 80 y.o. Sex: male     Patient is status post Procedure(s):  RIGHT EYE ONLY REFRACTIVE CATARACT REMOVAL WITH LENS IMPLANTATION. Surgeon(s) and Role:     * Evelyne Rock,  - Primary    Local/Dose/Irrigation:  SEE MAR                Peripheral IV 05/05/21 Left Arm (Active)   Site Assessment Clean, dry, & intact 05/05/21 0757   Phlebitis Assessment 0 05/05/21 0757   Infiltration Assessment 0 05/05/21 0757   Dressing Status Clean, dry, & intact 05/05/21 0757   Dressing Type Tape;Transparent 05/05/21 0757   Hub Color/Line Status Blue; Infusing 05/05/21 0757                           Dressing/Packing:  Incision 05/05/21 Eye Right-Dressing/Treatment: Eye pad;Eye shield; Other (Comment)(SECURED WITH TAPE BY MD.) (05/05/21 0700)

## 2021-05-05 NOTE — ANESTHESIA POSTPROCEDURE EVALUATION
Procedure(s):  RIGHT EYE ONLY REFRACTIVE CATARACT REMOVAL WITH LENS IMPLANTATION.     MAC    Anesthesia Post Evaluation      Multimodal analgesia: multimodal analgesia not used between 6 hours prior to anesthesia start to PACU discharge  Patient location during evaluation: PACU  Patient participation: complete - patient participated  Level of consciousness: awake and alert  Pain score: 2  Airway patency: patent  Anesthetic complications: no  Cardiovascular status: acceptable  Respiratory status: acceptable  Hydration status: acceptable  Post anesthesia nausea and vomiting:  none  Final Post Anesthesia Temperature Assessment:  Normothermia (36.0-37.5 degrees C)      INITIAL Post-op Vital signs:   Vitals Value Taken Time   /63 05/05/21 0915   Temp 36.2 °C (97.2 °F) 05/05/21 0907   Pulse 60 05/05/21 0915   Resp 16 05/05/21 0915   SpO2 93 % 05/05/21 0915

## 2021-05-05 NOTE — DISCHARGE INSTRUCTIONS
Yadira Khoury D.O., LUCYCAna  [facility].  [phone]    Post-Operative Instructions for Cataract Surgery     Remove your eye shield and bandage at 12 noon the same day as surgery and start your eye drops. THROW AWAY THE GAUZE UNDER THE SHIELD.  Place the blue eye shield back on for one week while asleep, even if napping in the recliner. Use the tape included in your bag.  DO NOT RUB YOUR EYE EVER! DO NOT WIPE YOUR EYE! ONLY WIPE ON YOUR CHEEK!                DO NOT WEAR EYE MAKEUP FOR 1 WEEK!  You can shower starting tomorrow.  You may resume your previous diet.  If you use glaucoma drops in the operative eye, continue them as directed.  Common symptoms after surgery include a scratchy feeling, slight headache, red eye, and/or blurred vision. You may use Advil or Tylenol for any discomfort.  Avoid strenuous activities and driving until you see Dr. Ebenezer Licona tomorrow. Please use care when walking, your depth perception may be altered.  Bring your bag with your drops to your follow up appointment. CONTINUE DROPS UNTIL ALL BOTTLES ARE EMPTY! Follow-up appointment tomorrow at Dr. Magan Gonzales office:____________________    Please call the office at [phone] if you experience severe pain or nausea. The following personal items collected during your admission are returned to you:   Dental Appliance: Dental Appliances: Uppers, Lowers  Vision: Visual Aid: Glasses  Hearing Aid: @FLOW  DISCHARGE SUMMARY from Nurse  ([phone]:LAST)@  [facility]:    Clothing:    Other Valuables:    Valuables sent to safe:        PATIENT INSTRUCTIONS:    After General Anesthesia or Intravenous Sedation, for 24 hours or while taking prescription Narcotics:        Someone should be with you for the next 24 hours. For your own safety, a responsible adult must drive you home. · Limit your activities  · Recommended activity: Rest today, up with assistance today.  Do not climb stairs or shower unattended for the next 24 hours. · Please start with a soft bland diet and advance as tolerated (no nausea) to regular diet. · If you have a sore throat you should try the following: fluids, warm salt water gargles, or throat lozenges. If it does not improve after several days please follow up with your primary physician. · Do not drive and operate hazardous machinery  · Do not make important personal or business decisions  · Do  not drink alcoholic beverages  · If you have not urinated within 8 hours after discharge, please contact your surgeon on call. Report the following to your surgeon:  · Excessive pain, swelling, redness or odor of or around the surgical area  · Temperature over 100.5  · Any nausea or vomiting. · You will receive a Post Operative Call from one of the Recovery Room Nurses on the day after your surgery to check on you. It is very important for us to know how you are recovering after your surgery. If you have an issue or need to speak with someone, please call your surgeon, do not wait for the post operative call. · You may receive an e-mail or letter in the mail from [employer] regarding your experience with us in the Ambulatory Surgery Unit. Your feedback is valuable to us and we appreciate your participation in the survey. · If the above instructions are not adequate or you are having problems after your surgery, call the physician at their office number. · We wish you a speedy recovery ? What to do at Home:      *  Please give a list of your current medications to your Primary Care Provider. *  Please update this list whenever your medications are discontinued, doses are      changed, or new medications (including over-the-counter products) are added. *  Please carry medication information at all times in case of emergency situations.               If you have not received your influenza and/or pneumococcal vaccine, please follow up with your primary care physician. The discharge information has been reviewed with the patient and family. The patient and family verbalized understanding. TO PREVENT AN INFECTION      1. [address] YOUR HANDS     To prevent infection, good handwashing is the most important thing you or your caregiver can do.  Wash your hands with soap and water or use the hand  we gave you before you touch any wounds. 2.  USE CLEAN SHEETS     Use freshly cleaned sheets on your bed after surgery.  To keep the surgery site clean, do not allow pets to sleep with you while your wound is still healing. 3. STOP SMOKING    Stop smoking, or at least cut back on smoking     Smoking slows your healing. 4.  CONTROL YOUR BLOOD SUGAR     High blood sugars slow wound healing.  If you are diabetic, control your blood sugar levels before and after your surgery.

## 2021-05-05 NOTE — ANESTHESIA PREPROCEDURE EVALUATION
Relevant Problems   RESPIRATORY SYSTEM   (+) COPD (chronic obstructive pulmonary disease) (HCC)   (+) Chronic bronchitis (HCC)   (+) PNA (pneumonia)   (+) Pneumonia   (+) Shortness of breath      HEMATOLOGY   (+) Anemia      PERSONAL HX & FAMILY HX OF CANCER   (+) Bladder cancer (HCC)   (+) Malignant neoplasm of lateral wall of urinary bladder (HCC)       Anesthetic History   No history of anesthetic complications            Review of Systems / Medical History  Patient summary reviewed, nursing notes reviewed and pertinent labs reviewed    Pulmonary    COPD (stable symptoms)            Comments: Sinus drainage  aspirates   Neuro/Psych   Within defined limits           Cardiovascular    Hypertension        Dysrhythmias ( ?)       Exercise tolerance: <4 METS  Comments: negative Cath per pt years ago   GI/Hepatic/Renal     GERD: well controlled      PUD    Comments: [facility]/Other        Cancer (bladder)     Other Findings   Comments: S/p cystectomy; has urostomy tube         Physical Exam    Airway  Mallampati: II  TM Distance: > 6 cm  Neck ROM: normal range of motion   Mouth opening: Normal     Cardiovascular    Rhythm: regular  Rate: normal         Dental    Dentition: Full upper dentures and Full lower dentures     Pulmonary  Breath sounds clear to auscultation               Abdominal  GI exam deferred       Other Findings            Anesthetic Plan    ASA: 3  Anesthesia type: MAC          Induction: Intravenous  Anesthetic plan and risks discussed with: Patient

## 2021-05-05 NOTE — PERIOP NOTES
Permission received to review discharge instructions and discuss private health information with Lillian. Patient states that Maxwell Aniket will be with them for at least 24 hours following today's procedure.

## 2021-05-05 NOTE — PERIOP NOTES
Pt. Alert. Denies pain or chill. Discharge instructions reviewed with caregiver and patient. Allowed and answered questions. Tolerating PO fluids. Both state ready for discharge. 948 Escorted to BR to empty urology bag.  Discharged to car without incident

## 2021-05-05 NOTE — PERIOP NOTES
[facility]  1940  [id number]    Situation:  Verbal report given from: RN and CRNA  Procedure: Procedure(s):  RIGHT EYE ONLY REFRACTIVE CATARACT REMOVAL WITH LENS IMPLANTATION    Background:    Preoperative diagnosis: Combined forms of age-related cataract of right eye [H25.811]    Postoperative diagnosis: Combined forms of age-related cataract of right eye [H25.811]    :  Dr. Ragini Salcedo    Assistant(s): Circ-1: Rita March RN  Scrub Tech-1: Nimo Borja    Specimens: * No specimens in log *    Assessment:  Intra-procedure medications         Anesthesia gave intra-procedure sedation and medications, see anesthesia flow sheet     Intravenous fluids: LR@ KVO     Vital signs stable.  Pt groggy but follows commands-denies chill and only some discomfort      Recommendation:    Permission to share finding with daughter : yes

## 2021-06-09 ENCOUNTER — HOSPITAL ENCOUNTER (OUTPATIENT)
Dept: CT IMAGING | Age: 81
Discharge: HOME | End: 2021-06-09
Attending: UROLOGY
Payer: MEDICARE

## 2021-06-09 DIAGNOSIS — Z85.51 HX OF BLADDER CANCER: ICD-10-CM

## 2021-06-09 LAB — CREAT BLD-MCNC: 0.9 MG/DL (ref 0.6–1.3)

## 2021-06-09 PROCEDURE — 74011000636 HC RX REV CODE- 636: Performed by: RADIOLOGY

## 2021-06-09 PROCEDURE — 82565 ASSAY OF CREATININE: CPT

## 2021-06-09 PROCEDURE — 74178 CT ABD&PLV WO CNTR FLWD CNTR: CPT

## 2021-06-09 RX ADMIN — IOPAMIDOL 100 ML: 612 INJECTION, SOLUTION INTRAVENOUS at 12:00

## 2021-08-03 PROBLEM — J18.9 PNEUMONIA: Status: RESOLVED | Noted: 2017-11-18 | Resolved: 2021-08-03

## 2021-09-08 ENCOUNTER — TRANSCRIBE ORDER (OUTPATIENT)
Dept: SCHEDULING | Age: 81
End: 2021-09-08

## 2021-09-08 DIAGNOSIS — R91.8 MASS OF UPPER LOBE OF RIGHT LUNG: Primary | ICD-10-CM

## 2021-09-15 ENCOUNTER — HOSPITAL ENCOUNTER (OUTPATIENT)
Dept: CT IMAGING | Age: 81
Discharge: HOME | End: 2021-09-15
Attending: INTERNAL MEDICINE
Payer: MEDICARE

## 2021-09-15 DIAGNOSIS — R91.8 MASS OF UPPER LOBE OF RIGHT LUNG: ICD-10-CM

## 2021-09-15 PROCEDURE — 71250 CT THORAX DX C-: CPT

## 2022-02-23 ENCOUNTER — OFFICE VISIT (OUTPATIENT)
Dept: SURGERY | Age: 82
End: 2022-02-23
Payer: MEDICARE

## 2022-02-23 VITALS
HEIGHT: 69 IN | OXYGEN SATURATION: 94 % | SYSTOLIC BLOOD PRESSURE: 117 MMHG | DIASTOLIC BLOOD PRESSURE: 71 MMHG | HEART RATE: 74 BPM | TEMPERATURE: 97.9 F | BODY MASS INDEX: 25.74 KG/M2 | RESPIRATION RATE: 18 BRPM | WEIGHT: 173.8 LBS

## 2022-02-23 DIAGNOSIS — K43.5 PARASTOMAL HERNIA WITHOUT OBSTRUCTION OR GANGRENE: Primary | ICD-10-CM

## 2022-02-23 PROCEDURE — G8427 DOCREV CUR MEDS BY ELIG CLIN: HCPCS | Performed by: SURGERY

## 2022-02-23 PROCEDURE — 99204 OFFICE O/P NEW MOD 45 MIN: CPT | Performed by: SURGERY

## 2022-02-23 PROCEDURE — G8432 DEP SCR NOT DOC, RNG: HCPCS | Performed by: SURGERY

## 2022-02-23 PROCEDURE — G8419 CALC BMI OUT NRM PARAM NOF/U: HCPCS | Performed by: SURGERY

## 2022-02-23 PROCEDURE — G8536 NO DOC ELDER MAL SCRN: HCPCS | Performed by: SURGERY

## 2022-02-23 PROCEDURE — 1101F PT FALLS ASSESS-DOCD LE1/YR: CPT | Performed by: SURGERY

## 2022-02-23 NOTE — LETTER
2/23/2022    Patient: Zoë Age   YOB: 1940   Date of Visit: 2/23/2022     Asya Stark NP  [address] [facility] [address]  Via Fax: [phone]     Angella Juarez MD  [place] [address]  [address] [facility] [address]    Dear SHAHID Carrasquillo MD,      Thank you for referring Mr. Jessica Corley to [facility] [address] for evaluation. My notes for this consultation are attached. If you have questions, please do not hesitate to call me. I look forward to following your patient along with you.       Sincerely,    Ramsey Pacheco MD

## 2022-02-23 NOTE — PROGRESS NOTES
1. Have you been to the ER, urgent care clinic since your last visit? Hospitalized since your last visit? No    2. Have you seen or consulted any other health care providers outside of the [address] [place] [address] since your last visit? Include any pap smears or colon screening.  No

## 2022-02-23 NOTE — PROGRESS NOTES
[facility] at [facility] Surgery History and Physical    History of Present Illness:      Michael Rae is a 80 y.o. male who has a past surgical history of open radical cystectomy with ileal conduit, laparoscopic ventral hernia repair by Dr. Jonathan Gonzalez. The patient has a large parastomal hernia. He has had this parastomal hernia for many years. He does think it is been getting a little bit bigger. He does not have any pain from the hernia. He does have a large bulge. In general his ostomy is on pretty well but occasionally has issues. He does not have any nausea and vomiting or changes in bowel movements.     Past Medical History:   Diagnosis Date    Aneurysm [facility])     ABDOMINAL     Arrhythmia 3/2/2014    Stated cardiac cath for abn EKG several years ago NEG 4/23/21 pt denies    Ortiz esophagus     Bladder cancer ([address]     CAD (coronary artery disease)     4/23/21 pt denies    Calculus of kidney     Chronic obstructive pulmonary disease ([address]     Contact dermatitis and other eczema, due to unspecified cause     GERD (gastroesophageal reflux disease)     ORTIZ'S ESOPH    Hyperlipemia     Hypertension     Ill-defined condition     HIATEL HERNIA    Ill-defined condition     ECZEMA    Presence of urostomy ([address]     PUD (peptic ulcer disease)     Baretts Esophagus/antral gastritis/    Skin cancer        Past Surgical History:   Procedure Laterality Date    HX ADENOIDECTOMY      HX CATARACT REMOVAL Left 11/2016    HX COLONOSCOPY      HX GI      Colonoscopy x 2-3    HX GI      COLONOSCOPY    HX HERNIA REPAIR  8/21/13    left inguinal hernia repair, LAPAROSCOPIC  (DR JAQUEZ)    HX HERNIA REPAIR  3/14/16    LAPAROSCOPIC Incisional W/ MESH by     HX OTHER SURGICAL      REMOVAL OF SKIN CA R NECK    HX TONSILLECTOMY      HX UROLOGICAL  2010    Bladder bx/kidney stone removal    HX UROLOGICAL  2015    CYSTO    HX UROLOGICAL      cystectomy/urostomy    HX VASCULAR ACCESS  03/31/2015    PORT R CHEST WALL    HX VASCULAR ACCESS      REMOVAL OF PORT R CHEST WALL    MI ABDOMEN SURGERY PROC UNLISTED      UMBILICAL hernia          Current Outpatient Medications:     vit A/vit C/vit E/zinc/copper (PRESERVISION AREDS PO), Take 1 Tab by mouth daily. , Disp: , Rfl:     loratadine-pseudoephedrine (Claritin-D 24 Hour)  mg per tablet, Take 1 Tab by mouth daily. , Disp: , Rfl:     tiotropium-olodaterol (STIOLTO RESPIMAT) 2.5-2.5 mcg/actuation mist, Take 2 Puffs by inhalation every morning., Disp: , Rfl:     montelukast (SINGULAIR) 10 mg tablet, Take 10 mg by mouth daily. , Disp: , Rfl:     sodium chloride (SALINE MIST) 0.65 % nasal spray, 1 Spray as needed for Congestion. , Disp: , Rfl:     omeprazole (PRILOSEC) 20 mg capsule, Take 20 mg by mouth two (2) times a day., Disp: , Rfl:     atorvastatin (LIPITOR) 20 mg tablet, Take 20 mg by mouth nightly., Disp: , Rfl:     zolpidem (Ambien) 10 mg tablet, Take 10 mg by mouth nightly as needed for Sleep. (Patient not taking: Reported on 2/23/2022), Disp: , Rfl:     potassium chloride (KLOR-CON) 10 mEq tablet, Take 10 mEq by mouth as needed (take 1 tab by mouth when lasix are taken). take with lasix to prevent low potassium level (Patient not taking: Reported on 2/23/2022), Disp: , Rfl:     furosemide (LASIX) 20 mg tablet, Take 10-20 mg by mouth daily as needed (swelling). for swelling (Patient not taking: Reported on 2/23/2022), Disp: , Rfl:     HYDROcodone-acetaminophen (NORCO) 5-325 mg per tablet, Take 1 Tab by mouth every six (6) hours as needed for Pain. Max Daily Amount: 4 Tabs. Indications: Pain (Patient not taking: Reported on 2/23/2022), Disp: 25 Tab, Rfl: 0    fluticasone (FLONASE ALLERGY RELIEF) 50 mcg/actuation nasal spray, 2 Sprays by Both Nostrils route daily.  (Patient not taking: Reported on 2/23/2022), Disp: , Rfl:     No Known Allergies    Social History     Socioeconomic History    Marital status:  Spouse name: Not on file    Number of children: Not on file    Years of education: Not on file    Highest education level: Not on file   Occupational History    Not on file   Tobacco Use    Smoking status: Former Smoker     Packs/day: 1.00     Years: 40.00     Pack years: 40.00     Quit date: 2009     Years since quittin.0    Smokeless tobacco: Never Used   Substance and Sexual Activity    Alcohol use: No     Alcohol/week: 0.0 standard drinks     Comment: RARELY    Drug use: No    Sexual activity: Not Currently   Other Topics Concern    Not on file   Social History Narrative    Not on file     Social Determinants of Health     Financial Resource Strain:     Difficulty of Paying Living Expenses: Not on file   Food Insecurity:     Worried About Running Out of Food in the Last Year: Not on file    Ambreen of Food in the Last Year: Not on file   Transportation Needs:     Lack of Transportation (Medical): Not on file    Lack of Transportation (Non-Medical):  Not on file   Physical Activity:     Days of Exercise per Week: Not on file    Minutes of Exercise per Session: Not on file   Stress:     Feeling of Stress : Not on file   Social Connections:     Frequency of Communication with Friends and Family: Not on file    Frequency of Social Gatherings with Friends and Family: Not on file    Attends Druze Services: Not on file    Active Member of [address] [place] [address] or Organizations: Not on file    Attends Club or Organization Meetings: Not on file    Marital Status: Not on file   Intimate Partner Violence:     Fear of Current or Ex-Partner: Not on file    Emotionally Abused: Not on file    Physically Abused: Not on file    Sexually Abused: Not on file   Housing Stability:     Unable to Pay for Housing in the Last Year: Not on file    Number of [place] in the Last Year: Not on file    Unstable Housing in the Last Year: Not on file       Family History   Problem Relation Age of Onset    Heart Disease Mother     Mult Sclerosis Sister     Heart Disease Sister     Psychiatric Disorder Sister         DEPRESSION    No Known Problems Sister     Anesth Problems Neg Hx        ROS   Constitutional: negative  Ears, Nose, Mouth, Throat, and Face: negative  Respiratory: negative  Cardiovascular: negative  Gastrointestinal: Parastomal hernia  Genitourinary:negative  Integument/Breast: negative  Hematologic/Lymphatic: negative  Behavioral/Psychiatric: negative  Allergic/Immunologic: negative      Physical Exam:     Visit Vitals  /71 (BP 1 Location: Left arm, BP Patient Position: Sitting, BP Cuff Size: Adult)   Pulse 74   Temp 97.9 °F (36.6 °C) (Oral)   Resp 18   Ht 5' 9\" (1.753 m)   Wt 173 lb 12.8 oz (78.8 kg)   SpO2 94%   BMI 25.67 kg/m²       General - alert and oriented, no apparent distress  HEENT - no jaundice, no hearing imparement  Pulm - CTAB, no C/W/R  CV - RRR, no M/R/G  Abd -soft, nondistended, bowel sounds present, right lower quadrant ileal conduit with large parastomal hernia that is soft, nontender to palpation, cannot reduce it on exam  Ext - pulses intact in UE and LE bilaterally, no edema  Skin - supple, no rashes  Psychiatric - normal affect, good mood    Labs  Lab Results   Component Value Date/Time    Sodium 140 06/05/2020 12:11 PM    Potassium 3.2 (L) 06/05/2020 12:11 PM    Chloride 108 06/05/2020 12:11 PM    CO2 23 06/05/2020 12:11 PM    Anion gap 9 06/05/2020 12:11 PM    Glucose 86 06/05/2020 12:11 PM    BUN 9 06/05/2020 12:11 PM    Creatinine 0.55 (L) 06/05/2020 12:11 PM    BUN/Creatinine ratio 16 06/05/2020 12:11 PM    GFR est AA >60 06/05/2020 12:11 PM    GFR est non-AA >60 06/05/2020 12:11 PM    Calcium 8.1 (L) 06/05/2020 12:11 PM    Bilirubin, total 0.5 06/05/2020 02:00 AM    Alk.  phosphatase 87 06/05/2020 02:00 AM    Protein, total 7.4 06/05/2020 02:00 AM    Albumin 2.2 (L) 06/05/2020 02:00 AM    Globulin 5.2 (H) 06/05/2020 02:00 AM    A-G Ratio 0.4 (L) 06/05/2020 02:00 AM    ALT (SGPT) 15 06/05/2020 02:00 AM    AST (SGOT) 22 06/05/2020 02:00 AM     Lab Results   Component Value Date/Time    WBC 7.5 02/09/2021 12:35 PM    Hemoglobin (POC) 10.0 (L) 07/23/2018 02:55 PM    HGB 12.7 (L) 02/09/2021 12:35 PM    Hematocrit (POC) 34 (L) 11/27/2018 07:49 PM    HCT 38.4 02/09/2021 12:35 PM    PLATELET 204 (L) 12/11/6905 12:35 PM    MCV 92 02/09/2021 12:35 PM         Imaging  CT abdomen pelvis-Findings:     Lung bases: There are severe bibasilar emphysematous changes. There is mild  right basilar atelectasis. There is right basilar cylindrical bronchiectasis. Extensive atherosclerotic calcifications are noted within the visualized  coronary arteries.     Liver: The liver is normal. There is no focal hepatic lesion.     Adrenals: Adrenal glands are normal.     Pancreas: The pancreas is normal.     Gallbladder: The gallbladder is normal.     Kidneys: There are several nonobstructing punctate renal calculi. There is no  hydronephrosis. There is a 4.2 cm right renal cyst. There is no hydronephrosis.     Spleen: The spleen is normal.     Lymph nodes. There is no leah hepatitis, mesenteric, retroperitoneal or pelvic  lymphadenopathy.     Bowel/urinary bladder: The urinary bladder is surgically absent and there is a  right lower quadrant ileal conduit. A right lower quadrant parastomal hernia  containing colon. No thickened or dilated loop of large or small bowel is  visualized.     Abdominal aorta: There is a 3.7 cm infrarenal abdominal aortic aneurysm,  unchanged compared to prior CT dated February 2021.     Bones: Osseous structures are diffusely demineralized and are stable.     Miscellaneous: There is no free intraperitoneal fluid or gas. There is no focal  fluid collection to suggest abscess.     IMPRESSION  No interval change.   I have reviewed and agree with all of the pertinent images    Assessment:     Yuly Arango is a 80 y.o. 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 The patient does have a large parastomal hernia related to his previous ileal conduit. This could have hernias are extremely difficult to fix. He is also had a previous incisional hernia repair done with mesh a number of years ago. He is asymptomatic in general from the parastomal hernia. It is relatively large but does not seem to cause him any pain. At this point I do not think having surgery for him is needed. He has a very high risk patient with multiple comorbidities. The surgeries are extremely difficult and high risk. For now he will live with the hernia and come back and see me in about a year. If he has any changes including abdominal pain nausea vomiting redness over the hernia or any other issues he will come back and see me. Alonzo Rees MD    Greater than half of the time: 45 minutes was used in counciling the patient about diagnosis and treatment plan    Mr. Yocasta Claudio has a reminder for a \"due or due soon\" health maintenance. I have asked that he contact his primary care provider for follow-up on this health maintenance.

## 2022-03-18 PROBLEM — H25.811 COMBINED FORMS OF AGE-RELATED CATARACT OF RIGHT EYE: Status: ACTIVE | Noted: 2021-05-04

## 2022-03-18 PROBLEM — R68.89 OTHER GENERAL SYMPTOMS AND SIGNS: Status: ACTIVE | Noted: 2019-08-20

## 2022-03-18 PROBLEM — J98.4 CAVITARY LESION OF LUNG: Status: ACTIVE | Noted: 2020-06-02

## 2022-03-18 PROBLEM — J18.9 PNA (PNEUMONIA): Status: ACTIVE | Noted: 2020-06-02

## 2022-03-19 PROBLEM — C67.2 MALIGNANT NEOPLASM OF LATERAL WALL OF URINARY BLADDER (HCC): Status: ACTIVE | Noted: 2017-12-05

## 2022-03-19 PROBLEM — J42 CHRONIC BRONCHITIS (HCC): Status: ACTIVE | Noted: 2018-06-20

## 2022-03-19 PROBLEM — R06.02 SHORTNESS OF BREATH: Status: ACTIVE | Noted: 2017-12-05

## 2022-03-19 PROBLEM — K46.9 PERISTOMAL HERNIA: Status: ACTIVE | Noted: 2019-02-28

## 2022-03-19 PROBLEM — L98.499 ULCER OF ABDOMEN WALL (HCC): Status: ACTIVE | Noted: 2019-02-28

## 2022-03-19 PROBLEM — D69.6 THROMBOCYTOPENIA (HCC): Status: ACTIVE | Noted: 2017-12-05

## 2022-03-20 PROBLEM — D64.9 ANEMIA: Status: ACTIVE | Noted: 2019-08-20

## 2022-05-20 NOTE — PROGRESS NOTES
Problem: Falls - Risk of  Goal: *Absence of Falls  Document Harpal Fall Risk and appropriate interventions in the flowsheet.    Outcome: Progressing Towards Goal  Fall Risk Interventions:  Mobility Interventions: Bed/chair exit alarm no wheezing/no dyspnea/no cough

## 2022-08-09 ENCOUNTER — HOSPITAL ENCOUNTER (EMERGENCY)
Age: 82
Discharge: HOME | End: 2022-08-09
Attending: EMERGENCY MEDICINE | Admitting: EMERGENCY MEDICINE
Payer: MEDICARE

## 2022-08-09 ENCOUNTER — APPOINTMENT (OUTPATIENT)
Dept: GENERAL RADIOLOGY | Age: 82
End: 2022-08-09
Attending: EMERGENCY MEDICINE
Payer: MEDICARE

## 2022-08-09 VITALS
BODY MASS INDEX: 22.43 KG/M2 | RESPIRATION RATE: 16 BRPM | WEIGHT: 151.9 LBS | OXYGEN SATURATION: 97 % | TEMPERATURE: 97.7 F | SYSTOLIC BLOOD PRESSURE: 113 MMHG | HEART RATE: 98 BPM | DIASTOLIC BLOOD PRESSURE: 60 MMHG

## 2022-08-09 DIAGNOSIS — J06.9 VIRAL URI WITH COUGH: Primary | ICD-10-CM

## 2022-08-09 DIAGNOSIS — R63.0 DECREASED APPETITE: ICD-10-CM

## 2022-08-09 DIAGNOSIS — J84.9 CHRONIC INTERSTITIAL LUNG DISEASE (HCC): ICD-10-CM

## 2022-08-09 LAB
ALBUMIN SERPL-MCNC: 3.1 G/DL (ref 3.5–5)
ALBUMIN/GLOB SERPL: 0.6 {RATIO} (ref 1.1–2.2)
ALP SERPL-CCNC: 106 U/L (ref 45–117)
ALT SERPL-CCNC: 12 U/L (ref 12–78)
ANION GAP SERPL CALC-SCNC: 9 MMOL/L (ref 5–15)
AST SERPL-CCNC: 24 U/L (ref 15–37)
ATRIAL RATE: 68 BPM
BASOPHILS # BLD: 0 K/UL (ref 0–0.1)
BASOPHILS NFR BLD: 1 % (ref 0–1)
BILIRUB SERPL-MCNC: 1.4 MG/DL (ref 0.2–1)
BNP SERPL-MCNC: 1043 PG/ML (ref 0–450)
BUN SERPL-MCNC: 28 MG/DL (ref 6–20)
BUN/CREAT SERPL: 25 (ref 12–20)
CALCIUM SERPL-MCNC: 9 MG/DL (ref 8.5–10.1)
CALCULATED P AXIS, ECG09: 106 DEGREES
CALCULATED R AXIS, ECG10: -55 DEGREES
CALCULATED T AXIS, ECG11: 2 DEGREES
CHLORIDE SERPL-SCNC: 105 MMOL/L (ref 97–108)
CO2 SERPL-SCNC: 23 MMOL/L (ref 21–32)
CREAT SERPL-MCNC: 1.13 MG/DL (ref 0.7–1.3)
DIAGNOSIS, 93000: NORMAL
DIFFERENTIAL METHOD BLD: ABNORMAL
EOSINOPHIL # BLD: 0.1 K/UL (ref 0–0.4)
EOSINOPHIL NFR BLD: 3 % (ref 0–7)
ERYTHROCYTE [DISTWIDTH] IN BLOOD BY AUTOMATED COUNT: 17.2 % (ref 11.5–14.5)
GLOBULIN SER CALC-MCNC: 5 G/DL (ref 2–4)
GLUCOSE SERPL-MCNC: 94 MG/DL (ref 65–100)
HCT VFR BLD AUTO: 35.8 % (ref 36.6–50.3)
HGB BLD-MCNC: 11.4 G/DL (ref 12.1–17)
IMM GRANULOCYTES # BLD AUTO: 0 K/UL (ref 0–0.04)
IMM GRANULOCYTES NFR BLD AUTO: 1 % (ref 0–0.5)
LIPASE SERPL-CCNC: 183 U/L (ref 73–393)
LYMPHOCYTES # BLD: 0.6 K/UL (ref 0.8–3.5)
LYMPHOCYTES NFR BLD: 12 % (ref 12–49)
MAGNESIUM SERPL-MCNC: 2.3 MG/DL (ref 1.6–2.4)
MCH RBC QN AUTO: 29.4 PG (ref 26–34)
MCHC RBC AUTO-ENTMCNC: 31.8 G/DL (ref 30–36.5)
MCV RBC AUTO: 92.3 FL (ref 80–99)
MONOCYTES # BLD: 0.6 K/UL (ref 0–1)
MONOCYTES NFR BLD: 12 % (ref 5–13)
NEUTS SEG # BLD: 3.4 K/UL (ref 1.8–8)
NEUTS SEG NFR BLD: 71 % (ref 32–75)
NRBC # BLD: 0 K/UL (ref 0–0.01)
NRBC BLD-RTO: 0 PER 100 WBC
P-R INTERVAL, ECG05: 152 MS
PLATELET # BLD AUTO: 146 K/UL (ref 150–400)
PMV BLD AUTO: 11.7 FL (ref 8.9–12.9)
POTASSIUM SERPL-SCNC: 3.4 MMOL/L (ref 3.5–5.1)
PROT SERPL-MCNC: 8.1 G/DL (ref 6.4–8.2)
Q-T INTERVAL, ECG07: 400 MS
QRS DURATION, ECG06: 112 MS
QTC CALCULATION (BEZET), ECG08: 422 MS
RBC # BLD AUTO: 3.88 M/UL (ref 4.1–5.7)
RBC MORPH BLD: ABNORMAL
SODIUM SERPL-SCNC: 137 MMOL/L (ref 136–145)
TROPONIN-HIGH SENSITIVITY: 12 NG/L (ref 0–76)
VENTRICULAR RATE, ECG03: 67 BPM
WBC # BLD AUTO: 4.7 K/UL (ref 4.1–11.1)

## 2022-08-09 PROCEDURE — 83690 ASSAY OF LIPASE: CPT

## 2022-08-09 PROCEDURE — 83880 ASSAY OF NATRIURETIC PEPTIDE: CPT

## 2022-08-09 PROCEDURE — 93005 ELECTROCARDIOGRAM TRACING: CPT

## 2022-08-09 PROCEDURE — 83735 ASSAY OF MAGNESIUM: CPT

## 2022-08-09 PROCEDURE — 85025 COMPLETE CBC W/AUTO DIFF WBC: CPT

## 2022-08-09 PROCEDURE — 71046 X-RAY EXAM CHEST 2 VIEWS: CPT

## 2022-08-09 PROCEDURE — 99285 EMERGENCY DEPT VISIT HI MDM: CPT | Performed by: EMERGENCY MEDICINE

## 2022-08-09 PROCEDURE — 80053 COMPREHEN METABOLIC PANEL: CPT

## 2022-08-09 PROCEDURE — 84484 ASSAY OF TROPONIN QUANT: CPT

## 2022-08-09 PROCEDURE — 36415 COLL VENOUS BLD VENIPUNCTURE: CPT

## 2022-08-09 NOTE — ED PROVIDER NOTES
61-year-old male with history as below, presents to the emergency department \"to get checked out. \"  He states that he has been feeling some depression symptoms for the last several months and has changed his diet not eating as much as he normally does and as result he has lost weight. Additionally he states that over the last week or so he has had some nasal congestion and intermittent nonproductive cough and increased fever. Denies any significant nasal shortness of breath but states that he has COPD and follows with pulmonology and has an appointment with them scheduled coming up. He is reportedly taken multiple COVID-19 tests at home which were negative. He denies any known sick contacts or recent travel. He denies any chest pain, nausea, abdominal pain, vomiting, but does note occasional nonbloody diarrhea. He denies any SI, HI, AVH. His daughter at the bedside states that his pulmonologist has ordered a chest x-ray to be done as an outpatient and request that that be done while they are here in the emergency department. Nasal Congestion  Pertinent negatives include no chest pain, no abdominal pain, no headaches and no shortness of breath. Weight Loss   Associated symptoms include diarrhea. Pertinent negatives include no fever, no nausea, no vomiting, no constipation, no dysuria, no hematuria, no headaches, no arthralgias, no myalgias, no chest pain, no testicular pain and no back pain.       Past Medical History:   Diagnosis Date    Aneurysm ([address]     ABDOMINAL     Arrhythmia 3/2/2014    Stated cardiac cath for abn EKG several years ago NEG 4/23/21 pt denies    Ortiz esophagus     Bladder cancer ([address]     CAD (coronary artery disease)     4/23/21 pt denies    Calculus of kidney     Chronic obstructive pulmonary disease (HCC)     Contact dermatitis and other eczema, due to unspecified cause     GERD (gastroesophageal reflux disease)     ORTIZ'S ESOPH    Hyperlipemia     Hypertension Ill-defined condition     HIATEL HERNIA    Ill-defined condition     ECZEMA    Presence of urostomy (HCC)     PUD (peptic ulcer disease)     Baretts Esophagus/antral gastritis/    Skin cancer        Past Surgical History:   Procedure Laterality Date    HX ADENOIDECTOMY      HX CATARACT REMOVAL Left 2016    HX COLONOSCOPY      HX GI      Colonoscopy x 2-3    HX GI      COLONOSCOPY    HX HERNIA REPAIR  13    left inguinal hernia repair, LAPAROSCOPIC  (DR JAQUEZ)    HX HERNIA REPAIR  3/14/16    LAPAROSCOPIC Incisional W/ MESH by     HX OTHER SURGICAL      REMOVAL OF SKIN CA R NECK    HX TONSILLECTOMY      HX UROLOGICAL      Bladder bx/kidney stone removal    HX UROLOGICAL      CYSTO    HX UROLOGICAL      cystectomy/urostomy    HX VASCULAR ACCESS  2015    PORT R CHEST WALL    HX VASCULAR ACCESS      REMOVAL OF PORT R CHEST WALL    OK ABDOMEN SURGERY PROC UNLISTED      UMBILICAL hernia          Family History:   Problem Relation Age of Onset    Heart Disease Mother     Mult Sclerosis Sister     Heart Disease Sister     Psychiatric Disorder Sister         DEPRESSION    No Known Problems Sister     Anesth Problems Neg Hx        Social History     Socioeconomic History    Marital status:      Spouse name: Not on file    Number of children: Not on file    Years of education: Not on file    Highest education level: Not on file   Occupational History    Not on file   Tobacco Use    Smoking status: Former     Packs/day: 1.00     Years: 40.00     Pack years: 40.00     Types: Cigarettes     Quit date: 2009     Years since quittin.5    Smokeless tobacco: Never   Substance and Sexual Activity    Alcohol use: No     Alcohol/week: 0.0 standard drinks     Comment: RARELY    Drug use: No    Sexual activity: Not Currently   Other Topics Concern    Not on file   Social History Narrative    Not on file     Social Determinants of Health     Financial Resource Strain: Not on file   Food Insecurity: Not on file   Transportation Needs: Not on file   Physical Activity: Not on file   Stress: Not on file   Social Connections: Not on file   Intimate Partner Violence: Not on file   Housing Stability: Not on file         ALLERGIES: Patient has no known allergies. Review of Systems   Constitutional:  Positive for appetite change and fatigue. Negative for activity change, chills and fever. HENT:  Positive for congestion and rhinorrhea. Negative for sinus pain, sneezing and sore throat. Eyes:  Negative for photophobia and visual disturbance. Respiratory:  Positive for cough. Negative for shortness of breath. Cardiovascular:  Negative for chest pain. Gastrointestinal:  Positive for diarrhea. Negative for abdominal pain, blood in stool, constipation, nausea and vomiting. Genitourinary:  Negative for difficulty urinating, dysuria, flank pain, hematuria, penile pain and testicular pain. Musculoskeletal:  Negative for arthralgias, back pain, myalgias and neck pain. Skin:  Negative for rash and wound. Neurological:  Negative for syncope, weakness, light-headedness, numbness and headaches. Psychiatric/Behavioral:  Positive for dysphoric mood. Negative for self-injury and suicidal ideas. All other systems reviewed and are negative. Vitals:    08/09/22 1038   BP: 129/73   Pulse: 99   Resp: 18   Temp: 97.7 °F (36.5 °C)   SpO2: 99%   Weight: 68.9 kg (151 lb 14.4 oz)            Physical Exam  Vitals and nursing note reviewed. Constitutional:       General: He is not in acute distress. Appearance: Normal appearance. He is well-developed. He is not diaphoretic. Comments: Pleasant, no acute distress, speaking full sentences. Chronically ill-appearing. HENT:      Head: Normocephalic and atraumatic. Nose: Congestion and rhinorrhea present. Eyes:      Extraocular Movements: Extraocular movements intact.       Conjunctiva/sclera: Conjunctivae normal.      Pupils: Pupils are equal, round, and reactive to light. Cardiovascular:      Rate and Rhythm: Normal rate and regular rhythm. Heart sounds: Normal heart sounds. Pulmonary:      Effort: Pulmonary effort is normal.      Breath sounds: Normal breath sounds. Abdominal:      General: There is no distension. Palpations: Abdomen is soft. Tenderness: There is no abdominal tenderness. Comments: Urostomy in place without surrounding erythema or tenderness. Musculoskeletal:         General: No tenderness. Cervical back: Neck supple. Skin:     General: Skin is warm and dry. Neurological:      General: No focal deficit present. Mental Status: He is alert and oriented to person, place, and time. Cranial Nerves: No cranial nerve deficit. Sensory: No sensory deficit. Motor: No weakness. Coordination: Coordination normal.   Psychiatric:         Mood and Affect: Mood is depressed. Thought Content: Thought content does not include homicidal or suicidal ideation. MDM    20-year-old male presents with reported appetite change over the last several months with con commitment to weight loss. Also notes about a week of viral URI type symptoms. He is afebrile with vital signs stable satting 97% on room air. Labs returned reassuringly showing no significant abnormalities, no leukocytosis, Hg 11.4, normal renal function and unremarkable LFTs, normal electrolytes, negative troponin, trace elevated BNP at 1043, but reletively similar to prior measure. Chest x-ray was viewed by myself and read by radiology showing chronic interstitial lung disease with emphysema and improvement in cavitation in the right upper lobe since prior study. Reassurance was given and he was recommended to follow-up with his pulmonologist as scheduled for further evaluation. Suspect that his URI symptoms are likely viral, offered repeat COVID-19 testing but declined.   Return precautions were given for worsening or concerns. Please note that this dictation was completed with [employer], the computer voice recognition software. Quite often unanticipated grammatical, syntax, homophones, and other interpretive errors are inadvertently transcribed by the computer software. Please disregard these errors. Please excuse any errors that have escaped final proofreading. Procedures    1056 EKG shows normal sinus rhythm with a rate of 67 bpm with occasional PAC with left anterior fascicular block and LVH but no acute ST or T wave abnormalities suggestive of ischemia.

## 2022-08-09 NOTE — ED TRIAGE NOTES
Patient arrives ambulatory to the ED with complaints of nasal congestion and about 30-40 lbs of weight loss over 1 month.

## 2022-08-15 ENCOUNTER — APPOINTMENT (OUTPATIENT)
Dept: CT IMAGING | Age: 82
End: 2022-08-15
Attending: EMERGENCY MEDICINE
Payer: MEDICARE

## 2022-08-15 ENCOUNTER — APPOINTMENT (OUTPATIENT)
Dept: GENERAL RADIOLOGY | Age: 82
End: 2022-08-15
Attending: EMERGENCY MEDICINE
Payer: MEDICARE

## 2022-08-15 ENCOUNTER — HOSPITAL ENCOUNTER (OUTPATIENT)
Age: 82
Setting detail: OBSERVATION
Discharge: HOME | End: 2022-08-16
Attending: EMERGENCY MEDICINE | Admitting: HOSPITALIST
Payer: MEDICARE

## 2022-08-15 ENCOUNTER — APPOINTMENT (OUTPATIENT)
Dept: MRI IMAGING | Age: 82
End: 2022-08-15
Attending: HOSPITALIST
Payer: MEDICARE

## 2022-08-15 DIAGNOSIS — R53.1 WEAKNESS: ICD-10-CM

## 2022-08-15 DIAGNOSIS — I63.9 ACUTE ISCHEMIC STROKE (HCC): Primary | ICD-10-CM

## 2022-08-15 PROBLEM — G45.9 TIA (TRANSIENT ISCHEMIC ATTACK): Status: ACTIVE | Noted: 2022-08-15

## 2022-08-15 LAB
ALBUMIN SERPL-MCNC: 2.7 G/DL (ref 3.5–5)
ALBUMIN/GLOB SERPL: 0.5 {RATIO} (ref 1.1–2.2)
ALP SERPL-CCNC: 90 U/L (ref 45–117)
ALT SERPL-CCNC: 13 U/L (ref 12–78)
ANION GAP SERPL CALC-SCNC: 6 MMOL/L (ref 5–15)
APPEARANCE UR: CLEAR
AST SERPL-CCNC: 18 U/L (ref 15–37)
ATRIAL RATE: 63 BPM
BACTERIA URNS QL MICRO: NEGATIVE /HPF
BASOPHILS # BLD: 0 K/UL (ref 0–0.1)
BASOPHILS NFR BLD: 1 % (ref 0–1)
BILIRUB SERPL-MCNC: 1.1 MG/DL (ref 0.2–1)
BILIRUB UR QL: NEGATIVE
BUN SERPL-MCNC: 17 MG/DL (ref 6–20)
BUN/CREAT SERPL: 22 (ref 12–20)
CALCIUM SERPL-MCNC: 9.1 MG/DL (ref 8.5–10.1)
CALCULATED P AXIS, ECG09: 36 DEGREES
CALCULATED R AXIS, ECG10: 31 DEGREES
CALCULATED T AXIS, ECG11: 5 DEGREES
CHLORIDE SERPL-SCNC: 108 MMOL/L (ref 97–108)
CO2 SERPL-SCNC: 24 MMOL/L (ref 21–32)
COLOR UR: NORMAL
CREAT SERPL-MCNC: 0.76 MG/DL (ref 0.7–1.3)
DIAGNOSIS, 93000: NORMAL
DIFFERENTIAL METHOD BLD: ABNORMAL
EOSINOPHIL # BLD: 0.1 K/UL (ref 0–0.4)
EOSINOPHIL NFR BLD: 3 % (ref 0–7)
EPITH CASTS URNS QL MICRO: NORMAL /LPF
ERYTHROCYTE [DISTWIDTH] IN BLOOD BY AUTOMATED COUNT: 17.2 % (ref 11.5–14.5)
GLOBULIN SER CALC-MCNC: 5 G/DL (ref 2–4)
GLUCOSE BLD STRIP.AUTO-MCNC: 116 MG/DL (ref 65–117)
GLUCOSE BLD STRIP.AUTO-MCNC: 94 MG/DL (ref 65–117)
GLUCOSE SERPL-MCNC: 103 MG/DL (ref 65–100)
GLUCOSE UR STRIP.AUTO-MCNC: NEGATIVE MG/DL
HCT VFR BLD AUTO: 36.8 % (ref 36.6–50.3)
HGB BLD-MCNC: 12 G/DL (ref 12.1–17)
HGB UR QL STRIP: NEGATIVE
IMM GRANULOCYTES # BLD AUTO: 0 K/UL (ref 0–0.04)
IMM GRANULOCYTES NFR BLD AUTO: 1 % (ref 0–0.5)
INR PPP: 1 (ref 0.9–1.1)
KETONES UR QL STRIP.AUTO: NEGATIVE MG/DL
LEUKOCYTE ESTERASE UR QL STRIP.AUTO: NEGATIVE
LYMPHOCYTES # BLD: 0.6 K/UL (ref 0.8–3.5)
LYMPHOCYTES NFR BLD: 13 % (ref 12–49)
MCH RBC QN AUTO: 30.2 PG (ref 26–34)
MCHC RBC AUTO-ENTMCNC: 32.6 G/DL (ref 30–36.5)
MCV RBC AUTO: 92.5 FL (ref 80–99)
MONOCYTES # BLD: 0.5 K/UL (ref 0–1)
MONOCYTES NFR BLD: 11 % (ref 5–13)
NEUTS SEG # BLD: 3.4 K/UL (ref 1.8–8)
NEUTS SEG NFR BLD: 71 % (ref 32–75)
NITRITE UR QL STRIP.AUTO: NEGATIVE
NRBC # BLD: 0 K/UL (ref 0–0.01)
NRBC BLD-RTO: 0 PER 100 WBC
P-R INTERVAL, ECG05: 216 MS
PH UR STRIP: 6.5 [PH] (ref 5–8)
PLATELET # BLD AUTO: 122 K/UL (ref 150–400)
PLATELET COMMENTS,PCOM: ABNORMAL
PMV BLD AUTO: 12 FL (ref 8.9–12.9)
POTASSIUM SERPL-SCNC: 3.6 MMOL/L (ref 3.5–5.1)
PROT SERPL-MCNC: 7.7 G/DL (ref 6.4–8.2)
PROT UR STRIP-MCNC: NEGATIVE MG/DL
PROTHROMBIN TIME: 10.9 SEC (ref 9–11.1)
Q-T INTERVAL, ECG07: 418 MS
QRS DURATION, ECG06: 124 MS
QTC CALCULATION (BEZET), ECG08: 427 MS
RBC # BLD AUTO: 3.98 M/UL (ref 4.1–5.7)
RBC #/AREA URNS HPF: NORMAL /HPF (ref 0–5)
RBC MORPH BLD: ABNORMAL
RBC MORPH BLD: ABNORMAL
SERVICE CMNT-IMP: NORMAL
SERVICE CMNT-IMP: NORMAL
SODIUM SERPL-SCNC: 138 MMOL/L (ref 136–145)
SP GR UR REFRACTOMETRY: >1.03
UR CULT HOLD, URHOLD: NORMAL
UROBILINOGEN UR QL STRIP.AUTO: 1 EU/DL (ref 0.2–1)
VENTRICULAR RATE, ECG03: 63 BPM
WBC # BLD AUTO: 4.6 K/UL (ref 4.1–11.1)
WBC URNS QL MICRO: NORMAL /HPF (ref 0–4)

## 2022-08-15 PROCEDURE — 97530 THERAPEUTIC ACTIVITIES: CPT

## 2022-08-15 PROCEDURE — 70551 MRI BRAIN STEM W/O DYE: CPT

## 2022-08-15 PROCEDURE — 85610 PROTHROMBIN TIME: CPT

## 2022-08-15 PROCEDURE — 74011250636 HC RX REV CODE- 250/636: Performed by: HOSPITALIST

## 2022-08-15 PROCEDURE — 99223 1ST HOSP IP/OBS HIGH 75: CPT | Performed by: PSYCHIATRY & NEUROLOGY

## 2022-08-15 PROCEDURE — 74011250637 HC RX REV CODE- 250/637: Performed by: HOSPITALIST

## 2022-08-15 PROCEDURE — 0042T CT CODE NEURO PERF W CBF: CPT

## 2022-08-15 PROCEDURE — 74011000636 HC RX REV CODE- 636: Performed by: RADIOLOGY

## 2022-08-15 PROCEDURE — 70496 CT ANGIOGRAPHY HEAD: CPT

## 2022-08-15 PROCEDURE — 65390000012 HC CONDITION CODE 44 OBSERVATION

## 2022-08-15 PROCEDURE — 85025 COMPLETE CBC W/AUTO DIFF WBC: CPT

## 2022-08-15 PROCEDURE — 97165 OT EVAL LOW COMPLEX 30 MIN: CPT

## 2022-08-15 PROCEDURE — 97535 SELF CARE MNGMENT TRAINING: CPT

## 2022-08-15 PROCEDURE — 82962 GLUCOSE BLOOD TEST: CPT

## 2022-08-15 PROCEDURE — 99285 EMERGENCY DEPT VISIT HI MDM: CPT

## 2022-08-15 PROCEDURE — 96372 THER/PROPH/DIAG INJ SC/IM: CPT

## 2022-08-15 PROCEDURE — 36415 COLL VENOUS BLD VENIPUNCTURE: CPT

## 2022-08-15 PROCEDURE — 81001 URINALYSIS AUTO W/SCOPE: CPT

## 2022-08-15 PROCEDURE — 65270000046 HC RM TELEMETRY

## 2022-08-15 PROCEDURE — 71045 X-RAY EXAM CHEST 1 VIEW: CPT

## 2022-08-15 PROCEDURE — APPNB45 APP NON BILLABLE 31-45 MINUTES: Performed by: NURSE PRACTITIONER

## 2022-08-15 PROCEDURE — 93005 ELECTROCARDIOGRAM TRACING: CPT

## 2022-08-15 PROCEDURE — 70450 CT HEAD/BRAIN W/O DYE: CPT

## 2022-08-15 PROCEDURE — 80053 COMPREHEN METABOLIC PANEL: CPT

## 2022-08-15 RX ORDER — SODIUM CHLORIDE 0.65 %
1 AEROSOL, SPRAY (ML) NASAL AS NEEDED
COMMUNITY

## 2022-08-15 RX ORDER — GUAIFENESIN 100 MG/5ML
81 LIQUID (ML) ORAL DAILY
Status: DISCONTINUED | OUTPATIENT
Start: 2022-08-16 | End: 2022-08-16 | Stop reason: HOSPADM

## 2022-08-15 RX ORDER — UMECLIDINIUM BROMIDE AND VILANTEROL TRIFENATATE 62.5; 25 UG/1; UG/1
1 POWDER RESPIRATORY (INHALATION) DAILY
COMMUNITY

## 2022-08-15 RX ORDER — ENOXAPARIN SODIUM 100 MG/ML
40 INJECTION SUBCUTANEOUS EVERY 24 HOURS
Status: DISCONTINUED | OUTPATIENT
Start: 2022-08-15 | End: 2022-08-16 | Stop reason: HOSPADM

## 2022-08-15 RX ORDER — ATORVASTATIN CALCIUM 20 MG/1
20 TABLET, FILM COATED ORAL
Status: DISCONTINUED | OUTPATIENT
Start: 2022-08-15 | End: 2022-08-16 | Stop reason: HOSPADM

## 2022-08-15 RX ORDER — ACETAMINOPHEN 650 MG/1
650 SUPPOSITORY RECTAL
Status: DISCONTINUED | OUTPATIENT
Start: 2022-08-15 | End: 2022-08-16 | Stop reason: HOSPADM

## 2022-08-15 RX ORDER — ACETAMINOPHEN 325 MG/1
650 TABLET ORAL
Status: DISCONTINUED | OUTPATIENT
Start: 2022-08-15 | End: 2022-08-16 | Stop reason: HOSPADM

## 2022-08-15 RX ADMIN — SODIUM CHLORIDE 1000 ML: 9 INJECTION, SOLUTION INTRAVENOUS at 12:29

## 2022-08-15 RX ADMIN — IOPAMIDOL 100 ML: 755 INJECTION, SOLUTION INTRAVENOUS at 10:29

## 2022-08-15 RX ADMIN — IOPAMIDOL 50 ML: 755 INJECTION, SOLUTION INTRAVENOUS at 10:29

## 2022-08-15 RX ADMIN — ATORVASTATIN CALCIUM 20 MG: 40 TABLET, FILM COATED ORAL at 20:42

## 2022-08-15 RX ADMIN — ENOXAPARIN SODIUM 40 MG: 100 INJECTION SUBCUTANEOUS at 12:29

## 2022-08-15 NOTE — ROUTINE PROCESS
TRANSFER - OUT REPORT:    Verbal report given to [facility], RN(name) on Marian Pimentel  being transferred to (unit) for routine progression of care       Report consisted of patients Situation, Background, Assessment and   Recommendations(SBAR). Information from the following report(s) SBAR, ED Summary, Intake/Output, MAR, and Recent Results was reviewed with the receiving nurse. Lines:   Peripheral IV 08/15/22 Right Antecubital (Active)        Opportunity for questions and clarification was provided.       Patient transported with:   [employer]

## 2022-08-15 NOTE — ED PROVIDER NOTES
29-year-old male presents with a chief complaint of left-sided weakness. Patient was last known normal when he went to bed; however, that time is unclear. Patient got up several times in the middle of the night to go to the bathroom and had several falls. He feels as though he is leaning to the left. He denies other symptoms.          Past Medical History:   Diagnosis Date    Aneurysm ([address]     ABDOMINAL     Arrhythmia 3/2/2014    Stated cardiac cath for abn EKG several years ago NEG 4/23/21 pt denies    Ortiz esophagus     Bladder cancer ([address]     CAD (coronary artery disease)     4/23/21 pt denies    Calculus of kidney     Chronic obstructive pulmonary disease (HCC)     Contact dermatitis and other eczema, due to unspecified cause     GERD (gastroesophageal reflux disease)     ORTIZ'S ESOPH    Hyperlipemia     Hypertension     Ill-defined condition     HIATEL HERNIA    Ill-defined condition     ECZEMA    Presence of urostomy ([address]     PUD (peptic ulcer disease)     Baretts Esophagus/antral gastritis/    Skin cancer        Past Surgical History:   Procedure Laterality Date    HX ADENOIDECTOMY      HX CATARACT REMOVAL Left 11/2016    HX COLONOSCOPY      HX GI      Colonoscopy x 2-3    HX GI      COLONOSCOPY    HX HERNIA REPAIR  8/21/13    left inguinal hernia repair, LAPAROSCOPIC  (DR JAQUEZ)    HX HERNIA REPAIR  3/14/16    LAPAROSCOPIC Incisional W/ MESH by     HX OTHER SURGICAL      REMOVAL OF SKIN CA R NECK    HX TONSILLECTOMY      HX UROLOGICAL  2010    Bladder bx/kidney stone removal    HX UROLOGICAL  2015    CYSTO    HX UROLOGICAL      cystectomy/urostomy    HX VASCULAR ACCESS  03/31/2015    PORT R CHEST WALL    HX VASCULAR ACCESS      REMOVAL OF PORT R CHEST WALL    [place] ABDOMEN SURGERY PROC UNLISTED      UMBILICAL hernia          Family History:   Problem Relation Age of Onset    Heart Disease Mother     Mult Sclerosis Sister     Heart Disease Sister     Psychiatric Disorder Sister DEPRESSION    No Known Problems Sister     Anesth Problems Neg Hx        Social History     Socioeconomic History    Marital status:      Spouse name: Not on file    Number of children: Not on file    Years of education: Not on file    Highest education level: Not on file   Occupational History    Not on file   Tobacco Use    Smoking status: Former     Packs/day: 1.00     Years: 40.00     Pack years: 40.00     Types: Cigarettes     Quit date: 2009     Years since quittin.5    Smokeless tobacco: Never   Substance and Sexual Activity    Alcohol use: No     Alcohol/week: 0.0 standard drinks     Comment: RARELY    Drug use: No    Sexual activity: Not Currently   Other Topics Concern    Not on file   Social History Narrative    Not on file     Social Determinants of Health     Financial Resource Strain: Not on file   Food Insecurity: Not on file   Transportation Needs: Not on file   Physical Activity: Not on file   Stress: Not on file   Social Connections: Not on file   Intimate Partner Violence: Not on file   Housing Stability: Not on file         ALLERGIES: Patient has no known allergies. Review of Systems   Constitutional:  Negative for fever. HENT:  Negative for rhinorrhea. Respiratory:  Negative for cough. Cardiovascular:  Negative for chest pain. Gastrointestinal:  Negative for abdominal pain. Genitourinary:  Negative for dysuria. Musculoskeletal:  Negative for back pain. Skin:  Negative for wound. Neurological:  Positive for weakness. Negative for headaches. Psychiatric/Behavioral:  Negative for confusion. Vitals:    08/15/22 1011   BP: 100/64   Pulse: 74   Resp: 16   Temp: 97 °F (36.1 °C)   SpO2: 94%            Physical Exam  Vitals and nursing note reviewed. Constitutional:       General: He is not in acute distress. Appearance: Normal appearance. He is not ill-appearing, toxic-appearing or diaphoretic. HENT:      Head: Normocephalic.    Eyes:      Extraocular Movements: Extraocular movements intact. Cardiovascular:      Rate and Rhythm: Normal rate. Pulses: Normal pulses. Pulmonary:      Effort: Pulmonary effort is normal. No respiratory distress. Abdominal:      General: There is no distension. Musculoskeletal:         General: Normal range of motion. Cervical back: Normal range of motion. Skin:     General: Skin is dry. Capillary Refill: Capillary refill takes less than 2 seconds. Neurological:      General: No focal deficit present. Mental Status: He is alert and oriented to person, place, and time. Psychiatric:         Mood and Affect: Mood normal.        MDM  Number of Diagnoses or Management Options  Acute ischemic stroke ([address]  Weakness  Diagnosis management comments: Patient presents with left-sided weakness which started sometime last night. Level 2 stroke activated. Patient taken for imaging which was unremarkable. Labs unremarkable. Patient not a tPA candidate. He was evaluated by teleneurology. They recommended admission for stroke work-up. Patient comfortable and agreeable to plan of care. Perfect Serve Consult for Admission  11:24 AM    ED Room Number: ER10/10  Patient Name and age:  Adolph Gordon 80 y.o.  male  Working Diagnosis: Acute ischemic stroke ([address]  (primary encounter diagnosis)  Weakness    COVID-19 Suspicion:  no  Sepsis present:  no  Reassessment needed: no  Code Status:  Full Code  Readmission: no  Isolation Requirements:  no  Recommended Level of Care:  telemetry  Department:[facility] Adult ED - 21   Other: Left-sided weakness with multiple falls last night. Initial imaging shows no acute abnormality. Teleneurology recommends admission for MRI with and without contrast and stroke work-up.     Total critical care time spent exclusive of procedures:  37 minutes               Procedures

## 2022-08-15 NOTE — PROGRESS NOTES
Admission Medication Reconciliation:    Information obtained from:  Patient, family member  RxQuery data available¹:  YES    Comments/Recommendations: Updated PTA meds/reviewed patient's allergies. 1)  Medication list updated     2)  Medication changes (since last review): Added  - Anoro    Adjusted  - Zolpidem from 10 mg to 5 mg     Removed  - Potassium  - Furosemide  - Fluticasone    3)  Medications were last taken yesterday     ¹RxQuery pharmacy benefit data reflects medications filled and processed through the patient's insurance, however   this data does NOT capture whether the medication was picked up or is currently being taken by the patient. Allergies:  Patient has no known allergies. Significant PMH/Disease States:   Past Medical History:   Diagnosis Date    Aneurysm ([facility] [address]     ABDOMINAL     Arrhythmia 3/2/2014    Stated cardiac cath for abn EKG several years ago NEG 4/23/21 pt denies    Ortiz esophagus     Bladder cancer (HCC)     CAD (coronary artery disease)     4/23/21 pt denies    Calculus of kidney     Chronic obstructive pulmonary disease (HCC)     Contact dermatitis and other eczema, due to unspecified cause     GERD (gastroesophageal reflux disease)     ORTIZ'S ESOPH    Hyperlipemia     Hypertension     Ill-defined condition     HIATEL HERNIA    Ill-defined condition     ECZEMA    Presence of urostomy (HCC)     PUD (peptic ulcer disease)     Baretts Esophagus/antral gastritis/    Skin cancer      Chief Complaint for this Admission:    Chief Complaint   Patient presents with    Fall     Prior to Admission Medications:   Prior to Admission Medications   Prescriptions Last Dose Informant Taking? HYDROcodone-acetaminophen (NORCO) 5-325 mg per tablet   Yes   Sig: Take 1 Tab by mouth every six (6) hours as needed for Pain. Max Daily Amount: 4 Tabs. Indications: Pain   atorvastatin (LIPITOR) 20 mg tablet 8/14/2022  Yes   Sig: Take 20 mg by mouth nightly.    loratadine-pseudoephedrine (Claritin-D 24 Hour)  mg per tablet 2022  Yes   Sig: Take 1 Tab by mouth daily. montelukast (SINGULAIR) 10 mg tablet 2022  Yes   Sig: Take 10 mg by mouth daily. omeprazole (PRILOSEC) 20 mg capsule 2022  Yes   Sig: Take 20 mg by mouth two (2) times a day. sodium chloride (OCEAN) 0.65 % nasal squeeze bottle   Yes   Si Spray as needed for Congestion. sodium chloride (Ocean Nasal) 0.65 % nasal squeeze bottle   Yes   Si Spray as needed for Congestion. umeclidinium-vilanteroL (Anoro Ellipta) 62.5-25 mcg/actuation inhaler 2022  Yes   Sig: Take 1 Puff by inhalation daily. vit A/vit C/vit E/zinc/copper (PRESERVISION AREDS PO) 2022  Yes   Sig: Take 1 Tab by mouth daily. zolpidem (AMBIEN) 10 mg tablet   Yes   Sig: Take 5 mg by mouth nightly as needed for Sleep. Facility-Administered Medications: None     Please contact the main inpatient pharmacy with any questions or concerns at [phone] and we will direct you to the clinical pharmacist covering this patient's care while in-house.    Mariel Bergeron, YOUNGD

## 2022-08-15 NOTE — PROGRESS NOTES
Admission Medication Reconciliation:    Information obtained from:  Patient, family member  RxQuery data available¹:  YES    Comments/Recommendations: Updated PTA meds/reviewed patient's allergies. 1)  Medication list updated     2)  Medication changes (since last review): Added  - Anoro    Adjusted  - Zolpidem from 10 mg to 5 mg     Removed  - Potassium  - Furosemide  - Fluticasone    3)  Medications were last taken yesterday     ¹RxQuery pharmacy benefit data reflects medications filled and processed through the patient's insurance, however   this data does NOT capture whether the medication was picked up or is currently being taken by the patient. Allergies:  Patient has no known allergies. Significant PMH/Disease States:   Past Medical History:   Diagnosis Date    Aneurysm ([facility] [address]     ABDOMINAL     Arrhythmia 3/2/2014    Stated cardiac cath for abn EKG several years ago NEG 4/23/21 pt denies    Ortiz esophagus     Bladder cancer (HCC)     CAD (coronary artery disease)     4/23/21 pt denies    Calculus of kidney     Chronic obstructive pulmonary disease (HCC)     Contact dermatitis and other eczema, due to unspecified cause     GERD (gastroesophageal reflux disease)     ORTIZ'S ESOPH    Hyperlipemia     Hypertension     Ill-defined condition     HIATEL HERNIA    Ill-defined condition     ECZEMA    Presence of urostomy (HCC)     PUD (peptic ulcer disease)     Baretts Esophagus/antral gastritis/    Skin cancer      Chief Complaint for this Admission:    Chief Complaint   Patient presents with    Fall     Prior to Admission Medications:   Prior to Admission Medications   Prescriptions Last Dose Informant Taking? HYDROcodone-acetaminophen (NORCO) 5-325 mg per tablet   Yes   Sig: Take 1 Tab by mouth every six (6) hours as needed for Pain. Max Daily Amount: 4 Tabs. Indications: Pain   atorvastatin (LIPITOR) 20 mg tablet 8/14/2022  Yes   Sig: Take 20 mg by mouth nightly.    loratadine-pseudoephedrine (Claritin-D 24 Hour)  mg per tablet 2022  Yes   Sig: Take 1 Tab by mouth daily. montelukast (SINGULAIR) 10 mg tablet 2022  Yes   Sig: Take 10 mg by mouth daily. omeprazole (PRILOSEC) 20 mg capsule 2022  Yes   Sig: Take 20 mg by mouth two (2) times a day. sodium chloride (OCEAN) 0.65 % nasal squeeze bottle   Yes   Si Spray as needed for Congestion. umeclidinium-vilanteroL (Anoro Ellipta) 62.5-25 mcg/actuation inhaler 2022  Yes   Sig: Take 1 Puff by inhalation daily. vit A/vit C/vit E/zinc/copper (PRESERVISION AREDS PO) 2022  Yes   Sig: Take 1 Tab by mouth daily. zolpidem (AMBIEN) 10 mg tablet   Yes   Sig: Take 5 mg by mouth nightly as needed for Sleep. Facility-Administered Medications: None     Please contact the main inpatient pharmacy with any questions or concerns at [phone] and we will direct you to the clinical pharmacist covering this patient's care while in-house.    Paola Matta, YOUNGD

## 2022-08-15 NOTE — CONSULTS
NEUROLOGY   INPATIENT EVALUATION/CONSULTATION       PATIENT NAME: Meghann Zheng    MRN: [id number]    REASON FOR CONSULTATION: Transient gait disturbance, dizziness    08/15/22      HISTORY OF PRESENT ILLNESS:  Meghann Zheng is a 80 y.o. right-hand-dominant male history of multi medical problems including past cancer with urostomy bag, dyslipidemia and COPD who presented to [facility] today for evaluation of transient symptoms earlier this morning. Patient reports having a lot to do around his house and not getting good sleep last night secondary to having trouble relaxing from everything on his mind. Upon awakening this morning he went to the bathroom and stumbled to the left hitting the sink without falling. There was some associated dizziness with this but no other symptoms. He then went lay back in bed and decided to get back up to test the symptoms again this time he felt like he stumbled to the right. With this he called his daughter who told her granddaughter (who has been as an EMT/) he who brought accrue over. They checked him out did not notice anything of the ordinary but brought him to ER for further evaluation. Came in as a stroke alert, initial evaluation was unrevealing. Denies any past symptoms like this states that he feels at baseline now.   Has never had a stroke or TIA before    PAST MEDICAL HISTORY:  Past Medical History:   Diagnosis Date    Aneurysm ([facility] [address]     ABDOMINAL     Arrhythmia 3/2/2014    Stated cardiac cath for abn EKG several years ago NEG 4/23/21 pt denies    Ortiz esophagus     Bladder cancer ([facility] [address]     CAD (coronary artery disease)     4/23/21 pt denies    Calculus of kidney     Chronic obstructive pulmonary disease (HCC)     Contact dermatitis and other eczema, due to unspecified cause     GERD (gastroesophageal reflux disease)     ORTIZ'S ESOPH    Hyperlipemia     Hypertension     Ill-defined condition     HIATEL HERNIA    Ill-defined condition     ECZEMA    Presence of urostomy ([facility] [address]     PUD (peptic ulcer disease)     Baretts Esophagus/antral gastritis/    Skin cancer        PAST SURGICAL HISTORY:  Past Surgical History:   Procedure Laterality Date    HX ADENOIDECTOMY      HX CATARACT REMOVAL Left 2016    HX COLONOSCOPY      HX GI      Colonoscopy x 2-3    HX GI      COLONOSCOPY    HX HERNIA REPAIR  13    left inguinal hernia repair, LAPAROSCOPIC  (DR JAQUEZ)    HX HERNIA REPAIR  3/14/16    LAPAROSCOPIC Incisional W/ MESH by     HX OTHER SURGICAL      REMOVAL OF SKIN CA R NECK    HX TONSILLECTOMY      HX UROLOGICAL      Bladder bx/kidney stone removal    HX UROLOGICAL      CYSTO    HX UROLOGICAL      cystectomy/urostomy    HX VASCULAR ACCESS  2015    PORT R CHEST WALL    HX VASCULAR ACCESS      REMOVAL OF PORT R CHEST WALL    AR ABDOMEN SURGERY PROC UNLISTED      UMBILICAL hernia        FAMILY HISTORY:   Family History   Problem Relation Age of Onset    Heart Disease Mother     Mult Sclerosis Sister     Heart Disease Sister     Psychiatric Disorder Sister         DEPRESSION    No Known Problems Sister     Anesth Problems Neg Hx          SOCIAL HISTORY:  Social History     Socioeconomic History    Marital status:    Tobacco Use    Smoking status: Former     Packs/day: 1.00     Years: 40.00     Pack years: 40.00     Types: Cigarettes     Quit date: 2009     Years since quittin.5    Smokeless tobacco: Never   Substance and Sexual Activity    Alcohol use: No     Alcohol/week: 0.0 standard drinks     Comment: RARELY    Drug use: No    Sexual activity: Not Currently         MEDICATIONS:   Current Facility-Administered Medications   Medication Dose Route Frequency Provider Last Rate Last Admin    atorvastatin (LIPITOR) tablet 20 mg  20 mg Oral QHS eZina Aguilera MD        acetaminophen (TYLENOL) tablet 650 mg  650 mg Oral Q4H PRN Zeina Aguilera MD        Or    acetaminophen (TYLENOL) solution 650 mg 650 mg Per NG tube Q4H PRN Janelle Smith MD        Or    acetaminophen (TYLENOL) suppository 650 mg  650 mg Rectal Q4H PRN MD Megan Baig ON 8/16/2022] aspirin chewable tablet 81 mg  81 mg Oral DAILY Janelle Smith MD        enoxaparin (LOVENOX) injection 40 mg  40 mg SubCUTAneous Q24H Janelle Smith MD   40 mg at 08/15/22 1229         ALLERGIES:  No Known Allergies      REVIEW OF SYSTEMS:  10 point ROS reviewed with patient and negative except for those listed above. PHYSICAL EXAM:  Vital Signs: Visit Vitals  BP (!) 103/56   Pulse 75   Temp 97.1 °F (36.2 °C)   Resp 22   Ht 5' 9\" (1.753 m)   Wt 154 lb 12.2 oz (70.2 kg)   SpO2 96%   BMI 22.85 kg/m²     Pleasant member scannable in exam room in no distress. HEENT appears grossly unrevealing neck appears supple. Oral cavity appears dry. Cardiopulmonary exams are unremarkable. Abdomen is nondistended. Urostomy bag not examined. Extremities are warm/dry. Neurologically patient appears alert and oriented attention appears intact. Speech is clear, language fluent. Cranial nerves II through XII are grossly unrevealing. Motorically patient has full strength in upper and lower extremities. Sensation is grossly intact in upper and lower extremities. Coordination appears intact in upper extremities. Remainder examination is deferred    PERTINENT DATA:    CT Results (maximum last 3): Results from [place] encounter on 08/15/22    CT CODE NEURO PERF W CBF    Narrative  EXAM:  CTA CODE NEURO HEAD AND NECK W CONT, CT CODE NEURO PERF W CBF    INDICATION:  Code Stroke    COMPARISON:  Noncontrast head CT of earlier today    TECHNIQUE:  Multislice helical axial CT angiography was performed from the aortic arch to  the top of the head during uneventful rapid bolus intravenous administration of  150 mL of Isovue 370. Postprocessing was performed to include MIP and  reformatted images.  Standard images of the head were also obtained following  contrast administration and a delay. CT brain perfusion was performed with generation of hemodynamic maps of multiple  parameters, including cerebral blood flow, cerebral volume, Tmax and MTT (mean  transit time). This study was analyzed by the [address] ai algorithm. CT dose reduction was achieved through the use of a standardized protocol  tailored for this examination and automatic exposure control for dose  modulation. FINDINGS:    CT PERFUSION:  There are no regional areas of elevated Tmax, decreased cerebral blood flow or  blood volume. rCBF < 30% = 0 cc. Tmax > 6 seconds = 0 cc. Head CT:  There is prominence of the ventricles and cortical sulci, consistent with  cerebral volume loss. .  There is no intracranial hemorrhage or evolving infarct. . There is no abnormal enhancement. . .    CTA NECK:    Great vessels: Normal arch anatomy with the origins patent. Right subclavian artery: Patent    Left subclavian artery: Patent    Right common carotid artery: Patent    Left common carotid artery: Patent    Cervical right internal carotid artery: Patent with no significant stenosis by  NASCET criteria. Calcified plaque at the origin with less than 25% stenosis. Cervical left internal carotid artery: Patent with no significant stenosis by  NASCET criteria. Right vertebral artery: Patent    Left vertebral artery: Patent. Nondominant. Small calcified plaques scattered in  the cervical left vertebral artery with areas of mild stenosis. There is severe emphysema in the lung apices. Extensive right apical chronic  consolidation is noted, probably representing post therapeutic changes. The  appearance is similar when compared to a chest CT of 9/15/2021. There are  degenerative changes in the spine. CTA HEAD:    Right cavernous internal carotid artery: Patent. Calcified plaque with moderate  distal cavernous carotid stenosis. Left cavernous internal carotid artery: Patent.  Calcified plaque with mild to  moderate distal cavernous carotid stenosis. Anterior cerebral arteries: Patent    Anterior communicating artery: Patent    Right middle cerebral artery: Patent    Left middle cerebral artery: Patent    Posterior cerebral arteries: Patent    Posterior communicating arteries: Patent    Basilar artery: Patent    Distal vertebral arteries: Patent. Dominant, small left vertebral artery with  limited contribution to the basilar artery. Major venous sinus and deep venous system: Patent. Aneurysm: None. Impression  1. Negative CT perfusion study. 2. No acute large vessel occlusion. No flow-limiting stenosis. CTA CODE NEURO HEAD AND NECK W CONT    Narrative  EXAM:  CTA CODE NEURO HEAD AND NECK W CONT, CT CODE NEURO PERF W CBF    INDICATION:  Code Stroke    COMPARISON:  Noncontrast head CT of earlier today    TECHNIQUE:  Multislice helical axial CT angiography was performed from the aortic arch to  the top of the head during uneventful rapid bolus intravenous administration of  150 mL of Isovue 370. Postprocessing was performed to include MIP and  reformatted images. Standard images of the head were also obtained following  contrast administration and a delay. CT brain perfusion was performed with generation of hemodynamic maps of multiple  parameters, including cerebral blood flow, cerebral volume, Tmax and MTT (mean  transit time). This study was analyzed by the [address] ai algorithm. CT dose reduction was achieved through the use of a standardized protocol  tailored for this examination and automatic exposure control for dose  modulation. FINDINGS:    CT PERFUSION:  There are no regional areas of elevated Tmax, decreased cerebral blood flow or  blood volume. rCBF < 30% = 0 cc. Tmax > 6 seconds = 0 cc. Head CT:  There is prominence of the ventricles and cortical sulci, consistent with  cerebral volume loss. .  There is no intracranial hemorrhage or evolving infarct.   . There is no abnormal enhancement. . .    CTA NECK:    Great vessels: Normal arch anatomy with the origins patent. Right subclavian artery: Patent    Left subclavian artery: Patent    Right common carotid artery: Patent    Left common carotid artery: Patent    Cervical right internal carotid artery: Patent with no significant stenosis by  NASCET criteria. Calcified plaque at the origin with less than 25% stenosis. Cervical left internal carotid artery: Patent with no significant stenosis by  NASCET criteria. Right vertebral artery: Patent    Left vertebral artery: Patent. Nondominant. Small calcified plaques scattered in  the cervical left vertebral artery with areas of mild stenosis. There is severe emphysema in the lung apices. Extensive right apical chronic  consolidation is noted, probably representing post therapeutic changes. The  appearance is similar when compared to a chest CT of 9/15/2021. There are  degenerative changes in the spine. CTA HEAD:    Right cavernous internal carotid artery: Patent. Calcified plaque with moderate  distal cavernous carotid stenosis. Left cavernous internal carotid artery: Patent. Calcified plaque with mild to  moderate distal cavernous carotid stenosis. Anterior cerebral arteries: Patent    Anterior communicating artery: Patent    Right middle cerebral artery: Patent    Left middle cerebral artery: Patent    Posterior cerebral arteries: Patent    Posterior communicating arteries: Patent    Basilar artery: Patent    Distal vertebral arteries: Patent. Dominant, small left vertebral artery with  limited contribution to the basilar artery. Major venous sinus and deep venous system: Patent. Aneurysm: None. Impression  1. Negative CT perfusion study. 2. No acute large vessel occlusion. No flow-limiting stenosis.       CT CODE NEURO HEAD WO CONTRAST    Narrative  EXAM: CT CODE NEURO HEAD WO CONTRAST    INDICATION: leaning to the left, left side sided weakness    COMPARISON: None. CONTRAST: None. TECHNIQUE: Unenhanced CT of the head was performed using 5 mm images. Brain and  bone windows were generated. Coronal and sagittal reformats. CT dose reduction  was achieved through use of a standardized protocol tailored for this  examination and automatic exposure control for dose modulation. FINDINGS:  There is mild to moderate prominence the ventricles and cortical sulci  consistent with atrophy. There is no significant white matter disease. There is  no intracranial hemorrhage, extra-axial collection, or mass effect. The basilar  cisterns are open. No CT evidence of acute infarct. The bone windows demonstrate no abnormalities. The visualized portions of the  paranasal sinuses and mastoid air cells are clear. Impression  Atrophy. No acute findings. MRI Results (maximum last 3): Results from [place] encounter on 10/05/15    MRI BRAIN W WO CONT    Narrative  **Final Report**      ICD Codes / Adm. Diagnosis: 188.9  C67.9 / Malignant neoplasm of bladder,  Malignant neoplasm of bladde  Examination:  MRI BRAIN W AND WO CON  - [id number] - Oct  5 2015 12:08PM  Accession No:  [id number]  Reason:  188.9      REPORT:  Study: MRI BRAIN WITH AND WITHOUT CONTRAST    Indication:  188. 9    Additional clinical history:  Malignant neoplasm of bladder, Malignant  neoplasm of bladder,    Comparison: PET/CT 8/18/15. Contrast:  9.5  mL Gadavist gadolinium contrast administered intravenously  administered intravenously. Technique: The following sequences were obtained: Axial T1, T2, T2 FLAIR,  gradient echo; sagittal T1;   axial and coronal postcontrast T1;  diffusion-weighted imaging. Findings:  The ventricles and sulci are normal in appearance for the patient's age. There is no mass effect or midline shift. There is no intracranial  hemorrhage.  Mild confluent periventricular T2 hyperintense signal and patchy  subcortical T2 hyperintense signal is noted. Signal changes are likely the  sequela chronic small vessel ischemic disease. A focal, remote lacunar  infarct is seen in the right cerebellum. Diffusion-weighted imaging demonstrates no evidence of diffusion restriction. Postcontrast imaging demonstrates no abnormal enhancement. There are normal appearing vascular flow voids. The orbits are intact. A mucous retention cyst is seen in the right  maxillary sinus. Impression  :  No acute intracranial abnormality, including no evidence of metastatic  disease. Chronic changes described above. Signing/Reading Doctor: Adrianna Mcintyre ([id number])  [place] Mcintyre ([id number])  Oct  5 2015  1:25PM      VAS/US Results (maximum last 3): Results from [place] encounter on 12/29/15    DUPLEX LOWER EXT VENOUS RIGHT      PET Results (maximum last 3): Results from [place] encounter on 08/18/15    PET/CT TUMOR IMAGE SKULL THIGH (SUB)    Narrative  **Final Report**      ICD Codes / Adm. Diagnosis: 188.9  188.9 / Malignant neoplasm of bladder,  Examination:  PET TUMOR IMAGE SKULL THIGH SUB  - [id number] - Aug 18 2015  9:35AM  Accession No:  [id number]  Reason:  Bladder cancer [facility])      REPORT:  PET/CT SCAN    PROCEDURE: Following IV injection of 11.0 mCi 18 Fluoro 2 deoxyglucose (FDG)  and a standard uptake delay, PET imaging is performed from the skull vertex  to mid thigh and axial, sagittal and coronal images were acquired. Unenhanced CT is obtained for anatomic localization, and attenuation  correction of the PET scan. Patient preprocedure blood glucose level:  91mg/dL. CORRELATIVE IMAGING STUDIES: Diagnostic CT 7/27/2015. PRIOR PET:  None    HISTORY: The study is requested for restaging bladder cancer. FINDINGS:    HEAD/NECK: No apparent foci of abnormal hypermetabolism. Cerebral evaluation  is limited by normal intense activity.     CHEST: Mildly hypermetabolic AP window, precarinal, subcarinal, and  bilateral hilar lymph nodes are noted. Maximum SUV is 4.7 of the AP window  lymph node. Emphysematous changes are again noted. ABDOMEN/PELVIS: No foci of abnormal hypermetabolism. SKELETON: No foci of abnormal hypermetabolism in the axial and visualized  appendicular skeleton. Impression  : Hypermetabolic mediastinal and bilateral hilar lymph nodes. This  is nonspecific and could represent any number of inflammatory or neoplastic  etiologies. There is otherwise normal tracer distribution.           Signing/Reading Doctor: Meme Bridges ([id number])  Approved: Meme Bridges ([id number])  Aug 18 2015  1:10PM      ASSESSMENT:      Ulysses Lass is a pleasant 80-year-old right-hand-dominant male who presented to [facility] with transient imbalance upon awakening this morning with both left and right word gaze deviation    RECOMMENDATIONS:  Gait imbalance:  Endorses dizziness with gait imbalance to both the left and the right  Was also noted to have some low normal blood pressures at presentation, has dry oral mucosa and admits to not drinking as much water as he should as well as endorsing unintentional weight loss secondary to poor appetite over the past several months  As such based on history would have low suspicion for anything to do with cerebrovascular disease reasonable to obtain MRI  Would check orthostatics  Defer evaluation of anorexia, weight loss to primary team patient does have a history of cancer    We will follow-up after MRI is obtained if unrevealing would not call this a TIA        Richard Ho MD

## 2022-08-15 NOTE — PROGRESS NOTES
Bedside shift change report given to Aga (oncoming nurse) by Verónica (offgoing nurse). Report included the following information SBAR, MAR, Cardiac Rhythm (NSR), and Dual Neuro Assessment.

## 2022-08-15 NOTE — PROGRESS NOTES
Neurocritical Care Code Stroke Documentation      Symptoms:   Leaning to the left this morning while going to the bathroom. The patient subsequently had two falls in the bathroom due to this with no reported injury. He has no prior hx of a stroke    Last Known Well: Around 8:00 pm today    Medical hx:   Past Medical History:   Diagnosis Date    Aneurysm ([facility] [address]     ABDOMINAL     Arrhythmia 3/2/2014    Stated cardiac cath for abn EKG several years ago NEG 21 pt denies    Ortiz esophagus     Bladder cancer ([facility] [address]     CAD (coronary artery disease)     21 pt denies    Calculus of kidney     Chronic obstructive pulmonary disease (HCC)     Contact dermatitis and other eczema, due to unspecified cause     GERD (gastroesophageal reflux disease)     ORTIZ'S ESOPH    Hyperlipemia     Hypertension     Ill-defined condition     HIATEL HERNIA    Ill-defined condition     ECZEMA    Presence of urostomy (HCC)     PUD (peptic ulcer disease)     Baretts Esophagus/antral gastritis/    Skin cancer  Macular degeneration       Anticoagulation: None    VAN:   Negative   NIHSS:   1a-LOC:0    1b-Month/Age:0    1c-Open/Close Hand:0    2-Best Gaze:0    3-Visual Fields:0    4-Facial Palsy:0    5a-Left Arm:0    5b-Right Arm:0    6a-Left Le    6b-Right Le    7-Limb Ataxia:0    8-Sensory:0    9-Best Language:0    10-Dysarthria:0    11-Extinction/Inattention:0  TOTAL SCORE:0   Imaging:   CT: Atrophy. No acute findings. CTA/CTP: IMPRESSION     1. Negative CT perfusion study. 2. No acute large vessel occlusion. No flow-limiting stenosis. Plan:   TNK Candidate: NO    Mechanical thrombectomy Candidate: NO     Discussed with Dr. Corey Manzanares. Arrival time: 1017 AM  Time spent: 45 minutes.      Mark Ramos NP  Neurocritical Care Nurse Practitioner

## 2022-08-15 NOTE — PROGRESS NOTES
Problem: Falls - Risk of  Goal: *Absence of Falls  Description: Document Clydene Bone Fall Risk and appropriate interventions in the flowsheet.   Outcome: Progressing Towards Goal  Note: Fall Risk Interventions:  Mobility Interventions: Assess mobility with egress test, Bed/chair exit alarm, Communicate number of staff needed for ambulation/transfer, Patient to call before getting OOB, PT Consult for mobility concerns         Medication Interventions: Patient to call before getting OOB, Teach patient to arise slowly    Elimination Interventions: Bed/chair exit alarm, Call light in reach, Patient to call for help with toileting needs    History of Falls Interventions: Room close to nurse's station, Bed/chair exit alarm         Problem: Patient Education: Go to Patient Education Activity  Goal: Patient/Family Education  Outcome: Progressing Towards Goal     Problem: TIA/CVA Stroke: Day 2 Until Discharge  Goal: Activity/Safety  Outcome: Progressing Towards Goal  Goal: Diagnostic Test/Procedures  Outcome: Progressing Towards Goal  Goal: Nutrition/Diet  Outcome: Progressing Towards Goal  Goal: Discharge Planning  Outcome: Progressing Towards Goal  Goal: Medications  Outcome: Progressing Towards Goal  Goal: Respiratory  Outcome: Progressing Towards Goal  Goal: Treatments/Interventions/Procedures  Outcome: Progressing Towards Goal  Goal: Psychosocial  Outcome: Progressing Towards Goal  Goal: *Verbalizes anxiety and depression are reduced or absent  Outcome: Progressing Towards Goal  Goal: *Absence of aspiration  Outcome: Progressing Towards Goal  Goal: *Absence of deep venous thrombosis signs and symptoms(Stroke Metric)  Outcome: Progressing Towards Goal  Goal: *Optimal pain control at patient's stated goal  Outcome: Progressing Towards Goal  Goal: *Tolerating diet  Outcome: Progressing Towards Goal  Goal: *Ability to perform ADLs and demonstrates progressive mobility and function  Outcome: Progressing Towards Goal  Goal: *Stroke education continued(Stroke Metric)  Outcome: Progressing Towards Goal     Problem: Ischemic Stroke: Discharge Outcomes  Goal: *Verbalizes anxiety and depression are reduced or absent  Outcome: Progressing Towards Goal  Goal: *Verbalize understanding of risk factor modification(Stroke Metric)  Outcome: Progressing Towards Goal  Goal: *Hemodynamically stable  Outcome: Progressing Towards Goal  Goal: *Absence of aspiration pneumonia  Outcome: Progressing Towards Goal  Goal: *Aware of needed dietary changes  Outcome: Progressing Towards Goal  Goal: *Verbalize understanding of prescribed medications including anti-coagulants, anti-lipid, and/or anti-platelets(Stroke Metric)  Outcome: Progressing Towards Goal  Goal: *Tolerating diet  Outcome: Progressing Towards Goal  Goal: *Aware of follow-up diagnostics related to anticoagulants  Outcome: Progressing Towards Goal  Goal: *Ability to perform ADLs and demonstrates progressive mobility and function  Outcome: Progressing Towards Goal  Goal: *Absence of DVT(Stroke Metric)  Outcome: Progressing Towards Goal  Goal: *Absence of aspiration  Outcome: Progressing Towards Goal  Goal: *Optimal pain control at patient's stated goal  Outcome: Progressing Towards Goal  Goal: *Home safety concerns addressed  Outcome: Progressing Towards Goal  Goal: *Describes available resources and support systems  Outcome: Progressing Towards Goal  Goal: *Verbalizes understanding of activation of EMS(911) for stroke symptoms(Stroke Metric)  Outcome: Progressing Towards Goal  Goal: *Understands and describes signs and symptoms to report to providers(Stroke Metric)  Outcome: Progressing Towards Goal  Goal: *Neurolgocially stable (absence of additional neurological deficits)  Outcome: Progressing Towards Goal  Goal: *Verbalizes importance of follow-up with primary care physician(Stroke Metric)  Outcome: Progressing Towards Goal  Goal: *Smoking cessation discussed,if applicable(Stroke Metric)  Outcome: Progressing Towards Goal  Goal: *Depression screening completed(Stroke Metric)  Outcome: Progressing Towards Goal

## 2022-08-15 NOTE — PROGRESS NOTES
SLP Contact Note    Consult received and appreciated. Patient chart reviewed. CT negative for acute infarct. Recommend completing swallow screen. NIH 0. Will await MRI and can complete evaluation if indicated.     Thank you,  ADRIANNE Gallego.Ed, [id number] [facility]  Speech-Language Pathologist

## 2022-08-15 NOTE — H&P
History & Physical    Primary Care Provider: Mnidy Horn NP  Source of Information: Patient     Please note that this dictation was completed with [employer], the computer voice recognition software. Quite often unanticipated grammatical, syntax, homophones, and other interpretive errors are inadvertently transcribed by the computer software. Please disregard these errors. Please excuse any errors that have escaped final proofreading. CC: Transient gait disturbance, dizziness and fall    History of Presenting Illness:   Randy Cotto is a 80 y.o. male who presents with above symptoms. Patient has past medical history of cancer with urostomy bag dyslipidemia COPD. Patient presented to the ED with a complaint of gait disturbance leaning towards left and frequent falls in the last few days. There was some associated dizziness with this but no further symptoms right now. As per describing his symptoms he said he was trying to get up and then stumbled to the right. With this he called his daughter who told her granddaughter (who has been as an EMT/)  They checked him out did not notice anything of the ordinary but brought him to ER for further evaluation. Came in as a stroke alert, initial evaluation was unrevealing. The patient denies any fever, chills, chest pain, cough, congestion, recent illness, palpitations, or dysuria. Review of Systems:  Pertinent items are noted in the History of Present Illness.      Past Medical History:   Diagnosis Date    Aneurysm ([address]     ABDOMINAL     Arrhythmia 3/2/2014    Stated cardiac cath for abn EKG several years ago NEG 4/23/21 pt denies    Shepherd esophagus     Bladder cancer ([address]     CAD (coronary artery disease)     4/23/21 pt denies    Calculus of kidney     Chronic obstructive pulmonary disease (HCC)     Contact dermatitis and other eczema, due to unspecified cause     GERD (gastroesophageal reflux disease) ORTIZ'S ESOPH    Hyperlipemia     Hypertension     Ill-defined condition     HIATEL HERNIA    Ill-defined condition     ECZEMA    Presence of urostomy (HCC)     PUD (peptic ulcer disease)     Baretts Esophagus/antral gastritis/    Skin cancer       Past Surgical History:   Procedure Laterality Date    HX ADENOIDECTOMY      HX CATARACT REMOVAL Left 11/2016    HX COLONOSCOPY      HX GI      Colonoscopy x 2-3    HX GI      COLONOSCOPY    HX HERNIA REPAIR  8/21/13    left inguinal hernia repair, LAPAROSCOPIC  (DR JAQUEZ)    HX HERNIA REPAIR  3/14/16    LAPAROSCOPIC Incisional W/ MESH by     HX OTHER SURGICAL      REMOVAL OF SKIN CA R NECK    HX TONSILLECTOMY      HX UROLOGICAL  2010    Bladder bx/kidney stone removal    HX UROLOGICAL  2015    CYSTO    HX UROLOGICAL      cystectomy/urostomy    HX VASCULAR ACCESS  03/31/2015    PORT R CHEST WALL    HX VASCULAR ACCESS      REMOVAL OF PORT R CHEST WALL    [facility] ABDOMEN SURGERY PROC UNLISTED      UMBILICAL hernia      Prior to Admission medications    Medication Sig Start Date End Date Taking? Authorizing Provider   sodium chloride (Ocean Nasal) 0.65 % nasal squeeze bottle 1 Spray as needed for Congestion. Yes Provider, Historical   umeclidinium-vilanteroL (Anoro Ellipta) 62.5-25 mcg/actuation inhaler Take 1 Puff by inhalation daily. Yes Provider, Historical   zolpidem (AMBIEN) 10 mg tablet Take 5 mg by mouth nightly as needed for Sleep. Yes Provider, Historical   vit A/vit C/vit E/zinc/copper (PRESERVISION AREDS PO) Take 1 Tab by mouth daily. Yes Provider, Historical   loratadine-pseudoephedrine (Claritin-D 24 Hour)  mg per tablet Take 1 Tab by mouth daily. Yes Provider, Historical   HYDROcodone-acetaminophen (NORCO) 5-325 mg per tablet Take 1 Tab by mouth every six (6) hours as needed for Pain. Max Daily Amount: 4 Tabs. Indications: Pain 7/26/18  Yes Ravin Carrasquillo MD   montelukast (SINGULAIR) 10 mg tablet Take 10 mg by mouth daily.  11/16/17 Yes Provider, Historical   omeprazole (PRILOSEC) 20 mg capsule Take 20 mg by mouth two (2) times a day. 5/13/15  Yes Provider, Historical   atorvastatin (LIPITOR) 20 mg tablet Take 20 mg by mouth nightly. Yes Provider, Historical     No Known Allergies   Family History   Problem Relation Age of Onset    Heart Disease Mother     Mult Sclerosis Sister     Heart Disease Sister     Psychiatric Disorder Sister         DEPRESSION    No Known Problems Sister     Anesth Problems Neg Hx         SOCIAL HISTORY:  Patient resides:  Independently X   Assisted Living    SNF    With family care       Smoking history:   None X   Former X   Chronic      Alcohol history:   None X   Social    Chronic      Ambulates:   Independently X   w/cane    w/walker    w/wc    CODE STATUS:  DNR    Full X   Other      Objective:     Physical Exam:     Visit Vitals  BP (!) 108/53   Pulse 69   Temp 97.4 °F (36.3 °C) Comment: admission   Resp 13   Ht 5' 9\" (1.753 m)   Wt 70.2 kg (154 lb 12.2 oz)   SpO2 94%   BMI 22.85 kg/m²      O2 Device: None (Room air)    General:  Alert, cooperative, no distress, appears stated age. Head:  Normocephalic, without obvious abnormality, atraumatic. Eyes:  Conjunctivae/corneas clear. PERRL, EOMs intact. Nose: Nares normal. Septum midline. Mucosa normal. No drainage or sinus tenderness. Throat: Lips, mucosa, and tongue normal. Teeth and gums normal.   Neck: Supple, symmetrical, trachea midline, no adenopathy, thyroid: no enlargement/tenderness/nodules, no carotid bruit and no JVD. Back:   Symmetric, no curvature. ROM normal. No CVA tenderness. Lungs:   Clear to auscultation bilaterally. Chest wall:  No tenderness or deformity. Heart:  Regular rate and rhythm, S1, S2 normal, no murmur, click, rub or gallop. Abdomen:   Soft, non-tender. Bowel sounds normal. No masses,  No organomegaly. Extremities: Extremities normal, atraumatic, no cyanosis or edema. Pulses: 2+ and symmetric all extremities. Skin: Skin color, texture, turgor normal. No rashes or lesions   Neurologic: CNII-XII intact. EKG:  normal EKG, normal sinus rhythm. Data Review:     Recent Days:  Recent Labs     08/15/22  1032   WBC 4.6   HGB 12.0*   HCT 36.8   *     Recent Labs     08/15/22  1032      K 3.6      CO2 24   *   BUN 17   CREA 0.76   CA 9.1   ALB 2.7*   TBILI 1.1*   ALT 13   INR 1.0     No results for input(s): PH, PCO2, PO2, HCO3, FIO2 in the last 72 hours. 24 Hour Results:  Recent Results (from the past 24 hour(s))   CBC WITH AUTOMATED DIFF    Collection Time: 08/15/22 10:32 AM   Result Value Ref Range    WBC 4.6 4.1 - 11.1 K/uL    RBC 3.98 (L) 4.10 - 5.70 M/uL    HGB 12.0 (L) 12.1 - 17.0 g/dL    HCT 36.8 36.6 - 50.3 %    MCV 92.5 80.0 - 99.0 FL    MCH 30.2 26.0 - 34.0 PG    MCHC 32.6 30.0 - 36.5 g/dL    RDW 17.2 (H) 11.5 - 14.5 %    PLATELET 409 (L) 700 - 400 K/uL    MPV 12.0 8.9 - 12.9 FL    NRBC 0.0 0  WBC    ABSOLUTE NRBC 0.00 0.00 - 0.01 K/uL    NEUTROPHILS 71 32 - 75 %    LYMPHOCYTES 13 12 - 49 %    MONOCYTES 11 5 - 13 %    EOSINOPHILS 3 0 - 7 %    BASOPHILS 1 0 - 1 %    IMMATURE GRANULOCYTES 1 (H) 0.0 - 0.5 %    ABS. NEUTROPHILS 3.4 1.8 - 8.0 K/UL    ABS. LYMPHOCYTES 0.6 (L) 0.8 - 3.5 K/UL    ABS. MONOCYTES 0.5 0.0 - 1.0 K/UL    ABS. EOSINOPHILS 0.1 0.0 - 0.4 K/UL    ABS. BASOPHILS 0.0 0.0 - 0.1 K/UL    ABS. IMM.  GRANS. 0.0 0.00 - 0.04 K/UL    DF SMEAR SCANNED      PLATELET COMMENTS Large Platelets      RBC COMMENTS ANISOCYTOSIS  1+        RBC COMMENTS MONA CELLS  PRESENT       METABOLIC PANEL, COMPREHENSIVE    Collection Time: 08/15/22 10:32 AM   Result Value Ref Range    Sodium 138 136 - 145 mmol/L    Potassium 3.6 3.5 - 5.1 mmol/L    Chloride 108 97 - 108 mmol/L    CO2 24 21 - 32 mmol/L    Anion gap 6 5 - 15 mmol/L    Glucose 103 (H) 65 - 100 mg/dL    BUN 17 6 - 20 MG/DL    Creatinine 0.76 0.70 - 1.30 MG/DL    BUN/Creatinine ratio 22 (H) 12 - 20      GFR est AA >60 >60 ml/min/1.73m2    GFR est non-AA >60 >60 ml/min/1.73m2    Calcium 9.1 8.5 - 10.1 MG/DL    Bilirubin, total 1.1 (H) 0.2 - 1.0 MG/DL    ALT (SGPT) 13 12 - 78 U/L    AST (SGOT) 18 15 - 37 U/L    Alk. phosphatase 90 45 - 117 U/L    Protein, total 7.7 6.4 - 8.2 g/dL    Albumin 2.7 (L) 3.5 - 5.0 g/dL    Globulin 5.0 (H) 2.0 - 4.0 g/dL    A-G Ratio 0.5 (L) 1.1 - 2.2     PROTHROMBIN TIME + INR    Collection Time: 08/15/22 10:32 AM   Result Value Ref Range    INR 1.0 0.9 - 1.1      Prothrombin time 10.9 9.0 - 11.1 sec   GLUCOSE, POC    Collection Time: 08/15/22 10:36 AM   Result Value Ref Range    Glucose (POC) 94 65 - 117 mg/dL    Performed by Kalani soriano RN    URINALYSIS W/MICROSCOPIC    Collection Time: 08/15/22 11:58 AM   Result Value Ref Range    Color YELLOW/STRAW      Appearance CLEAR CLEAR      Specific gravity >1.030     pH (UA) 6.5 5.0 - 8.0      Protein Negative NEG mg/dL    Glucose Negative NEG mg/dL    Ketone Negative NEG mg/dL    Bilirubin Negative NEG      Blood Negative NEG      Urobilinogen 1.0 0.2 - 1.0 EU/dL    Nitrites Negative NEG      Leukocyte Esterase Negative NEG      WBC 5-10 0 - 4 /hpf    RBC 0-5 0 - 5 /hpf    Epithelial cells FEW FEW /lpf    Bacteria Negative NEG /hpf   URINE CULTURE HOLD SAMPLE    Collection Time: 08/15/22 11:58 AM    Specimen: Serum; Urine   Result Value Ref Range    Urine culture hold        Urine on hold in Microbiology dept for 2 days. If unpreserved urine is submitted, it cannot be used for addtional testing after 24 hours, recollection will be required.    EKG, 12 LEAD, INITIAL    Collection Time: 08/15/22  3:29 PM   Result Value Ref Range    Ventricular Rate 63 BPM    Atrial Rate 63 BPM    P-R Interval 216 ms    QRS Duration 124 ms    Q-T Interval 418 ms    QTC Calculation (Bezet) 427 ms    Calculated P Axis 36 degrees    Calculated R Axis 31 degrees    Calculated T Axis 5 degrees    Diagnosis       Sinus rhythm with 1st degree AV block with occasional premature ventricular   complexes  Nonspecific intraventricular conduction delay  When compared with ECG of 09-AUG-2022 10:56,  premature ventricular complexes are now present  aberrant conduction is no longer present  IA interval has increased  Left anterior fascicular block is no longer present           Imaging:     XR CHEST PORT    Result Date: 8/15/2022  Unchanged right apical opacity and interstitial lung disease. Superimposed bibasilar pneumonia versus atelectasis versus edema is new. Consider PA and lateral chest views when the patient can better tolerate. CTA CODE NEURO HEAD AND NECK W CONT    Result Date: 8/15/2022  1. Negative CT perfusion study. 2. No acute large vessel occlusion. No flow-limiting stenosis. CT CODE NEURO HEAD WO CONTRAST    Result Date: 8/15/2022  Atrophy. No acute findings. CT CODE NEURO PERF W CBF    Result Date: 8/15/2022  1. Negative CT perfusion study. 2. No acute large vessel occlusion. No flow-limiting stenosis. Admit to inpatient status      Patient was explained about the risk of admission including and not a complete list including risk of falls,fractures,blood clots,allergic reactions,infections. Patient/family also understands and agrees to the treatment plan including medications and side effect profiles and also understand the risk with radiation while undergoing imaging studies. The patient and the family/friends (after permission given by the patient to discuss) understand this and agree with the admission plan.         Assessment:     Principal Problem:    TIA (transient ischemic attack) (8/15/2022)           Plan:     TIA with gait imbalance and fall  --Patient to neuro telemetry neuro check every 4 hours  --CT CTA no acute issue  --We will obtain MRI and echocardiogram  --For fall check orthostatic blood pressure  --PT OT Case management consult SLP evaluation  --Further management as per clinical course  --Start aspirin and statin permissive hypertension 1 L fluid given in the ED  --Check A1c lipid panel    Code-full  DVT prophylaxis Lovenox  Dispo plan 24 hours       Signed By: Omari Bansal MD     August 15, 2022

## 2022-08-15 NOTE — ED TRIAGE NOTES
Pt stated he fell this am x 2 , pt went to the bathroom this am and everything was leaning to the left, denies headache, no blood thinners, no head trauma, denies numbness/tingling to face, arms or legs , feels weaker on left side , pt unsure what time he went to bed last , at dinner time he was okay

## 2022-08-15 NOTE — PROGRESS NOTES
OCCUPATIONAL THERAPY EVALUATION/DISCHARGE  Patient: Mary Kay Bustamante (15 y.o. male)  Date: 8/15/2022  Primary Diagnosis: TIA (transient ischemic attack) [G45.9]       Precautions: fall      ASSESSMENT  Note patient admitted for TIA/ CVA work-up d/t 2 falls, L lean, and L-sided weakness per chart. Patient reports he recently returned home after living in [address] [place] [address] for ~2 months   He reports he did not like ILF and has been distressed at home by his home environment being in disarray since returning. He also reports he has lost 42 pounds in 3 weeks due to not eating (RN notified, would benefit from a nutrition consult). Patient endorses no falls other than the 2 leading to current admission. Patient is unsure what caused him to fall. Patient presents for [facility] with mild general weakness, mild balance impairment, and mild PATRICK (reports d/t COPD and requested a pulmonology consult, RN notified, SPO2 94% on room air). Neuro exam non-focal.  Patient demonstrated functional reach to UB and LB and ambulated ~300 feet with no loss of balance, no lean or drift, and no path deviations. Patient receptive to education on fall prevention and energy conservation. Recommend return home at d/c with no additional OT needs. Orthostatics taken, which were stable. BP soft, then improved with activity. Vitals:    08/15/22 1548 08/15/22 1554 08/15/22 1602   BP: (!) 106/42 (!) 117/52 (!) 125/53   BP 1 Location: Left upper arm Left upper arm Left upper arm   BP Patient Position: Supine Sitting Standing  Comment: after walking ~300 feet   Pulse: 63 67 70        Current Level of Function (ADLs/self-care): independent    Functional Outcome Measure: The patient scored 66/66 on the [address] UE motor assessment with each UE which is indicative of no impairment.          PLAN :    Recommendation for discharge: (in order for the patient to meet his/her long term goals)  No skilled occupational therapy/ follow up rehabilitation needs identified at this time. This discharge recommendation:  Has been made in collaboration with the attending provider and/or case management         SUBJECTIVE:   Patient stated \"I'm not sure why I fell. Maybe because I haven't been eating.     OBJECTIVE DATA SUMMARY:   HISTORY:   Past Medical History:   Diagnosis Date    Aneurysm ([address]     ABDOMINAL     Arrhythmia 3/2/2014    Stated cardiac cath for abn EKG several years ago NEG 4/23/21 pt denies    Ortiz esophagus     Bladder cancer ([address]     CAD (coronary artery disease)     4/23/21 pt denies    Calculus of kidney     Chronic obstructive pulmonary disease (HCC)     Contact dermatitis and other eczema, due to unspecified cause     GERD (gastroesophageal reflux disease)     ORTIZ'S ESOPH    Hyperlipemia     Hypertension     Ill-defined condition     HIATEL HERNIA    Ill-defined condition     ECZEMA    Presence of urostomy ([address]     PUD (peptic ulcer disease)     Baretts Esophagus/antral gastritis/    Skin cancer      Past Surgical History:   Procedure Laterality Date    HX ADENOIDECTOMY      HX CATARACT REMOVAL Left 11/2016    HX COLONOSCOPY      HX GI      Colonoscopy x 2-3    HX GI      COLONOSCOPY    HX HERNIA REPAIR  8/21/13    left inguinal hernia repair, LAPAROSCOPIC  (DR JAQUEZ)    HX HERNIA REPAIR  3/14/16    LAPAROSCOPIC Incisional W/ MESH by     HX OTHER SURGICAL      REMOVAL OF SKIN CA R NECK    HX TONSILLECTOMY      HX UROLOGICAL  2010    Bladder bx/kidney stone removal    HX UROLOGICAL  2015    CYSTO    HX UROLOGICAL      cystectomy/urostomy    HX VASCULAR ACCESS  03/31/2015    PORT R CHEST WALL    HX VASCULAR ACCESS      REMOVAL OF PORT R CHEST WALL    [facility] ABDOMEN SURGERY PROC UNLISTED      UMBILICAL hernia        Prior Level of Function/Environment/Context: independent  Expanded or extensive additional review of patient history:   Home Situation  Home Environment: Private residence  Wheelchair Ramp: Yes  One/Two Story Residence: One story  Living Alone: Yes  Support Systems: Child(samm) (dtr lives nearby)  Current DME Used/Available at Home: None  Tub or Shower Type: Tub/Shower combination      EXAMINATION OF PERFORMANCE DEFICITS:  Cognitive/Behavioral Status:  Neurologic State: Alert  Orientation Level: Oriented X4  Cognition: Follows commands             Skin: visible skin appears intact    Edema: none noted    Hearing: WDL    Vision/Perceptual:    Tracking: Able to track stimulus in all quadrants w/o difficulty              Visual Fields:  (intact)  Diplopia: No    Acuity: Impaired near vision; Impaired far vision (macular degeneration)    Corrective Lenses: Glasses (reports needs new prescription)    Range of Motion:    AROM: Within functional limits                         Strength:    Strength: Generally decreased, functional                Coordination:  Coordination: Generally decreased, functional  Fine Motor Skills-Upper: Left Impaired;Right Impaired    Gross Motor Skills-Upper: Left Impaired;Right Impaired    Tone & Sensation:    Tone: Normal  Sensation: Intact                      Balance:  Sitting: Intact  Standing: Impaired; Without support  Standing - Static: Good  Standing - Dynamic : Good    Functional Mobility and Transfers for ADLs:  Bed Mobility:  Supine to Sit: Independent  Sit to Supine: Independent    Transfers:  Sit to Stand: Independent  Stand to Sit: Independent  Bed to Chair: Independent    ADL Assessment:  Feeding: Independent    Oral Facial Hygiene/Grooming: Independent    Bathing: Independent         Upper Body Dressing: Independent    Lower Body Dressing: Independent (donned socks seated/ reaching to feet on floor)    Toileting: Modified independent (manages urostomy at baseline)              Functional Measure:  Fugl-Saunders Assessment of Motor Recovery after Stroke:     Upper Extremity Assessment: Yes (each UE)    Reflex Activity  Flexors/Biceps/Fingers: Can be elicited  Extensors/Triceps: Can be elicited  Reflex Subtotal: 4    Volitional Movement Within Synergies  Shoulder Retraction: Full  Shoulder Elevation: Full  Shoulder Abduction (90 degrees): Full  Shoulder External Rotation: Full  Elbow Flexion: Full  Forearm Supination: Full  Shoulder Adduction/Internal Rotation: Full  Elbow Extension: Full  Forearm Pronation: Full  Subtotal: 18    Volitional Movement Mixing Synergies  Hand to Lumbar Spine: Full  Shoulder Flexion (0-90 degrees): Full  Pronation-Supination: Full  Subtotal: 6    Volitional Movement With Little or No Synergy  Shoulder Abduction (0-90 degrees): Full  Shoulder Flexion ( degrees): Full  Pronation/Supination: Full  Subtotal : 6    Normal Reflex Activity  Biceps, Triceps, Finger Flexors: Full  Subtotal : 2    Upper Extremity Total   Upper Extremity Total: 36    Wrist  Stability at 15 Degree Dorsiflexion: Full  Repeated Dorsiflexion/ Volar Flexion: Full  Stability at 15 Degree Dorsiflexion: Full  Repeated Dorsiflexion/ Volar Flexion: Full  Circumduction: Full  Wrist Total: 10    Hand  Mass Flexion: Full  Mass Extension: Full  Grasp A: Full  Grasp B: Full  Grasp C: Full  Grasp D: Full  Grasp E: Full  Hand Total: 14    Coordination/Speed  Tremor: None  Dysmetria: None  Time: <1s  Coordination/Speed Total : 6    Total A-D  Total A-D (Motor Function): 66/66         This is a reliable/valid measure of arm function after a neurological event. It has established value to characterize functional status and for measuring spontaneous and therapy-induced recovery; tests proximal and distal motor functions. Fugl-Saunders Assessment - UE scores recorded between five and 30 days post neurologic event can be used to predict UE recovery at six months post neurologic event. Severe = 0-21 points   Moderately Severe = 22-33 points   Moderate = 34-47 points   Mild = 48-66 points  SUKHDEV Juárez, ARNOLDO Leslie, & JORGE Bailey (1992).  Measurement of motor recovery after stroke: Outcome assessment and sample size requirements. Stroke, 23, pp. 8196-9969.   --------------------------------------------------------------------------------------------------------------------------------------------------------------------  MCID:  Stroke:   Mali Prieto et al, 2001; n = 171; mean age 79 (6) years; assessed within 16 (12) days of stroke, Acute Stroke)  FMA Motor Scores from Admission to Discharge   10 point increase in FMA Upper Extremity = 1.5 change in discharge FIM   10 point increase in FMA Lower Extremity = 1.9 change in discharge FIM  MDC:   Stroke:   Elvie Schirmer et al, 2008, n = 14, mean age = 59.9 (14.6) years, assessed on average 14 (6.5) months post stroke, Chronic Stroke)   FMA = 5.2 points for the Upper Extremity portion of the assessment         Occupational Therapy Evaluation Charge Determination   History Examination Decision-Making   LOW Complexity : Brief history review  LOW Complexity : 1-3 performance deficits relating to physical, cognitive , or psychosocial skils that result in activity limitations and / or participation restrictions  MEDIUM Complexity : Patient may present with comorbidities that affect occupational performnce. Miniml to moderate modification of tasks or assistance (eg, physical or verbal ) with assesment(s) is necessary to enable patient to complete evaluation       Based on the above components, the patient evaluation is determined to be of the following complexity level: LOW   Pain Rating:  Patient did not report pain    Activity Tolerance:   Good    After treatment patient left in no apparent distress:    Sitting in chair, Call bell within reach, and Bed / chair alarm activated    COMMUNICATION/EDUCATION:   The patients plan of care was discussed with: Registered nurse.      Thank you for this referral.  Demetra Boswell OT  Time Calculation: 30 mins

## 2022-08-16 VITALS
BODY MASS INDEX: 22.6 KG/M2 | SYSTOLIC BLOOD PRESSURE: 114 MMHG | HEART RATE: 75 BPM | OXYGEN SATURATION: 94 % | RESPIRATION RATE: 15 BRPM | DIASTOLIC BLOOD PRESSURE: 59 MMHG | HEIGHT: 69 IN | WEIGHT: 152.56 LBS | TEMPERATURE: 97.4 F
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
## 2022-08-16 LAB
CHOLEST SERPL-MCNC: 94 MG/DL
EST. AVERAGE GLUCOSE BLD GHB EST-MCNC: 94 MG/DL
HBA1C MFR BLD: 4.9 % (ref 4–5.6)
HDLC SERPL-MCNC: 22 MG/DL
HDLC SERPL: 4.3 {RATIO} (ref 0–5)
LDLC SERPL CALC-MCNC: 58.8 MG/DL (ref 0–100)
TRIGL SERPL-MCNC: 66 MG/DL (ref ?–150)
VLDLC SERPL CALC-MCNC: 13.2 MG/DL

## 2022-08-16 PROCEDURE — 74011250637 HC RX REV CODE- 250/637: Performed by: HOSPITALIST

## 2022-08-16 PROCEDURE — 92610 EVALUATE SWALLOWING FUNCTION: CPT

## 2022-08-16 PROCEDURE — 83036 HEMOGLOBIN GLYCOSYLATED A1C: CPT

## 2022-08-16 PROCEDURE — 65390000012 HC CONDITION CODE 44 OBSERVATION

## 2022-08-16 PROCEDURE — 80061 LIPID PANEL: CPT

## 2022-08-16 PROCEDURE — 97116 GAIT TRAINING THERAPY: CPT

## 2022-08-16 PROCEDURE — 74011250637 HC RX REV CODE- 250/637: Performed by: INTERNAL MEDICINE

## 2022-08-16 PROCEDURE — 97161 PT EVAL LOW COMPLEX 20 MIN: CPT

## 2022-08-16 PROCEDURE — 36415 COLL VENOUS BLD VENIPUNCTURE: CPT

## 2022-08-16 PROCEDURE — G0378 HOSPITAL OBSERVATION PER HR: HCPCS

## 2022-08-16 RX ORDER — GUAIFENESIN/DEXTROMETHORPHAN 100-10MG/5
10 SYRUP ORAL ONCE
Status: COMPLETED | OUTPATIENT
Start: 2022-08-16 | End: 2022-08-16

## 2022-08-16 RX ADMIN — GUAIFENESIN AND DEXTROMETHORPHAN 10 ML: 100; 10 SYRUP ORAL at 13:03

## 2022-08-16 RX ADMIN — ASPIRIN 81 MG CHEWABLE TABLET 81 MG: 81 TABLET CHEWABLE at 10:43

## 2022-08-16 NOTE — CONSULTS
Comprehensive Nutrition Assessment    Type and Reason for Visit: Initial, Consult    Nutrition Recommendations/Plan:   High akilah/high protein shakes BID. Weekly standing scale weights. Consider addition of daily MVI. Malnutrition Assessment:  Malnutrition Status:  Severe malnutrition (08/16/22 1109)    Context:  Social/environmental circumstances     Findings of the 6 clinical characteristics of malnutrition:   Energy Intake:  Less than 75% est energy requirements for 3 months or longer  Weight Loss:  Greater than 5% over 1 month     Body Fat Loss:  Severe body fat loss, Buccal region, Triceps   Muscle Mass Loss:  Mild muscle mass loss, Clavicles (pectoralis &deltoids), Hand (interosseous), Temples (temporalis)  Fluid Accumulation:  No significant fluid accumulation,     Strength:  Not performed        Nutrition Assessment:    PMH includes COPD, Bladder Ca with urostomy, PUD, HTN, hyperlipidemia, and GERD. Admitted after feeling dizzy, falling. Neuro work-up negative. Nutrition consult received for unintentional wt loss. Visited with pt this morning at bedside. Reports losing \"a lot\" of weight over the last 2-3 months given a change in living situation, as well as wt loss while maintaining consistent eating habits. His UBW is around 175#; current wt 152# which indicates a severe 13% unintentional loss. He has just started adding nutrition shakes into his daily routine, so far drinking ~one/day. Encouraged intakes of 2-3 shakes per day in addition to meals, to promote wt and muscle gain from this unintentional loss. He is agreeable. Will provide shakes while he is admitted BID, provided coupons, and outpatient nutrition center information attached to d/c paperwork.     Nutritionally Significant Medications:  Meds reviewed    Estimated Daily Nutrient Needs:  Energy Requirements Based On: Kcal/kg  Weight Used for Energy Requirements: Current  Energy (kcal/day): 5352-4022 (25-27 kcal/kg)  Weight Used for Protein Requirements: Current  Protein (g/day): 75 (1.1 g/kg)  Method Used for Fluid Requirements: 1 ml/kcal  Fluid (ml/day): 1900    Nutrition Related Findings:   Edema: No data recorded    Last BM:  (PTA),      Wounds: None      Current Nutrition Therapies:  Diet: 2g Sodium  Supplements: None presently ordered  Meal intake: No data found. Supplement intake: No data found. Nutrition Support: N/A      Anthropometric Measures:  Height: 5' 9\" (175.3 cm)  Ideal Body Weight (IBW): 160 lbs (73 kg)     Current Body Wt:  69.2 kg (152 lb 8.9 oz), 95.3 % IBW.  Bed scale  Current BMI (kg/m2): 22.5  Usual Body Weight: 79.4 kg (174 lb 15.7 oz)  % Weight Change (Calculated): -12.8  Weight Adjustment: No adjustment                 BMI Category: Underweight (BMI less than 22) age over 72    Wt Readings from Last 10 Encounters:   08/15/22 69.2 kg (152 lb 8.9 oz)   08/09/22 68.9 kg (151 lb 14.4 oz)   02/23/22 78.8 kg (173 lb 12.8 oz)   05/05/21 78.9 kg (174 lb)   02/24/21 78 kg (172 lb)   08/21/20 76.7 kg (169 lb)   06/02/20 74.8 kg (165 lb)   02/10/20 81.2 kg (179 lb)   08/20/19 78.9 kg (174 lb)   11/27/18 77 kg (169 lb 12.1 oz)           Nutrition Diagnosis:   Predicted inadequate energy intake related to inadequate protein-energy intake as evidenced by weight loss    Nutrition Interventions:   Food and/or Nutrient Delivery: Continue current diet, Start oral nutrition supplement  Nutrition Education/Counseling: No recommendations at this time  Coordination of Nutrition Care: Continue to monitor while inpatient  Plan of Care discussed with: RN    Goals:     Goals:  (PO intakes >65% meals/ONS by next RD assessment.)       Nutrition Monitoring and Evaluation:   Behavioral-Environmental Outcomes: None identified  Food/Nutrient Intake Outcomes: Food and nutrient intake, Supplement intake  Physical Signs/Symptoms Outcomes: GI status, Biochemical data, Meal time behavior    Discharge Planning:    Continue oral nutrition supplement, Recommend pursue outpatient nutrition counseling    Andrew Chavira RD  Available via [employer] or ext [phone]

## 2022-08-16 NOTE — PROGRESS NOTES
Transition of Care Plan  RUR- Low  12%  DISPOSITION: Home when stable  F/U with PCP/Specialist    Transport: DaughterYoni    Reason for Admission:  Transient gait disturbance, dizziness and fall                     RUR Score:          12%           Plan for utilizing home health:     No hx of home health, OT cleared patient, pending PT/SLP     PCP: First and Last name:  Michael Agee NP     Name of Practice:    Are you a current patient: Yes/No: Yes   Approximate date of last visit:    Can you participate in a virtual visit with your PCP:                     Current Advanced Directive/Advance Care Plan: Full Code      Healthcare Decision Maker:   Click here to complete [employer] including selection of the Healthcare Decision Maker Relationship (ie \"Primary\")             Primary Decision Maker: Godfrey Hairston - Daughter - [phone]    Secondary Decision Maker: Martha Motta - Other Relative - [phone]                  Transition of Care Plan:                    CM met with patient at bedside to introduce self and role. Living situation: lives alone in 1 story home, ramp to enter (recently moved back home from independent living facility, [facility])  ADLs: independent  DME: none  Previous IPR/SNF:none  Previous home health: none  Demographics: confirmed, Medicare A&B and [employer] insured  Pharmacy: [facility]  Main point of contact: daughterToby Pal - #[phone]    Patient reports strong familial support between his daughterYoni and granddaughterYesica (RN at [facility] [employer]). Both live within a mile of patient. CM to follow patient progress and assist as recommended with REECE plan. Care Management Interventions  PCP Verified by CM: Yes  Palliative Care Criteria Met (RRAT>21 & CHF Dx)?: No  Mode of Transport at Discharge:  Other (see comment) (family)  Transition of Care Consult (CM Consult): Discharge Planning  MyChart Signup: Yes  Discharge Durable Medical Equipment: No  Health Maintenance Reviewed: Yes  Physical Therapy Consult: Yes  Occupational Therapy Consult: Yes  Speech Therapy Consult: Yes  Support Systems: Child(samm), Other Family Member(s)  Confirm Follow Up Transport: Family  The Plan for Transition of Care is Related to the Following Treatment Goals : home  The Patient and/or Patient Representative was Provided with a Choice of Provider and Agrees with the Discharge Plan?: No  Name of the Patient Representative Who was Provided with a Choice of Provider and Agrees with the Discharge Plan: patient  Freedom of Choice List was Provided with Basic Dialogue that Supports the Patient's Individualized Plan of Care/Goals, Treatment Preferences and Shares the Quality Data Associated with the Providers?: No  [place] Resource Information Provided?: No  Discharge Location  Patient Expects to be Discharged to[de-identified] Home    Medicare pt has received, reviewed, and signed 2nd IM letter informing them of their right to appeal the discharge. Signed copy has been placed on pt bedside chart. - patient later changed to Observation    The program assesses the family and/or caregiver's readiness, willingness, and ability to provide or support and self-management activities for the patient as needed. 12:01pm: DC order noted. Medicare Outpatient Observation Notice (MOON)/ provided to patient/representative with verbal explanation of the notice. Time allotted for questions regarding the notice. Patient /representative provided a completed copy of the MOON notice. Copy placed on bedside chart. Virtual reviewer communicated change to CM, which reflect outpatient observation order written prior to Written discharge order. Code 44 delivered and given to patient. CM met with the patient and provided to the patient the printed information about her outpatient observation status.  The patient was given the flyer entitled, \"Medicare Outpatient Observation: Information & notification. \" All questions were answered, no additional discharge needs identified at this time and patient expects to return to their (home, assisted living facility, relatives home, etc.) after discharge today. Copy provided to patient or sent secure email, secure fax , or certified mail to patient's loved one per their request.      QUYNH King.

## 2022-08-16 NOTE — DISCHARGE INSTRUCTIONS
Discharge Instructions       PATIENT ID: Jeanne Jensen  MRN: [id number]   YOB: 1940    DATE OF ADMISSION: [unfilled]    DATE OF DISCHARGE: 8/16/2022    PRIMARY CARE PROVIDER: @PCP@     ATTENDING PHYSICIAN: [unfilled]  DISCHARGING PROVIDER: Cathryn Huddleston MD    To contact this individual call [phone] and ask the  to page. If unavailable ask to be transferred the Adult Hospitalist Department. DISCHARGE DIAGNOSES Fall due to inadequate sleep    CONSULTATIONS: [unfilled]    PROCEDURES/SURGERIES: * No surgery found *    PENDING TEST RESULTS:   At the time of discharge the following test results are still pending:     FOLLOW UP APPOINTMENTS:   [unfilled]     ADDITIONAL CARE RECOMMENDATIONS:     DIET: Regular Diet    ACTIVITY: Activity as tolerated    WOUND CARE:     EQUIPMENT needed:       Radiology      DISCHARGE MEDICATIONS:   See Medication Reconciliation Form    It is important that you take the medication exactly as they are prescribed. Keep your medication in the bottles provided by the pharmacist and keep a list of the medication names, dosages, and times to be taken in your wallet. Do not take other medications without consulting your doctor. NOTIFY YOUR PHYSICIAN FOR ANY OF THE FOLLOWING:   Fever over 101 degrees for 24 hours. Chest pain, shortness of breath, fever, chills, nausea, vomiting, diarrhea, change in mentation, falling, weakness, bleeding. Severe pain or pain not relieved by medications. Or, any other signs or symptoms that you may have questions about.       DISPOSITION:  x  Home With:   OT  PT  HH  RN       SNF/Inpatient Rehab/LTAC    Independent/assisted living    Hospice    Other:     CDMP Checked:   Yes x     PROBLEM LIST Updated:  Yes x       Signed:   Cathryn Huddleston MD  8/16/2022  11:00 AM

## 2022-08-16 NOTE — CONSULTS
PULMONARY MEDICINE    Initial Physician Consultation Note    Name: Blanka Macario   : 1940   MRN: [id number]   Date: 2022      Subjective:   Consult Note: 2022   Requesting Physician: Dr. Juanita Gonzáles  Reason for consult: COPD    Medical records and data reviewed. Patient is a 80 y.o. male who presented to the hospital and is being worked up for TIA. Patient has a history of COPD as well as abnormal CT scan of the chest for which he has undergone diagnostic work-up previously that did not show evidence of malignancy. He reports he has had more than 40 pound weight loss. He was admitted for work-up of TIA and is being discharged today. He requested evaluation by pulmonary service since he missed his appointment with Dr. Lobo Moore the office. Chest x-ray shows persistent abnormality in the right upper lobe. He reports cough with large amount of clear expectoration and suffers from chronic sinusitis. He is on [employer] which he uses on a regular basis. Outpatient follow-up with Dr. Lazaro Confer be arranged.   Given recent history of weight loss, he may need updated work-up including a PET scan which he will discuss with his usual physician    Review of Systems:     A comprehensive 12 system review of systems was negative except for as documented in HPI    Assessment:     Admitted for TIA  History of COPD with increased cough and sputum production -sputum culture is pending  Abnormal chest imaging with right upper lobe masslike abnormality and previous diagnostic work-up did not show presence of malignancy  Recent unexplained weight loss  History of Shepherd's esophagus, on Protonix, followed by GI on a regular basis  Other medical problems per chart    Recommendations:     Resume Anoro on discharge  Follow sputum culture  Outpatient follow-up with Dr. Cynthia Carvajal in the near future - last office note copied below  In a new gastroenterologist after his previous one retired  D/W patient    Active Problem List:     Problem List  Date Reviewed: 2/23/2022            Codes Class    Fall ICD-10-CM: W19Ana Ordaz  ICD-9-CM: E888.9         * (Principal) TIA (transient ischemic attack) ICD-10-CM: G45.9  ICD-9-CM: 435.9         Combined forms of age-related cataract of right eye ICD-10-CM: H25.811  ICD-9-CM: 366.19     Overview Signed 5/4/2021  5:08 PM by Norma Tamayo, DO     KPE/IOL OD             Cavitary lesion of lung ICD-10-CM: J98.4  ICD-9-CM: 518.89         PNA (pneumonia) ICD-10-CM: J18.9  ICD-9-CM: 621         Other general symptoms and signs  ICD-10-CM: R68.89  ICD-9-CM: 780.99         Anemia ICD-10-CM: D64.9  ICD-9-CM: 285.9         Peristomal hernia ICD-10-CM: K46.9  ICD-9-CM: 553.9         Ulcer of abdomen wall (HCC) ICD-10-CM: L98.499  ICD-9-CM: 707.8         Chronic bronchitis ([address] ICD-10-CM: J42  ICD-9-CM: 491.9         Thrombocytopenia (HCC) ICD-10-CM: D69.6  ICD-9-CM: 287.5         Shortness of breath ICD-10-CM: R06.02  ICD-9-CM: 786.05         Malignant neoplasm of lateral wall of urinary bladder (HCC) ICD-10-CM: C67.2  ICD-9-CM: 188. 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 9            Past Medical History:      has a past medical history of Aneurysm ([facility] Arrhythmia (3/2/2014), Shepherd esophagus, Bladder cancer ([facility] CAD (coronary artery disease), Calculus of kidney, Chronic obstructive pulmonary disease ([facility] Contact dermatitis and other eczema, due to unspecified cause, GERD (gastroesophageal reflux disease), Hyperlipemia, Hypertension, Ill-defined condition, Ill-defined condition, Presence of urostomy ([address] PUD (peptic ulcer disease), and Skin cancer. Past Surgical History:      has a past surgical history that includes hx colonoscopy; pr abdomen surgery proc unlisted; hx vascular access (03/31/2015); hx vascular access; hx tonsillectomy; hx cataract removal (Left, 11/2016); hx adenoidectomy; hx other surgical; hx gi; hx gi; hx hernia repair (8/21/13); hx hernia repair (3/14/16); hx urological (2010); hx urological (2015); and hx urological.    Home Medications:     Prior to Admission medications    Medication Sig Start Date End Date Taking? Authorizing Provider   sodium chloride (Ocean Nasal) 0.65 % nasal squeeze bottle 1 Spray as needed for Congestion. Yes Provider, Historical   umeclidinium-vilanteroL (Anoro Ellipta) 62.5-25 mcg/actuation inhaler Take 1 Puff by inhalation daily. Yes Provider, Historical   zolpidem (AMBIEN) 10 mg tablet Take 5 mg by mouth nightly as needed for Sleep. Yes Provider, Historical   vit A/vit C/vit E/zinc/copper (PRESERVISION AREDS PO) Take 1 Tab by mouth daily. Yes Provider, Historical   loratadine-pseudoephedrine (Claritin-D 24 Hour)  mg per tablet Take 1 Tab by mouth daily. Yes Provider, Historical   HYDROcodone-acetaminophen (NORCO) 5-325 mg per tablet Take 1 Tab by mouth every six (6) hours as needed for Pain. Max Daily Amount: 4 Tabs. Indications: Pain 7/26/18  Yes Adelaide Reed MD   montelukast (SINGULAIR) 10 mg tablet Take 10 mg by mouth daily. 11/16/17  Yes Provider, Historical   omeprazole (PRILOSEC) 20 mg capsule Take 20 mg by mouth two (2) times a day. 5/13/15  Yes Provider, Historical   atorvastatin (LIPITOR) 20 mg tablet Take 20 mg by mouth nightly.    Yes Provider, Historical       Allergies/Social/Family History:     No Known Allergies   Social History     Tobacco Use    Smoking status: Former     Packs/day: 1.00     Years: 40.00     Pack years: 40.00     Types: Cigarettes     Quit date: 2009     Years since quittin.5    Smokeless tobacco: Never   Substance Use Topics    Alcohol use: No     Alcohol/week: 0.0 standard drinks     Comment: RARELY      Family History   Problem Relation Age of Onset    Heart Disease Mother     Mult Sclerosis Sister     Heart Disease Sister     Psychiatric Disorder Sister         DEPRESSION    No Known Problems Sister     Anesth Problems Neg Hx             Objective:   Vital Signs:  Visit Vitals  BP (!) 114/59 (BP 1 Location: Left upper arm, BP Patient Position: Sitting)   Pulse 75   Temp 97.4 °F (36.3 °C)   Resp 15   Ht 5' 9\" (1.753 m)   Wt 69.2 kg (152 lb 8.9 oz)   SpO2 94%   BMI 22.53 kg/m²      O2 Device: None (Room air) Temp (24hrs), Av.8 °F (36.6 °C), Min:97.4 °F (36.3 °C), Max:98 °F (36.7 °C)           Intake/Output:     Intake/Output Summary (Last 24 hours) at 2022 1548  Last data filed at 2022 0351  Gross per 24 hour   Intake 440 ml   Output 500 ml   Net -60 ml       Physical Exam:   General:  Alert, cooperative, no distress, appears stated age. Head:  Normocephalic, without obvious abnormality, atraumatic. Eyes:  Conjunctivae/corneas clear. PERRL   Neck: Supple, symmetrical, no adenopathy, no carotid bruit and no JVD. Lungs:   Clear to auscultation bilaterally. Chest wall:  No tenderness or deformity. Heart:  Regular rate and rhythm, S1-S2 normal, no murmur, no click, rub or gallop. Abdomen:   Soft, non-tender. Bowel sounds present. No masses,  No organomegaly. Extremities: Atraumatic, no cyanosis or edema.    Pulses: Palpable   Skin: No rashes or lesions   Neurologic: Grossly nonfocal        LABS AND  DATA: Personally reviewed  Recent Labs     08/15/22  1032   WBC 4.6   HGB 12.0*   HCT 36.8   *     Recent Labs     08/15/22  1032      K 3.6      CO2 24   BUN 17 CREA 0.76   *   CA 9.1     Recent Labs     08/15/22  1032   AP 90   TP 7.7   ALB 2.7*   GLOB 5.0*     Recent Labs     08/15/22  1032   INR 1.0   PTP 10.9      No results for input(s): PHI, PCO2I, PO2I, FIO2I in the last 72 hours. No results for input(s): CPK, CKMB, TROIQ, BNPP in the last 72 hours. MEDS: Reviewed  Current Facility-Administered Medications   Medication Dose Route Frequency    atorvastatin (LIPITOR) tablet 20 mg  20 mg Oral QHS    acetaminophen (TYLENOL) tablet 650 mg  650 mg Oral Q4H PRN    Or    acetaminophen (TYLENOL) solution 650 mg  650 mg Per NG tube Q4H PRN    Or    acetaminophen (TYLENOL) suppository 650 mg  650 mg Rectal Q4H PRN    aspirin chewable tablet 81 mg  81 mg Oral DAILY    enoxaparin (LOVENOX) injection 40 mg  40 mg SubCUTAneous Q24H       Chest Imaging: personally reviewed and report checked  Results from Hospital Encounter encounter on 08/15/22    XR CHEST PORT    Narrative  EXAM: XR CHEST PORT    INDICATION: Fall, congestion, chronic right lung opacity. COMPARISON: CT chest on 9/15/2021. Chest views on 8/9/2022 and 10/30/2020. TECHNIQUE: Upright portable chest AP view    FINDINGS: Cardiac monitoring wires overlie the thorax. The cardiomediastinal and  hilar contours are within normal limits. The pulmonary vasculature is within  normal limits. Right apical opacity is unchanged. Surrounding irregular reticular interstitial  opacities are unchanged. Right lower lobe opacities are increased in density. Left lung base opacities are increased in density. No pneumothorax. Bones are  unchanged. Impression  Unchanged right apical opacity and interstitial lung disease. Superimposed  bibasilar pneumonia versus atelectasis versus edema is new. Consider PA and  lateral chest views when the patient can better tolerate.     Results from [place] encounter on 08/15/22    CT CODE NEURO PERF W CBF    Narrative  EXAM:  CTA CODE NEURO HEAD AND NECK W CONT, CT CODE NEURO PERF W CBF    INDICATION:  Code Stroke    COMPARISON:  Noncontrast head CT of earlier today    TECHNIQUE:  Multislice helical axial CT angiography was performed from the aortic arch to  the top of the head during uneventful rapid bolus intravenous administration of  150 mL of Isovue 370. Postprocessing was performed to include MIP and  reformatted images. Standard images of the head were also obtained following  contrast administration and a delay. CT brain perfusion was performed with generation of hemodynamic maps of multiple  parameters, including cerebral blood flow, cerebral volume, Tmax and MTT (mean  transit time). This study was analyzed by the [address] ai algorithm. CT dose reduction was achieved through the use of a standardized protocol  tailored for this examination and automatic exposure control for dose  modulation. FINDINGS:    CT PERFUSION:  There are no regional areas of elevated Tmax, decreased cerebral blood flow or  blood volume. rCBF < 30% = 0 cc. Tmax > 6 seconds = 0 cc. Head CT:  There is prominence of the ventricles and cortical sulci, consistent with  cerebral volume loss. .  There is no intracranial hemorrhage or evolving infarct. . There is no abnormal enhancement. . .    CTA NECK:    Great vessels: Normal arch anatomy with the origins patent. Right subclavian artery: Patent    Left subclavian artery: Patent    Right common carotid artery: Patent    Left common carotid artery: Patent    Cervical right internal carotid artery: Patent with no significant stenosis by  NASCET criteria. Calcified plaque at the origin with less than 25% stenosis. Cervical left internal carotid artery: Patent with no significant stenosis by  NASCET criteria. Right vertebral artery: Patent    Left vertebral artery: Patent. Nondominant. Small calcified plaques scattered in  the cervical left vertebral artery with areas of mild stenosis. There is severe emphysema in the lung apices. Extensive right apical chronic  consolidation is noted, probably representing post therapeutic changes. The  appearance is similar when compared to a chest CT of 9/15/2021. There are  degenerative changes in the spine. CTA HEAD:    Right cavernous internal carotid artery: Patent. Calcified plaque with moderate  distal cavernous carotid stenosis. Left cavernous internal carotid artery: Patent. Calcified plaque with mild to  moderate distal cavernous carotid stenosis. Anterior cerebral arteries: Patent    Anterior communicating artery: Patent    Right middle cerebral artery: Patent    Left middle cerebral artery: Patent    Posterior cerebral arteries: Patent    Posterior communicating arteries: Patent    Basilar artery: Patent    Distal vertebral arteries: Patent. Dominant, small left vertebral artery with  limited contribution to the basilar artery. Major venous sinus and deep venous system: Patent. Aneurysm: None. Impression  1. Negative CT perfusion study. 2. No acute large vessel occlusion. No flow-limiting stenosis. Tele- reviewed    Medical decision making:   I have reviewed the flowsheet and previous day's notes  Patient has acute or chronic illness that poses a threat to life or bodily function  Review and order of Clinical lab tests  Review and Order of Radiology tests  Independent visualization of Image  Reviewed Oxygen/ NiPPV  High Risk Drug therapy requiring intensive monitoring for toxicity: eg steroids, pressors, antibiotics        Thank you for allowing me to participate in this patient's care. Nik Roque MD      [facility]  Appointment: 2022 1:00 PM  Location: [facility]  Patient #: [id number]  : 1940   / Language: [place] / Race: White  Male      History of Present Illness (Avinash Lehman DO; 2022 1:23 PM)  The patient is a 80year old male who presents for evaluation of shortness of breath. The reason for today's visit is follow-up. The symptoms are described as when climbing stairs. The course is constant There has been no associated cough, fever, hemoptysis, muscle weakness or orthopnea. The symptoms are relieved by inhaled medications. Exacerbating factors include climbing stairs. Past medical history includes lung disease. Previous diagnostic tests have included pulmonary function testing and CT scan. Problem List/Past Medical Junious Ross Minor Naseem; 4/19/2022 1:17 PM)  CHRONIC SINUSITIS (J32.9)    BLADDER CANCER (C67.9)   history of bladder cancer s/p cxrt and bcg tx, followed by Dr. Shawanda Michelle. PNEUMONIA (J18.9)   last Chest CT scan 6/6/17: severe emphysema w/ pulmonary fibrosis  LYMPH NODE ENLARGEMENT (R59.1)   Chest  DYSPHAGIA W/ INITIAL SWALLOWING EVAL ORDER (R13.10)    FORMER TOBACCO USE (Z87.891)   quit smoking 9 yrs ago. FLU VACCINE RECEIVED PRIOR TO TODAY'S VISIT (Z92.29)    ORTIZ'S ESOPHAGUS (K22.70)    HYPOXIA (R09.02)   due to COPD - offered oxygen-patient currently declines    7/20/17-room air ambulatory oximetry patient's rick saturation of 87% with a peak heart rate 95. Placed on oxygen 2 L and ambulated and maintained saturations in 95% range with heart rate of approximately 90  PRE-OPERATIVE CLEARANCE (Z01.818)    POSTNASAL DRIP (R09.82)   saline irrigation BID and flonase QAM  On claritin.  singulair  ELEVATED IGE LEVEL (R76.8)   IgE 1391  RAST + in 4 areas. COPD (CHRONIC OBSTRUCTIVE PULMONARY DISEASE) (J44.9)   data- CTA chest 11/17 with centrilobular emphysema upper lobes and basilar fibrosis.  basilar GGO.  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 At this point BCG rx is done. now on anoro  10/21 FEV1 2.69 (102%) 0.66 no BD response %  OVERWEIGHT (BMI 25.0-29. 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 + CL emphysema. BAL and EBUS 6/2020 by Dr. Juanita Beauchamp- negative 10R/4L. BAL RUL negative AFB smear/cx and fungal showed scant filamentous with negative serum Galactomannan A. NEgative bacterial GS/CX. NEgative cyto. Saw Dr. Julia Oakes in hospital. BCG related lung infeciton less likely. s/p zosyn/vanco in hospital.  Pt per his report had a FEES exam with Dr. Shonna Miller showing aspiration over a year ago. > pt is active no AE. no purulent sputum. no recent abx  COUGH (R05.9)    HISTORY OF GASTROESOPHAGEAL REFLUX (GERD) (Z87.19)    SHORTNESS OF BREATH (R06.02)    OBESITY (BMI 30-39.9) (E66.9)    HEMOPTYSIS (R04.2)   10/20/2020 CXR with smaller RU cavity and inc RUL scar no infiltrates. Dx- resolved one time bright red hemoptysis last week none in 72 hours. suspect post nasal bleed. resolved. No need for abx no fever no PATRICK. Other Problems Vernonious Cody Gusman; 4/19/2022 1:17 PM)  Non-Contributory Other Past History History      Allergies Vernonious Cody Decker; 4/19/2022 1:17 PM)  No Known Drug Allergies   [07/19/2017]: Immunization History Vernonious Cody Gusman; 4/19/2022 1:18 PM)  Up to date      Family History Vernonious Cody Decker; 4/19/2022 1:17 PM)  Heart Disease   Mother. .    Social History Vernonious Cody Decker; 4/19/2022 1:17 PM)  Tobacco use   Former smoker. >1ppd/40yrs-QUIT 20+yrs  Alcohol use   Occasional alcohol use. .  No drug use   . Exercise   daily.  .    Medication History Junious Ross Minor Naseem; 4/19/2022 1:18 PM)  Pulmicort  (0.5MG/2ML Suspension, 1 (one) Inhalation two times daily, Taken starting 09/07/2021) Active. (rinse mouth after use)  Anoro Ellipta  (62.5-25MCG/INH Aero Pow Br Act, 1 (one) Inhalation daily, Taken starting 04/20/2021) Active. ProAir RespiClick  (162 (90 Base)MCG/ACT Aero Pow Br Act, 2 (two) Inhalation every 4 hours as needed, Taken starting 05/17/2021) Active. Claritin  (10MG Capsule, Oral) Active. Lipitor  (20MG Tablet, Oral at bedtime) Active. Omeprazole  (20MG Capsule DR, one Oral daily) Active. Montelukast Sodium  (10MG Tablet, take 1 tab Oral daily at bedtime) Active. Multiple Vitamins  (Oral) Active. Medications Reconciled     Past Surgical History Cande Gusman; 4/19/2022 1:17 PM)  Hiatal Hernia Surgery   . Tonsillectomy   . Vitals Cande Gusman; 4/19/2022 1:16 PM)  4/19/2022 1:16 PM  Weight: 173.38 lb   Height: 68.5 in   Body Surface Area: 1.93 m²   Body Mass Index: 25.98 kg/m²    Temp.: 97.6° F  (Temporal)    Pulse: 84 (Regular)    Resp. : 16 (Unlabored)    P. OX: 91% (Room air)  BP: 124/67(Sitting, Left Arm, Standard)              Physical Exam ([facility]; 4/19/2022 1:21 PM)  Chest and Lung Exam  Constitutional - alert, cooperative, well nourished and no acute distress. Skin - normal moisture and normal warmth. Head and Neck - normocephalic, atraumatic, trachea midline and thyroid normal.  Eyes - pupils equal round and reactive to light, conjunctiva normal and lids normal.  ENMT - external ears have normal appearance, external nose is normal, oral mucosa is moist and oropharynx clear. Chest and Lungs  Auscultation - breath sounds are diminished, bilaterally, with occasional crackles. Palpation - chest wall non-tender. Inspection - normal shape. Cardiovascular  Auscultation - no heart murmurs and rate regular. Inspection - jugular veins nondistended and no lower extremity edema. Abdomen - normal contour, nontender, soft and no hepatosplenomegaly.   Peripheral Vascular - normal gait and no digital clubbing. Neuropsychiatric - oriented x 3, fluent, appropriate mood and affect and good insight. Lymphatic - no neck lymphadenopathy and no supraclavicular lymphadenopathy. Neurological - extraocular muscles intact. Assessment & Plan (Avinash Lehman DO; 4/19/2022 1:25 PM)  CHRONIC OBSTRUCTIVE PULMONARY DISEASE, UNSPECIFIED COPD TYPE (J44.9)  Story: data- CTA chest 11/17 with centrilobular emphysema upper lobes and basilar fibrosis. basilar GGO.  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 At this point BCG rx is done. 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 + CL emphysema. BAL and EBUS 6/2020 by Dr. Anderson Martins- negative 10R/4L. BAL RUL negative AFB smear/cx and fungal showed scant filamentous with negative serum Galactomannan A. NEgative bacterial GS/CX. NEgative cyto. Saw Dr. Magdi Louie in hospital. BCG related lung infeciton less likely. s/p zosyn/vanco in hospital.  Pt per his report had a FEES exam with Dr. Emma Burnette showing aspiration over a year ago.   > pt is active no AE. no purulent sputum. no recent abx  HOW TO ACCESS HEALTH INFORMATION ONLINE (Z71.9)  Current Plans  Pt Education - How to [address] using Patient Portal and 3rd Party Apps: discussed with patient and provided information. OVERWEIGHT (BMI 25.0-29. 9) (E66.3)  Current Plans  LIFESTYLE EDUCATION REGARDING DIET (51997) (Patient advised to see educational material on our website.)  Pt Education - How to [address] using Patient Portal and 3rd Party Apps: discussed with patient and provided information. POSTNASAL DRIP (R09.82)  Story: saline irrigation BID and flonase QAM  On claritin.  singulair  ELEVATED IGE LEVEL (R76.8)  Story: IgE 1391  RAST + in 4 areas.         Signed electronically by Mitch Torres DO (4/19/2022 1:27 PM)

## 2022-08-16 NOTE — DISCHARGE SUMMARY
Discharge Summary       PATIENT ID: Chantelle Munson  MRN: [id number]   YOB: 1940    DATE OF ADMISSION: 8/15/2022 10:29 AM    DATE OF DISCHARGE: 8/16/22   PRIMARY CARE PROVIDER: Nasario Spatz, NP     ATTENDING PHYSICIAN: Lavonne Wise  DISCHARGING PROVIDER: Brendon Joyce MD    To contact this individual call [phone] and ask the  to page. If unavailable ask to be transferred the Adult Hospitalist Department. CONSULTATIONS: IP CONSULT TO NEUROLOGY  IP CONSULT TO PULMONOLOGY    PROCEDURES/SURGERIES: * No surgery found *    DISCHARGE DIAGNOSES:  Patricia Lange is a 80 y.o. male who presents with above symptoms. Patient has past medical history of cancer with urostomy bag dyslipidemia COPD. Patient presented to the ED with a complaint of gait disturbance leaning towards left and frequent falls in the last few days. There was some associated dizziness with this but no further symptoms right now. As per describing his symptoms he said he was trying to get up and then stumbled to the right. With this he called his daughter who told her granddaughter (who has been as an EMT/)  They checked him out did not notice anything of the ordinary but brought him to ER for further evaluation. Came in as a stroke alert, initial evaluation was unrevealing. [address] [facility],[address] COURSE:   Fall possibly in the setting of inadequate sleep work-up for TIA CVA was negative seen by neurology no further recommendation patient follow-up with primary care physician malnutrition advised patient to take boost Ensure patient discharged home no need for home health evaluated by PT OT    DISCHARGE DIAGNOSES / PLAN:      Discharge home    BMI: Body mass index is 22.53 kg/m². . This patient: Has a BMI within normal limits.     PENDING TEST RESULTS:   At the time of discharge the following test results are still pending:      ADDITIONAL CARE RECOMMENDATIONS:        NOTIFY YOUR PHYSICIAN FOR ANY OF THE FOLLOWING:   Fever over 101 degrees for 24 hours. Chest pain, shortness of breath, fever, chills, nausea, vomiting, diarrhea, change in mentation, falling, weakness, bleeding. Severe pain or pain not relieved by medications, as well as any other signs or symptoms that you may have questions about. FOLLOW UP APPOINTMENTS:    Follow-up Information       Follow up With Specialties Details Why Contact Laurie Loaiza NP Nurse Practitioner Follow up in 1 week(s)  [address] [facility]  [phone]                 DIET: Cardiac Diet    ACTIVITY: Activity as tolerated    EQUIPMENT needed:     DISCHARGE MEDICATIONS:  Current Discharge Medication List        CONTINUE these medications which have NOT CHANGED    Details   sodium chloride (Ocean Nasal) 0.65 % nasal squeeze bottle 1 Spray as needed for Congestion. umeclidinium-vilanteroL (Anoro Ellipta) 62.5-25 mcg/actuation inhaler Take 1 Puff by inhalation daily. zolpidem (AMBIEN) 10 mg tablet Take 5 mg by mouth nightly as needed for Sleep.      vit A/vit C/vit E/zinc/copper (PRESERVISION AREDS PO) Take 1 Tab by mouth daily. loratadine-pseudoephedrine (Claritin-D 24 Hour)  mg per tablet Take 1 Tab by mouth daily. HYDROcodone-acetaminophen (NORCO) 5-325 mg per tablet Take 1 Tab by mouth every six (6) hours as needed for Pain. Max Daily Amount: 4 Tabs. Indications: Pain  Qty: 25 Tab, Refills: 0    Associated Diagnoses: Pain at surgical incision      montelukast (SINGULAIR) 10 mg tablet Take 10 mg by mouth daily. omeprazole (PRILOSEC) 20 mg capsule Take 20 mg by mouth two (2) times a day. atorvastatin (LIPITOR) 20 mg tablet Take 20 mg by mouth nightly.              DISPOSITION:  x  Home With:   OT  PT  HH  RN       Long term SNF/Inpatient Rehab    Independent/assisted living    Hospice    Other:       PATIENT CONDITION AT DISCHARGE:     Functional status    Poor     Deconditioned x Independent      Cognition   x  Lucid     Forgetful     Dementia      Catheters/lines (plus indication)    Culver     PICC     PEG    x None      Code status   x  Full code     DNR      PHYSICAL EXAMINATION AT DISCHARGE:  General:          Alert, cooperative, no distress, appears stated age. HEENT:           Atraumatic, anicteric sclerae, pink conjunctivae                          No oral ulcers, mucosa moist, throat clear, dentition fair  Neck:               Supple, symmetrical  Lungs:             Clear to auscultation bilaterally. No Wheezing or Rhonchi. No rales. Chest wall:      No tenderness  No Accessory muscle use. Heart:              Regular  rhythm,  No  murmur   No edema  Abdomen:        Soft, non-tender. Not distended. Bowel sounds normal  Extremities:     No cyanosis. No clubbing,                            Skin turgor normal, Capillary refill normal  Skin:                Not pale. Not Jaundiced  No rashes   Psych:             Not anxious or agitated. Neurologic:      Alert, moves all extremities, answers questions appropriately and responds to commands       CHRONIC MEDICAL DIAGNOSES:  Problem List as of 8/16/2022 Date Reviewed: 2/23/2022            Codes Class Noted - Resolved    Fall ICD-10-CM: W19. Cat Gentile  ICD-9-CM: E888.9  8/16/2022 - Present        * (Principal) TIA (transient ischemic attack) ICD-10-CM: G45.9  ICD-9-CM: 435.9  8/15/2022 - Present        Combined forms of age-related cataract of right eye ICD-10-CM: H25.811  ICD-9-CM: 366.19  5/4/2021 - Present    Overview Signed 5/4/2021  5:08 PM by Jenifer Linwood, DO     KPE/IOL OD             Cavitary lesion of lung ICD-10-CM: J98.4  ICD-9-CM: 518.89  6/2/2020 - Present        PNA (pneumonia) ICD-10-CM: J18.9  ICD-9-CM: 109  6/2/2020 - Present        Other general symptoms and signs  ICD-10-CM: R68.89  ICD-9-CM: 780.99  8/20/2019 - Present        Anemia ICD-10-CM: D64.9  ICD-9-CM: 285.9  8/20/2019 - Present        Peristomal hernia ICD-10-CM: K46.9  ICD-9-CM: 553.9  2/28/2019 - Present        Ulcer of abdomen wall ([facility] ICD-10-CM: L98.499  ICD-9-CM: 707.8  2/28/2019 - Present        Chronic bronchitis ([facility] ICD-10-CM: J42  ICD-9-CM: 491.9  6/20/2018 - Present        Thrombocytopenia ([facility] ICD-10-CM: D69.6  ICD-9-CM: 287.5  12/5/2017 - Present        Shortness of breath ICD-10-CM: R06.02  ICD-9-CM: 786.05  12/5/2017 - Present        Malignant neoplasm of lateral wall of urinary bladder (HCC) ICD-10-CM: C67.2  ICD-9-CM: 188.2  12/5/2017 - Present        Combined forms of age-related cataract of left eye ICD-10-CM: H25.812  ICD-9-CM: 366.19  11/16/2016 - Present    Overview Signed 11/16/2016  9:32 AM by Patel Haskins DO     Complex KPE/IOL OS             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  ICD-9-CM: 364.81  11/16/2016 - Present    Overview Signed 11/16/2016  9:32 AM by Patel Haskins DO     Needs iris hooks             Incisional hernia ICD-10-CM: S65.5  ICD-9-CM: 553.21  3/15/2016 - Present        COPD (chronic obstructive pulmonary disease) ([facility] ICD-10-CM: J44.9  ICD-9-CM: 221  6/2/2015 - Present        Umbilical hernia NGC-29-MP: K42.9  ICD-9-CM: 553.1  5/27/2015 - Present        Bladder cancer ([facility] ICD-10-CM: C67.9  ICD-9-CM: 188.9  3/26/2015 - Present        RESOLVED: Pneumonia ICD-10-CM: J18.9  ICD-9-CM: 589  11/18/2017 - 8/3/2021           Greater than 30  minutes were spent with the patient on counseling and coordination of care    Signed:   Shanika Lutz MD  8/16/2022  11:56 AM

## 2022-08-16 NOTE — PROGRESS NOTES
I have reviewed discharge instructions with the patient and daughter. The patient and daughter verbalized understanding. Bedside RN performed patient education and medication education. Discharge concerns initiated and discussed with patient, including clarification on \"who\" assists the patient at their home and instructions for when the home going patient should call their provider after discharge. Opportunity for questions and clarification was provided. Patient receptive to education: YES  Patient stated: Verbalized Understanding  Barriers to Education: None  Diagnosis Education given:  YES    Length of stay: 1  Expected Day of Discharge: 8/16/2022  Ask if they have \"Help at Home\" & add to white board?   NO    Education Day #: 1    Pt aware of HCAHPS survey: YES          Stroke Education documented in Patient Education: YES  Core Measures Documented in [facility]:  Risk Factors: YES  Warning signs of stroke: YES  When to Activate 911: YES  Medication Education for Risk Factors: YES  Smoking cessation if applicable: YES  Written Education Given:  YES    Discharge NIH Completed: YES  Score: 0

## 2022-08-16 NOTE — PROGRESS NOTES
Neurology Sign Off Note:    MRI reviewed without significant  acute findings. CTA unrevealing, lipid panel, hemoglobin A1c at goal. Would agree with continuing aspirin given chronic vascular disease otherwise history is not striking for stroke/TIA. Please call with questions/concerns.

## 2022-08-16 NOTE — PROGRESS NOTES
Physician Progress Note      Mona Walters  CSN #:                  [id number]  :                       1940  ADMIT DATE:       8/15/2022 10:29 AM  DISCH DATE:  RESPONDING  PROVIDER #:        Kyara Marquez MD          QUERY TEXT:    Patient admitted with TIA. If possible, please document in progress notes and discharge summary if you are evaluating and /or treating any of the following: The medical record reflects the following:  Risk Factors: low BMI    Clinical Indicators:  Neuro consult 8/15  Was also noted to have some low normal blood pressures at presentation, has dry oral mucosa and admits to not drinking as much water as he should as well as endorsing unintentional weight loss secondary to poor appetite over the past several months    nutritional consult   Malnutrition Assessment:  Malnutrition Status:  Severe malnutrition (22 1109)  Context:  Social/environmental circumstances  Findings of the 6 clinical characteristics of malnutrition:  Energy Intake:  Less than 75% est energy requirements for 3 months or longer  Weight Loss:  Greater than 5% over 1 month  Body Fat Loss:  Severe body fat loss, Buccal region, Triceps  Muscle Mass Loss:  Mild muscle mass loss, Clavicles (pectoralis &deltoids), Hand (interosseous), Temples (temporalis)  Fluid Accumulation:  No significant fluid accumulation,   Strength:  Not performed    Anthropometric Measures:  Height: 5' 9\" (175.3 cm)  Ideal Body Weight (IBW): 160 lbs (73 kg)  Current Body Wt:  69.2 kg (152 lb 8.9 oz), 95.3 % IBW. Bed scale  Current BMI (kg/m2): 22.5  Usual Body Weight: 79.4 kg (174 lb 15.7 oz)  % Weight Change (Calculated): -12.8  Weight Adjustment: No adjustment  BMI Category: Underweight (BMI less than 22) age over 72  ? Treatment:  nutritional consult  Nutrition Recommendations/Plan:  1. High akilah/high protein shakes BID. 2. Weekly standing scale weights.   3. Consider addition of daily MVI.    ADULT DIET Regular; Low Sodium (2 gm)      Thank you,  Sabine Landa RN [facility]  Clinical Documentation  [phone] or via Shruthi Parra 1998:  https://[email or web address] onlinelibrary. andrade. com/doi/full/10.1177/5300204243543622  Options provided:  -- Protein calorie malnutrition moderate with BMI of 22.53  -- Protein calorie malnutrition severe with BMI of 22.53  -- Other - I will add my own diagnosis  -- Disagree - Not applicable / Not valid  -- Disagree - Clinically unable to determine / Unknown  -- Refer to Clinical Documentation Reviewer    PROVIDER RESPONSE TEXT:    This patient has moderate protein calorie malnutrition with BMI of 22.53.     Query created by: Joshua Warren on 8/16/2022 11:32 AM      Electronically signed by:  Lucila Manjarrez MD 8/16/2022 11:56 AM

## 2022-08-16 NOTE — PROGRESS NOTES
Problem: Falls - Risk of  Goal: *Absence of Falls  Description: Document Castro Maldonado Fall Risk and appropriate interventions in the flowsheet.   Outcome: Resolved/Met     Problem: Patient Education: Go to Patient Education Activity  Goal: Patient/Family Education  Outcome: Resolved/Met     Problem: Patient Education: Go to Patient Education Activity  Goal: Patient/Family Education  Outcome: Resolved/Met     Problem: TIA/CVA Stroke: 0-24 hours  Goal: Off Pathway (Use only if patient is Off Pathway)  Outcome: Resolved/Met  Goal: Activity/Safety  Outcome: Resolved/Met  Goal: Consults, if ordered  Outcome: Resolved/Met  Goal: Diagnostic Test/Procedures  Outcome: Resolved/Met  Goal: Nutrition/Diet  Outcome: Resolved/Met  Goal: Discharge Planning  Outcome: Resolved/Met  Goal: Medications  Outcome: Resolved/Met  Goal: Respiratory  Outcome: Resolved/Met  Goal: Treatments/Interventions/Procedures  Outcome: Resolved/Met  Goal: Minimize risk of bleeding post-thrombolytic infusion  Outcome: Resolved/Met  Goal: Monitor for complications post-thrombolytic infusion  Outcome: Resolved/Met  Goal: Psychosocial  Outcome: Resolved/Met  Goal: *Hemodynamically stable  Outcome: Resolved/Met  Goal: *Neurologically stable  Description: Absence of additional neurological deficits    Outcome: Resolved/Met  Goal: *Verbalizes anxiety and depression are reduced or absent  Outcome: Resolved/Met  Goal: *Absence of Signs of Aspiration on Current Diet  Outcome: Resolved/Met  Goal: *Absence of deep venous thrombosis signs and symptoms(Stroke Metric)  Outcome: Resolved/Met  Goal: *Ability to perform ADLs and demonstrates progressive mobility and function  Outcome: Resolved/Met  Goal: *Stroke education started(Stroke Metric)  Outcome: Resolved/Met  Goal: *Dysphagia screen performed(Stroke Metric)  Outcome: Resolved/Met  Goal: *Rehab consulted(Stroke Metric)  Outcome: Resolved/Met     Problem: TIA/CVA Stroke: Day 2 Until Discharge  Goal: Off Pathway (Use only if patient is Off Pathway)  Outcome: Resolved/Met  Goal: Activity/Safety  Outcome: Resolved/Met  Goal: Diagnostic Test/Procedures  Outcome: Resolved/Met  Goal: Nutrition/Diet  Outcome: Resolved/Met  Goal: Discharge Planning  Outcome: Resolved/Met  Goal: Medications  Outcome: Resolved/Met  Goal: Respiratory  Outcome: Resolved/Met  Goal: Treatments/Interventions/Procedures  Outcome: Resolved/Met  Goal: Psychosocial  Outcome: Resolved/Met  Goal: *Verbalizes anxiety and depression are reduced or absent  Outcome: Resolved/Met  Goal: *Absence of aspiration  Outcome: Resolved/Met  Goal: *Absence of deep venous thrombosis signs and symptoms(Stroke Metric)  Outcome: Resolved/Met  Goal: *Optimal pain control at patient's stated goal  Outcome: Resolved/Met  Goal: *Tolerating diet  Outcome: Resolved/Met  Goal: *Ability to perform ADLs and demonstrates progressive mobility and function  Outcome: Resolved/Met  Goal: *Stroke education continued(Stroke Metric)  Outcome: Resolved/Met     Problem: Ischemic Stroke: Discharge Outcomes  Goal: *Verbalizes anxiety and depression are reduced or absent  Outcome: Resolved/Met  Goal: *Verbalize understanding of risk factor modification(Stroke Metric)  Outcome: Resolved/Met  Goal: *Hemodynamically stable  Outcome: Resolved/Met  Goal: *Absence of aspiration pneumonia  Outcome: Resolved/Met  Goal: *Aware of needed dietary changes  Outcome: Resolved/Met  Goal: *Verbalize understanding of prescribed medications including anti-coagulants, anti-lipid, and/or anti-platelets(Stroke Metric)  Outcome: Resolved/Met  Goal: *Tolerating diet  Outcome: Resolved/Met  Goal: *Aware of follow-up diagnostics related to anticoagulants  Outcome: Resolved/Met  Goal: *Ability to perform ADLs and demonstrates progressive mobility and function  Outcome: Resolved/Met  Goal: *Absence of DVT(Stroke Metric)  Outcome: Resolved/Met  Goal: *Absence of aspiration  Outcome: Resolved/Met  Goal: *Optimal pain control at patient's stated goal  Outcome: Resolved/Met  Goal: *Home safety concerns addressed  Outcome: Resolved/Met  Goal: *Describes available resources and support systems  Outcome: Resolved/Met  Goal: *Verbalizes understanding of activation of EMS(911) for stroke symptoms(Stroke Metric)  Outcome: Resolved/Met  Goal: *Understands and describes signs and symptoms to report to providers(Stroke Metric)  Outcome: Resolved/Met  Goal: *Neurolgocially stable (absence of additional neurological deficits)  Outcome: Resolved/Met  Goal: *Verbalizes importance of follow-up with primary care physician(Stroke Metric)  Outcome: Resolved/Met  Goal: *Smoking cessation discussed,if applicable(Stroke Metric)  Outcome: Resolved/Met  Goal: *Depression screening completed(Stroke Metric)  Outcome: Resolved/Met     Problem: Discharge Planning  Goal: *Discharge to safe environment  Outcome: Resolved/Met

## 2022-08-16 NOTE — PROGRESS NOTES
PHYSICAL THERAPY EVALUATION/DISCHARGE  Patient: Monica Mckeon (19 y.o. male)  Date: 8/16/2022  Primary Diagnosis: TIA (transient ischemic attack) [G45.9]       Precautions:         ASSESSMENT  Based on the objective data described below, the patient presents with overall mobility at/near baseline function s/p admission for TIA - imaging negative at the time of this evaluation. Pt presents with generalized weakness, overall unsteadiness, and shortness of breath all of which he reports are baseline. Pt was independent to mod I for all mobility and tolerated ambulating 300 ft without difficulty. Of note, patient reports he recently returned home after living in ILF for ~2 months   He reports he did not like ILF and has been distressed at home by his home environment being in disarray since returning. He also reports he has lost 42 pounds in 3 weeks due to not eating. Pt has had 2 recent falls. Pt has no further PT needs, will sign off. Other factors to consider for discharge: lives alone, 2 recent falls, at baseline      Further skilled acute physical therapy is not indicated at this time. PLAN :  Recommendation for discharge: (in order for the patient to meet his/her long term goals)  No skilled physical therapy/ follow up rehabilitation needs identified at this time. This discharge recommendation:  Has not yet been discussed the attending provider and/or case management    IF patient discharges home will need the following DME: none       SUBJECTIVE:   Patient stated I have COPD so I sometimes get short of breath.     OBJECTIVE DATA SUMMARY:   HISTORY:    Past Medical History:   Diagnosis Date    Aneurysm ([facility] [facility]     ABDOMINAL     Arrhythmia 3/2/2014    Stated cardiac cath for abn EKG several years ago NEG 4/23/21 pt denies    Shepherd esophagus     Bladder cancer (HCC)     CAD (coronary artery disease)     4/23/21 pt denies    Calculus of kidney     Chronic obstructive pulmonary disease ([facility] Contact dermatitis and other eczema, due to unspecified cause     GERD (gastroesophageal reflux disease)     ORTIZ'S ESOPH    Hyperlipemia     Hypertension     Ill-defined condition     HIATEL HERNIA    Ill-defined condition     ECZEMA    Presence of urostomy (HCC)     PUD (peptic ulcer disease)     Baretts Esophagus/antral gastritis/    Skin cancer      Past Surgical History:   Procedure Laterality Date    HX ADENOIDECTOMY      HX CATARACT REMOVAL Left 11/2016    HX COLONOSCOPY      HX GI      Colonoscopy x 2-3    HX GI      COLONOSCOPY    HX HERNIA REPAIR  8/21/13    left inguinal hernia repair, LAPAROSCOPIC  (DR JAQUEZ)    HX HERNIA REPAIR  3/14/16    LAPAROSCOPIC Incisional W/ MESH by     HX OTHER SURGICAL      REMOVAL OF SKIN CA R NECK    HX TONSILLECTOMY      HX UROLOGICAL  2010    Bladder bx/kidney stone removal    HX UROLOGICAL  2015    CYSTO    HX UROLOGICAL      cystectomy/urostomy    HX VASCULAR ACCESS  03/31/2015    PORT R CHEST WALL    HX VASCULAR ACCESS      REMOVAL OF PORT R CHEST WALL    AK ABDOMEN SURGERY PROC UNLISTED      UMBILICAL hernia      Prior level of function: Lives alone with family members local.  Was independent with mobility and ADLs. 2 recent falls.     Home Situation  Home Environment: Private residence  Wheelchair Ramp: Yes  One/Two Story Residence: One story  Living Alone: Yes  Support Systems: Child(samm), Other Family Member(s)  Patient Expects to be Discharged to[de-identified] Home  Current DME Used/Available at Home: None  Tub or Shower Type: Tub/Shower combination    EXAMINATION/PRESENTATION/DECISION MAKING:   Critical Behavior:  Neurologic State: Alert  Orientation Level: Oriented X4  Cognition: Follows commands  Safety/Judgement: Awareness of environment    Range Of Motion:  AROM: Within functional limits    Strength:    Strength: Generally decreased, functional    Tone & Sensation:   Tone: Normal  Sensation: Intact    Coordination:  Coordination: Generally decreased, functional    Functional Mobility:  Bed Mobility:  Supine to Sit: Independent  Sit to Supine: Independent     Transfers:  Sit to Stand: Independent  Stand to Sit: Independent  Bed to Chair: Independent    Balance:   Sitting: Intact  Standing: Impaired; Without support  Standing - Dynamic : Good    Ambulation/Gait Training:  Distance (ft): 300 Feet (ft)  Assistive Device: Gait belt  Ambulation - Level of Assistance: Modified independent  Gait Abnormalities: Decreased step clearance;Shuffling gait  Base of Support: Narrowed  Speed/Valeria: Slow;Shuffled  Step Length: Right shortened;Left shortened    Activity Tolerance:   Good    After treatment patient left in no apparent distress:   Sitting in chair and Call bell within reach    COMMUNICATION/EDUCATION:   The patients plan of care was discussed with: Registered nurse. Fall prevention education was provided and the patient/caregiver indicated understanding., Patient/family have participated as able in goal setting and plan of care. , and Patient/family agree to work toward stated goals and plan of care.     Thank you for this referral.  Rodney Mills, PT, DPT   Time Calculation: 10 mins

## 2022-08-16 NOTE — PROGRESS NOTES
SPEECH PATHOLOGY BEDSIDE SWALLOW EVALUATION/DISCHARGE  Patient: Maria De Jesus Crandall (25 y.o. male)  Date: 8/16/2022  Primary Diagnosis: TIA (transient ischemic attack) [G45.9]       Precautions:        ASSESSMENT :  Patient presented with dizziness and gait disturbance. Neuro work up including CT and MRI have been negative for acute infarct, showed chronic cerebellar infarct. Patient passed swallow screen and has been tolerating regular diet. RN reporting patient with difficulty swallowing pills and with decreased intake PTA, reporting patient would benefit from evaluation. Based on the objective data described below, the patient presents with functional oropharyngeal swallow with no difficulties or s/s of aspiration appreciated at bedside with any consistencies. Patient denies any oropharyngeal wallowing concerns however reports that he has Shepherd's esophagus at baseline and is being followed by GI. Reports only difficulty is with large pills. Given this and bedside presentation. Recommend regular diet/thin liquids with general aspiration precautions. Suspect pt is at baseline swallow function and therefore, skilled acute therapy provided by a speech-language pathologist is not indicated at this time. SLP will sign off. Please reconsult with any changes or if SLP can be of any further assistance. Skilled acute therapy provided by a speech-language pathologist is not indicated at this time. PLAN :  Recommendations:  -- regular diet/ thin liquids  -- general aspiration precautions including completely upright for all PO   -- large pills crushed in applesauce when able   -- follow up with GI re: esophageal function  -- SLP will sign off     Discharge Recommendations: None     SUBJECTIVE:   Patient stated I can swallow fine.     OBJECTIVE:     Past Medical History:   Diagnosis Date    Aneurysm ([address]     ABDOMINAL     Arrhythmia 3/2/2014    Stated cardiac cath for abn EKG several years ago NEG 4/23/21 pt denies Ortiz esophagus     Bladder cancer (HCC)     CAD (coronary artery disease)     4/23/21 pt denies    Calculus of kidney     Chronic obstructive pulmonary disease (HCC)     Contact dermatitis and other eczema, due to unspecified cause     GERD (gastroesophageal reflux disease)     ORTIZ'S ESOPH    Hyperlipemia     Hypertension     Ill-defined condition     HIATEL HERNIA    Ill-defined condition     ECZEMA    Presence of urostomy (HCC)     PUD (peptic ulcer disease)     Baretts Esophagus/antral gastritis/    Skin cancer      Past Surgical History:   Procedure Laterality Date    HX ADENOIDECTOMY      HX CATARACT REMOVAL Left 11/2016    HX COLONOSCOPY      HX GI      Colonoscopy x 2-3    HX GI      COLONOSCOPY    HX HERNIA REPAIR  8/21/13    left inguinal hernia repair, LAPAROSCOPIC  (DR JAQUEZ)    HX HERNIA REPAIR  3/14/16    LAPAROSCOPIC Incisional W/ MESH by     HX OTHER SURGICAL      REMOVAL OF SKIN CA R NECK    HX TONSILLECTOMY      HX UROLOGICAL  2010    Bladder bx/kidney stone removal    HX UROLOGICAL  2015    CYSTO    HX UROLOGICAL      cystectomy/urostomy    HX VASCULAR ACCESS  03/31/2015    PORT R CHEST WALL    HX VASCULAR ACCESS      REMOVAL OF PORT R CHEST WALL    UT ABDOMEN SURGERY PROC UNLISTED      UMBILICAL hernia      Prior Level of Function/Home Situation:   Home Situation  Home Environment: Private residence  Wheelchair Ramp: Yes  One/Two Story Residence: One story  Living Alone: Yes  Support Systems: Child(samm), Other Family Member(s)  Patient Expects to be Discharged to[de-identified] Home  Current DME Used/Available at Home: None  Tub or Shower Type: Tub/Shower combination  Diet prior to admission: regular/thin  Current Diet:  regular/thin   Cognitive and Communication Status:  Neurologic State: Alert  Orientation Level: Oriented X4  Cognition: Follows commands  Perception: Appears intact  Perseveration: No perseveration noted  Safety/Judgement: Awareness of environment  Oral Assessment:  Oral Assessment  Labial: No impairment  Dentition: Intact; Natural  Oral Hygiene: moist oral mucosa  Lingual: No impairment  Velum: No impairment  Mandible: No impairment  P.O. Trials:  Patient Position: upright in bed  Vocal quality prior to P.O.: Other (comment) (congested)  Consistency Presented: Thin liquid; Solid  How Presented: Self-fed/presented     Bolus Acceptance: No impairment  Bolus Formation/Control: No impairment     Propulsion: No impairment  Oral Residue: None  Initiation of Swallow: No impairment  Laryngeal Elevation: Functional  Aspiration Signs/Symptoms: None  Pharyngeal Phase Characteristics: No impairment, issues, or problems              Oral Phase Severity: No impairment  Pharyngeal Phase Severity : No impairment  NOMS:   The NOMS functional outcome measure was used to quantify this patient's level of swallowing impairment. Based on the NOMS, the patient was determined to be at level 7 for swallow function     NOMS Swallowing Levels:  Level 1 (CN): NPO  Level 2 (CM): NPO but takes consistency in therapy  Level 3 (CL): Takes less than 50% of nutrition p.o. and continues with nonoral feedings; and/or safe with mod cues; and/or max diet restriction  Level 4 (CK): Safe swallow but needs mod cues; and/or mod diet restriction; and/or still requires some nonoral feeding/supplements  Level 5 (CJ): Safe swallow with min diet restriction; and/or needs min cues  Level 6 (CI): Independent with p.o.; rare cues; usually self cues; may need to avoid some foods or needs extra time  Level 7 ([address] [place] [address]): Independent for all p.o.  AGNIESZKA. (2003). National Outcomes Measurement System (NOMS): Adult Speech-Language Pathology User's Guide.        Pain:  Pain Scale 1: Numeric (0 - 10)  Pain Intensity 1: 0     After treatment:   Patient left in no apparent distress in bed, Call bell within reach, and Nursing notified    COMMUNICATION/EDUCATION:     The patient's plan of care including recommendations, planned interventions, and recommended diet changes were discussed with: Registered nurse.      Thank you for this referral.  CACHORRO Bassett Bias Pathologist     Time Calculation: 11 mins

## 2022-08-16 NOTE — PROGRESS NOTES
Physical Therapy:     Orders received, chart reviewed and patient evaluated by physical therapy. Patient is at his mobility baseline and has no further PT needs. Will sign off. Full note to follow.     Joao Avilez, PT, DPT

## 2022-08-17 ENCOUNTER — APPOINTMENT (OUTPATIENT)
Dept: GENERAL RADIOLOGY | Age: 82
DRG: 291 | End: 2022-08-17
Payer: MEDICARE

## 2022-08-17 ENCOUNTER — HOSPITAL ENCOUNTER (INPATIENT)
Age: 82
LOS: 5 days | Discharge: ANOTHER HOSPITAL | DRG: 291 | End: 2022-08-22
Attending: STUDENT IN AN ORGANIZED HEALTH CARE EDUCATION/TRAINING PROGRAM | Admitting: INTERNAL MEDICINE
Payer: MEDICARE

## 2022-08-17 DIAGNOSIS — I50.9 CONGESTIVE HEART FAILURE, UNSPECIFIED HF CHRONICITY, UNSPECIFIED HEART FAILURE TYPE (HCC): Primary | ICD-10-CM

## 2022-08-17 PROBLEM — I50.23 ACUTE ON CHRONIC SYSTOLIC (CONGESTIVE) HEART FAILURE (HCC): Status: ACTIVE | Noted: 2022-08-17

## 2022-08-17 LAB
ALBUMIN SERPL-MCNC: 3.7 G/DL (ref 3.5–5.2)
ALP BLD-CCNC: 67 U/L (ref 40–129)
ALT SERPL-CCNC: 10 U/L (ref 0–40)
ANION GAP SERPL CALCULATED.3IONS-SCNC: 4 MMOL/L (ref 7–16)
AST SERPL-CCNC: 22 U/L (ref 0–39)
BASOPHILS ABSOLUTE: 0.03 E9/L (ref 0–0.2)
BASOPHILS RELATIVE PERCENT: 0.5 % (ref 0–2)
BILIRUB SERPL-MCNC: 0.5 MG/DL (ref 0–1.2)
BUN BLDV-MCNC: 12 MG/DL (ref 6–23)
CALCIUM SERPL-MCNC: 9.3 MG/DL (ref 8.6–10.2)
CHLORIDE BLD-SCNC: 105 MMOL/L (ref 98–107)
CO2: 37 MMOL/L (ref 22–29)
CREAT SERPL-MCNC: 1 MG/DL (ref 0.7–1.2)
EOSINOPHILS ABSOLUTE: 0.04 E9/L (ref 0.05–0.5)
EOSINOPHILS RELATIVE PERCENT: 0.6 % (ref 0–6)
GFR AFRICAN AMERICAN: >60
GFR NON-AFRICAN AMERICAN: >60 ML/MIN/1.73
GLUCOSE BLD-MCNC: 137 MG/DL (ref 74–99)
HCT VFR BLD CALC: 30.9 % (ref 37–54)
HEMOGLOBIN: 9.1 G/DL (ref 12.5–16.5)
IMMATURE GRANULOCYTES #: 0.02 E9/L
IMMATURE GRANULOCYTES %: 0.3 % (ref 0–5)
INR BLD: 1.3
LYMPHOCYTES ABSOLUTE: 0.8 E9/L (ref 1.5–4)
LYMPHOCYTES RELATIVE PERCENT: 12.6 % (ref 20–42)
MCH RBC QN AUTO: 25.7 PG (ref 26–35)
MCHC RBC AUTO-ENTMCNC: 29.4 % (ref 32–34.5)
MCV RBC AUTO: 87.3 FL (ref 80–99.9)
MONOCYTES ABSOLUTE: 0.58 E9/L (ref 0.1–0.95)
MONOCYTES RELATIVE PERCENT: 9.2 % (ref 2–12)
NEUTROPHILS ABSOLUTE: 4.86 E9/L (ref 1.8–7.3)
NEUTROPHILS RELATIVE PERCENT: 76.8 % (ref 43–80)
PDW BLD-RTO: 15.9 FL (ref 11.5–15)
PLATELET # BLD: 203 E9/L (ref 130–450)
PMV BLD AUTO: 12 FL (ref 7–12)
POTASSIUM REFLEX MAGNESIUM: 4.1 MMOL/L (ref 3.5–5)
PRO-BNP: 8311 PG/ML (ref 0–450)
PROTHROMBIN TIME: 14.9 SEC (ref 9.3–12.4)
RBC # BLD: 3.54 E12/L (ref 3.8–5.8)
SARS-COV-2, NAAT: NOT DETECTED
SODIUM BLD-SCNC: 146 MMOL/L (ref 132–146)
TOTAL PROTEIN: 6.8 G/DL (ref 6.4–8.3)
TROPONIN, HIGH SENSITIVITY: 36 NG/L (ref 0–11)
WBC # BLD: 6.3 E9/L (ref 4.5–11.5)

## 2022-08-17 PROCEDURE — 85025 COMPLETE CBC W/AUTO DIFF WBC: CPT

## 2022-08-17 PROCEDURE — 94640 AIRWAY INHALATION TREATMENT: CPT

## 2022-08-17 PROCEDURE — 85610 PROTHROMBIN TIME: CPT

## 2022-08-17 PROCEDURE — 1200000000 HC SEMI PRIVATE

## 2022-08-17 PROCEDURE — 71045 X-RAY EXAM CHEST 1 VIEW: CPT

## 2022-08-17 PROCEDURE — 83880 ASSAY OF NATRIURETIC PEPTIDE: CPT

## 2022-08-17 PROCEDURE — 84484 ASSAY OF TROPONIN QUANT: CPT

## 2022-08-17 PROCEDURE — 87635 SARS-COV-2 COVID-19 AMP PRB: CPT

## 2022-08-17 PROCEDURE — 99285 EMERGENCY DEPT VISIT HI MDM: CPT

## 2022-08-17 PROCEDURE — 6370000000 HC RX 637 (ALT 250 FOR IP): Performed by: STUDENT IN AN ORGANIZED HEALTH CARE EDUCATION/TRAINING PROGRAM

## 2022-08-17 PROCEDURE — 93005 ELECTROCARDIOGRAM TRACING: CPT | Performed by: STUDENT IN AN ORGANIZED HEALTH CARE EDUCATION/TRAINING PROGRAM

## 2022-08-17 PROCEDURE — 80053 COMPREHEN METABOLIC PANEL: CPT

## 2022-08-17 RX ORDER — IPRATROPIUM BROMIDE AND ALBUTEROL SULFATE 2.5; .5 MG/3ML; MG/3ML
1 SOLUTION RESPIRATORY (INHALATION) ONCE
Status: COMPLETED | OUTPATIENT
Start: 2022-08-17 | End: 2022-08-17

## 2022-08-17 RX ORDER — FUROSEMIDE 10 MG/ML
40 INJECTION INTRAMUSCULAR; INTRAVENOUS ONCE
Status: DISCONTINUED | OUTPATIENT
Start: 2022-08-17 | End: 2022-08-18

## 2022-08-17 RX ADMIN — IPRATROPIUM BROMIDE AND ALBUTEROL SULFATE 1 AMPULE: 2.5; .5 SOLUTION RESPIRATORY (INHALATION) at 22:44

## 2022-08-17 ASSESSMENT — PAIN - FUNCTIONAL ASSESSMENT: PAIN_FUNCTIONAL_ASSESSMENT: NONE - DENIES PAIN

## 2022-08-17 ASSESSMENT — LIFESTYLE VARIABLES: HOW OFTEN DO YOU HAVE A DRINK CONTAINING ALCOHOL: NEVER

## 2022-08-18 ENCOUNTER — APPOINTMENT (OUTPATIENT)
Dept: ULTRASOUND IMAGING | Age: 82
DRG: 291 | End: 2022-08-18
Payer: MEDICARE

## 2022-08-18 ENCOUNTER — APPOINTMENT (OUTPATIENT)
Dept: CT IMAGING | Age: 82
DRG: 291 | End: 2022-08-18
Payer: MEDICARE

## 2022-08-18 LAB
ADENOVIRUS BY PCR: NOT DETECTED
ALBUMIN SERPL-MCNC: 3.7 G/DL (ref 3.5–5.2)
ALP BLD-CCNC: 68 U/L (ref 40–129)
ALT SERPL-CCNC: 11 U/L (ref 0–40)
ANION GAP SERPL CALCULATED.3IONS-SCNC: 8 MMOL/L (ref 7–16)
ANISOCYTOSIS: ABNORMAL
AST SERPL-CCNC: 21 U/L (ref 0–39)
BASOPHILS ABSOLUTE: 0 E9/L (ref 0–0.2)
BASOPHILS RELATIVE PERCENT: 0.5 % (ref 0–2)
BILIRUB SERPL-MCNC: 0.7 MG/DL (ref 0–1.2)
BORDETELLA PARAPERTUSSIS BY PCR: NOT DETECTED
BORDETELLA PERTUSSIS BY PCR: NOT DETECTED
BUN BLDV-MCNC: 10 MG/DL (ref 6–23)
CALCIUM SERPL-MCNC: 9.3 MG/DL (ref 8.6–10.2)
CHLAMYDOPHILIA PNEUMONIAE BY PCR: NOT DETECTED
CHLORIDE BLD-SCNC: 102 MMOL/L (ref 98–107)
CO2: 37 MMOL/L (ref 22–29)
CORONAVIRUS 229E BY PCR: NOT DETECTED
CORONAVIRUS HKU1 BY PCR: NOT DETECTED
CORONAVIRUS NL63 BY PCR: NOT DETECTED
CORONAVIRUS OC43 BY PCR: NOT DETECTED
CREAT SERPL-MCNC: 0.9 MG/DL (ref 0.7–1.2)
EKG ATRIAL RATE: 95 BPM
EKG P AXIS: 63 DEGREES
EKG P-R INTERVAL: 176 MS
EKG Q-T INTERVAL: 380 MS
EKG QRS DURATION: 112 MS
EKG QTC CALCULATION (BAZETT): 477 MS
EKG R AXIS: -48 DEGREES
EKG T AXIS: 69 DEGREES
EKG VENTRICULAR RATE: 95 BPM
EOSINOPHILS ABSOLUTE: 0.05 E9/L (ref 0.05–0.5)
EOSINOPHILS RELATIVE PERCENT: 0.9 % (ref 0–6)
FOLATE: 12.4 NG/ML (ref 4.8–24.2)
GFR AFRICAN AMERICAN: >60
GFR NON-AFRICAN AMERICAN: >60 ML/MIN/1.73
GLUCOSE BLD-MCNC: 171 MG/DL (ref 74–99)
HBA1C MFR BLD: 6.4 % (ref 4–5.6)
HCT VFR BLD CALC: 34.2 % (ref 37–54)
HEMOGLOBIN: 9.9 G/DL (ref 12.5–16.5)
HUMAN METAPNEUMOVIRUS BY PCR: NOT DETECTED
HUMAN RHINOVIRUS/ENTEROVIRUS BY PCR: NOT DETECTED
INFLUENZA A BY PCR: NOT DETECTED
INFLUENZA B BY PCR: NOT DETECTED
IRON SATURATION: 7 % (ref 20–55)
IRON: 22 MCG/DL (ref 59–158)
LV EF: 33 %
LVEF MODALITY: NORMAL
LYMPHOCYTES ABSOLUTE: 1.02 E9/L (ref 1.5–4)
LYMPHOCYTES RELATIVE PERCENT: 16.7 % (ref 20–42)
MAGNESIUM: 1.7 MG/DL (ref 1.6–2.6)
MCH RBC QN AUTO: 25.6 PG (ref 26–35)
MCHC RBC AUTO-ENTMCNC: 28.9 % (ref 32–34.5)
MCV RBC AUTO: 88.4 FL (ref 80–99.9)
METER GLUCOSE: 109 MG/DL (ref 74–99)
METER GLUCOSE: 125 MG/DL (ref 74–99)
METER GLUCOSE: 166 MG/DL (ref 74–99)
METER GLUCOSE: 195 MG/DL (ref 74–99)
MONOCYTES ABSOLUTE: 0.3 E9/L (ref 0.1–0.95)
MONOCYTES RELATIVE PERCENT: 5.3 % (ref 2–12)
MYCOPLASMA PNEUMONIAE BY PCR: NOT DETECTED
NEUTROPHILS ABSOLUTE: 4.62 E9/L (ref 1.8–7.3)
NEUTROPHILS RELATIVE PERCENT: 77.2 % (ref 43–80)
PARAINFLUENZA VIRUS 1 BY PCR: NOT DETECTED
PARAINFLUENZA VIRUS 2 BY PCR: NOT DETECTED
PARAINFLUENZA VIRUS 3 BY PCR: NOT DETECTED
PARAINFLUENZA VIRUS 4 BY PCR: NOT DETECTED
PDW BLD-RTO: 16.1 FL (ref 11.5–15)
PHOSPHORUS: 3.3 MG/DL (ref 2.5–4.5)
PLATELET # BLD: 208 E9/L (ref 130–450)
PMV BLD AUTO: 11.9 FL (ref 7–12)
POIKILOCYTES: ABNORMAL
POLYCHROMASIA: ABNORMAL
POTASSIUM SERPL-SCNC: 3.6 MMOL/L (ref 3.5–5)
PROCALCITONIN: 0.09 NG/ML (ref 0–0.08)
RBC # BLD: 3.87 E12/L (ref 3.8–5.8)
REASON FOR REJECTION: NORMAL
REJECTED TEST: NORMAL
RESPIRATORY SYNCYTIAL VIRUS BY PCR: NOT DETECTED
SARS-COV-2, PCR: NOT DETECTED
SODIUM BLD-SCNC: 147 MMOL/L (ref 132–146)
TARGET CELLS: ABNORMAL
TOTAL IRON BINDING CAPACITY: 299 MCG/DL (ref 250–450)
TOTAL PROTEIN: 6.9 G/DL (ref 6.4–8.3)
TROPONIN, HIGH SENSITIVITY: 37 NG/L (ref 0–11)
TSH SERPL DL<=0.05 MIU/L-ACNC: 0.25 UIU/ML (ref 0.27–4.2)
VITAMIN B-12: 470 PG/ML (ref 211–946)
WBC # BLD: 6 E9/L (ref 4.5–11.5)

## 2022-08-18 PROCEDURE — 82962 GLUCOSE BLOOD TEST: CPT

## 2022-08-18 PROCEDURE — 1200000000 HC SEMI PRIVATE

## 2022-08-18 PROCEDURE — 84145 PROCALCITONIN (PCT): CPT

## 2022-08-18 PROCEDURE — 51798 US URINE CAPACITY MEASURE: CPT

## 2022-08-18 PROCEDURE — 82607 VITAMIN B-12: CPT

## 2022-08-18 PROCEDURE — 83550 IRON BINDING TEST: CPT

## 2022-08-18 PROCEDURE — 71275 CT ANGIOGRAPHY CHEST: CPT

## 2022-08-18 PROCEDURE — 82746 ASSAY OF FOLIC ACID SERUM: CPT

## 2022-08-18 PROCEDURE — 2580000003 HC RX 258: Performed by: INTERNAL MEDICINE

## 2022-08-18 PROCEDURE — 87449 NOS EACH ORGANISM AG IA: CPT

## 2022-08-18 PROCEDURE — 83036 HEMOGLOBIN GLYCOSYLATED A1C: CPT

## 2022-08-18 PROCEDURE — 84484 ASSAY OF TROPONIN QUANT: CPT

## 2022-08-18 PROCEDURE — 2700000000 HC OXYGEN THERAPY PER DAY

## 2022-08-18 PROCEDURE — 84100 ASSAY OF PHOSPHORUS: CPT

## 2022-08-18 PROCEDURE — 6360000002 HC RX W HCPCS: Performed by: INTERNAL MEDICINE

## 2022-08-18 PROCEDURE — 97165 OT EVAL LOW COMPLEX 30 MIN: CPT

## 2022-08-18 PROCEDURE — 83540 ASSAY OF IRON: CPT

## 2022-08-18 PROCEDURE — 0202U NFCT DS 22 TRGT SARS-COV-2: CPT

## 2022-08-18 PROCEDURE — 97535 SELF CARE MNGMENT TRAINING: CPT

## 2022-08-18 PROCEDURE — 6370000000 HC RX 637 (ALT 250 FOR IP): Performed by: INTERNAL MEDICINE

## 2022-08-18 PROCEDURE — 85025 COMPLETE CBC W/AUTO DIFF WBC: CPT

## 2022-08-18 PROCEDURE — 6360000004 HC RX CONTRAST MEDICATION: Performed by: RADIOLOGY

## 2022-08-18 PROCEDURE — 83735 ASSAY OF MAGNESIUM: CPT

## 2022-08-18 PROCEDURE — 94640 AIRWAY INHALATION TREATMENT: CPT

## 2022-08-18 PROCEDURE — 93306 TTE W/DOPPLER COMPLETE: CPT

## 2022-08-18 PROCEDURE — 84443 ASSAY THYROID STIM HORMONE: CPT

## 2022-08-18 PROCEDURE — 80053 COMPREHEN METABOLIC PANEL: CPT

## 2022-08-18 PROCEDURE — 93970 EXTREMITY STUDY: CPT

## 2022-08-18 PROCEDURE — 36415 COLL VENOUS BLD VENIPUNCTURE: CPT

## 2022-08-18 RX ORDER — TAMSULOSIN HYDROCHLORIDE 0.4 MG/1
0.4 CAPSULE ORAL NIGHTLY
Status: DISCONTINUED | OUTPATIENT
Start: 2022-08-18 | End: 2022-08-22 | Stop reason: HOSPADM

## 2022-08-18 RX ORDER — ACETAMINOPHEN 650 MG/1
650 SUPPOSITORY RECTAL EVERY 6 HOURS PRN
Status: DISCONTINUED | OUTPATIENT
Start: 2022-08-18 | End: 2022-08-22 | Stop reason: HOSPADM

## 2022-08-18 RX ORDER — ACETAMINOPHEN 325 MG/1
650 TABLET ORAL EVERY 6 HOURS PRN
Status: DISCONTINUED | OUTPATIENT
Start: 2022-08-18 | End: 2022-08-22 | Stop reason: HOSPADM

## 2022-08-18 RX ORDER — INSULIN LISPRO 100 [IU]/ML
0-4 INJECTION, SOLUTION INTRAVENOUS; SUBCUTANEOUS NIGHTLY
Status: DISCONTINUED | OUTPATIENT
Start: 2022-08-18 | End: 2022-08-22 | Stop reason: HOSPADM

## 2022-08-18 RX ORDER — DEXTROSE MONOHYDRATE 100 MG/ML
INJECTION, SOLUTION INTRAVENOUS CONTINUOUS PRN
Status: DISCONTINUED | OUTPATIENT
Start: 2022-08-18 | End: 2022-08-18 | Stop reason: SDUPTHER

## 2022-08-18 RX ORDER — ENOXAPARIN SODIUM 100 MG/ML
40 INJECTION SUBCUTANEOUS DAILY
Status: DISCONTINUED | OUTPATIENT
Start: 2022-08-18 | End: 2022-08-22 | Stop reason: HOSPADM

## 2022-08-18 RX ORDER — OXYCODONE HYDROCHLORIDE AND ACETAMINOPHEN 5; 325 MG/1; MG/1
1 TABLET ORAL EVERY 8 HOURS PRN
Status: DISCONTINUED | OUTPATIENT
Start: 2022-08-18 | End: 2022-08-22 | Stop reason: HOSPADM

## 2022-08-18 RX ORDER — ATORVASTATIN CALCIUM 40 MG/1
40 TABLET, FILM COATED ORAL NIGHTLY
Status: DISCONTINUED | OUTPATIENT
Start: 2022-08-18 | End: 2022-08-22 | Stop reason: HOSPADM

## 2022-08-18 RX ORDER — DEXTROSE MONOHYDRATE 100 MG/ML
INJECTION, SOLUTION INTRAVENOUS CONTINUOUS PRN
Status: DISCONTINUED | OUTPATIENT
Start: 2022-08-18 | End: 2022-08-22 | Stop reason: HOSPADM

## 2022-08-18 RX ORDER — DULOXETIN HYDROCHLORIDE 30 MG/1
30 CAPSULE, DELAYED RELEASE ORAL DAILY
Status: DISCONTINUED | OUTPATIENT
Start: 2022-08-18 | End: 2022-08-22 | Stop reason: HOSPADM

## 2022-08-18 RX ORDER — INSULIN LISPRO 100 [IU]/ML
0-8 INJECTION, SOLUTION INTRAVENOUS; SUBCUTANEOUS
Status: DISCONTINUED | OUTPATIENT
Start: 2022-08-18 | End: 2022-08-22 | Stop reason: HOSPADM

## 2022-08-18 RX ORDER — AMLODIPINE BESYLATE AND BENAZEPRIL HYDROCHLORIDE 10; 20 MG/1; MG/1
1 CAPSULE ORAL DAILY
Status: DISCONTINUED | OUTPATIENT
Start: 2022-08-18 | End: 2022-08-18 | Stop reason: CLARIF

## 2022-08-18 RX ORDER — SODIUM CHLORIDE 0.9 % (FLUSH) 0.9 %
5-40 SYRINGE (ML) INJECTION EVERY 12 HOURS SCHEDULED
Status: DISCONTINUED | OUTPATIENT
Start: 2022-08-18 | End: 2022-08-22 | Stop reason: HOSPADM

## 2022-08-18 RX ORDER — ERGOCALCIFEROL 1.25 MG/1
50000 CAPSULE ORAL WEEKLY
Status: DISCONTINUED | OUTPATIENT
Start: 2022-08-25 | End: 2022-08-22 | Stop reason: HOSPADM

## 2022-08-18 RX ORDER — FUROSEMIDE 10 MG/ML
40 INJECTION INTRAMUSCULAR; INTRAVENOUS 2 TIMES DAILY
Status: DISCONTINUED | OUTPATIENT
Start: 2022-08-18 | End: 2022-08-19

## 2022-08-18 RX ORDER — POLYETHYLENE GLYCOL 3350 17 G/17G
17 POWDER, FOR SOLUTION ORAL DAILY PRN
Status: DISCONTINUED | OUTPATIENT
Start: 2022-08-18 | End: 2022-08-20

## 2022-08-18 RX ORDER — SODIUM CHLORIDE 9 MG/ML
INJECTION, SOLUTION INTRAVENOUS PRN
Status: DISCONTINUED | OUTPATIENT
Start: 2022-08-18 | End: 2022-08-22 | Stop reason: HOSPADM

## 2022-08-18 RX ORDER — IPRATROPIUM BROMIDE AND ALBUTEROL SULFATE 2.5; .5 MG/3ML; MG/3ML
1 SOLUTION RESPIRATORY (INHALATION) EVERY 4 HOURS
Status: DISCONTINUED | OUTPATIENT
Start: 2022-08-18 | End: 2022-08-18 | Stop reason: SDUPTHER

## 2022-08-18 RX ORDER — TAMSULOSIN HYDROCHLORIDE 0.4 MG/1
0.4 CAPSULE ORAL DAILY
Status: DISCONTINUED | OUTPATIENT
Start: 2022-08-18 | End: 2022-08-18

## 2022-08-18 RX ORDER — AMLODIPINE BESYLATE 10 MG/1
10 TABLET ORAL DAILY
Status: DISCONTINUED | OUTPATIENT
Start: 2022-08-18 | End: 2022-08-19

## 2022-08-18 RX ORDER — SODIUM CHLORIDE 0.9 % (FLUSH) 0.9 %
5-40 SYRINGE (ML) INJECTION PRN
Status: DISCONTINUED | OUTPATIENT
Start: 2022-08-18 | End: 2022-08-22 | Stop reason: HOSPADM

## 2022-08-18 RX ORDER — METHYLPREDNISOLONE SODIUM SUCCINATE 40 MG/ML
40 INJECTION, POWDER, LYOPHILIZED, FOR SOLUTION INTRAMUSCULAR; INTRAVENOUS EVERY 12 HOURS
Status: DISCONTINUED | OUTPATIENT
Start: 2022-08-18 | End: 2022-08-19

## 2022-08-18 RX ORDER — POTASSIUM CHLORIDE 20 MEQ/1
20 TABLET, EXTENDED RELEASE ORAL DAILY
Status: DISCONTINUED | OUTPATIENT
Start: 2022-08-18 | End: 2022-08-22 | Stop reason: HOSPADM

## 2022-08-18 RX ORDER — IPRATROPIUM BROMIDE AND ALBUTEROL SULFATE 2.5; .5 MG/3ML; MG/3ML
1 SOLUTION RESPIRATORY (INHALATION) EVERY 4 HOURS
Status: DISCONTINUED | OUTPATIENT
Start: 2022-08-18 | End: 2022-08-20

## 2022-08-18 RX ORDER — DOXYCYCLINE HYCLATE 100 MG/1
100 CAPSULE ORAL EVERY 12 HOURS SCHEDULED
Status: DISCONTINUED | OUTPATIENT
Start: 2022-08-18 | End: 2022-08-20

## 2022-08-18 RX ORDER — GUAIFENESIN 600 MG/1
600 TABLET, EXTENDED RELEASE ORAL 2 TIMES DAILY
Status: DISCONTINUED | OUTPATIENT
Start: 2022-08-18 | End: 2022-08-22 | Stop reason: HOSPADM

## 2022-08-18 RX ORDER — LISINOPRIL 20 MG/1
20 TABLET ORAL DAILY
Status: DISCONTINUED | OUTPATIENT
Start: 2022-08-18 | End: 2022-08-19

## 2022-08-18 RX ADMIN — OXYCODONE AND ACETAMINOPHEN 1 TABLET: 5; 325 TABLET ORAL at 23:20

## 2022-08-18 RX ADMIN — FUROSEMIDE 40 MG: 10 INJECTION, SOLUTION INTRAMUSCULAR; INTRAVENOUS at 12:00

## 2022-08-18 RX ADMIN — Medication 10 ML: at 12:00

## 2022-08-18 RX ADMIN — IPRATROPIUM BROMIDE AND ALBUTEROL SULFATE 1 AMPULE: .5; 2.5 SOLUTION RESPIRATORY (INHALATION) at 09:48

## 2022-08-18 RX ADMIN — IPRATROPIUM BROMIDE AND ALBUTEROL SULFATE 1 AMPULE: .5; 2.5 SOLUTION RESPIRATORY (INHALATION) at 06:09

## 2022-08-18 RX ADMIN — LISINOPRIL 20 MG: 20 TABLET ORAL at 10:41

## 2022-08-18 RX ADMIN — GUAIFENESIN 600 MG: 600 TABLET, EXTENDED RELEASE ORAL at 21:24

## 2022-08-18 RX ADMIN — FUROSEMIDE 20 MG: 10 INJECTION, SOLUTION INTRAMUSCULAR; INTRAVENOUS at 02:06

## 2022-08-18 RX ADMIN — POTASSIUM CHLORIDE 20 MEQ: 1500 TABLET, EXTENDED RELEASE ORAL at 10:40

## 2022-08-18 RX ADMIN — IOPAMIDOL 75 ML: 755 INJECTION, SOLUTION INTRAVENOUS at 14:32

## 2022-08-18 RX ADMIN — Medication 10 ML: at 21:24

## 2022-08-18 RX ADMIN — METHYLPREDNISOLONE SODIUM SUCCINATE 40 MG: 40 INJECTION, POWDER, FOR SOLUTION INTRAMUSCULAR; INTRAVENOUS at 12:00

## 2022-08-18 RX ADMIN — ATORVASTATIN CALCIUM 40 MG: 40 TABLET, FILM COATED ORAL at 21:24

## 2022-08-18 RX ADMIN — DULOXETINE HYDROCHLORIDE 30 MG: 30 CAPSULE, DELAYED RELEASE ORAL at 10:40

## 2022-08-18 RX ADMIN — IPRATROPIUM BROMIDE AND ALBUTEROL SULFATE 1 AMPULE: .5; 2.5 SOLUTION RESPIRATORY (INHALATION) at 13:20

## 2022-08-18 RX ADMIN — METHYLPREDNISOLONE SODIUM SUCCINATE 40 MG: 40 INJECTION, POWDER, FOR SOLUTION INTRAMUSCULAR; INTRAVENOUS at 21:24

## 2022-08-18 RX ADMIN — IPRATROPIUM BROMIDE AND ALBUTEROL SULFATE 1 AMPULE: .5; 2.5 SOLUTION RESPIRATORY (INHALATION) at 22:35

## 2022-08-18 RX ADMIN — DOXYCYCLINE HYCLATE 100 MG: 100 CAPSULE ORAL at 10:40

## 2022-08-18 RX ADMIN — AMLODIPINE BESYLATE 10 MG: 10 TABLET ORAL at 10:40

## 2022-08-18 RX ADMIN — GUAIFENESIN 600 MG: 600 TABLET, EXTENDED RELEASE ORAL at 10:40

## 2022-08-18 RX ADMIN — DOXYCYCLINE HYCLATE 100 MG: 100 CAPSULE ORAL at 21:24

## 2022-08-18 RX ADMIN — TAMSULOSIN HYDROCHLORIDE 0.4 MG: 0.4 CAPSULE ORAL at 21:24

## 2022-08-18 RX ADMIN — IPRATROPIUM BROMIDE AND ALBUTEROL SULFATE 1 AMPULE: .5; 2.5 SOLUTION RESPIRATORY (INHALATION) at 02:33

## 2022-08-18 RX ADMIN — ENOXAPARIN SODIUM 40 MG: 100 INJECTION SUBCUTANEOUS at 09:07

## 2022-08-18 RX ADMIN — CEFTRIAXONE SODIUM 1000 MG: 1 INJECTION, POWDER, FOR SOLUTION INTRAMUSCULAR; INTRAVENOUS at 11:59

## 2022-08-18 RX ADMIN — IPRATROPIUM BROMIDE AND ALBUTEROL SULFATE 1 AMPULE: .5; 2.5 SOLUTION RESPIRATORY (INHALATION) at 17:06

## 2022-08-18 SDOH — ECONOMIC STABILITY: FOOD INSECURITY: WITHIN THE PAST 12 MONTHS, YOU WORRIED THAT YOUR FOOD WOULD RUN OUT BEFORE YOU GOT MONEY TO BUY MORE.: NEVER TRUE

## 2022-08-18 SDOH — SOCIAL STABILITY: SOCIAL INSECURITY: WITHIN THE LAST YEAR, HAVE YOU BEEN AFRAID OF YOUR PARTNER OR EX-PARTNER?: NO

## 2022-08-18 SDOH — SOCIAL STABILITY: SOCIAL NETWORK
IN A TYPICAL WEEK, HOW MANY TIMES DO YOU TALK ON THE PHONE WITH FAMILY, FRIENDS, OR NEIGHBORS?: MORE THAN THREE TIMES A WEEK

## 2022-08-18 SDOH — SOCIAL STABILITY: SOCIAL INSECURITY
WITHIN THE LAST YEAR, HAVE YOU BEEN KICKED, HIT, SLAPPED, OR OTHERWISE PHYSICALLY HURT BY YOUR PARTNER OR EX-PARTNER?: NO

## 2022-08-18 SDOH — SOCIAL STABILITY: SOCIAL NETWORK: HOW OFTEN DO YOU ATTEND CHURCH OR RELIGIOUS SERVICES?: 1 TO 4 TIMES PER YEAR

## 2022-08-18 SDOH — HEALTH STABILITY: PHYSICAL HEALTH: ON AVERAGE, HOW MANY DAYS PER WEEK DO YOU ENGAGE IN MODERATE TO STRENUOUS EXERCISE (LIKE A BRISK WALK)?: 0 DAYS

## 2022-08-18 SDOH — ECONOMIC STABILITY: FOOD INSECURITY: WITHIN THE PAST 12 MONTHS, THE FOOD YOU BOUGHT JUST DIDN'T LAST AND YOU DIDN'T HAVE MONEY TO GET MORE.: NEVER TRUE

## 2022-08-18 SDOH — SOCIAL STABILITY: SOCIAL NETWORK: HOW OFTEN DO YOU ATTENT MEETINGS OF THE CLUB OR ORGANIZATION YOU BELONG TO?: NEVER

## 2022-08-18 SDOH — HEALTH STABILITY: MENTAL HEALTH: HOW MANY STANDARD DRINKS CONTAINING ALCOHOL DO YOU HAVE ON A TYPICAL DAY?: PATIENT DOES NOT DRINK

## 2022-08-18 SDOH — ECONOMIC STABILITY: HOUSING INSECURITY: IN THE LAST 12 MONTHS, HOW MANY PLACES HAVE YOU LIVED?: 2

## 2022-08-18 SDOH — ECONOMIC STABILITY: TRANSPORTATION INSECURITY
IN THE PAST 12 MONTHS, HAS LACK OF TRANSPORTATION KEPT YOU FROM MEETINGS, WORK, OR FROM GETTING THINGS NEEDED FOR DAILY LIVING?: NO

## 2022-08-18 SDOH — SOCIAL STABILITY: SOCIAL NETWORK
DO YOU BELONG TO ANY CLUBS OR ORGANIZATIONS SUCH AS CHURCH GROUPS UNIONS, FRATERNAL OR ATHLETIC GROUPS, OR SCHOOL GROUPS?: NO

## 2022-08-18 SDOH — SOCIAL STABILITY: SOCIAL INSECURITY: WITHIN THE LAST YEAR, HAVE YOU BEEN HUMILIATED OR EMOTIONALLY ABUSED IN OTHER WAYS BY YOUR PARTNER OR EX-PARTNER?: NO

## 2022-08-18 SDOH — HEALTH STABILITY: MENTAL HEALTH: HOW OFTEN DO YOU HAVE A DRINK CONTAINING ALCOHOL?: NEVER

## 2022-08-18 SDOH — ECONOMIC STABILITY: INCOME INSECURITY: HOW HARD IS IT FOR YOU TO PAY FOR THE VERY BASICS LIKE FOOD, HOUSING, MEDICAL CARE, AND HEATING?: NOT HARD AT ALL

## 2022-08-18 SDOH — ECONOMIC STABILITY: INCOME INSECURITY: IN THE LAST 12 MONTHS, WAS THERE A TIME WHEN YOU WERE NOT ABLE TO PAY THE MORTGAGE OR RENT ON TIME?: NO

## 2022-08-18 SDOH — SOCIAL STABILITY: SOCIAL NETWORK: HOW OFTEN DO YOU GET TOGETHER WITH FRIENDS OR RELATIVES?: MORE THAN THREE TIMES A WEEK

## 2022-08-18 SDOH — SOCIAL STABILITY: SOCIAL INSECURITY
WITHIN THE LAST YEAR, HAVE TO BEEN RAPED OR FORCED TO HAVE ANY KIND OF SEXUAL ACTIVITY BY YOUR PARTNER OR EX-PARTNER?: NO

## 2022-08-18 SDOH — SOCIAL STABILITY: SOCIAL NETWORK: ARE YOU MARRIED, WIDOWED, DIVORCED, SEPARATED, NEVER MARRIED, OR LIVING WITH A PARTNER?: MARRIED

## 2022-08-18 SDOH — ECONOMIC STABILITY: TRANSPORTATION INSECURITY
IN THE PAST 12 MONTHS, HAS THE LACK OF TRANSPORTATION KEPT YOU FROM MEDICAL APPOINTMENTS OR FROM GETTING MEDICATIONS?: NO

## 2022-08-18 SDOH — ECONOMIC STABILITY: HOUSING INSECURITY
IN THE LAST 12 MONTHS, WAS THERE A TIME WHEN YOU DID NOT HAVE A STEADY PLACE TO SLEEP OR SLEPT IN A SHELTER (INCLUDING NOW)?: NO

## 2022-08-18 SDOH — HEALTH STABILITY: MENTAL HEALTH
STRESS IS WHEN SOMEONE FEELS TENSE, NERVOUS, ANXIOUS, OR CAN'T SLEEP AT NIGHT BECAUSE THEIR MIND IS TROUBLED. HOW STRESSED ARE YOU?: ONLY A LITTLE

## 2022-08-18 ASSESSMENT — ENCOUNTER SYMPTOMS
VOMITING: 0
COUGH: 0
ABDOMINAL PAIN: 0
ABDOMINAL DISTENTION: 0
PHOTOPHOBIA: 0
BACK PAIN: 0
CHEST TIGHTNESS: 0
SHORTNESS OF BREATH: 1
NAUSEA: 0
DIARRHEA: 0

## 2022-08-18 ASSESSMENT — PAIN - FUNCTIONAL ASSESSMENT
PAIN_FUNCTIONAL_ASSESSMENT: ACTIVITIES ARE NOT PREVENTED
PAIN_FUNCTIONAL_ASSESSMENT: NONE - DENIES PAIN

## 2022-08-18 ASSESSMENT — LIFESTYLE VARIABLES
HOW OFTEN DO YOU HAVE A DRINK CONTAINING ALCOHOL: NEVER
HOW MANY STANDARD DRINKS CONTAINING ALCOHOL DO YOU HAVE ON A TYPICAL DAY: PATIENT DOES NOT DRINK

## 2022-08-18 ASSESSMENT — PAIN DESCRIPTION - LOCATION: LOCATION: BACK

## 2022-08-18 ASSESSMENT — PAIN DESCRIPTION - DESCRIPTORS: DESCRIPTORS: OTHER (COMMENT)

## 2022-08-18 ASSESSMENT — PAIN DESCRIPTION - ORIENTATION: ORIENTATION: LOWER

## 2022-08-18 ASSESSMENT — PAIN SCALES - GENERAL: PAINLEVEL_OUTOF10: 6

## 2022-08-18 ASSESSMENT — PAIN DESCRIPTION - PAIN TYPE: TYPE: CHRONIC PAIN

## 2022-08-18 NOTE — H&P
Department of Internal Medicine  History and Physical    PCP: Bryn Shah DO  Admitting Physician: Dr. Candace Edward  Consultants:   Date of Service: 8/17/2022    CHIEF COMPLAINT: Shortness of breath    HISTORY OF PRESENT ILLNESS:    Patient is 80-year-old male who presented to the ED due to shortness of breath. Patient states that this has progressed over the last few days. He has increased wheezing. He denies any increased cough. He denies any fever or chills. He has shortness of breath mainly with exertion. Otherwise he denies any chest pain. Denies any increased lower extremity edema. Apparently he also has decreased weight instead of increased. PAST MEDICAL Hx:  Past Medical History:   Diagnosis Date    Aortic stenosis, moderate 10/2018    Arthritis     Diabetes mellitus ([address]     H/O cardiovascular stress test 10/19/2018    lexiscan    Hyperlipidemia     Hypertension     Lymph node enlargement     seeing dr yates  coughing mucous    Partial small bowel obstruction ([facility] 04/14/2017       PAST SURGICAL Hx:   Past Surgical History:   Procedure Laterality Date    ACHILLES TENDON SURGERY      BREAST SURGERY Left 06/05/2014    Excision of left breast mass    COLONOSCOPY  04/24/2012    FRACTURE SURGERY      C-2    KNEE ARTHROTOMY      NERVE BLOCK  04 10 2012    lumbar epidural #1       FAMILY Hx:  Family History   Problem Relation Age of Onset    Hypertension Mother     Diabetes Mother     Hypertension Father     Brain Cancer Sister         Brain aneurysm    Hypertension Sister        HOME MEDICATIONS:  Prior to Admission medications    Medication Sig Start Date End Date Taking?  Authorizing Provider   furosemide (LASIX) 40 MG tablet TAKE 1 TABLET BY MOUTH DAILY AS NEEDED (FOR SWELLING IN LEGS) 3/16/21   Osiel Montes De Oca, DO   atorvastatin (LIPITOR) 40 MG tablet TAKE 1 TABLET BY MOUTH EVERY DAY 1/6/21   Osiel Montes De Oca, DO   amLODIPine-benazepril (LOTREL) 10-20 MG per capsule TAKE 1 CAPSULE BY MOUTH EVERY DAY 12/22/20   Joanna Montes De Oca DO   potassium chloride (KLOR-CON M) 20 MEQ extended release tablet Take 1 tablet by mouth daily Take when taking Lasix 12/7/20   Osiel Montes De Oca DO   ondansetron (ZOFRAN ODT) 4 MG disintegrating tablet Take 1 tablet by mouth every 6 hours 8/31/20   Joanna Montes De Oca DO   amLODIPine-benazepril (LOTREL) 10-20 MG per capsule Take 1 capsule by mouth daily 8/5/20   DA Shultz - CNP   albuterol sulfate  (90 Base) MCG/ACT inhaler INHALE 2 PUFFS 4 TIMES A DAY AS NEEDED FOR WHEEZING 3/3/20   Historical Provider, MD   vitamin D (ERGOCALCIFEROL) 1.25 MG (07275 UT) CAPS capsule Take 1 capsule by mouth once a week 1/10/20   Joanna Montes De Oca DO   tamsulosin (FLOMAX) 0.4 MG capsule Take 1 capsule by mouth daily 10/7/19   Joanna Montes De Oca DO   tiZANidine (ZANAFLEX) 4 MG tablet Take 1 tablet by mouth every 8 hours as needed (back spasm) 8/26/19   Joanna Montes De Oca DO   metFORMIN (GLUCOPHAGE) 500 MG tablet Take 1 tablet by mouth 2 times daily (with meals) 10/15/18   Joanna Montes De Oca DO   VIAGRA 100 MG tablet Take 1 tablet by mouth as needed for Erectile Dysfunction 6/26/18   Joanna Montes De Oca DO   DULoxetine (CYMBALTA) 30 MG extended release capsule Take 30 mg by mouth daily    Historical Provider, MD       ALLERGIES:  Patient has no known allergies. SOCIAL Hx:  Social History     Socioeconomic History    Marital status:      Spouse name:  Joi Bower    Number of children: 6    Years of education: 16    Highest education level: Not on file   Occupational History    Occupation: Retired    Tobacco Use    Smoking status: Every Day     Packs/day: 0.50     Types: Cigarettes    Smokeless tobacco: Never    Tobacco comments:     smoking 1/2 pack of cigarettes/day   Vaping Use    Vaping Use: Never used   Substance and Sexual Activity    Alcohol use: Yes     Comment: occassionally    Drug use: No    Sexual activity: Not on file   Other Topics Concern    Not on file   Social History Narrative Not on file     Social Determinants of Health     Financial Resource Strain: Not on file   Food Insecurity: Not on file   Transportation Needs: Not on file   Physical Activity: Not on file   Stress: Not on file   Social Connections: Not on file   Intimate Partner Violence: Not on file   Housing Stability: Not on file       ROS: Positive in bold  General:   Denies chills, fatigue, fever, malaise, night sweats or weight loss    Psychological:   Denies anxiety, disorientation or hallucinations    ENT:    Denies epistaxis, headaches, vertigo or visual changes    Cardiovascular:   Denies any chest pain, irregular heartbeats, or palpitations. No paroxysmal nocturnal dyspnea. Respiratory:   Denies shortness of breath, coughing, sputum production, hemoptysis, or wheezing. No orthopnea. Gastrointestinal:   Denies nausea, vomiting, diarrhea, or constipation. Denies any abdominal pain. Denies change in bowel habits or stools. Genito-Urinary:    Denies any urgency, frequency, hematuria. Voiding without difficulty. Musculoskeletal:   Denies joint pain, joint stiffness, joint swelling or muscle pain    Neurology:    Denies any headache or focal neurological deficits. No weakness or paresthesia. Derm:    Denies any rashes, ulcers, or excoriations. Denies bruising. Extremities:   Denies any lower extremity swelling or edema. PHYSICAL EXAM: Abnormal findings noted  VITALS:  Vitals:    08/17/22 2159   BP: (!) 173/87   Pulse: (!) 104   Resp: 20   Temp: 99 °F (37.2 °C)   SpO2: 99%         CONSTITUTIONAL:    Awake, alert, cooperative, no apparent distress, and appears stated age    EYES:    EOMI, sclera clear, conjunctiva normal    ENT:    Normocephalic, atraumatic,  External ears without lesions. NECK:    Supple, symmetrical, trachea midline, no JVD    HEMATOLOGIC/LYMPHATICS:    No cervical lymphadenopathy and no supraclavicular lymphadenopathy    LUNGS:    Symmetric.  No increased work of AST 22 08/17/2022 10:39 PM    ALT 10 08/17/2022 10:39 PM       ASSESSMENT/PLAN:  Acute CHF exacerbation  Possible pneumonia  Moderate aortic stenosis  Mild aortic regurgitation  Mild pulmonary hypertension  Chronic hypoxic respiratory failure on 3 L supplemental oxygen at baseline  Normocytic anemia with recent evaluation for GI bleed  Sleep apnea without the use of CPAP/BiPAP  Non-insulin-dependent diabetes mellitus  Hyperlipidemia  Hypertension  Current tobacco abuse    Patient presented with shortness of breath. Chest x-ray revealed pulmonary edema with associated pneumonia. Patient will be placed on IV Lasix twice daily. Repeat echocardiogram will be ordered. Obtain procalcitonin to assess for underlying bacterial infection. Monitor pulse ox at rest and with ambulation.     Jose Antonio Okeefe  86:97 AM  8/18/2022    Electronically signed by Pepper Pham DO on 8/18/22 at 12:09 AM EDT

## 2022-08-18 NOTE — ACP (ADVANCE CARE PLANNING)
Advance Care Planning   Healthcare Decision Maker:    Primary Decision Maker: Val Dow - Child - [phone]    Secondary Decision Maker: [place] - Spouse - [phone]    Supplemental (Other) Decision Maker: Jarod Brock - Brother/Sister - [phone]

## 2022-08-18 NOTE — PLAN OF CARE
Problem: Safety - Adult  Goal: Free from fall injury  8/18/2022 0531 by vIan Ortiz RN  Outcome: Progressing  8/18/2022 0530 by Ivan Ortiz RN  Outcome: Progressing

## 2022-08-18 NOTE — PROGRESS NOTES
[address] [facility]  [facility] [place]. [place]        Date:2022                                                  Patient Name: Giulia Pastrana    MRN: [id number]    : 1940    Room: [address] [place] [address]      Evaluating OT: Lisbet Toth OTR/L; [id number]     Referring Provider and Specific Provider Orders/Date:      22  OT eval and treat  Start:  22,   End:  22,   ONE TIME,   Standing Count:  1 Occurrences,   R         Suha Madrid, DO      Placement Recommendation: HHOT       Diagnosis:   No diagnosis found.        Surgery: none       Pertinent Medical History:       Past Medical History:   Diagnosis Date    Aortic stenosis, moderate 10/2018    Arthritis     Diabetes mellitus ([facility] [address]     H/O cardiovascular stress test 10/19/2018    lexiscan    Hyperlipidemia     Hypertension     Lymph node enlargement     seeing dr yates  coughing mucous    Partial small bowel obstruction ([facility] [address] 2017         Past Surgical History:   Procedure Laterality Date    ACHILLES TENDON SURGERY      BREAST SURGERY Left 2014    Excision of left breast mass    COLONOSCOPY  2012    FRACTURE SURGERY      C-2    KNEE ARTHROTOMY      NERVE BLOCK  04 10 2012    lumbar epidural #1      Precautions:  Fall Risk, 3L O2, fast pace, spirometer provided      Assessment of current deficits:     [x] Functional mobility  [x]ADLs  [x] Strength               []Cognition    [x] Functional transfers   [x] IADLs         [] Safety Awareness   [x]Endurance    [] Fine Coordination              [x] Balance      [] Vision/perception   []Sensation     []Gross Motor Coordination  [] ROM  [] Delirium                   [] Motor Control     OT PLAN OF CARE   OT POC based on physician orders, patient diagnosis and results of clinical assessment    Frequency/Duration 1-3 days/wk for 2 weeks PRN     Specific OT Treatment Interventions to eating meals?: None  AM-PAC Inpatient Daily Activity Raw Score: 19  AM-PAC Inpatient ADL T-Scale Score : 40.22  ADL Inpatient CMS 0-100% Score: 42.8  ADL Inpatient CMS G-Code Modifier : CK     Functional Assessment:    Initial Eval Status  Date: 8/18/22 Treatment Status  Date: STGs = LTGs  Time frame: 10-14 days   Feeding Independent   Independent    Grooming Supervision   Independent    UB Dressing Supervision   Independent    LB Dressing Minimal Assist   Independent    Bathing Minimal Assist  Independent    Toileting Supervision for hygiene and to stand at sink level to wash and dry hands. Independent    Bed Mobility  Supine to sit: Supervision   Sit to supine: Supervision   Supine to sit: Independent   Sit to supine: Independent    Functional Transfers Supervision from EOB  Supervision for transfer to and from low commode with minimal verbal cues to use grab bar for safe commode transfer. Transfer training with verbal cues for hand placement throughout session to improve safety. Independent    Functional Mobility Minimal Assist with no device to improve balance, fast pace, verbal cues for long O2 line management and to slow down for increased safety  Modified [place]    Balance Sitting:     Static: good     Dynamic: fair plus  Standing: fair  with no device     Activity Tolerance fair   good    Visual/  Perceptual Glasses: no                 Hand Dominance: right      AROM (PROM) Strength Additional Info:    RUE  WFL 4/5 good  and wfl FMC/dexterity noted during ADL tasks     LUE WFL 4/5 good  and wfl FMC/dexterity noted during ADL tasks       Hearing: [place]   Sensation:  No c/o numbness or tingling  Tone: WFL   Edema: no     Comments: Upon arrival the patient was side lying on his right in bed after returning from imaging. At end of session, patient was returned to bed and prefers to lay on his right with call light and phone within reach, all lines and tubes intact.  Spouse and daughter present in the room. Overall patient demonstrated decreased independence and safety during completion of ADL/functional transfer/mobility tasks. Pt would benefit from continued skilled OT to increase safety and independence with completion of ADL/IADL tasks for functional independence and quality of life. Treatment: OT treatment provided this date includes:   Instruction/training on safety and adapted techniques for completion of ADLs   Instruction/training on safe functional mobility/transfer techniques   Instruction/training on energy conservation/work simplification for completion of ADLs   Proper Positioning/Alignment    Rehab Potential: Good for established goals. Patient / Family Goal: no goal stated by patient. Spouse and daughter present and they would like the patient discharged with a bedside commode and rollator. Patient and/or family were instructed on functional diagnosis, prognosis/goals and OT plan of care. Demonstrated good understanding. Eval Complexity: Low    Time In: 3:20pm  Time Out: 3:50pm   Total Treatment Time: 10      Min Units   OT Eval Low 97165  X  1    OT Eval Medium 97644      OT Eval High 04840      OT Re-Eval 72186            ADL/Self Care 24791  8   1    Therapeutic Activities 90409  2      Therapeutic Ex 53276       Orthotic Management 00675       Manual [id number]     Neuro Re-Ed 34215       Non-Billable Time        Evaluation Time additionally includes thorough review of current medical information, gathering information on past medical history/social history and prior level of function, interpretation of standardized testing/informal observation of tasks, assessment of data and development of plan of care and goals.         Evaluating OT: Adam Rahman OTR/L; [id number]

## 2022-08-18 NOTE — ED PROVIDER NOTES
Ortiz Curry is an 42-year-old male with a past medical history of COPD, hyperlipidemia, diabetes, hypertension who presents emergency department with concern for shortness of breath. Patient is having increasing shortness of breath over the past few 3 days patient was recently admitted to hospital in [place] due to GI bleed and anemia. Patient is having orthopnea and exertional shortness of breath patient symptoms are moderate in severity and constant nothing make symptoms better patient has not tried any thing for symptoms. Patient is on his baseline is a cannula oxygen for chronic respiratory failure. The history is provided by the patient, medical records and a relative. Review of Systems   Constitutional:  Positive for fatigue. Negative for chills, diaphoresis and fever. Eyes:  Negative for photophobia and visual disturbance. Respiratory:  Positive for shortness of breath. Negative for cough and chest tightness. Cardiovascular:  Negative for chest pain, palpitations and leg swelling. Gastrointestinal:  Negative for abdominal distention, abdominal pain, diarrhea, nausea and vomiting. Genitourinary:  Negative for dysuria. Musculoskeletal:  Negative for back pain, neck pain and neck stiffness. Skin:  Negative for pallor and rash. Neurological:  Negative for headaches. Psychiatric/Behavioral:  Negative for confusion. Physical Exam  Vitals and nursing note reviewed. Constitutional:       General: He is not in acute distress. Appearance: He is ill-appearing. HENT:      Head: Normocephalic and atraumatic. Eyes:      General: No scleral icterus. Conjunctiva/sclera: Conjunctivae normal.      Pupils: Pupils are equal, round, and reactive to light. Cardiovascular:      Rate and Rhythm: Regular rhythm. Tachycardia present.    Pulmonary:      Effort: Pulmonary effort is normal.      Comments: Diminished breath sounds bilaterally  Abdominal:      General: Bowel sounds are normal. There is no distension. Palpations: Abdomen is soft. Tenderness: There is no abdominal tenderness. There is no guarding or rebound. Musculoskeletal:      Cervical back: Normal range of motion and neck supple. No rigidity. No muscular tenderness. Right lower leg: Edema present. Left lower leg: Edema present. Comments: Trace LE edema     Skin:     General: Skin is warm and dry. Capillary Refill: Capillary refill takes less than 2 seconds. Coloration: Skin is not pale. Findings: No erythema or rash. Neurological:      Mental Status: He is alert and oriented to person, place, and time. Psychiatric:         Mood and Affect: Mood normal.        Procedures     MDM  Number of Diagnoses or Management Options  Diagnosis management comments: Law Jaeger is an 51-year-old male present emergency department with concern for shortness of breath. Patient was found to have likely CHF exacerbation. Patient was given Lasix in emergency department. Patient was stable while in ED chest x-ray was significant for CHF. With significantly elevated BNP. Patient's not having any chest pain. Troponin stable   EKG stable       EKG: This EKG is signed and interpreted by me. Rate: 95  Rhythm: Sinus  Interpretation: non-specific EKG, no ST elevation or depression, fascicular block present  Comparison: changes compared to previous EKG          --------------------------------------------- PAST HISTORY ---------------------------------------------  Past Medical History:  has a past medical history of Aortic stenosis, moderate, Arthritis, Diabetes mellitus ([facility]), H/O cardiovascular stress test, Hyperlipidemia, Hypertension, Lymph node enlargement, and Partial small bowel obstruction ([facility]). Past Surgical History:  has a past surgical history that includes fracture surgery; Nerve Block (04 10 2012);  Achilles tendon surgery; Knee Arthrotomy; Colonoscopy (04/24/2012); and Breast surgery (Left, 06/05/2014). Social History:  reports that he has been smoking cigarettes. He has been smoking an average of .5 packs per day. He has never used smokeless tobacco. He reports that he does not drink alcohol and does not use drugs. Family History: family history includes Brain Cancer in his sister; Diabetes in his mother; Hypertension in his father, mother, and sister. The patients home medications have been reviewed. Allergies: Patient has no known allergies.     -------------------------------------------------- RESULTS -------------------------------------------------    LABS:  Results for orders placed or performed during the hospital encounter of 08/17/22   COVID-19, Rapid    Specimen: Nasopharyngeal Swab   Result Value Ref Range    SARS-CoV-2, NAAT Not Detected Not Detected   Respiratory Panel, Molecular, with COVID-19 (Restricted: peds pts or suitable admitted adults)    Specimen: Nasopharyngeal   Result Value Ref Range    Adenovirus by PCR Not Detected Not Detected    Bordetella parapertussis by PCR Not Detected Not Detected    Bordetella pertussis by PCR Not Detected Not Detected    Chlamydophilia pneumoniae by PCR Not Detected Not Detected    Coronavirus 229E by PCR Not Detected Not Detected    Coronavirus HKU1 by PCR Not Detected Not Detected    Coronavirus NL63 by PCR Not Detected Not Detected    Coronavirus OC43 by PCR Not Detected Not Detected    SARS-CoV-2, PCR Not Detected Not Detected    Human Metapneumovirus by PCR Not Detected Not Detected    Human Rhinovirus/Enterovirus by PCR Not Detected Not Detected    Influenza A by PCR Not Detected Not Detected    Influenza B by PCR Not Detected Not Detected    Mycoplasma pneumoniae by PCR Not Detected Not Detected    Parainfluenza Virus 1 by PCR Not Detected Not Detected    Parainfluenza Virus 2 by PCR Not Detected Not Detected    Parainfluenza Virus 3 by PCR Not Detected Not Detected    Parainfluenza Virus 4 by PCR Not Detected Not Detected    Respiratory Syncytial Virus by PCR Not Detected Not Detected   CBC with Auto Differential   Result Value Ref Range    WBC 6.3 4.5 - 11.5 E9/L    RBC 3.54 (L) 3.80 - 5.80 E12/L    Hemoglobin 9.1 (L) 12.5 - 16.5 g/dL    Hematocrit 30.9 (L) 37.0 - 54.0 %    MCV 87.3 80.0 - 99.9 fL    MCH 25.7 (L) 26.0 - 35.0 pg    MCHC 29.4 (L) 32.0 - 34.5 %    RDW 15.9 (H) 11.5 - 15.0 fL    Platelets 619 465 - 792 E9/L    MPV 12.0 7.0 - 12.0 fL    Neutrophils % 76.8 43.0 - 80.0 %    Immature Granulocytes % 0.3 0.0 - 5.0 %    Lymphocytes % 12.6 (L) 20.0 - 42.0 %    Monocytes % 9.2 2.0 - 12.0 %    Eosinophils % 0.6 0.0 - 6.0 %    Basophils % 0.5 0.0 - 2.0 %    Neutrophils Absolute 4.86 1.80 - 7.30 E9/L    Immature Granulocytes # 0.02 E9/L    Lymphocytes Absolute 0.80 (L) 1.50 - 4.00 E9/L    Monocytes Absolute 0.58 0.10 - 0.95 E9/L    Eosinophils Absolute 0.04 (L) 0.05 - 0.50 E9/L    Basophils Absolute 0.03 0.00 - 0.20 E9/L   Comprehensive Metabolic Panel w/ Reflex to MG   Result Value Ref Range    Sodium 146 132 - 146 mmol/L    Potassium reflex Magnesium 4.1 3.5 - 5.0 mmol/L    Chloride 105 98 - 107 mmol/L    CO2 37 (H) 22 - 29 mmol/L    Anion Gap 4 (L) 7 - 16 mmol/L    Glucose 137 (H) 74 - 99 mg/dL    BUN 12 6 - 23 mg/dL    Creatinine 1.0 0.7 - 1.2 mg/dL    GFR Non-African American >60 >=60 mL/min/1.73    GFR African American >60     Calcium 9.3 8.6 - 10.2 mg/dL    Total Protein 6.8 6.4 - 8.3 g/dL    Albumin 3.7 3.5 - 5.2 g/dL    Total Bilirubin 0.5 0.0 - 1.2 mg/dL    Alkaline Phosphatase 67 40 - 129 U/L    ALT 10 0 - 40 U/L    AST 22 0 - 39 U/L   Troponin   Result Value Ref Range    Troponin, High Sensitivity 36 (H) 0 - 11 ng/L   Brain Natriuretic Peptide   Result Value Ref Range    Pro-BNP 8,311 (H) 0 - 450 pg/mL   Protime-INR   Result Value Ref Range    Protime 14.9 (H) 9.3 - 12.4 sec    INR 1.3    SPECIMEN REJECTION   Result Value Ref Range    Rejected Test TROP     Reason for Rejection see below    Troponin   Result Value Ref Range    Troponin, High Sensitivity 37 (H) 0 - 11 ng/L   Procalcitonin   Result Value Ref Range    Procalcitonin 0.09 (H) 0.00 - 0.08 ng/mL   Phosphorus   Result Value Ref Range    Phosphorus 3.3 2.5 - 4.5 mg/dL   Magnesium   Result Value Ref Range    Magnesium 1.7 1.6 - 2.6 mg/dL   Comprehensive Metabolic Panel   Result Value Ref Range    Sodium 147 (H) 132 - 146 mmol/L    Potassium 3.6 3.5 - 5.0 mmol/L    Chloride 102 98 - 107 mmol/L    CO2 37 (H) 22 - 29 mmol/L    Anion Gap 8 7 - 16 mmol/L    Glucose 171 (H) 74 - 99 mg/dL    BUN 10 6 - 23 mg/dL    Creatinine 0.9 0.7 - 1.2 mg/dL    GFR Non-African American >60 >=60 mL/min/1.73    GFR African American >60     Calcium 9.3 8.6 - 10.2 mg/dL    Total Protein 6.9 6.4 - 8.3 g/dL    Albumin 3.7 3.5 - 5.2 g/dL    Total Bilirubin 0.7 0.0 - 1.2 mg/dL    Alkaline Phosphatase 68 40 - 129 U/L    ALT 11 0 - 40 U/L    AST 21 0 - 39 U/L   CBC with Auto Differential   Result Value Ref Range    WBC 6.0 4.5 - 11.5 E9/L    RBC 3.87 3.80 - 5.80 E12/L    Hemoglobin 9.9 (L) 12.5 - 16.5 g/dL    Hematocrit 34.2 (L) 37.0 - 54.0 %    MCV 88.4 80.0 - 99.9 fL    MCH 25.6 (L) 26.0 - 35.0 pg    MCHC 28.9 (L) 32.0 - 34.5 %    RDW 16.1 (H) 11.5 - 15.0 fL    Platelets 792 532 - 122 E9/L    MPV 11.9 7.0 - 12.0 fL    Neutrophils % 77.2 43.0 - 80.0 %    Lymphocytes % 16.7 (L) 20.0 - 42.0 %    Monocytes % 5.3 2.0 - 12.0 %    Eosinophils % 0.9 0.0 - 6.0 %    Basophils % 0.5 0.0 - 2.0 %    Neutrophils Absolute 4.62 1.80 - 7.30 E9/L    Lymphocytes Absolute 1.02 (L) 1.50 - 4.00 E9/L    Monocytes Absolute 0.30 0.10 - 0.95 E9/L    Eosinophils Absolute 0.05 0.05 - 0.50 E9/L    Basophils Absolute 0.00 0.00 - 0.20 E9/L    Anisocytosis 1+     Polychromasia 1+     Poikilocytes 1+     Target Cells 1+    Vitamin B12 & Folate   Result Value Ref Range    Vitamin B-12 470 211 - 946 pg/mL    Folate 12.4 4.8 - 24.2 ng/mL   Hemoglobin A1C   Result Value Ref Range    Hemoglobin A1C 6.4 (H) 4.0 - 5.6 %   Iron and TIBC   Result Value Ref Range    Iron 22 (L) 59 - 158 mcg/dL    TIBC 299 250 - 450 mcg/dL    Iron Saturation 7 (L) 20 - 55 %   TSH   Result Value Ref Range    TSH 0.249 (L) 0.270 - 4.200 uIU/mL   POCT Glucose   Result Value Ref Range    Meter Glucose 109 (H) 74 - 99 mg/dL   POCT Glucose   Result Value Ref Range    Meter Glucose 125 (H) 74 - 99 mg/dL   POCT Glucose   Result Value Ref Range    Meter Glucose 166 (H) 74 - 99 mg/dL   EKG 12 Lead   Result Value Ref Range    Ventricular Rate 95 BPM    Atrial Rate 95 BPM    P-R Interval 176 ms    QRS Duration 112 ms    Q-T Interval 380 ms    QTc Calculation (Bazett) 477 ms    P Axis 63 degrees    R Axis -48 degrees    T Axis 69 degrees       RADIOLOGY:  US DUP LOWER EXTREMITIES BILATERAL VENOUS   Final Result   No evidence of DVT in either lower extremity. CTA PULMONARY W CONTRAST   Final Result   1. No CT evidence of pulmonary embolism. 2.  Subsegmental atelectasis in the right middle lobe and lingular segments. 3.  Moderate to severe symmetric heterogeneous enlargement of the thyroid   gland which may represent thyroid goiter. 4.  Nonspecific mild mediastinal lymphadenopathy. XR CHEST PORTABLE   Final Result   Bilateral lung multifocal consolidation, greatest at the right lung base   suggesting pneumonia. Pulmonary vascular congestion plus or minus mild pulmonary interstitial edema. Moderate cardiomegaly. ------------------------- NURSING NOTES AND VITALS REVIEWED ---------------------------  Date / Time Roomed:  8/17/2022  9:54 PM  ED Bed Assignment:  [id number]    The nursing notes within the ED encounter and vital signs as below have been reviewed.      Patient Vitals for the past 24 hrs:   BP Temp Temp src Pulse Resp SpO2 Height Weight   08/18/22 2000 (!) 156/71 99.3 °F (37.4 °C) Oral 97 15 100 % -- --   08/18/22 1449 -- -- -- -- -- -- 5' 10\" (1.778 m) --   08/18/22 1040 (!) 177/61 -- -- -- -- -- -- --

## 2022-08-18 NOTE — CARE COORDINATION
8-18-Cm note: ( covid neg 8-17) met with pt at the bedside for transition of care needs, pt lives with his wife, they live in [place] for part of the year , he is a pt of Dr. Ellis Simpler office when in [place]. Pt has an Inogen type Oxygen tank that he uses prn, pt currently wearing 3l nc here. Pt denies any other DME.  Pt mostly independent, denies any needs for homegoing, PT/OT to see pt, CM will follow for any needs Electronically signed by Carrie Nichols RN on 8/18/2022 at 11:20 AM

## 2022-08-18 NOTE — PROGRESS NOTES
Internal Medicine Progress Note    [facility]    Jamila Oliva. Tony Aponte., F.SUSU.EFFIEO.I. Caesar Montano D.O., ALICIA Parker D.O. Dolores Hodges D.O. Laura Barker, MSN, APRN, NP-C  Karlezekielwilla Crooks. Faith Johnson, MSN, APRN-CNP     Primary Care Physician: Vikram Newsome DO   Admitting Physician:  Vikram Newsome DO  Admission date and time: 8/17/2022  9:54 PM    Room:  [facility]0344-29  Admitting diagnosis: Acute on chronic systolic (congestive) heart failure [facility]) [I50.23]    Patient Name: Violeta Yates  MRN: [id number]    Date of Service: 8/18/2022     Subjective:  Juan Luis Dunn is a 80 y.o. male who was seen and examined today,8/18/2022, at the bedside. Patient does not feel well today. Does have oxygen currently at 3 L. Does complain of shortness of breath. Denies chest pain. Does have slight productive cough. Has been residing in [place] for the past 2 years. Also has recent complaints of chronic back pain and has had an upper GI bleed in the past year    No family present during my examination. Review of System:   Constitutional:   Denies fever or chills, weight loss or gain. Complains of fatigue and malaise  HEENT:   Denies ear pain, sore throat, sinus or eye problems. Cardiovascular:   Denies any chest pain, irregular heartbeats, or palpitations. Respiratory:   Shortness of breath with productive cough with yellow mucus  Gastrointestinal:   Denies nausea, vomiting, diarrhea, or constipation. Denies any abdominal pain. Genitourinary:    Denies any urgency, frequency, hematuria. Voiding  without difficulty. Extremities:   Denies lower extremity swelling, edema or cyanosis. Neurology:    Denies any headache or focal neurological deficits, Denies generalized weakness or memory difficulty. Psch:   Denies being anxious or depressed. Musculoskeletal:    Denies  myalgias, joint complaint.   Complain of low back pain  Integumentary: Denies any rashes, ulcers, or excoriations. Denies bruising. Hematologic/Lymphatic:  Denies bruising or bleeding. Physical Exam:  I/O this shift:  In: 360 [P.O.:360]  Out: 100 [Urine:100]    Intake/Output Summary (Last 24 hours) at 8/18/2022 1444  Last data filed at 8/18/2022 1340  Gross per 24 hour   Intake 460 ml   Output 700 ml   Net -240 ml   I/O last 3 completed shifts: In: 100 [P.O.:100]  Out: 600 [Urine:600]  Patient Vitals for the past 96 hrs (Last 3 readings):   Weight   08/17/22 2159 214 lb (97.1 kg)     Vital Signs:   Blood pressure (!) 177/61, pulse 84, temperature 97.9 °F (36.6 °C), resp. rate 17, height 5' 10\" (1.778 m), weight 214 lb (97.1 kg), SpO2 100 %. General appearance:  Alert, responsive, oriented to person, place, and time. Well preserved, alert, no distress. Head:  Normocephalic. No masses, lesions or tenderness. Eyes:  PERRLA. EOMI. Sclera clear. Buccal mucosa moist.  ENT:  Ears normal. Mucosa normal.  Wearing supplemental O2  Neck:    Supple. Trachea midline. No thyromegaly. No JVD. No bruits. Heart:    Rhythm regular. Rate controlled. 1/6 murmurs. Lungs:    Diminished posteriorly with slight cough  Abdomen:   Soft. Non-tender. Non-distended. Bowel sounds positive. No organomegaly or masses. No pain on palpation. Extremities:    Peripheral pulses present. Trace peripheral edema. No ulcers. No cyanosis. No clubbing. Neurologic:    Alert x 3. No focal deficit. Cranial nerves grossly intact. No focal weakness. Psych:   Behavior is normal. Mood appears normal. Speech is not rapid and/or pressured. Musculoskeletal:   Spine ROM normal. Muscular strength intact. Gait not assessed. Integumentary:  No rashes  Skin normal color and texture.   Genitalia/Breast:  Deferred    Medication:  Scheduled Meds:   sodium chloride flush  5-40 mL IntraVENous 2 times per day    enoxaparin  40 mg SubCUTAneous Daily    DULoxetine  30 mg Oral Daily    potassium chloride  20 mEq Oral Daily ipratropium-albuterol  1 ampule Inhalation Q4H    amLODIPine  10 mg Oral Daily    And    lisinopril  20 mg Oral Daily    furosemide  40 mg IntraVENous BID    tamsulosin  0.4 mg Oral Nightly    [START ON 8/25/2022] vitamin D  50,000 Units Oral Weekly    methylPREDNISolone  40 mg IntraVENous Q12H    insulin lispro  0-8 Units SubCUTAneous TID WC    insulin lispro  0-4 Units SubCUTAneous Nightly    atorvastatin  40 mg Oral Nightly    cefTRIAXone (ROCEPHIN) IV  1,000 mg IntraVENous Q24H    doxycycline hyclate  100 mg Oral 2 times per day    guaiFENesin  600 mg Oral BID     Continuous Infusions:   sodium chloride      dextrose         Objective Data:  Recent Labs     08/17/22 2239 08/18/22  0827   WBC 6.3 6.0   RBC 3.54* 3.87   HGB 9.1* 9.9*   HCT 30.9* 34.2*   MCV 87.3 88.4   MCH 25.7* 25.6*   MCHC 29.4* 28.9*   RDW 15.9* 16.1*    208   MPV 12.0 11.9     Recent Labs     08/17/22 2239 08/18/22  0827    147*   K 4.1 3.6    102   CO2 37* 37*   BUN 12 10   CREATININE 1.0 0.9   GLUCOSE 137* 171*   CALCIUM 9.3 9.3   PROT 6.8 6.9   LABALBU 3.7 3.7   BILITOT 0.5 0.7   ALKPHOS 67 68   AST 22 21   ALT 10 11     Lab Results   Component Value Date    TROPONINI <0.01 12/24/2016    TROPONINI 0.01 05/16/2013    TROPONINI 0.04 04/23/2012          Wound Documentation:   Incision 06/05/14 Breast Left (Active)   Number of days: 2996       Assessment:    Acute CHF exacerbation-systolic versus diastolic dysfunction  Possible pneumonia  Moderate aortic stenosis  Mild aortic regurgitation  Mild pulmonary hypertension  Chronic hypoxic respiratory failure on 3 L supplemental oxygen at baseline  Normocytic anemia with recent evaluation for GI bleed  Sleep apnea without the use of CPAP/BiPAP  Non-insulin-dependent diabetes mellitus  Hyperlipidemia  Hypertension  Current tobacco abuse  Obesity with elevated BMI of 30.71      Plan: Will obtain echocardiogram to evaluate left ventricular chamber size and function. Continue diuretic therapy and blood pressure medication. Further adjustment of medication per ejection fraction  Patient has recently traveled back from [place] last Friday. We will obtain venous duplex study and CTA to exclude thrombus  Diuretic therapy for symptomatic fluid overload  Breathing treatment and antibiotics for community-acquired pneumonia  Mucolytic agent and bronchodilators  Cardiac evaluation  DVT prophylaxis  Evaluation anemia    More than 50% of my  time was spent at the bedside counseling/coordinating care with the patient and/or family with face to face contact. This time was spent reviewing notes and laboratory data as well as instructing and counseling the patient. Time I spent with the family or surrogate(s) is included only if the patient was incapable of providing the necessary information or participating in medical decisions. I also discussed the differential diagnosis and all of the proposed management plans with the patient and individuals accompanying the patient.     Anmol Montes De Oca DO, F.A.C.O.I.  8/18/2022  2:44 PM

## 2022-08-19 LAB
ALBUMIN SERPL-MCNC: 4.1 G/DL (ref 3.5–5.2)
ALP BLD-CCNC: 65 U/L (ref 40–129)
ALT SERPL-CCNC: 14 U/L (ref 0–40)
ANION GAP SERPL CALCULATED.3IONS-SCNC: 9 MMOL/L (ref 7–16)
AST SERPL-CCNC: 16 U/L (ref 0–39)
B.E.: 8.1 MMOL/L (ref -3–3)
BASOPHILS ABSOLUTE: 0 E9/L (ref 0–0.2)
BASOPHILS RELATIVE PERCENT: 0.1 % (ref 0–2)
BILIRUB SERPL-MCNC: 0.6 MG/DL (ref 0–1.2)
BUN BLDV-MCNC: 12 MG/DL (ref 6–23)
CALCIUM SERPL-MCNC: 9.9 MG/DL (ref 8.6–10.2)
CHLORIDE BLD-SCNC: 100 MMOL/L (ref 98–107)
CHOLESTEROL, TOTAL: 99 MG/DL (ref 0–199)
CO2: 36 MMOL/L (ref 22–29)
COHB: 0.6 % (ref 0–1.5)
CREAT SERPL-MCNC: 0.9 MG/DL (ref 0.7–1.2)
CRITICAL: ABNORMAL
DATE ANALYZED: ABNORMAL
DATE OF COLLECTION: ABNORMAL
EOSINOPHILS ABSOLUTE: 0 E9/L (ref 0.05–0.5)
EOSINOPHILS RELATIVE PERCENT: 0 % (ref 0–6)
GFR AFRICAN AMERICAN: >60
GFR NON-AFRICAN AMERICAN: >60 ML/MIN/1.73
GLUCOSE BLD-MCNC: 151 MG/DL (ref 74–99)
HCO3: 32.9 MMOL/L (ref 22–26)
HCT VFR BLD CALC: 33.7 % (ref 37–54)
HDLC SERPL-MCNC: 44 MG/DL
HEMOGLOBIN: 10.3 G/DL (ref 12.5–16.5)
HHB: 10.7 % (ref 0–5)
HYPOCHROMIA: ABNORMAL
L. PNEUMOPHILA SEROGP 1 UR AG: NORMAL
LAB: ABNORMAL
LDL CHOLESTEROL CALCULATED: 44 MG/DL (ref 0–99)
LYMPHOCYTES ABSOLUTE: 0.62 E9/L (ref 1.5–4)
LYMPHOCYTES RELATIVE PERCENT: 8.7 % (ref 20–42)
Lab: ABNORMAL
MAGNESIUM: 1.7 MG/DL (ref 1.6–2.6)
MCH RBC QN AUTO: 25.7 PG (ref 26–35)
MCHC RBC AUTO-ENTMCNC: 30.6 % (ref 32–34.5)
MCV RBC AUTO: 84 FL (ref 80–99.9)
METER GLUCOSE: 136 MG/DL (ref 74–99)
METER GLUCOSE: 137 MG/DL (ref 74–99)
METER GLUCOSE: 160 MG/DL (ref 74–99)
METER GLUCOSE: 187 MG/DL (ref 74–99)
METHB: 0.3 % (ref 0–1.5)
MODE: ABNORMAL
MONOCYTES ABSOLUTE: 0.28 E9/L (ref 0.1–0.95)
MONOCYTES RELATIVE PERCENT: 3.5 % (ref 2–12)
NEUTROPHILS ABSOLUTE: 6.07 E9/L (ref 1.8–7.3)
NEUTROPHILS RELATIVE PERCENT: 87.8 % (ref 43–80)
NUCLEATED RED BLOOD CELLS: 1.7 /100 WBC
O2 CONTENT: 14.4 ML/DL
O2 SATURATION: 89.2 % (ref 92–98.5)
O2HB: 88.4 % (ref 94–97)
OPERATOR ID: ABNORMAL
OVALOCYTES: ABNORMAL
PATIENT TEMP: 37 C
PCO2: 47 MMHG (ref 35–45)
PDW BLD-RTO: 15.9 FL (ref 11.5–15)
PH BLOOD GAS: 7.46 (ref 7.35–7.45)
PLATELET # BLD: 251 E9/L (ref 130–450)
PMV BLD AUTO: 12.6 FL (ref 7–12)
PO2: 57.3 MMHG (ref 75–100)
POIKILOCYTES: ABNORMAL
POLYCHROMASIA: ABNORMAL
POTASSIUM SERPL-SCNC: 3.2 MMOL/L (ref 3.5–5)
RBC # BLD: 4.01 E12/L (ref 3.8–5.8)
REASON FOR REJECTION: NORMAL
REJECTED TEST: NORMAL
SEDIMENTATION RATE, ERYTHROCYTE: 16 MM/HR (ref 0–15)
SODIUM BLD-SCNC: 145 MMOL/L (ref 132–146)
SOURCE, BLOOD GAS: ABNORMAL
STREP PNEUMONIAE ANTIGEN, URINE: NORMAL
T3 FREE: 2 PG/ML (ref 2–4.4)
T4 FREE: 1.35 NG/DL (ref 0.93–1.7)
THB: 11.6 G/DL (ref 11.5–16.5)
TIME ANALYZED: 1911
TOTAL PROTEIN: 7.2 G/DL (ref 6.4–8.3)
TRIGL SERPL-MCNC: 55 MG/DL (ref 0–149)
VLDLC SERPL CALC-MCNC: 11 MG/DL
WBC # BLD: 6.9 E9/L (ref 4.5–11.5)

## 2022-08-19 PROCEDURE — 6360000002 HC RX W HCPCS: Performed by: INTERNAL MEDICINE

## 2022-08-19 PROCEDURE — 80061 LIPID PANEL: CPT

## 2022-08-19 PROCEDURE — 6370000000 HC RX 637 (ALT 250 FOR IP): Performed by: INTERNAL MEDICINE

## 2022-08-19 PROCEDURE — 82962 GLUCOSE BLOOD TEST: CPT

## 2022-08-19 PROCEDURE — 84439 ASSAY OF FREE THYROXINE: CPT

## 2022-08-19 PROCEDURE — 83735 ASSAY OF MAGNESIUM: CPT

## 2022-08-19 PROCEDURE — 80053 COMPREHEN METABOLIC PANEL: CPT

## 2022-08-19 PROCEDURE — 97161 PT EVAL LOW COMPLEX 20 MIN: CPT | Performed by: PHYSICAL THERAPIST

## 2022-08-19 PROCEDURE — 2580000003 HC RX 258: Performed by: INTERNAL MEDICINE

## 2022-08-19 PROCEDURE — 36415 COLL VENOUS BLD VENIPUNCTURE: CPT

## 2022-08-19 PROCEDURE — 99223 1ST HOSP IP/OBS HIGH 75: CPT | Performed by: INTERNAL MEDICINE

## 2022-08-19 PROCEDURE — 82805 BLOOD GASES W/O2 SATURATION: CPT

## 2022-08-19 PROCEDURE — 85025 COMPLETE CBC W/AUTO DIFF WBC: CPT

## 2022-08-19 PROCEDURE — 1200000000 HC SEMI PRIVATE

## 2022-08-19 PROCEDURE — 85651 RBC SED RATE NONAUTOMATED: CPT

## 2022-08-19 PROCEDURE — 84481 FREE ASSAY (FT-3): CPT

## 2022-08-19 PROCEDURE — 6360000002 HC RX W HCPCS: Performed by: NURSE PRACTITIONER

## 2022-08-19 PROCEDURE — 2700000000 HC OXYGEN THERAPY PER DAY

## 2022-08-19 PROCEDURE — 94640 AIRWAY INHALATION TREATMENT: CPT

## 2022-08-19 PROCEDURE — 97530 THERAPEUTIC ACTIVITIES: CPT | Performed by: PHYSICAL THERAPIST

## 2022-08-19 RX ORDER — FUROSEMIDE 10 MG/ML
40 INJECTION INTRAMUSCULAR; INTRAVENOUS DAILY
Status: DISCONTINUED | OUTPATIENT
Start: 2022-08-19 | End: 2022-08-19

## 2022-08-19 RX ORDER — FUROSEMIDE 40 MG/1
40 TABLET ORAL DAILY
Status: DISCONTINUED | OUTPATIENT
Start: 2022-08-20 | End: 2022-08-22 | Stop reason: HOSPADM

## 2022-08-19 RX ORDER — ASPIRIN 81 MG/1
81 TABLET, CHEWABLE ORAL DAILY
Status: DISCONTINUED | OUTPATIENT
Start: 2022-08-19 | End: 2022-08-22 | Stop reason: HOSPADM

## 2022-08-19 RX ORDER — ONDANSETRON 2 MG/ML
4 INJECTION INTRAMUSCULAR; INTRAVENOUS EVERY 6 HOURS PRN
Status: DISCONTINUED | OUTPATIENT
Start: 2022-08-19 | End: 2022-08-22 | Stop reason: HOSPADM

## 2022-08-19 RX ORDER — METHYLPREDNISOLONE SODIUM SUCCINATE 40 MG/ML
20 INJECTION, POWDER, LYOPHILIZED, FOR SOLUTION INTRAMUSCULAR; INTRAVENOUS EVERY 12 HOURS
Status: DISCONTINUED | OUTPATIENT
Start: 2022-08-19 | End: 2022-08-20

## 2022-08-19 RX ORDER — CARVEDILOL 3.12 MG/1
3.12 TABLET ORAL 2 TIMES DAILY WITH MEALS
Status: DISCONTINUED | OUTPATIENT
Start: 2022-08-19 | End: 2022-08-20

## 2022-08-19 RX ORDER — SPIRONOLACTONE 25 MG/1
25 TABLET ORAL DAILY
Status: DISCONTINUED | OUTPATIENT
Start: 2022-08-19 | End: 2022-08-22 | Stop reason: HOSPADM

## 2022-08-19 RX ORDER — POTASSIUM CHLORIDE 20 MEQ/1
40 TABLET, EXTENDED RELEASE ORAL
Status: COMPLETED | OUTPATIENT
Start: 2022-08-19 | End: 2022-08-19

## 2022-08-19 RX ADMIN — DOXYCYCLINE HYCLATE 100 MG: 100 CAPSULE ORAL at 09:44

## 2022-08-19 RX ADMIN — CARVEDILOL 3.12 MG: 3.12 TABLET, FILM COATED ORAL at 18:46

## 2022-08-19 RX ADMIN — ATORVASTATIN CALCIUM 40 MG: 40 TABLET, FILM COATED ORAL at 20:45

## 2022-08-19 RX ADMIN — AMLODIPINE BESYLATE 10 MG: 10 TABLET ORAL at 09:44

## 2022-08-19 RX ADMIN — GUAIFENESIN 600 MG: 600 TABLET, EXTENDED RELEASE ORAL at 20:45

## 2022-08-19 RX ADMIN — CEFTRIAXONE SODIUM 1000 MG: 1 INJECTION, POWDER, FOR SOLUTION INTRAMUSCULAR; INTRAVENOUS at 09:45

## 2022-08-19 RX ADMIN — IPRATROPIUM BROMIDE AND ALBUTEROL SULFATE 1 AMPULE: .5; 2.5 SOLUTION RESPIRATORY (INHALATION) at 02:20

## 2022-08-19 RX ADMIN — ASPIRIN 81 MG CHEWABLE TABLET 81 MG: 81 TABLET CHEWABLE at 09:43

## 2022-08-19 RX ADMIN — METHYLPREDNISOLONE SODIUM SUCCINATE 20 MG: 40 INJECTION, POWDER, FOR SOLUTION INTRAMUSCULAR; INTRAVENOUS at 23:11

## 2022-08-19 RX ADMIN — Medication 10 ML: at 20:45

## 2022-08-19 RX ADMIN — DULOXETINE HYDROCHLORIDE 30 MG: 30 CAPSULE, DELAYED RELEASE ORAL at 09:44

## 2022-08-19 RX ADMIN — POTASSIUM CHLORIDE 40 MEQ: 1500 TABLET, EXTENDED RELEASE ORAL at 12:10

## 2022-08-19 RX ADMIN — SACUBITRIL AND VALSARTAN 1 TABLET: 24; 26 TABLET, FILM COATED ORAL at 23:11

## 2022-08-19 RX ADMIN — Medication 10 ML: at 09:44

## 2022-08-19 RX ADMIN — IPRATROPIUM BROMIDE AND ALBUTEROL SULFATE 1 AMPULE: .5; 2.5 SOLUTION RESPIRATORY (INHALATION) at 21:27

## 2022-08-19 RX ADMIN — EMPAGLIFLOZIN 10 MG: 10 TABLET, FILM COATED ORAL at 18:46

## 2022-08-19 RX ADMIN — GUAIFENESIN 600 MG: 600 TABLET, EXTENDED RELEASE ORAL at 09:44

## 2022-08-19 RX ADMIN — FUROSEMIDE 40 MG: 10 INJECTION, SOLUTION INTRAMUSCULAR; INTRAVENOUS at 09:37

## 2022-08-19 RX ADMIN — METHYLPREDNISOLONE SODIUM SUCCINATE 20 MG: 40 INJECTION, POWDER, FOR SOLUTION INTRAMUSCULAR; INTRAVENOUS at 09:45

## 2022-08-19 RX ADMIN — SPIRONOLACTONE 25 MG: 25 TABLET ORAL at 12:09

## 2022-08-19 RX ADMIN — ONDANSETRON 4 MG: 2 INJECTION INTRAMUSCULAR; INTRAVENOUS at 15:33

## 2022-08-19 RX ADMIN — CARVEDILOL 3.12 MG: 3.12 TABLET, FILM COATED ORAL at 10:00

## 2022-08-19 RX ADMIN — DOXYCYCLINE HYCLATE 100 MG: 100 CAPSULE ORAL at 20:45

## 2022-08-19 RX ADMIN — IPRATROPIUM BROMIDE AND ALBUTEROL SULFATE 1 AMPULE: .5; 2.5 SOLUTION RESPIRATORY (INHALATION) at 17:56

## 2022-08-19 RX ADMIN — IPRATROPIUM BROMIDE AND ALBUTEROL SULFATE 1 AMPULE: .5; 2.5 SOLUTION RESPIRATORY (INHALATION) at 15:09

## 2022-08-19 RX ADMIN — TAMSULOSIN HYDROCHLORIDE 0.4 MG: 0.4 CAPSULE ORAL at 20:45

## 2022-08-19 RX ADMIN — POTASSIUM CHLORIDE 40 MEQ: 1500 TABLET, EXTENDED RELEASE ORAL at 15:33

## 2022-08-19 RX ADMIN — IPRATROPIUM BROMIDE AND ALBUTEROL SULFATE 1 AMPULE: .5; 2.5 SOLUTION RESPIRATORY (INHALATION) at 06:48

## 2022-08-19 RX ADMIN — POTASSIUM CHLORIDE 20 MEQ: 1500 TABLET, EXTENDED RELEASE ORAL at 09:44

## 2022-08-19 RX ADMIN — ENOXAPARIN SODIUM 40 MG: 100 INJECTION SUBCUTANEOUS at 09:44

## 2022-08-19 RX ADMIN — OXYCODONE AND ACETAMINOPHEN 1 TABLET: 5; 325 TABLET ORAL at 18:49

## 2022-08-19 ASSESSMENT — PAIN SCALES - GENERAL: PAINLEVEL_OUTOF10: 5

## 2022-08-19 NOTE — PROGRESS NOTES
Physical Therapy Initial Evaluation/Plan of Care    Room #:  5585/7915-88  Patient Name: Violeta Yates  YOB: 1940  MRN: [id number]    Date of Service: 8/19/2022     Tentative placement recommendation: Home  Equipment recommendation: None      Evaluating Physical Therapist: Wood Zimmer, PT #[id number]      Specific Provider Orders/Date/Referring Provider :  08/18/22 0915    PT eval and treat  Start:  08/18/22 0915,   End:  08/18/22 0915,   ONE TIME,   Standing Count:  1 Occurrences,   R         Vikram Jerod, DO     Admitting Diagnosis:   Acute on chronic systolic (congestive) heart failure (HCC) [I50.23]      increasing shortness of breath over the past few 3 days patient was recently admitted to hospital in [place] due to GI bleed and anemia  Surgery: none  Visit Diagnoses         Codes    Congestive heart failure, unspecified HF chronicity, unspecified heart failure type ([facility] [address]    -  Primary I50.9            Patient Active Problem List   Diagnosis    DDD (degenerative disc disease), lumbosacral    Lumbago    Lumbar facet arthropathy    Diabetes mellitus ([address]    Hypertension    Arthritis    Hyperlipidemia    Benign prostatic hyperplasia    Primary localized osteoarthritis    Nocturia    Insomnia    Aortic stenosis, moderate    Bilateral carotid bruits    Acute on chronic systolic (congestive) heart failure (HCC)        ASSESSMENT of Current Deficits Patient exhibits decreased balance and endurance impairing gait distance and tolerance to activity increased shortness of breath with decreased O2 saturation with increased gait distances. Patient requires skilled physical therapy to address concerns listed above to increase safety and independence at discharge.        PHYSICAL THERAPY  PLAN OF CARE       Physical therapy plan of care is established based on physician order,  patient diagnosis and clinical assessment    Current Treatment Recommendations:    -Gait: Gait training, Standing activities to improve: base of support, weight shift, weight bearing , and Pregait training to emphasize: Safety and pacing   -Endurance: Utilize Supervised activities to increase level of endurance to allow for safe functional mobility including transfers and gait  and Use graduated activities to promote good breathing techniques and provide support and education to maximize respiratory function    PT long term treatment goals are located in below grid    Patient and or family understand(s) diagnosis, prognosis, and plan of care. Frequency of treatments: Patient will be seen  2-3 times/week. Nursing to ambulate patient every shift. Prior Level of Function: Patient ambulated independently   retired [employer]: good   for baseline    Past medical history:   Past Medical History:   Diagnosis Date    Aortic stenosis, moderate 10/2018    Arthritis     Diabetes mellitus ([facility] [address]     H/O cardiovascular stress test 10/19/2018    lexiscan    Hyperlipidemia     Hypertension     Lymph node enlargement     seeing dr yates  coughing mucous    Partial small bowel obstruction ([facility] [address] 04/14/2017     Past Surgical History:   Procedure Laterality Date    ACHILLES TENDON SURGERY      BREAST SURGERY Left 06/05/2014    Excision of left breast mass    COLONOSCOPY  04/24/2012    FRACTURE SURGERY      C-2    KNEE ARTHROTOMY      NERVE BLOCK  04 10 2012    lumbar epidural #1       SUBJECTIVE:    Precautions: Up with assistance and Check Pulse Oximetry while ambulating , O2  prn,      Social history: Patient lives with spouse in a condo     with No steps  to enter detached garage.       Walk in shower grab bars, built in shower chair    Comment: pt has a home in [place] and generally returns there in November       Equipment owned: None,       [id number] [facility]  Mobility Inpatient   How much difficulty turning over in bed?: None  How much difficulty sitting down on / standing up from a chair with arms?: A Little  How much difficulty moving from lying on back to sitting on side of bed?: None  How much help from another person moving to and from a bed to a chair?: A Little  How much help from another person needed to walk in hospital room?: A Little  How much help from another person for climbing 3-5 steps with a railing?: A Little  AM-[facility] Inpatient Mobility Raw Score : 20  AM-PAC Inpatient T-Scale Score : 47.67  Mobility Inpatient CMS 0-100% Score: 35.83  Mobility Inpatient CMS G-Code Modifier : [address] cleared patient for PT evaluation. The admitting diagnosis and active problem list as listed above have been reviewed prior to the initiation of this evaluation. OBJECTIVE;   Initial Evaluation  Date: 8/19/2022 Treatment Date:     Short Term/ Long Term   Goals   Was pt agreeable to Eval/treatment? Yes  To be met in 2 days   Pain level   0/10        Bed Mobility    Rolling: Independent    Supine to sit: Independent    Sit to supine: Independent    Scooting: Independent    Rolling: Not assessed     Supine to sit:  Independent    Sit to supine: Independent    Scooting: Independent     Transfers Sit to stand: Supervision     Sit to stand: Independent     Ambulation     2 x 20 feet 2 x 100 feet using  no device with Supervision    cues for safety, pacing, and pursed lip breathing    150 feet using  no device with Independent    Stair negotiation: ascended and descended   Not assessed          ROM Within functional limits       Strength BUE:  refer to OT eval  RLE:  4/5  LLE:  4/5     Balance Sitting EOB:  good    Dynamic Standing:  fair +  Sitting EOB:  good    Dynamic Standing: good       Patient is Alert & Oriented x person, place, time, and situation and follows directions    Sensation:  Patient  denies numbness/tingling   Edema:  yes bilateral lower extremities   Endurance: fair  +    Vitals: room air     Blood Pressure at rest  Blood Pressure during session    Heart Rate at rest 114 Heart Rate during session 119   SPO2 at rest 93%  SPO2 during session 95% after 100', 88% after additional 100 feet seated Edge of bed 93% ~ 1 min       Patient education  Patient educated on role of Physical Therapy, risks of immobility, safety and plan of care,  importance of mobility while in hospital , and purse lip breathing     Patient response to education:   Pt verbalized understanding Pt demonstrated skill Pt requires further education in this area   Yes Partial Yes      Treatment:  Patient practiced and was instructed/facilitated in the following treatment: Patient assisted to edge of bed,  Sat edge of bed 15 minutes with Supervision  to increase dynamic sitting balance and activity tolerance. Stood x 2-Doffed/donned gown and pants. ambulated to/from bathroom, ambulated in hallway. Returned to room cues purse lip breathing and use of incentive spirometer. Therapeutic Exercises:  ankle pumps  x 15 reps. At end of session, patient sitting edge of bed with     call light and phone within reach,  all lines and tubes intact, nursing notified. Patient would benefit from continued skilled Physical Therapy to improve functional independence and quality of life. Patient's/ family goals   home    Time in  56  Time out  1025    Total Treatment Time  25 minutes    Evaluation time includes thorough review of current medical information, gathering information on past medical history/social history and prior level of function, completion of standardized testing/informal observation of tasks, assessment of data, and development of Plan of care and goals.      CPT codes:  Low Complexity PT evaluation (61326)  Therapeutic activities (47007)   25 minutes  2 unit(s)    Trang Junior, PT

## 2022-08-19 NOTE — PROGRESS NOTES
ABG drawn x 1 from Right Radial. Patient had NormalAllen's Test.  Patient was on room air  at time of puncture. Pressure held for 5. No bleeding or bruising noted at puncture site.   Patient tolerated procedure with difficulty      Performed by Ab Taylor RCP

## 2022-08-19 NOTE — CONSULTS
INPATIENT CARDIOLOGY CONSULT    Name: Yumiko Chandler    Age: 80 y.o. Date of Admission: 8/17/2022  9:54 PM    Date of Service: 8/19/2022    Reason for Consultation: CHF    Referring Physician: Julio C Magaña DO    Primary Cardiologist: None, known to me from this admission    History of Present Illness:   Yumiko Chandler is a 80 y.o. male (no prior association [facility] cardiology) who presented on 8/17/2022 for further evaluation of shortness of breath. He just returned from [place] where he was living for several years, just a few days ago. States was following with a cardiologist down there but unclear reasons. Not a great historian. He thinks he may have had a heart cath at some point but when I first asked him, he said he has never had a heart cath. Presents with 4 days of worsening shortness of breath with exertion. Denies chest pain, lower extremity edema, palpitation or syncope. He was given furosemide, urinated a lot, and feels much better today. I's and O's appear inaccurate. I saw him walking with therapy and he felt well. He did desaturate to the high 80s upon return to his room. Review of Systems:   Complete review of systems negative except as described above.     Past Medical History:  Past Medical History:   Diagnosis Date    Aortic stenosis, moderate 10/2018    Arthritis     Diabetes mellitus ([address]     H/O cardiovascular stress test 10/19/2018    lexiscan    Hyperlipidemia     Hypertension     Lymph node enlargement     seeing dr yates  coughing mucous    Partial small bowel obstruction ([address] 04/14/2017       Past Surgical History:  Past Surgical History:   Procedure Laterality Date    ACHILLES TENDON SURGERY      BREAST SURGERY Left 06/05/2014    Excision of left breast mass    COLONOSCOPY  04/24/2012    FRACTURE SURGERY      C-2    KNEE ARTHROTOMY      NERVE BLOCK  04 10 2012    lumbar epidural #1       Family History:  Family History   Problem Relation Age of Onset Hypertension Mother     Diabetes Mother     Hypertension Father     Brain Cancer Sister         Brain aneurysm    Hypertension Sister        Social History:  Social History     Tobacco Use    Smoking status: Every Day     Packs/day: 0.50     Types: Cigarettes    Smokeless tobacco: Never    Tobacco comments:     smoking 1/2 pack of cigarettes/day   Vaping Use    Vaping Use: Never used   Substance Use Topics    Alcohol use: Never     Comment: occassionally    Drug use: No       Allergies:  No Known Allergies    Home Medications:  Prior to Admission medications    Medication Sig Start Date End Date Taking?  Authorizing Provider   furosemide (LASIX) 40 MG tablet TAKE 1 TABLET BY MOUTH DAILY AS NEEDED (FOR SWELLING IN LEGS) 3/16/21   Osiel Montes De [place] DO   atorvastatin (LIPITOR) 40 MG tablet TAKE 1 TABLET BY MOUTH EVERY DAY 1/6/21   Osiel Montes De [place] DO   amLODIPine-benazepril (LOTREL) 10-20 MG per capsule TAKE 1 CAPSULE BY MOUTH EVERY DAY 12/22/20   Malaika Montes De [place] DO   potassium chloride (KLOR-CON M) 20 MEQ extended release tablet Take 1 tablet by mouth daily Take when taking Lasix 12/7/20   Osiel Montes De [place] DO   ondansetron (ZOFRAN ODT) 4 MG disintegrating tablet Take 1 tablet by mouth every 6 hours 8/31/20   Osiel Montes De [place] DO   amLODIPine-benazepril (LOTREL) 10-20 MG per capsule Take 1 capsule by mouth daily 8/5/20   DA Em - CNP   albuterol sulfate  (90 Base) MCG/ACT inhaler INHALE 2 PUFFS 4 TIMES A DAY AS NEEDED FOR WHEEZING 3/3/20   Historical Provider, MD   vitamin D (ERGOCALCIFEROL) 1.25 MG (03990 UT) CAPS capsule Take 1 capsule by mouth once a week 1/10/20   Malaika Montes De [place] DO   tamsulosin (FLOMAX) 0.4 MG capsule Take 1 capsule by mouth daily 10/7/19   Malaika Montes De [place] DO   tiZANidine (ZANAFLEX) 4 MG tablet Take 1 tablet by mouth every 8 hours as needed (back spasm) 8/26/19   Malaika Montes De [place] DO   metFORMIN (GLUCOPHAGE) 500 MG tablet Take 1 tablet by mouth 2 times daily (with meals) 10/15/18 Osiel Montes De [place] DO   VIAGRA 100 MG tablet Take 1 tablet by mouth as needed for Erectile Dysfunction 6/26/18   [facility] DO Falguni   DULoxetine (CYMBALTA) 30 MG extended release capsule Take 30 mg by mouth daily    Historical Provider, MD       Current Medications:    Current Facility-Administered Medications:     sacubitril-valsartan (ENTRESTO) 24-26 MG per tablet 1 tablet, 1 tablet, Oral, BID, Osiel Montes De [place] DO    [START ON 8/20/2022] furosemide (LASIX) injection 40 mg, 40 mg, IntraVENous, Daily, Osiel Montes De [place] DO    methylPREDNISolone sodium (SOLU-MEDROL) injection 20 mg, 20 mg, IntraVENous, Q12H, Osiel Montes De [place] DO    aspirin chewable tablet 81 mg, 81 mg, Oral, Daily, Osiel Montes De [place] DO    sodium chloride flush 0.9 % injection 5-40 mL, 5-40 mL, IntraVENous, 2 times per day, Lisa Butts, DO, 10 mL at 08/18/22 2124    sodium chloride flush 0.9 % injection 5-40 mL, 5-40 mL, IntraVENous, PRN, Ismail U Leida, DO    0.9 % sodium chloride infusion, , IntraVENous, PRN, Lisa Butts, DO    enoxaparin (LOVENOX) injection 40 mg, 40 mg, SubCUTAneous, Daily, Ismail U Leida, DO, 40 mg at 08/18/22 8784    polyethylene glycol (GLYCOLAX) packet 17 g, 17 g, Oral, Daily PRN, Lisa Butts, DO    acetaminophen (TYLENOL) tablet 650 mg, 650 mg, Oral, Q6H PRN **OR** acetaminophen (TYLENOL) suppository 650 mg, 650 mg, Rectal, Q6H PRN, Lisa Butts, DO    DULoxetine (CYMBALTA) extended release capsule 30 mg, 30 mg, Oral, Daily, Ismail U Leida, DO, 30 mg at 08/18/22 1040    potassium chloride (KLOR-CON M) extended release tablet 20 mEq, 20 mEq, Oral, Daily, Ismail U Leida, DO, 20 mEq at 08/18/22 1040    ipratropium-albuterol (DUONEB) nebulizer solution 1 ampule, 1 ampule, Inhalation, Q4H, Ismail U Leida, DO, 1 ampule at 08/19/22 0648    perflutren lipid microspheres (DEFINITY) injection 1.65 mg, 1.5 mL, IntraVENous, ONCE PRN, Nilo Casarez DO    amLODIPine (NORVASC) tablet 10 mg, 10 mg, Oral, Daily, 10 mg at 08/18/22 1040 **AND** [DISCONTINUED] lisinopril (PRINIVIL;ZESTRIL) tablet 20 mg, 20 mg, Oral, Daily, Ismail U Leida, DO, 20 mg at 08/18/22 1041    tamsulosin (FLOMAX) capsule 0.4 mg, 0.4 mg, Oral, Nightly, Catalina Montes De [place], DO, 0.4 mg at 08/18/22 2124    [START ON 8/25/2022] vitamin D (ERGOCALCIFEROL) capsule 50,000 Units, 50,000 Units, Oral, Weekly, Osiel Montes De [place] DO    glucose chewable tablet 16 g, 4 tablet, Oral, PRN, Osiel Montes De [place] DO    dextrose bolus 10% 125 mL, 125 mL, IntraVENous, PRN **OR** dextrose bolus 10% 250 mL, 250 mL, IntraVENous, PRN, Osiel Montes De [place] DO    glucagon (rDNA) injection 1 mg, 1 mg, SubCUTAneous, PRN, Osiel Montes De [place] DO    dextrose 10 % infusion, , IntraVENous, Continuous PRN, Osiel Montes De [place] DO    insulin lispro (HUMALOG) injection vial 0-8 Units, 0-8 Units, SubCUTAneous, TID WC, Osiel Montes De [place] DO    insulin lispro (HUMALOG) injection vial 0-4 Units, 0-4 Units, SubCUTAneous, Nightly, Osiel Montes De [place] DO    atorvastatin (LIPITOR) tablet 40 mg, 40 mg, Oral, Nightly, Osiel Montes De [place] DO, 40 mg at 08/18/22 2124    cefTRIAXone (ROCEPHIN) 1,000 mg in sterile water 10 mL IV syringe, 1,000 mg, IntraVENous, Q24H, Osiel Montes De [place] DO, 1,000 mg at 08/18/22 1159    doxycycline hyclate (VIBRAMYCIN) capsule 100 mg, 100 mg, Oral, 2 times per day, Charlie Garcia DO, 100 mg at 08/18/22 2124    guaiFENesin (MUCINEX) extended release tablet 600 mg, 600 mg, Oral, BID, Catalina Montes De [place] DO, 600 mg at 08/18/22 2124    oxyCODONE-acetaminophen (PERCOCET) 5-325 MG per tablet 1 tablet, 1 tablet, Oral, Q8H PRN, Joe Brown DO, 1 tablet at 08/18/22 2320    Physical Exam:  BP (!) 140/68   Pulse 95   Temp 98.1 °F (36.7 °C) (Oral)   Resp 16   Ht 5' 10\" (1.778 m)   Wt 204 lb 3.2 oz (92.6 kg)   SpO2 92%   BMI 29.30 kg/m²   Wt Readings from Last 3 Encounters:   08/19/22 204 lb 3.2 oz (92.6 kg)   11/20/19 217 lb (98.4 kg)   08/26/19 210 lb (95.3 kg)     Appearance: Well-appearing gentleman, looks younger than age, awake, alert, no acute respiratory distress  Skin: Intact, no rash  Head: Normocephalic, atraumatic  Eyes: EOMI, no conjunctival erythema  ENMT: No pharyngeal erythema, MMM, no rhinorrhea  Neck: Supple, no elevated JVP, no carotid bruits  Lungs: Clear to auscultation bilaterally. No wheezes, rales, or rhonchi. Cardiac: PMI nondisplaced, regular rhythm with a normal rate, normal S1 & S2, 3/6 mid peaking harsh systolic murmur at the right upper sternal border  Abdomen: Soft, nontender, +bowel sounds  Extremities: Moves all extremities x 4, no lower extremity edema  Neurologic: No focal motor deficits apparent, normal mood and affect  Peripheral Pulses: Intact posterior tibial pulses bilaterally    Intake/Output:    Intake/Output Summary (Last 24 hours) at 8/19/2022 0924  Last data filed at 8/19/2022 0553  Gross per 24 hour   Intake 740 ml   Output 375 ml   Net 365 ml     No intake/output data recorded.     Laboratory Tests:  Recent Labs     08/17/22 2239 08/18/22  0827    147*   K 4.1 3.6    102   CO2 37* 37*   BUN 12 10   CREATININE 1.0 0.9   GLUCOSE 137* 171*   CALCIUM 9.3 9.3     Lab Results   Component Value Date/Time    MG 1.7 08/18/2022 08:27 AM     Recent Labs     08/17/22 2239 08/18/22  0827   ALKPHOS 67 68   ALT 10 11   AST 22 21   PROT 6.8 6.9   BILITOT 0.5 0.7   LABALBU 3.7 3.7     Recent Labs     08/17/22 2239 08/18/22  0827   WBC 6.3 6.0   RBC 3.54* 3.87   HGB 9.1* 9.9*   HCT 30.9* 34.2*   MCV 87.3 88.4   MCH 25.7* 25.6*   MCHC 29.4* 28.9*   RDW 15.9* 16.1*    208   MPV 12.0 11.9     Lab Results   Component Value Date    CKTOTAL 296 (H) 05/16/2013    CKMB 1.4 05/16/2013    TROPONINI <0.01 12/24/2016    TROPONINI 0.01 05/16/2013    TROPONINI 0.04 04/23/2012     Lab Results   Component Value Date    INR 1.3 08/17/2022    INR 1.0 12/24/2016    INR 1.2 04/26/2012    PROTIME 14.9 (H) 08/17/2022    PROTIME 11.2 12/24/2016    PROTIME 13.4 (H) 04/26/2012     Lab Results   Component Value Date TSH 0.249 (L) 2022     Lab Results   Component Value Date    LABA1C 6.4 (H) 2022     No results found for: EAG  Lab Results   Component Value Date    CHOL 125 10/19/2018    CHOL 117 2017    CHOL 124 10/18/2016     Lab Results   Component Value Date    TRIG 83 10/19/2018    TRIG 118 2017    TRIG 129 10/18/2016     Lab Results   Component Value Date    HDL 60 10/19/2018    HDL 49 2017    HDL 53 10/18/2016     Lab Results   Component Value Date    LDLCALC 48 10/19/2018    LDLCALC 44 2017    LDLCALC 45 10/18/2016     Lab Results   Component Value Date    LABVLDL 17 10/19/2018    LABVLDL 24 2017    LABVLDL 26 10/18/2016     No results found for: CHOLHDLRATIO  Recent Labs     22  2239   PROBNP 8,311*       Cardiac Tests:  EK2022: Sinus rhythm 95 bpm.  Left anterior fascicular block. QRS duration 112 ms. Telemetry:   Sinus tach 110s    Chest X-ray:   2020    Impression   Bilateral lung multifocal consolidation, greatest at the right lung base   suggesting pneumonia. Pulmonary vascular congestion plus or minus mild pulmonary interstitial edema. Moderate cardiomegaly. CTA chest 2022  Personally reviewed, showing dilated ascending aorta 4.3 cm. Pulmonary artery appears prominent. There is coronary arterial, aortic annulus, and mitral annular calcification. Impression   1. No CT evidence of pulmonary embolism. 2.  Subsegmental atelectasis in the right middle lobe and lingular segments. 3.  Moderate to severe symmetric heterogeneous enlargement of the thyroid   gland which may represent thyroid goiter. 4.  Nonspecific mild mediastinal lymphadenopathy.      Echocardiogram:   TTE 22 per my own interpretation:  Normal LV size  LV systolic function is moderately reduced  Visually estimated EF appears to be 35-40%  There is hypokinesis of the basal to mid anterolateral, basal to mid inferolateral and apical lateral walls  There is basal septal hypertrophy  Normal RV size and function  Biatrial enlargement  Severe mitral annular calcification  Mean gradient 5.5 mmHg at heart rate 99 bpm  Probably mild functional mitral stenosis  No significant MR  Aortic valve is heavily calcified, and no way to tell how many leaflets are present  There is moderate aortic stenosis somewhat incompletely assessed  V-max 3.5 m/s  Mean gradient 30 mmHg  Aortic valve area 1.3 cm²  Dimensionless index 0.38  LV stroke-volume index 40 mL/m²  LVOT diameter 2.1 cm  At least moderate AR, not well assessed. No pressure half-time  Mild TR  Unable to estimate RVSP due to incomplete TR spectral Doppler envelope  IVC is not visualized    Compared to the study from 10/2018, inferolateral and anterolateral wall motion abnormalities are new and LV function has decreased and AS appears to have progressed. Stress test:    Pharmacologic stress 10/2018  Findings:    Both the stress and resting myocardial images are normal.       The gated left ventricular ejection fraction is 58 %. Impression   No evidence of stress-induced ischemia. Cardiac catheterization:     -------------------------------------------------------------------------------------------------------------------------------------------------------------  IMPRESSION:  Acute heart failure with reduced ejection fraction. proBNP 8300  Cardiomyopathy, likely ischemic.  EF 35-40% with wall motion abnormalities  Coronary artery disease. Coronary calcification noted on chest CT  Moderate aortic stenosis, at least moderate aortic regurgitation  Severe mitral annular calcification/functional MS  Sinus tachycardia  Ascending thoracic aortic aneurysm 4.3 cm by CT  Anemia Hgb 10.3  Hypertension, running high  Type 2 diabetes, non-insulin-requiring A1c 6.4%  AUBREY  Tobacco abuse  Hyperthyroidism.   TSH suppressed with enlarged thyroid on imaging    RECOMMENDATIONS:  He appears nearly euvolemic after several doses of furosemide and symptomatically is improved. Current echo showing new wall motion abnormality in inferolateral and anterolateral walls compared to prior echo from 2018. He thinks he had a heart cath in [place] but not sure. Transition IV to oral diuretic  Optimize guideline directed medical therapy  Agree with transitioning ACE to sacubitril/valsartan after 36-hour washout, last dose of lisinopril 8/18/2022 AM (was on amlodipine/benazepril combination at home)  Start carvedilol 3.125 mg twice daily and uptitrate  Wean and discontinue amlodipine to optimize guideline therapy  Recommend SGLT2 inhibitor  Add spironolactone  Recommend ischemic evaluation likely heart catheterization  Obtain records from [place] to see if he had a heart cath with the anatomy was at that time  Continue aspirin and statin for medical treatment of CAD  Tachycardia may be related hyperthyroidism, titrate beta-blocker as above  Lowering heart rate will also optimize diastolic filling in the setting of mitral stenosis  Free T3 and free T4 ordered, further management of hyperthyroidism per primary service  Aggressive risk factor modification including smoking cessation recommended    Thank you for allowing me to participate in your patient's care. Please feel free to contact me if you have any questions or concerns. Rebeca Hernandez MD, [address] [facility]    NOTE: This report was transcribed using voice recognition software. Every effort was made to ensure accuracy; however, inadvertent computerized transcription errors may be present.

## 2022-08-19 NOTE — PROGRESS NOTES
intake/output data recorded. Intake/Output Summary (Last 24 hours) at 8/19/2022 1624  Last data filed at 8/19/2022 0553  Gross per 24 hour   Intake 620 ml   Output 275 ml   Net 345 ml   I/O last 3 completed shifts: In: 1080 [P.O.:1080]  Out: 975 [Urine:975]  Patient Vitals for the past 96 hrs (Last 3 readings):   Weight   08/19/22 0552 204 lb 3.2 oz (92.6 kg)   08/17/22 2159 214 lb (97.1 kg)     Vital Signs:   Blood pressure (!) 140/68, pulse 95, temperature 98.1 °F (36.7 °C), temperature source Oral, resp. rate 16, height 5' 10\" (1.778 m), weight 204 lb 3.2 oz (92.6 kg), SpO2 92 %. General appearance:  Alert, responsive, oriented to person, place, and time. Well preserved, alert, no distress. Head:  Normocephalic. No masses, lesions or tenderness. Eyes:  PERRLA. EOMI. Sclera clear. Buccal mucosa moist.  ENT:  Ears normal. Mucosa normal.  Wearing supplemental O2  Neck:    Supple. Trachea midline. No thyromegaly. No JVD. No bruits. Heart:    Rhythm regular. Rate controlled. 1/6 murmurs. Lungs:    Diminished posteriorly with slight cough  Abdomen:   Soft. Non-tender. Non-distended. Bowel sounds positive. No organomegaly or masses. No pain on palpation. Extremities:    Peripheral pulses present. Trace peripheral edema. No ulcers. No cyanosis. No clubbing. Neurologic:    Alert x 3. No focal deficit. Cranial nerves grossly intact. No focal weakness. Psych:   Behavior is normal. Mood appears normal. Speech is not rapid and/or pressured. Musculoskeletal:   Spine ROM normal. Muscular strength intact. Gait not assessed. Integumentary:  No rashes  Skin normal color and texture.   Genitalia/Breast:  Deferred    Medication:  Scheduled Meds:   sacubitril-valsartan  1 tablet Oral BID    methylPREDNISolone  20 mg IntraVENous Q12H    aspirin  81 mg Oral Daily    carvedilol  3.125 mg Oral BID     spironolactone  25 mg Oral Daily    [START ON 8/20/2022] furosemide  40 mg Oral Daily    sodium chloride flush 5-40 mL IntraVENous 2 times per day    enoxaparin  40 mg SubCUTAneous Daily    DULoxetine  30 mg Oral Daily    potassium chloride  20 mEq Oral Daily    ipratropium-albuterol  1 ampule Inhalation Q4H    tamsulosin  0.4 mg Oral Nightly    [START ON 8/25/2022] vitamin D  50,000 Units Oral Weekly    insulin lispro  0-8 Units SubCUTAneous TID WC    insulin lispro  0-4 Units SubCUTAneous Nightly    atorvastatin  40 mg Oral Nightly    cefTRIAXone (ROCEPHIN) IV  1,000 mg IntraVENous Q24H    doxycycline hyclate  100 mg Oral 2 times per day    guaiFENesin  600 mg Oral BID     Continuous Infusions:   sodium chloride      dextrose         Objective Data:  Recent Labs     08/17/22 2239 08/18/22 0827 08/19/22  1037   WBC 6.3 6.0 6.9   RBC 3.54* 3.87 4.01   HGB 9.1* 9.9* 10.3*   HCT 30.9* 34.2* 33.7*   MCV 87.3 88.4 84.0   MCH 25.7* 25.6* 25.7*   MCHC 29.4* 28.9* 30.6*   RDW 15.9* 16.1* 15.9*    208 251   MPV 12.0 11.9 12.6*     Recent Labs     08/17/22 2239 08/18/22 0827 08/19/22  1037    147* 145   K 4.1 3.6 3.2*    102 100   CO2 37* 37* 36*   BUN 12 10 12   CREATININE 1.0 0.9 0.9   GLUCOSE 137* 171* 151*   CALCIUM 9.3 9.3 9.9   PROT 6.8 6.9 7.2   LABALBU 3.7 3.7 4.1   BILITOT 0.5 0.7 0.6   ALKPHOS 67 68 65   AST 22 21 16   ALT 10 11 14     Lab Results   Component Value Date    TROPONINI <0.01 12/24/2016    TROPONINI 0.01 05/16/2013    TROPONINI 0.04 04/23/2012          Wound Documentation:   Incision 06/05/14 Breast Left (Active)   Number of days: 2996       Assessment:    Acute CHF exacerbation-systolic versus diastolic dysfunction  Possible pneumonia  Moderate aortic stenosis  Mild aortic regurgitation  Mild pulmonary hypertension  Chronic hypoxic respiratory failure on 3 L supplemental oxygen at baseline  Normocytic anemia with recent evaluation for GI bleed  Sleep apnea without the use of CPAP/BiPAP  Non-insulin-dependent diabetes mellitus  Hyperlipidemia  Hypertension  Current tobacco abuse  Obesity with elevated BMI of 30.71      Plan:     Echocardiogram has been reviewed. Cardiology consult appreciated. Adjustment has been made to medication. Patient is weaning off of calcium channel blocker and started on beta-blockers. Started on [facility] after washout  Will obtain ultrasound of the right upper quadrant  Ischemic evaluation with possible cath  Continue breathing treatment  Wean steroids    More than 50% of my  time was spent at the bedside counseling/coordinating care with the patient and/or family with face to face contact. This time was spent reviewing notes and laboratory data as well as instructing and counseling the patient. Time I spent with the family or surrogate(s) is included only if the patient was incapable of providing the necessary information or participating in medical decisions. I also discussed the differential diagnosis and all of the proposed management plans with the patient and individuals accompanying the patient.     Ok Philip DO, F.A.C.O.I.  8/19/2022  4:24 PM

## 2022-08-19 NOTE — CARE COORDINATION
Free 30 day trial card for [facility] and instructions explained and given to patient. Patient instructed to take this card with  prescription to retail pharmacy to obtain 30 day supply for free. Instructed patient to follow-up with PCP within 1-2 weeks in order to obtain subsequent prescription for this medication at which time PCP will complete prior authorization if required. Patient verbalized understanding.  Electronically signed by Linda Hagan on 8/19/2022 at 11:39 AM

## 2022-08-19 NOTE — PROGRESS NOTES
Spoke with patient's daughter Paty uLna ([phone]) who resides in [place]. She does not believe patient ever had a heart catheterization. She also mentions that she believes her father is addicted to opioid pain medications and that he may be antsy to leave the hospital for that reason. Given new LV dysfunction will arrange left heart catheterization on Monday to assess for ischemic etiology, with optimization of guideline directed medical therapy over the weekend.     [facility] HF 7    Vincent Davila MD

## 2022-08-20 ENCOUNTER — APPOINTMENT (OUTPATIENT)
Dept: ULTRASOUND IMAGING | Age: 82
DRG: 291 | End: 2022-08-20
Payer: MEDICARE

## 2022-08-20 ENCOUNTER — APPOINTMENT (OUTPATIENT)
Dept: GENERAL RADIOLOGY | Age: 82
DRG: 291 | End: 2022-08-20
Payer: MEDICARE

## 2022-08-20 PROBLEM — I50.9 CONGESTIVE HEART FAILURE (HCC): Status: ACTIVE | Noted: 2022-08-20

## 2022-08-20 LAB
ALBUMIN SERPL-MCNC: 3.7 G/DL (ref 3.5–5.2)
ALP BLD-CCNC: 63 U/L (ref 40–129)
ALT SERPL-CCNC: 12 U/L (ref 0–40)
ANION GAP SERPL CALCULATED.3IONS-SCNC: 6 MMOL/L (ref 7–16)
ANISOCYTOSIS: ABNORMAL
AST SERPL-CCNC: 17 U/L (ref 0–39)
BASOPHILS ABSOLUTE: 0 E9/L (ref 0–0.2)
BASOPHILS RELATIVE PERCENT: 0.1 % (ref 0–2)
BILIRUB SERPL-MCNC: 0.5 MG/DL (ref 0–1.2)
BUN BLDV-MCNC: 12 MG/DL (ref 6–23)
CALCIUM SERPL-MCNC: 9.6 MG/DL (ref 8.6–10.2)
CHLORIDE BLD-SCNC: 100 MMOL/L (ref 98–107)
CO2: 36 MMOL/L (ref 22–29)
CREAT SERPL-MCNC: 1 MG/DL (ref 0.7–1.2)
EOSINOPHILS ABSOLUTE: 0 E9/L (ref 0.05–0.5)
EOSINOPHILS RELATIVE PERCENT: 0 % (ref 0–6)
GFR AFRICAN AMERICAN: >60
GFR NON-AFRICAN AMERICAN: >60 ML/MIN/1.73
GLUCOSE BLD-MCNC: 147 MG/DL (ref 74–99)
HCT VFR BLD CALC: 36.6 % (ref 37–54)
HEMOGLOBIN: 10.9 G/DL (ref 12.5–16.5)
HYPOCHROMIA: ABNORMAL
LYMPHOCYTES ABSOLUTE: 0.22 E9/L (ref 1.5–4)
LYMPHOCYTES RELATIVE PERCENT: 2.6 % (ref 20–42)
MCH RBC QN AUTO: 25.5 PG (ref 26–35)
MCHC RBC AUTO-ENTMCNC: 29.8 % (ref 32–34.5)
MCV RBC AUTO: 85.5 FL (ref 80–99.9)
METER GLUCOSE: 121 MG/DL (ref 74–99)
METER GLUCOSE: 125 MG/DL (ref 74–99)
METER GLUCOSE: 126 MG/DL (ref 74–99)
MONOCYTES ABSOLUTE: 0.07 E9/L (ref 0.1–0.95)
MONOCYTES RELATIVE PERCENT: 0.9 % (ref 2–12)
NEUTROPHILS ABSOLUTE: 7.18 E9/L (ref 1.8–7.3)
NEUTROPHILS RELATIVE PERCENT: 96.5 % (ref 43–80)
OVALOCYTES: ABNORMAL
PDW BLD-RTO: 16 FL (ref 11.5–15)
PLATELET # BLD: 233 E9/L (ref 130–450)
PMV BLD AUTO: 12.2 FL (ref 7–12)
POIKILOCYTES: ABNORMAL
POLYCHROMASIA: ABNORMAL
POTASSIUM SERPL-SCNC: 4.1 MMOL/L (ref 3.5–5)
RBC # BLD: 4.28 E12/L (ref 3.8–5.8)
SARS-COV-2 ANTIBODY, TOTAL: NORMAL
SODIUM BLD-SCNC: 142 MMOL/L (ref 132–146)
TOTAL PROTEIN: 6.9 G/DL (ref 6.4–8.3)
WBC # BLD: 7.4 E9/L (ref 4.5–11.5)

## 2022-08-20 PROCEDURE — 99233 SBSQ HOSP IP/OBS HIGH 50: CPT | Performed by: INTERNAL MEDICINE

## 2022-08-20 PROCEDURE — 2700000000 HC OXYGEN THERAPY PER DAY

## 2022-08-20 PROCEDURE — 1200000000 HC SEMI PRIVATE

## 2022-08-20 PROCEDURE — 94640 AIRWAY INHALATION TREATMENT: CPT

## 2022-08-20 PROCEDURE — 86769 SARS-COV-2 COVID-19 ANTIBODY: CPT

## 2022-08-20 PROCEDURE — 6370000000 HC RX 637 (ALT 250 FOR IP): Performed by: INTERNAL MEDICINE

## 2022-08-20 PROCEDURE — 2580000003 HC RX 258: Performed by: INTERNAL MEDICINE

## 2022-08-20 PROCEDURE — 80053 COMPREHEN METABOLIC PANEL: CPT

## 2022-08-20 PROCEDURE — 82962 GLUCOSE BLOOD TEST: CPT

## 2022-08-20 PROCEDURE — 6360000002 HC RX W HCPCS: Performed by: INTERNAL MEDICINE

## 2022-08-20 PROCEDURE — 71045 X-RAY EXAM CHEST 1 VIEW: CPT

## 2022-08-20 PROCEDURE — 36415 COLL VENOUS BLD VENIPUNCTURE: CPT

## 2022-08-20 PROCEDURE — 85025 COMPLETE CBC W/AUTO DIFF WBC: CPT

## 2022-08-20 PROCEDURE — 76705 ECHO EXAM OF ABDOMEN: CPT

## 2022-08-20 PROCEDURE — 76536 US EXAM OF HEAD AND NECK: CPT

## 2022-08-20 RX ORDER — POLYETHYLENE GLYCOL 3350 17 G/17G
17 POWDER, FOR SOLUTION ORAL DAILY
Status: DISCONTINUED | OUTPATIENT
Start: 2022-08-20 | End: 2022-08-22 | Stop reason: HOSPADM

## 2022-08-20 RX ORDER — CARVEDILOL 6.25 MG/1
12.5 TABLET ORAL 2 TIMES DAILY WITH MEALS
Status: DISCONTINUED | OUTPATIENT
Start: 2022-08-20 | End: 2022-08-21

## 2022-08-20 RX ORDER — IPRATROPIUM BROMIDE AND ALBUTEROL SULFATE 2.5; .5 MG/3ML; MG/3ML
1 SOLUTION RESPIRATORY (INHALATION) 4 TIMES DAILY
Status: DISCONTINUED | OUTPATIENT
Start: 2022-08-20 | End: 2022-08-22 | Stop reason: HOSPADM

## 2022-08-20 RX ADMIN — CEFTRIAXONE SODIUM 1000 MG: 1 INJECTION, POWDER, FOR SOLUTION INTRAMUSCULAR; INTRAVENOUS at 11:10

## 2022-08-20 RX ADMIN — Medication 10 ML: at 11:21

## 2022-08-20 RX ADMIN — IPRATROPIUM BROMIDE AND ALBUTEROL SULFATE 1 AMPULE: .5; 2.5 SOLUTION RESPIRATORY (INHALATION) at 06:05

## 2022-08-20 RX ADMIN — CARVEDILOL 3.12 MG: 3.12 TABLET, FILM COATED ORAL at 11:10

## 2022-08-20 RX ADMIN — SPIRONOLACTONE 25 MG: 25 TABLET ORAL at 09:43

## 2022-08-20 RX ADMIN — OXYCODONE AND ACETAMINOPHEN 1 TABLET: 5; 325 TABLET ORAL at 20:48

## 2022-08-20 RX ADMIN — FUROSEMIDE 40 MG: 40 TABLET ORAL at 09:43

## 2022-08-20 RX ADMIN — GUAIFENESIN 600 MG: 600 TABLET, EXTENDED RELEASE ORAL at 09:44

## 2022-08-20 RX ADMIN — CARVEDILOL 12.5 MG: 6.25 TABLET, FILM COATED ORAL at 17:03

## 2022-08-20 RX ADMIN — EMPAGLIFLOZIN 10 MG: 10 TABLET, FILM COATED ORAL at 09:44

## 2022-08-20 RX ADMIN — IPRATROPIUM BROMIDE AND ALBUTEROL SULFATE 1 AMPULE: .5; 2.5 SOLUTION RESPIRATORY (INHALATION) at 09:39

## 2022-08-20 RX ADMIN — TAMSULOSIN HYDROCHLORIDE 0.4 MG: 0.4 CAPSULE ORAL at 20:41

## 2022-08-20 RX ADMIN — SACUBITRIL AND VALSARTAN 1 TABLET: 24; 26 TABLET, FILM COATED ORAL at 09:43

## 2022-08-20 RX ADMIN — ENOXAPARIN SODIUM 40 MG: 100 INJECTION SUBCUTANEOUS at 09:43

## 2022-08-20 RX ADMIN — DULOXETINE HYDROCHLORIDE 30 MG: 30 CAPSULE, DELAYED RELEASE ORAL at 09:43

## 2022-08-20 RX ADMIN — SACUBITRIL AND VALSARTAN 1 TABLET: 49; 51 TABLET, FILM COATED ORAL at 20:41

## 2022-08-20 RX ADMIN — IPRATROPIUM BROMIDE AND ALBUTEROL SULFATE 1 AMPULE: .5; 2.5 SOLUTION RESPIRATORY (INHALATION) at 02:40

## 2022-08-20 RX ADMIN — ASPIRIN 81 MG CHEWABLE TABLET 81 MG: 81 TABLET CHEWABLE at 09:43

## 2022-08-20 RX ADMIN — POTASSIUM CHLORIDE 20 MEQ: 1500 TABLET, EXTENDED RELEASE ORAL at 09:44

## 2022-08-20 RX ADMIN — GUAIFENESIN 600 MG: 600 TABLET, EXTENDED RELEASE ORAL at 20:41

## 2022-08-20 RX ADMIN — POLYETHYLENE GLYCOL 3350 17 G: 17 POWDER, FOR SOLUTION ORAL at 11:10

## 2022-08-20 RX ADMIN — ATORVASTATIN CALCIUM 40 MG: 40 TABLET, FILM COATED ORAL at 20:41

## 2022-08-20 RX ADMIN — IPRATROPIUM BROMIDE AND ALBUTEROL SULFATE 1 AMPULE: .5; 2.5 SOLUTION RESPIRATORY (INHALATION) at 13:17

## 2022-08-20 RX ADMIN — OXYCODONE AND ACETAMINOPHEN 1 TABLET: 5; 325 TABLET ORAL at 04:59

## 2022-08-20 RX ADMIN — Medication 10 ML: at 20:48

## 2022-08-20 ASSESSMENT — PAIN DESCRIPTION - DESCRIPTORS: DESCRIPTORS: ACHING;CRAMPING;DISCOMFORT

## 2022-08-20 ASSESSMENT — PAIN DESCRIPTION - ORIENTATION: ORIENTATION: MID

## 2022-08-20 ASSESSMENT — PAIN DESCRIPTION - LOCATION: LOCATION: ABDOMEN

## 2022-08-20 ASSESSMENT — PAIN SCALES - GENERAL
PAINLEVEL_OUTOF10: 5
PAINLEVEL_OUTOF10: 7

## 2022-08-20 ASSESSMENT — PAIN - FUNCTIONAL ASSESSMENT: PAIN_FUNCTIONAL_ASSESSMENT: PREVENTS OR INTERFERES SOME ACTIVE ACTIVITIES AND ADLS

## 2022-08-20 NOTE — PROGRESS NOTES
[id number] [facility] Cardiology Inpatient Progress Note    Patient is a 80 [facility]. [facility] up. Chief complaint: CHF    HPI: Some SOB. No CP.      Patient Active Problem List   Diagnosis    DDD (degenerative disc disease), lumbosacral    Lumbago    Lumbar facet arthropathy    Diabetes mellitus ([facility] [address]    Hypertension    Arthritis    Hyperlipidemia    Benign prostatic hyperplasia    Primary localized osteoarthritis    Nocturia    Insomnia    Aortic stenosis, moderate    Bilateral carotid bruits    Acute on chronic systolic (congestive) heart failure (HCC)       No Known Allergies    Current Facility-Administered Medications   Medication Dose Route Frequency Provider Last Rate Last Admin    ipratropium-albuterol (DUONEB) nebulizer solution 1 ampule  1 ampule Inhalation 4x daily Osiel Montes De Oca DO   1 ampule at 08/20/22 0939    polyethylene glycol (GLYCOLAX) packet 17 g  17 g Oral Daily Osiel Montes De Oca DO   17 g at 08/20/22 1110    sacubitril-valsartan (ENTRESTO) 24-26 MG per tablet 1 tablet  1 [facility] BID [facility]   1 tablet at 08/20/22 5602    aspirin chewable tablet 81 mg  81 mg Oral Daily Osiel Montes De Oca DO   81 mg at 08/20/22 0943    carvedilol (COREG) tablet 3.125 mg  3.125 mg Oral BID  Samuel Bradshaw MD   3.125 mg at 08/20/22 1110    spironolactone (ALDACTONE) tablet 25 mg  25 mg Oral Daily Samuel Bradshaw MD   25 mg at 08/20/22 5804    furosemide (LASIX) tablet 40 mg  40 mg Oral Daily Samuel Bradshaw MD   40 mg at 08/20/22 0943    ondansetron (ZOFRAN) injection 4 mg  4 mg IntraVENous Q6H PRN DA Garcia - CNP   4 mg at 08/19/22 1533    empagliflozin (JARDIANCE) tablet 10 mg  10 mg Oral Daily Osiel Montes De Oca DO   10 mg at 08/20/22 0944    sodium chloride flush 0.9 % injection 5-40 mL  5-40 mL IntraVENous 2 times per day Cristela Ingram, DO   10 mL at 08/20/22 1121    sodium chloride flush 0.9 % injection 5-40 mL  5-40 mL IntraVENous PRN Uyen Mcnamara Leida, DO        0.9 % sodium chloride infusion   IntraVENous PRN Elray Ales, DO        enoxaparin (LOVENOX) injection 40 mg  40 mg SubCUTAneous Daily Elray Ales, DO   40 mg at 08/20/22 9804    acetaminophen (TYLENOL) tablet 650 mg  650 mg Oral Q6H PRN Elray Ales, DO        Or    acetaminophen (TYLENOL) suppository 650 mg  650 mg Rectal Q6H PRN Elray Ales, DO        DULoxetine (CYMBALTA) extended release capsule 30 mg  30 mg Oral Daily Elray Ales, DO   30 mg at 08/20/22 0943    potassium chloride (KLOR-CON M) extended release tablet 20 mEq  20 mEq Oral Daily Elray Ales, DO   20 mEq at 08/20/22 7081    perflutren lipid microspheres (DEFINITY) injection 1.65 mg  1.5 mL IntraVENous ONCE PRN Elray Ales, DO        tamsulosin (FLOMAX) capsule 0.4 mg  0.4 mg Oral Nightly Jennifer Derrek Montes De Oca, DO   0.4 mg at 08/19/22 2045    [START ON 8/25/2022] vitamin D (ERGOCALCIFEROL) capsule 50,000 Units  50,000 Units Oral Weekly Osiel Montes De Oca DO        glucose chewable tablet 16 g  4 tablet Oral PRN Osiel Montes De Oca DO        dextrose bolus 10% 125 mL  125 mL IntraVENous PRN Osiel Montes De Oca DO        Or    dextrose bolus 10% 250 mL  250 mL IntraVENous PRN Osiel Montes De Oca DO        glucagon (rDNA) injection 1 mg  1 mg SubCUTAneous PRN Osiel Montes De Oca DO        dextrose 10 % infusion   IntraVENous Continuous PRN Osiel Montes De Oca DO        insulin lispro (HUMALOG) injection vial 0-8 Units  0-8 Units SubCUTAneous TID  Osiel Montes De Oca DO        insulin lispro (HUMALOG) injection vial 0-4 Units  0-4 Units SubCUTAneous Nightly Osiel Montes De Oca DO        atorvastatin (LIPITOR) tablet 40 mg  40 mg Oral Nightly Osiel Montes De Oca, DO   40 mg at 08/19/22 2045    cefTRIAXone (ROCEPHIN) 1,000 mg in sterile water 10 mL IV syringe  1,000 mg IntraVENous Q24H Jennifer Anglin Bisel, DO   1,000 mg at 08/20/22 1110    guaiFENesin (MUCINEX) extended release tablet 600 mg  600 mg Oral BID Osiel Montes De Oca DO   600 mg at 08/20/22 9578 oxyCODONE-acetaminophen (PERCOCET) 5-325 MG per tablet 1 tablet  1 tablet Oral Q8H PRN Lelia Reyes DO   1 tablet at 08/20/22 5514       Review of systems:   Heart: as above   Lungs: as above   Eyes: denies changes in vision or discharge. Ears: denies changes in hearing or pain. Nose: denies epistaxis or masses   Throat: denies sore throat or trouble swallowing. Neuro: denies numbness, tingling, tremors. Skin: denies rashes or itching. : denies hematuria, dysuria   GI: denies vomiting, diarrhea   Psych: denies mood changed, anxiety, depression. Physical Exam   /60   Pulse (!) 108   Temp 98.3 °F (36.8 °C) (Oral)   Resp 17   Ht 5' 10\" (1.778 m)   Wt 204 lb 3.2 oz (92.6 kg)   SpO2 100%   BMI 29.30 kg/m²   Constitutional: Oriented to person, place, and time. No distress. Well developed. Head: Normocephalic and atraumatic. Neck: Neck supple. No hepatojugular reflux. No JVD present. Carotid bruit is not present. No tracheal deviation present. No thyromegaly present. Cardiovascular: Normal rate, regular rhythm, normal heart sounds. intact distal pulses. No gallop and no friction rub. No murmur heard. Pulmonary: Breath sounds normal. No respiratory distress. No wheezes. No rales. Chest: Effort normal. No tenderness. Abdominal: Soft. Bowel sounds are normal. No distension or mass. No tenderness, rebound or guarding. Musculoskeletal: . No tenderness. No clubbing or cyanosis. Extremitites: Intact distal pulses. No edema  Neurological: Alert and oriented to person, place, and time. Skin: Skin is warm and dry. No rash noted. Not diaphoretic. No erythema. Psychiatric: Normal mood and affect. Behavior is normal.   Lymphadenopathy: No cervical adenopathy. No groin adenopathy.     CBC:   Lab Results   Component Value Date/Time    WBC 7.4 08/20/2022 05:19 AM    RBC 4.28 08/20/2022 05:19 AM    HGB 10.9 08/20/2022 05:19 AM    HCT 36.6 08/20/2022 05:19 AM    MCV 85.5 08/20/2022 05:19 AM    MCH 25.5 08/20/2022 05:19 AM    MCHC 29.8 08/20/2022 05:19 AM    RDW 16.0 08/20/2022 05:19 AM     08/20/2022 05:19 AM    MPV 12.2 08/20/2022 05:19 AM     BMP:   Lab Results   Component Value Date/Time     08/20/2022 05:19 AM    K 4.1 08/20/2022 05:19 AM    K 4.1 08/17/2022 10:39 PM     08/20/2022 05:19 AM    CO2 36 08/20/2022 05:19 AM    BUN 12 08/20/2022 05:19 AM    LABALBU 3.7 08/20/2022 05:19 AM    LABALBU 3.3 04/25/2012 05:45 AM    CREATININE 1.0 08/20/2022 05:19 AM    CALCIUM 9.6 08/20/2022 05:19 AM    GFRAA >60 08/20/2022 05:19 AM    LABGLOM >60 08/20/2022 05:19 AM     Magnesium:    Lab Results   Component Value Date/Time    MG 1.7 08/19/2022 10:37 AM     Cardiac Enzymes:   Lab Results   Component Value Date    CKTOTAL 296 (H) 05/16/2013    CKTOTAL 268 04/23/2012    CKTOTAL 224 04/23/2012    CKMB 1.4 05/16/2013    CKMB 3.2 04/23/2012    CKMB 3.2 04/23/2012    TROPHS 37 (H) 08/18/2022    TROPHS 36 (H) 08/17/2022      PT/INR:    Lab Results   Component Value Date/Time    PROTIME 14.9 08/17/2022 10:39 PM    PROTIME 13.4 04/26/2012 06:30 AM    INR 1.3 08/17/2022 10:39 PM     TSH:    Lab Results   Component Value Date/Time    TSH 0.249 08/18/2022 08:27 AM     Rhythm Strip: sinus tachycardia.     Echocardiogram TTE 8/18/22:  Normal LV size  LV systolic function is moderately reduced  Visually estimated EF appears to be 35-40%  There is hypokinesis of the basal to mid anterolateral, basal to mid inferolateral and apical lateral walls  There is basal septal hypertrophy  Normal RV size and function  Biatrial enlargement  Severe mitral annular calcification  Mean gradient 5.5 mmHg at heart rate 99 bpm  Probably mild functional mitral stenosis  No significant MR  Aortic valve is heavily calcified, and no way to tell how many leaflets are present  There is moderate aortic stenosis somewhat incompletely assessed  V-max 3.5 m/s  Mean gradient 30 mmHg  Aortic valve area 1.3 cm²  Dimensionless index 0. 38  LV stroke-volume index 40 mL/m²  LVOT diameter 2.1 cm  At least moderate AR, not well assessed. No pressure half-time  Mild TR  Unable to estimate RVSP due to incomplete TR spectral Doppler envelope  IVC is not visualized  Compared to the study from 10/2018, inferolateral and anterolateral wall motion abnormalities are new and LV function has decreased and AS appears to have progressed. ASSESSMENT & PLAN:    Patient Active Problem List   Diagnosis    DDD (degenerative disc disease), lumbosacral    Lumbago    Lumbar facet arthropathy    Diabetes mellitus ([facility] [address]    Hypertension    Arthritis    Hyperlipidemia    Benign prostatic hyperplasia    Primary localized osteoarthritis    Nocturia    Insomnia    Aortic stenosis, moderate    Bilateral carotid bruits    Acute on chronic systolic (congestive) heart failure ([facility] [address]     1. Acute systolic CHF:    EF 29-10% with wall motion abnormalities. Cath Monday. AUC HF 7.    BB(titrated)/entresto(titrated)/aldactone/SGLT2 inhibitor/PO lasix. 2. CAD: Coronary calcification noted on chest CT. Cath Monday as above. ASA/BB/statin. 3. VHD: Mod AS/mod AI/severe MAC with mild MS. Medically manage. 4. Ascending thoracic aortic aneurysm 4.3 cm by CT    5. HTN: Observe. 6. DM: Per primary service. 7. Anemia: Follow labs. 8. AUBREY    9. Tobacco abuse    10. Hyperthyroidism: Per primary service. Dania Kasepr D.O.   Cardiologist  Cardiology, [facility]

## 2022-08-20 NOTE — PROGRESS NOTES
Internal Medicine Progress Note    R[facility]    Tameka Ahumada. Arik Clements., OTONIELOZhaoIZhao Sher D.O., GUERO Wilkes D.O. Annmarie Pacini, MSN, APRN, NP-C  Dominique Mathew. Hallie Hurst, MSN, APRN-CNP     Primary Care Physician: Marilin Spaulding DO   Admitting Physician:  Marilin Spaulding DO  Admission date and time: 8/17/2022  9:54 PM    Room:  [address] [place] [address]  Admitting diagnosis: Acute on chronic systolic (congestive) heart failure [facility]) [I50.23]    Patient Name: Ken Boyce  MRN: [id number]    Date of Service: 8/20/2022     Subjective:  Richard Delgadillo is a 80 y.o. male who was seen and examined today,8/20/2022, at the bedside. Patient seem relatively comfortable at the present time. Overall clinically improving. Plan for cardiac catheterization on Monday. Did have some nausea yesterday and gallbladder ultrasound does show cholelithiasis. Daughter relates to concern for chronic opiates. No family present during my examination. Review of System:   Constitutional:   Denies fever or chills, weight loss or gain. Improved fatigue and malaise HEENT:   Denies ear pain, sore throat, sinus or eye problems. Cardiovascular:   Denies any chest pain, irregular heartbeats, or palpitations. Respiratory:   Breathing much improved  Gastrointestinal:   Occasional nausea  Genitourinary:    Denies any urgency, frequency, hematuria. Voiding  without difficulty. Extremities:   Denies lower extremity swelling, edema or cyanosis. Neurology:    Denies any headache or focal neurological deficits, Denies generalized weakness or memory difficulty. Psch:   Denies being anxious or depressed. Musculoskeletal:    Denies  myalgias, joint complaint. Complain of low back pain  Integumentary:   Denies any rashes, ulcers, or excoriations. Denies bruising. Hematologic/Lymphatic:  Denies bruising or bleeding.     Physical Exam:  No intake/output data recorded. Intake/Output Summary (Last 24 hours) at 8/20/2022 1548  Last data filed at 8/20/2022 0820  Gross per 24 hour   Intake 340 ml   Output 250 ml   Net 90 ml   I/O last 3 completed shifts: In: 840 [P.O.:840]  Out: 525 [Urine:525]  Patient Vitals for the past 96 hrs (Last 3 readings):   Weight   08/19/22 0552 204 lb 3.2 oz (92.6 kg)   08/17/22 2159 214 lb (97.1 kg)     Vital Signs:   Blood pressure 119/60, pulse (!) 108, temperature 98.3 °F (36.8 °C), temperature source Oral, resp. rate 17, height 5' 10\" (1.778 m), weight 204 lb 3.2 oz (92.6 kg), SpO2 100 %. General appearance:  Alert, responsive, oriented to person, place, and time. Well preserved, alert, no distress. Head:  Normocephalic. No masses, lesions or tenderness. Eyes:  PERRLA. EOMI. Sclera clear. Buccal mucosa moist.  ENT:  Ears normal. Mucosa normal.  Wearing supplemental O2  Neck:    Supple. Trachea midline. No thyromegaly. No JVD. No bruits. Heart:    Rhythm regular. Rate controlled. 1/6 murmurs. Lungs:    Improved breath sounds  Abdomen:   Soft. Non-tender. Non-distended. Bowel sounds positive. No organomegaly or masses. No pain on palpation. Extremities:    Peripheral pulses present. No peripheral edema. No ulcers. No cyanosis. No clubbing. Neurologic:    Alert x 3. No focal deficit. Cranial nerves grossly intact. No focal weakness. Psych:   Behavior is normal. Mood appears normal. Speech is not rapid and/or pressured. Musculoskeletal:   Spine ROM normal. Muscular strength intact. Gait not assessed. Chronic low back pain  Integumentary:  No rashes  Skin normal color and texture.   Genitalia/Breast:  Deferred    Medication:  Scheduled Meds:   ipratropium-albuterol  1 ampule Inhalation 4x daily    polyethylene glycol  17 g Oral Daily    carvedilol  12.5 mg Oral BID     sacubitril-valsartan  1 tablet Oral BID    aspirin  81 mg Oral Daily    spironolactone  25 mg Oral Daily    furosemide  40 mg Oral Daily    empagliflozin  10 mg Oral Daily    sodium chloride flush  5-40 mL IntraVENous 2 times per day    enoxaparin  40 mg SubCUTAneous Daily    DULoxetine  30 mg Oral Daily    potassium chloride  20 mEq Oral Daily    tamsulosin  0.4 mg Oral Nightly    [START ON 8/25/2022] vitamin D  50,000 Units Oral Weekly    insulin lispro  0-8 Units SubCUTAneous TID WC    insulin lispro  0-4 Units SubCUTAneous Nightly    atorvastatin  40 mg Oral Nightly    cefTRIAXone (ROCEPHIN) IV  1,000 mg IntraVENous Q24H    guaiFENesin  600 mg Oral BID     Continuous Infusions:   sodium chloride      dextrose         Objective Data:  Recent Labs     08/18/22  0827 08/19/22  1037 08/20/22  0519   WBC 6.0 6.9 7.4   RBC 3.87 4.01 4.28   HGB 9.9* 10.3* 10.9*   HCT 34.2* 33.7* 36.6*   MCV 88.4 84.0 85.5   MCH 25.6* 25.7* 25.5*   MCHC 28.9* 30.6* 29.8*   RDW 16.1* 15.9* 16.0*    251 233   MPV 11.9 12.6* 12.2*     Recent Labs     08/18/22  0827 08/19/22  1037 08/20/22  0519   * 145 142   K 3.6 3.2* 4.1    100 100   CO2 37* 36* 36*   BUN 10 12 12   CREATININE 0.9 0.9 1.0   GLUCOSE 171* 151* 147*   CALCIUM 9.3 9.9 9.6   PROT 6.9 7.2 6.9   LABALBU 3.7 4.1 3.7   BILITOT 0.7 0.6 0.5   ALKPHOS 68 65 63   AST 21 16 17   ALT 11 14 12     Lab Results   Component Value Date    TROPONINI <0.01 12/24/2016    TROPONINI 0.01 05/16/2013    TROPONINI 0.04 04/23/2012          Wound Documentation:   Incision 06/05/14 Breast Left (Active)   Number of days: 2996       Assessment:    Acute CHF exacerbation-systolic dysfunction.   Clinically improved  Moderate aortic stenosis  Mild aortic regurgitation  Mild pulmonary hypertension  Chronic hypoxic respiratory failure on 3 L supplemental oxygen at baseline  Normocytic anemia with recent evaluation for GI bleed  Sleep apnea without the use of CPAP/BiPAP  Non-insulin-dependent diabetes mellitus  Hyperlipidemia  Hypertension  Current tobacco abuse  Obesity with elevated BMI of 30.71  Chronic low back pain with opiate use  Cholelithiasis      Plan:     Patient is doing well on Tiana Duque and Jardiance. Diuretics has been modified and chest x-ray show improvement  Will need outpatient follow-up for cholelithiasis  Have consult Dr. Angel Wan who is willing to follow him for outpatient management and chronic back pain  Encourage ambulation  Glycemic control    More than 50% of my  time was spent at the bedside counseling/coordinating care with the patient and/or family with face to face contact. This time was spent reviewing notes and laboratory data as well as instructing and counseling the patient. Time I spent with the family or surrogate(s) is included only if the patient was incapable of providing the necessary information or participating in medical decisions. I also discussed the differential diagnosis and all of the proposed management plans with the patient and individuals accompanying the patient.     Stephanie Almonte DO, YOLANDA.C.O.I.  8/20/2022  3:48 PM

## 2022-08-21 LAB
ALBUMIN SERPL-MCNC: 3.4 G/DL (ref 3.5–5.2)
ALP BLD-CCNC: 63 U/L (ref 40–129)
ALT SERPL-CCNC: 12 U/L (ref 0–40)
ANION GAP SERPL CALCULATED.3IONS-SCNC: 7 MMOL/L (ref 7–16)
AST SERPL-CCNC: 15 U/L (ref 0–39)
BASOPHILS ABSOLUTE: 0.02 E9/L (ref 0–0.2)
BASOPHILS RELATIVE PERCENT: 0.3 % (ref 0–2)
BILIRUB SERPL-MCNC: 0.6 MG/DL (ref 0–1.2)
BUN BLDV-MCNC: 12 MG/DL (ref 6–23)
CALCIUM SERPL-MCNC: 9.3 MG/DL (ref 8.6–10.2)
CHLORIDE BLD-SCNC: 100 MMOL/L (ref 98–107)
CO2: 34 MMOL/L (ref 22–29)
CREAT SERPL-MCNC: 1 MG/DL (ref 0.7–1.2)
EOSINOPHILS ABSOLUTE: 0.01 E9/L (ref 0.05–0.5)
EOSINOPHILS RELATIVE PERCENT: 0.2 % (ref 0–6)
GFR AFRICAN AMERICAN: >60
GFR NON-AFRICAN AMERICAN: >60 ML/MIN/1.73
GLUCOSE BLD-MCNC: 117 MG/DL (ref 74–99)
HCT VFR BLD CALC: 38.8 % (ref 37–54)
HEMOGLOBIN: 11.7 G/DL (ref 12.5–16.5)
IMMATURE GRANULOCYTES #: 0.01 E9/L
IMMATURE GRANULOCYTES %: 0.2 % (ref 0–5)
LYMPHOCYTES ABSOLUTE: 0.73 E9/L (ref 1.5–4)
LYMPHOCYTES RELATIVE PERCENT: 11.3 % (ref 20–42)
MCH RBC QN AUTO: 25.8 PG (ref 26–35)
MCHC RBC AUTO-ENTMCNC: 30.2 % (ref 32–34.5)
MCV RBC AUTO: 85.7 FL (ref 80–99.9)
METER GLUCOSE: 109 MG/DL (ref 74–99)
METER GLUCOSE: 118 MG/DL (ref 74–99)
METER GLUCOSE: 124 MG/DL (ref 74–99)
METER GLUCOSE: 131 MG/DL (ref 74–99)
MONOCYTES ABSOLUTE: 0.66 E9/L (ref 0.1–0.95)
MONOCYTES RELATIVE PERCENT: 10.2 % (ref 2–12)
NEUTROPHILS ABSOLUTE: 5.03 E9/L (ref 1.8–7.3)
NEUTROPHILS RELATIVE PERCENT: 77.8 % (ref 43–80)
PDW BLD-RTO: 15.9 FL (ref 11.5–15)
PLATELET # BLD: 241 E9/L (ref 130–450)
PMV BLD AUTO: 12.5 FL (ref 7–12)
POTASSIUM SERPL-SCNC: 3.6 MMOL/L (ref 3.5–5)
RBC # BLD: 4.53 E12/L (ref 3.8–5.8)
SODIUM BLD-SCNC: 141 MMOL/L (ref 132–146)
TOTAL PROTEIN: 6.8 G/DL (ref 6.4–8.3)
WBC # BLD: 6.5 E9/L (ref 4.5–11.5)

## 2022-08-21 PROCEDURE — 6360000002 HC RX W HCPCS: Performed by: INTERNAL MEDICINE

## 2022-08-21 PROCEDURE — 2580000003 HC RX 258: Performed by: INTERNAL MEDICINE

## 2022-08-21 PROCEDURE — 2700000000 HC OXYGEN THERAPY PER DAY

## 2022-08-21 PROCEDURE — 36415 COLL VENOUS BLD VENIPUNCTURE: CPT

## 2022-08-21 PROCEDURE — 1200000000 HC SEMI PRIVATE

## 2022-08-21 PROCEDURE — 6370000000 HC RX 637 (ALT 250 FOR IP): Performed by: INTERNAL MEDICINE

## 2022-08-21 PROCEDURE — 94640 AIRWAY INHALATION TREATMENT: CPT

## 2022-08-21 PROCEDURE — 85025 COMPLETE CBC W/AUTO DIFF WBC: CPT

## 2022-08-21 PROCEDURE — 6360000002 HC RX W HCPCS: Performed by: NURSE PRACTITIONER

## 2022-08-21 PROCEDURE — 80053 COMPREHEN METABOLIC PANEL: CPT

## 2022-08-21 PROCEDURE — 82962 GLUCOSE BLOOD TEST: CPT

## 2022-08-21 PROCEDURE — 99233 SBSQ HOSP IP/OBS HIGH 50: CPT | Performed by: INTERNAL MEDICINE

## 2022-08-21 RX ORDER — MECOBALAMIN 5000 MCG
5 TABLET,DISINTEGRATING ORAL NIGHTLY PRN
Status: DISCONTINUED | OUTPATIENT
Start: 2022-08-22 | End: 2022-08-22 | Stop reason: HOSPADM

## 2022-08-21 RX ORDER — MECOBALAMIN 5000 MCG
5 TABLET,DISINTEGRATING ORAL ONCE
Status: COMPLETED | OUTPATIENT
Start: 2022-08-21 | End: 2022-08-21

## 2022-08-21 RX ORDER — CARVEDILOL 25 MG/1
25 TABLET ORAL 2 TIMES DAILY WITH MEALS
Status: DISCONTINUED | OUTPATIENT
Start: 2022-08-21 | End: 2022-08-22 | Stop reason: HOSPADM

## 2022-08-21 RX ADMIN — SPIRONOLACTONE 25 MG: 25 TABLET ORAL at 09:19

## 2022-08-21 RX ADMIN — GUAIFENESIN 600 MG: 600 TABLET, EXTENDED RELEASE ORAL at 09:19

## 2022-08-21 RX ADMIN — EMPAGLIFLOZIN 10 MG: 10 TABLET, FILM COATED ORAL at 09:17

## 2022-08-21 RX ADMIN — Medication 5 MG: at 22:50

## 2022-08-21 RX ADMIN — OXYCODONE AND ACETAMINOPHEN 1 TABLET: 5; 325 TABLET ORAL at 16:16

## 2022-08-21 RX ADMIN — ONDANSETRON 4 MG: 2 INJECTION INTRAMUSCULAR; INTRAVENOUS at 06:12

## 2022-08-21 RX ADMIN — IPRATROPIUM BROMIDE AND ALBUTEROL SULFATE 1 AMPULE: .5; 2.5 SOLUTION RESPIRATORY (INHALATION) at 17:53

## 2022-08-21 RX ADMIN — Medication 10 ML: at 09:19

## 2022-08-21 RX ADMIN — IPRATROPIUM BROMIDE AND ALBUTEROL SULFATE 1 AMPULE: .5; 2.5 SOLUTION RESPIRATORY (INHALATION) at 13:41

## 2022-08-21 RX ADMIN — OXYCODONE AND ACETAMINOPHEN 1 TABLET: 5; 325 TABLET ORAL at 22:50

## 2022-08-21 RX ADMIN — POLYETHYLENE GLYCOL 3350 17 G: 17 POWDER, FOR SOLUTION ORAL at 09:17

## 2022-08-21 RX ADMIN — IPRATROPIUM BROMIDE AND ALBUTEROL SULFATE 1 AMPULE: .5; 2.5 SOLUTION RESPIRATORY (INHALATION) at 09:53

## 2022-08-21 RX ADMIN — ATORVASTATIN CALCIUM 40 MG: 40 TABLET, FILM COATED ORAL at 21:16

## 2022-08-21 RX ADMIN — DULOXETINE HYDROCHLORIDE 30 MG: 30 CAPSULE, DELAYED RELEASE ORAL at 09:19

## 2022-08-21 RX ADMIN — POTASSIUM CHLORIDE 20 MEQ: 1500 TABLET, EXTENDED RELEASE ORAL at 09:19

## 2022-08-21 RX ADMIN — GUAIFENESIN 600 MG: 600 TABLET, EXTENDED RELEASE ORAL at 21:16

## 2022-08-21 RX ADMIN — IPRATROPIUM BROMIDE AND ALBUTEROL SULFATE 1 AMPULE: .5; 2.5 SOLUTION RESPIRATORY (INHALATION) at 05:50

## 2022-08-21 RX ADMIN — ASPIRIN 81 MG CHEWABLE TABLET 81 MG: 81 TABLET CHEWABLE at 09:19

## 2022-08-21 RX ADMIN — CARVEDILOL 25 MG: 25 TABLET, FILM COATED ORAL at 16:16

## 2022-08-21 RX ADMIN — CEFTRIAXONE SODIUM 1000 MG: 1 INJECTION, POWDER, FOR SOLUTION INTRAMUSCULAR; INTRAVENOUS at 10:54

## 2022-08-21 RX ADMIN — FUROSEMIDE 40 MG: 40 TABLET ORAL at 09:19

## 2022-08-21 RX ADMIN — ENOXAPARIN SODIUM 40 MG: 100 INJECTION SUBCUTANEOUS at 09:18

## 2022-08-21 RX ADMIN — TAMSULOSIN HYDROCHLORIDE 0.4 MG: 0.4 CAPSULE ORAL at 21:16

## 2022-08-21 RX ADMIN — SACUBITRIL AND VALSARTAN 1 TABLET: 49; 51 TABLET, FILM COATED ORAL at 09:18

## 2022-08-21 RX ADMIN — Medication 10 ML: at 21:17

## 2022-08-21 RX ADMIN — CARVEDILOL 12.5 MG: 6.25 TABLET, FILM COATED ORAL at 09:18

## 2022-08-21 ASSESSMENT — PAIN SCALES - GENERAL
PAINLEVEL_OUTOF10: 4
PAINLEVEL_OUTOF10: 5
PAINLEVEL_OUTOF10: 6

## 2022-08-21 ASSESSMENT — PAIN DESCRIPTION - ONSET: ONSET: ON-GOING

## 2022-08-21 ASSESSMENT — PAIN DESCRIPTION - PAIN TYPE
TYPE: ACUTE PAIN
TYPE: CHRONIC PAIN

## 2022-08-21 ASSESSMENT — PAIN DESCRIPTION - DESCRIPTORS: DESCRIPTORS: STABBING;THROBBING;DISCOMFORT

## 2022-08-21 ASSESSMENT — PAIN - FUNCTIONAL ASSESSMENT: PAIN_FUNCTIONAL_ASSESSMENT: ACTIVITIES ARE NOT PREVENTED

## 2022-08-21 ASSESSMENT — PAIN DESCRIPTION - LOCATION
LOCATION: ABDOMEN
LOCATION: BACK
LOCATION: BACK

## 2022-08-21 ASSESSMENT — PAIN DESCRIPTION - ORIENTATION
ORIENTATION: MID
ORIENTATION: LOWER
ORIENTATION: LOWER

## 2022-08-21 ASSESSMENT — PAIN DESCRIPTION - FREQUENCY: FREQUENCY: CONTINUOUS

## 2022-08-21 NOTE — PLAN OF CARE
Problem: Safety - Adult  Goal: Free from fall injury  Outcome: Progressing     Problem: Skin/Tissue Integrity  Goal: Absence of new skin breakdown  Description: 1. Monitor for areas of redness and/or skin breakdown  2. Assess vascular access sites hourly  3. Every 4-6 hours minimum:  Change oxygen saturation probe site  4. Every 4-6 hours:  If on nasal continuous positive airway pressure, respiratory therapy assess nares and determine need for appliance change or resting period.   Outcome: Progressing     Problem: Pain  Goal: Verbalizes/displays adequate comfort level or baseline comfort level  Outcome: Progressing     Problem: ABCDS Injury Assessment  Goal: Absence of physical injury  Outcome: Progressing

## 2022-08-21 NOTE — PROGRESS NOTES
Internal Medicine Progress Note    R[facility]    Joanna Gastelum. Anne Shea, OTONIELOZhaoIZhao Garcia D.O., GUERO Luque D.O. [facility], MSN, APRN, NP-C  Viktoria Whaley. Toney Colvin, MSN, APRN-CNP     Primary Care Physician: Ok Philip DO   Admitting Physician:  Ok Philip DO  Admission date and time: 8/17/2022  9:54 PM    Room:  [address] [place] [address]  Admitting diagnosis: Acute on chronic systolic (congestive) heart failure [facility]) [I50.23]    Patient Name: Viktoriya Borja  MRN: [id number]    Date of Service: 8/21/2022     Subjective:  Rosy Barrera is a 80 y.o. male who was seen and examined today,8/21/2022, at the bedside. Patient still has periodic nausea, suspicious for gallbladder etiology. Will obtain surgical consult. Ultrasound of the thyroid is abnormal and suggest biopsy. Plan for cardiac catheterization in the morning    No family present during my examination. Review of System:   Constitutional:   Denies fever or chills, weight loss or gain. Improved fatigue and malaise HEENT:   Denies ear pain, sore throat, sinus or eye problems. Cardiovascular:   Denies any chest pain, irregular heartbeats, or palpitations. Respiratory:   Breathing much improved  Gastrointestinal:   Occasional nausea  Genitourinary:    Denies any urgency, frequency, hematuria. Voiding  without difficulty. Extremities:   Denies lower extremity swelling, edema or cyanosis. Neurology:    Denies any headache or focal neurological deficits, Denies generalized weakness or memory difficulty. Psch:   Denies being anxious or depressed. Musculoskeletal:    Denies  myalgias, joint complaint. Complain of low back pain  Integumentary:   Denies any rashes, ulcers, or excoriations. Denies bruising. Hematologic/Lymphatic:  Denies bruising or bleeding.     Physical Exam:  I/O this shift:  In: 240 [P.O.:240]  Out: -     Intake/Output Summary (Last 24 hours) at 8/21/2022 1507  Last data filed at 8/21/2022 1219  Gross per 24 hour   Intake 240 ml   Output --   Net 240 ml   I/O last 3 completed shifts: In: 340 [P.O.:340]  Out: 250 [Urine:250]  Patient Vitals for the past 96 hrs (Last 3 readings):   Weight   08/19/22 0552 204 lb 3.2 oz (92.6 kg)   08/17/22 2159 214 lb (97.1 kg)     Vital Signs:   Blood pressure (!) 107/56, pulse (!) 101, temperature 97.7 °F (36.5 °C), temperature source Oral, resp. rate 20, height 5' 10\" (1.778 m), weight 204 lb 3.2 oz (92.6 kg), SpO2 94 %. General appearance:  Alert, responsive, oriented to person, place, and time. Well preserved, alert, no distress. Head:  Normocephalic. No masses, lesions or tenderness. Eyes:  PERRLA. EOMI. Sclera clear. Buccal mucosa moist.  ENT:  Ears normal. Mucosa normal.  Wearing supplemental O2  Neck:    Supple. Trachea midline. No thyromegaly. No JVD. No bruits. Heart:    Rhythm regular. Rate controlled. 1/6 murmurs. Lungs:    Improved breath sounds  Abdomen:   Soft. Non-tender. Non-distended. Bowel sounds positive. No organomegaly or masses. No pain on palpation. Extremities:    Peripheral pulses present. No peripheral edema. No ulcers. No cyanosis. No clubbing. Neurologic:    Alert x 3. No focal deficit. Cranial nerves grossly intact. No focal weakness. Psych:   Behavior is normal. Mood appears normal. Speech is not rapid and/or pressured. Musculoskeletal:   Spine ROM normal. Muscular strength intact. Gait not assessed. Chronic low back pain  Integumentary:  No rashes  Skin normal color and texture.   Genitalia/Breast:  Deferred    Medication:  Scheduled Meds:   carvedilol  25 mg Oral BID     ipratropium-albuterol  1 ampule Inhalation 4x daily    polyethylene glycol  17 g Oral Daily    sacubitril-valsartan  1 tablet Oral BID    aspirin  81 mg Oral Daily    spironolactone  25 mg Oral Daily    furosemide  40 mg Oral Daily    empagliflozin  10 mg Oral Daily    sodium chloride flush  5-40 mL IntraVENous 2 times per day    enoxaparin  40 mg SubCUTAneous Daily    DULoxetine  30 mg Oral Daily    potassium chloride  20 mEq Oral Daily    tamsulosin  0.4 mg Oral Nightly    [START ON 8/25/2022] vitamin D  50,000 Units Oral Weekly    insulin lispro  0-8 Units SubCUTAneous TID WC    insulin lispro  0-4 Units SubCUTAneous Nightly    atorvastatin  40 mg Oral Nightly    cefTRIAXone (ROCEPHIN) IV  1,000 mg IntraVENous Q24H    guaiFENesin  600 mg Oral BID     Continuous Infusions:   sodium chloride      dextrose         Objective Data:  Recent Labs     08/19/22  1037 08/20/22  0519 08/21/22  0538   WBC 6.9 7.4 6.5   RBC 4.01 4.28 4.53   HGB 10.3* 10.9* 11.7*   HCT 33.7* 36.6* 38.8   MCV 84.0 85.5 85.7   MCH 25.7* 25.5* 25.8*   MCHC 30.6* 29.8* 30.2*   RDW 15.9* 16.0* 15.9*    233 241   MPV 12.6* 12.2* 12.5*     Recent Labs     08/19/22  1037 08/20/22  0519 08/21/22  0538    142 141   K 3.2* 4.1 3.6    100 100   CO2 36* 36* 34*   BUN 12 12 12   CREATININE 0.9 1.0 1.0   GLUCOSE 151* 147* 117*   CALCIUM 9.9 9.6 9.3   PROT 7.2 6.9 6.8   LABALBU 4.1 3.7 3.4*   BILITOT 0.6 0.5 0.6   ALKPHOS 65 63 63   AST 16 17 15   ALT 14 12 12     Lab Results   Component Value Date    TROPONINI <0.01 12/24/2016    TROPONINI 0.01 05/16/2013    TROPONINI 0.04 04/23/2012          Wound Documentation:   Incision 06/05/14 Breast Left (Active)   Number of days: 2996       Assessment:    Acute CHF exacerbation-systolic dysfunction.   Clinically improved  Moderate aortic stenosis  Mild aortic regurgitation  Mild pulmonary hypertension  Chronic hypoxic respiratory failure on 3 L supplemental oxygen at baseline  Normocytic anemia with recent evaluation for GI bleed  Sleep apnea without the use of CPAP/BiPAP  Non-insulin-dependent diabetes mellitus  Hyperlipidemia  Hypertension  Current tobacco abuse  Obesity with elevated BMI of 30.71  Chronic low back pain with opiate use  Cholelithiasis      Plan: Patient is tolerating cardiac medication without satisfactory. Plan for cardiac catheterization on Monday  Will obtain surgical consult due to persistent nausea and abnormal ultrasound showing cholelithiasis. Might require cholecystectomy in the future  Refer to pain clinic upon discharge for chronic back pain  Abnormal thyroid ultrasound recommended biopsy to be done as an outpatient  Continue glycemic control  Suggest behavior modification such as cigarette sensation    More than 50% of my  time was spent at the bedside counseling/coordinating care with the patient and/or family with face to face contact. This time was spent reviewing notes and laboratory data as well as instructing and counseling the patient. Time I spent with the family or surrogate(s) is included only if the patient was incapable of providing the necessary information or participating in medical decisions. I also discussed the differential diagnosis and all of the proposed management plans with the patient and individuals accompanying the patient.     José Luis Johnson DO, DEMETRIA.A.C.O.I.  8/21/2022  3:07 PM

## 2022-08-21 NOTE — PLAN OF CARE
Problem: Safety - Adult  Goal: Free from fall injury  8/21/2022 1821 by Conor Mueller RN  Outcome: Progressing     Problem: Skin/Tissue Integrity  Goal: Absence of new skin breakdown  Description: 1. Monitor for areas of redness and/or skin breakdown  2. Assess vascular access sites hourly  3. Every 4-6 hours minimum:  Change oxygen saturation probe site  4. Every 4-6 hours:  If on nasal continuous positive airway pressure, respiratory therapy assess nares and determine need for appliance change or resting period.   8/21/2022 1821 by Conor Mueller RN  Outcome: Progressing     Problem: Pain  Goal: Verbalizes/displays adequate comfort level or baseline comfort level  8/21/2022 1821 by Conor Mueller RN  Outcome: Progressing     Problem: ABCDS Injury Assessment  Goal: Absence of physical injury  8/21/2022 1821 by Conor Mueller RN  Outcome: Progressing

## 2022-08-21 NOTE — PROGRESS NOTES
IntraVENous PRN [place] Ramming, DO        0.9 % sodium chloride infusion   IntraVENous PRN [place] Ramming, DO        enoxaparin (LOVENOX) injection 40 mg  40 mg SubCUTAneous Daily [place] Ramming, DO   40 mg at 08/20/22 6241    acetaminophen (TYLENOL) tablet 650 mg  650 mg Oral Q6H PRN [place] Ramming, DO        Or    acetaminophen (TYLENOL) suppository 650 mg  650 mg Rectal Q6H PRN [place] Ramming, DO        DULoxetine (CYMBALTA) extended release capsule 30 mg  30 mg Oral Daily [place] Ramming, DO   30 mg at 08/20/22 0943    potassium chloride (KLOR-CON M) extended release tablet 20 mEq  20 mEq Oral Daily [place] Ramming, DO   20 mEq at 08/20/22 1307    perflutren lipid microspheres (DEFINITY) injection 1.65 mg  1.5 mL IntraVENous ONCE PRN [place] Ramming, DO        tamsulosin (FLOMAX) capsule 0.4 mg  0.4 mg Oral Nightly Philip Montes De Oca, DO   0.4 mg at 08/20/22 2041    [START ON 8/25/2022] vitamin D (ERGOCALCIFEROL) capsule 50,000 Units  50,000 Units Oral Weekly Osiel Montes De Oca, DO        glucose chewable tablet 16 g  4 tablet Oral PRN Osiel Montes De Oca, DO        dextrose bolus 10% 125 mL  125 mL IntraVENous PRN Osiel Montes De Oca DO        Or    dextrose bolus 10% 250 mL  250 mL IntraVENous PRN Osiel Montes De Oca, DO        glucagon (rDNA) injection 1 mg  1 mg SubCUTAneous PRN Osiel Montes De Oca, DO        dextrose 10 % infusion   IntraVENous Continuous PRN Osiel Montes De Oca DO        insulin lispro (HUMALOG) injection vial 0-8 Units  0-8 Units SubCUTAneous TID WC Osiel Montes De Oca DO        insulin lispro (HUMALOG) injection vial 0-4 Units  0-4 Units SubCUTAneous Nightly Osiel Montes De Oca, DO        atorvastatin (LIPITOR) tablet 40 mg  40 mg Oral Nightly Osiel MASSEY Bisel, DO   40 mg at 08/20/22 2041    cefTRIAXone (ROCEPHIN) 1,000 mg in sterile water 10 mL IV syringe  1,000 mg IntraVENous Q24H Philip Montes De Oca, DO   1,000 mg at 08/20/22 1110    guaiFENesin (MUCINEX) extended release tablet 600 mg  600 mg Oral BID Philip Montes De Oca DO 600 mg at 08/20/22 2041    oxyCODONE-acetaminophen (PERCOCET) 5-325 MG per tablet 1 tablet  1 tablet Oral Q8H PRN Blayne Kunz DO   1 tablet at 08/20/22 2048       Review of systems:   Heart: as above   Lungs: as above   Eyes: denies changes in vision or discharge. Ears: denies changes in hearing or pain. Nose: denies epistaxis or masses   Throat: denies sore throat or trouble swallowing. Neuro: denies numbness, tingling, tremors. Skin: denies rashes or itching. : denies hematuria, dysuria   GI: denies vomiting, diarrhea   Psych: denies mood changed, anxiety, depression. Physical Exam   /64   Pulse (!) 115   Temp 97.7 °F (36.5 °C) (Oral)   Resp 20   Ht 5' 10\" (1.778 m)   Wt 204 lb 3.2 oz (92.6 kg)   SpO2 94%   BMI 29.30 kg/m²   Constitutional: Oriented to person, place, and time. No distress. Well developed. Head: Normocephalic and atraumatic. Neck: Neck supple. No hepatojugular reflux. No JVD present. Carotid bruit is not present. No tracheal deviation present. No thyromegaly present. Cardiovascular: Normal rate, regular rhythm, normal heart sounds. intact distal pulses. No gallop and no friction rub. No murmur heard. Pulmonary: Breath sounds normal. No respiratory distress. No wheezes. No rales. Chest: Effort normal. No tenderness. Abdominal: Soft. Bowel sounds are normal. No distension or mass. No tenderness, rebound or guarding. Musculoskeletal: . No tenderness. No clubbing or cyanosis. Extremitites: Intact distal pulses. No edema  Neurological: Alert and oriented to person, place, and time. Skin: Skin is warm and dry. No rash noted. Not diaphoretic. No erythema. Psychiatric: Normal mood and affect. Behavior is normal.   Lymphadenopathy: No cervical adenopathy. No groin adenopathy.     CBC:   Lab Results   Component Value Date/Time    WBC 6.5 08/21/2022 05:38 AM    RBC 4.53 08/21/2022 05:38 AM    HGB 11.7 08/21/2022 05:38 AM    HCT 38.8 08/21/2022 05:38 AM cm²  Dimensionless index 0.38  LV stroke-volume index 40 mL/m²  LVOT diameter 2.1 cm  At least moderate AR, not well assessed. No pressure half-time  Mild TR  Unable to estimate RVSP due to incomplete TR spectral Doppler envelope  IVC is not visualized  Compared to the study from 10/2018, inferolateral and anterolateral wall motion abnormalities are new and LV function has decreased and AS appears to have progressed. ASSESSMENT & PLAN:    Patient Active Problem List   Diagnosis    DDD (degenerative disc disease), lumbosacral    Lumbago    Lumbar facet arthropathy    Diabetes mellitus ([facility] [address]    Hypertension    Arthritis    Hyperlipidemia    Benign prostatic hyperplasia    Primary localized osteoarthritis    Nocturia    Insomnia    Aortic stenosis, moderate    Bilateral carotid bruits    Acute on chronic systolic (congestive) heart failure (HCC)    Congestive heart failure ([facility] [address]     1. Acute systolic CHF:    EF 19-42% with wall motion abnormalities. Cath Monday. AUC HF 7.    BB(titrated)/entresto/aldactone/SGLT2 inhibitor/PO lasix. 2. CAD: Coronary calcification noted on chest CT. Cath Monday as above. ASA/BB/statin. 3. VHD: Mod AS/mod AI/severe MAC with mild MS. Medically manage. 4. Ascending thoracic aortic aneurysm 4.3 cm by CT    5. HTN: Observe. 6. DM: Per primary service. 7. Anemia: Follow labs. 8. AUBREY    9. Tobacco abuse    10. Hyperthyroidism: Per primary service. Nirali Dumont D.O.   Cardiologist  Cardiology, [facility]

## 2022-08-22 ENCOUNTER — TELEPHONE (OUTPATIENT)
Dept: CARDIOLOGY CLINIC | Age: 82
End: 2022-08-22

## 2022-08-22 ENCOUNTER — HOSPITAL ENCOUNTER (INPATIENT)
Age: 82
LOS: 2 days | Discharge: HOME | DRG: 286 | End: 2022-08-24
Attending: INTERNAL MEDICINE | Admitting: INTERNAL MEDICINE
Payer: MEDICARE

## 2022-08-22 VITALS
HEIGHT: 70 IN | WEIGHT: 204.2 LBS | DIASTOLIC BLOOD PRESSURE: 65 MMHG | HEART RATE: 92 BPM | RESPIRATION RATE: 13 BRPM | OXYGEN SATURATION: 97 % | TEMPERATURE: 99.5 F | SYSTOLIC BLOOD PRESSURE: 120 MMHG | BODY MASS INDEX: 29.23 KG/M2

## 2022-08-22 PROBLEM — I50.43 CHF (CONGESTIVE HEART FAILURE), NYHA CLASS I, ACUTE ON CHRONIC, COMBINED (HCC): Status: ACTIVE | Noted: 2022-08-22

## 2022-08-22 PROBLEM — R06.02 SOB (SHORTNESS OF BREATH): Status: ACTIVE | Noted: 2022-08-22

## 2022-08-22 LAB
ABO/RH: NORMAL
ALBUMIN SERPL-MCNC: 3.4 G/DL (ref 3.5–5.2)
ALP BLD-CCNC: 64 U/L (ref 40–129)
ALT SERPL-CCNC: 15 U/L (ref 0–40)
ANION GAP SERPL CALCULATED.3IONS-SCNC: 8 MMOL/L (ref 7–16)
ANTIBODY SCREEN: NORMAL
AST SERPL-CCNC: 18 U/L (ref 0–39)
BASOPHILS ABSOLUTE: 0.04 E9/L (ref 0–0.2)
BASOPHILS RELATIVE PERCENT: 0.9 % (ref 0–2)
BILIRUB SERPL-MCNC: 0.4 MG/DL (ref 0–1.2)
BUN BLDV-MCNC: 13 MG/DL (ref 6–23)
CALCIUM SERPL-MCNC: 9.1 MG/DL (ref 8.6–10.2)
CHLORIDE BLD-SCNC: 101 MMOL/L (ref 98–107)
CO2: 33 MMOL/L (ref 22–29)
CREAT SERPL-MCNC: 1.1 MG/DL (ref 0.7–1.2)
EOSINOPHILS ABSOLUTE: 0 E9/L (ref 0.05–0.5)
EOSINOPHILS RELATIVE PERCENT: 0.4 % (ref 0–6)
GFR AFRICAN AMERICAN: >60
GFR NON-AFRICAN AMERICAN: >60 ML/MIN/1.73
GLUCOSE BLD-MCNC: 128 MG/DL (ref 74–99)
HCT VFR BLD CALC: 40 % (ref 37–54)
HEMOGLOBIN: 11.8 G/DL (ref 12.5–16.5)
HYPOCHROMIA: ABNORMAL
LYMPHOCYTES ABSOLUTE: 0.38 E9/L (ref 1.5–4)
LYMPHOCYTES RELATIVE PERCENT: 7.9 % (ref 20–42)
MCH RBC QN AUTO: 25.1 PG (ref 26–35)
MCHC RBC AUTO-ENTMCNC: 29.5 % (ref 32–34.5)
MCV RBC AUTO: 84.9 FL (ref 80–99.9)
METER GLUCOSE: 121 MG/DL (ref 74–99)
METER GLUCOSE: 128 MG/DL (ref 74–99)
METER GLUCOSE: 134 MG/DL (ref 74–99)
MONOCYTES ABSOLUTE: 0.28 E9/L (ref 0.1–0.95)
MONOCYTES RELATIVE PERCENT: 6.1 % (ref 2–12)
NEUTROPHILS ABSOLUTE: 4 E9/L (ref 1.8–7.3)
NEUTROPHILS RELATIVE PERCENT: 85.1 % (ref 43–80)
OVALOCYTES: ABNORMAL
PDW BLD-RTO: 15.8 FL (ref 11.5–15)
PLATELET # BLD: 223 E9/L (ref 130–450)
PMV BLD AUTO: 12.3 FL (ref 7–12)
POIKILOCYTES: ABNORMAL
POLYCHROMASIA: ABNORMAL
POTASSIUM SERPL-SCNC: 3.7 MMOL/L (ref 3.5–5)
RBC # BLD: 4.71 E12/L (ref 3.8–5.8)
SODIUM BLD-SCNC: 142 MMOL/L (ref 132–146)
TOTAL PROTEIN: 6.7 G/DL (ref 6.4–8.3)
WBC # BLD: 4.7 E9/L (ref 4.5–11.5)

## 2022-08-22 PROCEDURE — 2580000003 HC RX 258: Performed by: INTERNAL MEDICINE

## 2022-08-22 PROCEDURE — G0379 DIRECT REFER HOSPITAL OBSERV: HCPCS

## 2022-08-22 PROCEDURE — 82962 GLUCOSE BLOOD TEST: CPT

## 2022-08-22 PROCEDURE — 36415 COLL VENOUS BLD VENIPUNCTURE: CPT

## 2022-08-22 PROCEDURE — 86900 BLOOD TYPING SEROLOGIC ABO: CPT

## 2022-08-22 PROCEDURE — G0378 HOSPITAL OBSERVATION PER HR: HCPCS

## 2022-08-22 PROCEDURE — 4A023N7 MEASUREMENT OF CARDIAC SAMPLING AND PRESSURE, LEFT HEART, PERCUTANEOUS APPROACH: ICD-10-PCS | Performed by: INTERNAL MEDICINE

## 2022-08-22 PROCEDURE — 94667 MNPJ CHEST WALL 1ST: CPT

## 2022-08-22 PROCEDURE — 85025 COMPLETE CBC W/AUTO DIFF WBC: CPT

## 2022-08-22 PROCEDURE — B2111ZZ FLUOROSCOPY OF MULTIPLE CORONARY ARTERIES USING LOW OSMOLAR CONTRAST: ICD-10-PCS | Performed by: INTERNAL MEDICINE

## 2022-08-22 PROCEDURE — 94640 AIRWAY INHALATION TREATMENT: CPT

## 2022-08-22 PROCEDURE — 6370000000 HC RX 637 (ALT 250 FOR IP): Performed by: INTERNAL MEDICINE

## 2022-08-22 PROCEDURE — 86850 RBC ANTIBODY SCREEN: CPT

## 2022-08-22 PROCEDURE — 2700000000 HC OXYGEN THERAPY PER DAY

## 2022-08-22 PROCEDURE — 2000000000 HC ICU R&B

## 2022-08-22 PROCEDURE — 86901 BLOOD TYPING SEROLOGIC RH(D): CPT

## 2022-08-22 PROCEDURE — 80053 COMPREHEN METABOLIC PANEL: CPT

## 2022-08-22 RX ORDER — DULOXETIN HYDROCHLORIDE 30 MG/1
30 CAPSULE, DELAYED RELEASE ORAL DAILY
Status: DISCONTINUED | OUTPATIENT
Start: 2022-08-23 | End: 2022-08-24 | Stop reason: HOSPADM

## 2022-08-22 RX ORDER — CARVEDILOL 6.25 MG/1
6.25 TABLET ORAL 2 TIMES DAILY WITH MEALS
Status: DISCONTINUED | OUTPATIENT
Start: 2022-08-22 | End: 2022-08-23

## 2022-08-22 RX ORDER — INSULIN LISPRO 100 [IU]/ML
0-4 INJECTION, SOLUTION INTRAVENOUS; SUBCUTANEOUS NIGHTLY
Status: DISCONTINUED | OUTPATIENT
Start: 2022-08-22 | End: 2022-08-24 | Stop reason: HOSPADM

## 2022-08-22 RX ORDER — SODIUM CHLORIDE 0.9 % (FLUSH) 0.9 %
10 SYRINGE (ML) INJECTION EVERY 12 HOURS SCHEDULED
Status: DISCONTINUED | OUTPATIENT
Start: 2022-08-22 | End: 2022-08-23 | Stop reason: SDUPTHER

## 2022-08-22 RX ORDER — INSULIN LISPRO 100 [IU]/ML
0-4 INJECTION, SOLUTION INTRAVENOUS; SUBCUTANEOUS
Status: DISCONTINUED | OUTPATIENT
Start: 2022-08-23 | End: 2022-08-24 | Stop reason: HOSPADM

## 2022-08-22 RX ORDER — DEXTROSE MONOHYDRATE 100 MG/ML
INJECTION, SOLUTION INTRAVENOUS CONTINUOUS PRN
Status: DISCONTINUED | OUTPATIENT
Start: 2022-08-22 | End: 2022-08-24 | Stop reason: HOSPADM

## 2022-08-22 RX ORDER — TAMSULOSIN HYDROCHLORIDE 0.4 MG/1
0.4 CAPSULE ORAL DAILY
Status: DISCONTINUED | OUTPATIENT
Start: 2022-08-23 | End: 2022-08-24 | Stop reason: HOSPADM

## 2022-08-22 RX ORDER — ALBUTEROL SULFATE 2.5 MG/3ML
2.5 SOLUTION RESPIRATORY (INHALATION) EVERY 4 HOURS PRN
Status: DISCONTINUED | OUTPATIENT
Start: 2022-08-22 | End: 2022-08-24 | Stop reason: HOSPADM

## 2022-08-22 RX ORDER — ACETAMINOPHEN 325 MG/1
650 TABLET ORAL EVERY 6 HOURS PRN
Status: DISCONTINUED | OUTPATIENT
Start: 2022-08-22 | End: 2022-08-24 | Stop reason: HOSPADM

## 2022-08-22 RX ORDER — ATORVASTATIN CALCIUM 40 MG/1
40 TABLET, FILM COATED ORAL DAILY
Status: DISCONTINUED | OUTPATIENT
Start: 2022-08-23 | End: 2022-08-24 | Stop reason: HOSPADM

## 2022-08-22 RX ORDER — ONDANSETRON 4 MG/1
4 TABLET, ORALLY DISINTEGRATING ORAL EVERY 8 HOURS PRN
Status: DISCONTINUED | OUTPATIENT
Start: 2022-08-22 | End: 2022-08-24 | Stop reason: HOSPADM

## 2022-08-22 RX ORDER — LISINOPRIL 20 MG/1
20 TABLET ORAL DAILY
Status: DISCONTINUED | OUTPATIENT
Start: 2022-08-23 | End: 2022-08-24

## 2022-08-22 RX ORDER — ACETAMINOPHEN 650 MG/1
650 SUPPOSITORY RECTAL EVERY 6 HOURS PRN
Status: DISCONTINUED | OUTPATIENT
Start: 2022-08-22 | End: 2022-08-24 | Stop reason: HOSPADM

## 2022-08-22 RX ORDER — TIZANIDINE 4 MG/1
4 TABLET ORAL EVERY 8 HOURS PRN
Status: DISCONTINUED | OUTPATIENT
Start: 2022-08-22 | End: 2022-08-24 | Stop reason: HOSPADM

## 2022-08-22 RX ORDER — AMLODIPINE BESYLATE 10 MG/1
10 TABLET ORAL DAILY
Status: DISCONTINUED | OUTPATIENT
Start: 2022-08-23 | End: 2022-08-24

## 2022-08-22 RX ORDER — ALBUTEROL SULFATE 90 UG/1
1 AEROSOL, METERED RESPIRATORY (INHALATION) EVERY 4 HOURS PRN
Status: DISCONTINUED | OUTPATIENT
Start: 2022-08-22 | End: 2022-08-22 | Stop reason: CLARIF

## 2022-08-22 RX ORDER — ASPIRIN 81 MG/1
81 TABLET, CHEWABLE ORAL DAILY
Status: DISCONTINUED | OUTPATIENT
Start: 2022-08-23 | End: 2022-08-24 | Stop reason: HOSPADM

## 2022-08-22 RX ORDER — SODIUM CHLORIDE 9 MG/ML
INJECTION, SOLUTION INTRAVENOUS PRN
Status: DISCONTINUED | OUTPATIENT
Start: 2022-08-22 | End: 2022-08-23 | Stop reason: SDUPTHER

## 2022-08-22 RX ORDER — FUROSEMIDE 10 MG/ML
20 INJECTION INTRAMUSCULAR; INTRAVENOUS DAILY
Status: DISCONTINUED | OUTPATIENT
Start: 2022-08-23 | End: 2022-08-24

## 2022-08-22 RX ORDER — ONDANSETRON 2 MG/ML
4 INJECTION INTRAMUSCULAR; INTRAVENOUS EVERY 6 HOURS PRN
Status: DISCONTINUED | OUTPATIENT
Start: 2022-08-22 | End: 2022-08-24 | Stop reason: HOSPADM

## 2022-08-22 RX ORDER — SPIRONOLACTONE 25 MG/1
25 TABLET ORAL DAILY
Status: DISCONTINUED | OUTPATIENT
Start: 2022-08-23 | End: 2022-08-24 | Stop reason: HOSPADM

## 2022-08-22 RX ORDER — SODIUM CHLORIDE 0.9 % (FLUSH) 0.9 %
10 SYRINGE (ML) INJECTION PRN
Status: DISCONTINUED | OUTPATIENT
Start: 2022-08-22 | End: 2022-08-23 | Stop reason: SDUPTHER

## 2022-08-22 RX ORDER — ENOXAPARIN SODIUM 100 MG/ML
40 INJECTION SUBCUTANEOUS DAILY
Status: DISCONTINUED | OUTPATIENT
Start: 2022-08-23 | End: 2022-08-24 | Stop reason: HOSPADM

## 2022-08-22 RX ORDER — AMLODIPINE BESYLATE AND BENAZEPRIL HYDROCHLORIDE 10; 20 MG/1; MG/1
1 CAPSULE ORAL DAILY
Status: DISCONTINUED | OUTPATIENT
Start: 2022-08-23 | End: 2022-08-22 | Stop reason: ALTCHOICE

## 2022-08-22 RX ADMIN — IPRATROPIUM BROMIDE AND ALBUTEROL SULFATE 1 AMPULE: .5; 2.5 SOLUTION RESPIRATORY (INHALATION) at 08:47

## 2022-08-22 RX ADMIN — ASPIRIN 81 MG CHEWABLE TABLET 81 MG: 81 TABLET CHEWABLE at 08:11

## 2022-08-22 RX ADMIN — IPRATROPIUM BROMIDE AND ALBUTEROL SULFATE 1 AMPULE: .5; 2.5 SOLUTION RESPIRATORY (INHALATION) at 05:29

## 2022-08-22 RX ADMIN — CARVEDILOL 25 MG: 25 TABLET, FILM COATED ORAL at 08:11

## 2022-08-22 RX ADMIN — Medication 10 ML: at 08:27

## 2022-08-22 RX ADMIN — ACETAMINOPHEN 650 MG: 325 TABLET, FILM COATED ORAL at 22:19

## 2022-08-22 RX ADMIN — CARVEDILOL 6.25 MG: 6.25 TABLET, FILM COATED ORAL at 22:18

## 2022-08-22 RX ADMIN — SODIUM CHLORIDE, PRESERVATIVE FREE 10 ML: 5 INJECTION INTRAVENOUS at 22:20

## 2022-08-22 ASSESSMENT — PAIN DESCRIPTION - DESCRIPTORS
DESCRIPTORS: ACHING;DISCOMFORT
DESCRIPTORS: ACHING;DISCOMFORT

## 2022-08-22 ASSESSMENT — PAIN SCALES - GENERAL
PAINLEVEL_OUTOF10: 4
PAINLEVEL_OUTOF10: 4

## 2022-08-22 ASSESSMENT — PAIN DESCRIPTION - ORIENTATION: ORIENTATION: LOWER

## 2022-08-22 ASSESSMENT — PAIN DESCRIPTION - LOCATION
LOCATION: BACK
LOCATION: BACK

## 2022-08-22 ASSESSMENT — PAIN DESCRIPTION - ONSET: ONSET: ON-GOING

## 2022-08-22 ASSESSMENT — PAIN - FUNCTIONAL ASSESSMENT: PAIN_FUNCTIONAL_ASSESSMENT: PREVENTS OR INTERFERES SOME ACTIVE ACTIVITIES AND ADLS

## 2022-08-22 ASSESSMENT — LIFESTYLE VARIABLES: HOW OFTEN DO YOU HAVE A DRINK CONTAINING ALCOHOL: NEVER

## 2022-08-22 ASSESSMENT — PAIN DESCRIPTION - PAIN TYPE: TYPE: CHRONIC PAIN

## 2022-08-22 NOTE — PROGRESS NOTES
Inpatient Cardiology Progress note     PATIENT IS BEING FOLLOWED FOR:CHF, valvular heart disease    Suzi Shearer is a 80 y.o. male followed by Dr. Zaki Hui: denies chest pain and dyspnea  OBJECTIVE: No apparent distress     ROS:  Consist: Denies fevers, chills or night sweats  Heart: Denies chest pain, palpitations, lightheadedness, dizziness or syncope  Lungs: Denies SOB, cough, wheezing, orthopnea or PND  GI: Denies abdominal pain, vomiting or diarrhea    PHYSICAL EXAM:   /65   Pulse 92   Temp 99.5 °F (37.5 °C) (Oral)   Resp 13   Ht 5' 10\" (1.778 m)   Wt 204 lb 3.2 oz (92.6 kg)   SpO2 97%   BMI 29.30 kg/m²    B/P Range last 24 hours: Systolic (98VVO), LORRAINE:079 , Min:92 , DBO:848    Diastolic (38THO), LNL:77, Min:56, Max:67    CONST: Well developed, well nourished who appears stated age. Awake, alert and cooperative. No apparent distress  HEENT:   Head- Normocephalic, atraumatic   Eyes- Conjunctivae pink, anicteric  Throat- Oral mucosa pink and moist  Neck-  No stridor, trachea midline, no jugular venous distention. No carotid bruit  CHEST: Chest symmetrical and non-tender to palpation. No accessory muscle use or intercostal retractions  RESPIRATORY:  Lung sounds - clear throughout fields   CARDIOVASCULAR:     Heart Inspection- shows no noted pulsations  Heart Palpation- no heaves or thrills; PMI is non-displaced   Heart Ausculation- Regular rate and rhythm, no murmur. No s3, s4 or rub   PV: No lower extremity edema. No varicosities. Pedal pulses palpable, no clubbing or cyanosis   ABDOMEN: Soft, non-tender to light palpation. Bowel sounds present. No palpable masses no organomegaly; no abdominal bruit  MS: Good muscle strength and tone. No atrophy or abnormal movements. : Deferred  SKIN: Warm and dry no statis dermatitis or ulcers   NEURO / PSYCH: Oriented to person, place and time. Speech clear and appropriate. Follows all commands.  Pleasant affect       Intake/Output Summary (Last 24 hours) at 8/22/2022 1046  Last data filed at 8/22/2022 0441  Gross per 24 hour   Intake 240 ml   Output --   Net 240 ml       Weight:   Wt Readings from Last 3 Encounters:   08/19/22 204 lb 3.2 oz (92.6 kg)   11/20/19 217 lb (98.4 kg)   08/26/19 210 lb (95.3 kg)     Current Inpatient Medications:   carvedilol  25 mg Oral BID     ipratropium-albuterol  1 ampule Inhalation 4x daily    polyethylene glycol  17 g Oral Daily    sacubitril-valsartan  1 tablet Oral BID    aspirin  81 mg Oral Daily    spironolactone  25 mg Oral Daily    furosemide  40 mg Oral Daily    empagliflozin  10 mg Oral Daily    sodium chloride flush  5-40 mL IntraVENous 2 times per day    enoxaparin  40 mg SubCUTAneous Daily    DULoxetine  30 mg Oral Daily    potassium chloride  20 mEq Oral Daily    tamsulosin  0.4 mg Oral Nightly    [START ON 8/25/2022] vitamin D  50,000 Units Oral Weekly    insulin lispro  0-8 Units SubCUTAneous TID     insulin lispro  0-4 Units SubCUTAneous Nightly    atorvastatin  40 mg Oral Nightly    guaiFENesin  600 mg Oral BID       IV Infusions (if any):   sodium chloride      dextrose         DIAGNOSTIC/ LABORATORY DATA:  Labs:   CBC:   Recent Labs     08/21/22  0538 08/22/22  0700   WBC 6.5 4.7   HGB 11.7* 11.8*   HCT 38.8 40.0    223     BMP:   Recent Labs     08/21/22  0538 08/22/22  0700    142   K 3.6 3.7   CO2 34* 33*   BUN 12 13   CREATININE 1.0 1.1   LABGLOM >60 >60   CALCIUM 9.3 9.1     Mag: No results for input(s): MG in the last 72 hours. Phos: No results for input(s): PHOS in the last 72 hours. TFT:   Lab Results   Component Value Date    TSH 0.249 (L) 08/18/2022    R2TVZIZ 8.5 10/18/2016    FT3 2.0 08/19/2022    T4FREE 1.35 08/19/2022      HgA1c:   Lab Results   Component Value Date    LABA1C 6.4 (H) 08/18/2022     No results found for: EAG    BNP: No results for input(s): BNP in the last 72 hours. PT/INR: No results for input(s): PROTIME, INR in the last 72 hours.   APTT:No results for input(s): APTT in the last 72 hours. CARDIAC ENZYMES:No results for input(s): CKTOTAL, CKMB, CKMBINDEX, TROPHS in the last 72 hours. FASTING LIPID PANEL:  Lab Results   Component Value Date/Time    CHOL 99 08/19/2022 10:37 AM    HDL 44 08/19/2022 10:37 AM    LDLCALC 44 08/19/2022 10:37 AM    TRIG 55 08/19/2022 10:37 AM     LIVER PROFILE:  Recent Labs     08/21/22  0538 08/22/22  0700   AST 15 18   ALT 12 15   LABALBU 3.4* 3.4*       CXR:     Telemetry SR  12 lead EKG:  Echo:  Left ventricular size is grossly normal.  Mild left ventricular concentric hypertrophy noted. Ejection fraction is measured at 26%. Ejection fraction is visually estimated at 30-35%. Inferior and posterior wall akinetic  No evidence of left ventricular mass or thrombus noted. The left atrium is mildly dilated. Interatrial septum appears intact. Mildly enlarged right atrium size. Physiologic and/or trace mitral regurgitation is present. Mild mitral stenosis is present. Mitral annular calcification is present. The aortic valve is trileaflet. The aortic valve appears mildly sclerotic. Mild-to-moderate aortic regurgitation is noted. Moderate aortic stenosis. The aortic valve area is 1.1 cm2 with a maximum gradient of 49.52 mmHg and a mean gradient of 29.51 mmHg. Mild tricuspid regurgitation. RVSP is 35 mmHg. Pulmonary hypertension is mild . Regular rhythm    ASSESSMENT:       Acute heart failure with reduced ejection fraction. proBNP 8300 on admission, now compensated  Cardiomyopathy, likely ischemic.  EF 35-40% with wall motion abnormalities  Coronary artery disease. Coronary calcification noted on chest CT  Moderate aortic stenosis, at least moderate aortic regurgitation  Severe mitral annular calcification/functional MS  Sinus tachycardia  Ascending thoracic aortic aneurysm 4.3 cm by CT  Anemia Hgb 10.3  Hypertension, running high  Type 2 diabetes, non-insulin-requiring A1c 6.4%  AUBREY  Tobacco abuse  Hyperthyroidism. TSH suppressed with enlarged thyroid on imaging    PLAN:  For left heart catheterization today or possibly tomorrow  Continue Coreg, Lasix, entresto, aldactone, lipitor      Electronically signed by Jeneen Brunner, APRN - CNP on 8/22/2022 at 10:46 AM

## 2022-08-22 NOTE — DISCHARGE SUMMARY
Internal Medicine Progress Note     [facility]     Bea Larry. Fede Stiles., F.A.C.OZhaoI. Lazaro Montgomery D.O., F.A.CZhaoOZhaoI. Singh Leach D.O. Patricio Wells, MSN, APRN, NP-C  Farrah Cai. 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% and new regional wall motion abnormalities necessitating cardiac catheterization   VHD with Moderate aortic stenosis,Mild aortic regurgitation and Mild pulmonary hypertension  Thyroid nodules, with multiple, with associated moderate to severe thyromegaly requiring close outpatient follow-up and eventual arrangement for fine-needle aspiration   Cholelithiasis requiring outpatient general surgery follow-up   Chronic hypoxic respiratory failure on 3 L supplemental oxygen at baseline  Normocytic anemia with recent evaluation for GI bleed  Sleep apnea without the use of CPAP/BiPAP  Non-insulin-dependent diabetes mellitus  Hyperlipidemia  Hypertension  Current tobacco abuse  Obesity with elevated BMI of 30.71  Chronic low back pain with opiate use      BRIEF HISTORY OF PRESENT ILLNESS:   Patient is 80-year-old male who presented to the ED due to shortness of breath. Patient states that this has progressed over the last few days. He has increased wheezing. He denies any increased cough. He denies any fever or chills. He has shortness of breath mainly with exertion. Otherwise he denies any chest pain.   Denies any increased lower extremity edema. Apparently he also has decreased weight instead of increased.     LABS[de-identified]  Lab Results   Component Value Date    WBC 4.7 08/22/2022    HGB 11.8 (L) 08/22/2022    HCT 40.0 08/22/2022     08/22/2022     08/22/2022    K 3.7 08/22/2022     08/22/2022    CREATININE 1.1 08/22/2022    BUN 13 08/22/2022    CO2 33 (H) 08/22/2022    GLUCOSE 128 (H) 08/22/2022    ALT 15 08/22/2022    AST 18 08/22/2022    INR 1.3 08/17/2022     Lab Results   Component Value Date    INR 1.3 08/17/2022    INR 1.0 12/24/2016    INR 1.2 04/26/2012    PROTIME 14.9 (H) 08/17/2022    PROTIME 11.2 12/24/2016    PROTIME 13.4 (H) 04/26/2012      Lab Results   Component Value Date    TSH 0.249 (L) 08/18/2022     Lab Results   Component Value Date    TRIG 55 08/19/2022    TRIG 83 10/19/2018    TRIG 118 12/13/2017     Lab Results   Component Value Date    HDL 44 08/19/2022    HDL 60 10/19/2018    HDL 49 12/13/2017     Lab Results   Component Value Date    LDLCALC 44 08/19/2022    LDLCALC 48 10/19/2018    LDLCALC 44 12/13/2017     Lab Results   Component Value Date    LABA1C 6.4 (H) 08/18/2022       IMAGING:  Echo Complete    Result Date: 8/18/2022  Transthoracic Echocardiography Report (TTE)  Demographics   Patient Name      Gil Lennon  Gender              Male                    L   Medical Record    [id number]      Room Number         7921  Number   Account #         [de-identified]     Procedure Date      08/18/2022   Corporate ID                    Ordering Physician  Judith Mackenzie DO   Accession Number  [id number]    Referring Physician   Date of Birth     1940    [address]                                                      [facility]   Age               80 year(s)    Interpreting        Judith Mackenzie DO                                  Physician                                   Any Other  Procedure Type of Study   TTE procedure  Procedure Date Date: 08/18/2022 Start: 01:36 PM Study Location: Echo Lab Technical Quality: Adequate visualization Indications:Congestive heart failure. Patient Status: Routine Height: 70 inches Weight: 214 pounds BSA: 2.15 m^2 BMI: 30.71 kg/m^2 Rhythm: Within normal limits HR: 98 bpm BP: 158/74 mmHg  Findings   Left Ventricle  Left ventricular size is grossly normal.  Mild left ventricular concentric hypertrophy noted. Ejection fraction is measured at 26%. Ejection fraction is visually estimated at 30-35%. Inferior and posterior wall akinetic  No evidence of left ventricular mass or thrombus noted. Right Ventricle  Normal right ventricular size. Left Atrium  The left atrium is mildly dilated. Interatrial septum appears intact. Right Atrium  Mildly enlarged right atrium size. Mitral Valve  Physiologic and/or trace mitral regurgitation is present. Mild mitral stenosis is present. Mitral annular calcification is present. Tricuspid Valve  Mild tricuspid regurgitation. RVSP is 35 mmHg. Pulmonary hypertension is mild . Aortic Valve  The aortic valve is trileaflet. The aortic valve appears mildly sclerotic. Mild-to-moderate aortic regurgitation is noted. Moderate aortic stenosis. The aortic valve area is 1.1 cm2 with a maximum gradient of 49.52 mmHg and  a mean gradient of 29.51 mmHg. Pulmonic Valve  The pulmonic valve was not well visualized. Pericardial Effusion  No evidence of pericardial effusion. Aorta  The aorta is within normal limits. Miscellaneous  Regular rhythm. Conclusions   Summary  Left ventricular size is grossly normal.  Mild left ventricular concentric hypertrophy noted. Ejection fraction is measured at 26%. Ejection fraction is visually estimated at 30-35%. Inferior and posterior wall akinetic  No evidence of left ventricular mass or thrombus noted. The left atrium is mildly dilated. Interatrial septum appears intact. Mildly enlarged right atrium size. Physiologic and/or trace mitral regurgitation is present. Mild mitral stenosis is present. Mitral annular calcification is present. The aortic valve is trileaflet. The aortic valve appears mildly sclerotic. Mild-to-moderate aortic regurgitation is noted. Moderate aortic stenosis. The aortic valve area is 1.1 cm2 with a maximum gradient of 49.52 mmHg and  a mean gradient of 29.51 mmHg. Mild tricuspid regurgitation. RVSP is 35 mmHg. Pulmonary hypertension is mild . Regular rhythm.    Signature   ----------------------------------------------------------------  Electronically signed by Alessandra Escobar DO(Interpreting  physician) on 08/18/2022 05:24 PM  ----------------------------------------------------------------  M-Mode/2D Measurements & Calculations   LV Diastolic    LV Systolic Dimension: 4.8   AV Cusp Separation: 1.2 cmLA  Dimension: 5.4  cm                           Dimension: 4.5 cmAO Root  cm              LV Volume Diastolic: 290.5   Dimension: 3.5 cm  LV FS:11.1 %    ml  LV PW           LV Volume Systolic: 098.6 ml  Diastolic: 1.3  LV EDV/LV EDV Index: 142.9  cm              ml/66 ml/m^2LV ESV/LV ESV    RV Diastolic Dimension: 2.5  Septum          Index: 105.9 ml/49ml/ m^2    cm  Diastolic: 1.3  EF Calculated: 25.9 %  cm              LV Mass Index: 137 l/min*m^2  CO: 8.48 l/min  LV Length: 8.9 cm            LA volume/Index: 81.1 ml  CI: 3.94  l/m*m^2         LVOT: 2.1 cm  LV Mass: 295.57  g  Doppler Measurements & Calculations   MV Peak E-Wave: 1.09 AV Peak Velocity:     LVOT Peak Velocity: 1.04 m/s  m/s                  3.52 m/s              LVOT Mean Velocity: 0.73 m/s  MV Peak A-Wave: 1.49 AV Peak Gradient:     LVOT Peak Gradient: 4.4  m/s                  49.51 mmHg            mmHgLVOT Mean Gradient: 2.4  MV E/A Ratio: 0.73   AV Mean Velocity:     mmHg  MV Peak Gradient:    2.57 m/s              Estimated RVSP: 34.7 mmHg  9.7 mmHg             AV Mean Gradient:     Estimated RAP:5 mmHg  MV Mean Gradient:    29.5 mmHg  5.4 mmHg             AV VTI: 66.7 cm MV Mean Velocity:    AV Area               TR Velocity:2.72 m/s  1.14 m/s             (Continuity):1.3 cm^2 TR Gradient:29.68 mmHg  MV Deceleration                            PV Peak Velocity: 1.07 m/s  Time: 185.4 msec     LVOT VTI: 25 cm       PV Peak Gradient: 4.56 mmHg  MV P1/2t: 98.7 msec  IVRT: 93.4 msec       PV Mean Velocity: 0.7 m/s  MVA by PHT:2.23 cm^2 Estimated PASP: 34.68 PV Mean Gradient: 2.3 mmHg  MV Area              mmHg  (continuity): 3.3  cm^2  [email or web address] [id number]    US THYROID    Result Date: 8/21/2022  EXAMINATION: THYROID ULTRASOUND 8/20/2022 COMPARISON: None. HISTORY: ORDERING SYSTEM PROVIDED HISTORY: abnormal CT- enlargement? goiter TECHNOLOGIST PROVIDED HISTORY: Reason for exam:->abnormal CT- enlargement? goiter What reading provider will be dictating this exam?->CRC FINDINGS: Right thyroid lobe:  9.5 x 3.0 x 5.1 cm Left thyroid lobe:  9.3 x 3.6 x 4.0 cm Isthmus:  3 mm Thyroid Gland:  Thyroid gland demonstrates a lobular heterogeneous echotexture with multiple isoechoic and hyperechoic nodules numbering greater than 6. There is prominent thyromegaly. No shadowing calcifications identified by ultrasound. Nodules: Right lower pole nodule: There is a slightly heterogeneous isoechoic nodule with some internal cystic change measuring 5.0 x 3.8 x 3.4 cm. The lesion appears wider than tall on transverse imaging. The lesion borders are somewhat lobular. No shadowing calculi. The lesion is TR 4 Left mid to lower pole nodule: Isoechoic mildly heterogeneous solid nodule in the mid to left lower pole measuring 3.4 x 1.6 x 2.7 cm. The lesion is wider than tall with smooth margins and no shadowing calculi. The lesion is TR 3. Cervical lymphadenopathy: No abnormal lymph nodes in the imaged portions of the neck. 1.  Findings compatible with multinodular thyroid goiter.   There is otherwise unremarkable configuration. The mediastinal contours are within normal limits. Multifocal bilateral lung ill-defined consolidation is noted, greatest at the right lung base. Pulmonary vasculature is prominent with cephalization seen and is mildly indistinct. The pleural surfaces are normal and no evidence of a pleural effusion is seen. No acute bone or soft tissue abnormality is noted. Bilateral lung multifocal consolidation, greatest at the right lung base suggesting pneumonia. Pulmonary vascular congestion plus or minus mild pulmonary interstitial edema. Moderate cardiomegaly. CTA PULMONARY W CONTRAST    Result Date: 8/18/2022  EXAMINATION: CTA OF THE CHEST 8/18/2022 1:59 pm TECHNIQUE: CTA of the chest was performed after the administration of intravenous contrast.  Multiplanar reformatted images are provided for review. MIP images are provided for review. Automated exposure control, iterative reconstruction, and/or weight based adjustment of the mA/kV was utilized to reduce the radiation dose to as low as reasonably achievable. COMPARISON: None. HISTORY: ORDERING SYSTEM PROVIDED HISTORY: R/O PE TECHNOLOGIST PROVIDED HISTORY: Reason for exam:->R/O PE FINDINGS: Pulmonary Arteries: No filling defects in the right or left pulmonary artery. There is mild dilatation of the main pulmonary artery measuring 3.8 cm. Mediastinum: There is low-density mild upper thoracic mediastinal lymphadenopathy and a mildly prominent subcarinal lymph node measuring up to 1.2 cm in diameter. Heterogeneous moderate thyroid gland enlargement is present. Lungs/pleura: There is an area of atelectasis in the right middle lobe and to a lesser extent the left lingular segment. No pleural fluid or pneumonia. No suspicious soft tissue masses. No endobronchial lesions are visible. Upper Abdomen:  Limited images of the upper abdomen demonstrate no acute abnormality.  Soft Tissues/Bones: No acute bone or soft tissue abnormality. 1.  No CT evidence of pulmonary embolism. 2.  Subsegmental atelectasis in the right middle lobe and lingular segments. 3.  Moderate to severe symmetric heterogeneous enlargement of the thyroid gland which may represent thyroid goiter. 4.  Nonspecific mild mediastinal lymphadenopathy. US ABDOMEN LIMITED    Result Date: 8/20/2022  EXAMINATION: RIGHT UPPER QUADRANT ULTRASOUND 8/20/2022 8:41 am COMPARISON: None. HISTORY: ORDERING SYSTEM PROVIDED HISTORY: pain, gallbladder disease TECHNOLOGIST PROVIDED HISTORY: Reason for exam:->pain, gallbladder disease What reading provider will be dictating this exam?->CRC FINDINGS: LIVER:  The liver demonstrates normal echogenicity without evidence of intrahepatic biliary ductal dilatation. BILIARY SYSTEM:  There are findings of cholelithiasis. Small stones are seen near the gallbladder neck. Gallbladder wall thickness is normal.  There is no tenderness in the gallbladder fossa. Gallstones appear mobile. Common bile duct is within normal limits measuring 5 mm. RIGHT KIDNEY: The right kidney is grossly unremarkable without evidence of hydronephrosis. Right kidney contains a cyst measuring 5.9 x 5.3 x 6.1 cm. Right kidney measures 13.3 x 6.0 x 6.0 cm. PANCREAS:  Visualized portions of the pancreas are unremarkable. OTHER: No evidence of right upper quadrant ascites. Cholelithiasis without ultrasound evidence of acute cholecystitis. Common bile duct is normal. Right renal cortical cyst.     US DUP LOWER EXTREMITIES BILATERAL VENOUS    Result Date: 8/18/2022  EXAMINATION: DUPLEX VENOUS ULTRASOUND OF THE BILATERAL LOWER EXTREMITIES8/18/2022 7:34 pm TECHNIQUE: Duplex ultrasound using B-mode/gray scaled imaging, Doppler spectral analysis and color flow Doppler was obtained of the deep venous structures of the lower bilateral extremities. COMPARISON: None.  HISTORY: ORDERING SYSTEM PROVIDED HISTORY: R/O DVT TECHNOLOGIST PROVIDED HISTORY: Reason for exam:->R/O DVT What reading provider will be dictating this exam?->CRC FINDINGS: The visualized veins of the bilateral lower extremities are patent and free of echogenic thrombus. The veins demonstrate good compressibility with normal color flow study and spectral analysis. No evidence of DVT in either lower extremity. HOSPITAL COURSE:   Rosy Barrera spent an extended period time hospitalized also serve as a brief synopsis. He initially presented to the emergency department with multiple days worth of progressively worsening shortness of breath. He was identified as having acutely decompensated congestive heart failure complicated by his underlying chronic respiratory failure on 3 L nasal cannula oxygen at baseline and valvular heart disease. Pneumonia evaluation was undertaken the patient was found to have a very mild elevated procalcitonin with an otherwise negative work-up. Antimicrobial therapy was employed for multiple days and discontinued today. Echocardiogram was updated and the patient was identified as having worsening LVEF of 35% with associated new regional wall motion abnormality. The cardiovascular team provided consultation, medical optimization for the underlying cardiomyopathy was instituted and patient was arranged for cardiac catheterization today. During the hospitalization the patient was also identified as having gallstones and will require outpatient surgery consult for eventual consideration of laparoscopic cholecystectomy if needed once cardiac condition has been addressed. Nodular thyroid with thyromegaly identified as well on thyroid ultrasound that required close outpatient follow-up and eventual consideration of fine-needle aspiration once medically appropriate as well.   The patient's chronic morbidities, laboratory values and vital signs were monitored and addressed accordingly throughout hospitalization and the patient CHF compensated to the degree where he is medically excepted for transfer to [facility] for cardiac catheterization, further treatment and eventual disposition as per their internal medicine cardiology service. Upon eventual discharge, follow-up within 1 week with primary care provider for further work-up and arrangement of outpatient follow-ups as discussed above. BRIEF PHYSICAL EXAMINATION AND LABORATORIES ON DAY OF DISCHARGE:  VITALS:  /65   Pulse 92   Temp 99.5 °F (37.5 °C) (Oral)   Resp 13   Ht 5' 10\" (1.778 m)   Wt 204 lb 3.2 oz (92.6 kg)   SpO2 97%   BMI 29.30 kg/m²     HEENT:  PERRLA. EOMI. Sclera clear. Buccal mucosa moist.    Neck:  Supple. Trachea midline. + nodules and thyromegaly. No JVD. No bruits. Heart:  Rhythm regular, rate controlled. 1 and S2. Systolic murmur. Lungs:  Symmetrical.  Air exchange is diminished bibasilar. Otherwise lungs are clear to auscultation bilaterally. No wheezes. No rhonchi. No rales. Abdomen: Soft. No significant tenderness to palpation. Non-distended. Bowel sounds positive. No organomegaly or masses. No pain on palpation    Extremities:  Peripheral pulses present. Improved peripheral edema. No ulcers. Neurologic:  Alert x 3. No focal deficit. Cranial nerves grossly intact. Skin:  No petechia. No hemorrhage. No wounds.       DISPOSITION:  The patient's condition is stable for transfer to [facility] for cardiac catheterization    Current Facility-Administered Medications   Medication Dose Route Frequency Provider Last Rate Last Admin    carvedilol (COREG) tablet 25 mg  25 mg Oral BID  Lexa Giraldo DO   25 mg at 08/22/22 6794    melatonin disintegrating tablet 5 mg  5 mg Oral Nightly PRN [employer], DO        ipratropium-albuterol (DUONEB) nebulizer solution 1 ampule  1 ampule Inhalation 4x daily Jennifer Montes De Oca DO   1 ampule at 08/22/22 0529    polyethylene glycol (GLYCOLAX) packet 17 g  17 g Oral Daily Osiel Montes De Oca, DO   17 g at 08/21/22 0610 sacubitril-valsartan (ENTRESTO) 49-51 MG per tablet 1 tablet  1 tablet Oral BID Kalpana Gallagher DO   1 tablet at 08/21/22 3567    aspirin chewable tablet 81 mg  81 mg Oral Daily Osiel Montes De Oca DO   81 mg at 08/22/22 9904    spironolactone (ALDACTONE) tablet 25 mg  25 mg Oral Daily Rose Huitron MD   25 mg at 08/21/22 0919    furosemide (LASIX) tablet 40 mg  40 mg Oral Daily Rose Huitron MD   40 mg at 08/21/22 0919    ondansetron (ZOFRAN) injection 4 mg  4 mg IntraVENous Q6H PRN DA Hawkins - CNP   4 mg at 08/21/22 0612    empagliflozin (JARDIANCE) tablet 10 mg  10 mg Oral Daily Osiel Montes De Oca, DO   10 mg at 08/21/22 3893    sodium chloride flush 0.9 % injection 5-40 mL  5-40 mL IntraVENous 2 times per day Ruthell Sans, DO   10 mL at 08/21/22 2117    sodium chloride flush 0.9 % injection 5-40 mL  5-40 mL IntraVENous PRN Ruthell Sans, DO        0.9 % sodium chloride infusion   IntraVENous PRN Ruthell Sans, DO        enoxaparin (LOVENOX) injection 40 mg  40 mg SubCUTAneous Daily Ruthell Sans, DO   40 mg at 08/21/22 4141    acetaminophen (TYLENOL) tablet 650 mg  650 mg Oral Q6H PRN Ruthell Sans, DO        Or    acetaminophen (TYLENOL) suppository 650 mg  650 mg Rectal Q6H PRN Ruthell Sans, DO        DULoxetine (CYMBALTA) extended release capsule 30 mg  30 mg Oral Daily Ruthell Sans, DO   30 mg at 08/21/22 0919    potassium chloride (KLOR-CON M) extended release tablet 20 mEq  20 mEq Oral Daily Ruthell Sans, DO   20 mEq at 08/21/22 3133    perflutren lipid microspheres (DEFINITY) injection 1.65 mg  1.5 mL IntraVENous ONCE PRN Ruthell Sans, DO        tamsulosin (FLOMAX) capsule 0.4 mg  0.4 mg Oral Nightly Osiel Montes De Oca, DO   0.4 mg at 08/21/22 2116    [START ON 8/25/2022] vitamin D (ERGOCALCIFEROL) capsule 50,000 Units  50,000 Units Oral Weekly Osiel Montes De Oca DO        glucose chewable tablet 16 g  4 tablet Oral PRN Osiel Montes De Oca DO        dextrose bolus 10% 125 mL  125 mL IntraVENous PRN Andra Montes De Oca DO        Or    dextrose bolus 10% 250 mL  250 mL IntraVENous PRN Osiel Montes De Oca DO        glucagon (rDNA) injection 1 mg  1 mg SubCUTAneous PRN Osiel Montes De Oca DO        dextrose 10 % infusion   IntraVENous Continuous PRN Osiel Montes De Oca DO        insulin lispro (HUMALOG) injection vial 0-8 Units  0-8 Units SubCUTAneous TID WC Osiel Montes De Oca DO        insulin lispro (HUMALOG) injection vial 0-4 Units  0-4 Units SubCUTAneous Nightly Osiel Montes De Oca DO        atorvastatin (LIPITOR) tablet 40 mg  40 mg Oral Nightly Osiel Montes De Oca DO   40 mg at 08/21/22 2116    guaiFENesin [facility]) extended release tablet 600 mg  600 mg Oral BID Andra Montes De Oca DO   600 mg at 08/21/22 2116    oxyCODONE-acetaminophen (PERCOCET) 5-325 MG per tablet 1 tablet  1 tablet Oral Q8H PRN Mando Power DO   1 tablet at 08/21/22 2250          FOLLOW UP/INSTRUCTIONS:  This patient is instructed to follow-up with his primary care physician. Patient is instructed to follow-up with the consults listed above as directed by them. he is instructed to resume home medications and take new medications as indicated in the list above. If the patient has a recurrence of symptoms, he is instructed to go to the ED. Preparing for this patient's discharge, including paperwork, orders, instructions, and meeting with patient did require > 40 minutes.     DA Humphrey CNP     8/22/2022  8:14 AM

## 2022-08-22 NOTE — PROGRESS NOTES
SpO2 on 2L at rest was 100%  Oxygen removed. SpO2 on room air at rest was 96%. Ambulated patient on room air. SpO2 stayed between % on room air while ambulating ~200 feet.

## 2022-08-22 NOTE — PROGRESS NOTES
Physical Therapy        0420/0420-01    Patient unavailable for physical therapy treatment due to patient declining therapy at this time since he is to transferring for heart cath.     Haleigh Wilder, PTA  #[phone]

## 2022-08-22 NOTE — TELEPHONE ENCOUNTER
Contacted patient's daughter, Tao Pelletier, and gave her our fax number and mailing address to send [place] records. ----- Message from Chantale Kenny MD sent at 8/22/2022  8:32 AM EDT -----  Can you please reach out to his daughter Tao Pelletier, she has some medical record she would like to send from [place].

## 2022-08-22 NOTE — PROGRESS NOTES
Patients manual blood pressure was 92/58 HR 98, contacted Dr. Lisa Miller whom instructed me to hold his PM entresto.

## 2022-08-22 NOTE — PLAN OF CARE
Problem: Safety - Adult  Goal: Free from fall injury  8/22/2022 0435 by Nilda Garcia RN  Outcome: Progressing  8/21/2022 1821 by Kourtney Kohler RN  Outcome: Progressing     Problem: Skin/Tissue Integrity  Goal: Absence of new skin breakdown  Description: 1. Monitor for areas of redness and/or skin breakdown  2. Assess vascular access sites hourly  3. Every 4-6 hours minimum:  Change oxygen saturation probe site  4. Every 4-6 hours:  If on nasal continuous positive airway pressure, respiratory therapy assess nares and determine need for appliance change or resting period.   8/22/2022 0435 by Nilda Garcia RN  Outcome: Progressing  8/21/2022 1821 by Kourtney Kohler RN  Outcome: Progressing     Problem: Pain  Goal: Verbalizes/displays adequate comfort level or baseline comfort level  8/22/2022 0435 by Nilda Garcia RN  Outcome: Progressing  8/21/2022 1821 by Kourtney Kohler RN  Outcome: Progressing     Problem: ABCDS Injury Assessment  Goal: Absence of physical injury  8/22/2022 0435 by Nilda Garcia RN  Outcome: Progressing  8/21/2022 1821 by Kourtney Kohler RN  Outcome: Progressing

## 2022-08-22 NOTE — CARE COORDINATION
8/22/2022 1041 CM note: Negative covid 8/17/22. Per IDR, patient for  possible cardiac catheterization today at [facility]. Per previous CM note dated 8/19/22, pt has home inogen/02 and Entresto 30 day free card was given.   Joy CRUZ

## 2022-08-22 NOTE — DISCHARGE INSTRUCTIONS
Your information:  Name: Verner Osgood  : 1940    Your instructions:    YOU ARE BEING DISCHARGED HOME. PLEASE MAKE AND KEEP YOUR FOLLOW UP APPOINTMENTS. What to do after you leave the hospital:    Recommended diet: regular diet and low sodium diet    Recommended activity: activity as tolerated    IF YOU EXPERIENCE ANY OF THE FOLLOWING SYMPTOMS, CHEST PAIN, SHORTNESS OF BREATH, COUGHING UP BLOOD OR BLOODY SPUTUM, STOMACH PAIN OR CRAMPING, DARK, TARRY STOOLS, LOSS OF APPETITE, GENERAL NOT FEELING WELL, SIGNS AND SYMPTOMS OF INFECTION LIKE FEVER AND OR CHILLS, PLEASE CALL DR Sahil Montes De Oca, DO OR RETURN TO THE EMERGENCY ROOM. The following personal items were collected during your admission and were returned to you:    Belongings  Dental Appliances: Uppers, Lowers  Vision - Corrective Lenses: None  Hearing Aid: None  Clothing: Pants, Shirt, Footwear, Undergarments  Jewelry: Watch  Body Piercings Removed: No  Electronic Devices: Cell Phone,   Weapons (Notify Protective Services/Security): None  Other Valuables: Sent home (wallet sent home with daughter)  Home Medications: None  Valuables Given To: Family (Comment)  Provide Name(s) of Who Valuable(s) Were Given To: Jorden Brooks  Responsible person(s) in the waiting room: none  Patient approves for provider to speak to responsible person post operatively: Yes (wife and daughter)    Information obtained by:  By signing below, I understand that if any problems occur once I leave the hospital I am to contact  [employer], DO or go to emergency room. I understand and acknowledge receipt of the instructions indicated above.

## 2022-08-23 PROBLEM — E04.2 MULTINODULAR GOITER: Status: ACTIVE | Noted: 2022-08-23

## 2022-08-23 LAB
ALBUMIN SERPL-MCNC: 3.9 G/DL (ref 3.5–5.2)
ALP BLD-CCNC: 66 U/L (ref 40–129)
ALT SERPL-CCNC: 17 U/L (ref 0–40)
ANION GAP SERPL CALCULATED.3IONS-SCNC: 10 MMOL/L (ref 7–16)
AST SERPL-CCNC: 25 U/L (ref 0–39)
BASOPHILS ABSOLUTE: 0.02 E9/L (ref 0–0.2)
BASOPHILS RELATIVE PERCENT: 0.4 % (ref 0–2)
BILIRUB SERPL-MCNC: 0.3 MG/DL (ref 0–1.2)
BUN BLDV-MCNC: 14 MG/DL (ref 6–23)
CALCIUM SERPL-MCNC: 9.2 MG/DL (ref 8.6–10.2)
CHLORIDE BLD-SCNC: 101 MMOL/L (ref 98–107)
CO2: 33 MMOL/L (ref 22–29)
CREAT SERPL-MCNC: 1 MG/DL (ref 0.7–1.2)
EKG ATRIAL RATE: 104 BPM
EKG P AXIS: 65 DEGREES
EKG P-R INTERVAL: 130 MS
EKG Q-T INTERVAL: 394 MS
EKG QRS DURATION: 114 MS
EKG QTC CALCULATION (BAZETT): 518 MS
EKG R AXIS: -68 DEGREES
EKG T AXIS: 86 DEGREES
EKG VENTRICULAR RATE: 104 BPM
EOSINOPHILS ABSOLUTE: 0.06 E9/L (ref 0.05–0.5)
EOSINOPHILS RELATIVE PERCENT: 1.3 % (ref 0–6)
GFR AFRICAN AMERICAN: >60
GFR NON-AFRICAN AMERICAN: >60 ML/MIN/1.73
GLUCOSE BLD-MCNC: 111 MG/DL (ref 74–99)
HBA1C MFR BLD: 6.5 % (ref 4–5.6)
HCT VFR BLD CALC: 38.9 % (ref 37–54)
HEMOGLOBIN: 11.9 G/DL (ref 12.5–16.5)
IMMATURE GRANULOCYTES #: 0.01 E9/L
IMMATURE GRANULOCYTES %: 0.2 % (ref 0–5)
LYMPHOCYTES ABSOLUTE: 0.87 E9/L (ref 1.5–4)
LYMPHOCYTES RELATIVE PERCENT: 19.4 % (ref 20–42)
MCH RBC QN AUTO: 25.2 PG (ref 26–35)
MCHC RBC AUTO-ENTMCNC: 30.6 % (ref 32–34.5)
MCV RBC AUTO: 82.4 FL (ref 80–99.9)
METER GLUCOSE: 114 MG/DL (ref 74–99)
METER GLUCOSE: 130 MG/DL (ref 74–99)
METER GLUCOSE: 164 MG/DL (ref 74–99)
METER GLUCOSE: 176 MG/DL (ref 74–99)
MONOCYTES ABSOLUTE: 0.86 E9/L (ref 0.1–0.95)
MONOCYTES RELATIVE PERCENT: 19.2 % (ref 2–12)
NEUTROPHILS ABSOLUTE: 2.66 E9/L (ref 1.8–7.3)
NEUTROPHILS RELATIVE PERCENT: 59.5 % (ref 43–80)
PDW BLD-RTO: 16.1 FL (ref 11.5–15)
PLATELET # BLD: 216 E9/L (ref 130–450)
PMV BLD AUTO: 12.1 FL (ref 7–12)
POTASSIUM REFLEX MAGNESIUM: 3.8 MMOL/L (ref 3.5–5)
RBC # BLD: 4.72 E12/L (ref 3.8–5.8)
SODIUM BLD-SCNC: 144 MMOL/L (ref 132–146)
TOTAL PROTEIN: 6.9 G/DL (ref 6.4–8.3)
TROPONIN, HIGH SENSITIVITY: 28 NG/L (ref 0–11)
TROPONIN, HIGH SENSITIVITY: 29 NG/L (ref 0–11)
WBC # BLD: 4.5 E9/L (ref 4.5–11.5)

## 2022-08-23 PROCEDURE — 6370000000 HC RX 637 (ALT 250 FOR IP): Performed by: INTERNAL MEDICINE

## 2022-08-23 PROCEDURE — 84484 ASSAY OF TROPONIN QUANT: CPT

## 2022-08-23 PROCEDURE — 6360000002 HC RX W HCPCS: Performed by: INTERNAL MEDICINE

## 2022-08-23 PROCEDURE — 82962 GLUCOSE BLOOD TEST: CPT

## 2022-08-23 PROCEDURE — 93005 ELECTROCARDIOGRAM TRACING: CPT | Performed by: INTERNAL MEDICINE

## 2022-08-23 PROCEDURE — 93458 L HRT ARTERY/VENTRICLE ANGIO: CPT

## 2022-08-23 PROCEDURE — 99223 1ST HOSP IP/OBS HIGH 75: CPT | Performed by: INTERNAL MEDICINE

## 2022-08-23 PROCEDURE — 93458 L HRT ARTERY/VENTRICLE ANGIO: CPT | Performed by: INTERNAL MEDICINE

## 2022-08-23 PROCEDURE — 2580000003 HC RX 258: Performed by: INTERNAL MEDICINE

## 2022-08-23 PROCEDURE — 2700000000 HC OXYGEN THERAPY PER DAY

## 2022-08-23 PROCEDURE — 94640 AIRWAY INHALATION TREATMENT: CPT

## 2022-08-23 PROCEDURE — 2709999900 HC NON-CHARGEABLE SUPPLY

## 2022-08-23 PROCEDURE — 6370000000 HC RX 637 (ALT 250 FOR IP): Performed by: FAMILY MEDICINE

## 2022-08-23 PROCEDURE — 36415 COLL VENOUS BLD VENIPUNCTURE: CPT

## 2022-08-23 PROCEDURE — C1769 GUIDE WIRE: HCPCS

## 2022-08-23 PROCEDURE — 99222 1ST HOSP IP/OBS MODERATE 55: CPT | Performed by: INTERNAL MEDICINE

## 2022-08-23 PROCEDURE — 2060000000 HC ICU INTERMEDIATE R&B

## 2022-08-23 PROCEDURE — C1894 INTRO/SHEATH, NON-LASER: HCPCS

## 2022-08-23 PROCEDURE — 80053 COMPREHEN METABOLIC PANEL: CPT

## 2022-08-23 PROCEDURE — 83036 HEMOGLOBIN GLYCOSYLATED A1C: CPT

## 2022-08-23 PROCEDURE — 85025 COMPLETE CBC W/AUTO DIFF WBC: CPT

## 2022-08-23 RX ORDER — SODIUM CHLORIDE 0.9 % (FLUSH) 0.9 %
5-40 SYRINGE (ML) INJECTION PRN
Status: DISCONTINUED | OUTPATIENT
Start: 2022-08-23 | End: 2022-08-24 | Stop reason: HOSPADM

## 2022-08-23 RX ORDER — SODIUM CHLORIDE 0.9 % (FLUSH) 0.9 %
5-40 SYRINGE (ML) INJECTION EVERY 12 HOURS SCHEDULED
Status: DISCONTINUED | OUTPATIENT
Start: 2022-08-23 | End: 2022-08-24 | Stop reason: HOSPADM

## 2022-08-23 RX ORDER — SODIUM CHLORIDE 9 MG/ML
INJECTION, SOLUTION INTRAVENOUS PRN
Status: DISCONTINUED | OUTPATIENT
Start: 2022-08-23 | End: 2022-08-24 | Stop reason: HOSPADM

## 2022-08-23 RX ORDER — CARVEDILOL 6.25 MG/1
12.5 TABLET ORAL 2 TIMES DAILY WITH MEALS
Status: DISCONTINUED | OUTPATIENT
Start: 2022-08-23 | End: 2022-08-24

## 2022-08-23 RX ORDER — MECOBALAMIN 5000 MCG
5 TABLET,DISINTEGRATING ORAL NIGHTLY
Status: DISCONTINUED | OUTPATIENT
Start: 2022-08-23 | End: 2022-08-24 | Stop reason: HOSPADM

## 2022-08-23 RX ADMIN — TAMSULOSIN HYDROCHLORIDE 0.4 MG: 0.4 CAPSULE ORAL at 12:48

## 2022-08-23 RX ADMIN — ASPIRIN 81 MG CHEWABLE TABLET 81 MG: 81 TABLET CHEWABLE at 08:45

## 2022-08-23 RX ADMIN — CARVEDILOL 12.5 MG: 6.25 TABLET, FILM COATED ORAL at 16:31

## 2022-08-23 RX ADMIN — SODIUM CHLORIDE, PRESERVATIVE FREE 10 ML: 5 INJECTION INTRAVENOUS at 12:49

## 2022-08-23 RX ADMIN — ATORVASTATIN CALCIUM 40 MG: 40 TABLET, FILM COATED ORAL at 12:47

## 2022-08-23 RX ADMIN — LISINOPRIL 20 MG: 20 TABLET ORAL at 12:48

## 2022-08-23 RX ADMIN — SPIRONOLACTONE 25 MG: 25 TABLET ORAL at 12:48

## 2022-08-23 RX ADMIN — Medication 5 MG: at 20:22

## 2022-08-23 RX ADMIN — CARVEDILOL 12.5 MG: 6.25 TABLET, FILM COATED ORAL at 12:48

## 2022-08-23 RX ADMIN — SODIUM CHLORIDE, PRESERVATIVE FREE 10 ML: 5 INJECTION INTRAVENOUS at 20:22

## 2022-08-23 RX ADMIN — DULOXETINE 30 MG: 30 CAPSULE, DELAYED RELEASE ORAL at 12:47

## 2022-08-23 RX ADMIN — ALBUTEROL SULFATE 2.5 MG: 2.5 SOLUTION RESPIRATORY (INHALATION) at 15:44

## 2022-08-23 RX ADMIN — AMLODIPINE BESYLATE 10 MG: 10 TABLET ORAL at 12:48

## 2022-08-23 RX ADMIN — ENOXAPARIN SODIUM 40 MG: 100 INJECTION SUBCUTANEOUS at 12:47

## 2022-08-23 ASSESSMENT — PAIN SCALES - GENERAL
PAINLEVEL_OUTOF10: 0
PAINLEVEL_OUTOF10: 0

## 2022-08-23 NOTE — PROGRESS NOTES
Inpatient Cardiology Consult / Progress note ( no need for full consult: see cardiology consult from [address] [place] [address],[address] by Dr. Hayden Palmer dated 8/19/22. PATIENT IS BEING FOLLOWED FOR: CHF, valvular heart disease. Patient admitted to [address] [place] [address],[facility] hospital with 4 days of worsening SOB. Received IV lasix with improvement. Denies CP. Had an Echo done which showed LV dysfunction and VHD. Was transferred for Cath. Ty Kidd is a 80 y.o. male seen in initial consultation by Dr. Queta Kamarar: denies SOB or CP  OBJECTIVE: No apparent distress     ROS:  Consist: Denies fevers, chills or night sweats  Heart: Denies chest pain, palpitations, lightheadedness, dizziness or syncope  Lungs: Denies SOB, cough, wheezing, orthopnea or PND  GI: Denies abdominal pain, vomiting or diarrhea    PHYSICAL EXAM:   /63   Pulse (!) 102   Temp 99.3 °F (37.4 °C) (Oral)   Resp 18   SpO2 95%    B/P Range last 24 hours: Systolic (72HOA), GNT:421 , Min:120 , RAC:701    Diastolic (08PPG), ZFU:19, Min:63, Max:65    CONST: Well developed, well nourished elderly AA male who appears of stated age. Awake, alert and cooperative. No apparent distress  HEENT:   Head- Normocephalic, atraumatic   Eyes- Conjunctivae pink, anicteric  Throat- Oral mucosa pink and moist  Neck-  No stridor, trachea midline, no jugular venous distention. No carotid bruit  CHEST: Chest symmetrical and non-tender to palpation. No accessory muscle use or intercostal retractions  RESPIRATORY:  Lung sounds - diminished. No crackles or wheezes heard  CARDIOVASCULAR:     Heart Inspection- shows no noted pulsations  Heart Palpation- no heaves or thrills; PMI is non-displaced   Heart Ausculation- Regular rate and rhythm. 2/6 systolic murmur RUSB. No s3, s4 or rub   PV: No significant lower extremity edema. No varicosities. Pedal pulses palpable, no clubbing or cyanosis   ABDOMEN: Soft, non-tender to light palpation. Bowel sounds present.  No palpable masses no organomegaly; no abdominal bruit  MS: Good muscle strength and tone. No atrophy or abnormal movements. : Deferred  SKIN: Warm and dry no statis dermatitis or ulcers   NEURO / PSYCH: Oriented to person, place and time. Speech clear and appropriate. Follows all commands.  Pleasant affect     No intake or output data in the 24 hours ending 08/23/22 0739      Weight:   Wt Readings from Last 3 Encounters:   08/19/22 204 lb 3.2 oz (92.6 kg)   11/20/19 217 lb (98.4 kg)   08/26/19 210 lb (95.3 kg)     Current Inpatient Medications:   atorvastatin  40 mg Oral Daily    DULoxetine  30 mg Oral Daily    furosemide  20 mg IntraVENous Daily    tamsulosin  0.4 mg Oral Daily    carvedilol  6.25 mg Oral BID WC    spironolactone  25 mg Oral Daily    sodium chloride flush  10 mL IntraVENous 2 times per day    enoxaparin  40 mg SubCUTAneous Daily    aspirin  81 mg Oral Daily    insulin lispro  0-4 Units SubCUTAneous TID WC    insulin lispro  0-4 Units SubCUTAneous Nightly    amLODIPine  10 mg Oral Daily    And    lisinopril  20 mg Oral Daily       IV Infusions (if any):   dextrose      sodium chloride         DIAGNOSTIC/ LABORATORY DATA:  Labs:   CBC:   Recent Labs     08/22/22  0700 08/23/22  0559   WBC 4.7 4.5   HGB 11.8* 11.9*   HCT 40.0 38.9    216     BMP:   Recent Labs     08/22/22  0700 08/23/22  0559    144   K 3.7 3.8   CO2 33* 33*   BUN 13 14   CREATININE 1.1 1.0   LABGLOM >60 >60   CALCIUM 9.1 9.2       TFT:   Lab Results   Component Value Date    TSH 0.249 (L) 08/18/2022    R9USZFY 8.5 10/18/2016    FT3 2.0 08/19/2022    T4FREE 1.35 08/19/2022      HgA1c:   Lab Results   Component Value Date    LABA1C 6.5 (H) 08/23/2022       CARDIAC ENZYMES:  Recent Labs     08/23/22  0111 08/23/22  0559   TROPHS 29* 28*     FASTING LIPID PANEL:  Lab Results   Component Value Date/Time    CHOL 99 08/19/2022 10:37 AM    HDL 44 08/19/2022 10:37 AM    LDLCALC 44 08/19/2022 10:37 AM    TRIG 55 08/19/2022 10:37 AM     LIVER PROFILE:  Recent Labs     08/22/22  0700 08/23/22  0559   AST 18 25   ALT 15 17   LABALBU 3.4* 3.9       CXR 8/17/22:   Bilateral lung multifocal consolidation, greatest at the right lung base suggesting pneumonia. Pulmonary vascular congestion plus or minus mild pulmonary interstitial edema. Moderate cardiomegaly    CTA pulmonary 8/18/22:  1. No CT evidence of pulmonary embolism. 2.  Subsegmental atelectasis in the right middle lobe and lingular segments. 3.  Moderate to severe symmetric heterogeneous enlargement of the thyroid gland which may represent thyroid goiter. 4.  Nonspecific mild mediastinal lymphadenopathy. CXR 8/20/22:  Interval improvement seen in the aeration of the lung fields with minimal  residual markings at the lung bases. 12 lead EKG 8/23/22:   Sinus tachycardia with occasional premature ventricular complexes. RSR' or QR pattern in V1 suggests right ventricular conduction delay. Left anterior fascicular block. Septal infarct , age undetermined    Echo 8/18/22 ( Abbyia Ahmet DO ) :  Left ventricular size is grossly normal.  Mild left ventricular concentric hypertrophy noted. Ejection fraction is measured at 26%. Ejection fraction is visually estimated at 30-35%. Inferior and posterior wall akinetic  No evidence of left ventricular mass or thrombus noted. The left atrium is mildly dilated. Interatrial septum appears intact. Mildly enlarged right atrium size. Physiologic and/or trace mitral regurgitation is present. Mild mitral stenosis is present. Mitral annular calcification is present. The aortic valve is trileaflet. The aortic valve appears mildly sclerotic. Mild-to-moderate aortic regurgitation is noted. Moderate aortic stenosis. The aortic valve area is 1.1 cm2 with a maximum gradient of 49.52 mmHg and a mean gradient of 29.51 mmHg. Mild tricuspid regurgitation. RVSP is 35 mmHg. Pulmonary hypertension is mild .   Regular rhythm    Telemetry:  SR / ST    ASSESSMENT:       Acute heart failure with reduced ejection fraction. proBNP 8300 on admission, now appears compensated / Eu volemic post IV diuresis  Cardiomyopathy, likely ischemic.  EF 35-40% with wall motion abnormalities  Coronary artery disease. Coronary calcification noted on chest CT  Moderate aortic stenosis, at least moderate aortic regurgitation  Severe mitral annular calcification/functional MS  Sinus tachycardia  Ascending thoracic aortic aneurysm 4.3 cm by CT  Borderline anemia Hgb 10.3 -->11.9  Hypertension  Type 2 diabetes, non-insulin-requiring A1c 6.4%  AUBREY  Tobacco abuse, suspect COPD  Hyperthyroidism.   TSH suppressed with enlarged thyroid on imaging    PLAN:  Increase Coreg  Hold IV Lasix today  Rest of Cardiac medications same for now  For left heart catheterization today   Further recommendations to follow      Electronically signed by Kelly Mcleod MD on 8/23/2022 at 7:39 AM

## 2022-08-23 NOTE — H&P
Hospitalist History & Physical      PATIENT NAME:  Melody Murphy    MRN:  [id number]  SERVICE DATE:  08/22/22    Primary Care Physician: Giuliano Parks DO       SUBJECTIVE  CHIEF COMPLAINT:  had no chief complaint listed for this encounter. HPI:  Mr. Melody Murphy, a 80y.o. year old male  who  has a past medical history of Aortic stenosis, moderate, Arthritis, Diabetes mellitus ([address] H/O cardiovascular stress test, Hyperlipidemia, Hypertension, Lymph node enlargement, and Partial small bowel obstruction ([address] presents with SOB, transferred and direct admitted for CHF exacerbation, proBNP 8300, EF 35%. Seen by Cardiology already and planned for left heart cath tomorrow. No chest pain currently no nausea vomiting no fever or chills. PAST MEDICAL HISTORY:    Past Medical History:   Diagnosis Date    Aortic stenosis, moderate 10/2018    Arthritis     Diabetes mellitus ([address]     H/O cardiovascular stress test 10/19/2018    lexiscan    Hyperlipidemia     Hypertension     Lymph node enlargement     seeing dr yates  coughing mucous    Partial small bowel obstruction ([address] 04/14/2017     PAST SURGICAL HISTORY:    Past Surgical History:   Procedure Laterality Date    ACHILLES TENDON SURGERY      BREAST SURGERY Left 06/05/2014    Excision of left breast mass    COLONOSCOPY  04/24/2012    FRACTURE SURGERY      C-2    KNEE ARTHROTOMY      NERVE BLOCK  04 10 2012    lumbar epidural #1     FAMILY HISTORY:    Family History   Problem Relation Age of Onset    Hypertension Mother     Diabetes Mother     Hypertension Father     Brain Cancer Sister         Brain aneurysm    Hypertension Sister      SOCIAL HISTORY:    Social History     Socioeconomic History    Marital status:      Spouse name:  Yadi Bone    Number of children: 6    Years of education: 16    Highest education level: Not on file   Occupational History    Occupation: Retired    Tobacco Use    Smoking status: Every Day     Packs/day: 0.50 Types: Cigarettes    Smokeless tobacco: Never    Tobacco comments:     smoking 1/2 pack of cigarettes/day   Vaping Use    Vaping Use: Never used   Substance and Sexual Activity    Alcohol use: Never     Comment: occassionally    Drug use: No    Sexual activity: Not on file   Other Topics Concern    Not on file   Social History Narrative    Not on file     Social Determinants of Health     Financial Resource Strain: Low Risk     Difficulty of Paying Living Expenses: Not hard at all   Food Insecurity: No Food Insecurity    Worried About Running Out of Food in the Last Year: Never true    [address] in the Last Year: Never true   Transportation Needs: No Transportation Needs    Lack of Transportation (Medical): No    Lack of Transportation (Non-Medical): No   Physical Activity: Unknown    Days of Exercise per Week: 0 days    Minutes of Exercise per Session: Not on file   Stress: No Stress Concern Present    Feeling of Stress : Only a little   Social Connections: Moderately Integrated    Frequency of Communication with Friends and Family: More than three times a week    Frequency of Social Gatherings with Friends and Family: More than three times a week    Attends [employer] Services: 1 to 4 times per year    Active Member of [address] [place] [address] [employer] or Organizations: No    Attends Club or Organization Meetings: Never    Marital Status:    Intimate Partner Violence: Not At Risk    Fear of Current or Ex-Partner: No    Emotionally Abused: No    Physically Abused: No    Sexually Abused: No   Housing Stability: Low Risk     Unable to Pay for Housing in the Last Year: No    Number of [place] in the Last Year: 2    Unstable Housing in the Last Year: No    TOBACCO:   reports that he has been smoking cigarettes. He has been smoking an average of .5 packs per day. He has never used smokeless tobacco.  ETOH:   reports no history of alcohol use.   MEDICATIONS:   Prior to Admission medications    Medication Sig Start Date End Date Taking? Authorizing Provider   furosemide (LASIX) 40 MG tablet TAKE 1 TABLET BY MOUTH DAILY AS NEEDED (FOR SWELLING IN LEGS) 3/16/21   Osiel Montes De Oca DO   atorvastatin (LIPITOR) 40 MG tablet TAKE 1 TABLET BY MOUTH EVERY DAY 1/6/21   Osiel Montes De Oca DO   amLODIPine-benazepril (LOTREL) 10-20 MG per capsule TAKE 1 CAPSULE BY MOUTH EVERY DAY 12/22/20   Kemal Montes De Oca DO   potassium chloride (KLOR-CON M) 20 MEQ extended release tablet Take 1 tablet by mouth daily Take when taking Lasix 12/7/20   Osiel Montes De Oca DO   ondansetron (ZOFRAN ODT) 4 MG disintegrating tablet Take 1 tablet by mouth every 6 hours 8/31/20   Osiel Montes De Oca DO   amLODIPine-benazepril (LOTREL) 10-20 MG per capsule Take 1 capsule by mouth daily 8/5/20   Falguni Wilson, APRN - CNP   albuterol sulfate  (90 Base) MCG/ACT inhaler INHALE 2 PUFFS 4 TIMES A DAY AS NEEDED FOR WHEEZING 3/3/20   Historical Provider, MD   vitamin D (ERGOCALCIFEROL) 1.25 MG (57052 UT) CAPS capsule Take 1 capsule by mouth once a week 1/10/20   Kemal Montes De Oca DO   tamsulosin (FLOMAX) 0.4 MG capsule Take 1 capsule by mouth daily 10/7/19   Kemal Montes De Oca DO   tiZANidine (ZANAFLEX) 4 MG tablet Take 1 tablet by mouth every 8 hours as needed (back spasm) 8/26/19   Kemal Montes De Oca DO   metFORMIN (GLUCOPHAGE) 500 MG tablet Take 1 tablet by mouth 2 times daily (with meals) 10/15/18   Kemal Montes De Oca DO   VIAGRA 100 MG tablet Take 1 tablet by mouth as needed for Erectile Dysfunction 6/26/18   Osiel Montes De Oca DO   DULoxetine (CYMBALTA) 30 MG extended release capsule Take 30 mg by mouth daily    Historical Provider, MD        ALLERGIES: Patient has no known allergies. REVIEW OF SYSTEM:   ROS as noted in HPI, 12 point ROS reviewed and otherwise negative. OBJECTIVE  PHYSICAL EXAM:   There were no vitals filed for this visit.     General appearance: alert, appears stated age and cooperative  CONSTITUTIONAL:  no apparent distress  ENT:  normocepalic, without obvious abnormality, atraumatic  NECK:  supple, symmetrical, trachea midline  Heart: regular rate and rhythm, S1, S2 normal   Lungs: clear to auscultation bilaterally  Abdomen: soft lax, not tender, not distended, positive bowel sounds  Extremities: extremities normal, atraumatic, no cyanosis, edema  Skin: Normal skin color. No rashes or lesions. Neurologic:  Neurovascularly intact without any focal sensory/motor deficits. Cranial nerves: II-XII intact, grossly non-focal.  Psychiatric: Alert and oriented, thought content appropriate, normal insight      DATA:     Diagnostic tests reviewed for today's visit:    Most recent labs and imaging results reviewed.    Labs:   Recent Results (from the past 72 hour(s))   POCT Glucose    Collection Time: 08/19/22  8:48 PM   Result Value Ref Range    Meter Glucose 136 (H) 74 - 99 mg/dL   Comprehensive Metabolic Panel    Collection Time: 08/20/22  5:19 AM   Result Value Ref Range    Sodium 142 132 - 146 mmol/L    Potassium 4.1 3.5 - 5.0 mmol/L    Chloride 100 98 - 107 mmol/L    CO2 36 (H) 22 - 29 mmol/L    Anion Gap 6 (L) 7 - 16 mmol/L    Glucose 147 (H) 74 - 99 mg/dL    BUN 12 6 - 23 mg/dL    Creatinine 1.0 0.7 - 1.2 mg/dL    GFR Non-African American >60 >=60 mL/min/1.73    GFR African American >60     Calcium 9.6 8.6 - 10.2 mg/dL    Total Protein 6.9 6.4 - 8.3 g/dL    Albumin 3.7 3.5 - 5.2 g/dL    Total Bilirubin 0.5 0.0 - 1.2 mg/dL    Alkaline Phosphatase 63 40 - 129 U/L    ALT 12 0 - 40 U/L    AST 17 0 - 39 U/L   CBC with Auto Differential    Collection Time: 08/20/22  5:19 AM   Result Value Ref Range    WBC 7.4 4.5 - 11.5 E9/L    RBC 4.28 3.80 - 5.80 E12/L    Hemoglobin 10.9 (L) 12.5 - 16.5 g/dL    Hematocrit 36.6 (L) 37.0 - 54.0 %    MCV 85.5 80.0 - 99.9 fL    MCH 25.5 (L) 26.0 - 35.0 pg    MCHC 29.8 (L) 32.0 - 34.5 %    RDW 16.0 (H) 11.5 - 15.0 fL    Platelets 712 605 - 985 E9/L    MPV 12.2 (H) 7.0 - 12.0 fL    Neutrophils % 96.5 (H) 43.0 - 80.0 %    Lymphocytes % 2.6 (L) 20.0 - 42.0 % Monocytes % 0.9 (L) 2.0 - 12.0 %    Eosinophils % 0.0 0.0 - 6.0 %    Basophils % 0.1 0.0 - 2.0 %    Neutrophils Absolute 7.18 1.80 - 7.30 E9/L    Lymphocytes Absolute 0.22 (L) 1.50 - 4.00 E9/L    Monocytes Absolute 0.07 (L) 0.10 - 0.95 E9/L    Eosinophils Absolute 0.00 (L) 0.05 - 0.50 E9/L    Basophils Absolute 0.00 0.00 - 0.20 E9/L    Anisocytosis 1+     Polychromasia 1+     Hypochromia 2+     Poikilocytes 1+     Ovalocytes 1+    Covid-19, Antibody, Total    Collection Time: 08/20/22  5:19 AM   Result Value Ref Range    SARS-CoV-2 Antibody, Total Non-Reactive Non Reactive   POCT Glucose    Collection Time: 08/20/22 11:22 AM   Result Value Ref Range    Meter Glucose 125 (H) 74 - 99 mg/dL   POCT Glucose    Collection Time: 08/20/22  4:21 PM   Result Value Ref Range    Meter Glucose 121 (H) 74 - 99 mg/dL   POCT Glucose    Collection Time: 08/20/22  8:46 PM   Result Value Ref Range    Meter Glucose 126 (H) 74 - 99 mg/dL   Comprehensive Metabolic Panel    Collection Time: 08/21/22  5:38 AM   Result Value Ref Range    Sodium 141 132 - 146 mmol/L    Potassium 3.6 3.5 - 5.0 mmol/L    Chloride 100 98 - 107 mmol/L    CO2 34 (H) 22 - 29 mmol/L    Anion Gap 7 7 - 16 mmol/L    Glucose 117 (H) 74 - 99 mg/dL    BUN 12 6 - 23 mg/dL    Creatinine 1.0 0.7 - 1.2 mg/dL    GFR Non-African American >60 >=60 mL/min/1.73    GFR African American >60     Calcium 9.3 8.6 - 10.2 mg/dL    Total Protein 6.8 6.4 - 8.3 g/dL    Albumin 3.4 (L) 3.5 - 5.2 g/dL    Total Bilirubin 0.6 0.0 - 1.2 mg/dL    Alkaline Phosphatase 63 40 - 129 U/L    ALT 12 0 - 40 U/L    AST 15 0 - 39 U/L   CBC with Auto Differential    Collection Time: 08/21/22  5:38 AM   Result Value Ref Range    WBC 6.5 4.5 - 11.5 E9/L    RBC 4.53 3.80 - 5.80 E12/L    Hemoglobin 11.7 (L) 12.5 - 16.5 g/dL    Hematocrit 38.8 37.0 - 54.0 %    MCV 85.7 80.0 - 99.9 fL    MCH 25.8 (L) 26.0 - 35.0 pg    MCHC 30.2 (L) 32.0 - 34.5 %    RDW 15.9 (H) 11.5 - 15.0 fL    Platelets 547 463 - 480 E9/L MPV 12.5 (H) 7.0 - 12.0 fL    Neutrophils % 77.8 43.0 - 80.0 %    Immature Granulocytes % 0.2 0.0 - 5.0 %    Lymphocytes % 11.3 (L) 20.0 - 42.0 %    Monocytes % 10.2 2.0 - 12.0 %    Eosinophils % 0.2 0.0 - 6.0 %    Basophils % 0.3 0.0 - 2.0 %    Neutrophils Absolute 5.03 1.80 - 7.30 E9/L    Immature Granulocytes # 0.01 E9/L    Lymphocytes Absolute 0.73 (L) 1.50 - 4.00 E9/L    Monocytes Absolute 0.66 0.10 - 0.95 E9/L    Eosinophils Absolute 0.01 (L) 0.05 - 0.50 E9/L    Basophils Absolute 0.02 0.00 - 0.20 E9/L   POCT Glucose    Collection Time: 08/21/22  6:17 AM   Result Value Ref Range    Meter Glucose 118 (H) 74 - 99 mg/dL   POCT Glucose    Collection Time: 08/21/22 10:53 AM   Result Value Ref Range    Meter Glucose 131 (H) 74 - 99 mg/dL   POCT Glucose    Collection Time: 08/21/22  4:18 PM   Result Value Ref Range    Meter Glucose 124 (H) 74 - 99 mg/dL   POCT Glucose    Collection Time: 08/21/22  8:17 PM   Result Value Ref Range    Meter Glucose 109 (H) 74 - 99 mg/dL   POCT Glucose    Collection Time: 08/22/22  3:03 AM   Result Value Ref Range    Meter Glucose 121 (H) 74 - 99 mg/dL   Comprehensive Metabolic Panel    Collection Time: 08/22/22  7:00 AM   Result Value Ref Range    Sodium 142 132 - 146 mmol/L    Potassium 3.7 3.5 - 5.0 mmol/L    Chloride 101 98 - 107 mmol/L    CO2 33 (H) 22 - 29 mmol/L    Anion Gap 8 7 - 16 mmol/L    Glucose 128 (H) 74 - 99 mg/dL    BUN 13 6 - 23 mg/dL    Creatinine 1.1 0.7 - 1.2 mg/dL    GFR Non-African American >60 >=60 mL/min/1.73    GFR African American >60     Calcium 9.1 8.6 - 10.2 mg/dL    Total Protein 6.7 6.4 - 8.3 g/dL    Albumin 3.4 (L) 3.5 - 5.2 g/dL    Total Bilirubin 0.4 0.0 - 1.2 mg/dL    Alkaline Phosphatase 64 40 - 129 U/L    ALT 15 0 - 40 U/L    AST 18 0 - 39 U/L   CBC with Auto Differential    Collection Time: 08/22/22  7:00 AM   Result Value Ref Range    WBC 4.7 4.5 - 11.5 E9/L    RBC 4.71 3.80 - 5.80 E12/L    Hemoglobin 11.8 (L) 12.5 - 16.5 g/dL    Hematocrit 40.0 Ordering Physician  Keenan San DO   Accession Number  [id number]    Referring Physician   Date of Birth     1940    Lit Wong RDCS   Age               80 year(s)    Interpreting        Keenan San DO                                  Physician                                   Any Other  Procedure Type of Study   TTE procedure  Procedure Date Date: 08/18/2022 Start: 01:36 PM Study Location: Echo Lab Technical Quality: Adequate visualization Indications:Congestive heart failure. Patient Status: Routine Height: 70 inches Weight: 214 pounds BSA: 2.15 m^2 BMI: 30.71 kg/m^2 Rhythm: Within normal limits HR: 98 bpm BP: 158/74 mmHg  Findings   Left Ventricle  Left ventricular size is grossly normal.  Mild left ventricular concentric hypertrophy noted. Ejection fraction is measured at 26%. Ejection fraction is visually estimated at 30-35%. Inferior and posterior wall akinetic  No evidence of left ventricular mass or thrombus noted. Right Ventricle  Normal right ventricular size. Left Atrium  The left atrium is mildly dilated. Interatrial septum appears intact. Right Atrium  Mildly enlarged right atrium size. Mitral Valve  Physiologic and/or trace mitral regurgitation is present. Mild mitral stenosis is present. Mitral annular calcification is present. Tricuspid Valve  Mild tricuspid regurgitation. RVSP is 35 mmHg. Pulmonary hypertension is mild . Aortic Valve  The aortic valve is trileaflet. The aortic valve appears mildly sclerotic. Mild-to-moderate aortic regurgitation is noted. Moderate aortic stenosis. The aortic valve area is 1.1 cm2 with a maximum gradient of 49.52 mmHg and  a mean gradient of 29.51 mmHg. Pulmonic Valve  The pulmonic valve was not well visualized. Pericardial Effusion  No evidence of pericardial effusion. Aorta  The aorta is within normal limits.   Miscellaneous  Regular imaging. The lesion borders are somewhat lobular. No shadowing calculi. The lesion is TR 4 Left mid to lower pole nodule: Isoechoic mildly heterogeneous solid nodule in the mid to left lower pole measuring 3.4 x 1.6 x 2.7 cm. The lesion is wider than tall with smooth margins and no shadowing calculi. The lesion is TR 3. Cervical lymphadenopathy: No abnormal lymph nodes in the imaged portions of the neck. 1.  Findings compatible with multinodular thyroid goiter. There is moderate to severe thyromegaly. Multiple hyperechoic and isoechoic nodules bilaterally. 2.  Dominant nodule on the right in the lower pole measures up to 5 cm in diameter and is moderately suspicious. Based on TIRADS follow-up recommendations, FNA biopsy recommended. 3.  The dominant nodule in the left mid to lower thyroid lobe measures up to 3.4 cm and is mildly suspicious. FNA biopsy recommended by TIRADS size criteria. Consider nuclear medicine thyroid uptake and scan RECOMMENDATIONS: ACR TI-RADS recommendations: TR5 (>= 7 points):  FNA if >= 1 cm; follow-up if 0.5-0.9 cm in 1, 2, 3, 4, and 5 years TR4 (4-6 points):  FNA if >= 1.5 cm; follow-up if 1.0-1.4 cm in 1, 2, 3, and 5 years TR3 (3 points):  FNA if >= 2.5 cm; follow-up if 1.5-2.4 cm in 1, 3, and 5 years TR2 (2 points):  No FNA or follow-up TR1 (0 points):  No FNA or follow-up ACR TI-RADS recommends that no more than two nodules with the highest ACR TI-RADS point total should be biopsied and no more than four nodules should be followed. XR CHEST PORTABLE    Result Date: 8/20/2022  EXAMINATION: ONE XRAY VIEW OF THE CHEST 8/20/2022 6:42 am COMPARISON: 08/17/2022 HISTORY: ORDERING SYSTEM PROVIDED HISTORY: dyspnea TECHNOLOGIST PROVIDED HISTORY: Reason for exam:->dyspnea FINDINGS: Portable chest reveals improvement seen in the pulmonary vascularity in the interval.  Minimal residual markings in the lower lung fields. Degenerative changes seen within the spine.   No definite pleural effusion or pneumothorax. Interval improvement seen in the aeration of the lung fields with minimal residual markings at the lung bases. XR CHEST PORTABLE    Result Date: 8/18/2022  EXAMINATION: ONE XRAY VIEW OF THE CHEST 8/17/2022 10:27 pm COMPARISON: Chest two views October 19, 2018. HISTORY: ORDERING SYSTEM PROVIDED HISTORY: shortness of breath TECHNOLOGIST PROVIDED HISTORY: Reason for exam:->shortness of breath FINDINGS: The heart is moderately enlarged with otherwise unremarkable configuration. The mediastinal contours are within normal limits. Multifocal bilateral lung ill-defined consolidation is noted, greatest at the right lung base. Pulmonary vasculature is prominent with cephalization seen and is mildly indistinct. The pleural surfaces are normal and no evidence of a pleural effusion is seen. No acute bone or soft tissue abnormality is noted. Bilateral lung multifocal consolidation, greatest at the right lung base suggesting pneumonia. Pulmonary vascular congestion plus or minus mild pulmonary interstitial edema. Moderate cardiomegaly. CTA PULMONARY W CONTRAST    Result Date: 8/18/2022  EXAMINATION: CTA OF THE CHEST 8/18/2022 1:59 pm TECHNIQUE: CTA of the chest was performed after the administration of intravenous contrast.  Multiplanar reformatted images are provided for review. MIP images are provided for review. Automated exposure control, iterative reconstruction, and/or weight based adjustment of the mA/kV was utilized to reduce the radiation dose to as low as reasonably achievable. COMPARISON: None. HISTORY: ORDERING SYSTEM PROVIDED HISTORY: R/O PE TECHNOLOGIST PROVIDED HISTORY: Reason for exam:->R/O PE FINDINGS: Pulmonary Arteries: No filling defects in the right or left pulmonary artery. There is mild dilatation of the main pulmonary artery measuring 3.8 cm.  Mediastinum: There is low-density mild upper thoracic mediastinal lymphadenopathy and a mildly prominent subcarinal lymph node measuring up to 1.2 cm in diameter. Heterogeneous moderate thyroid gland enlargement is present. Lungs/pleura: There is an area of atelectasis in the right middle lobe and to a lesser extent the left lingular segment. No pleural fluid or pneumonia. No suspicious soft tissue masses. No endobronchial lesions are visible. Upper Abdomen:  Limited images of the upper abdomen demonstrate no acute abnormality. Soft Tissues/Bones: No acute bone or soft tissue abnormality. 1.  No CT evidence of pulmonary embolism. 2.  Subsegmental atelectasis in the right middle lobe and lingular segments. 3.  Moderate to severe symmetric heterogeneous enlargement of the thyroid gland which may represent thyroid goiter. 4.  Nonspecific mild mediastinal lymphadenopathy. US ABDOMEN LIMITED    Result Date: 8/20/2022  EXAMINATION: RIGHT UPPER QUADRANT ULTRASOUND 8/20/2022 8:41 am COMPARISON: None. HISTORY: ORDERING SYSTEM PROVIDED HISTORY: pain, gallbladder disease TECHNOLOGIST PROVIDED HISTORY: Reason for exam:->pain, gallbladder disease What reading provider will be dictating this exam?->CRC FINDINGS: LIVER:  The liver demonstrates normal echogenicity without evidence of intrahepatic biliary ductal dilatation. BILIARY SYSTEM:  There are findings of cholelithiasis. Small stones are seen near the gallbladder neck. Gallbladder wall thickness is normal.  There is no tenderness in the gallbladder fossa. Gallstones appear mobile. Common bile duct is within normal limits measuring 5 mm. RIGHT KIDNEY: The right kidney is grossly unremarkable without evidence of hydronephrosis. Right kidney contains a cyst measuring 5.9 x 5.3 x 6.1 cm. Right kidney measures 13.3 x 6.0 x 6.0 cm. PANCREAS:  Visualized portions of the pancreas are unremarkable. OTHER: No evidence of right upper quadrant ascites. Cholelithiasis without ultrasound evidence of acute cholecystitis.   Common bile duct is normal. Right renal cortical cyst. US DUP LOWER EXTREMITIES BILATERAL VENOUS    Result Date: 8/18/2022  EXAMINATION: DUPLEX VENOUS ULTRASOUND OF THE BILATERAL LOWER EXTREMITIES8/18/2022 7:34 pm TECHNIQUE: Duplex ultrasound using B-mode/gray scaled imaging, Doppler spectral analysis and color flow Doppler was obtained of the deep venous structures of the lower bilateral extremities. COMPARISON: None. HISTORY: ORDERING SYSTEM PROVIDED HISTORY: R/O DVT TECHNOLOGIST PROVIDED HISTORY: Reason for exam:->R/O DVT What reading provider will be dictating this exam?->CRC FINDINGS: The visualized veins of the bilateral lower extremities are patent and free of echogenic thrombus. The veins demonstrate good compressibility with normal color flow study and spectral analysis. No evidence of DVT in either lower extremity. No orders to display         ASSESSMENT AND PLAN  Active Problems:    * No active hospital problems. *  Resolved Problems:    * No resolved hospital problems.  *    - Acute CHF exacerbation  - ischemic cardiomyopathy   - CAD  - Moderate aortic stenosis   - Ascending thoracic aortic aneurysm   - HTN  - T2DM  - Hyperlipidemia   - Tobacco abuse   - AUBREY  - Hyperthyroidism   - Cholelithiasis   - Thyroid nodule     Plan:  - admit to icu  - tele monitoring   - pulse oxymetry   - cardiology consult  - plan for left heart cath tomorrow, NPO after MN  - continue coreg, lasix entresto aldactone and lipitor per cards recs  - accuchecks and ssi   - outpatient follow up for thyroid nodule and cholelithiasis       VTE Prophylaxis: low molecular weight heparin -    DVT Prophylaxis: []Lovenox []Heparin []PCD [] [address] []Encouraged ambulation    Diet: No diet orders on file  Code Status: Prior  Surrogate decision maker confirmed with patient:  Primary Emergency Contact: Perfecto Ambriz, Home Phone: [phone]      Disposition: []Med/Surg [] Intermediate [x] ICU/CCU   Admit status: [] Observation [x] Inpatient       Additional work up or/and treatment plan may be added today or thereafter based on clinical progression. I am managing a portion of pt care. Some medical issues are handled by other specialists. Additional work up and treatment should be done by my colleague hospitalist and at out pt setting by pt PCP and other out pt providers.      SIGNATUREWjulia Gomez MD  DATE: August 22, 2022

## 2022-08-23 NOTE — PLAN OF CARE
Problem: Chronic Conditions and Co-morbidities  Goal: Patient's chronic conditions and co-morbidity symptoms are monitored and maintained or improved  Outcome: Progressing  Flowsheets (Taken 8/23/2022 0863)  Care Plan - Patient's Chronic Conditions and Co-Morbidity Symptoms are Monitored and Maintained or Improved: Monitor and assess patient's chronic conditions and comorbid symptoms for stability, deterioration, or improvement

## 2022-08-23 NOTE — CONSULTS
ENDOCRINOLOGY INITIAL CONSULTATION NOTE  Via Tele-Health Service     Date of Admission: 8/22/2022  Date of Service: 8/23/2022  Admitting Physician: Myranda Curran MD   Primary Care Physician: Giuliano Parks DO  Consultant physician: Anthony Sibley MD     Reason for the consultation:  Multinodular goiter     History of Present Illness: The history is provided by the patient. Accuracy of the patient data is excellent. Melody Murphy is a very pleasant 80 y.o. old male with PMH of MNG, DM type 2, HTN, HLD and other  listed below admitted to [facility] on 8/22/2022 because of SOB, endocrine service was consulted for management of MNG   The patient denies chest pain, nausea vomiting , fever or chills. CTA chest 8/18/2022 --> no PE but showed enlarged thyroid gland     8/20/2022 - dedicated thyroid US   Thyroid gland demonstrates a lobular heterogeneous echotexture with multiple isoechoic and hyperechoic nodules numbering greater than 6  Right thyroid lobe:  9.5 x 3.0 x 5.1 cm --> complex nodule measuring 5 x 3.8 x 3.4 cm    Left thyroid lobe:  9.3 x 3.6 x 4.0 cm --> heterogenous nodule 3.4 x 1.6 x 2.7 cm   Isthmus:  3 mm  Cervical lymphadenopathy: No abnormal lymph nodes in the imaged portions of the neck. The patient was diagnsoed with MNG long time ago.  Per pt FNA to dominant thyroid nodules was benign few years ago (was done in [place])     Lab Results   Component Value Date/Time    TSH 0.249 (L) 08/18/2022 08:27 AM    T4FREE 1.35 08/19/2022 10:37 AM    U6UIQMI 8.5 10/18/2016 12:00 PM    FT3 2.0 08/19/2022 10:37 AM     Past Medical History   Past Medical History:   Diagnosis Date    Aortic stenosis, moderate 10/2018    Arthritis     Diabetes mellitus ([address]     H/O cardiovascular stress test 10/19/2018    lexiscan    Hyperlipidemia     Hypertension     Lymph node enlargement     seeing dr yates  coughing mucous    Partial small bowel obstruction ([address] 04/14/2017       Past Surgical History   Past Surgical History:   Procedure Laterality Date    ACHILLES TENDON SURGERY      BREAST SURGERY Left 06/05/2014    Excision of left breast mass    COLONOSCOPY  04/24/2012    FRACTURE SURGERY      C-2    KNEE ARTHROTOMY      NERVE BLOCK  04 10 2012    lumbar epidural #1       Social history   Tobacco:   reports that he has been smoking cigarettes. He has been smoking an average of .5 packs per day. He has never used smokeless tobacco.  Alcohol:   reports no history of alcohol use. Drugs:   reports no history of drug use. Family history   Family History   Problem Relation Age of Onset    Hypertension Mother     Diabetes Mother     Hypertension Father     Brain Cancer Sister         Brain aneurysm    Hypertension Sister        Allergies and Drug reactions  No Known Allergies    Scheduled Meds:   carvedilol  12.5 mg Oral BID WC    atorvastatin  40 mg Oral Daily    DULoxetine  30 mg Oral Daily    [Held by provider] furosemide  20 mg IntraVENous Daily    tamsulosin  0.4 mg Oral Daily    spironolactone  25 mg Oral Daily    sodium chloride flush  10 mL IntraVENous 2 times per day    enoxaparin  40 mg SubCUTAneous Daily    aspirin  81 mg Oral Daily    insulin lispro  0-4 Units SubCUTAneous TID WC    insulin lispro  0-4 Units SubCUTAneous Nightly    amLODIPine  10 mg Oral Daily    And    lisinopril  20 mg Oral Daily     PRN Meds:   tiZANidine, 4 mg, Q8H PRN  glucose, 4 tablet, PRN  dextrose bolus, 125 mL, PRN   Or  dextrose bolus, 250 mL, PRN  glucagon (rDNA), 1 mg, PRN  dextrose, , Continuous PRN  sodium chloride flush, 10 mL, PRN  sodium chloride, , PRN  ondansetron, 4 mg, Q8H PRN   Or  ondansetron, 4 mg, Q6H PRN  magnesium hydroxide, 30 mL, Daily PRN  acetaminophen, 650 mg, Q6H PRN   Or  acetaminophen, 650 mg, Q6H PRN  albuterol, 2.5 mg, Q4H PRN      Continuous Infusions:   dextrose      sodium chloride         Review of Systems  All systems reviewed.  All negative except for symptoms mentioned in HPI   Constitutional: No fever, no chills, no diaphoresis, no generalized weakness. HEENT: No blurred vision, No sore throat, no ear pain, no hair loss  Neck: denied any neck swelling, difficulty swallowing,   Cadrdiopulomary: No CP, SOB or palpitation, No orthopnea or PND. No cough or wheezing. GI: No N/V/D, no constipation, No abdominal pain, no melena or hematochezia   : Denied any dysuria, hematuria, flank pain, discharge, or incontinence. Skin: denied any rash, ulcer, or hyperpigmentation. MSK: denied any joint deformity, joint pain/swelling, muscle pain, or back pain. Neuro: no numbess, no tingling, no weakness    OBJECTIVE    /66   Pulse (!) 102   Temp 98.4 °F (36.9 °C) (Oral)   Resp 17   SpO2 97%   Wt Readings from Last 6 Encounters:   08/19/22 204 lb 3.2 oz (92.6 kg)   11/20/19 217 lb (98.4 kg)   08/26/19 210 lb (95.3 kg)   05/22/19 216 lb (98 kg)   11/12/18 223 lb (101.2 kg)   10/15/18 229 lb (103.9 kg)     Physical examination:  Due to this being a TeleHealth encounter, evaluation of the following organ systems is limited: Vitals/Constitutional/EENT/Resp/CV/GI//MS/Neuro/Skin/Heme-Lymph-Imm. Modified physical exam through Telemedicine camera    General: Communicating well via camera   Neck: no obvious neck mass. No obvious neck deformity     CVS: no distress   Chest: no distress. Chest is moving with respiration    Extremities:  no visible tremor  Skin: No visible rashes as seen from camera   Musculoskeletal: no visible deformity  Neuro: Alert and oriented to person, place, and time. Psychiatric: Normal mood and affect.  Behavior is normal    Review of Laboratory Data:  I personally reviewed the following labs:  Recent Labs     08/21/22  0538 08/22/22  0700 08/23/22  0559   WBC 6.5 4.7 4.5   RBC 4.53 4.71 4.72   HGB 11.7* 11.8* 11.9*   HCT 38.8 40.0 38.9   MCV 85.7 84.9 82.4   MCH 25.8* 25.1* 25.2*   MCHC 30.2* 29.5* 30.6*   RDW 15.9* 15.8* 16.1*    223 216   MPV 12.5* 12.3* 12.1*     Recent Labs major symptoms of hyperthyroidism. Will recheck TFT few weeks after discharge   Endocrine follow up visit, Tuesday 9/6 at 12:30pm     DM type 2  Continue low dose sliding scale while inpatient   On discharge, we recommend dc on previous dose of Metformin     The above issues were reviewed with the patient who understood and agreed with the plan. Thank you for allowing us to participate in the care of this patient. Please do not hesitate to contact us with any additional questions. Garcie Garnett MD  Endocrinologist, [facility]   [address] [place] [address]   Phone: [phone]  Fax: [phone]  ---------------------------------  An electronic signature was used to authenticate this note.  Isaiah Gutierrez MD on 8/23/2022 at 2:15 PM

## 2022-08-23 NOTE — PROGRESS NOTES
Hospitalist Progress Note      Synopsis: Patient with hx of moderate AS, DM2, HLD, HTN admitted on 8/22/2022 as a transfer due to acute CHF for [facility] which did show CAD but did not require intervention. Continues on medical management     Subjective  No acute events overnight  Increased cough productive of sputum    Exam:  BP (!) 140/70   Pulse (!) 101   Temp 97.5 °F (36.4 °C)   Resp 20   SpO2 99%   General appearance: No apparent distress, appears stated age and cooperative. HEENT: Pupils equal, round, and reactive to light. Conjunctivae/corneas clear. Neck: Supple. No jugular venous distention. Trachea midline. Respiratory:  Normal respiratory effort. Clear to auscultation, bilaterally without Rales/Wheezes/Rhonchi. Cardiovascular: Intermittent tachycardia, regular rhythm, systolic murmur   Abdomen: Soft, non-tender, non-distended with normal bowel sounds. Musculoskeletal: No clubbing, cyanosis or edema bilaterally. Brisk capillary refill. 2+ lower extremity pulses (dorsalis pedis).    Skin:  No rashes    Neurologic: awake, alert and following commands     Medications:  Reviewed    Infusion Medications    dextrose      sodium chloride       Scheduled Medications    carvedilol  12.5 mg Oral BID WC    atorvastatin  40 mg Oral Daily    DULoxetine  30 mg Oral Daily    [Held by provider] furosemide  20 mg IntraVENous Daily    tamsulosin  0.4 mg Oral Daily    spironolactone  25 mg Oral Daily    sodium chloride flush  10 mL IntraVENous 2 times per day    enoxaparin  40 mg SubCUTAneous Daily    aspirin  81 mg Oral Daily    insulin lispro  0-4 Units SubCUTAneous TID WC    insulin lispro  0-4 Units SubCUTAneous Nightly    amLODIPine  10 mg Oral Daily    And    lisinopril  20 mg Oral Daily     PRN Meds: tiZANidine, glucose, dextrose bolus **OR** dextrose bolus, glucagon (rDNA), dextrose, sodium chloride flush, sodium chloride, ondansetron **OR** ondansetron, magnesium hydroxide, acetaminophen **OR** acetaminophen, albuterol    I/O  No intake or output data in the 24 hours ending 08/23/22 0849    Labs:   Recent Labs     08/21/22  0538 08/22/22  0700 08/23/22  0559   WBC 6.5 4.7 4.5   HGB 11.7* 11.8* 11.9*   HCT 38.8 40.0 38.9    223 216       Recent Labs     08/21/22  0538 08/22/22  0700 08/23/22  0559    142 144   K 3.6 3.7 3.8    101 101   CO2 34* 33* 33*   BUN 12 13 14   CREATININE 1.0 1.1 1.0   CALCIUM 9.3 9.1 9.2       Recent Labs     08/21/22  0538 08/22/22  0700 08/23/22  0559   PROT 6.8 6.7 6.9   ALKPHOS 63 64 66   ALT 12 15 17   AST 15 18 25   BILITOT 0.6 0.4 0.3       No results for input(s): INR in the last 72 hours. No results for input(s): Adonica Hoose in the last 72 hours. Chronic labs:  Lab Results   Component Value Date    CHOL 99 08/19/2022    TRIG 55 08/19/2022    HDL 44 08/19/2022    LDLCALC 44 08/19/2022    TSH 0.249 (L) 08/18/2022    PSA 4.02 (H) 10/19/2018    INR 1.3 08/17/2022    LABA1C 6.5 (H) 08/23/2022       Radiology:  Imaging studies reviewed today.     ASSESSMENT:  Acute systolic heart failure, now compensated  Suspected ischemic cardiomyopathy  CAD  Moderate aortic stenosis with at least moderat regurgitation  Severe mitral annular calcification/functional MS  HTN  DM2  AUBREY  Tobacco abuse  Ascending thoracic aortic aneurysm  HLD  Hyperthyroidism - TSH suppressed with enlarged thyroid on imaging and thyroid nodule    PLAN:  Continue entresto, aldactone, lipitor   Glycemic control  Continuous cardiopulmonary monitoring  Will need outpatient follow up for thyroid nodule and cholelithiasis  Appreciate cardiology support     Wife updated at bedside on 8/23/22    Diet: Diet NPO Exceptions are: Sips of Water with Meds  Code Status: Full Code  PT/OT Eval Status:   ordered  DVT Prophylaxis:   lovenox  Recommended disposition at discharge:  pending medical progression; anticipate dc home tomorrow     +++++++++++++++++++++++++++++++++++++++++++++++++  Sai Rivas MD   [facility].  +++++++++++++++++++++++++++++++++++++++++++++++++  NOTE: This report was transcribed using voice recognition software. Every effort was made to ensure accuracy; however, inadvertent computerized transcription errors may be present.

## 2022-08-23 NOTE — PROGRESS NOTES
Physical Therapy    PT evaluation orders received and chart review completed. Pt off unit at time of attempt. Will follow and reattempt as able. Thank you.     Jolene Barbosa PT, DPT  [id number]

## 2022-08-23 NOTE — PROCEDURES
[address]                  [address] [place],  [facility]                            CARDIAC CATHETERIZATION    PATIENT NAME: Cecy Kidd                     :        1940  MED REC NO:   [id number]                            ROOM:       4509  ACCOUNT NO:   [de-identified]                           ADMIT DATE: 2022  PROVIDER:     Guevara Nguyen MD    DATE OF PROCEDURE:  2022    PROCEDURES:  Left heart catheterization and selective coronary  angiography. The procedure was done through right radial approach using ultrasound  guidance. The patient received intravenous Versed and intravenous fentanyl for  sedation. INDICATION:  Newly diagnosed cardiomyopathy with heart failure with  reduced ejection fraction. AUC:  HF-7. PRESSURES:  1. Aorta 98/67 with a mean of 82.  2.  Left ventricular systolic pressure 209. Left ventricular  end-diastolic pressure 5.  3.  There was a 22 mm mean gradient across the aortic valve on pullback. CORONARY ANGIOGRAPHY:  LEFT MAIN:  The left main artery is nonexistent:  There are separate  ostia to the LAD and the left circumflex. LAD:  The left anterior descending artery is a very large vessel that  wraps around the left ventricular apex. There was an angulation just  after the site of takeoff of the first septal  branch and  after the first diagonal branch, but there did not appear to be any  significant angiographic luminal narrowing at that site. There was  around 30% mid to distal LAD disease before it wrapped around the left  ventricular apex. The LAD provided blood supply to the apical half of  the inferior wall. LCX:  The left circumflex is a very large and dominant vessel.   Just  after the origin of the first marginal branch, in a true bifurcation  fashion, the left circumflex has a somewhat hazy 50% to 60% narrowing  with luminal irregularities more distally with no more than 30%  narrowings after that lesion. The left circumflex provided a large  posterolateral branch and a large posterior descending artery branch,  both of which did not appear to have any significant disease. A large  first marginal branch that took off just above the narrowing in the  proximal left circumflex has 50% luminal narrowing in its proximal  segment before it divided into two subdivisions. RCA:  The right coronary artery was a small nondominant vessel and did  not appear to have significant disease. The right radial arterial sheath was removed at the end of procedure and  a TR band was applied with adequate hemostasis and with preservation of  pulse. The patient tolerated the procedure well and left the cardiac  catheterization laboratory in a stable condition. ESTIMATED BLOOD LOSS:  20 mL. CONTRAST USED:  100 mL. CONCLUSIONS:  1. Coronary artery disease:  A. Left main:  Nonexistent with separate ostia to the LAD and the left  circumflex. B.  LAD:  Large vessel with around 30% mid to distal vessel disease. C.  LCX:  Very large, dominant vessel with 50% to 60% narrowing just  after the origin of the first marginal branch in the proximal vessel  (bifurcation lesion) and 50% proximal narrowing of the large first  marginal branch. D.  RCA:  Small, nondominant vessel with no significant disease. 2.  22 mm mean gradient across the aortic valve on pullback.         Nishi Bullock MD    D: 08/23/2022 10:20:37       T: 08/23/2022 10:23:04     [email or web address]  Job#: [id number]     Doc#: [id number]    CC:

## 2022-08-23 NOTE — CARE COORDINATION
Spoke with patient. He is from home, lives with his spouse. He does not use assistive devices, does not feel he will need any when released. PCP is Dr. Rosealee Sandifer. No current homecare. Home set up, no steps to enter and one floor. He plans for home with no needs when released. For questions I can be reached at [phone].  Lizbeth Chau [place]

## 2022-08-24 VITALS
HEART RATE: 107 BPM | SYSTOLIC BLOOD PRESSURE: 118 MMHG | OXYGEN SATURATION: 96 % | TEMPERATURE: 98.7 F | DIASTOLIC BLOOD PRESSURE: 73 MMHG | RESPIRATION RATE: 18 BRPM

## 2022-08-24 LAB
ANION GAP SERPL CALCULATED.3IONS-SCNC: 10 MMOL/L (ref 7–16)
BUN BLDV-MCNC: 15 MG/DL (ref 6–23)
CALCIUM SERPL-MCNC: 8.8 MG/DL (ref 8.6–10.2)
CHLORIDE BLD-SCNC: 103 MMOL/L (ref 98–107)
CO2: 28 MMOL/L (ref 22–29)
CREAT SERPL-MCNC: 1 MG/DL (ref 0.7–1.2)
GFR AFRICAN AMERICAN: >60
GFR NON-AFRICAN AMERICAN: >60 ML/MIN/1.73
GLUCOSE BLD-MCNC: 119 MG/DL (ref 74–99)
POTASSIUM REFLEX MAGNESIUM: 3.6 MMOL/L (ref 3.5–5)
PRO-BNP: 1159 PG/ML (ref 0–450)
SODIUM BLD-SCNC: 141 MMOL/L (ref 132–146)

## 2022-08-24 PROCEDURE — 97161 PT EVAL LOW COMPLEX 20 MIN: CPT

## 2022-08-24 PROCEDURE — 99233 SBSQ HOSP IP/OBS HIGH 50: CPT | Performed by: INTERNAL MEDICINE

## 2022-08-24 PROCEDURE — 80048 BASIC METABOLIC PNL TOTAL CA: CPT

## 2022-08-24 PROCEDURE — 36415 COLL VENOUS BLD VENIPUNCTURE: CPT

## 2022-08-24 PROCEDURE — 83880 ASSAY OF NATRIURETIC PEPTIDE: CPT

## 2022-08-24 PROCEDURE — 6360000002 HC RX W HCPCS: Performed by: INTERNAL MEDICINE

## 2022-08-24 PROCEDURE — 6370000000 HC RX 637 (ALT 250 FOR IP): Performed by: INTERNAL MEDICINE

## 2022-08-24 PROCEDURE — 2580000003 HC RX 258: Performed by: INTERNAL MEDICINE

## 2022-08-24 RX ORDER — FUROSEMIDE 20 MG/1
20 TABLET ORAL DAILY
Qty: 60 TABLET | Refills: 3 | Status: SHIPPED | OUTPATIENT
Start: 2022-08-25 | End: 2022-09-06 | Stop reason: DRUGHIGH

## 2022-08-24 RX ORDER — CARVEDILOL 25 MG/1
25 TABLET ORAL 2 TIMES DAILY WITH MEALS
Qty: 60 TABLET | Refills: 3 | Status: SHIPPED | OUTPATIENT
Start: 2022-08-24

## 2022-08-24 RX ORDER — AMLODIPINE BESYLATE 5 MG/1
5 TABLET ORAL DAILY
Status: DISCONTINUED | OUTPATIENT
Start: 2022-08-24 | End: 2022-08-24 | Stop reason: HOSPADM

## 2022-08-24 RX ORDER — LISINOPRIL 20 MG/1
20 TABLET ORAL DAILY
Qty: 30 TABLET | Refills: 3 | Status: SHIPPED | OUTPATIENT
Start: 2022-08-25 | End: 2022-10-05 | Stop reason: SDUPTHER

## 2022-08-24 RX ORDER — FUROSEMIDE 20 MG/1
20 TABLET ORAL DAILY
Status: DISCONTINUED | OUTPATIENT
Start: 2022-08-24 | End: 2022-08-24 | Stop reason: HOSPADM

## 2022-08-24 RX ORDER — CARVEDILOL 25 MG/1
25 TABLET ORAL 2 TIMES DAILY WITH MEALS
Status: DISCONTINUED | OUTPATIENT
Start: 2022-08-24 | End: 2022-08-24 | Stop reason: HOSPADM

## 2022-08-24 RX ORDER — ASPIRIN 81 MG/1
81 TABLET, CHEWABLE ORAL DAILY
Qty: 30 TABLET | Refills: 3 | Status: SHIPPED | OUTPATIENT
Start: 2022-08-25

## 2022-08-24 RX ORDER — AMLODIPINE BESYLATE 5 MG/1
5 TABLET ORAL DAILY
Qty: 30 TABLET | Refills: 3 | Status: SHIPPED | OUTPATIENT
Start: 2022-08-25 | End: 2022-10-05 | Stop reason: ALTCHOICE

## 2022-08-24 RX ORDER — LISINOPRIL 20 MG/1
20 TABLET ORAL DAILY
Status: DISCONTINUED | OUTPATIENT
Start: 2022-08-24 | End: 2022-08-24 | Stop reason: HOSPADM

## 2022-08-24 RX ORDER — SPIRONOLACTONE 25 MG/1
25 TABLET ORAL DAILY
Qty: 30 TABLET | Refills: 3 | Status: SHIPPED | OUTPATIENT
Start: 2022-08-25 | End: 2022-10-05 | Stop reason: SDUPTHER

## 2022-08-24 RX ADMIN — DULOXETINE 30 MG: 30 CAPSULE, DELAYED RELEASE ORAL at 09:21

## 2022-08-24 RX ADMIN — CARVEDILOL 25 MG: 25 TABLET, FILM COATED ORAL at 09:21

## 2022-08-24 RX ADMIN — ENOXAPARIN SODIUM 40 MG: 100 INJECTION SUBCUTANEOUS at 09:20

## 2022-08-24 RX ADMIN — LISINOPRIL 20 MG: 20 TABLET ORAL at 09:20

## 2022-08-24 RX ADMIN — ATORVASTATIN CALCIUM 40 MG: 40 TABLET, FILM COATED ORAL at 09:20

## 2022-08-24 RX ADMIN — TAMSULOSIN HYDROCHLORIDE 0.4 MG: 0.4 CAPSULE ORAL at 09:21

## 2022-08-24 RX ADMIN — SODIUM CHLORIDE, PRESERVATIVE FREE 10 ML: 5 INJECTION INTRAVENOUS at 09:20

## 2022-08-24 RX ADMIN — ASPIRIN 81 MG CHEWABLE TABLET 81 MG: 81 TABLET CHEWABLE at 09:20

## 2022-08-24 RX ADMIN — FUROSEMIDE 20 MG: 20 TABLET ORAL at 09:22

## 2022-08-24 RX ADMIN — AMLODIPINE BESYLATE 5 MG: 5 TABLET ORAL at 09:20

## 2022-08-24 RX ADMIN — SPIRONOLACTONE 25 MG: 25 TABLET ORAL at 09:21

## 2022-08-24 NOTE — PROGRESS NOTES
Physical Therapy  Physical Therapy Initial Assessment     Name: Osei Santiago  : 1940  MRN: [id number]    Date of Service: 2022    Evaluating PT:  Pearl Alvarez PT, DPT [id number]    Room #:  2007/6242-P  Diagnosis:  CHF (congestive heart failure), NYHA class I, acute on chronic, combined (HCC) [I50.43]  SOB (shortness of breath) [R06.02]  PMHx/PSHx:  arthritis, DM, HLD, HTN, partial SBO  Procedure/Surgery:  L heart cath   Precautions:  none  Equipment Needs:  none    SUBJECTIVE:    Pt lives with wife in a 1 story home with no stairs to enter and no rail. Bed is on 1st floor and bath is on 1st floor. Pt ambulated with no AD PTA. OBJECTIVE:   Initial Evaluation  Date: 22 Treatment Short Term/ Long Term   Goals   AM-PAC 6 Clicks 45/41     Was pt agreeable to Eval/treatment? yes     Does pt have pain? No c/o pain     Bed Mobility  Rolling: Independent  Supine to sit: Independent  Sit to supine: Independent  Scooting: Independent  NA   Transfers Sit to stand: Independent  Stand to sit:  Independent  Stand pivot: Independent  NA   Ambulation    200 feet with no AD Independent  NA   Stair negotiation: ascended and descended  NT  NA   ROM BUE:  defer to OT  BLE:  WNL     Strength BUE:  defer to OT  BLE:  WNL     Balance Sitting EOB:  Independent   Dynamic Standing:  Independent   NA     Pt is A & O x 4  Sensation:  Pt denies numbness and tingling to extremities  Edema:  none noted    Patient education  Pt educated on role of PT    Patient response to education:   Pt verbalized understanding Pt demonstrated skill Pt requires further education in this area   yes yes no     ASSESSMENT:    Conditions Requiring Skilled Therapeutic Intervention:  N/a  []Decreased strength     []Decreased ROM  []Decreased functional mobility  []Decreased balance   []Decreased endurance   []Decreased posture  []Decreased sensation  []Decreased coordination   []Decreased vision  []Decreased safety awareness   []Increased pain       Comments:  patient semi-supine in bed upon entry and agreeable to PT evaluation. Pt able to complete all mobility with no difficulty. Ambulation into [place] with normal leroy and gait pattern. Pt reports no needs from skilled PT at this time reporting that he feels that this is his independent baseline functional status. Will DC pt from caseload at this time. Treatment:  Patient practiced and was instructed in the following treatment:    Bed Mobility: pt able to complete with independence  Transfer Training: pt able to complete with independence  Gait Training: Ambulation with no AD, pt able to complete with independence    Pt's/ family goals   1. Return home    No goals at this time d/t pt at independent functional baseline. Patient and or family understand(s) diagnosis, prognosis, and plan of care.   yes    PHYSICAL THERAPY PLAN OF CARE:    PT POC is established based on physician order and patient diagnosis     Referring provider/PT Order:    08/23/22 0900  PT eval and treat  Start:  08/23/22 0900,   End:  08/23/22 0900,   ONE TIME,   Standing Count:  1 Occurrences,   R        Last continued at transfer on Tue Aug 23, 2022  8:01 PM    Ashley Blair MD     Diagnosis:  CHF (congestive heart failure), NYHA class I, acute on chronic, combined ([facility] [I50.43]  SOB (shortness of breath) [R06.02]  Specific instructions for next treatment:  DC pt at this time d/t at independent functional baseline    Current Treatment Recommendations:   N/a  [] Strengthening to improve independence with functional mobility   [] ROM to improve independence with functional mobility   [] Balance Training to improve static/dynamic balance and to reduce fall risk  [] Endurance Training to improve activity tolerance during functional mobility   [] Transfer Training to improve safety and independence with all functional transfers   [] Gait Training to improve gait mechanics, endurance and asses need for appropriate assistive device  [] Stair Training in preparation for safe discharge home and/or into the community   [] Positioning to prevent skin breakdown and contractures  [] Safety and Education Training   [] Patient/Caregiver Education   [] HEP  [] Other     Based on pt's current level of functional independence for all mobility, this pt is not a candidate for continued skilled PT services. Will remove pt from PT caseload. Please re-consult if pt experiences functional decline. Thank you. Time in: 0845  Time out: 0855    Total Treatment Time 0 minutes     Evaluation Time includes thorough review of current medical information, gathering information on past medical history/social history and prior level of function, completion of standardized testing/informal observation of tasks, assessment of data and education on plan of care and goals. .    CPT codes:  [x] Low Complexity PT evaluation 72904  [] Moderate Complexity PT evaluation 64547  [] High Complexity PT evaluation 74724  [] PT Re-evaluation 50017  [] Gait training 93243 - minutes  [] Manual therapy 22478 - minutes  [] Therapeutic activities 19236 - minutes  [] Therapeutic exercises 19117 - minutes  [] Neuromuscular reeducation 81000 - minutes     Aram Monsivais PT, DPT   [id number]

## 2022-08-24 NOTE — PROGRESS NOTES
Inpatient Cardiology Consult / Progress note ( no need for full consult: see cardiology consult from [address] [place] [address],[address] by Dr. Girish Berg dated 8/19/22. PATIENT IS BEING FOLLOWED FOR: CHF, valvular heart disease. Patient admitted to [address] [place] [address],[facility] hospital with 4 days of worsening SOB. Received IV lasix with improvement. Denies CP. Had an Echo done which showed LV dysfunction and VHD. Was transferred for Cath. Violeta Yates is a 80 y.o. male seen in initial consultation by Dr. Vesta Masonf: denies SOB or CP  OBJECTIVE: No apparent distress     ROS:  Consist: Denies fevers, chills or night sweats  Heart: Denies chest pain, palpitations, lightheadedness, dizziness or syncope  Lungs: Denies SOB, cough, wheezing, orthopnea or PND  GI: Denies abdominal pain, vomiting or diarrhea    PHYSICAL EXAM:   /72   Pulse 99   Temp 99.3 °F (37.4 °C) (Oral)   Resp 18   SpO2 94%    B/P Range last 24 hours: Systolic (26DTW), SIH:522 , Min:109 , PSO:935    Diastolic (52GGI), QZX:39, Min:66, Max:72    CONST: Well developed, well nourished elderly AA male who appears of stated age. Awake, alert and cooperative. No apparent distress  HEENT:   Head- Normocephalic, atraumatic   Eyes- Conjunctivae pink, anicteric  Throat- Oral mucosa pink and moist  Neck-  No stridor, trachea midline, no jugular venous distention. No carotid bruit  CHEST: Chest symmetrical and non-tender to palpation. No accessory muscle use or intercostal retractions  RESPIRATORY:  Lung sounds - diminished. No crackles or wheezes heard  CARDIOVASCULAR:     Heart Inspection- shows no noted pulsations  Heart Palpation- no heaves or thrills; PMI is non-displaced   Heart Ausculation- Regular rate and rhythm. 2/6 systolic murmur RUSB. No s3, s4 or rub   PV: No significant lower extremity edema. No varicosities. Pedal pulses palpable, no clubbing or cyanosis.  Right radial access site without hematoma and with preserved pulse  ABDOMEN: Soft, non-tender to light palpation. Bowel sounds present. No palpable masses no organomegaly; no abdominal bruit  MS: Good muscle strength and tone. No atrophy or abnormal movements. : Deferred  SKIN: Warm and dry no statis dermatitis or ulcers   NEURO / PSYCH: Oriented to person, place and time. Speech clear and appropriate. Follows all commands.  Pleasant affect     No intake or output data in the 24 hours ending 08/24/22 0811      Weight:   Wt Readings from Last 3 Encounters:   08/19/22 204 lb 3.2 oz (92.6 kg)   11/20/19 217 lb (98.4 kg)   08/26/19 210 lb (95.3 kg)     Current Inpatient Medications:   carvedilol  12.5 mg Oral BID WC    sodium chloride flush  5-40 mL IntraVENous 2 times per day    melatonin  5 mg Oral Nightly    atorvastatin  40 mg Oral Daily    DULoxetine  30 mg Oral Daily    [Held by provider] furosemide  20 mg IntraVENous Daily    tamsulosin  0.4 mg Oral Daily    spironolactone  25 mg Oral Daily    enoxaparin  40 mg SubCUTAneous Daily    aspirin  81 mg Oral Daily    insulin lispro  0-4 Units SubCUTAneous TID WC    insulin lispro  0-4 Units SubCUTAneous Nightly    amLODIPine  10 mg Oral Daily    And    lisinopril  20 mg Oral Daily       IV Infusions (if any):   sodium chloride      dextrose         DIAGNOSTIC/ LABORATORY DATA:  Labs:   CBC:   Recent Labs     08/22/22  0700 08/23/22  0559   WBC 4.7 4.5   HGB 11.8* 11.9*   HCT 40.0 38.9    216     BMP:   Recent Labs     08/23/22  0559 08/24/22  0432    141   K 3.8 3.6   CO2 33* 28   BUN 14 15   CREATININE 1.0 1.0   LABGLOM >60 >60   CALCIUM 9.2 8.8       TFT:   Lab Results   Component Value Date    TSH 0.249 (L) 08/18/2022    G8KCKYH 8.5 10/18/2016    FT3 2.0 08/19/2022    T4FREE 1.35 08/19/2022      HgA1c:   Lab Results   Component Value Date    LABA1C 6.5 (H) 08/23/2022       CARDIAC ENZYMES:  Recent Labs     08/23/22  0111 08/23/22  0559   TROPHS 29* 28*     FASTING LIPID PANEL:  Lab Results   Component Value Date/Time    CHOL 99 08/19/2022 10:37 AM HDL 44 08/19/2022 10:37 AM    LDLCALC 44 08/19/2022 10:37 AM    TRIG 55 08/19/2022 10:37 AM     LIVER PROFILE:  Recent Labs     08/22/22  0700 08/23/22  0559   AST 18 25   ALT 15 17   LABALBU 3.4* 3.9       CXR 8/17/22:   Bilateral lung multifocal consolidation, greatest at the right lung base suggesting pneumonia. Pulmonary vascular congestion plus or minus mild pulmonary interstitial edema. Moderate cardiomegaly    CTA pulmonary 8/18/22:  1. No CT evidence of pulmonary embolism. 2.  Subsegmental atelectasis in the right middle lobe and lingular segments. 3.  Moderate to severe symmetric heterogeneous enlargement of the thyroid gland which may represent thyroid goiter. 4.  Nonspecific mild mediastinal lymphadenopathy. CXR 8/20/22:  Interval improvement seen in the aeration of the lung fields with minimal  residual markings at the lung bases. 12 lead EKG 8/23/22:   Sinus tachycardia with occasional premature ventricular complexes. RSR' or QR pattern in V1 suggests right ventricular conduction delay. Left anterior fascicular block. Septal infarct , age undetermined    Echo 8/18/22 ( Dumont Ojeda DO ) :  Left ventricular size is grossly normal.  Mild left ventricular concentric hypertrophy noted. Ejection fraction is measured at 26%. Ejection fraction is visually estimated at 30-35%. Inferior and posterior wall akinetic  No evidence of left ventricular mass or thrombus noted. The left atrium is mildly dilated. Interatrial septum appears intact. Mildly enlarged right atrium size. Physiologic and/or trace mitral regurgitation is present. Mild mitral stenosis is present. Mitral annular calcification is present. The aortic valve is trileaflet. The aortic valve appears mildly sclerotic. Mild-to-moderate aortic regurgitation is noted. Moderate aortic stenosis. The aortic valve area is 1.1 cm2 with a maximum gradient of 49.52 mmHg and a mean gradient of 29.51 mmHg.   Mild tricuspid regurgitation. RVSP is 35 mmHg. Pulmonary hypertension is mild . Regular rhythm    Cath 8/23/22 ( Dr. Candi Wilkinson ):  CONCLUSIONS:  1. Coronary artery disease:  A. Left main:  Nonexistent with separate ostia to the LAD and the left  circumflex. B.  LAD:  Large vessel with around 30% mid to distal vessel disease. C.  LCX:  Very large, dominant vessel with 50% to 60% narrowing just  after the origin of the first marginal branch in the proximal vessel  (bifurcation lesion) and 50% proximal narrowing of the large first  marginal branch. D.  RCA:  Small, nondominant vessel with no significant disease. 2.  22 mm mean gradient across the aortic valve on pullback. Telemetry:  SR / ST    ASSESSMENT:       Acute heart failure with reduced ejection fraction. proBNP 8300 on admission, now appears compensated / Eu volemic post IV diuresis  Cardiomyopathy, likely ischemic.  EF 35-40% with wall motion abnormalities  Coronary artery disease ( mild LAD disease and moderate LCx disease as described above ). No CP  Moderate aortic stenosis, at least moderate aortic regurgitation  Severe mitral annular calcification/functional MS  Sinus tachycardia  Ascending thoracic aortic aneurysm 4.3 cm by CT  Borderline anemia Hgb 10.3 -->11.9  Hypertension  Type 2 diabetes, non-insulin-requiring A1c 6.4%  AUBREY  Tobacco abuse, suspect COPD  Hyperthyroidism. TSH suppressed with enlarged thyroid on imaging    PLAN:  Decrease Amlodipine to 5 mg daily   Increase Coreg to 25 mg bid  Start PO lasix 20 mg daily  Rest of Cardiac medications same   May be discharged from Cardiology stand point To follow as outpatient with [address] [facility] cardiology Dr. Sera Wynn  Cardiology will sign off.  Please call if needed      Electronically signed by Arun Capellan MD on 8/24/2022 at 8:11 AM

## 2022-08-24 NOTE — PLAN OF CARE
Problem: Chronic Conditions and Co-morbidities  Goal: Patient's chronic conditions and co-morbidity symptoms are monitored and maintained or improved  Outcome: Progressing     Problem: Pain  Goal: Verbalizes/displays adequate comfort level or baseline comfort level  Outcome: Progressing     Problem: ABCDS Injury Assessment  Goal: Absence of physical injury  Outcome: Progressing

## 2022-08-26 NOTE — PROGRESS NOTES
Physician Progress Note      PATIENT:               Pilo Flores  CSN #:                  [id number]  :                       1940  ADMIT DATE:       2022 6:57 PM  [address] [place] DATE:        2022 11:05 AM  RESPONDING  PROVIDER #:        Baron Estefanía MCNALLY          QUERY TEXT:    Pt admitted with CHF. Pt noted to also have HTN, CAD, Cardiomyopathy, VHD. If   possible, please document in progress notes and discharge summary the etiology   of CHF, if able to be determined. The medical record reflects the following:  Risk Factors: HTN, CAD, VHD, ICMP  Clinical Indicators: Per notes, patient admitted with Acute CHF Exacerbation. Also noted to have ischemic cardiomyopathy, CAD, HTN. EF 35-40%. Treatment: IV Lasix, heart cath    Thank you,  David Navarro, RN, BSN, CCDS, Clinical Documentation Improvement  Options provided:  -- CHF due to Hypertensive Heart Disease  -- CHF due to Hypertensive Heart Disease and CAD  -- CHF not due to Hypertension but due to CAD  -- CHF due to Hypertensive Heart Disease and ICMP  -- CHF not due to Hypertension but due to ICMP  -- CHF due to Hypertensive Heart Disease and Valvular Heart Disease  -- CHF not due to Hypertension but due to valvular heart disease  -- CHF due to HTN, CAD, ICMP and valvular disease  -- Other - I will add my own diagnosis  -- Disagree - Not applicable / Not valid  -- Disagree - Clinically unable to determine / Unknown  -- Refer to Clinical Documentation Reviewer    PROVIDER RESPONSE TEXT:    This patient has CHF due to HTN, CAD, ICMP and valvular heart disease.     Query created by: Mason Cochran on 2022 6:29 AM      Electronically signed by:  Baron Estefanía MCNALLY 2022 3:26 PM

## 2022-08-31 ENCOUNTER — TELEPHONE (OUTPATIENT)
Dept: ADMINISTRATIVE | Age: 82
End: 2022-08-31

## 2022-09-06 ENCOUNTER — HOSPITAL ENCOUNTER (OUTPATIENT)
Age: 82
Discharge: HOME | End: 2022-09-08
Payer: MEDICARE

## 2022-09-06 ENCOUNTER — HOSPITAL ENCOUNTER (OUTPATIENT)
Dept: GENERAL RADIOLOGY | Age: 82
Discharge: HOME | End: 2022-09-08
Payer: MEDICARE

## 2022-09-06 DIAGNOSIS — I25.10 CORONARY ARTERY DISEASE INVOLVING NATIVE CORONARY ARTERY OF NATIVE HEART WITHOUT ANGINA PECTORIS: ICD-10-CM

## 2022-09-06 DIAGNOSIS — R05.9 COUGH: ICD-10-CM

## 2022-09-06 DIAGNOSIS — I42.9 CARDIOMYOPATHY, UNSPECIFIED TYPE (HCC): ICD-10-CM

## 2022-09-06 DIAGNOSIS — I50.23 ACUTE ON CHRONIC SYSTOLIC (CONGESTIVE) HEART FAILURE (HCC): ICD-10-CM

## 2022-09-06 DIAGNOSIS — R63.5 WEIGHT GAIN: ICD-10-CM

## 2022-09-06 PROCEDURE — 71046 X-RAY EXAM CHEST 2 VIEWS: CPT

## 2022-09-13 ENCOUNTER — OFFICE VISIT (OUTPATIENT)
Dept: PAIN MANAGEMENT | Age: 82
End: 2022-09-13
Payer: MEDICARE

## 2022-09-13 VITALS
DIASTOLIC BLOOD PRESSURE: 80 MMHG | BODY MASS INDEX: 29.35 KG/M2 | HEIGHT: 70 IN | OXYGEN SATURATION: 91 % | WEIGHT: 205 LBS | SYSTOLIC BLOOD PRESSURE: 146 MMHG | HEART RATE: 90 BPM | RESPIRATION RATE: 16 BRPM | TEMPERATURE: 96.9 F

## 2022-09-13 DIAGNOSIS — M47.816 LUMBAR SPONDYLOSIS: ICD-10-CM

## 2022-09-13 DIAGNOSIS — F11.20 UNCOMPLICATED OPIOID DEPENDENCE (HCC): ICD-10-CM

## 2022-09-13 DIAGNOSIS — M51.9 LUMBAR DISC DISORDER: ICD-10-CM

## 2022-09-13 DIAGNOSIS — G89.4 CHRONIC PAIN SYNDROME: ICD-10-CM

## 2022-09-13 DIAGNOSIS — G89.4 CHRONIC PAIN SYNDROME: Primary | ICD-10-CM

## 2022-09-13 DIAGNOSIS — M48.061 SPINAL STENOSIS OF LUMBAR REGION WITHOUT NEUROGENIC CLAUDICATION: ICD-10-CM

## 2022-09-13 DIAGNOSIS — M47.816 LUMBAR FACET ARTHROPATHY: ICD-10-CM

## 2022-09-13 LAB
6-MONOACETYLMORPHINE, URINE: NOT DETECTED
ALCOHOL URINE: NOT DETECTED
AMPHETAMINE SCREEN, URINE: NOT DETECTED
BARBITURATE SCREEN URINE: NOT DETECTED
BENZODIAZEPINE SCREEN, URINE: NOT DETECTED
BUPRENORPHINE URINE: NOT DETECTED
CANNABINOID SCREEN URINE: NOT DETECTED
COCAINE METABOLITE SCREEN URINE: NOT DETECTED
FENTANYL SCREEN, URINE: NOT DETECTED
INTEGRITY CHECK, CREATININE, URINE: 242
INTEGRITY CHECK, OXIDANT, URINE: <40
INTEGRITY CHECK, PH, URINE: 6.9 (ref 4.5–9)
INTEGRITY CHECK, SPECIFIC GRAVITY, URINE: 1.02 (ref 1–1.03)
INTEGRITY CHECK, SPECIMEN INTEGRITY, URINE: ABNORMAL
Lab: ABNORMAL
METHADONE SCREEN, URINE: NOT DETECTED
OPIATE SCREEN URINE: NOT DETECTED
OXYCODONE URINE: POSITIVE
PHENCYCLIDINE SCREEN URINE: NOT DETECTED
TRAMADOL SCREEN URINE: NOT DETECTED

## 2022-09-13 PROCEDURE — 99204 OFFICE O/P NEW MOD 45 MIN: CPT | Performed by: PAIN MEDICINE

## 2022-09-13 PROCEDURE — G8417 CALC BMI ABV UP PARAM F/U: HCPCS | Performed by: PAIN MEDICINE

## 2022-09-13 PROCEDURE — G8427 DOCREV CUR MEDS BY ELIG CLIN: HCPCS | Performed by: PAIN MEDICINE

## 2022-09-13 PROCEDURE — 4004F PT TOBACCO SCREEN RCVD TLK: CPT | Performed by: PAIN MEDICINE

## 2022-09-13 PROCEDURE — 1123F ACP DISCUSS/DSCN MKR DOCD: CPT | Performed by: PAIN MEDICINE

## 2022-09-13 PROCEDURE — 1111F DSCHRG MED/CURRENT MED MERGE: CPT | Performed by: PAIN MEDICINE

## 2022-09-13 RX ORDER — HYDRALAZINE HYDROCHLORIDE 25 MG/1
25 TABLET, FILM COATED ORAL 3 TIMES DAILY
COMMUNITY
End: 2022-10-05

## 2022-09-13 RX ORDER — CETIRIZINE HYDROCHLORIDE 10 MG/1
10 TABLET ORAL DAILY
COMMUNITY

## 2022-09-13 RX ORDER — DOXAZOSIN 2 MG/1
2 TABLET ORAL NIGHTLY
COMMUNITY

## 2022-09-13 RX ORDER — PANTOPRAZOLE SODIUM 40 MG/1
40 TABLET, DELAYED RELEASE ORAL DAILY
COMMUNITY

## 2022-09-13 NOTE — PROGRESS NOTES
Do you currently have any of the following:    Fever: No  Headache:  No  Cough: No  Shortness of breath: No  Exposed to anyone with these symptoms: Taina                                                                                                                Hazel Huber presents to the [facility] on 9/13/2022. Ruddy Izquierdo is complaining of pain in lower back and bilateral legs. . The pain is constant. The pain is described as aching, throbbing, shooting, stabbing, and sharp. Pain is rated on his best day at a 5, on his worst day at a 10, and on average at a 7 on the VAS scale. He took his last dose of  nothing at this time. Gia Quezada does not have issues with constipation. Any procedures since your last visit: No,     He is not on NSAIDS and  is  on anticoagulation medications to include ASA and is managed by Pantera Jackson DO  . Pacemaker or defibrillator: No Physician managing device is NA. Medication Contract and Consent for Opioid Use Documents Filed        No documents found                       BP (!) 146/80   Pulse 90   Temp 96.9 °F (36.1 °C) (Infrared)   Resp 16   Ht 5' 10\" (1.778 m)   Wt 205 lb (93 kg)   SpO2 91%   BMI 29.41 kg/m²      No LMP for male patient.

## 2022-09-13 NOTE — PROGRESS NOTES
[facility]        [address] [facility], [address] [facility], [place], operated by [facility]      [phone]          Consult Note      Patient:  Coretha Hodgkins, DOB 1940    Date of Service:  22    Requesting Physician:  Maribell Rangel [id number]    Reason for Consult:      Patient presents with complaints of low back pain that started a long time ago and has been stable with percocet. Pain is described as sharp/intermittent. Pain is aggravated standing/walking. Pain is relieved by sitting and Percocet. HISTORY OF PRESENT ILLNESS:      Pain does radiate to Left thigh down to the knee. He  does not have numbness, tingling and does not have bladder or bowel dysfunction. He has not been on anticoagulation medications to include none. The patient  has not been on herbal supplements. The patient is diabetic. Imaging:   None    Previous treatments: Physical Therapy, Epidural Steroid Injection, and medications. .      Opioid Agreement:  Renewal date:    Past Medical History:   Diagnosis Date    Aortic stenosis, moderate 10/2018    Arthritis     Diabetes mellitus ([address]     H/O cardiovascular stress test 10/19/2018    lexiscan    Hyperlipidemia     Hypertension     Lymph node enlargement     seeing dr yates  coughing mucous    Partial small bowel obstruction ([address] 2017       Past Surgical History:   Procedure Laterality Date    ACHILLES TENDON SURGERY      BREAST SURGERY Left 2014    Excision of left breast mass    COLONOSCOPY  2012    FRACTURE SURGERY      C-2    KNEE ARTHROTOMY      NERVE BLOCK  04 10 2012    lumbar epidural #1       Prior to Admission medications    Medication Sig Start Date End Date Taking?  Authorizing Provider   hydrALAZINE (APRESOLINE) 25 MG tablet Take 25 mg by mouth 3 times daily   Yes Historical Provider, MD   pantoprazole (PROTONIX) 40 MG tablet Take 40 mg by mouth daily   Yes Historical Provider, MD   cetirizine (ZYRTEC) 10 MG tablet Take 10 mg by mouth daily   Yes Historical Provider, MD   doxazosin (CARDURA) 2 MG tablet Take 2 mg by mouth nightly   Yes Historical Provider, MD   furosemide (LASIX) 20 MG tablet Take 1 tablet by mouth 2 times daily 9/6/22  Yes Ck Ventura,    amLODIPine (NORVASC) 5 MG tablet Take 1 tablet by mouth daily 8/25/22  Yes John Mares MD   lisinopril (PRINIVIL;ZESTRIL) 20 MG tablet Take 1 tablet by mouth daily 8/25/22  Yes John Mares MD   aspirin 81 MG chewable tablet Take 1 tablet by mouth daily 8/25/22  Yes Magalys Tafoya MD   carvedilol (COREG) 25 MG tablet Take 1 tablet by mouth 2 times daily (with meals) 8/24/22  Yes Magalys Tafoya MD   spironolactone (ALDACTONE) 25 MG tablet Take 1 tablet by mouth daily 8/25/22  Yes Magalys Tafoya MD   atorvastatin (LIPITOR) 40 MG tablet TAKE 1 TABLET BY MOUTH EVERY DAY 1/6/21  Yes Osiel Montes De Oca, DO   albuterol sulfate  (90 Base) MCG/ACT inhaler INHALE 2 PUFFS 4 TIMES A DAY AS NEEDED FOR WHEEZING 3/3/20  Yes Historical Provider, MD   vitamin D (ERGOCALCIFEROL) 1.25 MG (71703 UT) CAPS capsule Take 1 capsule by mouth once a week 1/10/20  Yes Magdiel Chaudhari DO   metFORMIN (GLUCOPHAGE) 500 MG tablet Take 1 tablet by mouth 2 times daily (with meals) 10/15/18  Yes Jennifer Montes De Oca DO   VIAGRA 100 MG tablet Take 1 tablet by mouth as needed for Erectile Dysfunction 6/26/18  Yes Osiel Montes De Oca DO   potassium chloride (KLOR-CON M) 20 MEQ extended release tablet Take 1 tablet by mouth daily Take when taking Lasix 12/7/20 8/24/22  Osiel Montes De Oca DO   amLODIPine-benazepril (LOTREL) 10-20 MG per capsule Take 1 capsule by mouth daily 8/5/20   DA Celestin - CNP       No Known Allergies    Social History     Socioeconomic History    Marital status:      Spouse name:  Vaughn Alibaba    Number of children: 6    Years of education: 16    Highest education level: Not on file   Occupational History    Occupation: Retired    Tobacco Use    Smoking status: Some Days     Packs/day: 0.50 Types: Cigarettes    Smokeless tobacco: Never   Vaping Use    Vaping Use: Never used   Substance and Sexual Activity    Alcohol use: Not Currently    Drug use: No    Sexual activity: Not on file   Other Topics Concern    Not on file   Social History Narrative    Not on file     Social Determinants of Health     Financial Resource Strain: Low Risk     Difficulty of Paying Living Expenses: Not hard at all   Food Insecurity: No Food Insecurity    Worried About [address] in the Last Year: Never true    [facility] in the Last Year: Never true   Transportation Needs: No Transportation Needs    Lack of Transportation (Medical): No    Lack of Transportation (Non-Medical): No   Physical Activity: Unknown    Days of Exercise per Week: 0 days    Minutes of Exercise per Session: Not on file   Stress: No Stress Concern Present    Feeling of Stress : Only a little   Social Connections: Moderately Integrated    Frequency of Communication with Friends and Family: More than three times a week    Frequency of Social Gatherings with Friends and Family: More than three times a week    Attends Gnosticist Services: 1 to 4 times per year    Active Member of [address] [place] [address] or Organizations: No    Attends Club or Organization Meetings: Never    Marital Status:    Intimate Partner Violence: Not At Risk    Fear of Current or Ex-Partner: No    Emotionally Abused: No    Physically Abused: No    Sexually Abused: No   Housing Stability: Low Risk     Unable to Pay for Housing in the Last Year: No    Number of [place] in the Last Year: 2    Unstable Housing in the Last Year: No       Family History   Problem Relation Age of Onset    Hypertension Mother     Diabetes Mother     Hypertension Father     Brain Cancer Sister         Brain aneurysm    Hypertension Sister        REVIEW OF SYSTEMS:     Patient specifically denies fever/chills, chest pain, shortness of breath, new bowel or bladder complaints.   All other review of systems was negative. PHYSICAL EXAMINATION:      BP (!) 146/80   Pulse 90   Temp 96.9 °F (36.1 °C) (Infrared)   Resp 16   Ht 5' 10\" (1.778 m)   Wt 205 lb (93 kg)   SpO2 91%   BMI 29.41 kg/m²     General:      General appearance:   elderly, pleasant, and well-hydrated. , in moderate discomfort and A & O x3  Build:Overweight    HEENT:    Head:normocephalic and atraumatic  Sclera: icterus absent,     Lungs:    Breathing:Breathing Pattern: regular, no distress    Abdomen:    Shape:obese, non-distended, and normal  Tenderness:none    Lumbar spine:    Spine inspection:normal   CVA tenderness:No   Palpation:tenderness paravertebral muscles, facet loading, left, right, positive, and tenderness. Range of motion:abnormal moderately Lateral bending, flexion, extension rotation bilateral and is  painful. Musculoskeletal:    Trigger points in Paraveteral:absent bilaterally  SI joint tenderness:positive right, positive left              GURPREET test:negative right, negative             left  Piriformis tenderness:negative right, negative left  Trochanteric bursa tenderness:negative right, negative left  SLR:negative right, negative left, sitting     Extremities:    Tremors:None bilaterally upper and lower  Range of motion:pain with internal rotation of hips negative. Intact:Yes  Edema:Normal    Knee: Inspection:Left knee replaced. Neurological:    Sensory:normal to light touch bilateral lower extremities. Motor:                           Right Quadriceps5/5          Left Quadriceps5/5           Right Gastrocnemius5/5    Left Gastrocnemius5/5  Right Ant Tibialis5/5  Left Ant Tibialis5/5  Reflexes:    Right Quadriceps reflex2+  Left Quadriceps reflexNOT DONE  Right Achilles reflex0  Left Achilles reflex0  [address]    Dermatology:    Skin:no unusual rashes and no skin lesions    Impression:  Chronic low back pain with radiation to the left thigh.   [place] moved [place] from [place], he was seeing pain management there and was on Percocet 10/325 x 6. Physical therapy 8 weeks ago with less than expected response. Plan:  ?? Degenerative spinal stenosis. Will schedule patient for lumbar spine MRI. Dicussed long term side effects of opioids and expectations from pain management, advised patient that I won't be able to maintain his previous opioid regimen. Urine screen today  OARRS report reviewed 09/2022. Patient encouraged to stay active and to lose weight. Treatment plan discussed with the patient including medications side effects. We discussed with the patient that combining opioids, benzodiazepines, alcohol, illicit drugs or sleep aids increases the risk of respiratory depression including death. We discussed that these medications may cause drowsiness, sedation or dizziness and have counseled the patient not to drive or operate machinery. We have discussed that these medications will be prescribed only by one provider. We have discussed with the patient about age related risk factors and have thoroughly discussed the importance of taking these medications as prescribed. The patient verbalizes understanding. ccrefalbertina Brewer M.D.

## 2022-09-14 ENCOUNTER — HOSPITAL ENCOUNTER (OUTPATIENT)
Dept: OTHER | Age: 82
Setting detail: THERAPIES SERIES
Discharge: HOME | End: 2022-09-14
Payer: MEDICARE

## 2022-09-14 ENCOUNTER — TELEPHONE (OUTPATIENT)
Dept: PAIN MANAGEMENT | Age: 82
End: 2022-09-14

## 2022-09-14 DIAGNOSIS — G89.4 CHRONIC PAIN SYNDROME: Primary | ICD-10-CM

## 2022-09-14 DIAGNOSIS — M48.061 SPINAL STENOSIS OF LUMBAR REGION WITHOUT NEUROGENIC CLAUDICATION: ICD-10-CM

## 2022-09-14 DIAGNOSIS — M47.816 LUMBAR SPONDYLOSIS: ICD-10-CM

## 2022-09-14 DIAGNOSIS — M47.816 LUMBAR FACET ARTHROPATHY: ICD-10-CM

## 2022-09-14 DIAGNOSIS — M51.9 LUMBAR DISC DISORDER: ICD-10-CM

## 2022-09-14 LAB
6-MAM, QUANTITATIVE, URINE: <10
7-AMINOCLONAZEPAM, QUANTITATIVE, URINE: <50
ALPHA-HYDROXYALPRAZOLAM, QUANTITATIVE, URINE: <50
ALPHA-HYDROXYMIDAZOLAM, QUANTITATIVE, URINE: <50
ALPHA-HYDROXYTRIAZOLAM, QUANTITATIVE, URINE: <50
ALPRAZOLAM URINE QUANT: <50
CHLORDIAZEPOXIDE, QUANTITATIVE, URINE: <50
CLONAZEPAM, QUANTITATIVE, URINE: <50
CODEINE, QUANTITATIVE, URINE: <50
COMMENT: NORMAL
DIAZEPAM URINE QUANT: <50
FLUNITRAZEPAM, QUANTITATIVE, URINE: <50
FLURAZEPAM, QUANTITATIVE, URINE: <50
HYDROCODONE, QUANTITATIVE, URINE: <50
HYDROMORPHONE, QUANTITATIVE, URINE: <50
LORAZEPAM URINE QUANT: <50
MIDAZOLAM URINE QUANT: <50
MORPHINE, QUANTITATIVE, URINE: <50
NORDIAZEPAM URINE QUANT: <50
NORHYDROCODONE, QUANTITATIVE, URINE: <50
NOROXYCODONE, QUANTITATIVE, URINE: >1000
OXAZEPAM URINE QUANT: <50
OXYCODONE URINE, QUANTITATIVE: >1000
OXYMORPHONE, QUANTITATIVE, URINE: >1000
TEMAZEPAM, QUANTITATIVE, URINE: <50

## 2022-09-14 RX ORDER — DIAZEPAM 5 MG/1
5 TABLET ORAL ONCE
Qty: 1 TABLET | Refills: 0 | Status: SHIPPED | OUTPATIENT
Start: 2022-09-14 | End: 2022-09-14

## 2022-09-14 NOTE — PROGRESS NOTES
Pt is no call now show for his initial consultation.  Rescheduled for :     Future Appointments   Date Time Provider Reuben Daisy   9/20/2022  9:30 AM [facility] CHF ROOM 1 [facility] CHF Derrick Handing   9/21/2022  4:00 PM [address] MRI [facility] MRI [facility]   9/28/2022  7:30 AM Adin White MD [facility]   9/28/2022 10:30 AM Leno Garces DO [facility]   9/28/2022  1:15 PM Camilla Goldstein MD [facility]   10/5/2022  2:00 PM Ck Ventura DO [facility]   9/5/2023  9:00 AM Osiel Montes De Oca DO [facility]

## 2022-09-14 NOTE — PROGRESS NOTES
Pt is no call now show for his initial consultation.  Rescheduled him for   Future Appointments   Date Time Provider Reuben Villa   9/20/2022  9:30 AM [facility] CHF ROOM 1 [facility] CHF Shi Alfaroadam   9/21/2022  4:00 PM [address] [facility] MRI [facility] MRI [facility]   9/28/2022  7:30 AM Jamee Walter MD [facility]   9/28/2022 10:30 AM Freda Dos Santos DO [facility]   9/28/2022  1:15 PM Anitra Snyder MD [facility]   10/5/2022  2:00 PM Ck Ventura DO [facility]   9/5/2023  9:00 AM Osiel Montes De Oca DO [facility]

## 2022-09-14 NOTE — TELEPHONE ENCOUNTER
Patient calling in asking for a sedative in order to help  with his anxiety during his upcoming MRI on Wednesday 9/21 at 4pm that was ordered at his visit yesterday.

## 2022-09-15 ENCOUNTER — TELEPHONE (OUTPATIENT)
Dept: PAIN MANAGEMENT | Age: 82
End: 2022-09-15
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
## 2022-09-15 NOTE — TELEPHONE ENCOUNTER
Patient was prescribed Valium 5mg for upcoming MRI - medication was sent to [facility] in [place], [place] in error. I cancelled the rx with [facility] and [facility] in [facility] [place] [facility]

## 2022-09-20 ENCOUNTER — TELEPHONE (OUTPATIENT)
Dept: CARDIOLOGY CLINIC | Age: 82
End: 2022-09-20

## 2022-09-20 ENCOUNTER — HOSPITAL ENCOUNTER (OUTPATIENT)
Dept: OTHER | Age: 82
Setting detail: THERAPIES SERIES
Discharge: HOME | End: 2022-09-20
Payer: MEDICARE

## 2022-09-20 VITALS
HEART RATE: 96 BPM | WEIGHT: 206 LBS | BODY MASS INDEX: 29.49 KG/M2 | HEIGHT: 70 IN | SYSTOLIC BLOOD PRESSURE: 138 MMHG | OXYGEN SATURATION: 98 % | DIASTOLIC BLOOD PRESSURE: 72 MMHG | RESPIRATION RATE: 20 BRPM

## 2022-09-20 DIAGNOSIS — I50.23 ACUTE ON CHRONIC SYSTOLIC (CONGESTIVE) HEART FAILURE (HCC): Primary | ICD-10-CM

## 2022-09-20 DIAGNOSIS — R06.02 SOB (SHORTNESS OF BREATH): ICD-10-CM

## 2022-09-20 LAB
ANION GAP SERPL CALCULATED.3IONS-SCNC: 8 MMOL/L (ref 7–16)
BUN BLDV-MCNC: 7 MG/DL (ref 6–23)
CALCIUM SERPL-MCNC: 9.3 MG/DL (ref 8.6–10.2)
CHLORIDE BLD-SCNC: 103 MMOL/L (ref 98–107)
CO2: 30 MMOL/L (ref 22–29)
CREAT SERPL-MCNC: 0.9 MG/DL (ref 0.7–1.2)
GFR AFRICAN AMERICAN: >60
GFR NON-AFRICAN AMERICAN: >60 ML/MIN/1.73
GLUCOSE BLD-MCNC: 107 MG/DL (ref 74–99)
POTASSIUM SERPL-SCNC: 4 MMOL/L (ref 3.5–5)
PRO-BNP: 1439 PG/ML (ref 0–450)
SODIUM BLD-SCNC: 141 MMOL/L (ref 132–146)

## 2022-09-20 PROCEDURE — 99214 OFFICE O/P EST MOD 30 MIN: CPT

## 2022-09-20 PROCEDURE — 36415 COLL VENOUS BLD VENIPUNCTURE: CPT

## 2022-09-20 PROCEDURE — 99204 OFFICE O/P NEW MOD 45 MIN: CPT

## 2022-09-20 PROCEDURE — 80048 BASIC METABOLIC PNL TOTAL CA: CPT

## 2022-09-20 PROCEDURE — 83880 ASSAY OF NATRIURETIC PEPTIDE: CPT

## 2022-09-20 NOTE — TELEPHONE ENCOUNTER
Patient seen in [facility] [address] today. He forgot  his meds so he went home and brought them back. States that PCP prescribed Lasix 20 mg twice daily, but he has only been taking it once daily. Also, has been taking apresoline 25 mg 3 times daily (not reported at discharge from hospital). BP today in clinic was 150/70. Labs drawn today in clinic. Please review and advise any further recommendations.

## 2022-09-20 NOTE — TELEPHONE ENCOUNTER
Increase lisinopril to 40 mg po daily and check BMP in one week, monitor BP and call us  in one week.

## 2022-09-20 NOTE — PROGRESS NOTES
Congestive Heart Failure [address] [facility]   1940    Referring Provider: DR. Amol Suazo  Primary Care Physician: Dr Rouse Pulse  Cardiologist: Dr. Amol Suazo  Nephrologist: Pt was established with nephrology in [place] however has not established with a nephrologist in [facility]     History of Present Illness:     Viviana Goznalez is a 80 y.o. male with a history of HFrEF, most recent EF 30% 8/18/22. Patient Story:  He does  have dyspnea with exertion, shortness of breath, or decline in overall functional capacity only if he moves too quickly he notices PAT. He does not have orthopnea, PND, nocturnal cough or hemoptysis. He does not have abdominal distention or bloating, early satiety, anorexia/change in appetite. He does has a good urinary response to  oral diuretic. He does not have  lower extremity edema. He denies lightheadedness, dizziness. He denies palpitations, syncope or near syncope. He does not complain of chest pain, pressure, discomfort.        No Known Allergies    Current Outpatient Medications on File Prior to Encounter   Medication Sig Dispense Refill    hydrALAZINE (APRESOLINE) 25 MG tablet Take 25 mg by mouth 3 times daily      pantoprazole (PROTONIX) 40 MG tablet Take 40 mg by mouth daily      cetirizine (ZYRTEC) 10 MG tablet Take 10 mg by mouth daily      doxazosin (CARDURA) 2 MG tablet Take 2 mg by mouth nightly      furosemide (LASIX) 20 MG tablet Take 1 tablet by mouth 2 times daily 60 tablet 0    amLODIPine (NORVASC) 5 MG tablet Take 1 tablet by mouth daily 30 tablet 3    lisinopril (PRINIVIL;ZESTRIL) 20 MG tablet Take 1 tablet by mouth daily 30 tablet 3    aspirin 81 MG chewable tablet Take 1 tablet by mouth daily 30 tablet 3    carvedilol (COREG) 25 MG tablet Take 1 tablet by mouth 2 times daily (with meals) 60 tablet 3    spironolactone (ALDACTONE) 25 MG tablet Take 1 tablet by mouth daily 30 tablet 3    atorvastatin (LIPITOR) 40 MG tablet TAKE 1 TABLET BY MOUTH EVERY DAY 90 tablet 1    [DISCONTINUED] potassium chloride (KLOR-CON M) 20 MEQ extended release tablet Take 1 tablet by mouth daily Take when taking Lasix 90 tablet 1    [DISCONTINUED] amLODIPine-benazepril (LOTREL) 10-20 MG per capsule Take 1 capsule by mouth daily 90 capsule 1    albuterol sulfate  (90 Base) MCG/ACT inhaler INHALE 2 PUFFS 4 TIMES A DAY AS NEEDED FOR WHEEZING      vitamin D (ERGOCALCIFEROL) 1.25 MG (22321 UT) CAPS capsule Take 1 capsule by mouth once a week 12 capsule 3    metFORMIN (GLUCOPHAGE) 500 MG tablet Take 1 tablet by mouth 2 times daily (with meals) 180 tablet 3    VIAGRA 100 MG tablet Take 1 tablet by mouth as needed for Erectile Dysfunction 30 tablet 3     No current facility-administered medications on file prior to encounter.        Guideline directed medical:  ARNI/ACE I/ARB: Yes lisinopril 20 mg daily   Beta blocker:  Yes Coreg 25 mg BID  Aldosterone antagonist:  Yes spirolactone 25 mg daily   SGLT2i:  No    Physical Examination:     BP (!) 150/70 Comment: pt took his medication at 800 AM  Pulse 96   Resp 20   Ht 5' 10\" (1.778 m)   Wt 206 lb (93.4 kg)   SpO2 98%   BMI 29.56 kg/m²     Assessment  Charting Type: Shift assessment    Neurological  Level of Consciousness: Alert (0)  Orientation Level: Oriented X4  Cognition: Follows commands  Speech: Clear    HEENT (Head, Ears, Eyes, Nose, & Throat)  HEENT (WDL): Within Defined Limits    Respiratory  Respiratory Pattern: Regular  Respiratory Depth: Normal  Respiratory Quality/Effort: Unlabored  Chest Assessment: Chest expansion symmetrical, Trachea midline  L Breath Sounds: Clear  R Breath Sounds: Clear    Cough/Sputum  Cough: None    Cardiac  Cardiac Regularity: Regular  Heart Sounds: S1, S2  Cardiac Rhythm: Sinus rhythm    Rhythm Interpretation  Heart Rate: 96      Peripheral Vascular  Peripheral Vascular (WDL): Exceptions to WDL  Edema: Right lower extremity, Left lower extremity  RLE Edema: None  LLE Edema: None      Psychosocial  Psychosocial (WDL): Within Defined Limits      Heart Rate: 96      LAB DATA:    Last 3 BMP      Sodium (mmol/L)   Date Value   09/20/2022 141   08/24/2022 141   08/23/2022 144     Potassium (mmol/L)   Date Value   09/20/2022 4.0   08/22/2022 3.7   08/21/2022 3.6     Potassium reflex Magnesium (mmol/L)   Date Value   08/24/2022 3.6   08/23/2022 3.8   08/17/2022 4.1     Chloride (mmol/L)   Date Value   09/20/2022 103   08/24/2022 103   08/23/2022 101     CO2 (mmol/L)   Date Value   09/20/2022 30 (H)   08/24/2022 28   08/23/2022 33 (H)     BUN (mg/dL)   Date Value   09/20/2022 7   08/24/2022 15   08/23/2022 14     Glucose (mg/dL)   Date Value   09/20/2022 107 (H)   08/24/2022 119 (H)   08/23/2022 111 (H)   05/01/2012 106   04/26/2012 107   04/25/2012 121 (H)     Calcium (mg/dL)   Date Value   09/20/2022 9.3   08/24/2022 8.8   08/23/2022 9.2       Last 3 BNP       Pro-BNP (pg/mL)   Date Value   09/20/2022 1,439 (H)   08/24/2022 1,159 (H)   08/17/2022 8,311 (H)          CBC: No results for input(s): WBC, HGB, PLT in the last 72 hours. BMP:    Recent Labs     09/20/22  1009      K 4.0      CO2 30*   BUN 7   CREATININE 0.9   GLUCOSE 107*     Hepatic: No results for input(s): AST, ALT, ALB, BILITOT, ALKPHOS in the last 72 hours. Troponin: No results for input(s): TROPONINI in the last 72 hours. BNP: No results for input(s): BNP in the last 72 hours. Lipids: No results for input(s): CHOL, HDL in the last 72 hours. Invalid input(s): LDLCALCU  INR: No results for input(s): INR in the last 72 hours. WEIGHTS:    Wt Readings from Last 3 Encounters:   09/20/22 206 lb (93.4 kg)   09/13/22 205 lb (93 kg)   09/06/22 202 lb (91.6 kg)     TELEMETRY:  Cardiac Regularity: Regular  Cardiac Rhythm/Interpretation: SR PVC's    ASSESSMENT:  Odette Bates First visit with CHF clinic today. 8/22-8/29 admitted for Acute CHF exacerbation.  Pt was discharged with lasix 40 mg PRN for s/s symptomatic HF. Seen 9/7 Dr. Sanam Alvarado for a post F/U hospital. Lukas Allen changed lasix 40mg BID. CXR was performed at that visit. Offered an [facility] and instructed by to use it. Pt states he has an [facility] at at home. Educated to start using at home. Pt feels much better after increased dose of Lasix and antibiotics completed. Upon assessment bilateral lungs are diminished, abdomen soft, no edema present. Discussed with patient the purpose of CHF clinic and importance of daily weights and doing a self check every day to monitor for changes. Went over the three heart failure zones and to call cardiologist if in yellow zone immediately to prevent any further decline. Pt seen Dr. Amelia De Luna on 9/28. 2 weeks follow up appointment. 12:48 PM Patient was called to review medications since patient did not have a list or bring his bottles into the clinic. Patient preferred coming into the clinic. Reviewed medications and updated in the epic. Patient did have hydralazine 25 mg BID that was prescribed when he was in [facility]. Pt has been taking this medication since discharged from the hospital on 8/29. Hydralazine was not on the discharge AVS medication. CHF RN called Giovana Jaramillo re: clarification on medication and if patient she should stop medication or continue to take. Pt was states he was not taking lasix 20 mg BID as prescribed by Dr. Darien Wade on 9/7 and was not aware of this change by Dr. Darien Wade. Lastly, spirolactone was not in the bag-- instructed patient to call  CHF clinic to confirm if he was taking this medication. Gave a brochure for [employer] to review. Pt would take information and contemplated this service.       Interventions completed this visit:  IV diuretics given no  Lab work obtained yes, pro- BNP, BMP   Reviewed currently prescribed medications with patient, educated on importance of compliance and answered any questions regarding their medication  Educated on signs and symptoms of HF  Educated on low sodium diet    PLAN:  Scheduled to follow up in CHF clinic on   Future Appointments   Date Time Provider Reuben Villa   9/21/2022  4:00 PM [address] [facility]   9/28/2022  7:30 AM Virginia Barajas MD [facility]   9/28/2022 10:30 AM DO MARLYN Kumari   9/28/2022  1:15 PM Razia Conroy MD [facility]   10/5/2022  9:30 AM [facility] CHF ROOM 1 [facility]   10/5/2022  2:00 PM DO MARLYN Allen   9/5/2023  9:00 AM DO MARLYN Serrano     Given clinic phone number and aware of signs and symptoms to call with any HF change in symptoms.

## 2022-09-20 NOTE — DISCHARGE INSTR - LAB
Lasix 20 mg twice daily per Dr. Eduardo Mosher the hydralazine dosing or medication with the provider.    Check for the pill bottle named spirolactone 25 mg daily at home and call CHF clinic--- [phone]

## 2022-09-20 NOTE — TELEPHONE ENCOUNTER
Contacted patient with recommendations per Dr. Viktor Horn. Patient verbalized understanding. Lisinopril increase documented in patient's chart. Lab order in EPIC.

## 2022-10-05 ENCOUNTER — HOSPITAL ENCOUNTER (OUTPATIENT)
Dept: OTHER | Age: 82
Setting detail: THERAPIES SERIES
Discharge: HOME | End: 2022-10-05
Payer: MEDICARE

## 2022-10-05 VITALS
HEART RATE: 78 BPM | DIASTOLIC BLOOD PRESSURE: 53 MMHG | OXYGEN SATURATION: 97 % | WEIGHT: 205 LBS | RESPIRATION RATE: 16 BRPM | SYSTOLIC BLOOD PRESSURE: 109 MMHG | BODY MASS INDEX: 29.41 KG/M2

## 2022-10-05 DIAGNOSIS — I50.23 ACUTE ON CHRONIC SYSTOLIC (CONGESTIVE) HEART FAILURE (HCC): ICD-10-CM

## 2022-10-05 DIAGNOSIS — I42.9 CARDIOMYOPATHY, UNSPECIFIED TYPE (HCC): ICD-10-CM

## 2022-10-05 LAB
ANION GAP SERPL CALCULATED.3IONS-SCNC: 8 MMOL/L (ref 7–16)
BUN BLDV-MCNC: 12 MG/DL (ref 6–23)
CALCIUM SERPL-MCNC: 8.9 MG/DL (ref 8.6–10.2)
CHLORIDE BLD-SCNC: 99 MMOL/L (ref 98–107)
CO2: 33 MMOL/L (ref 22–29)
CREAT SERPL-MCNC: 1.2 MG/DL (ref 0.7–1.2)
GFR AFRICAN AMERICAN: >60
GFR NON-AFRICAN AMERICAN: >60 ML/MIN/1.73
GLUCOSE BLD-MCNC: 150 MG/DL (ref 74–99)
POTASSIUM SERPL-SCNC: 3.7 MMOL/L (ref 3.5–5)
PRO-BNP: 2358 PG/ML (ref 0–450)
SODIUM BLD-SCNC: 140 MMOL/L (ref 132–146)

## 2022-10-05 PROCEDURE — 99215 OFFICE O/P EST HI 40 MIN: CPT

## 2022-10-05 PROCEDURE — 36415 COLL VENOUS BLD VENIPUNCTURE: CPT

## 2022-10-05 PROCEDURE — 83880 ASSAY OF NATRIURETIC PEPTIDE: CPT

## 2022-10-05 PROCEDURE — 80048 BASIC METABOLIC PNL TOTAL CA: CPT

## 2022-10-05 PROCEDURE — 96374 THER/PROPH/DIAG INJ IV PUSH: CPT

## 2022-10-05 RX ORDER — SPIRONOLACTONE 25 MG/1
25 TABLET ORAL DAILY
Qty: 30 TABLET | Refills: 3 | Status: SHIPPED | OUTPATIENT
Start: 2022-10-05

## 2022-10-05 RX ORDER — FUROSEMIDE 40 MG/1
40 TABLET ORAL 2 TIMES DAILY
Qty: 60 TABLET | Refills: 3 | Status: SHIPPED | OUTPATIENT
Start: 2022-10-05

## 2022-10-05 RX ORDER — LISINOPRIL 20 MG/1
20 TABLET ORAL DAILY
Qty: 30 TABLET | Refills: 3 | Status: SHIPPED | OUTPATIENT
Start: 2022-10-05

## 2022-10-05 NOTE — PROGRESS NOTES
Call placed to Dr. Abigail King office and spoke with Lissett Chris regarding patient's low oxygen saturation at today's chf visit and patient stating he only has a concentrator at home. Per patient portable oxygen tanks are in his [place] residence. PCP office to contact patient to help establish portable tanks in home.     Pearl Medrano RN

## 2022-10-05 NOTE — PROGRESS NOTES
Congestive Heart Failure [address] [facility]   1940    Referring Provider: Dr. Tana Archer  Primary Care Physician: Dr Ginna Pastrana  Cardiologist: Dr. Tana Archer  Nephrologist: Pt was established with nephrology in [place] however has not established with a nephrologist in [facility]     History of Present Illness:   Aldair To is a 80 y.o. male with a history of HFrEF, most recent EF 30% 8/18/22. Patient Story: He does have dyspnea with exertion, shortness of breath, or decline in overall functional capacity. He does not have orthopnea, PND, nocturnal cough or hemoptysis. He does not have abdominal distention or bloating, early satiety, anorexia/change in appetite. He does has a good urinary response to  oral diuretic. He does have increased pitting lower extremity edema. He denies lightheadedness, dizziness. He denies palpitations, syncope or near syncope. He does not complain of chest pain, pressure, discomfort.        No Known Allergies    Current Outpatient Medications on File Prior to Encounter   Medication Sig Dispense Refill    oxyCODONE-Acetaminophen (PERCOCET PO) Take by mouth (Patient not taking: Reported on 10/5/2022)      pantoprazole (PROTONIX) 40 MG tablet Take 40 mg by mouth daily (Patient not taking: Reported on 10/5/2022)      cetirizine (ZYRTEC) 10 MG tablet Take 10 mg by mouth daily      doxazosin (CARDURA) 2 MG tablet Take 2 mg by mouth nightly      aspirin 81 MG chewable tablet Take 1 tablet by mouth daily 30 tablet 3    carvedilol (COREG) 25 MG tablet Take 1 tablet by mouth 2 times daily (with meals) 60 tablet 3    atorvastatin (LIPITOR) 40 MG tablet TAKE 1 TABLET BY MOUTH EVERY DAY (Patient not taking: Reported on 10/5/2022) 90 tablet 1    [DISCONTINUED] amLODIPine-benazepril (LOTREL) 10-20 MG per capsule Take 1 capsule by mouth daily 90 capsule 1    albuterol sulfate  (90 Base) MCG/ACT inhaler INHALE 2 PUFFS 4 TIMES A DAY AS NEEDED FOR WHEEZING      vitamin D (ERGOCALCIFEROL) 1.25 MG (62153 UT) CAPS capsule Take 1 capsule by mouth once a week 12 capsule 3    metFORMIN (GLUCOPHAGE) 500 MG tablet Take 1 tablet by mouth 2 times daily (with meals) 180 tablet 3    VIAGRA 100 MG tablet Take 1 tablet by mouth as needed for Erectile Dysfunction 30 tablet 3     No current facility-administered medications on file prior to encounter. Guideline directed medical:  ARNI/ACE I/ARB: Yes   Beta blocker:   Yes   Aldosterone antagonist:  No  SGLT2i:  No    Physical Examination:     BP (!) 109/53   Pulse 78   Resp 16   Wt 205 lb (93 kg)   SpO2 97%   BMI 29.41 kg/m²     Assessment  Charting Type: Shift assessment    Neurological  Level of Consciousness: Alert (0)  Orientation Level: Oriented X4  Cognition: Follows commands  Speech: Clear         Respiratory  Respiratory Pattern: Regular  Respiratory Depth: Normal  Respiratory Quality/Effort: Unlabored  Chest Assessment: Chest expansion symmetrical, Trachea midline  L Breath Sounds: Diminished  R Breath Sounds: Diminished         Cardiac  Cardiac Regularity: Regular  Heart Sounds: S1, S2  Cardiac Rhythm: Sinus rhythm    Rhythm Interpretation  Heart Rate: 78      Peripheral Vascular  Peripheral Vascular (WDL): Exceptions to WDL  Edema: Right lower extremity, Left lower extremity  RLE Edema: +3, Pitting  LLE Edema: +3, Pitting      Psychosocial  Psychosocial (WDL): Within Defined Limits      Heart Rate: 78      LAB DATA:    Last 3 BMP      Sodium (mmol/L)   Date Value   10/05/2022 140   09/20/2022 141   08/24/2022 141     Potassium (mmol/L)   Date Value   10/05/2022 3.7   09/20/2022 4.0   08/22/2022 3.7     Potassium reflex Magnesium (mmol/L)   Date Value   08/24/2022 3.6   08/23/2022 3.8   08/17/2022 4.1     Chloride (mmol/L)   Date Value   10/05/2022 99   09/20/2022 103   08/24/2022 103     CO2 (mmol/L)   Date Value   10/05/2022 33 (H)   09/20/2022 30 (H)   08/24/2022 28     BUN (mg/dL)   Date Value 10/05/2022 12   09/20/2022 7   08/24/2022 15     Glucose (mg/dL)   Date Value   10/05/2022 150 (H)   09/20/2022 107 (H)   08/24/2022 119 (H)   05/01/2012 106   04/26/2012 107   04/25/2012 121 (H)     Calcium (mg/dL)   Date Value   10/05/2022 8.9   09/20/2022 9.3   08/24/2022 8.8       Last 3 BNP       Pro-BNP (pg/mL)   Date Value   10/05/2022 2,358 (H)   09/20/2022 1,439 (H)   08/24/2022 1,159 (H)          CBC: No results for input(s): WBC, HGB, PLT in the last 72 hours. BMP:    Recent Labs     10/05/22  1050      K 3.7   CL 99   CO2 33*   BUN 12   CREATININE 1.2   GLUCOSE 150*     Hepatic: No results for input(s): AST, ALT, ALB, BILITOT, ALKPHOS in the last 72 hours. Troponin: No results for input(s): TROPONINI in the last 72 hours. BNP: No results for input(s): BNP in the last 72 hours. Lipids: No results for input(s): CHOL, HDL in the last 72 hours. Invalid input(s): LDLCALCU  INR: No results for input(s): INR in the last 72 hours. WEIGHTS:    Wt Readings from Last 3 Encounters:   10/05/22 205 lb (93 kg)   10/04/22 207 lb (93.9 kg)   09/20/22 206 lb (93.4 kg)     TELEMETRY:  Cardiac Regularity: Regular  Cardiac Rhythm/Interpretation: SR     ASSESSMENT:  Michaela Alston presented for 2nd chf clinic appt. Patient very sleepy; yet arousable. Oxygen saturations in low 70's on room air. 3 liter nasal cannula applied and oxygen increased to 96%. Patient lives primarily in [place] and states he has all his portable oxygen tanks there. Wife and patient did confirm there is an oxygen concentrator at the [place], but patient does not routinely check his pulse ox or utilize supplemental oxygen. Call placed to patient's wife Marli Pritchett who confirmed patient was only taking 20 mg lasix daily and was NOT taking spironolactone. Rubén Kidney APRN- CNP in office. On assessment 3+ pitting edema present in BLE. IVP lasix given at chf appt. Patient to come back this Friday to re evaluate. Interventions completed this visit:  IV diuretics given YES  Lab work obtained yes, pro- BNP, BMP   Reviewed currently prescribed medications with patient, educated on importance of compliance and answered any questions regarding their medication  Educated on signs and symptoms of HF  Educated on low sodium diet    PLAN:  Scheduled to follow up in CHF clinic on   Future Appointments   Date Time Provider Reuben Villa   10/7/2022  9:00 AM [facility] CHF ROOM 1 [facility]   10/25/2022  2:00 PM Rosamaria Burroughs MD [facility]   11/23/2022  9:15 AM Ember Randall MD [facility]   1/11/2023 11:00 AM DA Bennett - CNS [facility] ENDO [facility]   2/8/2023 10:00 AM DO MARLYN Allen   9/5/2023  9:00 AM DO MARLYN Castro [phone] [facility] clinic phone number and aware of signs and symptoms to call with any HF change in symptoms.

## 2022-10-05 NOTE — PROGRESS NOTES
Received the following orders from Dominic MADISON:  Spoke with patient regarding new orders. Patient must wear oxygen at all times (day and night). Please check pulse ox frequently throughout the day. STOP Amlodipine 5 mg until further notice   NEW lasix prescription ( Furosemide 40 mg twice daily)   Start new medication Aldactone daily (Spironolactone 25 mg daily)  Weigh yourself everyday  Return to CHF clinic Friday October 7th at 9:15 AM       Also call placed to patient's wife [facility] to inform of medication changes, need for O2, and follow up appointment. All questions answered at this time.     Electronically signed by Florence Dean RN on 10/5/2022 at 2:24 PM

## 2022-10-05 NOTE — DISCHARGE INSTR - LAB
Patient must wear oxygen at all times (day and night). Please check pulse ox frequently throughout the day.   STOP Amlodipine 5 mg    NEW lasix prescription ( Furosemide 40 mg twice daily)   Start new medication Aldactone daily (Spironolactone 25 mg daily)  Weigh yourself everyday  Return to CHF clinic Friday October 7th at 9:15 AM

## 2022-10-07 ENCOUNTER — HOSPITAL ENCOUNTER (OUTPATIENT)
Dept: OTHER | Age: 82
Setting detail: THERAPIES SERIES
Discharge: HOME | End: 2022-10-07
Payer: MEDICARE

## 2022-10-07 VITALS
OXYGEN SATURATION: 97 % | HEART RATE: 83 BPM | DIASTOLIC BLOOD PRESSURE: 59 MMHG | BODY MASS INDEX: 28.98 KG/M2 | RESPIRATION RATE: 18 BRPM | WEIGHT: 202 LBS | SYSTOLIC BLOOD PRESSURE: 121 MMHG

## 2022-10-07 LAB
ANION GAP SERPL CALCULATED.3IONS-SCNC: 7 MMOL/L (ref 7–16)
BUN BLDV-MCNC: 11 MG/DL (ref 6–23)
CALCIUM SERPL-MCNC: 8.9 MG/DL (ref 8.6–10.2)
CHLORIDE BLD-SCNC: 100 MMOL/L (ref 98–107)
CO2: 38 MMOL/L (ref 22–29)
CREAT SERPL-MCNC: 1 MG/DL (ref 0.7–1.2)
GFR AFRICAN AMERICAN: >60
GFR NON-AFRICAN AMERICAN: >60 ML/MIN/1.73
GLUCOSE BLD-MCNC: 110 MG/DL (ref 74–99)
POTASSIUM SERPL-SCNC: 3.7 MMOL/L (ref 3.5–5)
PRO-BNP: 870 PG/ML (ref 0–450)
SODIUM BLD-SCNC: 145 MMOL/L (ref 132–146)

## 2022-10-07 PROCEDURE — 36415 COLL VENOUS BLD VENIPUNCTURE: CPT

## 2022-10-07 PROCEDURE — 80048 BASIC METABOLIC PNL TOTAL CA: CPT

## 2022-10-07 PROCEDURE — 99214 OFFICE O/P EST MOD 30 MIN: CPT

## 2022-10-07 PROCEDURE — 83880 ASSAY OF NATRIURETIC PEPTIDE: CPT

## 2022-10-07 NOTE — PROGRESS NOTES
Congestive Heart Failure [address] [facility]   1940    Referring Provider: Dr. Randall Zhang  Primary Care Physician: Dr Krista Mai  Cardiologist: Dr. Randall Zhang  Nephrologist: Pt was established with nephrology in [place] however has not established with a nephrologist in [facility]     History of Present Illness:   Alba Dash is a 80 y.o. male with a history of HFrEF, most recent EF 30% 8/18/22. Patient Story: He does  not have dyspnea with exertion, shortness of breath, or decline in overall functional capacity. He does not have orthopnea, PND, nocturnal cough or hemoptysis. He does not have abdominal distention or bloating, early satiety, anorexia/change in appetite. He does has a good urinary response to  oral diuretic. He does have decrease pitting lower extremity edema. He denies lightheadedness, dizziness. He denies palpitations, syncope or near syncope. He does not complain of chest pain, pressure, discomfort.        No Known Allergies    Current Outpatient Medications on File Prior to Encounter   Medication Sig Dispense Refill    oxyCODONE-Acetaminophen (PERCOCET PO) Take by mouth (Patient not taking: No sig reported)      furosemide (LASIX) 40 MG tablet Take 1 tablet by mouth 2 times daily 60 tablet 3    lisinopril (PRINIVIL;ZESTRIL) 20 MG tablet Take 1 tablet by mouth daily 30 tablet 3    spironolactone (ALDACTONE) 25 MG tablet Take 1 tablet by mouth daily 30 tablet 3    pantoprazole (PROTONIX) 40 MG tablet Take 40 mg by mouth daily (Patient not taking: No sig reported)      cetirizine (ZYRTEC) 10 MG tablet Take 10 mg by mouth daily      doxazosin (CARDURA) 2 MG tablet Take 2 mg by mouth nightly      aspirin 81 MG chewable tablet Take 1 tablet by mouth daily 30 tablet 3    carvedilol (COREG) 25 MG tablet Take 1 tablet by mouth 2 times daily (with meals) 60 tablet 3    atorvastatin (LIPITOR) 40 MG tablet TAKE 1 TABLET BY MOUTH EVERY DAY (Patient taking differently: Take 40 mg by mouth daily) 90 tablet 1    [DISCONTINUED] amLODIPine-benazepril (LOTREL) 10-20 MG per capsule Take 1 capsule by mouth daily 90 capsule 1    albuterol sulfate  (90 Base) MCG/ACT inhaler INHALE 2 PUFFS 4 TIMES A DAY AS NEEDED FOR WHEEZING      vitamin D (ERGOCALCIFEROL) 1.25 MG (38651 UT) CAPS capsule Take 1 capsule by mouth once a week 12 capsule 3    metFORMIN (GLUCOPHAGE) 500 MG tablet Take 1 tablet by mouth 2 times daily (with meals) 180 tablet 3    VIAGRA 100 MG tablet Take 1 tablet by mouth as needed for Erectile Dysfunction 30 tablet 3     No current facility-administered medications on file prior to encounter. Guideline directed medical:  ARNI/ACE I/ARB: Yes   Beta blocker:   Yes   Aldosterone antagonist:  yes aldactone  SGLT2i:  No    Physical Examination:     BP (!) 121/59   Pulse 83   Resp 18   Wt 202 lb (91.6 kg)   SpO2 97%   BMI 28.98 kg/m²     Assessment  Charting Type: Shift assessment    Neurological  Level of Consciousness: Alert (0)  Orientation Level: Oriented X4  Cognition: Follows commands  Speech: Clear         Respiratory  Respiratory Pattern: Regular  Respiratory Depth: Normal  Respiratory Quality/Effort: Unlabored  Chest Assessment: Chest expansion symmetrical, Trachea midline  L Breath Sounds: Diminished (bases)  R Breath Sounds: Diminished (bases)         Cardiac  Cardiac Regularity: Regular  Heart Sounds: S1, S2  Cardiac Rhythm: Sinus rhythm    Rhythm Interpretation  Heart Rate: 83      Peripheral Vascular  Peripheral Vascular (WDL): Exceptions to WDL  Edema: Right lower extremity, Left lower extremity  RLE Edema: Pitting, +1  LLE Edema: Pitting, +1      Psychosocial  Psychosocial (WDL): Within Defined Limits      Heart Rate: 83      LAB DATA:    Last 3 BMP      Sodium (mmol/L)   Date Value   10/07/2022 145   10/05/2022 140   09/20/2022 141     Potassium (mmol/L)   Date Value   10/07/2022 3.7   10/05/2022 3.7   09/20/2022 4.0     Potassium reflex Magnesium (mmol/L)   Date Value   08/24/2022 3.6   08/23/2022 3.8   08/17/2022 4.1     Chloride (mmol/L)   Date Value   10/07/2022 100   10/05/2022 99   09/20/2022 103     CO2 (mmol/L)   Date Value   10/07/2022 38 (H)   10/05/2022 33 (H)   09/20/2022 30 (H)     BUN (mg/dL)   Date Value   10/07/2022 11   10/05/2022 12   09/20/2022 7     Glucose (mg/dL)   Date Value   10/07/2022 110 (H)   10/05/2022 150 (H)   09/20/2022 107 (H)   05/01/2012 106   04/26/2012 107   04/25/2012 121 (H)     Calcium (mg/dL)   Date Value   10/07/2022 8.9   10/05/2022 8.9   09/20/2022 9.3       Last 3 BNP       Pro-BNP (pg/mL)   Date Value   10/07/2022 870 (H)   10/05/2022 2,358 (H)   09/20/2022 1,439 (H)          CBC: No results for input(s): WBC, HGB, PLT in the last 72 hours. BMP:    Recent Labs     10/05/22  1050 10/07/22  0940    145   K 3.7 3.7   CL 99 100   CO2 33* 38*   BUN 12 11   CREATININE 1.2 1.0   GLUCOSE 150* 110*     Hepatic: No results for input(s): AST, ALT, ALB, BILITOT, ALKPHOS in the last 72 hours. Troponin: No results for input(s): TROPONINI in the last 72 hours. BNP: No results for input(s): BNP in the last 72 hours. Lipids: No results for input(s): CHOL, HDL in the last 72 hours. Invalid input(s): LDLCALCU  INR: No results for input(s): INR in the last 72 hours. WEIGHTS:    Wt Readings from Last 3 Encounters:   10/07/22 202 lb (91.6 kg)   10/05/22 205 lb (93 kg)   10/04/22 207 lb (93.9 kg)     TELEMETRY:  Cardiac Regularity: Regular  Cardiac Rhythm/Interpretation: SR     ASSESSMENT:  Denilson Mckeon presented for follow up appointment from 10/5/22. Patient states he feels much better, patient's SPO2 97% on room air and more awake than previous appointment. Lungs are diminished bilaterally, edema is +1 pitting to the bilateral lower extremity which is significantly less than his appointment on 10/5/22. Abdomen soft and not distended.  Lasix increased to 40 mg twice daily and aldactone 25 mg daily started patient's states he does feel as though he is urinating more. One week follow up appointment made for close monitoring. Interventions completed this visit:  IV diuretics given no  Lab work obtained yes, pro- BNP, BMP   Reviewed currently prescribed medications with patient, educated on importance of compliance and answered any questions regarding their medication  Educated on signs and symptoms of HF  Educated on low sodium diet    PLAN:  Scheduled to follow up in CHF clinic on   Future Appointments   Date Time Provider Reuben Villa   10/14/2022  1:30 PM [facility] CHF ROOM 1 [facility]   10/25/2022  2:00 PM Mayela Delaney MD [facility]   11/23/2022  9:15 AM Charli Da Silva MD [facility]   1/11/2023 11:00 AM DA Flowers - CNS BD ENDO [facility]   2/8/2023 10:00 AM DO MARLYN Allen   9/5/2023  9:00 AM DO MARLYN Donaldson [phone] [facility] clinic phone number and aware of signs and symptoms to call with any HF change in symptoms.

## 2022-10-10 ENCOUNTER — TELEPHONE (OUTPATIENT)
Dept: CARDIOLOGY CLINIC | Age: 82
End: 2022-10-10

## 2022-10-10 NOTE — TELEPHONE ENCOUNTER
Contacted patient with lab results and recommendations per Dr. Candace Banks. Patient verbalized understanding.    ----- Message from Arturo Tubbs MD sent at 10/9/2022 11:17 AM EDT -----  Blood work stable and improved. Continue current medications and follow-up as scheduled.

## 2022-10-14 ENCOUNTER — HOSPITAL ENCOUNTER (OUTPATIENT)
Dept: OTHER | Age: 82
Setting detail: THERAPIES SERIES
Discharge: HOME | End: 2022-10-14
Payer: MEDICARE

## 2022-10-14 NOTE — PROGRESS NOTES
Patient was a no call no show at today's appointment. Called patient and denies any s/s symptomatic CHF.    Rescheduled for :    Future Appointments   Date Time Provider Reuben Villa   10/25/2022  2:00 PM Vita Medrano MD [facility]   10/28/2022 12:00 PM [facility] CHF ROOM 1 [facility]   11/23/2022  9:15 AM Jose Llanos MD [facility]   1[facility]11/2023 11:00 [facility]   2/8/2023 10:00 AM DO MARLYN Campbell   9/5/2023  9:00 AM DO MARLYN Serrano

## 2022-10-25 ENCOUNTER — OFFICE VISIT (OUTPATIENT)
Dept: PAIN MANAGEMENT | Age: 82
End: 2022-10-25
Payer: MEDICARE

## 2022-10-25 VITALS
SYSTOLIC BLOOD PRESSURE: 122 MMHG | RESPIRATION RATE: 16 BRPM | WEIGHT: 202 LBS | DIASTOLIC BLOOD PRESSURE: 78 MMHG | OXYGEN SATURATION: 95 % | HEART RATE: 88 BPM | HEIGHT: 70 IN | TEMPERATURE: 96.2 F | BODY MASS INDEX: 28.92 KG/M2

## 2022-10-25 DIAGNOSIS — G89.4 CHRONIC PAIN SYNDROME: Primary | ICD-10-CM

## 2022-10-25 DIAGNOSIS — M47.816 LUMBAR SPONDYLOSIS: ICD-10-CM

## 2022-10-25 DIAGNOSIS — F11.20 UNCOMPLICATED OPIOID DEPENDENCE (HCC): ICD-10-CM

## 2022-10-25 DIAGNOSIS — M48.061 SPINAL STENOSIS OF LUMBAR REGION WITHOUT NEUROGENIC CLAUDICATION: ICD-10-CM

## 2022-10-25 DIAGNOSIS — M47.816 LUMBAR FACET ARTHROPATHY: ICD-10-CM

## 2022-10-25 DIAGNOSIS — M51.9 LUMBAR DISC DISORDER: ICD-10-CM

## 2022-10-25 PROCEDURE — 99214 OFFICE O/P EST MOD 30 MIN: CPT | Performed by: PAIN MEDICINE

## 2022-10-25 PROCEDURE — 99213 OFFICE O/P EST LOW 20 MIN: CPT | Performed by: PAIN MEDICINE

## 2022-10-25 PROCEDURE — 1123F ACP DISCUSS/DSCN MKR DOCD: CPT | Performed by: PAIN MEDICINE

## 2022-10-25 PROCEDURE — 4004F PT TOBACCO SCREEN RCVD TLK: CPT | Performed by: PAIN MEDICINE

## 2022-10-25 PROCEDURE — G8417 CALC BMI ABV UP PARAM F/U: HCPCS | Performed by: PAIN MEDICINE

## 2022-10-25 PROCEDURE — G8427 DOCREV CUR MEDS BY ELIG CLIN: HCPCS | Performed by: PAIN MEDICINE

## 2022-10-25 PROCEDURE — G8484 FLU IMMUNIZE NO ADMIN: HCPCS | Performed by: PAIN MEDICINE

## 2022-10-25 RX ORDER — OXYCODONE HYDROCHLORIDE AND ACETAMINOPHEN 5; 325 MG/1; MG/1
1 TABLET ORAL 2 TIMES DAILY PRN
Qty: 45 TABLET | Refills: 0 | Status: SHIPPED
Start: 2022-10-25 | End: 2022-11-23 | Stop reason: SDUPTHER

## 2022-10-25 NOTE — PROGRESS NOTES
Do you currently have any of the following:    Fever: No  Headache:  No  Cough: No  Shortness of breath: No  Exposed to anyone with these symptoms: Taina                                                                                                                Leonid Cade presents to the [address] [address] on 10/25/2022. [facility] is complaining of pain in his mid to lower back, right hip and down the right leg. . The pain is constant. The pain is described as aching and shooting. Pain is rated on his best day at a 5, on his worst day at a 10, and on average at a 7 on the VAS scale. He took his last dose of Percocet . [facility] does not have issues with constipation. Any procedures since your last visit: No,    He is not on NSAIDS and  is not on anticoagulation medications to include none and is managed by NA. Pacemaker or defibrillator: No Physician managing device is NA. Medication Contract and Consent for Opioid Use Documents Filed        No documents found                       /78   Pulse 88   Temp (!) 96.2 °F (35.7 °C) (Infrared)   Resp 16   Ht 5' 10\" (1.778 m)   Wt 202 lb (91.6 kg)   SpO2 95%   BMI 28.98 kg/m²      No LMP for male patient.

## 2022-10-25 NOTE — PROGRESS NOTES
[address]  [address] [facility], [address] [place] [address] [place]  [phone]    Follow up Note      Kristine Steve     Date of Visit:  10/25/2022    CC:  Patient presents for follow up   Chief Complaint   Patient presents with    Chronic Pain     Mid back to right hip and leg    Results     MRI     HPI:    Pain is unchanged. Appropriate analgesia with current medications regimen: yes - . Change in quality of symptoms:no. Medication side effects:none. Recent diagnostic testing:Lumbar spine MRI. Recent interventional procedures:none. He has not been on anticoagulation medications to include none. The patient  has not been on herbal supplements. The patient is diabetic. Imaging:   Lumbar spine MRI 2022:  1. Marrow edema in L3 and L4 vertebral bodies with paraspinal edema. These   findings are concerning for DISCITIS/OSTEOMYELITIS, although a POSTTRAUMATIC   ETIOLOGY may present similarly. Clinical correlation is needed. .  Further   evaluation with CT of the lumbar spine may be obtained to assess for fracture. 2. SEVERE CENTRAL CANAL STENOSIS at L3-4. Moderate stenosis at L4-5. Mild   stenosis at L2-3.   3.  Multilevel neural foraminal stenoses, worst (severe) at L5-S1. Moderate   to severe stenoses at the left L3-4 and bilateral L4-5 levels. Previous treatments: Physical Therapy, Epidural Steroid Injection, and medications. .         Potential Aberrant Drug-Related Behavior:    No    Urine Drug Screening:  First office visit urine screen showed Oxycodone which is consistent.     OARRS report:  10/2022 consistent     Opioid Agreement:  Renewal date:10/2023    Past Medical History:   Diagnosis Date    Aortic stenosis, moderate 10/2018    Arthritis     Diabetes mellitus ([address]     H/O cardiovascular stress test 10/19/2018    lexiscan    Hyperlipidemia     Hypertension     Lymph node enlargement     seeing dr yates  coughing mucous    [facility] 04/14/2017     Past Surgical History:   Procedure Laterality Date    ACHILLES TENDON SURGERY      BREAST SURGERY Left 06/05/2014    Excision of left breast mass    COLONOSCOPY  04/24/2012    FRACTURE SURGERY      C-2    KNEE ARTHROTOMY      NERVE BLOCK  04 10 2012    lumbar epidural #1     Prior to Admission medications    Medication Sig Start Date End Date Taking?  Authorizing Provider   oxyCODONE-Acetaminophen (PERCOCET PO) Take by mouth   Yes Historical Provider, MD   furosemide (LASIX) 40 MG tablet Take 1 tablet by mouth 2 times daily 10/5/22  Yes Dwight Littlejohn APRN - CNP   lisinopril (PRINIVIL;ZESTRIL) 20 MG tablet Take 1 tablet by mouth daily 10/5/22  Yes Dwight Littlejohn APRN - CNP   spironolactone (ALDACTONE) 25 MG tablet Take 1 tablet by mouth daily 10/5/22  Yes DA Hanks - CNP   pantoprazole (PROTONIX) 40 MG tablet Take 40 mg by mouth daily   Yes Historical Provider, MD   cetirizine (ZYRTEC) 10 MG tablet Take 10 mg by mouth daily   Yes Historical Provider, MD   doxazosin (CARDURA) 2 MG tablet Take 2 mg by mouth nightly   Yes Historical Provider, MD   aspirin 81 MG chewable tablet Take 1 tablet by mouth daily 8/25/22  Yes Akosua Godwin MD   carvedilol (COREG) 25 MG tablet Take 1 tablet by mouth 2 times daily (with meals) 8/24/22  Yes Magalys Tafoya MD   atorvastatin (LIPITOR) 40 MG tablet TAKE 1 TABLET BY MOUTH EVERY DAY  Patient taking differently: Take 40 mg by mouth daily 1/6/21  Yes Osiel Montes De [place]ca, DO   albuterol sulfate  (90 Base) MCG/ACT inhaler INHALE 2 PUFFS 4 TIMES A DAY AS NEEDED FOR WHEEZING 3/3/20  Yes Historical Provider, MD   vitamin D (ERGOCALCIFEROL) 1.25 MG (29040 UT) CAPS capsule Take 1 capsule by mouth once a week 1/10/20  Yes Raymond Alfaro DO   metFORMIN (GLUCOPHAGE) 500 MG tablet Take 1 tablet by mouth 2 times daily (with meals) 10/15/18  Yes Jeremiah Montes De [place]ca DO   VIAGRA 100 MG tablet Take 1 tablet by mouth as needed for Erectile Dysfunction 6/26/18  Yes Gomez Robles SHILPA Montes De [place]ca DO   amLODIPine-benazepril (LOTREL) 10-20 MG per capsule Take 1 capsule by mouth daily 8/5/20   Winsome Cooper APRN - CNP     No Known Allergies    Social History     Socioeconomic History    Marital status:      Spouse name: Yudi Stroud    Number of children: 6    Years of education: 16    Highest education level: Not on file   Occupational History    Occupation: Retired    Tobacco Use    Smoking status: Some Days     Packs/day: 0.50     Types: Cigarettes    Smokeless tobacco: Never   Vaping Use    Vaping Use: Never used   Substance and Sexual Activity    Alcohol use: Not Currently    Drug use: No    Sexual activity: Not on file   Other Topics Concern    Not on file   Social History Narrative    Not on file     Social Determinants of Health     Financial Resource Strain: Low Risk     Difficulty of Paying Living Expenses: Not hard at all   Food Insecurity: No Food Insecurity    Worried About Running Out of Food in the Last Year: Never true    [address] in the Last Year: Never true   Transportation Needs: No Transportation Needs    Lack of Transportation (Medical): No    Lack of Transportation (Non-Medical): No   Physical Activity: Unknown    Days of Exercise per Week: 0 days    Minutes of Exercise per Session: Not on file   Stress: No Stress Concern Present    Feeling of Stress : Only a little   Social Connections:  Moderately Integrated    Frequency of Communication with Friends and Family: More than three times a week    Frequency of Social Gatherings with Friends and Family: More than three times a week    Attends Restoration Services: 1 to 4 times per year    Active Member of [address] [place] [address] or Organizations: No    Attends Club or Organization Meetings: Never    Marital Status:    Intimate Partner Violence: Not At Risk    Fear of Current or Ex-Partner: No    Emotionally Abused: No    Physically Abused: No    Sexually Abused: No   Housing Stability: Low Risk     Unable to Pay for Housing in the Last Year: No    Number of Places Lived in the Last Year: 2    Unstable Housing in the Last Year: No     Family History   Problem Relation Age of Onset    Hypertension Mother     Diabetes Mother     Hypertension Father     Brain Cancer Sister         Brain aneurysm    Hypertension Sister      REVIEW OF SYSTEMS:     Aldair Greer denies fever/chills, chest pain, shortness of breath, new bowel or bladder complaints. All other review of systems was negative. PHYSICAL EXAMINATION:      /78   Pulse 88   Temp (!) 96.2 °F (35.7 °C) (Infrared)   Resp 16   Ht 5' 10\" (1.778 m)   Wt 202 lb (91.6 kg)   SpO2 95%   BMI 28.98 kg/m²     General:       General appearance:   elderly, pleasant, and well-hydrated. , in moderate discomfort and A & O x3  Build:Overweight     HEENT:     Head:normocephalic and atraumatic  Sclera: icterus absent,      Lungs:     Breathing:Breathing Pattern: regular, no distress     Abdomen:     Shape:obese, non-distended, and normal  Tenderness:none     Lumbar spine:     Spine inspection:normal   CVA tenderness:No   Palpation:tenderness paravertebral muscles, facet loading, left, right, positive, and tenderness. Range of motion:abnormal moderately Lateral bending, flexion, extension rotation bilateral and is  painful. Musculoskeletal:     Trigger points in Paraveteral:absent bilaterally  SI joint tenderness:positive right, positive left              GURPREET test:negative right, negative             left  Piriformis tenderness:negative right, negative left  Trochanteric bursa tenderness:negative right, negative left  SLR:negative right, negative left, sitting      Extremities:     Tremors:None bilaterally upper and lower  Range of motion:pain with internal rotation of hips negative. Intact:Yes  Edema:Normal     Knee: Inspection:Left knee replaced. Neurological:     Sensory:normal to light touch bilateral lower extremities.   Motor:                           Right Quadriceps5/5 Left Quadriceps5/5           Right Gastrocnemius5/5    Left Gastrocnemius5/5  Right Ant Tibialis5/5  Left Ant Tibialis5/5  Reflexes:    Right Quadriceps reflex2+  Left Quadriceps reflexNOT DONE  Right Achilles reflex0  Left Achilles reflex0  [address]     Dermatology:     Skin:no unusual rashes and no skin lesions     Impression:  Chronic low back pain with radiation to the left thigh. [place] from [place], he was seeing pain management there and was on Percocet 10/325 x 6. Physical therapy 8 weeks ago with less than expected response. Plan:  Follow up on his low back pain with no acute issues. Reviewed lumbar spine MRI including actual images and discussed with the patient. Will schedule patient for lumbar spine CT. Will consider LESI after reviewing imaging studies. Reviewed first office visit urine screen. Will start patient on Percocet 5/325 BID PRN(discussed side effects and expectations)  OARRS report reviewed 10/2022. Patient encouraged to stay active and to lose weight. Treatment plan discussed with the patient including medications side effects. We discussed with the patient that combining opioids, benzodiazepines, alcohol, illicit drugs or sleep aids increases the risk of respiratory depression including death. We discussed that these medications may cause drowsiness, sedation or dizziness and have counseled the patient not to drive or operate machinery. We have discussed that these medications will be prescribed only by one provider. We have discussed with the patient about age related risk factors and have thoroughly discussed the importance of taking these medications as prescribed. The patient verbalizes understanding. ccreferring oseas Upton M.D.

## 2022-10-28 ENCOUNTER — HOSPITAL ENCOUNTER (OUTPATIENT)
Dept: OTHER | Age: 82
Setting detail: THERAPIES SERIES
Discharge: HOME | End: 2022-10-28
Payer: MEDICARE

## 2022-10-28 VITALS
SYSTOLIC BLOOD PRESSURE: 115 MMHG | WEIGHT: 202 LBS | OXYGEN SATURATION: 94 % | HEART RATE: 78 BPM | BODY MASS INDEX: 28.98 KG/M2 | RESPIRATION RATE: 16 BRPM | DIASTOLIC BLOOD PRESSURE: 60 MMHG

## 2022-10-28 LAB
ANION GAP SERPL CALCULATED.3IONS-SCNC: 7 MMOL/L (ref 7–16)
BUN BLDV-MCNC: 23 MG/DL (ref 6–23)
CALCIUM SERPL-MCNC: 9.2 MG/DL (ref 8.6–10.2)
CHLORIDE BLD-SCNC: 103 MMOL/L (ref 98–107)
CO2: 29 MMOL/L (ref 22–29)
CREAT SERPL-MCNC: 1.4 MG/DL (ref 0.7–1.2)
GFR SERPL CREATININE-BSD FRML MDRD: 50 ML/MIN/1.73
GLUCOSE BLD-MCNC: 106 MG/DL (ref 74–99)
POTASSIUM SERPL-SCNC: 4.3 MMOL/L (ref 3.5–5)
PRO-BNP: 347 PG/ML (ref 0–450)
SODIUM BLD-SCNC: 139 MMOL/L (ref 132–146)

## 2022-10-28 PROCEDURE — 36415 COLL VENOUS BLD VENIPUNCTURE: CPT

## 2022-10-28 PROCEDURE — 99214 OFFICE O/P EST MOD 30 MIN: CPT

## 2022-10-28 PROCEDURE — 83880 ASSAY OF NATRIURETIC PEPTIDE: CPT

## 2022-10-28 PROCEDURE — 80048 BASIC METABOLIC PNL TOTAL CA: CPT

## 2022-10-28 NOTE — PROGRESS NOTES
Congestive Heart Failure [address] [facility]   1940    Referring Provider: Dr. Noel Phillip  Primary Care Physician: Dr Sujata Alonso  Cardiologist: Dr. Noel Phillip  Nephrologist: Pt was established with nephrology in [place] however has not established with a nephrologist in [facility]     History of Present Illness:   Felix Petersen is a 80 y.o. male with a history of HFrEF, most recent EF 30% 8/18/22. Patient Story: He does not have dyspnea with exertion, shortness of breath, or decline in overall functional capacity. He does not have orthopnea, PND, nocturnal cough or hemoptysis. He does not have abdominal distention or bloating, early satiety, anorexia/change in appetite. He does have a good urinary response to oral diuretic. He does have a 1+ pitting lower extremity edema. He denies lightheadedness, dizziness. He denies palpitations, syncope or near syncope. He does not complain of chest pain, pressure, discomfort. No Known Allergies    Current Outpatient Medications on File Prior to Encounter   Medication Sig Dispense Refill    oxyCODONE-acetaminophen (PERCOCET) 5-325 MG per tablet Take 1 tablet by mouth 2 times daily as needed for Pain for up to 30 days.  45 tablet 0    oxyCODONE-Acetaminophen (PERCOCET PO) Take by mouth      furosemide (LASIX) 40 MG tablet Take 1 tablet by mouth 2 times daily 60 tablet 3    lisinopril (PRINIVIL;ZESTRIL) 20 MG tablet Take 1 tablet by mouth daily 30 tablet 3    spironolactone (ALDACTONE) 25 MG tablet Take 1 tablet by mouth daily 30 tablet 3    pantoprazole (PROTONIX) 40 MG tablet Take 40 mg by mouth daily      cetirizine (ZYRTEC) 10 MG tablet Take 10 mg by mouth daily      doxazosin (CARDURA) 2 MG tablet Take 2 mg by mouth nightly      aspirin 81 MG chewable tablet Take 1 tablet by mouth daily 30 tablet 3    carvedilol (COREG) 25 MG tablet Take 1 tablet by mouth 2 times daily (with meals) 60 tablet 3    atorvastatin (LIPITOR) 40 MG tablet TAKE 1 TABLET BY MOUTH EVERY DAY (Patient taking differently: Take 40 mg by mouth daily) 90 tablet 1    [DISCONTINUED] amLODIPine-benazepril (LOTREL) 10-20 MG per capsule Take 1 capsule by mouth daily 90 capsule 1    albuterol sulfate  (90 Base) MCG/ACT inhaler INHALE 2 PUFFS 4 TIMES A DAY AS NEEDED FOR WHEEZING      vitamin D (ERGOCALCIFEROL) 1.25 MG (88336 UT) CAPS capsule Take 1 capsule by mouth once a week 12 capsule 3    metFORMIN (GLUCOPHAGE) 500 MG tablet Take 1 tablet by mouth 2 times daily (with meals) 180 tablet 3    VIAGRA 100 MG tablet Take 1 tablet by mouth as needed for Erectile Dysfunction 30 tablet 3     No current facility-administered medications on file prior to encounter. Guideline directed medical:  ARNI/ACE I/ARB: Yes   Beta blocker:   Yes   Aldosterone antagonist: Yes  SGLT2i:  No    Physical Examination:     /60   Pulse 78   Resp 16   Wt 202 lb (91.6 kg)   SpO2 94%   BMI 28.98 kg/m²     Assessment  Charting Type: Shift assessment    Neurological  Level of Consciousness: Alert (0)  Orientation Level: Oriented X4  Cognition: Follows commands  Speech: Clear, Appropriate for developmental age         Respiratory  Respiratory Pattern: Regular  Respiratory Depth: Normal  Respiratory Quality/Effort: Unlabored  Chest Assessment: Chest expansion symmetrical, Trachea midline  L Breath Sounds: Diminished (bases)  R Breath Sounds: Diminished (bases)         Cardiac  Cardiac Regularity: Regular  Heart Sounds: S1, S2  Cardiac Rhythm: Sinus rhythm    Rhythm Interpretation  Heart Rate: 78      Peripheral Vascular  Peripheral Vascular (WDL): Exceptions to WDL  Edema: Right lower extremity, Left lower extremity  RLE Edema: Pitting, +1  LLE Edema: Pitting, +1      Psychosocial  Psychosocial (WDL): Within Defined Limits      Heart Rate: 78      LAB DATA:    Last 3 BMP      Sodium (mmol/L)   Date Value   10/28/2022 139   10/07/2022 145   10/05/2022 140     Potassium (mmol/L) Date Value   10/28/2022 4.3   10/07/2022 3.7   10/05/2022 3.7     Potassium reflex Magnesium (mmol/L)   Date Value   08/24/2022 3.6   08/23/2022 3.8   08/17/2022 4.1     Chloride (mmol/L)   Date Value   10/28/2022 103   10/07/2022 100   10/05/2022 99     CO2 (mmol/L)   Date Value   10/28/2022 29   10/07/2022 38 (H)   10/05/2022 33 (H)     BUN (mg/dL)   Date Value   10/28/2022 23   10/07/2022 11   10/05/2022 12     Glucose (mg/dL)   Date Value   10/28/2022 106 (H)   10/07/2022 110 (H)   10/05/2022 150 (H)   05/01/2012 106   04/26/2012 107   04/25/2012 121 (H)     Calcium (mg/dL)   Date Value   10/28/2022 9.2   10/07/2022 8.9   10/05/2022 8.9       Last 3 BNP       Pro-BNP (pg/mL)   Date Value   10/28/2022 347   10/07/2022 870 (H)   10/05/2022 2,358 (H)          CBC: No results for input(s): WBC, HGB, PLT in the last 72 hours. BMP:    Recent Labs     10/28/22  1220      K 4.3      CO2 29   BUN 23   CREATININE 1.4*   GLUCOSE 106*     Hepatic: No results for input(s): AST, ALT, ALB, BILITOT, ALKPHOS in the last 72 hours. Troponin: No results for input(s): TROPONINI in the last 72 hours. BNP: No results for input(s): BNP in the last 72 hours. Lipids: No results for input(s): CHOL, HDL in the last 72 hours. Invalid input(s): LDLCALCU  INR: No results for input(s): INR in the last 72 hours. WEIGHTS:    Wt Readings from Last 3 Encounters:   10/28/22 202 lb (91.6 kg)   10/25/22 202 lb (91.6 kg)   10/07/22 202 lb (91.6 kg)     TELEMETRY:  Cardiac Regularity: Regular  Cardiac Rhythm/Interpretation: SR     ASSESSMENT:  Yasmin Navarro presented for chf clinic follow up with stable weights. Patient denies SOB, PAT, or orthopnea. On assessments lungs are clear/ diminished and abdomen is soft. Labs obtained. Patient requesting to be seen prior to traveling to [place] for winter. Follow up made for 11/22/22.       Interventions completed this visit:  IV diuretics given no  Lab work obtained yes, pro- BNP, BMP   Reviewed currently prescribed medications with patient, educated on importance of compliance and answered any questions regarding their medication  Educated on signs and symptoms of HF  Educated on low sodium diet    PLAN:  Scheduled to follow up in CHF clinic on   Future Appointments   Date Time Provider Reuben Villa   11/22/2022 11:30 AM [facility] CHF ROOM 1 [facility] Vanessa Regalado   11/23/2022  9:15 AM Lamar Connelly MD [facility]   11/23/2022  1:00 PM Jared Azar MD [facility]   1/11/2023 11:00 AM [facility]   2/8/2023 10:00 AM DO MARLYN Allen   9/5/2023  9:00 AM DO MARLYN Salcedo [phone] [facility] phone number and aware of signs and symptoms to call with any HF change in symptoms.

## 2022-11-22 ENCOUNTER — HOSPITAL ENCOUNTER (OUTPATIENT)
Dept: OTHER | Age: 82
Setting detail: THERAPIES SERIES
Discharge: HOME | End: 2022-11-22
Payer: MEDICARE

## 2022-11-22 VITALS
OXYGEN SATURATION: 95 % | BODY MASS INDEX: 28.55 KG/M2 | SYSTOLIC BLOOD PRESSURE: 140 MMHG | RESPIRATION RATE: 18 BRPM | DIASTOLIC BLOOD PRESSURE: 67 MMHG | HEART RATE: 83 BPM | WEIGHT: 199 LBS

## 2022-11-22 LAB
ANION GAP SERPL CALCULATED.3IONS-SCNC: 10 MMOL/L (ref 7–16)
BUN BLDV-MCNC: 16 MG/DL (ref 6–23)
CALCIUM SERPL-MCNC: 9.8 MG/DL (ref 8.6–10.2)
CHLORIDE BLD-SCNC: 100 MMOL/L (ref 98–107)
CO2: 31 MMOL/L (ref 22–29)
CREAT SERPL-MCNC: 1.2 MG/DL (ref 0.7–1.2)
GFR SERPL CREATININE-BSD FRML MDRD: >60 ML/MIN/1.73
GLUCOSE BLD-MCNC: 125 MG/DL (ref 74–99)
POTASSIUM SERPL-SCNC: 4.2 MMOL/L (ref 3.5–5)
PRO-BNP: 1160 PG/ML (ref 0–450)
SODIUM BLD-SCNC: 141 MMOL/L (ref 132–146)

## 2022-11-22 PROCEDURE — 99214 OFFICE O/P EST MOD 30 MIN: CPT

## 2022-11-22 PROCEDURE — 36415 COLL VENOUS BLD VENIPUNCTURE: CPT

## 2022-11-22 PROCEDURE — 80048 BASIC METABOLIC PNL TOTAL CA: CPT

## 2022-11-22 PROCEDURE — 83880 ASSAY OF NATRIURETIC PEPTIDE: CPT

## 2022-11-22 NOTE — PROGRESS NOTES
Labs routed to Dr. Phil Martínez for review from CHF clinic visit on 11/22/22.     Electronically signed by Brigitte Ruiz RN on 11/22/2022 at 1:05 PM

## 2022-11-22 NOTE — PROGRESS NOTES
Congestive Heart Failure [address] [facility]   1940    Referring Provider: Dr. Randall Zhang  Primary Care Physician: Dr Krista Mai  Cardiologist: Dr. Randall Zhang  Nephrologist: Pt was established with nephrology in [place] however has not established with a nephrologist in [facility]     History of Present Illness:   Alba Dash is a 80 y.o. male with a history of HFrEF, most recent EF 30% 8/18/22. Patient Story: He does not have dyspnea with exertion, shortness of breath, or decline in overall functional capacity. He does not have orthopnea, PND, nocturnal cough or hemoptysis. He does not have abdominal distention or bloating, early satiety, anorexia/change in appetite. He does have a good urinary response to oral diuretic. He does not have lower extremity edema. He denies lightheadedness, dizziness. He denies palpitations, syncope or near syncope. He does not complain of chest pain, pressure, discomfort. No Known Allergies    Current Outpatient Medications on File Prior to Encounter   Medication Sig Dispense Refill    oxyCODONE-acetaminophen (PERCOCET) 5-325 MG per tablet Take 1 tablet by mouth 2 times daily as needed for Pain for up to 30 days.  (Patient not taking: Reported on 11/22/2022) 45 tablet 0    oxyCODONE-Acetaminophen (PERCOCET PO) Take by mouth (Patient not taking: Reported on 11/22/2022)      furosemide (LASIX) 40 MG tablet Take 1 tablet by mouth 2 times daily 60 tablet 3    lisinopril (PRINIVIL;ZESTRIL) 20 MG tablet Take 1 tablet by mouth daily 30 tablet 3    spironolactone (ALDACTONE) 25 MG tablet Take 1 tablet by mouth daily 30 tablet 3    pantoprazole (PROTONIX) 40 MG tablet Take 40 mg by mouth daily      cetirizine (ZYRTEC) 10 MG tablet Take 10 mg by mouth daily (Patient not taking: Reported on 11/22/2022)      doxazosin (CARDURA) 2 MG tablet Take 2 mg by mouth nightly      aspirin 81 MG chewable tablet Take 1 tablet by mouth daily 30 tablet 3 carvedilol (COREG) 25 MG tablet Take 1 tablet by mouth 2 times daily (with meals) 60 tablet 3    atorvastatin (LIPITOR) 40 MG tablet TAKE 1 TABLET BY MOUTH EVERY DAY (Patient taking differently: Take 40 mg by mouth daily) 90 tablet 1    [DISCONTINUED] amLODIPine-benazepril (LOTREL) 10-20 MG per capsule Take 1 capsule by mouth daily 90 capsule 1    albuterol sulfate  (90 Base) MCG/ACT inhaler INHALE 2 PUFFS 4 TIMES A DAY AS NEEDED FOR WHEEZING      vitamin D (ERGOCALCIFEROL) 1.25 MG (86467 UT) CAPS capsule Take 1 capsule by mouth once a week 12 capsule 3    metFORMIN (GLUCOPHAGE) 500 MG tablet Take 1 tablet by mouth 2 times daily (with meals) 180 tablet 3    VIAGRA 100 MG tablet Take 1 tablet by mouth as needed for Erectile Dysfunction (Patient not taking: Reported on 11/22/2022) 30 tablet 3     No current facility-administered medications on file prior to encounter. Guideline directed medical:  ARNI/ACE I/ARB: Yes   Beta blocker:   Yes   Aldosterone antagonist: Yes  SGLT2i:  No    Physical Examination:     BP (!) 140/67   Pulse 83   Resp 18   Wt 199 lb (90.3 kg)   SpO2 95%   BMI 28.55 kg/m²     Assessment  Charting Type: Shift assessment    Neurological  Level of Consciousness: Alert (0)  Orientation Level: Oriented X4  Cognition: Follows commands  Speech: Clear, Appropriate for developmental age         Respiratory  Respiratory Pattern: Regular  Respiratory Depth: Normal  Respiratory Quality/Effort: Unlabored  Chest Assessment: Chest expansion symmetrical, Trachea midline  L Breath Sounds: Diminished (bases)  R Breath Sounds: Diminished (bases)         Cardiac  Cardiac Regularity: Regular  Heart Sounds: S1, S2  Cardiac Rhythm: Sinus rhythm    Rhythm Interpretation  Heart Rate: 83      Peripheral Vascular  Peripheral Vascular (WDL): Exceptions to WDL  Edema: Right lower extremity, Left lower extremity  RLE Edema: None  LLE Edema: None      Psychosocial  Psychosocial (WDL): Within Defined Limits      Heart Rate: 83      LAB DATA:    Last 3 BMP      Sodium (mmol/L)   Date Value   11/22/2022 141   10/28/2022 139   10/07/2022 145     Potassium (mmol/L)   Date Value   11/22/2022 4.2   10/28/2022 4.3   10/07/2022 3.7     Potassium reflex Magnesium (mmol/L)   Date Value   08/24/2022 3.6   08/23/2022 3.8   08/17/2022 4.1     Chloride (mmol/L)   Date Value   11/22/2022 100   10/28/2022 103   10/07/2022 100     CO2 (mmol/L)   Date Value   11/22/2022 31 (H)   10/28/2022 29   10/07/2022 38 (H)     BUN (mg/dL)   Date Value   11/22/2022 16   10/28/2022 23   10/07/2022 11     Glucose (mg/dL)   Date Value   11/22/2022 125 (H)   10/28/2022 106 (H)   10/07/2022 110 (H)   05/01/2012 106   04/26/2012 107   04/25/2012 121 (H)     Calcium (mg/dL)   Date Value   11/22/2022 9.8   10/28/2022 9.2   10/07/2022 8.9       Last 3 BNP       Pro-BNP (pg/mL)   Date Value   11/22/2022 1,160 (H)   10/28/2022 347   10/07/2022 870 (H)          CBC: No results for input(s): WBC, HGB, PLT in the last 72 hours. BMP:    Recent Labs     11/22/22  1158      K 4.2      CO2 31*   BUN 16   CREATININE 1.2   GLUCOSE 125*     Labs sent to Dr. Danielle Mcdonough. Hepatic: No results for input(s): AST, ALT, ALB, BILITOT, ALKPHOS in the last 72 hours. Troponin: No results for input(s): TROPONINI in the last 72 hours. BNP: No results for input(s): BNP in the last 72 hours. Lipids: No results for input(s): CHOL, HDL in the last 72 hours. Invalid input(s): LDLCALCU  INR: No results for input(s): INR in the last 72 hours. WEIGHTS:    Wt Readings from Last 3 Encounters:   11/22/22 199 lb (90.3 kg)   10/28/22 202 lb (91.6 kg)   10/25/22 202 lb (91.6 kg)     TELEMETRY:  Cardiac Regularity: Regular  Cardiac Rhythm/Interpretation: SR     ASSESSMENT:  Michaela Alston presented for chf clinic follow up with decreased weight, down 3 lbs from last visit . Patient denies SOB, PAT, or orthopnea.  On assessments lungs are clear/ diminished and abdomen is soft. Pt states that he recently saw Dr. Velia Russell and has and an appointment with Dr. Audrey Kramer tomorrow 11/23/22. He is leaving for [place] this Sat until the end of March. Labs obtained. Follow up made for the first week in April. Pt instructed to call if he needs to change his appointment before then. Interventions completed this visit:  IV diuretics given no  Lab work obtained yes, pro- BNP, BMP   Reviewed currently prescribed medications with patient, educated on importance of compliance and answered any questions regarding their medication  Educated on signs and symptoms of HF  Educated on low sodium diet    PLAN:  Scheduled to follow up in CHF clinic on   Future Appointments   Date Time Provider Reuben Villa   11/23/2022  9:15 AM Estela Tolentino MD [facility]   11/23/2022  1:00 PM Tonny Arnold MD [facility]   1/11/2023 11:00 AM DA Andrew - CNS [facility]   2/8/2023 10:00 AM DO MARLYN [place]   4/4/2023 11:30 AM [facility] CHF ROOM 1 [facility] CHF [facility] Manual   9/5/2023  9:00 AM DO MARLYN Sainz [phone] [facility] clinic phone number and aware of signs and symptoms to call with any HF change in symptoms.

## 2022-11-23 ENCOUNTER — OFFICE VISIT (OUTPATIENT)
Dept: CARDIOLOGY CLINIC | Age: 82
End: 2022-11-23
Payer: MEDICARE

## 2022-11-23 ENCOUNTER — OFFICE VISIT (OUTPATIENT)
Dept: PAIN MANAGEMENT | Age: 82
End: 2022-11-23
Payer: MEDICARE

## 2022-11-23 VITALS
DIASTOLIC BLOOD PRESSURE: 74 MMHG | WEIGHT: 200 LBS | SYSTOLIC BLOOD PRESSURE: 140 MMHG | RESPIRATION RATE: 16 BRPM | HEART RATE: 89 BPM | HEIGHT: 70 IN | BODY MASS INDEX: 28.63 KG/M2

## 2022-11-23 VITALS
HEIGHT: 70 IN | OXYGEN SATURATION: 91 % | SYSTOLIC BLOOD PRESSURE: 114 MMHG | TEMPERATURE: 96.8 F | HEART RATE: 74 BPM | WEIGHT: 200 LBS | BODY MASS INDEX: 28.63 KG/M2 | DIASTOLIC BLOOD PRESSURE: 62 MMHG

## 2022-11-23 DIAGNOSIS — I35.0 NONRHEUMATIC AORTIC VALVE STENOSIS: ICD-10-CM

## 2022-11-23 DIAGNOSIS — M48.061 SPINAL STENOSIS OF LUMBAR REGION WITHOUT NEUROGENIC CLAUDICATION: ICD-10-CM

## 2022-11-23 DIAGNOSIS — I50.42 CHRONIC COMBINED SYSTOLIC AND DIASTOLIC CONGESTIVE HEART FAILURE (HCC): ICD-10-CM

## 2022-11-23 DIAGNOSIS — F11.20 UNCOMPLICATED OPIOID DEPENDENCE (HCC): ICD-10-CM

## 2022-11-23 DIAGNOSIS — I42.9 CARDIOMYOPATHY, UNSPECIFIED TYPE (HCC): ICD-10-CM

## 2022-11-23 DIAGNOSIS — M47.816 LUMBAR FACET ARTHROPATHY: ICD-10-CM

## 2022-11-23 DIAGNOSIS — M51.9 LUMBAR DISC DISORDER: ICD-10-CM

## 2022-11-23 DIAGNOSIS — I35.1 NONRHEUMATIC AORTIC VALVE INSUFFICIENCY: ICD-10-CM

## 2022-11-23 DIAGNOSIS — M47.816 LUMBAR SPONDYLOSIS: ICD-10-CM

## 2022-11-23 DIAGNOSIS — G89.4 CHRONIC PAIN SYNDROME: Primary | ICD-10-CM

## 2022-11-23 DIAGNOSIS — R06.02 SOB (SHORTNESS OF BREATH): Primary | ICD-10-CM

## 2022-11-23 DIAGNOSIS — I25.10 CORONARY ARTERY DISEASE INVOLVING NATIVE CORONARY ARTERY OF NATIVE HEART WITHOUT ANGINA PECTORIS: ICD-10-CM

## 2022-11-23 PROCEDURE — 3074F SYST BP LT 130 MM HG: CPT | Performed by: PAIN MEDICINE

## 2022-11-23 PROCEDURE — 1123F ACP DISCUSS/DSCN MKR DOCD: CPT | Performed by: INTERNAL MEDICINE

## 2022-11-23 PROCEDURE — G8417 CALC BMI ABV UP PARAM F/U: HCPCS | Performed by: PAIN MEDICINE

## 2022-11-23 PROCEDURE — G8484 FLU IMMUNIZE NO ADMIN: HCPCS | Performed by: INTERNAL MEDICINE

## 2022-11-23 PROCEDURE — 99214 OFFICE O/P EST MOD 30 MIN: CPT | Performed by: PAIN MEDICINE

## 2022-11-23 PROCEDURE — G8427 DOCREV CUR MEDS BY ELIG CLIN: HCPCS | Performed by: INTERNAL MEDICINE

## 2022-11-23 PROCEDURE — 4004F PT TOBACCO SCREEN RCVD TLK: CPT | Performed by: INTERNAL MEDICINE

## 2022-11-23 PROCEDURE — G8484 FLU IMMUNIZE NO ADMIN: HCPCS | Performed by: PAIN MEDICINE

## 2022-11-23 PROCEDURE — G8427 DOCREV CUR MEDS BY ELIG CLIN: HCPCS | Performed by: PAIN MEDICINE

## 2022-11-23 PROCEDURE — 99214 OFFICE O/P EST MOD 30 MIN: CPT

## 2022-11-23 PROCEDURE — 4004F PT TOBACCO SCREEN RCVD TLK: CPT | Performed by: PAIN MEDICINE

## 2022-11-23 PROCEDURE — G8417 CALC BMI ABV UP PARAM F/U: HCPCS | Performed by: INTERNAL MEDICINE

## 2022-11-23 PROCEDURE — 3074F SYST BP LT 130 MM HG: CPT | Performed by: INTERNAL MEDICINE

## 2022-11-23 PROCEDURE — 99214 OFFICE O/P EST MOD 30 MIN: CPT | Performed by: INTERNAL MEDICINE

## 2022-11-23 PROCEDURE — 3078F DIAST BP <80 MM HG: CPT | Performed by: PAIN MEDICINE

## 2022-11-23 PROCEDURE — 3078F DIAST BP <80 MM HG: CPT | Performed by: INTERNAL MEDICINE

## 2022-11-23 PROCEDURE — 93000 ELECTROCARDIOGRAM COMPLETE: CPT | Performed by: INTERNAL MEDICINE

## 2022-11-23 PROCEDURE — 1123F ACP DISCUSS/DSCN MKR DOCD: CPT | Performed by: PAIN MEDICINE

## 2022-11-23 RX ORDER — OXYCODONE HYDROCHLORIDE AND ACETAMINOPHEN 5; 325 MG/1; MG/1
1 TABLET ORAL 2 TIMES DAILY PRN
Qty: 45 TABLET | Refills: 0 | Status: SHIPPED | OUTPATIENT
Start: 2022-11-23 | End: 2022-12-23

## 2022-11-23 NOTE — PROGRESS NOTES
Do you currently have any of the following:    Fever: No  Headache:  No  Cough: No  Shortness of breath: No  Exposed to anyone with these symptoms: No                                                                                                                Jona Mcdaniel presents to the [facility] on 11/23/2022. Karolyn Morrison is complaining of pain in french. The pain is constant. The pain is described as aching and sharp. Pain is rated on his best day at a 7, on his worst day at a 10, and on average at a 7 on the VAS scale. He took his last dose of Percocet yesterday. Karolyn Morrison does not have issues with constipation. Any procedures since your last visit: No.    He is not on NSAIDS and  is not on anticoagulation medications to include none and is managed by none. Pacemaker or defibrillator: No.    Medication Contract and Consent for Opioid Use Documents Filed       Patient Documents       Type of Document Status Date Received Received By Description    Medication Contract Received 10/25/2022  2:36 PM LEONARDO MCNEILL pt agreement 10/25/22                       There were no vitals taken for this visit. No LMP for male patient.

## 2022-11-23 NOTE — PROGRESS NOTES
OUTPATIENT CARDIOLOGY FOLLOW-UP    Name: William Bautista    Age: 80 y.o. Primary Care Physician: Stacy Ayala DO    Date of Service: 11/23/2022    Chief Complaint:   Chief Complaint   Patient presents with    Follow-Up from Hospital        Interim History:   Here for follow-up regarding congestive heart failure. Seen as new patient in August 2022 with LV dysfunction EF 35 to 40%. Underwent heart catheterization that showed mild to moderate nonobstructive CAD. Also has aortic stenosis which appears to be moderate with suboptimally assessed on echocardiogram.  Has been following with CHF clinic and feeling well. Denies chest pain, shortness breath, palpitations, lower extremity edema. Looks like lisinopril was increased by telephone but he is not sure what dose he was taking. Doxazosin is listed on his medications but he is not sure if he is taking that either. He is traveling to [place] this weekend and will be there until March.     Review of Systems:   Negative except as described above    Past Medical History:  Past Medical History:   Diagnosis Date    Aortic stenosis, moderate 10/2018    Arthritis     Diabetes mellitus ([address]     H/O cardiovascular stress test 10/19/2018    lexiscan    Hyperlipidemia     Hypertension     Lymph node enlargement     seeing dr yates  coughing mucous    Partial small bowel obstruction ([address] 04/14/2017       Past Surgical History:  Past Surgical History:   Procedure Laterality Date    ACHILLES TENDON SURGERY      BREAST SURGERY Left 06/05/2014    Excision of left breast mass    COLONOSCOPY  04/24/2012    FRACTURE SURGERY      C-2    KNEE ARTHROTOMY      NERVE BLOCK  04 10 2012    lumbar epidural #1       Family History:  Family History   Problem Relation Age of Onset    Hypertension Mother     Diabetes Mother     Hypertension Father     Brain Cancer Sister         Brain aneurysm    Hypertension Sister        Social History:  Social History     Tobacco Use Smoking status: Some Days     Packs/day: 0.50     Types: Cigarettes    Smokeless tobacco: Never   Vaping Use    Vaping Use: Never used   Substance Use Topics    Alcohol use: Not Currently    Drug use: No        Allergies:  No Known Allergies    Current Medications:    Current Outpatient Medications:     oxyCODONE-acetaminophen (PERCOCET) 5-325 MG per tablet, Take 1 tablet by mouth 2 times daily as needed for Pain for up to 30 days. , Disp: 45 tablet, Rfl: 0    furosemide (LASIX) 40 MG tablet, Take 1 tablet by mouth 2 times daily, Disp: 60 tablet, Rfl: 3    lisinopril (PRINIVIL;ZESTRIL) 20 MG tablet, Take 1 tablet by mouth daily, Disp: 30 tablet, Rfl: 3    spironolactone (ALDACTONE) 25 MG tablet, Take 1 tablet by mouth daily, Disp: 30 tablet, Rfl: 3    pantoprazole (PROTONIX) 40 MG tablet, Take 40 mg by mouth daily, Disp: , Rfl:     cetirizine (ZYRTEC) 10 MG tablet, Take 10 mg by mouth daily, Disp: , Rfl:     doxazosin (CARDURA) 2 MG tablet, Take 2 mg by mouth nightly, Disp: , Rfl:     aspirin 81 MG chewable tablet, Take 1 tablet by mouth daily, Disp: 30 tablet, Rfl: 3    carvedilol (COREG) 25 MG tablet, Take 1 tablet by mouth 2 times daily (with meals), Disp: 60 tablet, Rfl: 3    atorvastatin (LIPITOR) 40 MG tablet, TAKE 1 TABLET BY MOUTH EVERY DAY (Patient taking differently: Take 40 mg by mouth daily), Disp: 90 tablet, Rfl: 1    albuterol sulfate  (90 Base) MCG/ACT inhaler, INHALE 2 PUFFS 4 TIMES A DAY AS NEEDED FOR WHEEZING, Disp: , Rfl:     metFORMIN (GLUCOPHAGE) 500 MG tablet, Take 1 tablet by mouth 2 times daily (with meals), Disp: 180 tablet, Rfl: 3    VIAGRA 100 MG tablet, Take 1 tablet by mouth as needed for Erectile Dysfunction, Disp: 30 tablet, Rfl: 3    vitamin D (ERGOCALCIFEROL) 1.25 MG (43339 UT) CAPS capsule, Take 1 capsule by mouth once a week, Disp: 12 capsule, Rfl: 3    Physical Exam:  BP (!) 140/74   Pulse 89   Resp 16   Ht 5' 10\" (1.778 m)   Wt 200 lb (90.7 kg)   BMI 28.70 kg/m²   Wt Readings from Last 3 Encounters:   11/23/22 200 lb (90.7 kg)   11/22/22 199 lb (90.3 kg)   10/28/22 202 lb (91.6 kg)     Appearance: Well-appearing older gentleman, awake, alert and oriented x 3, no acute respiratory distress  Skin: Intact, no rash  Head: Normocephalic, atraumatic  Eyes: EOMI, no conjunctival erythema  ENMT: No pharyngeal erythema, MMM, no rhinorrhea  Neck: Supple, no elevated JVP, no carotid bruits  Lungs: Clear to auscultation bilaterally. No wheezes, rales, or rhonchi.   Cardiac: Regular rate and rhythm, +S1S2, 2/6 harsh systolic murmur right upper sternal border  Abdomen: Soft, nontender, +bowel sounds  Extremities: Moves all extremities x 4, no lower extremity edema  Neurologic: No focal motor deficits apparent, normal mood and affect, alert and oriented x 3  Peripheral Pulses: Intact posterior tibial pulses bilaterally    Laboratory Tests:  Lab Results   Component Value Date    CREATININE 1.2 11/22/2022    BUN 16 11/22/2022     11/22/2022    K 4.2 11/22/2022     11/22/2022    CO2 31 (H) 11/22/2022     Lab Results   Component Value Date/Time    MG 1.7 08/19/2022 10:37 AM     Lab Results   Component Value Date    WBC 4.5 08/23/2022    HGB 11.9 (L) 08/23/2022    HCT 38.9 08/23/2022    MCV 82.4 08/23/2022     08/23/2022     Lab Results   Component Value Date    ALT 17 08/23/2022    AST 25 08/23/2022    ALKPHOS 66 08/23/2022    BILITOT 0.3 08/23/2022     Lab Results   Component Value Date    CKTOTAL 296 (H) 05/16/2013    CKMB 1.4 05/16/2013    TROPONINI <0.01 12/24/2016    TROPONINI 0.01 05/16/2013    TROPONINI 0.04 04/23/2012     Lab Results   Component Value Date    INR 1.3 08/17/2022    INR 1.0 12/24/2016    INR 1.2 04/26/2012    PROTIME 14.9 (H) 08/17/2022    PROTIME 11.2 12/24/2016    PROTIME 13.4 (H) 04/26/2012     Lab Results   Component Value Date    TSH 0.249 (L) 08/18/2022     Lab Results   Component Value Date    LABA1C 6.5 (H) 08/23/2022     No results found for: EAG  Lab Results   Component Value Date    CHOL 99 2022    CHOL 125 10/19/2018    CHOL 117 2017     Lab Results   Component Value Date    TRIG 55 2022    TRIG 83 10/19/2018    TRIG 118 2017     Lab Results   Component Value Date    HDL 44 2022    HDL 60 10/19/2018    HDL 49 2017     Lab Results   Component Value Date    LDLCALC 44 2022    LDLCALC 48 10/19/2018    LDLCALC 44 2017     Lab Results   Component Value Date    LABVLDL 11 2022    LABVLDL 17 10/19/2018    LABVLDL 24 2017     No results found for: CHOLHDLRATIO  Recent Labs     22  1158   PROBNP 1,160*       Cardiac Tests:  EC2022: Sinus rhythm 89 bpm.  Left anterior fascicular block with IVCD QRS duration 118 ms. Nonspecific T wave changes. Echocardiogram:   TTE 22 per my own interpretation:  Normal LV size  LV systolic function is moderately reduced  Visually estimated EF appears to be 35-40%  There is hypokinesis of the basal to mid anterolateral, basal to mid inferolateral and apical lateral walls  There is basal septal hypertrophy  Normal RV size and function  Biatrial enlargement  Severe mitral annular calcification  Mean gradient 5.5 mmHg at heart rate 99 bpm  Probably mild functional mitral stenosis  No significant MR  Aortic valve is heavily calcified, and no way to tell how many leaflets are present  There is moderate aortic stenosis somewhat incompletely assessed  V-max 3.5 m/s  Mean gradient 30 mmHg  Aortic valve area 1.3 cm²  Dimensionless index 0.38  LV stroke-volume index 40 mL/m²  LVOT diameter 2.1 cm  At least moderate AR, not well assessed. No pressure half-time  Mild TR  Unable to estimate RVSP due to incomplete TR spectral Doppler envelope  IVC is not visualized    Stress test:    Pharmacologic stress 10/2018  Findings:    Both the stress and resting myocardial images are normal.       The gated left ventricular ejection fraction is 58 %. Impression   No evidence of stress-induced ischemia. Cardiac catheterization:   Cath 8/23/22 ( Dr. Scot Young ):  CONCLUSIONS:  1. Coronary artery disease:  A. Left main:  Nonexistent with separate ostia to the LAD and the left  circumflex. B.  LAD:  Large vessel with around 30% mid to distal vessel disease. C.  LCX:  Very large, dominant vessel with 50% to 60% narrowing just  after the origin of the first marginal branch in the proximal vessel  (bifurcation lesion) and 50% proximal narrowing of the large first  marginal branch. D.  RCA:  Small, nondominant vessel with no significant disease. 2.  22 mm mean gradient across the aortic valve on pullback. Orders Placed This Encounter   Procedures    EKG 12 Lead    ECHO COMPLETE        Requested Prescriptions      No prescriptions requested or ordered in this encounter        ASSESSMENT / PLAN:  Chronic heart failure with reduced ejection fraction. Euvolemic  Cardiomyopathy predominantly nonischemic.  EF 35-40%  Coronary artery disease, mild to moderate nonobstructive  Moderate aortic stenosis, moderate AR  Severe mitral annular calcification/functional MS  LAFB  Ascending thoracic aortic aneurysm 4.3 cm by CT  Hypertension  Type 2 diabetes, non-insulin-requiring. A1c 6.4%  Anemia  AUBREY  Tobacco abuse  Hyperthyroidism. TSH suppressed with enlarged thyroid on imaging    Recommendations:  Doing well from cardiac standpoint. He is well compensated today.     Repeat echo to assess LV function on GDMT as well as to better evaluate aortic valve disease, can be done next spring when he returns from [place]  Continue guideline directed medical therapy  Carvedilol 25 mg twice daily  Lisinopril 40 mg daily - he will call to clarify what dose he is taking  Spironolactone 25 mg daily  Consider SGLT2 inhibitor  Continue furosemide 40 mg twice daily  Recommend discontinuation of doxazosin given systolic heart failure if he is in fact taking it  Continue medical treatment of CAD with aspirin and statin  Continue follow-up with CHF clinic  Aggressive risk factor modification  Follow-up in 6 months or sooner if need arises    The patient's current medication list, allergies, problem list and results of all previously ordered testing were reviewed at today's visit.     Tee Jimenez MD, [facility]

## 2022-11-23 NOTE — PROGRESS NOTES
[address]  [address] [facility], [address] [place] [address] [place]  [phone]    Follow up Note      Mimi Corrales     Date of Visit:  11/23/2022    CC:  Patient presents for follow up   Chief Complaint   Patient presents with    Back Pain     Radiating down right leg     HPI:    Pain is unchanged. Appropriate analgesia with current medications regimen: yes - . Change in quality of symptoms:no. Medication side effects:none. Recent diagnostic testing:Lumbar spine CT   Recent interventional procedures:none. He has not been on anticoagulation medications to include none. The patient  has not been on herbal supplements. The patient is diabetic. Imaging:   Lumbar spine MRI 2022:  1. Marrow edema in L3 and L4 vertebral bodies with paraspinal edema. These   findings are concerning for DISCITIS/OSTEOMYELITIS, although a POSTTRAUMATIC   ETIOLOGY may present similarly. Clinical correlation is needed. .  Further   evaluation with CT of the lumbar spine may be obtained to assess for fracture. 2. SEVERE CENTRAL CANAL STENOSIS at L3-4. Moderate stenosis at L4-5. Mild   stenosis at L2-3.   3.  Multilevel neural foraminal stenoses, worst (severe) at L5-S1. Moderate   to severe stenoses at the left L3-4 and bilateral L4-5 levels. Lumbar spine CT 2022:  1. Mottled appearance of the L4 and L5 vertebral bodies. Severe disc disease   at L3-4 L4-5 and L5-S1 levels. The changes appear to impinge on the exiting   bilateral L4 and L5 nerve roots. 2. The CT results are inconclusive for the exclusion of developing   osteomyelitis. Correlation with serum CRP are white count may be helpful for   further evaluation     Previous treatments: Physical Therapy, Epidural Steroid Injection, and medications. .         Potential Aberrant Drug-Related Behavior:    No    Urine Drug Screening:  First office visit urine screen showed Oxycodone which is consistent.     OARRS report:  11/2022 consistent Opioid Agreement:  Renewal date:10/2023    Past Medical History:   Diagnosis Date    Aortic stenosis, moderate 10/2018    Arthritis     Diabetes mellitus ([facility]     H/O cardiovascular stress test 10/19/2018    lexiscan    Hyperlipidemia     Hypertension     Lymph node enlargement     seeing dr yates  coughing mucous    Partial small bowel obstruction ([facility] 04/14/2017     Past Surgical History:   Procedure Laterality Date    ACHILLES TENDON SURGERY      BREAST SURGERY Left 06/05/2014    Excision of left breast mass    COLONOSCOPY  04/24/2012    FRACTURE SURGERY      C-2    KNEE ARTHROTOMY      NERVE BLOCK  04 10 2012    lumbar epidural #1     Prior to Admission medications    Medication Sig Start Date End Date Taking? Authorizing Provider   oxyCODONE-acetaminophen (PERCOCET) 5-325 MG per tablet Take 1 tablet by mouth 2 times daily as needed for Pain for up to 30 days.  10/25/22 11/24/22 Yes Alida Cooper MD   furosemide (LASIX) 40 MG tablet Take 1 tablet by mouth 2 times daily 10/5/22  Yes Emely Toure APRN - CNP   lisinopril (PRINIVIL;ZESTRIL) 20 MG tablet Take 1 tablet by mouth daily 10/5/22  Yes Emely Toure APRN - CNP   spironolactone (ALDACTONE) 25 MG tablet Take 1 tablet by mouth daily 10/5/22  Yes DA Metz - CNP   pantoprazole (PROTONIX) 40 MG tablet Take 40 mg by mouth daily   Yes Historical Provider, MD   cetirizine (ZYRTEC) 10 MG tablet Take 10 mg by mouth daily   Yes Historical Provider, MD   doxazosin (CARDURA) 2 MG tablet Take 2 mg by mouth nightly   Yes Historical Provider, MD   aspirin 81 MG chewable tablet Take 1 tablet by mouth daily 8/25/22  Yes Adrian Linda MD   carvedilol (COREG) 25 MG tablet Take 1 tablet by mouth 2 times daily (with meals) 8/24/22  Yes Magalys Tafoya MD   atorvastatin (LIPITOR) 40 MG tablet TAKE 1 TABLET BY MOUTH EVERY DAY  Patient taking differently: Take 40 mg by mouth daily 1/6/21  Yes Osiel Montes De [place],    albuterol sulfate  (90 Base) MCG/ACT with Friends and Family: More than three times a week    Attends [employer] Services: 1 to 4 times per year    Active Member of [address] [place] [address] [employer] or Organizations: No    Attends Club or Organization Meetings: Never    Marital Status:    Intimate Partner Violence: Not At Risk    Fear of Current or Ex-Partner: No    Emotionally Abused: No    Physically Abused: No    Sexually Abused: No   Housing Stability: Low Risk     Unable to Pay for Housing in the Last Year: No    Number of [place] in the Last Year: 2    Unstable Housing in the Last Year: No     Family History   Problem Relation Age of Onset    Hypertension Mother     Diabetes Mother     Hypertension Father     Brain Cancer Sister         Brain aneurysm    Hypertension Sister      REVIEW OF SYSTEMS:     Arthur Al denies fever/chills, chest pain, shortness of breath, new bowel or bladder complaints. All other review of systems was negative. PHYSICAL EXAMINATION:      /62   Pulse 74   Temp 96.8 °F (36 °C) (Infrared)   Ht 5' 10\" (1.778 m)   Wt 200 lb (90.7 kg)   SpO2 91%   BMI 28.70 kg/m²     General:       General appearance:   elderly, pleasant, and well-hydrated. , in moderate discomfort and A & O x3  Build:Overweight     HEENT:     Head:normocephalic and atraumatic  Sclera: icterus absent,      Lungs:     Breathing:Breathing Pattern: regular, no distress     Abdomen:     Shape:obese, non-distended, and normal  Tenderness:none     Lumbar spine:     Spine inspection:normal   CVA tenderness:No   Palpation:tenderness paravertebral muscles, facet loading, left, right, positive, and tenderness. Range of motion:abnormal moderately Lateral bending, flexion, extension rotation bilateral and is  painful.      Musculoskeletal:     Trigger points in Paraveteral:absent bilaterally  SI joint tenderness:positive right, positive left              GURPREET test:negative right, negative             left  Piriformis tenderness:negative right, negative left  Trochanteric bursa tenderness:negative right, negative left  SLR:negative right, negative left, sitting      Extremities:     Tremors:None bilaterally upper and lower  Range of motion:pain with internal rotation of hips negative. Intact:Yes  Edema:Normal     Knee: Inspection:Left knee replaced. Neurological:     Sensory:normal to light touch bilateral lower extremities. Motor:                           Right Quadriceps5/5          Left Quadriceps5/5           Right Gastrocnemius5/5    Left Gastrocnemius5/5  Right Ant Tibialis5/5  Left Ant Tibialis5/5  Reflexes:    Right Quadriceps reflex2+  Left Quadriceps reflexNOT DONE  Right Achilles reflex0  Left Achilles reflex0  [address]     Dermatology:     Skin:no unusual rashes and no skin lesions     Impression:  Chronic low back pain with radiation to the left thigh. Recently moved to [place] from [place], he was seeing pain management there and was on Percocet 10/325 x 6. Physical therapy 8 weeks ago with less than expected response. Plan:  Follow up on his low back pain with no acute issues. Reviewed lumbar spine CT and discussed with the patient(recent CBC within normal). Discussed treatment options including LESI L4-5, patient will think about it. Continue with Percocet 5/325 BID PRN(maintaining daily activities with no signs of abuse or diversion)  OARRS report reviewed 11/2022/opioid agreement up to date. Patient encouraged to stay active and to lose weight. Treatment plan discussed with the patient including medications side effects. We discussed with the patient that combining opioids, benzodiazepines, alcohol, illicit drugs or sleep aids increases the risk of respiratory depression including death. We discussed that these medications may cause drowsiness, sedation or dizziness and have counseled the patient not to drive or operate machinery. We have discussed that these medications will be prescribed only by one provider.  We have discussed with the patient about age related risk factors and have thoroughly discussed the importance of taking these medications as prescribed. The patient verbalizes understanding. ccrefjovitaing oseas Menezes M.D.

## 2023-01-01 ENCOUNTER — HOSPITAL ENCOUNTER (INPATIENT)
Facility: HOSPITAL | Age: 83
LOS: 5 days | Discharge: HOSPICE | DRG: 871 | End: 2023-12-31
Attending: EMERGENCY MEDICINE | Admitting: INTERNAL MEDICINE
Payer: MEDICARE

## 2023-01-01 ENCOUNTER — APPOINTMENT (OUTPATIENT)
Facility: HOSPITAL | Age: 83
DRG: 871 | End: 2023-01-01
Payer: MEDICARE

## 2023-01-01 ENCOUNTER — HOSPICE ADMISSION (OUTPATIENT)
Age: 83
End: 2023-01-01
Payer: MEDICARE

## 2023-01-01 ENCOUNTER — HOSPITAL ENCOUNTER (INPATIENT)
Facility: HOSPITAL | Age: 83
LOS: 1 days | DRG: 951 | End: 2023-12-31
Attending: FAMILY MEDICINE | Admitting: FAMILY MEDICINE
Payer: COMMERCIAL

## 2023-01-01 ENCOUNTER — APPOINTMENT (OUTPATIENT)
Facility: HOSPITAL | Age: 83
DRG: 871 | End: 2023-01-01
Attending: EMERGENCY MEDICINE
Payer: MEDICARE

## 2023-01-01 VITALS
DIASTOLIC BLOOD PRESSURE: 58 MMHG | HEART RATE: 51 BPM | BODY MASS INDEX: 16.64 KG/M2 | WEIGHT: 129.63 LBS | RESPIRATION RATE: 35 BRPM | SYSTOLIC BLOOD PRESSURE: 112 MMHG | TEMPERATURE: 98.4 F | OXYGEN SATURATION: 80 % | HEIGHT: 74 IN

## 2023-01-01 VITALS
DIASTOLIC BLOOD PRESSURE: 46 MMHG | SYSTOLIC BLOOD PRESSURE: 73 MMHG | RESPIRATION RATE: 36 BRPM | TEMPERATURE: 98.6 F | HEART RATE: 55 BPM

## 2023-01-01 DIAGNOSIS — J96.01 ACUTE RESPIRATORY FAILURE WITH HYPOXIA (HCC): ICD-10-CM

## 2023-01-01 DIAGNOSIS — D61.818 PANCYTOPENIA (HCC): ICD-10-CM

## 2023-01-01 DIAGNOSIS — Z51.5 PALLIATIVE CARE ENCOUNTER: ICD-10-CM

## 2023-01-01 DIAGNOSIS — A41.9 SEPTIC SHOCK (HCC): Primary | ICD-10-CM

## 2023-01-01 DIAGNOSIS — N17.9 AKI (ACUTE KIDNEY INJURY) (HCC): ICD-10-CM

## 2023-01-01 DIAGNOSIS — J18.9 PNEUMONIA OF LEFT LOWER LOBE DUE TO INFECTIOUS ORGANISM: ICD-10-CM

## 2023-01-01 DIAGNOSIS — Z71.1 CONCERN ABOUT END OF LIFE: ICD-10-CM

## 2023-01-01 DIAGNOSIS — E87.20 LACTIC ACIDOSIS: ICD-10-CM

## 2023-01-01 DIAGNOSIS — R65.21 SEPTIC SHOCK (HCC): Primary | ICD-10-CM

## 2023-01-01 DIAGNOSIS — Z98.890 HISTORY OF UROSTOMY: ICD-10-CM

## 2023-01-01 LAB
ABO + RH BLD: NORMAL
ACCESSION NUMBER, LLC1M: ABNORMAL
ACINETOBACTER CALCOAC BAUMANNII COMPLEX BY PCR: NOT DETECTED
ALBUMIN SERPL-MCNC: 2.1 G/DL (ref 3.5–5)
ALBUMIN SERPL-MCNC: 2.4 G/DL (ref 3.5–5)
ALBUMIN SERPL-MCNC: 2.4 G/DL (ref 3.5–5)
ALBUMIN SERPL-MCNC: 2.7 G/DL (ref 3.5–5)
ALBUMIN/GLOB SERPL: 0.4 (ref 1.1–2.2)
ALBUMIN/GLOB SERPL: 0.5 (ref 1.1–2.2)
ALBUMIN/GLOB SERPL: 0.5 (ref 1.1–2.2)
ALBUMIN/GLOB SERPL: 0.6 (ref 1.1–2.2)
ALP SERPL-CCNC: 49 U/L (ref 45–117)
ALP SERPL-CCNC: 55 U/L (ref 45–117)
ALP SERPL-CCNC: 62 U/L (ref 45–117)
ALP SERPL-CCNC: 68 U/L (ref 45–117)
ALT SERPL-CCNC: 27 U/L (ref 12–78)
ALT SERPL-CCNC: 31 U/L (ref 12–78)
ALT SERPL-CCNC: 31 U/L (ref 12–78)
ALT SERPL-CCNC: 34 U/L (ref 12–78)
AMORPH CRY URNS QL MICRO: ABNORMAL
ANION GAP SERPL CALC-SCNC: 10 MMOL/L (ref 5–15)
ANION GAP SERPL CALC-SCNC: 10 MMOL/L (ref 5–15)
ANION GAP SERPL CALC-SCNC: 11 MMOL/L (ref 5–15)
ANION GAP SERPL CALC-SCNC: 15 MMOL/L (ref 5–15)
ANION GAP SERPL CALC-SCNC: 9 MMOL/L (ref 5–15)
APPEARANCE UR: ABNORMAL
APTT PPP: 30.4 SEC (ref 22.1–31)
ARTERIAL PATENCY WRIST A: POSITIVE
AST SERPL-CCNC: 38 U/L (ref 15–37)
AST SERPL-CCNC: 56 U/L (ref 15–37)
AST SERPL-CCNC: 61 U/L (ref 15–37)
AST SERPL-CCNC: 79 U/L (ref 15–37)
B FRAGILIS DNA BLD POS QL NAA+NON-PROBE: NOT DETECTED
B PERT DNA SPEC QL NAA+PROBE: NOT DETECTED
BACTERIA SPEC CULT: ABNORMAL
BACTERIA URNS QL MICRO: ABNORMAL /HPF
BASE DEFICIT BLD-SCNC: 5.9 MMOL/L
BASE DEFICIT BLD-SCNC: 6.2 MMOL/L
BASE DEFICIT BLD-SCNC: 6.2 MMOL/L
BASE DEFICIT BLD-SCNC: 7.1 MMOL/L
BASE DEFICIT BLD-SCNC: 7.5 MMOL/L
BASOPHILS # BLD: 0 K/UL (ref 0–0.1)
BASOPHILS NFR BLD: 0 % (ref 0–1)
BASOPHILS NFR BLD: 2 % (ref 0–1)
BDY SITE: ABNORMAL
BILIRUB SERPL-MCNC: 1.8 MG/DL (ref 0.2–1)
BILIRUB SERPL-MCNC: 2.1 MG/DL (ref 0.2–1)
BILIRUB SERPL-MCNC: 2.3 MG/DL (ref 0.2–1)
BILIRUB SERPL-MCNC: 2.7 MG/DL (ref 0.2–1)
BILIRUB UR QL CFM: NEGATIVE
BIOFIRE TEST COMMENT: ABNORMAL
BLASTS NFR BLD MANUAL: 2 %
BLD PROD TYP BPU: NORMAL
BLD PROD TYP BPU: NORMAL
BLOOD BANK DISPENSE STATUS: NORMAL
BLOOD BANK DISPENSE STATUS: NORMAL
BLOOD GROUP ANTIBODIES SERPL: NORMAL
BORDETELLA PARAPERTUSSIS BY PCR: NOT DETECTED
BPU ID: NORMAL
BPU ID: NORMAL
BUN SERPL-MCNC: 102 MG/DL (ref 6–20)
BUN SERPL-MCNC: 87 MG/DL (ref 6–20)
BUN SERPL-MCNC: 88 MG/DL (ref 6–20)
BUN SERPL-MCNC: 93 MG/DL (ref 6–20)
BUN SERPL-MCNC: 95 MG/DL (ref 6–20)
BUN/CREAT SERPL: 26 (ref 12–20)
BUN/CREAT SERPL: 34 (ref 12–20)
BUN/CREAT SERPL: 34 (ref 12–20)
BUN/CREAT SERPL: 40 (ref 12–20)
BUN/CREAT SERPL: 59 (ref 12–20)
C ALBICANS DNA BLD POS QL NAA+NON-PROBE: NOT DETECTED
C AURIS DNA BLD POS QL NAA+NON-PROBE: NOT DETECTED
C GATTII+NEOFOR DNA BLD POS QL NAA+N-PRB: NOT DETECTED
C GLABRATA DNA BLD POS QL NAA+NON-PROBE: NOT DETECTED
C KRUSEI DNA BLD POS QL NAA+NON-PROBE: NOT DETECTED
C PARAP DNA BLD POS QL NAA+NON-PROBE: NOT DETECTED
C PNEUM DNA SPEC QL NAA+PROBE: NOT DETECTED
C TROPICLS DNA BLD POS QL NAA+NON-PROBE: NOT DETECTED
CA-I BLD-SCNC: 1.14 MMOL/L (ref 1.12–1.32)
CA-I BLD-SCNC: 1.2 MMOL/L (ref 1.12–1.32)
CALCIUM SERPL-MCNC: 7.7 MG/DL (ref 8.5–10.1)
CALCIUM SERPL-MCNC: 7.9 MG/DL (ref 8.5–10.1)
CALCIUM SERPL-MCNC: 7.9 MG/DL (ref 8.5–10.1)
CALCIUM SERPL-MCNC: 8.1 MG/DL (ref 8.5–10.1)
CALCIUM SERPL-MCNC: 9.3 MG/DL (ref 8.5–10.1)
CHLORIDE SERPL-SCNC: 102 MMOL/L (ref 97–108)
CHLORIDE SERPL-SCNC: 114 MMOL/L (ref 97–108)
CHLORIDE SERPL-SCNC: 114 MMOL/L (ref 97–108)
CHLORIDE SERPL-SCNC: 117 MMOL/L (ref 97–108)
CHLORIDE SERPL-SCNC: 124 MMOL/L (ref 97–108)
CO2 SERPL-SCNC: 17 MMOL/L (ref 21–32)
CO2 SERPL-SCNC: 18 MMOL/L (ref 21–32)
CO2 SERPL-SCNC: 19 MMOL/L (ref 21–32)
COLOR UR: ABNORMAL
COMMENT:: NORMAL
CREAT SERPL-MCNC: 1.72 MG/DL (ref 0.7–1.3)
CREAT SERPL-MCNC: 2.35 MG/DL (ref 0.7–1.3)
CREAT SERPL-MCNC: 2.62 MG/DL (ref 0.7–1.3)
CREAT SERPL-MCNC: 2.75 MG/DL (ref 0.7–1.3)
CREAT SERPL-MCNC: 3.33 MG/DL (ref 0.7–1.3)
CROSSMATCH RESULT: NORMAL
CROSSMATCH RESULT: NORMAL
DIFFERENTIAL METHOD BLD: ABNORMAL
E CLOAC COMP DNA BLD POS NAA+NON-PROBE: NOT DETECTED
E COLI DNA BLD POS QL NAA+NON-PROBE: NOT DETECTED
E FAECALIS DNA BLD POS QL NAA+NON-PROBE: NOT DETECTED
E FAECIUM DNA BLD POS QL NAA+NON-PROBE: NOT DETECTED
ECHO AR MAX VEL PISA: 4.1 M/S
ECHO AV AREA PEAK VELOCITY: 2 CM2
ECHO AV AREA/BSA PEAK VELOCITY: 1.1 CM2/M2
ECHO AV PEAK GRADIENT: 12 MMHG
ECHO AV PEAK VELOCITY: 1.7 M/S
ECHO AV REGURGITANT PHT: 725.1 MILLISECOND
ECHO AV VELOCITY RATIO: 0.53
ECHO BSA: 1.79 M2
ECHO EST RA PRESSURE: 3 MMHG
ECHO LA DIAMETER INDEX: 1.68 CM/M2
ECHO LA DIAMETER: 3.1 CM
ECHO LA VOL A-L A4C: 71 ML (ref 18–58)
ECHO LA VOL MOD A4C: 65 ML (ref 18–58)
ECHO LA VOLUME INDEX A-L A4C: 38 ML/M2 (ref 16–34)
ECHO LA VOLUME INDEX MOD A4C: 35 ML/M2 (ref 16–34)
ECHO LV EF PHYSICIAN: 40 %
ECHO LV EJECTION FRACTION A2C: 45 %
ECHO LV EJECTION FRACTION A4C: 42 %
ECHO LV FRACTIONAL SHORTENING: 7 % (ref 28–44)
ECHO LV GLOBAL LONGITUDINAL STRAIN (GLS): -17.1 %
ECHO LV INTERNAL DIMENSION DIASTOLE INDEX: 2.38 CM/M2
ECHO LV INTERNAL DIMENSION DIASTOLIC: 4.4 CM (ref 4.2–5.9)
ECHO LV INTERNAL DIMENSION SYSTOLIC INDEX: 2.22 CM/M2
ECHO LV INTERNAL DIMENSION SYSTOLIC: 4.1 CM
ECHO LV IVSD: 0.7 CM (ref 0.6–1)
ECHO LV MASS 2D: 118.6 G (ref 88–224)
ECHO LV MASS INDEX 2D: 64.1 G/M2 (ref 49–115)
ECHO LV POSTERIOR WALL DIASTOLIC: 1 CM (ref 0.6–1)
ECHO LV RELATIVE WALL THICKNESS RATIO: 0.45
ECHO LVOT AREA: 3.8 CM2
ECHO LVOT DIAM: 2.2 CM
ECHO LVOT MEAN GRADIENT: 2 MMHG
ECHO LVOT PEAK GRADIENT: 3 MMHG
ECHO LVOT PEAK VELOCITY: 0.9 M/S
ECHO LVOT STROKE VOLUME INDEX: 52 ML/M2
ECHO LVOT SV: 96.1 ML
ECHO LVOT VTI: 25.3 CM
ECHO MV EROA PISA: 0.3 CM2
ECHO MV REGURGITANT ALIASING (NYQUIST) VELOCITY: 38 CM/S
ECHO MV REGURGITANT PEAK GRADIENT: 174 MMHG
ECHO MV REGURGITANT PEAK VELOCITY: 6.6 M/S
ECHO MV REGURGITANT RADIUS PISA: 0.92 CM
ECHO MV REGURGITANT VELOCITY PISA: 6.6 M/S
ECHO MV REGURGITANT VOLUME PISA: 75.74 ML
ECHO MV REGURGITANT VTIA: 247.5 CM
ECHO RIGHT VENTRICULAR SYSTOLIC PRESSURE (RVSP): 61 MMHG
ECHO RV INTERNAL DIMENSION: 4.6 CM
ECHO RV TAPSE: 3.3 CM (ref 1.7–?)
ECHO TV REGURGITANT MAX VELOCITY: 3.8 M/S
ECHO TV REGURGITANT PEAK GRADIENT: 58 MMHG
EKG ATRIAL RATE: 103 BPM
EKG DIAGNOSIS: NORMAL
EKG P AXIS: -6 DEGREES
EKG P-R INTERVAL: 130 MS
EKG Q-T INTERVAL: 344 MS
EKG QRS DURATION: 96 MS
EKG QTC CALCULATION (BAZETT): 450 MS
EKG R AXIS: -49 DEGREES
EKG T AXIS: 43 DEGREES
EKG VENTRICULAR RATE: 103 BPM
ENTEROBACTERALES DNA BLD POS NAA+N-PRB: NOT DETECTED
EOSINOPHIL # BLD: 0 K/UL (ref 0–0.4)
EOSINOPHIL NFR BLD: 0 % (ref 0–7)
EOSINOPHIL NFR BLD: 1 % (ref 0–7)
EOSINOPHIL NFR BLD: 2 % (ref 0–7)
EPITH CASTS URNS QL MICRO: ABNORMAL /LPF
ERYTHROCYTE [DISTWIDTH] IN BLOOD BY AUTOMATED COUNT: 22.6 % (ref 11.5–14.5)
ERYTHROCYTE [DISTWIDTH] IN BLOOD BY AUTOMATED COUNT: 22.7 % (ref 11.5–14.5)
ERYTHROCYTE [DISTWIDTH] IN BLOOD BY AUTOMATED COUNT: 22.9 % (ref 11.5–14.5)
ERYTHROCYTE [DISTWIDTH] IN BLOOD BY AUTOMATED COUNT: 22.9 % (ref 11.5–14.5)
ERYTHROCYTE [DISTWIDTH] IN BLOOD BY AUTOMATED COUNT: 23 % (ref 11.5–14.5)
ERYTHROCYTE [DISTWIDTH] IN BLOOD BY AUTOMATED COUNT: 23.2 % (ref 11.5–14.5)
FERRITIN SERPL-MCNC: 1184 NG/ML (ref 26–388)
FLUAV AG NPH QL IA: NEGATIVE
FLUAV SUBTYP SPEC NAA+PROBE: NOT DETECTED
FLUBV AG NOSE QL IA: NEGATIVE
FLUBV RNA SPEC QL NAA+PROBE: NOT DETECTED
FOLATE SERPL-MCNC: 39.7 NG/ML (ref 5–21)
GAS FLOW.O2 O2 DELIVERY SYS: ABNORMAL
GAS FLOW.O2 SETTING OXYMISER: 20 BPM
GLOBULIN SER CALC-MCNC: 4.2 G/DL (ref 2–4)
GLOBULIN SER CALC-MCNC: 4.5 G/DL (ref 2–4)
GLOBULIN SER CALC-MCNC: 4.9 G/DL (ref 2–4)
GLOBULIN SER CALC-MCNC: 5.2 G/DL (ref 2–4)
GLUCOSE BLD STRIP.AUTO-MCNC: 146 MG/DL (ref 65–117)
GLUCOSE BLD STRIP.AUTO-MCNC: 96 MG/DL (ref 65–117)
GLUCOSE SERPL-MCNC: 107 MG/DL (ref 65–100)
GLUCOSE SERPL-MCNC: 114 MG/DL (ref 65–100)
GLUCOSE SERPL-MCNC: 131 MG/DL (ref 65–100)
GLUCOSE SERPL-MCNC: 147 MG/DL (ref 65–100)
GLUCOSE SERPL-MCNC: 90 MG/DL (ref 65–100)
GLUCOSE UR STRIP.AUTO-MCNC: NEGATIVE MG/DL
GP B STREP DNA BLD POS QL NAA+NON-PROBE: NOT DETECTED
HADV DNA SPEC QL NAA+PROBE: NOT DETECTED
HAEM INFLU DNA BLD POS QL NAA+NON-PROBE: DETECTED
HCO3 BLD-SCNC: 16.4 MMOL/L (ref 22–26)
HCO3 BLD-SCNC: 16.9 MMOL/L (ref 22–26)
HCO3 BLD-SCNC: 17.4 MMOL/L (ref 22–26)
HCO3 BLD-SCNC: 17.5 MMOL/L (ref 22–26)
HCO3 BLD-SCNC: 17.7 MMOL/L (ref 22–26)
HCOV 229E RNA SPEC QL NAA+PROBE: NOT DETECTED
HCOV HKU1 RNA SPEC QL NAA+PROBE: NOT DETECTED
HCOV NL63 RNA SPEC QL NAA+PROBE: NOT DETECTED
HCOV OC43 RNA SPEC QL NAA+PROBE: NOT DETECTED
HCT VFR BLD AUTO: 14.9 % (ref 36.6–50.3)
HCT VFR BLD AUTO: 21.6 % (ref 36.6–50.3)
HCT VFR BLD AUTO: 21.9 % (ref 36.6–50.3)
HCT VFR BLD AUTO: 23.3 % (ref 36.6–50.3)
HCT VFR BLD AUTO: 24.3 % (ref 36.6–50.3)
HCT VFR BLD AUTO: 25.4 % (ref 36.6–50.3)
HGB BLD-MCNC: 4.6 G/DL (ref 12.1–17)
HGB BLD-MCNC: 6.7 G/DL (ref 12.1–17)
HGB BLD-MCNC: 7 G/DL (ref 12.1–17)
HGB BLD-MCNC: 7.5 G/DL (ref 12.1–17)
HGB BLD-MCNC: 7.6 G/DL (ref 12.1–17)
HGB BLD-MCNC: 8.1 G/DL (ref 12.1–17)
HGB UR QL STRIP: ABNORMAL
HISTORY CHECK: NORMAL
HISTORY CHECK: NORMAL
HMPV RNA SPEC QL NAA+PROBE: NOT DETECTED
HPIV1 RNA SPEC QL NAA+PROBE: NOT DETECTED
HPIV2 RNA SPEC QL NAA+PROBE: NOT DETECTED
HPIV3 RNA SPEC QL NAA+PROBE: NOT DETECTED
HPIV4 RNA SPEC QL NAA+PROBE: NOT DETECTED
IMM GRANULOCYTES # BLD AUTO: 0 K/UL
IMM GRANULOCYTES NFR BLD AUTO: 0 %
INR PPP: 1.3 (ref 0.9–1.1)
INR PPP: 1.4 (ref 0.9–1.1)
IPAP/PIP/HIGH PEEP: 12
IPAP/PIP/HIGH PEEP: 14
IRON SATN MFR SERPL: 22 % (ref 20–50)
IRON SERPL-MCNC: 51 UG/DL (ref 35–150)
K OXYTOCA DNA BLD POS QL NAA+NON-PROBE: NOT DETECTED
KETONES UR QL STRIP.AUTO: NEGATIVE MG/DL
KLEBSIELLA SP DNA BLD POS QL NAA+NON-PRB: NOT DETECTED
KLEBSIELLA SP DNA BLD POS QL NAA+NON-PRB: NOT DETECTED
L MONOCYTOG DNA BLD POS QL NAA+NON-PROBE: NOT DETECTED
LACTATE SERPL-SCNC: 1.4 MMOL/L (ref 0.4–2)
LACTATE SERPL-SCNC: 1.5 MMOL/L (ref 0.4–2)
LACTATE SERPL-SCNC: 2.3 MMOL/L (ref 0.4–2)
LACTATE SERPL-SCNC: 3.3 MMOL/L (ref 0.4–2)
LACTATE SERPL-SCNC: 5.9 MMOL/L (ref 0.4–2)
LEUKOCYTE ESTERASE UR QL STRIP.AUTO: ABNORMAL
LYMPHOCYTES # BLD: 0.1 K/UL (ref 0.8–3.5)
LYMPHOCYTES # BLD: 0.2 K/UL (ref 0.8–3.5)
LYMPHOCYTES # BLD: 0.3 K/UL (ref 0.8–3.5)
LYMPHOCYTES # BLD: 0.5 K/UL (ref 0.8–3.5)
LYMPHOCYTES NFR BLD: 11 % (ref 12–49)
LYMPHOCYTES NFR BLD: 19 % (ref 12–49)
LYMPHOCYTES NFR BLD: 19 % (ref 12–49)
LYMPHOCYTES NFR BLD: 20 % (ref 12–49)
LYMPHOCYTES NFR BLD: 27 % (ref 12–49)
LYMPHOCYTES NFR BLD: 28 % (ref 12–49)
M PNEUMO DNA SPEC QL NAA+PROBE: NOT DETECTED
MAGNESIUM SERPL-MCNC: 2.2 MG/DL (ref 1.6–2.4)
MAGNESIUM SERPL-MCNC: 2.6 MG/DL (ref 1.6–2.4)
MAGNESIUM SERPL-MCNC: 3.1 MG/DL (ref 1.6–2.4)
MCH RBC QN AUTO: 37.9 PG (ref 26–34)
MCH RBC QN AUTO: 38.2 PG (ref 26–34)
MCH RBC QN AUTO: 38.3 PG (ref 26–34)
MCH RBC QN AUTO: 38.3 PG (ref 26–34)
MCH RBC QN AUTO: 38.5 PG (ref 26–34)
MCH RBC QN AUTO: 38.7 PG (ref 26–34)
MCHC RBC AUTO-ENTMCNC: 30.9 G/DL (ref 30–36.5)
MCHC RBC AUTO-ENTMCNC: 31 G/DL (ref 30–36.5)
MCHC RBC AUTO-ENTMCNC: 31.3 G/DL (ref 30–36.5)
MCHC RBC AUTO-ENTMCNC: 31.9 G/DL (ref 30–36.5)
MCHC RBC AUTO-ENTMCNC: 32 G/DL (ref 30–36.5)
MCHC RBC AUTO-ENTMCNC: 32.2 G/DL (ref 30–36.5)
MCV RBC AUTO: 118.7 FL (ref 80–99)
MCV RBC AUTO: 119.5 FL (ref 80–99)
MCV RBC AUTO: 119.7 FL (ref 80–99)
MCV RBC AUTO: 122.1 FL (ref 80–99)
MCV RBC AUTO: 123.4 FL (ref 80–99)
MCV RBC AUTO: 125.2 FL (ref 80–99)
METAMYELOCYTES NFR BLD MANUAL: 1 %
METAMYELOCYTES NFR BLD MANUAL: 1 %
METAMYELOCYTES NFR BLD MANUAL: 2 %
METAMYELOCYTES NFR BLD MANUAL: 6 %
METAMYELOCYTES NFR BLD MANUAL: 8 %
MONOCYTES # BLD: 0 K/UL (ref 0–1)
MONOCYTES # BLD: 0.1 K/UL (ref 0–1)
MONOCYTES # BLD: 0.2 K/UL (ref 0–1)
MONOCYTES NFR BLD: 21 % (ref 5–13)
MONOCYTES NFR BLD: 3 % (ref 5–13)
MONOCYTES NFR BLD: 7 % (ref 5–13)
MONOCYTES NFR BLD: 8 % (ref 5–13)
MONOCYTES NFR BLD: 8 % (ref 5–13)
MONOCYTES NFR BLD: 9 % (ref 5–13)
MYELOCYTES NFR BLD MANUAL: 1 %
MYELOCYTES NFR BLD MANUAL: 7 %
N MEN DNA BLD POS QL NAA+NON-PROBE: NOT DETECTED
NEUTS BAND NFR BLD MANUAL: 2 % (ref 0–6)
NEUTS BAND NFR BLD MANUAL: 22 % (ref 0–6)
NEUTS BAND NFR BLD MANUAL: 6 % (ref 0–6)
NEUTS BAND NFR BLD MANUAL: 6 % (ref 0–6)
NEUTS BAND NFR BLD MANUAL: 7 % (ref 0–6)
NEUTS SEG # BLD: 0.4 K/UL (ref 1.8–8)
NEUTS SEG # BLD: 0.5 K/UL (ref 1.8–8)
NEUTS SEG # BLD: 0.5 K/UL (ref 1.8–8)
NEUTS SEG # BLD: 0.9 K/UL (ref 1.8–8)
NEUTS SEG # BLD: 0.9 K/UL (ref 1.8–8)
NEUTS SEG # BLD: 1.1 K/UL (ref 1.8–8)
NEUTS SEG NFR BLD: 37 % (ref 32–75)
NEUTS SEG NFR BLD: 54 % (ref 32–75)
NEUTS SEG NFR BLD: 56 % (ref 32–75)
NEUTS SEG NFR BLD: 58 % (ref 32–75)
NEUTS SEG NFR BLD: 61 % (ref 32–75)
NEUTS SEG NFR BLD: 78 % (ref 32–75)
NITRITE UR QL STRIP.AUTO: POSITIVE
NRBC # BLD: 0.02 K/UL (ref 0–0.01)
NRBC # BLD: 0.03 K/UL (ref 0–0.01)
NRBC # BLD: 0.04 K/UL (ref 0–0.01)
NRBC # BLD: 0.04 K/UL (ref 0–0.01)
NRBC BLD-RTO: 2.4 PER 100 WBC
NRBC BLD-RTO: 2.4 PER 100 WBC
NRBC BLD-RTO: 2.5 PER 100 WBC
NRBC BLD-RTO: 3 PER 100 WBC
NRBC BLD-RTO: 4 PER 100 WBC
NRBC BLD-RTO: 4.5 PER 100 WBC
NT PRO BNP: ABNORMAL PG/ML (ref 0–450)
O2/TOTAL GAS SETTING VFR VENT: 50 %
O2/TOTAL GAS SETTING VFR VENT: 60 %
P AERUGINOSA DNA BLD POS NAA+NON-PROBE: NOT DETECTED
PATH REV BLD -IMP: ABNORMAL
PATH REV BLD -IMP: ABNORMAL
PCO2 BLD: 24.5 MMHG (ref 35–45)
PCO2 BLD: 24.6 MMHG (ref 35–45)
PCO2 BLD: 26 MMHG (ref 35–45)
PCO2 BLD: 28 MMHG (ref 35–45)
PCO2 BLD: 31.6 MMHG (ref 35–45)
PEEP RESPIRATORY: 6 CMH2O
PEEP RESPIRATORY: 6 CMH2O
PERIPHERAL SMEAR, MD REVIEW: NORMAL
PH BLD: 7.35 (ref 7.35–7.45)
PH BLD: 7.41 (ref 7.35–7.45)
PH BLD: 7.42 (ref 7.35–7.45)
PH BLD: 7.43 (ref 7.35–7.45)
PH BLD: 7.46 (ref 7.35–7.45)
PH UR STRIP: 8 (ref 5–8)
PHOSPHATE SERPL-MCNC: 4.5 MG/DL (ref 2.6–4.7)
PHOSPHATE SERPL-MCNC: 5.8 MG/DL (ref 2.6–4.7)
PHOSPHATE SERPL-MCNC: 7.1 MG/DL (ref 2.6–4.7)
PLATELET # BLD AUTO: 101 K/UL (ref 150–400)
PLATELET # BLD AUTO: 104 K/UL (ref 150–400)
PLATELET # BLD AUTO: 104 K/UL (ref 150–400)
PLATELET # BLD AUTO: 126 K/UL (ref 150–400)
PLATELET # BLD AUTO: 42 K/UL (ref 150–400)
PLATELET # BLD AUTO: 50 K/UL (ref 150–400)
PMV BLD AUTO: 11.5 FL (ref 8.9–12.9)
PMV BLD AUTO: 11.7 FL (ref 8.9–12.9)
PMV BLD AUTO: 11.8 FL (ref 8.9–12.9)
PMV BLD AUTO: 11.9 FL (ref 8.9–12.9)
PMV BLD AUTO: 12.1 FL (ref 8.9–12.9)
PO2 BLD: 101 MMHG (ref 80–100)
PO2 BLD: 48 MMHG (ref 80–100)
PO2 BLD: 51 MMHG (ref 80–100)
PO2 BLD: 66 MMHG (ref 80–100)
PO2 BLD: 72 MMHG (ref 80–100)
POTASSIUM SERPL-SCNC: 3.9 MMOL/L (ref 3.5–5.1)
POTASSIUM SERPL-SCNC: 4.4 MMOL/L (ref 3.5–5.1)
POTASSIUM SERPL-SCNC: 4.4 MMOL/L (ref 3.5–5.1)
POTASSIUM SERPL-SCNC: 4.6 MMOL/L (ref 3.5–5.1)
POTASSIUM SERPL-SCNC: 4.8 MMOL/L (ref 3.5–5.1)
PRESSURE SUPPORT SETTING VENT: 6 CMH2O
PRESSURE SUPPORT SETTING VENT: 8 CMH2O
PROCALCITONIN SERPL-MCNC: 28.35 NG/ML
PROCALCITONIN SERPL-MCNC: 30.75 NG/ML
PROT SERPL-MCNC: 6.6 G/DL (ref 6.4–8.2)
PROT SERPL-MCNC: 6.9 G/DL (ref 6.4–8.2)
PROT SERPL-MCNC: 7 G/DL (ref 6.4–8.2)
PROT SERPL-MCNC: 7.9 G/DL (ref 6.4–8.2)
PROT UR STRIP-MCNC: 100 MG/DL
PROTEUS SP DNA BLD POS QL NAA+NON-PROBE: NOT DETECTED
PROTHROMBIN TIME: 13.5 SEC (ref 9–11.1)
PROTHROMBIN TIME: 14.1 SEC (ref 9–11.1)
RBC # BLD AUTO: 1.19 M/UL (ref 4.1–5.7)
RBC # BLD AUTO: 1.75 M/UL (ref 4.1–5.7)
RBC # BLD AUTO: 1.83 M/UL (ref 4.1–5.7)
RBC # BLD AUTO: 1.95 M/UL (ref 4.1–5.7)
RBC # BLD AUTO: 1.99 M/UL (ref 4.1–5.7)
RBC # BLD AUTO: 2.14 M/UL (ref 4.1–5.7)
RBC #/AREA URNS HPF: ABNORMAL /HPF (ref 0–5)
RBC MORPH BLD: ABNORMAL
RESISTANT GENE TARGETS: ABNORMAL
RSV RNA SPEC QL NAA+PROBE: NOT DETECTED
RV+EV RNA SPEC QL NAA+PROBE: NOT DETECTED
S AUREUS DNA BLD POS QL NAA+NON-PROBE: NOT DETECTED
S AUREUS+CONS DNA BLD POS NAA+NON-PROBE: NOT DETECTED
S EPIDERMIDIS DNA BLD POS QL NAA+NON-PRB: NOT DETECTED
S LUGDUNENSIS DNA BLD POS QL NAA+NON-PRB: NOT DETECTED
S MALTOPHILIA DNA BLD POS QL NAA+NON-PRB: NOT DETECTED
S MARCESCENS DNA BLD POS NAA+NON-PROBE: NOT DETECTED
S PNEUM DNA BLD POS QL NAA+NON-PROBE: NOT DETECTED
S PYO DNA BLD POS QL NAA+NON-PROBE: NOT DETECTED
SALMONELLA DNA BLD POS QL NAA+NON-PROBE: NOT DETECTED
SAO2 % BLD: 84.7 % (ref 92–97)
SAO2 % BLD: 88 % (ref 92–97)
SAO2 % BLD: 94.4 % (ref 92–97)
SAO2 % BLD: 95 % (ref 92–97)
SAO2 % BLD: 97.6 % (ref 92–97)
SARS-COV-2 RDRP RESP QL NAA+PROBE: NOT DETECTED
SARS-COV-2 RNA RESP QL NAA+PROBE: NOT DETECTED
SERVICE CMNT-IMP: ABNORMAL
SERVICE CMNT-IMP: NORMAL
SODIUM SERPL-SCNC: 136 MMOL/L (ref 136–145)
SODIUM SERPL-SCNC: 141 MMOL/L (ref 136–145)
SODIUM SERPL-SCNC: 141 MMOL/L (ref 136–145)
SODIUM SERPL-SCNC: 145 MMOL/L (ref 136–145)
SODIUM SERPL-SCNC: 153 MMOL/L (ref 136–145)
SOURCE: NORMAL
SP GR UR REFRACTOMETRY: 1.01 (ref 1–1.03)
SPECIMEN EXP DATE BLD: NORMAL
SPECIMEN HOLD: NORMAL
SPECIMEN HOLD: NORMAL
SPECIMEN TYPE: ABNORMAL
STREPTOCOCCUS DNA BLD POS NAA+NON-PROBE: NOT DETECTED
THERAPEUTIC RANGE: NORMAL SECS (ref 58–77)
TIBC SERPL-MCNC: 234 UG/DL (ref 250–450)
TROPONIN I SERPL HS-MCNC: 22 NG/L (ref 0–76)
TSH SERPL DL<=0.05 MIU/L-ACNC: 1.16 UIU/ML (ref 0.36–3.74)
UNIT DIVISION: 0
UNIT DIVISION: 0
UROBILINOGEN UR QL STRIP.AUTO: 0.2 EU/DL (ref 0.2–1)
VENTILATION MODE VENT: ABNORMAL
VIT B12 SERPL-MCNC: 815 PG/ML (ref 193–986)
WBC # BLD AUTO: 0.7 K/UL (ref 4.1–11.1)
WBC # BLD AUTO: 0.8 K/UL (ref 4.1–11.1)
WBC # BLD AUTO: 0.8 K/UL (ref 4.1–11.1)
WBC # BLD AUTO: 1.3 K/UL (ref 4.1–11.1)
WBC # BLD AUTO: 1.3 K/UL (ref 4.1–11.1)
WBC # BLD AUTO: 1.7 K/UL (ref 4.1–11.1)
WBC MORPH BLD: ABNORMAL
WBC MORPH BLD: ABNORMAL
WBC URNS QL MICRO: ABNORMAL /HPF (ref 0–4)

## 2023-01-01 PROCEDURE — 96375 TX/PRO/DX INJ NEW DRUG ADDON: CPT

## 2023-01-01 PROCEDURE — 93010 ELECTROCARDIOGRAM REPORT: CPT | Performed by: INTERNAL MEDICINE

## 2023-01-01 PROCEDURE — 99285 EMERGENCY DEPT VISIT HI MDM: CPT

## 2023-01-01 PROCEDURE — 87040 BLOOD CULTURE FOR BACTERIA: CPT

## 2023-01-01 PROCEDURE — 6360000002 HC RX W HCPCS: Performed by: ANESTHESIOLOGY

## 2023-01-01 PROCEDURE — 2580000003 HC RX 258: Performed by: NURSE PRACTITIONER

## 2023-01-01 PROCEDURE — 87804 INFLUENZA ASSAY W/OPTIC: CPT

## 2023-01-01 PROCEDURE — 2500000003 HC RX 250 WO HCPCS: Performed by: ANESTHESIOLOGY

## 2023-01-01 PROCEDURE — 0202U NFCT DS 22 TRGT SARS-COV-2: CPT

## 2023-01-01 PROCEDURE — 85025 COMPLETE CBC W/AUTO DIFF WBC: CPT

## 2023-01-01 PROCEDURE — 71045 X-RAY EXAM CHEST 1 VIEW: CPT

## 2023-01-01 PROCEDURE — 2580000003 HC RX 258: Performed by: EMERGENCY MEDICINE

## 2023-01-01 PROCEDURE — 83605 ASSAY OF LACTIC ACID: CPT

## 2023-01-01 PROCEDURE — 85610 PROTHROMBIN TIME: CPT

## 2023-01-01 PROCEDURE — 84484 ASSAY OF TROPONIN QUANT: CPT

## 2023-01-01 PROCEDURE — 2000000000 HC ICU R&B

## 2023-01-01 PROCEDURE — 6370000000 HC RX 637 (ALT 250 FOR IP): Performed by: NURSE PRACTITIONER

## 2023-01-01 PROCEDURE — 93306 TTE W/DOPPLER COMPLETE: CPT

## 2023-01-01 PROCEDURE — P9016 RBC LEUKOCYTES REDUCED: HCPCS

## 2023-01-01 PROCEDURE — 36415 COLL VENOUS BLD VENIPUNCTURE: CPT

## 2023-01-01 PROCEDURE — 82803 BLOOD GASES ANY COMBINATION: CPT

## 2023-01-01 PROCEDURE — 87635 SARS-COV-2 COVID-19 AMP PRB: CPT

## 2023-01-01 PROCEDURE — 6360000002 HC RX W HCPCS: Performed by: EMERGENCY MEDICINE

## 2023-01-01 PROCEDURE — 86901 BLOOD TYPING SEROLOGIC RH(D): CPT

## 2023-01-01 PROCEDURE — 81001 URINALYSIS AUTO W/SCOPE: CPT

## 2023-01-01 PROCEDURE — 83735 ASSAY OF MAGNESIUM: CPT

## 2023-01-01 PROCEDURE — 99223 1ST HOSP IP/OBS HIGH 75: CPT | Performed by: NURSE PRACTITIONER

## 2023-01-01 PROCEDURE — 94640 AIRWAY INHALATION TREATMENT: CPT

## 2023-01-01 PROCEDURE — 93005 ELECTROCARDIOGRAM TRACING: CPT | Performed by: EMERGENCY MEDICINE

## 2023-01-01 PROCEDURE — C9113 INJ PANTOPRAZOLE SODIUM, VIA: HCPCS | Performed by: NURSE PRACTITIONER

## 2023-01-01 PROCEDURE — 6360000002 HC RX W HCPCS: Performed by: NURSE PRACTITIONER

## 2023-01-01 PROCEDURE — A4216 STERILE WATER/SALINE, 10 ML: HCPCS | Performed by: NURSE PRACTITIONER

## 2023-01-01 PROCEDURE — 36600 WITHDRAWAL OF ARTERIAL BLOOD: CPT

## 2023-01-01 PROCEDURE — 94660 CPAP INITIATION&MGMT: CPT

## 2023-01-01 PROCEDURE — 2500000003 HC RX 250 WO HCPCS: Performed by: EMERGENCY MEDICINE

## 2023-01-01 PROCEDURE — 2580000003 HC RX 258: Performed by: ANESTHESIOLOGY

## 2023-01-01 PROCEDURE — 86923 COMPATIBILITY TEST ELECTRIC: CPT

## 2023-01-01 PROCEDURE — 84443 ASSAY THYROID STIM HORMONE: CPT

## 2023-01-01 PROCEDURE — 80053 COMPREHEN METABOLIC PANEL: CPT

## 2023-01-01 PROCEDURE — 1100000000 HC RM PRIVATE

## 2023-01-01 PROCEDURE — 6370000000 HC RX 637 (ALT 250 FOR IP): Performed by: EMERGENCY MEDICINE

## 2023-01-01 PROCEDURE — 85730 THROMBOPLASTIN TIME PARTIAL: CPT

## 2023-01-01 PROCEDURE — 86850 RBC ANTIBODY SCREEN: CPT

## 2023-01-01 PROCEDURE — 87186 SC STD MICRODIL/AGAR DIL: CPT

## 2023-01-01 PROCEDURE — 0656 HSPC GENERAL INPATIENT

## 2023-01-01 PROCEDURE — 93306 TTE W/DOPPLER COMPLETE: CPT | Performed by: SPECIALIST

## 2023-01-01 PROCEDURE — 87154 CUL TYP ID BLD PTHGN 6+ TRGT: CPT

## 2023-01-01 PROCEDURE — 84100 ASSAY OF PHOSPHORUS: CPT

## 2023-01-01 PROCEDURE — 99222 1ST HOSP IP/OBS MODERATE 55: CPT | Performed by: INTERNAL MEDICINE

## 2023-01-01 PROCEDURE — 83880 ASSAY OF NATRIURETIC PEPTIDE: CPT

## 2023-01-01 PROCEDURE — 87185 SC STD ENZYME DETCJ PER NZM: CPT

## 2023-01-01 PROCEDURE — 83550 IRON BINDING TEST: CPT

## 2023-01-01 PROCEDURE — 93356 MYOCRD STRAIN IMG SPCKL TRCK: CPT | Performed by: SPECIALIST

## 2023-01-01 PROCEDURE — 6370000000 HC RX 637 (ALT 250 FOR IP): Performed by: STUDENT IN AN ORGANIZED HEALTH CARE EDUCATION/TRAINING PROGRAM

## 2023-01-01 PROCEDURE — 82962 GLUCOSE BLOOD TEST: CPT

## 2023-01-01 PROCEDURE — 82728 ASSAY OF FERRITIN: CPT

## 2023-01-01 PROCEDURE — 82746 ASSAY OF FOLIC ACID SERUM: CPT

## 2023-01-01 PROCEDURE — 83540 ASSAY OF IRON: CPT

## 2023-01-01 PROCEDURE — 2500000003 HC RX 250 WO HCPCS: Performed by: NURSE PRACTITIONER

## 2023-01-01 PROCEDURE — 84145 PROCALCITONIN (PCT): CPT

## 2023-01-01 PROCEDURE — 96365 THER/PROPH/DIAG IV INF INIT: CPT

## 2023-01-01 PROCEDURE — 82607 VITAMIN B-12: CPT

## 2023-01-01 PROCEDURE — 36430 TRANSFUSION BLD/BLD COMPNT: CPT

## 2023-01-01 PROCEDURE — 86900 BLOOD TYPING SEROLOGIC ABO: CPT

## 2023-01-01 PROCEDURE — 87077 CULTURE AEROBIC IDENTIFY: CPT

## 2023-01-01 PROCEDURE — 92612 ENDOSCOPY SWALLOW (FEES) VID: CPT

## 2023-01-01 RX ORDER — SODIUM CHLORIDE, SODIUM LACTATE, POTASSIUM CHLORIDE, AND CALCIUM CHLORIDE .6; .31; .03; .02 G/100ML; G/100ML; G/100ML; G/100ML
1000 INJECTION, SOLUTION INTRAVENOUS ONCE
Status: COMPLETED | OUTPATIENT
Start: 2023-01-01 | End: 2023-01-01

## 2023-01-01 RX ORDER — SODIUM CHLORIDE 0.9 % (FLUSH) 0.9 %
5-40 SYRINGE (ML) INJECTION PRN
Status: DISCONTINUED | OUTPATIENT
Start: 2023-01-01 | End: 2023-01-01

## 2023-01-01 RX ORDER — ACETAMINOPHEN 650 MG/1
650 SUPPOSITORY RECTAL EVERY 6 HOURS PRN
Status: DISCONTINUED | OUTPATIENT
Start: 2023-01-01 | End: 2023-01-01

## 2023-01-01 RX ORDER — HYDROMORPHONE HYDROCHLORIDE 1 MG/ML
0.5 INJECTION, SOLUTION INTRAMUSCULAR; INTRAVENOUS; SUBCUTANEOUS
Status: DISCONTINUED | OUTPATIENT
Start: 2023-01-01 | End: 2023-01-01

## 2023-01-01 RX ORDER — NOREPINEPHRINE BITARTRATE 0.06 MG/ML
1-100 INJECTION, SOLUTION INTRAVENOUS CONTINUOUS
Status: DISCONTINUED | OUTPATIENT
Start: 2023-01-01 | End: 2023-01-01

## 2023-01-01 RX ORDER — SODIUM CHLORIDE 9 MG/ML
INJECTION, SOLUTION INTRAVENOUS PRN
Status: DISCONTINUED | OUTPATIENT
Start: 2023-01-01 | End: 2023-01-01

## 2023-01-01 RX ORDER — 0.9 % SODIUM CHLORIDE 0.9 %
30 INTRAVENOUS SOLUTION INTRAVENOUS ONCE
Status: COMPLETED | OUTPATIENT
Start: 2023-01-01 | End: 2023-01-01

## 2023-01-01 RX ORDER — GLYCOPYRROLATE 0.2 MG/ML
0.2 INJECTION INTRAMUSCULAR; INTRAVENOUS EVERY 4 HOURS PRN
Status: DISCONTINUED | OUTPATIENT
Start: 2023-01-01 | End: 2023-01-01 | Stop reason: HOSPADM

## 2023-01-01 RX ORDER — ACETAMINOPHEN 325 MG/1
650 TABLET ORAL EVERY 6 HOURS PRN
Status: DISCONTINUED | OUTPATIENT
Start: 2023-01-01 | End: 2023-01-01

## 2023-01-01 RX ORDER — HEPARIN SODIUM 5000 [USP'U]/ML
5000 INJECTION, SOLUTION INTRAVENOUS; SUBCUTANEOUS EVERY 8 HOURS SCHEDULED
Status: DISCONTINUED | OUTPATIENT
Start: 2023-01-01 | End: 2023-01-01

## 2023-01-01 RX ORDER — MIDAZOLAM HYDROCHLORIDE 2 MG/2ML
2 INJECTION, SOLUTION INTRAMUSCULAR; INTRAVENOUS
Status: DISCONTINUED | OUTPATIENT
Start: 2023-01-01 | End: 2024-01-01 | Stop reason: HOSPADM

## 2023-01-01 RX ORDER — ONDANSETRON 4 MG/1
4 TABLET, ORALLY DISINTEGRATING ORAL EVERY 8 HOURS PRN
Status: DISCONTINUED | OUTPATIENT
Start: 2023-01-01 | End: 2023-01-01

## 2023-01-01 RX ORDER — LORAZEPAM 2 MG/ML
1 CONCENTRATE ORAL
Status: DISCONTINUED | OUTPATIENT
Start: 2023-01-01 | End: 2023-01-01 | Stop reason: HOSPADM

## 2023-01-01 RX ORDER — SODIUM CHLORIDE 0.9 % (FLUSH) 0.9 %
5-40 SYRINGE (ML) INJECTION EVERY 12 HOURS SCHEDULED
Status: DISCONTINUED | OUTPATIENT
Start: 2023-01-01 | End: 2023-01-01

## 2023-01-01 RX ORDER — POLYETHYLENE GLYCOL 3350 17 G/17G
17 POWDER, FOR SOLUTION ORAL DAILY PRN
Status: DISCONTINUED | OUTPATIENT
Start: 2023-01-01 | End: 2023-01-01

## 2023-01-01 RX ORDER — DEXTROSE MONOHYDRATE 50 MG/ML
INJECTION, SOLUTION INTRAVENOUS CONTINUOUS
Status: DISCONTINUED | OUTPATIENT
Start: 2023-01-01 | End: 2023-01-01

## 2023-01-01 RX ORDER — GLYCOPYRROLATE 0.2 MG/ML
0.2 INJECTION INTRAMUSCULAR; INTRAVENOUS EVERY 4 HOURS
Status: DISCONTINUED | OUTPATIENT
Start: 2023-01-01 | End: 2024-01-01 | Stop reason: HOSPADM

## 2023-01-01 RX ORDER — DEXMEDETOMIDINE HYDROCHLORIDE 4 UG/ML
.1-1.5 INJECTION, SOLUTION INTRAVENOUS CONTINUOUS
Status: DISCONTINUED | OUTPATIENT
Start: 2023-01-01 | End: 2023-01-01

## 2023-01-01 RX ORDER — DIAZEPAM 5 MG/ML
2.5 INJECTION, SOLUTION INTRAMUSCULAR; INTRAVENOUS EVERY 4 HOURS PRN
Status: DISCONTINUED | OUTPATIENT
Start: 2023-01-01 | End: 2023-01-01 | Stop reason: HOSPADM

## 2023-01-01 RX ORDER — LORAZEPAM 2 MG/ML
1 CONCENTRATE ORAL
Qty: 10 ML | Refills: 0 | Status: SHIPPED | OUTPATIENT
Start: 2023-01-01 | End: 2024-01-02

## 2023-01-01 RX ORDER — HYDROMORPHONE HYDROCHLORIDE 1 MG/ML
2 SOLUTION ORAL
Status: DISCONTINUED | OUTPATIENT
Start: 2023-01-01 | End: 2023-01-01 | Stop reason: HOSPADM

## 2023-01-01 RX ORDER — KETOROLAC TROMETHAMINE 30 MG/ML
30 INJECTION, SOLUTION INTRAMUSCULAR; INTRAVENOUS EVERY 6 HOURS PRN
Status: DISCONTINUED | OUTPATIENT
Start: 2023-01-01 | End: 2024-01-01 | Stop reason: HOSPADM

## 2023-01-01 RX ORDER — SODIUM CHLORIDE, SODIUM LACTATE, POTASSIUM CHLORIDE, CALCIUM CHLORIDE 600; 310; 30; 20 MG/100ML; MG/100ML; MG/100ML; MG/100ML
INJECTION, SOLUTION INTRAVENOUS CONTINUOUS
Status: DISCONTINUED | OUTPATIENT
Start: 2023-01-01 | End: 2023-01-01

## 2023-01-01 RX ORDER — MAGNESIUM SULFATE IN WATER 40 MG/ML
2000 INJECTION, SOLUTION INTRAVENOUS PRN
Status: DISCONTINUED | OUTPATIENT
Start: 2023-01-01 | End: 2023-01-01

## 2023-01-01 RX ORDER — MORPHINE SULFATE 10 MG/5ML
5 SOLUTION ORAL
Qty: 40 ML | Refills: 0 | Status: SHIPPED | OUTPATIENT
Start: 2023-01-01 | End: 2024-01-02

## 2023-01-01 RX ORDER — QUETIAPINE FUMARATE 25 MG/1
50 TABLET, FILM COATED ORAL 2 TIMES DAILY
Status: DISCONTINUED | OUTPATIENT
Start: 2023-01-01 | End: 2023-01-01

## 2023-01-01 RX ORDER — ACETAMINOPHEN 650 MG/1
650 SUPPOSITORY RECTAL EVERY 4 HOURS PRN
Status: DISCONTINUED | OUTPATIENT
Start: 2023-01-01 | End: 2024-01-01 | Stop reason: HOSPADM

## 2023-01-01 RX ORDER — LORAZEPAM 2 MG/ML
1 CONCENTRATE ORAL
Qty: 10 ML | Refills: 0 | Status: SHIPPED | OUTPATIENT
Start: 2023-01-01 | End: 2023-01-01

## 2023-01-01 RX ORDER — GLYCOPYRROLATE 0.2 MG/ML
0.2 INJECTION INTRAMUSCULAR; INTRAVENOUS EVERY 4 HOURS PRN
Status: DISCONTINUED | OUTPATIENT
Start: 2023-01-01 | End: 2024-01-01 | Stop reason: HOSPADM

## 2023-01-01 RX ORDER — POTASSIUM CHLORIDE 7.45 MG/ML
10 INJECTION INTRAVENOUS PRN
Status: DISCONTINUED | OUTPATIENT
Start: 2023-01-01 | End: 2023-01-01

## 2023-01-01 RX ORDER — OXYCODONE HCL 5 MG/5 ML
5 SOLUTION, ORAL ORAL EVERY 4 HOURS PRN
Status: DISCONTINUED | OUTPATIENT
Start: 2023-01-01 | End: 2023-01-01 | Stop reason: HOSPADM

## 2023-01-01 RX ORDER — IPRATROPIUM BROMIDE AND ALBUTEROL SULFATE 2.5; .5 MG/3ML; MG/3ML
1 SOLUTION RESPIRATORY (INHALATION)
Status: COMPLETED | OUTPATIENT
Start: 2023-01-01 | End: 2023-01-01

## 2023-01-01 RX ORDER — BISACODYL 10 MG
10 SUPPOSITORY, RECTAL RECTAL DAILY PRN
Status: DISCONTINUED | OUTPATIENT
Start: 2023-01-01 | End: 2024-01-01 | Stop reason: HOSPADM

## 2023-01-01 RX ORDER — POTASSIUM CHLORIDE 29.8 MG/ML
20 INJECTION INTRAVENOUS PRN
Status: DISCONTINUED | OUTPATIENT
Start: 2023-01-01 | End: 2023-01-01

## 2023-01-01 RX ORDER — LANOLIN ALCOHOL/MO/W.PET/CERES
6 CREAM (GRAM) TOPICAL NIGHTLY PRN
Status: DISCONTINUED | OUTPATIENT
Start: 2023-01-01 | End: 2023-01-01

## 2023-01-01 RX ORDER — IPRATROPIUM BROMIDE AND ALBUTEROL SULFATE 2.5; .5 MG/3ML; MG/3ML
1 SOLUTION RESPIRATORY (INHALATION)
Status: DISCONTINUED | OUTPATIENT
Start: 2023-01-01 | End: 2023-01-01

## 2023-01-01 RX ORDER — MONTELUKAST SODIUM 10 MG/1
10 TABLET ORAL NIGHTLY
Status: DISCONTINUED | OUTPATIENT
Start: 2023-01-01 | End: 2023-01-01

## 2023-01-01 RX ORDER — HYDROMORPHONE HYDROCHLORIDE 1 MG/ML
0.5 INJECTION, SOLUTION INTRAMUSCULAR; INTRAVENOUS; SUBCUTANEOUS EVERY 4 HOURS
Status: DISCONTINUED | OUTPATIENT
Start: 2023-01-01 | End: 2024-01-01 | Stop reason: HOSPADM

## 2023-01-01 RX ORDER — SALIVA STIMULANT COMB. NO.3
SPRAY, NON-AEROSOL (ML) MUCOUS MEMBRANE PRN
Status: DISCONTINUED | OUTPATIENT
Start: 2023-01-01 | End: 2023-01-01 | Stop reason: HOSPADM

## 2023-01-01 RX ORDER — SODIUM CHLORIDE 0.9 % (FLUSH) 0.9 %
5 SYRINGE (ML) INJECTION PRN
Status: DISCONTINUED | OUTPATIENT
Start: 2023-01-01 | End: 2024-01-01 | Stop reason: HOSPADM

## 2023-01-01 RX ORDER — ONDANSETRON 2 MG/ML
4 INJECTION INTRAMUSCULAR; INTRAVENOUS EVERY 6 HOURS PRN
Status: DISCONTINUED | OUTPATIENT
Start: 2023-01-01 | End: 2023-01-01 | Stop reason: HOSPADM

## 2023-01-01 RX ORDER — HYDROMORPHONE HYDROCHLORIDE 1 MG/ML
0.5 INJECTION, SOLUTION INTRAMUSCULAR; INTRAVENOUS; SUBCUTANEOUS
Status: DISCONTINUED | OUTPATIENT
Start: 2023-01-01 | End: 2024-01-01 | Stop reason: HOSPADM

## 2023-01-01 RX ADMIN — HEPARIN SODIUM 5000 UNITS: 5000 INJECTION INTRAVENOUS; SUBCUTANEOUS at 05:45

## 2023-01-01 RX ADMIN — SODIUM CHLORIDE 5 MCG/MIN: 9 INJECTION, SOLUTION INTRAVENOUS at 15:19

## 2023-01-01 RX ADMIN — SODIUM CHLORIDE 40 MG: 9 INJECTION INTRAMUSCULAR; INTRAVENOUS; SUBCUTANEOUS at 18:34

## 2023-01-01 RX ADMIN — SODIUM CHLORIDE 1833 ML: 9 INJECTION, SOLUTION INTRAVENOUS at 14:15

## 2023-01-01 RX ADMIN — METHYLPREDNISOLONE SODIUM SUCCINATE 125 MG: 125 INJECTION, POWDER, FOR SOLUTION INTRAMUSCULAR; INTRAVENOUS at 14:22

## 2023-01-01 RX ADMIN — Medication 5 MG: at 16:30

## 2023-01-01 RX ADMIN — DEXTROSE MONOHYDRATE: 50 INJECTION, SOLUTION INTRAVENOUS at 12:07

## 2023-01-01 RX ADMIN — HYDROMORPHONE HYDROCHLORIDE 0.5 MG: 1 INJECTION, SOLUTION INTRAMUSCULAR; INTRAVENOUS; SUBCUTANEOUS at 11:33

## 2023-01-01 RX ADMIN — METHYLPREDNISOLONE SODIUM SUCCINATE 60 MG: 125 INJECTION, POWDER, FOR SOLUTION INTRAMUSCULAR; INTRAVENOUS at 21:30

## 2023-01-01 RX ADMIN — SODIUM CHLORIDE 40 MG: 9 INJECTION INTRAMUSCULAR; INTRAVENOUS; SUBCUTANEOUS at 09:38

## 2023-01-01 RX ADMIN — METHYLPREDNISOLONE SODIUM SUCCINATE 60 MG: 125 INJECTION, POWDER, FOR SOLUTION INTRAMUSCULAR; INTRAVENOUS at 08:57

## 2023-01-01 RX ADMIN — HYDROMORPHONE HYDROCHLORIDE 0.5 MG: 1 INJECTION, SOLUTION INTRAMUSCULAR; INTRAVENOUS; SUBCUTANEOUS at 06:31

## 2023-01-01 RX ADMIN — SODIUM CHLORIDE, POTASSIUM CHLORIDE, SODIUM LACTATE AND CALCIUM CHLORIDE 1000 ML: 600; 310; 30; 20 INJECTION, SOLUTION INTRAVENOUS at 21:24

## 2023-01-01 RX ADMIN — HEPARIN SODIUM 5000 UNITS: 5000 INJECTION INTRAVENOUS; SUBCUTANEOUS at 22:30

## 2023-01-01 RX ADMIN — HYDROMORPHONE HYDROCHLORIDE 0.5 MG: 1 INJECTION, SOLUTION INTRAMUSCULAR; INTRAVENOUS; SUBCUTANEOUS at 22:25

## 2023-01-01 RX ADMIN — PIPERACILLIN SODIUM AND TAZOBACTAM SODIUM 3375 MG: 3; .375 INJECTION, POWDER, LYOPHILIZED, FOR SOLUTION INTRAVENOUS at 15:21

## 2023-01-01 RX ADMIN — Medication 5 MG: at 11:59

## 2023-01-01 RX ADMIN — IPRATROPIUM BROMIDE AND ALBUTEROL SULFATE 1 DOSE: 2.5; .5 SOLUTION RESPIRATORY (INHALATION) at 14:08

## 2023-01-01 RX ADMIN — SODIUM CHLORIDE, PRESERVATIVE FREE 10 ML: 5 INJECTION INTRAVENOUS at 20:06

## 2023-01-01 RX ADMIN — PIPERACILLIN AND TAZOBACTAM 4500 MG: 4; .5 INJECTION, POWDER, LYOPHILIZED, FOR SOLUTION INTRAVENOUS at 14:50

## 2023-01-01 RX ADMIN — Medication 1 MG: at 11:46

## 2023-01-01 RX ADMIN — HYDROMORPHONE HYDROCHLORIDE 0.5 MG: 1 INJECTION, SOLUTION INTRAMUSCULAR; INTRAVENOUS; SUBCUTANEOUS at 20:26

## 2023-01-01 RX ADMIN — PIPERACILLIN SODIUM AND TAZOBACTAM SODIUM 3375 MG: 3; .375 INJECTION, POWDER, LYOPHILIZED, FOR SOLUTION INTRAVENOUS at 02:41

## 2023-01-01 RX ADMIN — HEPARIN SODIUM 5000 UNITS: 5000 INJECTION INTRAVENOUS; SUBCUTANEOUS at 14:18

## 2023-01-01 RX ADMIN — CALCIUM CHLORIDE 1000 MG: 100 INJECTION INTRAVENOUS; INTRAVENTRICULAR at 13:49

## 2023-01-01 RX ADMIN — DEXMEDETOMIDINE HYDROCHLORIDE 0.4 MCG/KG/HR: 400 INJECTION, SOLUTION INTRAVENOUS at 19:27

## 2023-01-01 RX ADMIN — METHYLPREDNISOLONE SODIUM SUCCINATE 60 MG: 125 INJECTION, POWDER, FOR SOLUTION INTRAMUSCULAR; INTRAVENOUS at 03:29

## 2023-01-01 RX ADMIN — IPRATROPIUM BROMIDE AND ALBUTEROL SULFATE 1 DOSE: 2.5; .5 SOLUTION RESPIRATORY (INHALATION) at 21:34

## 2023-01-01 RX ADMIN — METHYLPREDNISOLONE SODIUM SUCCINATE 60 MG: 125 INJECTION, POWDER, FOR SOLUTION INTRAMUSCULAR; INTRAVENOUS at 09:38

## 2023-01-01 RX ADMIN — GLYCOPYRROLATE 0.2 MG: 0.2 INJECTION INTRAMUSCULAR; INTRAVENOUS at 20:26

## 2023-01-01 RX ADMIN — SODIUM CHLORIDE, PRESERVATIVE FREE 10 ML: 5 INJECTION INTRAVENOUS at 09:02

## 2023-01-01 RX ADMIN — HYDROMORPHONE HYDROCHLORIDE 0.5 MG: 1 INJECTION, SOLUTION INTRAMUSCULAR; INTRAVENOUS; SUBCUTANEOUS at 00:51

## 2023-01-01 RX ADMIN — IPRATROPIUM BROMIDE AND ALBUTEROL SULFATE 1 DOSE: 2.5; .5 SOLUTION RESPIRATORY (INHALATION) at 14:18

## 2023-01-01 RX ADMIN — PIPERACILLIN SODIUM AND TAZOBACTAM SODIUM 3375 MG: 3; .375 INJECTION, POWDER, LYOPHILIZED, FOR SOLUTION INTRAVENOUS at 15:09

## 2023-01-01 RX ADMIN — Medication 5 MG: at 21:35

## 2023-01-01 RX ADMIN — PIPERACILLIN SODIUM AND TAZOBACTAM SODIUM 3375 MG: 3; .375 INJECTION, POWDER, LYOPHILIZED, FOR SOLUTION INTRAVENOUS at 03:01

## 2023-01-01 RX ADMIN — METHYLPREDNISOLONE SODIUM SUCCINATE 60 MG: 125 INJECTION, POWDER, FOR SOLUTION INTRAMUSCULAR; INTRAVENOUS at 22:30

## 2023-01-01 RX ADMIN — HEPARIN SODIUM 5000 UNITS: 5000 INJECTION INTRAVENOUS; SUBCUTANEOUS at 05:13

## 2023-01-01 RX ADMIN — HYDROMORPHONE HYDROCHLORIDE 0.5 MG: 1 INJECTION, SOLUTION INTRAMUSCULAR; INTRAVENOUS; SUBCUTANEOUS at 18:54

## 2023-01-01 RX ADMIN — SODIUM CHLORIDE 40 MG: 9 INJECTION INTRAMUSCULAR; INTRAVENOUS; SUBCUTANEOUS at 08:57

## 2023-01-01 RX ADMIN — SODIUM CHLORIDE, POTASSIUM CHLORIDE, SODIUM LACTATE AND CALCIUM CHLORIDE: 600; 310; 30; 20 INJECTION, SOLUTION INTRAVENOUS at 13:49

## 2023-01-01 RX ADMIN — SODIUM CHLORIDE, PRESERVATIVE FREE 10 ML: 5 INJECTION INTRAVENOUS at 09:38

## 2023-01-01 RX ADMIN — HEPARIN SODIUM 5000 UNITS: 5000 INJECTION INTRAVENOUS; SUBCUTANEOUS at 21:14

## 2023-01-01 RX ADMIN — SODIUM CHLORIDE, POTASSIUM CHLORIDE, SODIUM LACTATE AND CALCIUM CHLORIDE: 600; 310; 30; 20 INJECTION, SOLUTION INTRAVENOUS at 21:28

## 2023-01-01 RX ADMIN — Medication 5 MG: at 14:59

## 2023-01-01 RX ADMIN — METHYLPREDNISOLONE SODIUM SUCCINATE 60 MG: 125 INJECTION, POWDER, FOR SOLUTION INTRAMUSCULAR; INTRAVENOUS at 17:33

## 2023-01-01 RX ADMIN — SODIUM CHLORIDE 5 MCG/MIN: 9 INJECTION, SOLUTION INTRAVENOUS at 15:35

## 2023-01-01 RX ADMIN — VANCOMYCIN HYDROCHLORIDE 1500 MG: 1.5 INJECTION, POWDER, LYOPHILIZED, FOR SOLUTION INTRAVENOUS at 14:47

## 2023-01-01 RX ADMIN — IPRATROPIUM BROMIDE AND ALBUTEROL SULFATE 1 DOSE: 2.5; .5 SOLUTION RESPIRATORY (INHALATION) at 08:19

## 2023-01-01 RX ADMIN — DEXMEDETOMIDINE HYDROCHLORIDE 0.2 MCG/KG/HR: 400 INJECTION, SOLUTION INTRAVENOUS at 23:01

## 2023-01-01 RX ADMIN — METHYLPREDNISOLONE SODIUM SUCCINATE 60 MG: 125 INJECTION, POWDER, FOR SOLUTION INTRAMUSCULAR; INTRAVENOUS at 03:01

## 2023-01-01 RX ADMIN — DEXMEDETOMIDINE HYDROCHLORIDE 0.6 MCG/KG/HR: 400 INJECTION, SOLUTION INTRAVENOUS at 04:30

## 2023-01-01 ASSESSMENT — PAIN SCALES - GENERAL
PAINLEVEL_OUTOF10: 0
PAINLEVEL_OUTOF10: 4
PAINLEVEL_OUTOF10: 0
PAINLEVEL_OUTOF10: 6

## 2023-01-01 ASSESSMENT — PAIN - FUNCTIONAL ASSESSMENT: PAIN_FUNCTIONAL_ASSESSMENT: NONE - DENIES PAIN

## 2023-01-01 ASSESSMENT — PAIN DESCRIPTION - ORIENTATION: ORIENTATION: POSTERIOR

## 2023-01-01 ASSESSMENT — PAIN DESCRIPTION - DESCRIPTORS: DESCRIPTORS: PATIENT UNABLE TO DESCRIBE

## 2023-01-01 ASSESSMENT — PAIN DESCRIPTION - LOCATION
LOCATION: BUTTOCKS
LOCATION: GENERALIZED

## 2023-01-25 NOTE — TELEPHONE ENCOUNTER
I had discussed his case with Dr. Porter Posadas last week. His urine test shows abnormal cells. This is concerning for cancer. I think it makes sense to see what is going on in his bladder - recurrent disease, new primary, or other process. He is feeling fine, but is agreeable to following up with Dr. Potrer Posadas.     Faheem Garcia MD Mucosal Advancement Flap Text: Given the location of the defect, shape of the defect and the proximity to free margins a mucosal advancement flap was deemed most appropriate. Incisions were made with a 15 blade scalpel in the appropriate fashion along the cutaneous vermilion border and the mucosal lip. The remaining actinically damaged mucosal tissue was excised.  The mucosal advancement flap was then elevated to the gingival sulcus with care taken to preserve the neurovascular structures and advanced into the primary defect. Care was taken to ensure that precise realignment of the vermilion border was achieved.

## 2023-04-04 ENCOUNTER — HOSPITAL ENCOUNTER (OUTPATIENT)
Dept: OTHER | Age: 83
Setting detail: THERAPIES SERIES
Discharge: HOME | End: 2023-04-04

## 2023-04-27 ENCOUNTER — TELEPHONE (OUTPATIENT)
Dept: CARDIOLOGY | Age: 83
End: 2023-04-27

## 2023-05-01 ENCOUNTER — HOSPITAL ENCOUNTER (OUTPATIENT)
Dept: OTHER | Age: 83
Setting detail: THERAPIES SERIES
Discharge: HOME | End: 2023-05-01

## 2023-05-10 NOTE — PROGRESS NOTES
8294 - patient refusing attempts for the rest of AM lab work, states he's \"tired of being tortured\" Promise has been having Acne breakouts.     She is wondering if her symptoms might be related to the new Birth control medication she is on and if she should go back on her previous Birth control.    She is not currently in [place] or [place].  Notified that I cannot triage symptoms when pts are out of these 2 states.    Advised sending MyChart msg to Provider.    Maria Alejandra Mora RN  [facility]      Reason for Disposition    [1] Caller requesting NON-URGENT health information AND [2] PCP's office is the best resource    Protocols used: INFORMATION ONLY CALL - NO TRIAGE-A-

## 2023-06-02 ENCOUNTER — TELEPHONE (OUTPATIENT)
Dept: OTHER | Age: 83
End: 2023-06-02

## 2023-06-02 NOTE — TELEPHONE ENCOUNTER
4:44 PM  Called patient to remind him of upcoming chf clinic appt. Future Appointments   Date Time Provider Reuben Villa   6/5/2023 11:00 AM [facility] CHF ROOM 1 [facility]   6/15/2023  9:00 AM [facility] ECHO RM 1 [facility]   9/5/2023  9:00 AM DO MARLYN Serrano       Patient states he is still in [place]. Will call to schedule when he returns.   Electronically signed by Ann Laura RN on 6/2/2023 at 4:44 PM

## 2023-06-05 ENCOUNTER — HOSPITAL ENCOUNTER (OUTPATIENT)
Dept: OTHER | Age: 83
Setting detail: THERAPIES SERIES
Discharge: HOME | End: 2023-06-05

## 2023-08-03 ENCOUNTER — HOSPITAL ENCOUNTER (OUTPATIENT)
Facility: HOSPITAL | Age: 83
Discharge: HOME | End: 2023-08-03
Attending: INTERNAL MEDICINE
Payer: MEDICARE

## 2023-08-03 DIAGNOSIS — R91.1 PULMONARY NODULE: ICD-10-CM

## 2023-08-03 PROCEDURE — 71250 CT THORAX DX C-: CPT

## 2023-10-04 ENCOUNTER — TELEPHONE (OUTPATIENT)
Dept: CARDIOLOGY CLINIC | Age: 83
End: 2023-10-04

## 2023-10-04 NOTE — TELEPHONE ENCOUNTER
Patient wants to be transfer from Dr. Clara Pham to you. Was last seen in 11/23/2022. Is this okay?      Please advise

## 2023-10-10 ENCOUNTER — OFFICE VISIT (OUTPATIENT)
Dept: CARDIOLOGY CLINIC | Age: 83
End: 2023-10-10

## 2023-10-10 ENCOUNTER — NURSE ONLY (OUTPATIENT)
Dept: CARDIOLOGY CLINIC | Age: 83
End: 2023-10-10

## 2023-10-10 VITALS
SYSTOLIC BLOOD PRESSURE: 122 MMHG | HEIGHT: 70 IN | DIASTOLIC BLOOD PRESSURE: 58 MMHG | HEART RATE: 85 BPM | RESPIRATION RATE: 18 BRPM | WEIGHT: 226 LBS | BODY MASS INDEX: 32.35 KG/M2

## 2023-10-10 DIAGNOSIS — R06.02 SOB (SHORTNESS OF BREATH): Primary | ICD-10-CM

## 2023-10-10 DIAGNOSIS — Z95.0 PACEMAKER: Primary | ICD-10-CM

## 2023-10-10 RX ORDER — ASPIRIN 81 MG/1
81 TABLET, CHEWABLE ORAL DAILY
Qty: 30 TABLET | Refills: 3 | Status: SHIPPED
Start: 2023-10-10 | End: 2023-10-12 | Stop reason: SDUPTHER

## 2023-10-10 RX ORDER — OXYCODONE HYDROCHLORIDE AND ACETAMINOPHEN 5; 325 MG/1; MG/1
1 TABLET ORAL EVERY 4 HOURS PRN
COMMUNITY

## 2023-10-10 NOTE — PROGRESS NOTES
Date/Time     11/22/2022 11:58 AM    K 4.2 11/22/2022 11:58 AM    K 3.6 08/24/2022 04:32 AM     11/22/2022 11:58 AM    CO2 31 11/22/2022 11:58 AM    BUN 16 11/22/2022 11:58 AM    CREATININE 1.2 11/22/2022 11:58 AM     CMP:    Lab Results   Component Value Date/Time     11/22/2022 11:58 AM    K 4.2 11/22/2022 11:58 AM    K 3.6 08/24/2022 04:32 AM     11/22/2022 11:58 AM    CO2 31 11/22/2022 11:58 AM    BUN 16 11/22/2022 11:58 AM    CREATININE 1.2 11/22/2022 11:58 AM    PROT 6.9 08/23/2022 05:59 AM     CBC:    Lab Results   Component Value Date/Time    WBC 4.5 08/23/2022 05:59 AM    RBC 4.72 08/23/2022 05:59 AM    HGB 11.9 08/23/2022 05:59 AM    HCT 38.9 08/23/2022 05:59 AM    MCV 82.4 08/23/2022 05:59 AM    RDW 16.1 08/23/2022 05:59 AM     08/23/2022 05:59 AM     PT/INR:  No results found for: \"PTINR\"  PT/INR Warfarin:  No components found for: \"PTPATWAR\", \"PTINRWAR\"  PTT:    Lab Results   Component Value Date/Time    APTT 30.3 12/24/2016 11:00 PM     PTT Heparin:  No components found for: \"APTTHEP\"  Magnesium:    Lab Results   Component Value Date/Time    MG 1.7 08/19/2022 10:37 AM     TSH:    Lab Results   Component Value Date/Time    TSH 0.249 08/18/2022 08:27 AM     TROPONIN:  No components found for: \"TROP\"  BNP:  No results found for: \"BNP\"  FASTING LIPID PANEL:    Lab Results   Component Value Date/Time    CHOL 99 08/19/2022 10:37 AM    HDL 44 08/19/2022 10:37 AM    TRIG 55 08/19/2022 10:37 AM     No orders to display     I have personally reviewed the laboratory, cardiac diagnostic and radiographic testing as outlined above:      IMPRESSION:  Chronic systolic congestive heart failure: Compensated, will continue current treatment  Cardiomyopathy predominantly nonischemic.  EF 35-40%  Coronary artery disease, mild to moderate nonobstructive  Pacemaker in situ: Interrogated, functioning appropriately  Aortic valve stenosis: Moderate  Aortic valve regurgitation: Moderate  Tricuspid

## 2023-10-12 RX ORDER — ASPIRIN 81 MG/1
81 TABLET, CHEWABLE ORAL DAILY
Qty: 30 TABLET | Refills: 3 | Status: SHIPPED | OUTPATIENT
Start: 2023-10-12

## 2023-10-25 ENCOUNTER — OFFICE VISIT (OUTPATIENT)
Dept: PAIN MANAGEMENT | Age: 83
End: 2023-10-25
Payer: MEDICARE

## 2023-10-25 VITALS
TEMPERATURE: 96.9 F | DIASTOLIC BLOOD PRESSURE: 70 MMHG | RESPIRATION RATE: 18 BRPM | SYSTOLIC BLOOD PRESSURE: 132 MMHG | WEIGHT: 225.97 LBS | OXYGEN SATURATION: 90 % | HEIGHT: 70 IN | HEART RATE: 82 BPM | BODY MASS INDEX: 32.35 KG/M2

## 2023-10-25 DIAGNOSIS — M48.061 SPINAL STENOSIS OF LUMBAR REGION WITHOUT NEUROGENIC CLAUDICATION: ICD-10-CM

## 2023-10-25 DIAGNOSIS — M47.816 LUMBAR FACET ARTHROPATHY: ICD-10-CM

## 2023-10-25 DIAGNOSIS — G89.4 CHRONIC PAIN SYNDROME: Primary | ICD-10-CM

## 2023-10-25 DIAGNOSIS — M51.9 LUMBAR DISC DISORDER: ICD-10-CM

## 2023-10-25 DIAGNOSIS — M47.816 LUMBAR SPONDYLOSIS: ICD-10-CM

## 2023-10-25 DIAGNOSIS — G89.4 CHRONIC PAIN SYNDROME: ICD-10-CM

## 2023-10-25 DIAGNOSIS — F11.20 UNCOMPLICATED OPIOID DEPENDENCE (HCC): ICD-10-CM

## 2023-10-25 PROCEDURE — 3075F SYST BP GE 130 - 139MM HG: CPT | Performed by: PAIN MEDICINE

## 2023-10-25 PROCEDURE — G8417 CALC BMI ABV UP PARAM F/U: HCPCS | Performed by: PAIN MEDICINE

## 2023-10-25 PROCEDURE — G8427 DOCREV CUR MEDS BY ELIG CLIN: HCPCS | Performed by: PAIN MEDICINE

## 2023-10-25 PROCEDURE — 3078F DIAST BP <80 MM HG: CPT | Performed by: PAIN MEDICINE

## 2023-10-25 PROCEDURE — G8484 FLU IMMUNIZE NO ADMIN: HCPCS | Performed by: PAIN MEDICINE

## 2023-10-25 PROCEDURE — 1036F TOBACCO NON-USER: CPT | Performed by: PAIN MEDICINE

## 2023-10-25 PROCEDURE — 1123F ACP DISCUSS/DSCN MKR DOCD: CPT | Performed by: PAIN MEDICINE

## 2023-10-25 PROCEDURE — 99214 OFFICE O/P EST MOD 30 MIN: CPT | Performed by: PAIN MEDICINE

## 2023-10-25 RX ORDER — OXYCODONE HYDROCHLORIDE AND ACETAMINOPHEN 5; 325 MG/1; MG/1
1 TABLET ORAL 2 TIMES DAILY PRN
Qty: 28 TABLET | Refills: 0 | Status: SHIPPED | OUTPATIENT
Start: 2023-10-25 | End: 2023-11-08

## 2023-10-26 LAB
6-MONOACETYLMORPHINE, URINE: NEGATIVE
ABNORMAL SPECIMEN VALIDITY TEST: ABNORMAL
ALCOHOL URINE: NOT DETECTED MG/DL
AMPHETAMINE SCREEN URINE: NEGATIVE
BARBITURATE SCREEN URINE: NEGATIVE
BENZODIAZEPINE SCREEN, URINE: NEGATIVE
BUPRENORPHINE URINE: NEGATIVE
CANNABINOID SCREEN URINE: NEGATIVE
COCAINE METABOLITE, URINE: NEGATIVE
FENTANYL URINE: NEGATIVE
INTEGRITY CHECK, CREATININE, URINE: 69
INTEGRITY CHECK, OXIDANT, URINE: <40
INTEGRITY CHECK, PH, URINE: 5.7
INTEGRITY CHECK, SPECIFIC GRAVITY, URINE: 1.01
METHADONE SCREEN, URINE: NEGATIVE
OPIATES, URINE: NEGATIVE
OXYCODONE SCREEN URINE: POSITIVE
PHENCYCLIDINE, URINE: NEGATIVE
TEST INFORMATION: ABNORMAL
TRAMADOL, URINE: NEGATIVE

## 2023-10-27 LAB
6-MAM, QUANTITATIVE, URINE: <10 NG/ML
7-AMINOCLONAZEPAM, QUANTITATIVE, URINE: <50 NG/ML
ALPHA-HYDROXYALPRAZOLAM, QUANTITATIVE, URINE: <50 NG/ML
ALPHA-HYDROXYMIDAZOLAM, QUANTITATIVE, URINE: <50 NG/ML
ALPHA-HYDROXYTRIAZOLAM, QUANTITATIVE, URINE: <50 NG/ML
ALPRAZOLAM URINE QUANT: <50 NG/ML
CHLORDIAZEPOXIDE, QUANTITATIVE, URINE: <50 NG/ML
CLONAZEPAM, QUANTITATIVE, URINE: <50 NG/ML
CODEINE, QUANTITATIVE, URINE: <50 NG/ML
DIAZEPAM URINE QUANT: <50 NG/ML
FLUNITRAZEPAM, QUANTITATIVE, URINE: <50 NG/ML
FLURAZEPAM, QUANTITATIVE, URINE: <50 NG/ML
HYDROCODONE, QUANTITATIVE, URINE: <50 NG/ML
HYDROMORPHONE, QUANTITATIVE, URINE: <50 NG/ML
LORAZEPAM URINE QUANT: <50 NG/ML
MIDAZOLAM URINE QUANT: <50 NG/ML
MORPHINE, QUANTITATIVE, URINE: <50 NG/ML
NORDIAZEPAM URINE QUANT: <50 NG/ML
NORHYDROCODONE, QUANTITATIVE, URINE: <50 NG/ML
NOROXYCODONE, QUANTITATIVE, URINE: >1000 NG/ML
OXAZEPAM URINE QUANT: <50 NG/ML
OXYCODONE URINE, QUANTITATIVE: >1000 NG/ML
OXYMORPHONE, QUANTITATIVE, URINE: >1000 NG/ML
TEMAZEPAM, QUANTITATIVE, URINE: <50 NG/ML

## 2023-11-08 ENCOUNTER — OFFICE VISIT (OUTPATIENT)
Dept: PAIN MANAGEMENT | Age: 83
End: 2023-11-08
Payer: MEDICARE

## 2023-11-08 VITALS
DIASTOLIC BLOOD PRESSURE: 90 MMHG | OXYGEN SATURATION: 93 % | SYSTOLIC BLOOD PRESSURE: 142 MMHG | HEIGHT: 70 IN | TEMPERATURE: 96.4 F | HEART RATE: 71 BPM | RESPIRATION RATE: 18 BRPM | WEIGHT: 225 LBS | BODY MASS INDEX: 32.21 KG/M2

## 2023-11-08 DIAGNOSIS — M47.816 LUMBAR SPONDYLOSIS: ICD-10-CM

## 2023-11-08 DIAGNOSIS — M47.816 LUMBAR FACET ARTHROPATHY: ICD-10-CM

## 2023-11-08 DIAGNOSIS — M51.9 LUMBAR DISC DISORDER: ICD-10-CM

## 2023-11-08 DIAGNOSIS — G89.4 CHRONIC PAIN SYNDROME: Primary | ICD-10-CM

## 2023-11-08 DIAGNOSIS — M48.061 SPINAL STENOSIS OF LUMBAR REGION WITHOUT NEUROGENIC CLAUDICATION: ICD-10-CM

## 2023-11-08 DIAGNOSIS — F11.20 UNCOMPLICATED OPIOID DEPENDENCE (HCC): ICD-10-CM

## 2023-11-08 PROCEDURE — G8417 CALC BMI ABV UP PARAM F/U: HCPCS | Performed by: PAIN MEDICINE

## 2023-11-08 PROCEDURE — 99214 OFFICE O/P EST MOD 30 MIN: CPT | Performed by: PAIN MEDICINE

## 2023-11-08 PROCEDURE — G8484 FLU IMMUNIZE NO ADMIN: HCPCS | Performed by: PAIN MEDICINE

## 2023-11-08 PROCEDURE — 99213 OFFICE O/P EST LOW 20 MIN: CPT | Performed by: PAIN MEDICINE

## 2023-11-08 PROCEDURE — 3077F SYST BP >= 140 MM HG: CPT | Performed by: PAIN MEDICINE

## 2023-11-08 PROCEDURE — 1123F ACP DISCUSS/DSCN MKR DOCD: CPT | Performed by: PAIN MEDICINE

## 2023-11-08 PROCEDURE — 1036F TOBACCO NON-USER: CPT | Performed by: PAIN MEDICINE

## 2023-11-08 PROCEDURE — G8427 DOCREV CUR MEDS BY ELIG CLIN: HCPCS | Performed by: PAIN MEDICINE

## 2023-11-08 PROCEDURE — 3080F DIAST BP >= 90 MM HG: CPT | Performed by: PAIN MEDICINE

## 2023-11-08 RX ORDER — OXYCODONE HYDROCHLORIDE AND ACETAMINOPHEN 5; 325 MG/1; MG/1
1 TABLET ORAL 2 TIMES DAILY PRN
Qty: 60 TABLET | Refills: 0 | Status: SHIPPED | OUTPATIENT
Start: 2023-11-08 | End: 2023-12-08

## 2023-12-07 ENCOUNTER — OFFICE VISIT (OUTPATIENT)
Dept: PAIN MANAGEMENT | Age: 83
End: 2023-12-07
Payer: MEDICARE

## 2023-12-07 ENCOUNTER — TRANSCRIBE ORDERS (OUTPATIENT)
Facility: HOSPITAL | Age: 83
End: 2023-12-07

## 2023-12-07 VITALS
HEART RATE: 78 BPM | OXYGEN SATURATION: 97 % | SYSTOLIC BLOOD PRESSURE: 108 MMHG | DIASTOLIC BLOOD PRESSURE: 60 MMHG | BODY MASS INDEX: 32.21 KG/M2 | HEIGHT: 70 IN | WEIGHT: 225 LBS | TEMPERATURE: 96.4 F | RESPIRATION RATE: 18 BRPM

## 2023-12-07 DIAGNOSIS — M47.816 LUMBAR SPONDYLOSIS: ICD-10-CM

## 2023-12-07 DIAGNOSIS — F11.20 UNCOMPLICATED OPIOID DEPENDENCE (HCC): ICD-10-CM

## 2023-12-07 DIAGNOSIS — M48.061 SPINAL STENOSIS OF LUMBAR REGION WITHOUT NEUROGENIC CLAUDICATION: ICD-10-CM

## 2023-12-07 DIAGNOSIS — G89.4 CHRONIC PAIN SYNDROME: Primary | ICD-10-CM

## 2023-12-07 DIAGNOSIS — I71.40 ABDOMINAL AORTIC ANEURYSM (AAA), UNSPECIFIED PART, UNSPECIFIED WHETHER RUPTURED (HCC): Primary | ICD-10-CM

## 2023-12-07 DIAGNOSIS — M47.816 LUMBAR FACET ARTHROPATHY: ICD-10-CM

## 2023-12-07 DIAGNOSIS — M51.9 LUMBAR DISC DISORDER: ICD-10-CM

## 2023-12-07 PROCEDURE — 99214 OFFICE O/P EST MOD 30 MIN: CPT | Performed by: PAIN MEDICINE

## 2023-12-07 PROCEDURE — G8427 DOCREV CUR MEDS BY ELIG CLIN: HCPCS | Performed by: PAIN MEDICINE

## 2023-12-07 PROCEDURE — 3078F DIAST BP <80 MM HG: CPT | Performed by: PAIN MEDICINE

## 2023-12-07 PROCEDURE — 1036F TOBACCO NON-USER: CPT | Performed by: PAIN MEDICINE

## 2023-12-07 PROCEDURE — 99213 OFFICE O/P EST LOW 20 MIN: CPT | Performed by: PAIN MEDICINE

## 2023-12-07 PROCEDURE — 3074F SYST BP LT 130 MM HG: CPT | Performed by: PAIN MEDICINE

## 2023-12-07 PROCEDURE — G8484 FLU IMMUNIZE NO ADMIN: HCPCS | Performed by: PAIN MEDICINE

## 2023-12-07 PROCEDURE — G8417 CALC BMI ABV UP PARAM F/U: HCPCS | Performed by: PAIN MEDICINE

## 2023-12-07 PROCEDURE — 1123F ACP DISCUSS/DSCN MKR DOCD: CPT | Performed by: PAIN MEDICINE

## 2023-12-07 RX ORDER — OXYCODONE HYDROCHLORIDE AND ACETAMINOPHEN 5; 325 MG/1; MG/1
1 TABLET ORAL 2 TIMES DAILY PRN
Qty: 60 TABLET | Refills: 0 | Status: SHIPPED
Start: 2023-12-08 | End: 2023-12-08 | Stop reason: HOSPADM

## 2023-12-13 ENCOUNTER — APPOINTMENT (OUTPATIENT)
Dept: GENERAL RADIOLOGY | Age: 83
End: 2023-12-13
Payer: MEDICARE

## 2023-12-13 ENCOUNTER — APPOINTMENT (OUTPATIENT)
Dept: CT IMAGING | Age: 83
End: 2023-12-13
Payer: MEDICARE

## 2023-12-13 ENCOUNTER — HOSPITAL ENCOUNTER (EMERGENCY)
Age: 83
Discharge: HOME | End: 2023-12-13
Payer: MEDICARE

## 2023-12-13 VITALS
HEART RATE: 86 BPM | RESPIRATION RATE: 18 BRPM | OXYGEN SATURATION: 94 % | TEMPERATURE: 97.1 F | DIASTOLIC BLOOD PRESSURE: 76 MMHG | SYSTOLIC BLOOD PRESSURE: 128 MMHG

## 2023-12-13 DIAGNOSIS — J10.1 INFLUENZA A: Primary | ICD-10-CM

## 2023-12-13 DIAGNOSIS — E04.9 ENLARGED THYROID: ICD-10-CM

## 2023-12-13 LAB
ALBUMIN SERPL-MCNC: 4.3 G/DL (ref 3.5–5.2)
ALP SERPL-CCNC: 77 U/L (ref 40–129)
ALT SERPL-CCNC: 7 U/L (ref 0–40)
ANION GAP SERPL CALCULATED.3IONS-SCNC: 8 MMOL/L (ref 7–16)
AST SERPL-CCNC: 19 U/L (ref 0–39)
BASOPHILS # BLD: 0.02 K/UL (ref 0–0.2)
BASOPHILS NFR BLD: 1 % (ref 0–2)
BILIRUB SERPL-MCNC: 0.4 MG/DL (ref 0–1.2)
BNP SERPL-MCNC: 492 PG/ML (ref 0–450)
BUN SERPL-MCNC: 15 MG/DL (ref 6–23)
CALCIUM SERPL-MCNC: 9.4 MG/DL (ref 8.6–10.2)
CHLORIDE SERPL-SCNC: 103 MMOL/L (ref 98–107)
CO2 SERPL-SCNC: 34 MMOL/L (ref 22–29)
CREAT SERPL-MCNC: 1.3 MG/DL (ref 0.7–1.2)
D DIMER: 587 NG/ML DDU (ref 0–232)
EKG ATRIAL RATE: 234 BPM
EKG ATRIAL RATE: 81 BPM
EKG P AXIS: 56 DEGREES
EKG P-R INTERVAL: 208 MS
EKG Q-T INTERVAL: 402 MS
EKG Q-T INTERVAL: 414 MS
EKG QRS DURATION: 134 MS
EKG QRS DURATION: 146 MS
EKG QTC CALCULATION (BAZETT): 466 MS
EKG QTC CALCULATION (BAZETT): 468 MS
EKG R AXIS: -85 DEGREES
EKG R AXIS: -86 DEGREES
EKG T AXIS: -25 DEGREES
EKG T AXIS: 1 DEGREES
EKG VENTRICULAR RATE: 77 BPM
EKG VENTRICULAR RATE: 81 BPM
EOSINOPHIL # BLD: 0.18 K/UL (ref 0.05–0.5)
EOSINOPHILS RELATIVE PERCENT: 6 % (ref 0–6)
ERYTHROCYTE [DISTWIDTH] IN BLOOD BY AUTOMATED COUNT: 15 % (ref 11.5–15)
GFR SERPL CREATININE-BSD FRML MDRD: 55 ML/MIN/1.73M2
GLUCOSE SERPL-MCNC: 123 MG/DL (ref 74–99)
HCT VFR BLD AUTO: 37.2 % (ref 37–54)
HGB BLD-MCNC: 11.2 G/DL (ref 12.5–16.5)
IMM GRANULOCYTES # BLD AUTO: <0.03 K/UL (ref 0–0.58)
IMM GRANULOCYTES NFR BLD: 0 % (ref 0–5)
INFLUENZA A BY PCR: NOT DETECTED
INFLUENZA B BY PCR: NOT DETECTED
LACTATE BLDV-SCNC: 1 MMOL/L (ref 0.5–2.2)
LYMPHOCYTES NFR BLD: 1.26 K/UL (ref 1.5–4)
LYMPHOCYTES RELATIVE PERCENT: 38 % (ref 20–42)
MCH RBC QN AUTO: 26.9 PG (ref 26–35)
MCHC RBC AUTO-ENTMCNC: 30.1 G/DL (ref 32–34.5)
MCV RBC AUTO: 89.2 FL (ref 80–99.9)
MONOCYTES NFR BLD: 0.4 K/UL (ref 0.1–0.95)
MONOCYTES NFR BLD: 12 % (ref 2–12)
NEUTROPHILS NFR BLD: 43 % (ref 43–80)
NEUTS SEG NFR BLD: 1.43 K/UL (ref 1.8–7.3)
PLATELET # BLD AUTO: 149 K/UL (ref 130–450)
PMV BLD AUTO: 13 FL (ref 7–12)
POTASSIUM SERPL-SCNC: 4.5 MMOL/L (ref 3.5–5)
PROT SERPL-MCNC: 7.7 G/DL (ref 6.4–8.3)
RBC # BLD AUTO: 4.17 M/UL (ref 3.8–5.8)
SARS-COV-2 RDRP RESP QL NAA+PROBE: DETECTED
SODIUM SERPL-SCNC: 145 MMOL/L (ref 132–146)
SPECIMEN DESCRIPTION: ABNORMAL
TROPONIN I SERPL HS-MCNC: 24 NG/L (ref 0–11)
TROPONIN I SERPL HS-MCNC: 25 NG/L (ref 0–11)
WBC OTHER # BLD: 3.3 K/UL (ref 4.5–11.5)

## 2023-12-13 PROCEDURE — 99285 EMERGENCY DEPT VISIT HI MDM: CPT

## 2023-12-13 PROCEDURE — 83880 ASSAY OF NATRIURETIC PEPTIDE: CPT

## 2023-12-13 PROCEDURE — 2580000003 HC RX 258

## 2023-12-13 PROCEDURE — 71275 CT ANGIOGRAPHY CHEST: CPT

## 2023-12-13 PROCEDURE — 93005 ELECTROCARDIOGRAM TRACING: CPT

## 2023-12-13 PROCEDURE — 83605 ASSAY OF LACTIC ACID: CPT

## 2023-12-13 PROCEDURE — 85025 COMPLETE CBC W/AUTO DIFF WBC: CPT

## 2023-12-13 PROCEDURE — 87502 INFLUENZA DNA AMP PROBE: CPT

## 2023-12-13 PROCEDURE — 87635 SARS-COV-2 COVID-19 AMP PRB: CPT

## 2023-12-13 PROCEDURE — 84484 ASSAY OF TROPONIN QUANT: CPT

## 2023-12-13 PROCEDURE — 6360000002 HC RX W HCPCS

## 2023-12-13 PROCEDURE — 93010 ELECTROCARDIOGRAM REPORT: CPT | Performed by: INTERNAL MEDICINE

## 2023-12-13 PROCEDURE — 6360000004 HC RX CONTRAST MEDICATION: Performed by: RADIOLOGY

## 2023-12-13 PROCEDURE — 94640 AIRWAY INHALATION TREATMENT: CPT

## 2023-12-13 PROCEDURE — 85379 FIBRIN DEGRADATION QUANT: CPT

## 2023-12-13 PROCEDURE — 71046 X-RAY EXAM CHEST 2 VIEWS: CPT

## 2023-12-13 PROCEDURE — 80053 COMPREHEN METABOLIC PANEL: CPT

## 2023-12-13 RX ORDER — ALBUTEROL SULFATE 2.5 MG/3ML
2.5 SOLUTION RESPIRATORY (INHALATION) ONCE
Status: COMPLETED | OUTPATIENT
Start: 2023-12-13 | End: 2023-12-13

## 2023-12-13 RX ORDER — GUAIFENESIN 1200 MG/1
1 TABLET, EXTENDED RELEASE ORAL 2 TIMES DAILY PRN
Qty: 14 TABLET | Refills: 0 | Status: SHIPPED | OUTPATIENT
Start: 2023-12-13 | End: 2023-12-13 | Stop reason: SDUPTHER

## 2023-12-13 RX ORDER — GUAIFENESIN 1200 MG/1
1 TABLET, EXTENDED RELEASE ORAL 2 TIMES DAILY PRN
Qty: 14 TABLET | Refills: 0 | Status: SHIPPED | OUTPATIENT
Start: 2023-12-13 | End: 2023-12-20

## 2023-12-13 RX ORDER — SODIUM CHLORIDE 9 MG/ML
INJECTION, SOLUTION INTRAVENOUS
Status: COMPLETED
Start: 2023-12-13 | End: 2023-12-13

## 2023-12-13 RX ORDER — 0.9 % SODIUM CHLORIDE 0.9 %
500 INTRAVENOUS SOLUTION INTRAVENOUS ONCE
Status: COMPLETED | OUTPATIENT
Start: 2023-12-13 | End: 2023-12-13

## 2023-12-13 RX ADMIN — IOPAMIDOL 75 ML: 755 INJECTION, SOLUTION INTRAVENOUS at 18:28

## 2023-12-13 RX ADMIN — SODIUM CHLORIDE 500 ML: 9 INJECTION, SOLUTION INTRAVENOUS at 16:37

## 2023-12-13 RX ADMIN — Medication 500 ML: at 16:37

## 2023-12-13 RX ADMIN — ALBUTEROL SULFATE 2.5 MG: 2.5 SOLUTION RESPIRATORY (INHALATION) at 15:19

## 2023-12-13 NOTE — ED PROVIDER NOTES
was obtained and reveals no acute process [MR]   1930 Patient's vitals remained stable. Patient be discharged home invited to return for any further concerns [MR]      ED Course User Index  [DH] Georgianna Burkitt, APRN - CNP  [MR] DA Bullard Cha, CNP         Assessment     1. Influenza A    2. Enlarged thyroid      Plan   Discharged home. Patient condition is fair    New Medications     Discharge Medication List as of 12/13/2023  7:47 PM        Electronically signed by DA Bullard Cha, CNP   DD: 12/13/23  **This report was transcribed using voice recognition software. Every effort was made to ensure accuracy; however, inadvertent computerized transcription errors may be present.   END OF ED PROVIDER NOTE        DA Bullard Cha, CNP  12/14/23 0153       DA Bullard Cha, CNP  12/14/23 [id number]

## 2023-12-26 PROBLEM — A41.9 SEPSIS (HCC): Status: ACTIVE | Noted: 2023-01-01

## 2023-12-26 NOTE — ED NOTES
Patient placed on non-rebreather mask due to satting at 84% on Mid flow NC. Dr. Solo aware, and verbal order to initiate bipap.

## 2023-12-26 NOTE — ED NOTES
SBAR report given to [facility]. Information included:     Levophed: 5 mcg/min    Bipap settings:   Pinsp: 7  PEEP: 5  Oxygen: 60%    Cardiac rhythm: sinus tachycardia

## 2023-12-26 NOTE — ED NOTES
TRANSFER - OUT REPORT:    Verbal report given to EMILY Radford on Michael Amezquita  being transferred to CCU for routine progression of patient care       Report consisted of patient's Situation, Background, Assessment and   Recommendations(SBAR).     Information from the following report(s) Index, ED Encounter Summary, MAR, Recent Results, Cardiac Rhythm sinus tachycardia, and Neuro Assessment was reviewed with the receiving nurse.    [place] Fall Assessment:    Presents to emergency department  because of falls (Syncope, seizure, or loss of consciousness): No  Age > 70: Yes  Altered Mental Status, Intoxication with alcohol or substance confusion (Disorientation, impaired judgment, poor safety awaremess, or inability to follow instructions): No  Impaired Mobility: Ambulates or transfers with assistive devices or assistance; Unable to ambulate or transer.: No  Nursing Judgement: Yes          Lines:   Peripheral IV 12/26/23 Right Antecubital (Active)       Peripheral IV 12/26/23 Left;Anterior Cephalic (Active)        Opportunity for questions and clarification was provided.      Patient transported with:  Monitor  bipap

## 2023-12-26 NOTE — ED PROVIDER NOTES
Patient signed out to me by Dr. Vance at 3 PM pending results of his workup and final disposition, admission.  Lactate elevated at 5.9.  Patient remaining hypotensive despite 30 cc/kg of IV fluid bolus.  Satting 95% on 6 L nasal cannula.  Plan to start peripheral vasopressors and admit to the ICU.  Has already received antibiotics.    [facility] Consult for Admission  3:16 PM    ED Room Number: SER09/09  Patient Name and age:  Michael Amezquita 83 y.o.  male  Working Diagnosis:   1. Septic shock (HCC)    2. Pancytopenia (HCC)    3. HUNG (acute kidney injury) (HCC)    4. Lactic acidosis    5. History of urostomy        COVID-19 Suspicion: No  Sepsis present:  Yes  Reassessment needed: Yes  Code Status:  Full Code  Readmission: No  Isolation Requirements: no  Recommended Level of Care: ICU  Department: [facility] - [phone]  Consulting Provider:     Other: Decreased oral intake with worsening weakness and fatigue over the past couple of days.  EMS was called and found the patient to be hypotensive.  Patient appears cachectic and dehydrated.  Placed on 6 L nasal cannula with O2 sats 95%.  Still hypotensive after 30 cc/kg of normal saline.  Starting peripheral norepi.  Lactate=5.9.  Unclear source for sepsis.  Broad-spectrum antibiotics given.  Flu and COVID-negative.  BUN/Cr=87/3.3.    Total critical care time spent exclusive of procedures:  45 minutes.        Mao Solo MD  12/26/23 3944

## 2023-12-26 NOTE — ED PROVIDER NOTES
[facility]  EMERGENCY DEPARTMENT ENCOUNTER      Pt Name: Michael Amezquita  MRN: [id number]  Birthdate 1940  Date of evaluation: 12/26/2023  Provider: Zeeshan Vance DO      HISTORY OF PRESENT ILLNESS      HPI  83-year-old male with history as below, reportedly not on home O2, presents to the emergency department from his independent living facility where he reportedly was found to have significant generalized fatigue, increased cough, shortness of breath, and malaise causing him to just be laying in bed for the last couple of days.  Suspected recent sick contacts.  He was found to be hypotensive with blood pressures in the 80s/50s and hypoxic satting 82% on room air on arrival by EMS.  He was started on an IV fluid bolus while in route by EMS.      Nursing Notes were reviewed.    REVIEW OF SYSTEMS         Review of Systems        PAST MEDICAL HISTORY     Past Medical History:   Diagnosis Date    Aneurysm (HCC)     ABDOMINAL     Arrhythmia 3/2/2014    Stated cardiac cath for abn EKG several years ago NEG 4/23/21 pt denies    Gonsalves esophagus     Bladder cancer (HCC)     CAD (coronary artery disease)     4/23/21 pt denies    Calculus of kidney     Chronic obstructive pulmonary disease (HCC)     Contact dermatitis and other eczema, due to unspecified cause     GERD (gastroesophageal reflux disease)     GONSALVES'S ESOPH    Hyperlipemia     Hypertension     Ill-defined condition     ECZEMA    Ill-defined condition     HIATEL HERNIA    Presence of urostomy (HCC)     PUD (peptic ulcer disease)     Baretts Esophagus/antral gastritis/    Skin cancer          SURGICAL HISTORY       Past Surgical History:   Procedure Laterality Date    ADENOIDECTOMY      CATARACT REMOVAL Left 11/2016    COLONOSCOPY      GI      COLONOSCOPY    GI      Colonoscopy x 2-3    HERNIA REPAIR  3/14/16    LAPAROSCOPIC Incisional W/ MESH by     HERNIA REPAIR  8/21/13    left inguinal hernia repair, LAPAROSCOPIC  (DR LI)

## 2023-12-26 NOTE — ED NOTES
Patient started on bipap, settings:     Pinsp: 7  PEEP: 5  Oxygen: 60%    Patient tolerating well at this time.

## 2023-12-26 NOTE — ED TRIAGE NOTES
Pt arrives from [facility] via ambulance. States he has been fatigued x 1 day. He was found to have a blood pressure of 80s/50s, and satting at 82% on RA. Patient has a PMH of COPD but does not wear O2 at baseline. He is hypotensive, tachypneic, and 84% on 2L in triage. Patient placed on midflow, and is now 95% on 6 L.

## 2023-12-27 NOTE — WOUND CARE
Wound Care Note:     New consult placed by nurse request for urostomy    Chart shows:  Admitted for sepsis  Past Medical History:   Diagnosis Date    Aneurysm (HCC)     ABDOMINAL     Arrhythmia 3/2/2014    Stated cardiac cath for abn EKG several years ago NEG 4/23/21 pt denies    Gonsalves esophagus     Bladder cancer (HCC)     CAD (coronary artery disease)     4/23/21 pt denies    Calculus of kidney     Chronic obstructive pulmonary disease (HCC)     Contact dermatitis and other eczema, due to unspecified cause     GERD (gastroesophageal reflux disease)     GONSALVES'S ESOPH    Hyperlipemia     Hypertension     Ill-defined condition     ECZEMA    Ill-defined condition     HIATEL HERNIA    Presence of urostomy (HCC)     PUD (peptic ulcer disease)     Baretts Esophagus/antral gastritis/    Skin cancer      WBC = 1.3 on 12/27/23  Admitted from [facility]    Assessment:   Patient is sedated, did not wake up during repositioning, incontinent with moderate assistance needed in repositioning.    Bed: Isolibrium  Patient has a urostomy.    Diet: NPO  Patient did not grimace or moan during repositioning.    Bilateral heels, buttocks, and sacral skin intact and without erythema.    1. POA Althea urostomy appliance in place, intact, no signs of leaking.  Coloplast 1 piece appliance with protective rings left in room.    2.  POA right ear with recent partial pinna removal, light crusting at proximal and distal ends.  Petroleum jelly to be ordered.          Spoke with Dr. Garvin, wound care orders obtained.    Patient repositioned on right side.  Heels offloaded on pillows.     Recommendations:    Urostomy care  Empty appliance when 1/3 full and PRN. Change appliance twice weekly and immediately with leaking to avoid skin breakdown. DO NOT REINFORCE LEAKS!  Ostomy supplies located on [address] and ICU.  Supplies left in room.    Right ear amputation site- Every 12 hours

## 2023-12-27 NOTE — CARE COORDINATION
Care Management Initial Assessment       RUR: 18%  Readmission? No  1st IM letter given? Yes   1st  letter given: No-NA     CM attempted to meet w patient but he was sleeping.  CM called his daughter, Michaela Kirkpatrick to introduce self/role as CM and verify the Facesheet.  Daughter reports patient owns a home in [place] which is listed on his Facesheet but has lived at [facility] ([address] , [place] [address]) for the last 6 months.  She reports he has an aide that comes to help w IADLs and encourages him to eat w his meal plan (3 meals per day) but he is ind w all ADLs and denies DME.  She states he is legally blind w macular degeneration and this has progressed quickly.  She denies hx of HH and SNF and reports until recently he's been in good health.  She is interested in assisted living info and was provided contact info for a senior navigator, [employer] (-[phone]) to learn about this level of care.  All questions have been answered and CM will continue to follow for BEN needs.       12/27/23 1422   Service Assessment   Patient Orientation Unable to Assess   History Provided By Child/Family   Primary Caregiver Self   Support Systems Children   PCP Verified by CM Yes  (Angelina Dangelo)   Prior Functional Level Assistance with the following:;Cooking;Housework;Shopping   Can patient return to prior living arrangement Unknown at present   Family able to assist with home care needs: Yes   Would you like for me to discuss the discharge plan with any other family members/significant others, and if so, who? Yes  (Daughter, Michaela Kirkpatrick ([phone])   Financial Resources Medicare   Social/Functional History   Lives With Alone   Type of Home Senior housing apartment   Home Layout One level   Home Access Elevator;Level entry   Home Equipment None   Receives Help From Personal care attendant   Active  No   Condition of Participation: Discharge Planning   The Plan for

## 2023-12-29 PROBLEM — Z51.5 PALLIATIVE CARE ENCOUNTER: Status: ACTIVE | Noted: 2023-01-01

## 2023-12-29 PROBLEM — A41.9 SEPTIC SHOCK (HCC): Status: ACTIVE | Noted: 2023-01-01

## 2023-12-29 PROBLEM — Z71.89 GOALS OF CARE, COUNSELING/DISCUSSION: Status: ACTIVE | Noted: 2023-01-01

## 2023-12-29 PROBLEM — Z71.89 DNR (DO NOT RESUSCITATE) DISCUSSION: Status: ACTIVE | Noted: 2023-01-01

## 2023-12-29 PROBLEM — R65.21 SEPTIC SHOCK (HCC): Status: ACTIVE | Noted: 2023-01-01

## 2023-12-29 PROBLEM — Z71.1 CONCERN ABOUT END OF LIFE: Status: ACTIVE | Noted: 2023-01-01

## 2023-12-29 NOTE — PLAN OF CARE
Problem: Safety - Adult  Goal: Free from fall injury  12/28/2023 2349 by Swati Quevedo RN  Outcome: Progressing  12/28/2023 0956 by Kenzie Mcgarry RN  Outcome: Progressing     Problem: Discharge Planning  Goal: Discharge to home or other facility with appropriate resources  12/28/2023 2349 by Swati Quevedo RN  Outcome: Progressing  12/28/2023 0956 by Kenzie Mcgarry RN  Outcome: Progressing  Flowsheets (Taken 12/27/2023 2000 by Manuel White, RN)  Discharge to home or other facility with appropriate resources: Identify barriers to discharge with patient and caregiver     Problem: ABCDS Injury Assessment  Goal: Absence of physical injury  12/28/2023 2349 by Swati Quevedo RN  Outcome: Progressing  12/28/2023 0956 by Kenzie Mcgarry RN  Outcome: Progressing     Problem: Pain  Goal: Verbalizes/displays adequate comfort level or baseline comfort level  12/28/2023 2349 by Swati Quevedo RN  Outcome: Progressing  Flowsheets (Taken 12/28/2023 1200 by Sarah Beth Waller RN)  Verbalizes/displays adequate comfort level or baseline comfort level:   Encourage patient to monitor pain and request assistance   Assess pain using appropriate pain scale   Administer analgesics based on type and severity of pain and evaluate response   Implement non-pharmacological measures as appropriate and evaluate response  12/28/2023 0956 by Kenzie Mcgarry RN  Outcome: Progressing  Flowsheets  Taken 12/28/2023 0800 by Sarah Beth Waller RN  Verbalizes/displays adequate comfort level or baseline comfort level:   Encourage patient to monitor pain and request assistance   Assess pain using appropriate pain scale   Administer analgesics based on type and severity of pain and evaluate response   Implement non-pharmacological measures as appropriate and evaluate response  Taken 12/28/2023 0400 by Manuel White, RN  Verbalizes/displays adequate comfort level or baseline comfort level: Assess pain using

## 2023-12-29 NOTE — CONSULTS
Palliative brief note- Full visit documentation pending.    Mr. Amezquita was seen and evaluated.    Met with patients medical POA/daughter, Michaela Kirkpatrick and granddaughter Samantha Hennessy, now ready to transition to comfort.   Hospice consulted.   Patient lives in an independent living facility, family is unable to care for patient atc, will need support of hired CGs. Will ask CM and Hospice to assist family with identifying care giver agencies.   Comfort order placed.      Please contact me via [facility] with any questions or concerns.    Thank you.  
See progress note  
applicable and billing time based rather than MDM  [] The total encounter time on this service date was __75__ minutes which was spent performing a face-to-face encounter and personally completing the provider-level activities documented in the note. This includes time spent prior to the visit and after the visit in direct care of the patient. This time does not include time spent in any separately reportable services.    Electronically signed by   ASHIA Olguin NP  Palliative Care Team  on 12/29/2023 at 3:59 PM

## 2023-12-29 NOTE — H&P
History and Physical    Date of Service:  12/29/2023  Primary Care Provider: Angelina Dangelo APRN - NP  Source of information: patient, family member - granddaughter, electronic medical record    Chief Complaint: Hypotension      History of Presenting Illness:   Michael Amezquita is a 83 y.o. male hx COPD, urothelial carcinoma, CAD, GERD, hypertension, hyperlipidemia who presents with septic shock, H influenza bacteremia, hemoglobin 4.6 this am.  Was initially admitted directly to the ICU on pressors and continued on antibiotics, daughter had extensive discussion with ICU attending today daughter does not want invasive measures and does not want exploration of anemia or lab draws wants hospice comfort care.  Hospitalist requested for transfer for hospice comfort measures.    ROS unable to be obtained due to patient's clinical condition he is nonverbal not following commands, granddaughter at bedside, as needed comfort meds already in place, bp soft but stable.        REVIEW OF SYSTEMS:  Review of systems not obtained due to patient factors.     Past Medical History:   Diagnosis Date    Aneurysm (HCC)     ABDOMINAL     Arrhythmia 3/2/2014    Stated cardiac cath for abn EKG several years ago NEG 4/23/21 pt denies    Gonsalves esophagus     Bladder cancer (HCC)     CAD (coronary artery disease)     4/23/21 pt denies    Calculus of kidney     Chronic obstructive pulmonary disease (HCC)     Contact dermatitis and other eczema, due to unspecified cause     GERD (gastroesophageal reflux disease)     GONSALVES'S ESOPH    Hyperlipemia     Hypertension     Ill-defined condition     ECZEMA    Ill-defined condition     HIATEL HERNIA    Presence of urostomy (HCC)     PUD (peptic ulcer disease)     Baretts Esophagus/antral gastritis/    Skin cancer       Past Surgical History:   Procedure Laterality Date    ADENOIDECTOMY      CATARACT REMOVAL Left 11/2016    COLONOSCOPY      GI      COLONOSCOPY    GI      Colonoscopy x 2-3

## 2023-12-30 NOTE — CARE COORDINATION
Transition of Care Plan:     RUR: 18%  Prior Level of Functioning: Senior apt complex, [address] and personal care aide for IADLs. Ind w ADLs and no DME.  Disposition: Home with home hospice. Provider [id number].   If SNF or IPR: Date FOC offered: N/A   Date FOC received:   Accepting facility:   Date authorization started with reference number:   Date authorization received and expires:   Follow up appointments:   DME needed:   Transportation at discharge: Probable BLS  IM/IMM Medicare/ letter given: Yes 12/26/23  Is patient a [place] and connected with [facility]?               If yes, was [place] transfer form completed and [facility] notified?   Caregiver Contact: DaughterMichaela ([phone]   Discharge Caregiver contacted prior to discharge?   Care Conference needed?   Barriers to discharge:      LEATHA spoke with the hospitalist who said plan is for discharge with home hospice.   CM noted that patient's residence is outside of the [facility] service area.    CM met with daughter Gina Kirkpatrick. It is apparent that patient's decline is such that return to [facility] is not feasible. Patient can return to his previous residence in [place] near family members.  CM gave daughter the hospice provider list. Daughter selected [facility] and [facility] with plan made for daughter to speak to both agencies before finalizing preference. CM sent referrals via [facility] to both agencies, and responses are pending.

## 2023-12-30 NOTE — PLAN OF CARE
Problem: Safety - Adult  Goal: Free from fall injury  Outcome: Progressing     Problem: Discharge Planning  Goal: Discharge to home or other facility with appropriate resources  Outcome: Progressing     Problem: ABCDS Injury Assessment  Goal: Absence of physical injury  Outcome: Progressing     Problem: Pain  Goal: Verbalizes/displays adequate comfort level or baseline comfort level  Outcome: Progressing     Problem: Confusion  Goal: Confusion, delirium, dementia, or psychosis is improved or at baseline  Description: INTERVENTIONS:  1. Assess for possible contributors to thought disturbance, including medications, impaired vision or hearing, underlying metabolic abnormalities, dehydration, psychiatric diagnoses, and notify attending LIP  2. [place] high risk fall precautions, as indicated  3. Provide frequent short contacts to provide reality reorientation, refocusing and direction  4. Decrease environmental stimuli, including noise as appropriate  5. Monitor and intervene to maintain adequate nutrition, hydration, elimination, sleep and activity  6. If unable to ensure safety without constant attention obtain sitter and review sitter guidelines with assigned personnel  7. Initiate Psychosocial CNS and Spiritual Care consult, as indicated  Outcome: Progressing     Problem: Skin/Tissue Integrity  Goal: Absence of new skin breakdown  Description: 1.  Monitor for areas of redness and/or skin breakdown  2.  Assess vascular access sites hourly  3.  Every 4-6 hours minimum:  Change oxygen saturation probe site  4.  Every 4-6 hours:  If on nasal continuous positive airway pressure, respiratory therapy assess nares and determine need for appliance change or resting period.  Outcome: Progressing

## 2023-12-31 NOTE — CARE COORDINATION
LEATHA received communication from [facility] rep that bed has been delivered and requested transport be set up for 10am tomorrow.  LEATHA requested hospital to home at 10am [phone].  Hospital to home confirmed ETA of 10am.     Ursula advised that she is following up with family regarding their outpatient medications needed for home hospice admission.  Concern is with the holiday tomorrow, she wants to ensure that outpatient pharmacy can fill medications and is open.  She also requested that pt be medicated appropriately for symptom management and comfort for transport home, to [place], [place] prior to leaving at 10am.     [facility] is open tomorrow from 11a-5pm and have liquid morphine and lorazepam and hospice is requesting the discharging physician send in these scripts (fax [phone], address )[address] [place] [address],  [phone]  phone) to ensure that pt receives comfort meds promptly once home.  Their hospice MD will be on standby if need to order.      Claudia Forbes LCSW [facility]  Care Manager   [phone]

## 2023-12-31 NOTE — PROGRESS NOTES
Hospitalist Progress Note  Elisabet Washington MD  Answering service: [phone] OR [phone] from in house phone        Date of Service:  2023  NAME:  Michael Amezquita  :  1940  MRN:  [id number]      Admission Summary:   HPI: \"Michael Amezquita is a 83 y.o. male hx COPD, urothelial carcinoma, CAD, GERD, hypertension, hyperlipidemia who presents with septic shock, H influenza bacteremia, hemoglobin 4.6 this am.  Was initially admitted directly to the ICU on pressors and continued on antibiotics, daughter had extensive discussion with ICU attending today daughter does not want invasive measures and does not want exploration of anemia or lab draws wants hospice comfort care.  Hospitalist requested for transfer for hospice comfort measures.     ROS unable to be obtained due to patient's clinical condition he is nonverbal not following commands, granddaughter at bedside, as needed comfort meds already in place, bp soft but stable. \"          Interval history / Subjective:   Patient seen earlier      Assessment & Plan:      Septic shock  Acute blood loss anemia concern for actively bleeding infrarenal aortic aneurysm  HUNG  H influenza bacteremia  Complex UTI  Acute metabolic encephalopathy on dementia   Pancytopenia  History of urothelial carcinoma with urostomy  Hx Hypertension  History of GERD  CAD  HLD     -Appreciate ICU and palliative care evaluation extensive discussion already took place with patient's daughter Michaela she agrees to hospice comfort measures does not want invasive measures lab draws or higher level of care.  - Comfort orders placed  -hospice consulted placed awaiting eval plan for hospice meeting in the afternoon today with patient's daughter               Code status: dnr   Prophylaxis: scd  Care Plan discussed with: patient/family   Anticipated Disposition: tbd some form of hospice     Principal 
     CRITICAL CARE NOTE    Name: Michael Amezquita   : 1940   MRN: [id number]   Date: 2023      REASON FOR ICU ADMISSION:    Septic shock    PRINCIPAL ICU DIAGNOSIS   Septic Shock     BRIEF PATIENT SUMMARY   82 y/o male who is resident at independent living facility sent to ED after having generalized fatigue, cough, SOB. He was reported to be hypotensive with BP 80/50's and hypoxic with saturation of 82%. He was provided oxygen and IVF by EMS. In the ED he receieved IVF and started on norepi.     Lactate was origiinally 5.9 and BUN/CR elevated at 87/3.33 Reviewiing chart his baseline renal function was 17/0.76 on 8/15/22. Pt has h/o bladder cancer with urostomy tube      COMPREHENSIVE ASSESSMENT & PLAN:SYSTEM BASED     24 HOUR EVENTS:  Precedex off. 3 of norepi. Labs hbg 6.7, plts 48 - 49, WBC 0.9      2023: Speech to see.   NEUROLOGICAL:   Baseline confused, dementia ?  Seroquel 50 mg BID (night dose 8 pm)  Melatonin 10 mg nightly     PULMONOLOGY:   RA  H/O COPD / emphysema   - cont neb treatments prn  - Discontinue solumedrol.  - saturation goal > 92%  - CXR Improved    CARDIOVASCULAR:   Circulatory Shock (sepsis)  - MAP > 65 with good urostomy output  - Lactate cleared. Do not need to repeat.   - iCal > 1.2  - Restart statin as LFTs normalizing.     TTE     Left Ventricle: Moderately reduced left ventricular systolic function. EF by visual approximation is 40%. Left ventricle size is normal. Normal wall thickness. Unable to assess wall motion. Global longitudinal strain is reduced with a value of -17.1%. Normal diastolic function.    Right Ventricle: Not well visualized. Right ventricle size is normal. Normal systolic function. TAPSE is normal. TAPSE is 3.3 cm.    Aortic Valve: Mild regurgitation.    Mitral Valve: Moderate regurgitation.    Tricuspid Valve: Moderately elevated RVSP. The estimated RVSP is 61 mmHg.    Image quality is adequate.    GASTROINTESTINAL   At risk for 
     CRITICAL CARE NOTE    Name: Michael Amezquita   : 1940   MRN: [id number]   Date: 2023      REASON FOR ICU ADMISSION:    Septic shock    PRINCIPAL ICU DIAGNOSIS   Septic Shock     BRIEF PATIENT SUMMARY   84 y/o male who is resident at independent living facility sent to ED after having generalized fatigue, cough, SOB. He was reported to be hypotensive with BP 80/50's and hypoxic with saturation of 82%. He was provided oxygen and IVF by EMS. In the ED he receieved IVF and started on norepi.     Lactate was origiinally 5.9 and BUN/CR elevated at 87/3.33 Reviewiing chart his baseline renal function was 17/0.76 on 8/15/22. Pt has h/o bladder cancer with urostomy tube      COMPREHENSIVE ASSESSMENT & PLAN:SYSTEM BASED     24 HOUR EVENTS:  Precedex off. 3 of norepi. Labs hbg 6.7, plts 48 - 49, WBC 0.9  2023: Speech to see.   2023: Daughter does not want NGT, supportive transfusion, does not want to explore anemia. Does not want lab draws.  Transitioned to comfort measures.   NEUROLOGICAL:   Baseline confused, dementia ?  Seroquel 50 mg BID (night dose 8 pm)  Melatonin 10 mg nightly     PULMONOLOGY:   RA  H/O COPD / emphysema   - cont neb treatments prn  - Discontinue solumedrol.  - saturation goal > 92%    CARDIOVASCULAR:   Circulatory Shock (resolved)  - MAP > 65 with good urostomy output     TTE     Left Ventricle: Moderately reduced left ventricular systolic function. EF by visual approximation is 40%. Left ventricle size is normal. Normal wall thickness. Unable to assess wall motion. Global longitudinal strain is reduced with a value of -17.1%. Normal diastolic function.    Right Ventricle: Not well visualized. Right ventricle size is normal. Normal systolic function. TAPSE is normal. TAPSE is 3.3 cm.    Aortic Valve: Mild regurgitation.    Mitral Valve: Moderate regurgitation.    Tricuspid Valve: Moderately elevated RVSP. The estimated RVSP is 61 mmHg.    Image quality is 
     CRITICAL CARE NOTE    Name: Michael Amezquita   : 1940   MRN: [id number]   Date: 2023      REASON FOR ICU ADMISSION:    Septic shock    PRINCIPAL ICU DIAGNOSIS   Septic Shock     BRIEF PATIENT SUMMARY   84 y/o male who is resident at independent living facility sent to ED after having generalized fatigue, cough, SOB. He was reported to be hypotensive with BP 80/50's and hypoxic with saturation of 82%. He was provided oxygen and IVF by EMS. In the ED he receieved IVF and started on norepi.     Lactate was origiinally 5.9 and BUN/CR elevated at 87/3.33 Reviewiing chart his baseline renal function was 17/0.76 on 8/15/22. Pt has h/o bladder cancer with urostomy tube      COMPREHENSIVE ASSESSMENT & PLAN:SYSTEM BASED     24 HOUR EVENTS:  Precedex, BIPAP, decreasing nor epi.     NEUROLOGICAL:   Baseline confused, dementia ?  Precedex 0.5 > RASS 0  Seroquel 50 mg BID (night dose 8 pm)  Melatonin 10 mg nightl     PULMONOLOGY:   RA  H/O COPD / emphysema   - cont neb treatments prn  - will cont IV solumedrol for next 24 hours and then reassess.  - saturation goal > 92%  - CXR TMW    CARDIOVASCULAR:   Circulatory Shock (sepsis)  - MAP > 65 with good urostomy output  - repeat iCal / LA  - iCal > 1.2  - Repeat TTE   - Hold statin for elevated LFT.     GASTROINTESTINAL   At risk for malnutrition.  - Keep NPO  - SLP tmw as know to aspirate.   - Hold off on NG as family want to decide.      BM  - last BM yesterday     RENAL/ELECTROLYTE/FLUIDS:   Acute kidney injury   - HUNG improving continue monitoring.     Lactic acidosis   - Improving    Volume goal   - Autoregulate    Discontinue MIVF    ENDOCRINE:   Continue steroids in the setting of septic shock.   Follow up TFTs.   HEMATOLOGY/ONCOLOGY:   Leukopenia   - Likely 2/2 septic shock.  - Improving. Continue monitoring.     INFECTIOUS DISEASE:   Septic shock   - Likely from urostomy.   - Improving.     ANTIBIOTICS TO DATE:  Zosyn ()    CULTURES TO DATE:  Blood 1 
0730 Bedside and Verbal shift change report given to Srini (oncoming nurse) by Marcy (offgoing nurse). Report included the following information Nurse Handoff Report, Index, Intake/Output, MAR, and Recent Results.    
0730: Bedside and Verbal shift change report given to EMILY Espino (oncoming nurse) by EMILY Longoria (offgoing nurse). Report included the following information Nurse Handoff Report, Index, Adult Overview, Intake/Output, MAR, Recent Results, Med Rec Status, Cardiac Rhythm A fib, Alarm Parameters, and Neuro Assessment.     Drips: Levo @ 1    0800: Resumed pt care. Levo off, MAP 70.    1200: Palliative @ bedside speaking w/ family      1330: Plan to transition to comfort measures. Case management consulted for possible in home hospice.    1930: Bedside and Verbal shift change report given to EMILY Kent (oncoming nurse) by EMILY Espino (offgoing nurse). Report included the following information Nurse Handoff Report, Index, Intake/Output, MAR, Recent Results, Med Rec Status, Cardiac Rhythm Afib, Alarm Parameters, and Neuro Assessment.     
1400: assumed care of pt.     1530: PRBC infusion started.   
1700: TRANSFER - IN REPORT:  Verbal report received from  on Michael Amezquita  being received from [facility] for routine progression of patient care    Report consisted of patient's Situation, Background, Assessment and   Recommendations(SBAR).   Information from the following report(s) Nurse Handoff Report, Index, ED Encounter Summary, ED SBAR, Intake/Output, MAR, Recent Results, Med Rec Status, Cardiac Rhythm NSR, Alarm Parameters, and Neuro Assessment was reviewed with the receiving nurse.  Opportunity for questions and clarification was provided.    Assessment completed upon patient's arrival to unit and care assumed.     Drips: Levo @ 5     1800: Pt arrived to unit, confused and attempting to take off bipap. Reoriented pt without improvement. Orders for precedex. Labs sent     1900: WBC 0.8, placed on neutropenic precautions. Lactic normal     1930: Bedside and Verbal shift change report given to EMILY Vidal (oncoming nurse) by EMILY Espino (offgoing nurse). Report included the following information Nurse Handoff Report, Index, Adult Overview, Intake/Output, MAR, Recent Results, Med Rec Status, Cardiac Rhythm NSR, Alarm Parameters, and Neuro Assessment.       
1800 Dr. Garvin and RN speaking with Pt's daughter via telephone regarding Pt's status.  Plan to move towards comfort measures only, no more blood transfusions or labs per daughter.  Palliative consulted. Plan to stop levophed gtt.    1900 Daughter called back and wishes to postpone comfort measures until after she speaks with palliative team tomorrow. Intensivist notified. Levophed gtt restarted.  Reconfirmed no blood transfusions or labs with Pt's daughter.  
1930: Bedside shift change report given to Marcy RN (oncoming nurse) by Kenzie RN (offgoing nurse). Report included the following information Nurse Handoff Report.     2100: MD at bedside. MD notified of pt consistent low MAPs despite systolic BP 's, Orders for LR bolus and continuous infusion.     2130: Respiratory panel obtained and sent. Labs obtained and sent.     0300: Am labs drawn and sent.    0400: Rt at bedside- ABG obtained.     Arterial Blood Gas result:  pO2 101; pCO2 31.6; pH 7.35;  HCO3 17.4, %O2 Sat 97.6.    FiO2 decreased to 40% by RT.     
1930: Bedside shift change report given to Swati  (oncoming nurse) by RN  (offgoing nurse). Report included the following information Nurse Handoff Report, Index, Intake/Output, and MAR.       0730: Bedside shift change report given to RN  (oncoming nurse) by Swati  (offgoing nurse). Report included the following information Nurse Handoff Report, Index, Intake/Output, and MAR.     
4 Eyes Skin Assessment     NAME:  Michael Amezquita  YOB: 1940  MEDICAL RECORD NUMBER:  [id number]    The patient is being assessed for  Admission    I agree that at least one RN has performed a thorough Head to Toe Skin Assessment on the patient. ALL assessment sites listed below have been assessed.      Areas assessed by both nurses:    Head, Face, Ears, Shoulders, Back, Chest, Arms, Elbows, Hands, Sacrum. Buttock, Coccyx, Ischium, Legs. Feet and Heels, Under Medical Devices , and Other          Does the Patient have a Wound? Yes wound(s) were present on assessment. LDA wound assessment was Initiated and completed by RN       Juan Prevention initiated by RN: Yes  Wound Care Orders initiated by RN: Yes    Pressure Injury (Stage 3,4, Unstageable, DTI, NWPT, and Complex wounds) if present, place Wound referral order by RN under : No    New Ostomies, if present place, Ostomy referral order under : Yes     Nurse 1 eSignature: Electronically signed by Kenzie Mcgarry RN on 12/26/23 at 7:10 PM EST    **SHARE this note so that the co-signing nurse can place an eSignature**    Nurse 2 eSignature: {Esignature:[id number]}    
9621 report given to Zara, discussed pt plan of care and history. Pt calm at this time. Spoke to pt daughter that wished to be contacted and notified with pt transfer to .   
[facility]  Good Help to Those in Need  [phone]    Nursing Note   Patient Name: Mcihael Amezquita  YOB: 1940  Age: 83 y.o.    [facility] RN Note:      Call from pt's daughter Michaela. She wishes to meet in room today at 12:00 noon for hospice info session.    12:05: Met with patient, his dtr Prabhu and his granddtr Samantha. Pt unable to contribute to conversation due to his intermittent confusion, and some restlessness. Discussed Hospice philosophy, general plan of care, levels of care, services. Family information packet provided as well as private caregivers for review.     Pt does not meet inpatient criteria today but we will continue to evaluate daily. He is eligible for routine care at home with hospice. Pt lives outside our service area and another hospice agency needs to be contacted. Family interested in At Home care and possibly [facility].    Pt would benefit from SL PRN opioids as a trial before returning home. Family will need time to set up home as he will be unable to return to his independent living facility, [facility]. DME needs to be determined as discharge plans evolve.    We will be available to access daily for change in clinical condition until discharge.      Stephanie Sims RN, [facility]  Hospice Nurse Liaison  [phone] Mobile  [phone] Office     
Goals of care discussion.     Discussed goals of care with patient's daughter. Conversation witnessed by EMILY Callahan. Pt's daughter elected to transition to comfort measures. She does not want labs, enteral access, supportive transfusion. She would like the team to focus on the patient's comfort. Patient has an hgb drop of 4.6. He has an infrarenal aortic aneurysm that could be bleeding. She does not want to investigate this further and does not want to blood product transfusion.     Jonathan Garvin MD  Critical Care Medicine     
[facility]  Good Help to Those in Need  [phone]     Patient Name: Michael Amezquita  YOB: 1940  Age: 83 y.o.    [facility] RN Note:  Hospice consult received, reviewing chart. Will follow up with Unit Nurse and Care Manager to discuss plan of care, patient status and discharge disposition     Voice mail left with patient's daughter Nat Kirkpatrick [phone] in regard to hospice info session. No answer, VM left. Will plan to see patient and attempt again tomorrow     Thank you for the opportunity to be of service to this patient.   Jazlyn Barry, RN, BSN, CHPN  Clinical Nurse Liaison  [facility]  [phone] Mobile  [phone] Office   Available on Perfect [employer]   
Heme/ONC  Events noted  For supportive care  
[facility]  Good Help to Those in Need  [phone]    Nursing Note   Patient Name: Michael Amezquita  YOB: 1940  Age: 83 y.o.    [facility] RN Note:      Patient seen and assessed and does not meet [facility] hospice criteria.  Spoke with family at bedside along with [facility] representative.  Family's goal is to get patient home with [facility].  Reviewed MAR and recommended dilaudid/SL.  Received approval from Dr Washington and order placed.  If there are any questions, please contact this writer or the main BS number (see below).    KECIA AyalaN, RN, [facility]  Hospice Liaison  [phone] (mobile)  [phone] (main BS number)  Available on [employer]    
[facility]  Good Help to Those in Need  [phone]    Nursing Note   Patient Name: Michael Amezquita  YOB: 1940  Age: 83 y.o.    [facility] RN Note:  Chart reviewed.     Rounded with NP Lulu Puckett, no family at bedside. Pt minimally responsive to physical stimuli only, breathing tachy, increased work of breathing, audible secretions. Call to pt's dtr Prabhu to offer inpatient hospice. She will speak to family and let us know.    Thank you for the opportunity to be of service to this patient.     Stephanie Sims, RN, [facility]  Hospice Nurse Liaison  [phone] Mobile  [phone] Office     
Patient's daughter is not ready for comfort care until she has spoken with palliative care tomorrow.  Patient put back on Levophed drip as per ICU team.  Request to transfer to hospitalist service no longer hold.  Please consult the hospitalist service again when you are ready to downgrade the patient to medical floor.  
Referral source:   Michael Amezquita at [facility] in [facility] 4 CORONARY CARE.  attended rounds in the CCU as part of the Interdisciplinary team where the patient's ongoing care was discussed. I reviewed the medical record as part of this encounter.     Outcome: Interdisciplinary team are aware of  availability and were encouraged to request support as needed.      Advised nurse to contact Spiritual Care for any further referrals.The  on-call can be reached at [phone]).     Rev. Radha Fan MDiv, [facility]  Staff     
Referral source:   Michael Amezquita at [facility] in [facility] 4 CORONARY CARE.  attended rounds in the CCU as part of the Interdisciplinary team where the patient's ongoing care was discussed. I reviewed the medical record as part of this encounter.     Outcome: Interdisciplinary team are aware of  availability and were encouraged to request support as needed.      Advised nurse to contact Spiritual Care for any further referrals.The  on-call can be reached at [phone]).     Rev. Radha Fan MDiv, [facility]  Staff     
Referral source:   Michael Amezquita at [facility] in [facility] 4 CORONARY CARE.  attended rounds in the CCU as part of the Interdisciplinary team where the patient's ongoing care was discussed. I reviewed the medical record as part of this encounter.     Outcome: Interdisciplinary team are aware of  availability and were encouraged to request support as needed.      Advised nurse to contact Spiritual Care for any further referrals.The  on-call can be reached at [phone]).     Rev. Radha Fan MDiv, [facility]  Staff     
SLP Contact Note    Consult received and appreciated. Pt chart reviewed. Discussed with RN. Concern for silent aspiration at home. Will plan to complete Flexible Endoscopic Evaluation of Swallow (FEES) at bedside to objectively assess safety of swallow and to help further inform goals of care.      MEREDITH Champagne.Jesus, CCC-SLP  Speech-Language Pathologist    
SLP Contact Note    SLP FEES complete. Pt with decreased airway protection with all consistencies (thin, mildly-thick, moderately-thick, and puree) and with no complete swallow initiation likely due to muscular weakness. No white out period indicative of epiglottic inversion. Discoordination also present likely due to hx of COPD. Unclear as to etiology of significantly reduced pharyngeal squeeze. Agree with goals of care conversation to determine pt and family's medical wants, needs, and opinions regarding feeding.  Furthermore, in the geriatric population, research shows that there is no proven benefit in weight gain or markers of nutrition (albumin, prealbumin) in patients with malnutrition due to impaired oral intake. Full note to follow.      Mady De León M.Ed, CCC-SLP  Speech-Language Pathologist      
Spiritual Care Assessment/Progress Note  [facility]    Name: Michael Amezquita MRN: [id number]    Age: 83 y.o.     Sex: male   Language: English     Date: 12/31/2023            Total Time Calculated: 15 min              Spiritual Assessment begun in [facility] 5S1 ORTHO JOINT  Service Provided For:: Patient not available  Referral/Consult From:: Other   Encounter Overview/Reason : Initial Encounter    Spiritual beliefs:      [] Involved in a alexandrea tradition/spiritual practice:      [] Supported by a [place] community:      [] Claims no spiritual orientation:      [] Seeking spiritual identity:           [] Adheres to an individual form of spirituality:      [x] Not able to assess:                Identified resources for coping and support system:   Support System: Spouse, Family members       [] Prayer                  [] Devotional reading               [] Music                  [] Guided Imagery     [] Pet visits                                        [] Other: (COMMENT)     Specific area/focus of visit   Encounter:    Crisis:    Spiritual/Emotional needs:    Ritual, Rites and Sacraments:    Grief, Loss, and Adjustments: Type: End of Life  Ethics/Mediation:    Behavioral Health:    Palliative Care:    Advance Care Planning:      Attempted visit for palliative initial spiritual assessment. Unable to visit patient at this time. Patient was sleeping and did not awake. No family present. Patient's chart was consulted.  Chaplain James, Kwesi, MS, BCC  
Verbal bedside shift change report given to Manuel (oncoming nurse) by Srini (offgoing nurse). Report included the following information Nurse Handoff Report, ED Encounter Summary, ED SBAR, Adult Overview, Intake/Output, MAR, Recent Results, and Cardiac Rhythm Sinus rhythm.      0450 WBC 0.7, platelets 42, decrease from 104. Hgb 6.7. Intensivist aware, no new orders    0730 Report to Srini/Sarah Beth DIAZ      
speech-language pathology at this time.  Discharge Recommendations: No, additional SLP treatment not indicated at discharge     SUBJECTIVE:   Patient expectorating throughout study.    OBJECTIVE:     Past Medical History:   Diagnosis Date    Aneurysm (HCC)     ABDOMINAL     Arrhythmia 3/2/2014    Stated cardiac cath for abn EKG several years ago NEG 4/23/21 pt denies    Gonsalves esophagus     Bladder cancer (HCC)     CAD (coronary artery disease)     4/23/21 pt denies    Calculus of kidney     Chronic obstructive pulmonary disease (HCC)     Contact dermatitis and other eczema, due to unspecified cause     GERD (gastroesophageal reflux disease)     GONSALVES'S ESOPH    Hyperlipemia     Hypertension     Ill-defined condition     ECZEMA    Ill-defined condition     HIATEL HERNIA    Presence of urostomy (HCC)     PUD (peptic ulcer disease)     Baretts Esophagus/antral gastritis/    Skin cancer      Past Surgical History:   Procedure Laterality Date    ADENOIDECTOMY      CATARACT REMOVAL Left 11/2016    COLONOSCOPY      GI      COLONOSCOPY    GI      Colonoscopy x 2-3    HERNIA REPAIR  3/14/16    LAPAROSCOPIC Incisional W/ MESH by     HERNIA REPAIR  8/21/13    left inguinal hernia repair, LAPAROSCOPIC  (DR LI)    OTHER SURGICAL HISTORY      REMOVAL OF SKIN CA R NECK    PA UNLISTED PROCEDURE ABDOMEN PERITONEUM & OMENTUM      UMBILICAL hernia     TONSILLECTOMY      UROLOGICAL SURGERY  2015    CYSTO    UROLOGICAL SURGERY      cystectomy/urostomy    UROLOGICAL SURGERY  2010    Bladder bx/kidney stone removal    VASCULAR SURGERY      REMOVAL OF PORT R CHEST WALL    VASCULAR SURGERY  03/31/2015    PORT R CHEST WALL     Prior Level of Function/Home Situation:   Social/Functional History  Lives With: Alone  Type of Home: Senior housing apartment  Home Layout: One level  Home Access: Elevator, Level entry  Home Equipment: None  Receives Help From: Personal care attendant  Active : No  Diet prior to admission: 
calcification.      Records reviewed and summarized above.  Pathology report(s) reviewed above.  Radiology report(s) reviewed above.      Assessment/PLAN:     1) pancytopenia  Records and history reviewed with family/ nursing at bedside.   Hx of anemia and thrombocytopenia back to 2021  Seen by Dr Zarate in past for bladder cancer and all  Was iron deficient in past  ? Bleeding presently since severe anemia now  Cytopenias likely worse due to sepsis/ acute illness  Will do lab work up/ smear review  Talked with family at bedside  Pt is frail/ weak/ not able to converse  We will follow labs    2) septic shock in CCU.  Per intensivist    3) Recurrent bladder cancer- MIBC urothelial- T2N0M0- 2018   He ultimately had a cystectomy on 7/23/18- pT2N0 HG MIBC    4) ILD/ COPD.  Sees pulmonary    We will follow  Call if questions    I appreciate the opportunity to participate in Mr. Michael Amezquita's care.    Signed By: Tegan Rodriguez DO

## 2023-12-31 NOTE — CARE COORDINATION
UPDATE: 12:11PM      CM spoke with [facility] liaison, and it was reported that DME still pending delivery to families home.  Hospice agency able to accept on Monday vs today.    MERLYN Isidro CM  [phone]        INITIAL NOTE: CM: Daphnie Peña is currently working with pt.  CM aware that pt family are wanting home hospice at the time of d/c.  [facility] is selecting agency, and spoke with pts family and they are requesting time to prepare home for DME delivery.  Hospice agency seem to be able to accept pt for start of care on 1/1/24    CM will continue to follow.    MERLYN Isidro CM  [phone]

## 2024-01-01 ENCOUNTER — HOME CARE VISIT (OUTPATIENT)
Facility: HOME HEALTH | Age: 84
End: 2024-01-01
Payer: MEDICARE

## 2024-01-01 ENCOUNTER — CLINICAL DOCUMENTATION (OUTPATIENT)
Facility: HOSPITAL | Age: 84
End: 2024-01-01

## 2024-01-01 NOTE — DISCHARGE SUMMARY
Discharge Summary       PATIENT ID: Michael Amezquita  MRN: [id number]   YOB: 1940    DATE OF ADMISSION: 12/26/2023  1:51 PM    DATE OF DISCHARGE: 12/31/2023   PRIMARY CARE PROVIDER: Angelina Dangelo APRN - HARSHA     ATTENDING PHYSICIAN: Elisabet Washington MD   DISCHARGING PROVIDER: Elisabet Washington MD    To contact this individual call [phone] and ask the  to page.  If unavailable ask to be transferred the Adult Hospitalist Department.    CONSULTATIONS: IP WOUND CARE NURSE CONSULT TO EVAL  IP CONSULT TO HEMATOLOGY  IP CONSULT TO PALLIATIVE CARE  IP CONSULT TO CASE MANAGEMENT  IP CONSULT TO CASE MANAGEMENT  IP CONSULT TO HOSPICE  IP CONSULT TO CASE MANAGEMENT    PROCEDURES/SURGERIES: * No surgery found *     ADMITTING DIAGNOSES & HOSPITAL COURSE:   HPI: 'HPI: \"Michael Amezquita is a 83 y.o. male hx COPD, urothelial carcinoma, CAD, GERD, hypertension, hyperlipidemia who presents with septic shock, H influenza bacteremia, hemoglobin 4.6 this am.  Was initially admitted directly to the ICU on pressors and continued on antibiotics, daughter had extensive discussion with ICU attending today daughter does not want invasive measures and does not want exploration of anemia or lab draws wants hospice comfort care.  Hospitalist requested for transfer for hospice comfort measures.     ROS unable to be obtained due to patient's clinical condition he is nonverbal not following commands, granddaughter at bedside, as needed comfort meds already in place, bp soft but stable. \"   \"        DISCHARGE DIAGNOSES / PLAN:      Septic shock  Acute blood loss anemia concern for actively bleeding infrarenal aortic aneurysm  HUNG  H influenza bacteremia  Complex UTI  Acute metabolic encephalopathy on dementia   Pancytopenia  History of urothelial carcinoma with urostomy  Hx Hypertension  History of GERD  CAD  HLD     -Appreciate ICU and palliative care evaluation extensive discussion already took place with patient's

## 2024-01-01 NOTE — PLAN OF CARE
Problem: Hospice Orientation  Goal: Demonstrate understanding of hospice philosophy, plan of care, and inpatient hospice program  Description: The patient/family/caregiver will demonstrate understanding of hospice philosophy, plan of care and the inpatient hospice program as evidenced by participation in meeting the patient's psychosocial, spiritual, medical, and physical needs inclusive of medical supplies/equipment focusing on symptoms.  Outcome: Progressing     Problem: Respiratory - Adult  Goal: Achieves optimal ventilation and oxygenation  Outcome: Progressing     Problem: Genitourinary - Adult  Goal: Absence of urinary retention  Outcome: Progressing  Goal: Urinary catheter remains patent  Outcome: Progressing     Problem: Pain  Goal: Verbalizes/displays adequate comfort level or baseline comfort level  Outcome: Progressing     Problem: Skin/Tissue Integrity  Goal: Absence of new skin breakdown  Description: 1.  Monitor for areas of redness and/or skin breakdown  2.  Assess vascular access sites hourly  3.  Every 4-6 hours minimum:  Change oxygen saturation probe site  4.  Every 4-6 hours:  If on nasal continuous positive airway pressure, respiratory therapy assess nares and determine need for appliance change or resting period.  Outcome: Progressing     Problem: Infection - Adult  Goal: Absence of infection at discharge  Outcome: Progressing  Goal: Absence of infection during hospitalization  Outcome: Progressing  Goal: Absence of fever/infection during anticipated neutropenic period  Outcome: Progressing     Problem: Hematologic - Adult  Goal: Maintains hematologic stability  Outcome: Progressing     Problem: Potential for Alteration in Skin Integrity  Goal: Monitor skin for areas of alteration in skin integrity  Description: Patient [unfilled] will remain free from alterations of or worsening skin integrity as evidenced by no changes to skin during assessment each shift during the inpatient hospice

## 2024-01-01 NOTE — PROGRESS NOTES
Patient . 's presence not requested.     On-call staff Noah benito.   
Patient . 's presence not requested.    On-call staff Noah benito.       
no

## 2024-01-01 NOTE — PROGRESS NOTES
Mr. Amezquita arrived on the unit via stretcher with his daughter at bedside. Granddaughter will be visiting later per daughter.    Mr. Amezquita, who goes by \"Punch\" is minimally responsive, responds to pain, being moved.    He arrived on 2L O2, NC.     EMILY Andrade, placed 22G IV in the LFA.      Patient plan is to be made Hospice Care and be discharged into [facility] at home tomorrow morning if able.      NFD at this time.    Annie. EMILY 1945 hrs    sIGNED

## 2024-01-01 NOTE — PROGRESS NOTES
[facility]  Good Help to Those in Need  [phone]    Inpatient Nursing Admission   Patient Name: Michael Amezquita  YOB: 1940  Age: 83 y.o.       Date of Hospice Admission: 12/31/2023  Hospice Attending Elected by Patient: Cristi Horn MD  Primary Care Physician: Angelina Dangelo, APRN - NP  Admitting RN: Stephanie Sims   : MAYITO     Level of Care (GIP/Routine/Respite): [facility]  Facility of Care: [facility]  Patient Room: [facility]     HOSPICE SUMMARY   ER Visits/ Hospitalizations in past year: 2  Hospice Diagnosis: Sepsis (HCC) [A41.9]  Onset Date of Hospice Diagnosis: 12/29  Summary of Disease Progression Leading to Hospice Diagnosis:   Excerpted per MD notes:  Michael Amezquita is a 83 y.o. male admitted by EMS from his ILF for generalized fatigue, increased cough, shortness of breath, and malaise causing him to just be bedbound for last few day. He was treated for hypotension and hypoxia with O2 and IV bolus prior to arrival. Hx of COPD, urothelial carcinoma, urostomy, CAD, GERD, hypertension, hyperlipidemia. He was treated for septic shock. Blood culture + H influenza bacteremia, hemoglobin 4.6.  Was initially admitted directly to the ICU on pressors and continued on antibiotics, daughter had extensive discussion with ICU attending; does not want invasive measures and wants to focus on comfort care with the support of hospice.    Co-Morbidities:   Patient Active Problem List   Diagnosis    Bladder cancer (HCC)    Other general symptoms and signs    PNA (pneumonia)    Cavitary lesion of lung    Combined forms of age-related cataract of right eye    Incisional hernia    Peristomal hernia    Floppy iris syndrome    Ulcer of abdomen wall (HCC)    Chronic bronchitis (HCC)    Umbilical hernia    COPD (chronic obstructive pulmonary disease) (HCC)    Combined forms of age-related cataract of left eye    Thrombocytopenia (HCC)    Malignant neoplasm of lateral wall of urinary bladder (HCC)

## 2024-01-01 NOTE — PROGRESS NOTES
[facility]  Good Help to Those in Need  [phone]    Discharge/Death Nursing Note   Patient Name: Michael Amezquita  YOB: 1940  Age: 83 y.o.    Date of Death: 2023  Admitted Date: 2023  Time of Death:     Facility of Care: [facility]  Level of Care: [facility]  Patient Room: [facility]     Hospice Attending: Cristi Horn MD  Hospice Diagnosis: Sepsis (HCC) [A41.9]    Death Pronouncement   Pronouncement of death completed by:     ASHIA Vaca NP    Agency staff was not present at the time of death    At the time of death the patient was documented as:    Patient was examined and the following were absent: Pulses, Blood Pressure, and Respiratory effort     The pt  within Oncology Unit/6E    The following were notified of the patient's death:     Michaela Kirkpatrick/daughter    Medications were disposed of per facility protocol     Discharge Summary   Discharge Reason: Death    Summary of Care Provided:    [x] Post mortem care provided by Oncology Unit staff  [x] Notification of  home by nursing supervisor  [] Referrals/Community resources provided:   [] Goals completed  [] Durable Medical Equipment vendor notified     Disciplines involved: [x] RN [] SW []  [] MARAVILLA [] Vol [] PT [] OT [] ST [] BC    [x] IDT communication/notification    Attending Physician, Dr. Cristi Horn, notified of death    Bereaved   Michaela Kirkpatrick/daugther (low)         2023     8:19 PM   Demographics   Marital Status

## 2024-01-01 NOTE — DEATH NOTES
Death Pronouncement Note  Patient's Name: Michael Amezquita   Patient's YOB: 1940  MRN Number: [id number]    Admitting Provider: Cristi Horn MD  Attending Provider: Cristi Horn MD    Patient was examined and the following were absent: Pulses, Blood Pressure, and Respiratory effort    I declared the patient dead on 12/31/2023 at 9:28 PM    Preliminary Cause of Death: Sepsis     Electronically signed by ASHIA Vaca NP on 12/31/23 at 9:22 PM EST

## 2024-01-03 LAB
BACTERIA ISLT: NORMAL
OTHER ANTIBIOTIC SUSC ISLT: NORMAL
SOURCE: NORMAL
SPECIMEN SOURCE: NORMAL

## 2024-01-03 NOTE — DISCHARGE SUMMARY
Hospice Discharge Summary    [facility]  Good Help to Those in Need        Date of Admission: 12/31/2023  Date of Discharge: 12/31/2023    Michael Amezquita is a 83 y.o. year old who was admitted to [facility] at [facility] with a Hospice diagnosis of Sepsis (HCC) [A41.9].      The patient's care was focused on comfort and the patient passed away on 12/31/2023.

## 2024-01-05 LAB
AMPICILLIN + SULBACTAM: NORMAL
AMPICILLIN: NORMAL
CEFACLOR: NORMAL
CEFIXIME: NORMAL
CEFTRIAXONE: NORMAL
CEFUROXIME: NORMAL
CHLORAMPHENICOL: NORMAL
CLARITHROMYCIN: NORMAL
LEVOFLOXACIN: NORMAL
MEROPENEM: NORMAL
ORGANISM ID: NORMAL
SPECIMEN SOURCE: NORMAL
TETRACYCLINE: NORMAL
TRIMETHOPRIM + SULFAMETHOXAZOLE: NORMAL

## 2024-01-16 ENCOUNTER — HOSPITAL ENCOUNTER (EMERGENCY)
Age: 84
Discharge: HOME | End: 2024-01-16
Payer: MEDICARE

## 2024-01-16 ENCOUNTER — APPOINTMENT (OUTPATIENT)
Dept: CT IMAGING | Age: 84
End: 2024-01-16
Payer: MEDICARE

## 2024-01-16 VITALS
RESPIRATION RATE: 16 BRPM | TEMPERATURE: 98.1 F | OXYGEN SATURATION: 96 % | WEIGHT: 215 LBS | DIASTOLIC BLOOD PRESSURE: 91 MMHG | SYSTOLIC BLOOD PRESSURE: 165 MMHG | HEART RATE: 93 BPM | BODY MASS INDEX: 30.85 KG/M2

## 2024-01-16 DIAGNOSIS — M48.00 SPINAL STENOSIS, UNSPECIFIED SPINAL REGION: ICD-10-CM

## 2024-01-16 DIAGNOSIS — M51.9 LUMBAR DISC DISEASE: ICD-10-CM

## 2024-01-16 DIAGNOSIS — S39.012A STRAIN OF LUMBAR REGION, INITIAL ENCOUNTER: Primary | ICD-10-CM

## 2024-01-16 DIAGNOSIS — M48.061 SPINAL STENOSIS OF LUMBAR REGION WITHOUT NEUROGENIC CLAUDICATION: ICD-10-CM

## 2024-01-16 PROCEDURE — 99284 EMERGENCY DEPT VISIT MOD MDM: CPT

## 2024-01-16 PROCEDURE — 6370000000 HC RX 637 (ALT 250 FOR IP): Performed by: PHYSICIAN ASSISTANT

## 2024-01-16 PROCEDURE — 72131 CT LUMBAR SPINE W/O DYE: CPT

## 2024-01-16 RX ORDER — OXYCODONE HYDROCHLORIDE AND ACETAMINOPHEN 5; 325 MG/1; MG/1
1 TABLET ORAL EVERY 4 HOURS PRN
Qty: 12 TABLET | Refills: 0 | Status: SHIPPED | OUTPATIENT
Start: 2024-01-16 | End: 2024-01-19

## 2024-01-16 RX ORDER — HYDROCODONE BITARTRATE AND ACETAMINOPHEN 5; 325 MG/1; MG/1
1 TABLET ORAL EVERY 6 HOURS PRN
Qty: 12 TABLET | Refills: 0 | Status: SHIPPED | OUTPATIENT
Start: 2024-01-16 | End: 2024-01-16

## 2024-01-16 RX ORDER — LIDOCAINE 50 MG/G
1 PATCH TOPICAL EVERY 24 HOURS
Qty: 10 PATCH | Refills: 0 | Status: SHIPPED | OUTPATIENT
Start: 2024-01-16 | End: 2024-01-26

## 2024-01-16 RX ORDER — HYDROCODONE BITARTRATE AND ACETAMINOPHEN 5; 325 MG/1; MG/1
1 TABLET ORAL ONCE
Status: COMPLETED | OUTPATIENT
Start: 2024-01-16 | End: 2024-01-16

## 2024-01-16 RX ADMIN — HYDROCODONE BITARTRATE AND ACETAMINOPHEN 1 TABLET: 5; 325 TABLET ORAL at 14:02

## 2024-01-16 ASSESSMENT — PAIN DESCRIPTION - ORIENTATION
ORIENTATION: LOWER
ORIENTATION: LOWER

## 2024-01-16 ASSESSMENT — PAIN SCALES - GENERAL
PAINLEVEL_OUTOF10: 9
PAINLEVEL_OUTOF10: 4
PAINLEVEL_OUTOF10: 10

## 2024-01-16 ASSESSMENT — PAIN DESCRIPTION - LOCATION
LOCATION: BACK
LOCATION: BACK

## 2024-01-16 ASSESSMENT — PAIN - FUNCTIONAL ASSESSMENT: PAIN_FUNCTIONAL_ASSESSMENT: 0-10

## 2024-01-16 ASSESSMENT — LIFESTYLE VARIABLES: HOW OFTEN DO YOU HAVE A DRINK CONTAINING ALCOHOL: NEVER

## 2024-01-16 ASSESSMENT — PAIN DESCRIPTION - DESCRIPTORS: DESCRIPTORS: SPASM;PRESSURE

## 2024-01-16 NOTE — DISCHARGE INSTR - COC
Continuity of Care Form    Patient Name: Osiel Mcnamara   :  1940  MRN:  [id number]    Admit date:  2024  Discharge date:  ***    Code Status Order: Prior   Advance Directives:     Admitting Physician:  No admitting provider for patient encounter.  PCP: Ck Ventura DO    Discharging Nurse: ***  Discharging Hospital Unit/Room#:   Discharging Unit Phone Number: ***    Emergency Contact:   Extended Emergency Contact Information  Primary Emergency Contact: Osiel Mcnamara Jr  Address: [address] [place] [address] [facility]  Home Phone: [phone]  Mobile Phone: [phone]  Relation: Child  Secondary Emergency Contact: Slime Mcnamara  Address: [address] [place] [address] [facility]  Home Phone: [phone]  Mobile Phone: [phone]  Relation: Spouse    Past Surgical History:  Past Surgical History:   Procedure Laterality Date    ACHILLES TENDON SURGERY      BREAST SURGERY Left 2014    Excision of left breast mass    COLONOSCOPY  2012    FRACTURE SURGERY      C-2    KNEE ARTHROTOMY      NERVE BLOCK  04/10/2012    lumbar epidural #1    PACEMAKER PLACEMENT         Immunization History:   Immunization History   Administered Date(s) Administered    COVID-19, PFIZER PURPLE top, DILUTE for use, (age 12 y+), 30mcg/0.3mL 2021, 2021    Influenza Virus Vaccine 2019, 2020, 03/15/2021    Influenza, FLUCELVAX, (age 6 mo+), MDCK, PF, 0.5mL 10/21/2020    Influenza, FLUZONE (age 65 y+), High Dose, 0.7mL 10/06/2020    Influenza, High Dose (Fluzone 65 yrs and older) 10/06/2020    Influenza, Triv, inactivated, subunit, adjuvanted, IM (Fluad 65 yrs and older) 2017, 10/15/2018    Pneumococcal, PCV-13, PREVNAR 13, (age 6w+), IM, 0.5mL 10/18/2016, 2021    Pneumococcal, PPSV23, PNEUMOVAX 23, (age 2y+), SC/IM, 0.5mL 2014, 2022    TDaP, ADACEL (age 10y-64y), BOOSTRIX (age 10y+), IM, 0.5mL 10/18/2016,

## 2024-01-16 NOTE — ED PROVIDER NOTES
vertebral  Extremities: Moves all extremities x 4. Warm and well perfused no bony point tenderness of the hips bilateral lower extremity  Skin: warm and dry without rash  Neurologic: GCS 15,  Psych: Normal Affect      ------------------------------ ED COURSE/MEDICAL DECISION MAKING----------------------  Medications   HYDROcodone-acetaminophen (NORCO) 5-325 MG per tablet 1 tablet (1 tablet Oral Given 1/16/24 1402)         ED COURSE:   Patient states he is taken Percocet in the past with improvement in pain did not get any pain improvement on Norco will discharge with Percocet Lidoderm patch    Medical Decision Making:    Medical Decision Making  Amount and/or Complexity of Data Reviewed  External Data Reviewed: notes.  Radiology: ordered and independent interpretation performed. Decision-making details documented in ED Course.  ECG/medicine tests:  Decision-making details documented in ED Course.    Risk  Prescription drug management.       Medical Decision Making    Patient presents to the ER for back pain.   Social Determinants include   Social Connections: Moderately Integrated (8/18/2022)    Social Connection and Isolation Panel [NHANES]     Frequency of Communication with Friends and Family: More than three times a week     Frequency of Social Gatherings with Friends and Family: More than three times a week     Attends [employer] Services: 1 to 4 times per year     Active Member of Clubs or Organizations: No     Attends Club or Organization Meetings: Never     Marital Status:     Social Determinants : None.   Chronic conditions    Past Medical History:   Diagnosis Date    Aortic stenosis, moderate 10/2018    Arthritis     Diabetes mellitus (HCC)     Enlarged thyroid 12/13/2023    H/O cardiovascular stress test 10/19/2018    lexiscan    Hyperlipidemia     Hypertension     Lymph node enlargement     seeing dr yates  coughing mucous    Partial small bowel obstruction (HCC) 04/14/2017   .    Physical exam

## 2024-01-18 PROCEDURE — 93296 REM INTERROG EVL PM/IDS: CPT | Performed by: INTERNAL MEDICINE

## 2024-01-18 PROCEDURE — 93294 REM INTERROG EVL PM/LDLS PM: CPT | Performed by: INTERNAL MEDICINE

## 2024-01-20 ENCOUNTER — HOSPITAL ENCOUNTER (EMERGENCY)
Age: 84
Discharge: HOME | End: 2024-01-20
Attending: EMERGENCY MEDICINE
Payer: MEDICARE

## 2024-01-20 VITALS
OXYGEN SATURATION: 95 % | TEMPERATURE: 97 F | SYSTOLIC BLOOD PRESSURE: 169 MMHG | HEART RATE: 93 BPM | DIASTOLIC BLOOD PRESSURE: 92 MMHG | RESPIRATION RATE: 16 BRPM

## 2024-01-20 DIAGNOSIS — S39.012A STRAIN OF LUMBAR REGION, INITIAL ENCOUNTER: Primary | ICD-10-CM

## 2024-01-20 PROCEDURE — 6370000000 HC RX 637 (ALT 250 FOR IP): Performed by: STUDENT IN AN ORGANIZED HEALTH CARE EDUCATION/TRAINING PROGRAM

## 2024-01-20 PROCEDURE — 99283 EMERGENCY DEPT VISIT LOW MDM: CPT

## 2024-01-20 RX ORDER — HYDROCODONE BITARTRATE AND ACETAMINOPHEN 5; 325 MG/1; MG/1
1 TABLET ORAL EVERY 8 HOURS PRN
Qty: 4 TABLET | Refills: 0 | Status: SHIPPED | OUTPATIENT
Start: 2024-01-20 | End: 2024-01-23

## 2024-01-20 RX ORDER — HYDROCODONE BITARTRATE AND ACETAMINOPHEN 5; 325 MG/1; MG/1
1 TABLET ORAL EVERY 8 HOURS PRN
Qty: 4 TABLET | Refills: 0 | Status: SHIPPED | OUTPATIENT
Start: 2024-01-20 | End: 2024-01-20

## 2024-01-20 RX ORDER — HYDROCODONE BITARTRATE AND ACETAMINOPHEN 5; 325 MG/1; MG/1
1 TABLET ORAL ONCE
Status: COMPLETED | OUTPATIENT
Start: 2024-01-20 | End: 2024-01-20

## 2024-01-20 RX ADMIN — HYDROCODONE BITARTRATE AND ACETAMINOPHEN 1 TABLET: 5; 325 TABLET ORAL at 11:26

## 2024-01-20 ASSESSMENT — LIFESTYLE VARIABLES: HOW OFTEN DO YOU HAVE A DRINK CONTAINING ALCOHOL: NEVER

## 2024-01-20 ASSESSMENT — PAIN SCALES - GENERAL: PAINLEVEL_OUTOF10: 8

## 2024-01-20 NOTE — ED PROVIDER NOTES
Medical History:  has a past medical history of Aortic stenosis, moderate, Arthritis, Diabetes mellitus (HCC), Enlarged thyroid, H/O cardiovascular stress test, Hyperlipidemia, Hypertension, Lymph node enlargement, and Partial small bowel obstruction (HCC).    Past Surgical History:  has a past surgical history that includes fracture surgery; Nerve Block (04/10/2012); Achilles tendon surgery; Knee Arthrotomy; Colonoscopy (04/24/2012); Breast surgery (Left, 06/05/2014); and pacemaker placement.    Social History:  reports that he has quit smoking. His smoking use included cigarettes. He has never used smokeless tobacco. He reports that he does not currently use alcohol. He reports that he does not use drugs.    Family History: family history includes Brain Cancer in his sister; Diabetes in his mother; Hypertension in his father, mother, and sister.     The patient’s home medications have been reviewed.    Allergies: Patient has no known allergies.    ---------------------------------------------------PHYSICAL EXAM--------------------------------------    Constitutional/General: well appearing, non toxic in NAD  Head: Normocephalic and atraumatic  Neck: Supple, full ROM  Pulmonary: Lungs clear to auscultation bilaterally, no wheezes, rales, or rhonchi. Not in respiratory distress  Cardiovascular:  Regular rate. Regular rhythm. No murmurs  Abdomen: Soft.  Non tender. Non distended.   No rebound, guarding, or rigidity. No pulsatile masses appreciated.  Musculoskeletal: Mild tenderness to palpation in the lumbar region.  No obvious step-off or deformities in the spine palpated.  Moves all extremities x 4. Warm and well perfused, no clubbing, cyanosis, or edema. Capillary refill <3 seconds  Skin: warm and dry. No rashes.   Psych: Normal Affect    -------------------------------------------------- RESULTS -------------------------------------------------  I have personally reviewed all laboratory and imaging results for

## 2024-02-07 ENCOUNTER — HOSPITAL ENCOUNTER (EMERGENCY)
Age: 84
Discharge: HOME | End: 2024-02-07
Attending: EMERGENCY MEDICINE
Payer: MEDICARE

## 2024-02-07 ENCOUNTER — APPOINTMENT (OUTPATIENT)
Dept: GENERAL RADIOLOGY | Age: 84
End: 2024-02-07
Payer: MEDICARE

## 2024-02-07 VITALS
RESPIRATION RATE: 18 BRPM | DIASTOLIC BLOOD PRESSURE: 80 MMHG | SYSTOLIC BLOOD PRESSURE: 126 MMHG | HEART RATE: 87 BPM | TEMPERATURE: 98 F | OXYGEN SATURATION: 4 % | BODY MASS INDEX: 33.72 KG/M2 | WEIGHT: 235 LBS

## 2024-02-07 DIAGNOSIS — M54.50 LOW BACK PAIN, UNSPECIFIED BACK PAIN LATERALITY, UNSPECIFIED CHRONICITY, UNSPECIFIED WHETHER SCIATICA PRESENT: Primary | ICD-10-CM

## 2024-02-07 PROCEDURE — 99284 EMERGENCY DEPT VISIT MOD MDM: CPT

## 2024-02-07 PROCEDURE — 6360000002 HC RX W HCPCS: Performed by: EMERGENCY MEDICINE

## 2024-02-07 PROCEDURE — 6370000000 HC RX 637 (ALT 250 FOR IP): Performed by: EMERGENCY MEDICINE

## 2024-02-07 PROCEDURE — 96372 THER/PROPH/DIAG INJ SC/IM: CPT

## 2024-02-07 PROCEDURE — 73502 X-RAY EXAM HIP UNI 2-3 VIEWS: CPT

## 2024-02-07 RX ORDER — LIDOCAINE 4 G/G
1 PATCH TOPICAL DAILY
Status: DISCONTINUED | OUTPATIENT
Start: 2024-02-07 | End: 2024-02-07 | Stop reason: HOSPADM

## 2024-02-07 RX ORDER — KETOROLAC TROMETHAMINE 30 MG/ML
30 INJECTION, SOLUTION INTRAMUSCULAR; INTRAVENOUS ONCE
Status: COMPLETED | OUTPATIENT
Start: 2024-02-07 | End: 2024-02-07

## 2024-02-07 RX ORDER — METHOCARBAMOL 750 MG/1
750 TABLET, FILM COATED ORAL 3 TIMES DAILY
Qty: 5 TABLET | Refills: 0 | Status: SHIPPED | OUTPATIENT
Start: 2024-02-07 | End: 2024-02-09

## 2024-02-07 RX ORDER — ORPHENADRINE CITRATE 30 MG/ML
60 INJECTION INTRAMUSCULAR; INTRAVENOUS ONCE
Status: COMPLETED | OUTPATIENT
Start: 2024-02-07 | End: 2024-02-07

## 2024-02-07 RX ADMIN — ORPHENADRINE CITRATE 60 MG: 60 INJECTION INTRAMUSCULAR; INTRAVENOUS at 10:50

## 2024-02-07 RX ADMIN — KETOROLAC TROMETHAMINE 30 MG: 30 INJECTION, SOLUTION INTRAMUSCULAR; INTRAVENOUS at 10:50

## 2024-02-07 ASSESSMENT — PAIN DESCRIPTION - LOCATION
LOCATION: HIP
LOCATION: GROIN

## 2024-02-07 ASSESSMENT — PAIN SCALES - GENERAL
PAINLEVEL_OUTOF10: 8
PAINLEVEL_OUTOF10: 8

## 2024-02-07 ASSESSMENT — PAIN - FUNCTIONAL ASSESSMENT: PAIN_FUNCTIONAL_ASSESSMENT: 0-10

## 2024-02-07 ASSESSMENT — PAIN DESCRIPTION - DESCRIPTORS: DESCRIPTORS: ACHING

## 2024-02-07 NOTE — ED PROVIDER NOTES
soft and nontender.  Heart rate regular, lungs are clear and equal.       [NC]   1153 On reevaluation, patient feeling slightly improved, will ambulate and we discussed plans for discharge and follow-up in the outpatient setting. [MM]      ED Course User Index  [MM] Apoorva Oglesby DO  [NC] Jeremias Davis DO      CONSULTS: (Who and What was discussed)  None    FINAL IMPRESSION      1. Low back pain, unspecified back pain laterality, unspecified chronicity, unspecified whether sciatica present          DISPOSITION/PLAN     DISPOSITION Decision To Discharge 02/07/2024 11:53:54 AM      PATIENT REFERRED TO:  Ck Ventura DO  [address] [address]  [phone]    Call in 1 day  follow up    ELIDIA Mason DO  [address], [address]  [place] [address] [phone]    Call in 1 day  follow up    [facility]  [address] [facility] [address]  Call in 1 day  follow up      DISCHARGE MEDICATIONS:  New Prescriptions    METHOCARBAMOL (ROBAXIN-750) 750 MG TABLET    Take 1 tablet by mouth 3 times daily for 5 doses            (Please note that portions of this note were completed with a voice recognition program.  Efforts were made to edit the dictations but occasionally words are mis-transcribed.)    Apoorva Oglesby DO (electronically signed)

## 2024-02-12 PROBLEM — E11.22 TYPE 2 DIABETES MELLITUS WITH CHRONIC KIDNEY DISEASE (HCC): Status: ACTIVE | Noted: 2024-02-12

## 2024-02-13 ENCOUNTER — HOSPITAL ENCOUNTER (EMERGENCY)
Age: 84
Discharge: HOME | End: 2024-02-13
Attending: STUDENT IN AN ORGANIZED HEALTH CARE EDUCATION/TRAINING PROGRAM
Payer: MEDICARE

## 2024-02-13 ENCOUNTER — APPOINTMENT (OUTPATIENT)
Dept: GENERAL RADIOLOGY | Age: 84
End: 2024-02-13
Payer: MEDICARE

## 2024-02-13 ENCOUNTER — APPOINTMENT (OUTPATIENT)
Dept: CT IMAGING | Age: 84
End: 2024-02-13
Payer: MEDICARE

## 2024-02-13 VITALS
BODY MASS INDEX: 32.35 KG/M2 | DIASTOLIC BLOOD PRESSURE: 88 MMHG | WEIGHT: 226 LBS | SYSTOLIC BLOOD PRESSURE: 162 MMHG | HEART RATE: 82 BPM | OXYGEN SATURATION: 95 % | TEMPERATURE: 98.1 F | RESPIRATION RATE: 18 BRPM | HEIGHT: 70 IN

## 2024-02-13 DIAGNOSIS — R10.9 ABDOMINAL PAIN, UNSPECIFIED ABDOMINAL LOCATION: Primary | ICD-10-CM

## 2024-02-13 DIAGNOSIS — G89.29 CHRONIC LOW BACK PAIN, UNSPECIFIED BACK PAIN LATERALITY, UNSPECIFIED WHETHER SCIATICA PRESENT: ICD-10-CM

## 2024-02-13 DIAGNOSIS — Z76.5 DRUG-SEEKING BEHAVIOR: ICD-10-CM

## 2024-02-13 DIAGNOSIS — F11.20: ICD-10-CM

## 2024-02-13 DIAGNOSIS — M54.50 CHRONIC LOW BACK PAIN, UNSPECIFIED BACK PAIN LATERALITY, UNSPECIFIED WHETHER SCIATICA PRESENT: ICD-10-CM

## 2024-02-13 LAB
ALBUMIN SERPL-MCNC: 3.6 G/DL (ref 3.5–5.2)
ALP SERPL-CCNC: 69 U/L (ref 40–129)
ALT SERPL-CCNC: 6 U/L (ref 0–40)
ANION GAP SERPL CALCULATED.3IONS-SCNC: 8 MMOL/L (ref 7–16)
AST SERPL-CCNC: 19 U/L (ref 0–39)
BASOPHILS # BLD: 0.03 K/UL (ref 0–0.2)
BASOPHILS NFR BLD: 1 % (ref 0–2)
BILIRUB SERPL-MCNC: 0.4 MG/DL (ref 0–1.2)
BUN SERPL-MCNC: 15 MG/DL (ref 6–23)
CALCIUM SERPL-MCNC: 8.3 MG/DL (ref 8.6–10.2)
CHLORIDE SERPL-SCNC: 106 MMOL/L (ref 98–107)
CO2 SERPL-SCNC: 27 MMOL/L (ref 22–29)
CREAT SERPL-MCNC: 1.1 MG/DL (ref 0.7–1.2)
EOSINOPHIL # BLD: 0.21 K/UL (ref 0.05–0.5)
EOSINOPHILS RELATIVE PERCENT: 5 % (ref 0–6)
ERYTHROCYTE [DISTWIDTH] IN BLOOD BY AUTOMATED COUNT: 16.4 % (ref 11.5–15)
GFR SERPL CREATININE-BSD FRML MDRD: >60 ML/MIN/1.73M2
GLUCOSE SERPL-MCNC: 99 MG/DL (ref 74–99)
HCT VFR BLD AUTO: 33.4 % (ref 37–54)
HGB BLD-MCNC: 10.4 G/DL (ref 12.5–16.5)
IMM GRANULOCYTES # BLD AUTO: <0.03 K/UL (ref 0–0.58)
IMM GRANULOCYTES NFR BLD: 0 % (ref 0–5)
LACTATE BLDV-SCNC: 1.3 MMOL/L (ref 0.5–2.2)
LYMPHOCYTES NFR BLD: 1.21 K/UL (ref 1.5–4)
LYMPHOCYTES RELATIVE PERCENT: 28 % (ref 20–42)
MCH RBC QN AUTO: 28 PG (ref 26–35)
MCHC RBC AUTO-ENTMCNC: 31.1 G/DL (ref 32–34.5)
MCV RBC AUTO: 89.8 FL (ref 80–99.9)
MONOCYTES NFR BLD: 0.73 K/UL (ref 0.1–0.95)
MONOCYTES NFR BLD: 17 % (ref 2–12)
NEUTROPHILS NFR BLD: 50 % (ref 43–80)
NEUTS SEG NFR BLD: 2.2 K/UL (ref 1.8–7.3)
PLATELET # BLD AUTO: 156 K/UL (ref 130–450)
PMV BLD AUTO: 12.8 FL (ref 7–12)
POTASSIUM SERPL-SCNC: 3.6 MMOL/L (ref 3.5–5)
PROT SERPL-MCNC: 6.3 G/DL (ref 6.4–8.3)
RBC # BLD AUTO: 3.72 M/UL (ref 3.8–5.8)
SODIUM SERPL-SCNC: 141 MMOL/L (ref 132–146)
WBC OTHER # BLD: 4.4 K/UL (ref 4.5–11.5)

## 2024-02-13 PROCEDURE — 36415 COLL VENOUS BLD VENIPUNCTURE: CPT

## 2024-02-13 PROCEDURE — 96375 TX/PRO/DX INJ NEW DRUG ADDON: CPT

## 2024-02-13 PROCEDURE — 99284 EMERGENCY DEPT VISIT MOD MDM: CPT

## 2024-02-13 PROCEDURE — 96374 THER/PROPH/DIAG INJ IV PUSH: CPT

## 2024-02-13 PROCEDURE — 85025 COMPLETE CBC W/AUTO DIFF WBC: CPT

## 2024-02-13 PROCEDURE — 6370000000 HC RX 637 (ALT 250 FOR IP)

## 2024-02-13 PROCEDURE — 74018 RADEX ABDOMEN 1 VIEW: CPT

## 2024-02-13 PROCEDURE — 80053 COMPREHEN METABOLIC PANEL: CPT

## 2024-02-13 PROCEDURE — 6360000002 HC RX W HCPCS

## 2024-02-13 PROCEDURE — 83605 ASSAY OF LACTIC ACID: CPT

## 2024-02-13 RX ORDER — SENNA AND DOCUSATE SODIUM 50; 8.6 MG/1; MG/1
1 TABLET, FILM COATED ORAL DAILY
Qty: 15 TABLET | Refills: 0 | Status: SHIPPED | OUTPATIENT
Start: 2024-02-13 | End: 2024-02-28

## 2024-02-13 RX ORDER — PROCHLORPERAZINE EDISYLATE 5 MG/ML
10 INJECTION INTRAMUSCULAR; INTRAVENOUS ONCE
Status: COMPLETED | OUTPATIENT
Start: 2024-02-13 | End: 2024-02-13

## 2024-02-13 RX ORDER — DIPHENHYDRAMINE HYDROCHLORIDE 50 MG/ML
25 INJECTION INTRAMUSCULAR; INTRAVENOUS ONCE
Status: COMPLETED | OUTPATIENT
Start: 2024-02-13 | End: 2024-02-13

## 2024-02-13 RX ORDER — ORPHENADRINE CITRATE 30 MG/ML
60 INJECTION INTRAMUSCULAR; INTRAVENOUS ONCE
Status: DISCONTINUED | OUTPATIENT
Start: 2024-02-13 | End: 2024-02-13

## 2024-02-13 RX ORDER — FENTANYL CITRATE 0.05 MG/ML
50 INJECTION, SOLUTION INTRAMUSCULAR; INTRAVENOUS ONCE
Status: COMPLETED | OUTPATIENT
Start: 2024-02-13 | End: 2024-02-13

## 2024-02-13 RX ORDER — SODIUM PHOSPHATE,MONO-DIBASIC 19G-7G/118
1 ENEMA (ML) RECTAL ONCE
Status: COMPLETED | OUTPATIENT
Start: 2024-02-13 | End: 2024-02-13

## 2024-02-13 RX ORDER — KETOROLAC TROMETHAMINE 15 MG/ML
15 INJECTION, SOLUTION INTRAMUSCULAR; INTRAVENOUS ONCE
Status: COMPLETED | OUTPATIENT
Start: 2024-02-13 | End: 2024-02-13

## 2024-02-13 RX ADMIN — SODIUM PHOSPHATE 1 ENEMA: 7; 19 ENEMA RECTAL at 06:05

## 2024-02-13 RX ADMIN — DIPHENHYDRAMINE HYDROCHLORIDE 25 MG: 50 INJECTION, SOLUTION INTRAMUSCULAR; INTRAVENOUS at 03:05

## 2024-02-13 RX ADMIN — FENTANYL CITRATE 50 MCG: 0.05 INJECTION, SOLUTION INTRAMUSCULAR; INTRAVENOUS at 03:05

## 2024-02-13 RX ADMIN — KETOROLAC TROMETHAMINE 15 MG: 15 INJECTION, SOLUTION INTRAMUSCULAR; INTRAVENOUS at 06:11

## 2024-02-13 RX ADMIN — PROCHLORPERAZINE EDISYLATE 10 MG: 5 INJECTION INTRAMUSCULAR; INTRAVENOUS at 03:05

## 2024-02-13 NOTE — ED NOTES
This nurse took pt to ct in wheelchair assisted pt to ct table; patient attempted twice to lay on table; patient was unable to tolerate; when this nurse asked patient what was wrong if it was his stomach or his back he stated \"there is nothing wrong with my stomach it's my back\"; physicians notified

## 2024-02-13 NOTE — ED NOTES
Bentley, nursing supervisor, and Erwin [facility]eng [facility] at bedside following family member at bedside using physical force with ED staff member.

## 2024-02-13 NOTE — ED NOTES
Pt's daughter came out of room to ask ED staff to lower side rail to use the restroom. Pt ambulated independently with his cane to the restroom. Pt denies other needs and verbalized understanding of calling for help if he needs assistance.

## 2024-02-13 NOTE — ED NOTES
This RN heard patient's daughter yelling from his room.  Upon entering room, patient was on his knees in the bathroom with daughter standing next to him stating that he fell.  Patient was able to get himself off the floor with assistance of his 4 prong cane and ambulate with stand by assist.  As this RN was questioning patient as to how he was feeling and if he got hurt when he \"fell\"  patient's daughter started pushing this RN and stating \"Get out of my way!  Get out of my way!  I'm not doing this!\"  Dr. Lacy also in attendance during incident.  Patient stated to this RN that he did hit his head, but didn't fall hard.

## 2024-02-13 NOTE — ED PROVIDER NOTES
some small amount of bowel movement. [KS]      ED Course User Index  [JM] Silvio Broussard MD  [KS] Carlo Lacy MD        Treatment here in the ED: Fentanyl, Compazine Benadryl, fleets enema and    Discussion: Patient presents for constipation and abdominal pain though it sounds like it is more likely back pain. Please see the ED course above.  Essentially though, the patient has a history of significant opiate dependence and obtains appropriate prescriptions from both [place] through what sounds like slightly odd means as well as receiving prescriptions here in [place].  Patient used to follow with pain management here locally but stopped following them because he was unhappy with his care.  While the CT scanner without his daughters are present, the patient said he was not having any abdominal pain and does not why he was here, he was here for his back pain.  He repeatedly asked for pain medication and a dose of fentanyl was given prior to our full chart review in an attempt to obtain CT scan.  The patient flatly refused the CT scan stating he did not need it.  Patient did state he had some nausea at some point even though he denied any nausea over the past couple of days previously.  Compazine and Benadryl were given.  Afterwards, the patient did appear somewhat sleepy and was still demanding an enema as well as his daughter who felt that his constipation was not being addressed.  Patient given a dose of Toradol for his back pain.  Enema was given with some small bowel movement though I am not severely concerned for severe constipation or impaction based on the patient's KUB.  Patient is prescribed Senokot-S for outpatient constipation therapy and the patient will be discharged home to follow-up outpatient.                Interpretation per the Radiologist below, if available at the time of this note:  XR ABDOMEN (KUB) (SINGLE AP VIEW)   Final Result   Nonobstructive bowel gas pattern.  No significant

## 2024-02-13 NOTE — ED NOTES
Patient coming out of bathroom short of breath found to be 86% placed on 2 liters as pt states he wears as needed at home

## 2024-02-13 NOTE — ED NOTES
Pt used enema independently in restroom, call light within reach. Pt reported \"a few little things came out\" and was assisted back to bed. Daughter remains at bedside.

## 2024-02-20 LAB
AMPICILLIN + SULBACTAM: NORMAL
AMPICILLIN: NORMAL
BACTERIA ISLT: NORMAL
CEFACLOR: NORMAL
CEFIXIME: NORMAL
CEFTRIAXONE: NORMAL
CEFUROXIME: NORMAL
CHLORAMPHENICOL: NORMAL
CLARITHROMYCIN: NORMAL
LEVOFLOXACIN: NORMAL
Lab: NORMAL
Lab: NORMAL
MEROPENEM: NORMAL
ORGANISM ID: NORMAL
REFERENCE LAB: NORMAL
SOURCE: NORMAL
SPECIMEN SOURCE: NORMAL
TETRACYCLINE: NORMAL
TRIMETHOPRIM + SULFAMETHOXAZOLE: NORMAL

## 2024-04-19 ENCOUNTER — HOSPITAL ENCOUNTER (INPATIENT)
Age: 84
LOS: 5 days | Discharge: HOME | DRG: 189 | End: 2024-04-24
Attending: EMERGENCY MEDICINE | Admitting: HOSPITALIST
Payer: MEDICARE

## 2024-04-19 ENCOUNTER — APPOINTMENT (OUTPATIENT)
Dept: CT IMAGING | Age: 84
DRG: 189 | End: 2024-04-19
Payer: MEDICARE

## 2024-04-19 ENCOUNTER — APPOINTMENT (OUTPATIENT)
Dept: GENERAL RADIOLOGY | Age: 84
DRG: 189 | End: 2024-04-19
Payer: MEDICARE

## 2024-04-19 DIAGNOSIS — J96.12 ACUTE HYPOXIC ON CHRONIC HYPERCAPNIC RESPIRATORY FAILURE (HCC): Primary | ICD-10-CM

## 2024-04-19 DIAGNOSIS — J96.01 ACUTE HYPOXIC ON CHRONIC HYPERCAPNIC RESPIRATORY FAILURE (HCC): Primary | ICD-10-CM

## 2024-04-19 DIAGNOSIS — J44.1 COPD WITH EXACERBATION (HCC): ICD-10-CM

## 2024-04-19 PROBLEM — Z95.2 HISTORY OF TRANSCATHETER AORTIC VALVE REPLACEMENT (TAVR): Status: ACTIVE | Noted: 2024-04-19

## 2024-04-19 PROBLEM — N17.9 AKI (ACUTE KIDNEY INJURY) (HCC): Status: ACTIVE | Noted: 2024-04-19

## 2024-04-19 PROBLEM — Z95.0 CARDIAC PACEMAKER IN SITU: Status: ACTIVE | Noted: 2024-04-19

## 2024-04-19 PROBLEM — J96.02 ACUTE RESPIRATORY FAILURE WITH HYPOXIA AND HYPERCAPNIA (HCC): Status: ACTIVE | Noted: 2024-04-19

## 2024-04-19 LAB
AADO2: 157.6 MMHG
AADO2: 188.2 MMHG
ALBUMIN SERPL-MCNC: 4.3 G/DL (ref 3.5–5.2)
ALP SERPL-CCNC: 76 U/L (ref 40–129)
ALT SERPL-CCNC: 14 U/L (ref 0–40)
AMPHET UR QL SCN: NEGATIVE
AMPHET UR QL SCN: NEGATIVE
ANION GAP SERPL CALCULATED.3IONS-SCNC: 12 MMOL/L (ref 7–16)
AST SERPL-CCNC: 60 U/L (ref 0–39)
B PARAP IS1001 DNA NPH QL NAA+NON-PROBE: NOT DETECTED
B PERT DNA SPEC QL NAA+PROBE: NOT DETECTED
B.E.: -1.3 MMOL/L (ref -3–3)
B.E.: -1.7 MMOL/L (ref -3–3)
B.E.: -1.8 MMOL/L (ref -3–3)
B.E.: 0.6 MMOL/L (ref -3–3)
BACTERIA URNS QL MICRO: ABNORMAL
BARBITURATES UR QL SCN: NEGATIVE
BARBITURATES UR QL SCN: NEGATIVE
BASOPHILS # BLD: 0.03 K/UL (ref 0–0.2)
BASOPHILS NFR BLD: 0 % (ref 0–2)
BENZODIAZ UR QL: NEGATIVE
BENZODIAZ UR QL: NEGATIVE
BILIRUB SERPL-MCNC: 0.7 MG/DL (ref 0–1.2)
BILIRUB UR QL STRIP: NEGATIVE
BNP SERPL-MCNC: 1199 PG/ML (ref 0–450)
BUN SERPL-MCNC: 31 MG/DL (ref 6–23)
BUPRENORPHINE UR QL: POSITIVE
BUPRENORPHINE UR QL: POSITIVE
C PNEUM DNA NPH QL NAA+NON-PROBE: NOT DETECTED
CALCIUM SERPL-MCNC: 9.2 MG/DL (ref 8.6–10.2)
CANNABINOIDS UR QL SCN: NEGATIVE
CANNABINOIDS UR QL SCN: NEGATIVE
CHLORIDE SERPL-SCNC: 98 MMOL/L (ref 98–107)
CLARITY UR: ABNORMAL
CO2 SERPL-SCNC: 30 MMOL/L (ref 22–29)
COCAINE UR QL SCN: NEGATIVE
COCAINE UR QL SCN: NEGATIVE
COHB: 0.6 % (ref 0–1.5)
COHB: 0.7 % (ref 0–1.5)
COHB: 0.8 % (ref 0–1.5)
COHB: 0.9 % (ref 0–1.5)
COLOR UR: YELLOW
CREAT SERPL-MCNC: 2.8 MG/DL (ref 0.7–1.2)
CRITICAL: ABNORMAL
D DIMER: 873 NG/ML DDU (ref 0–232)
DATE ANALYZED: ABNORMAL
DATE OF COLLECTION: ABNORMAL
EKG ATRIAL RATE: 95 BPM
EKG P AXIS: 20 DEGREES
EKG P-R INTERVAL: 186 MS
EKG Q-T INTERVAL: 400 MS
EKG QRS DURATION: 154 MS
EKG QTC CALCULATION (BAZETT): 502 MS
EKG R AXIS: 145 DEGREES
EKG T AXIS: 5 DEGREES
EKG VENTRICULAR RATE: 95 BPM
EOSINOPHIL # BLD: 0.17 K/UL (ref 0.05–0.5)
EOSINOPHILS RELATIVE PERCENT: 3 % (ref 0–6)
EPI CELLS #/AREA URNS HPF: ABNORMAL /HPF
ERYTHROCYTE [DISTWIDTH] IN BLOOD BY AUTOMATED COUNT: 15.8 % (ref 11.5–15)
FENTANYL UR QL: NEGATIVE
FENTANYL UR QL: NEGATIVE
FIO2: 50 %
FIO2: 60 %
FLUAV RNA NPH QL NAA+NON-PROBE: NOT DETECTED
FLUBV RNA NPH QL NAA+NON-PROBE: NOT DETECTED
GFR SERPL CREATININE-BSD FRML MDRD: 21 ML/MIN/1.73M2
GLUCOSE BLD-MCNC: 130 MG/DL (ref 74–99)
GLUCOSE BLD-MCNC: 139 MG/DL (ref 74–99)
GLUCOSE SERPL-MCNC: 148 MG/DL (ref 74–99)
GLUCOSE UR STRIP-MCNC: NEGATIVE MG/DL
HADV DNA NPH QL NAA+NON-PROBE: NOT DETECTED
HCO3: 24.7 MMOL/L (ref 22–26)
HCO3: 27.6 MMOL/L (ref 22–26)
HCO3: 27.7 MMOL/L (ref 22–26)
HCO3: 30.1 MMOL/L (ref 22–26)
HCOV 229E RNA NPH QL NAA+NON-PROBE: NOT DETECTED
HCOV HKU1 RNA NPH QL NAA+NON-PROBE: NOT DETECTED
HCOV NL63 RNA NPH QL NAA+NON-PROBE: NOT DETECTED
HCOV OC43 RNA NPH QL NAA+NON-PROBE: NOT DETECTED
HCT VFR BLD AUTO: 41.7 % (ref 37–54)
HGB BLD-MCNC: 12.3 G/DL (ref 12.5–16.5)
HGB UR QL STRIP.AUTO: NEGATIVE
HHB: 1.6 % (ref 0–5)
HHB: 2.6 % (ref 0–5)
HHB: 3.3 % (ref 0–5)
HHB: 5.8 % (ref 0–5)
HMPV RNA NPH QL NAA+NON-PROBE: NOT DETECTED
HPIV1 RNA NPH QL NAA+NON-PROBE: NOT DETECTED
HPIV2 RNA NPH QL NAA+NON-PROBE: NOT DETECTED
HPIV3 RNA NPH QL NAA+NON-PROBE: NOT DETECTED
HPIV4 RNA NPH QL NAA+NON-PROBE: NOT DETECTED
IMM GRANULOCYTES # BLD AUTO: 0.04 K/UL (ref 0–0.58)
IMM GRANULOCYTES NFR BLD: 1 % (ref 0–5)
KETONES UR STRIP-MCNC: NEGATIVE MG/DL
LAB: ABNORMAL
LACTATE BLDV-SCNC: 1 MMOL/L (ref 0.5–1.9)
LACTATE BLDV-SCNC: 1.5 MMOL/L (ref 0.5–1.9)
LEUKOCYTE ESTERASE UR QL STRIP: NEGATIVE
LYMPHOCYTES NFR BLD: 0.91 K/UL (ref 1.5–4)
LYMPHOCYTES RELATIVE PERCENT: 13 % (ref 20–42)
Lab: 1320
Lab: 1535
Lab: 1805
Lab: 2305
M PNEUMO DNA NPH QL NAA+NON-PROBE: NOT DETECTED
MAGNESIUM SERPL-MCNC: 2.1 MG/DL (ref 1.6–2.6)
MCH RBC QN AUTO: 27.1 PG (ref 26–35)
MCHC RBC AUTO-ENTMCNC: 29.5 G/DL (ref 32–34.5)
MCV RBC AUTO: 91.9 FL (ref 80–99.9)
METHADONE UR QL: NEGATIVE
METHADONE UR QL: NEGATIVE
METHB: 0.4 % (ref 0–1.5)
METHB: 0.5 % (ref 0–1.5)
MODE: ABNORMAL
MONOCYTES NFR BLD: 0.6 K/UL (ref 0.1–0.95)
MONOCYTES NFR BLD: 9 % (ref 2–12)
NEUTROPHILS NFR BLD: 74 % (ref 43–80)
NEUTS SEG NFR BLD: 5.09 K/UL (ref 1.8–7.3)
NITRITE UR QL STRIP: NEGATIVE
O2 CONTENT: 16.5 ML/DL
O2 SATURATION: 94.1 % (ref 92–98.5)
O2 SATURATION: 96.7 % (ref 92–98.5)
O2 SATURATION: 97.4 % (ref 92–98.5)
O2 SATURATION: 98.4 % (ref 92–98.5)
O2HB: 92.9 % (ref 94–97)
O2HB: 95.5 % (ref 94–97)
O2HB: 96.3 % (ref 94–97)
O2HB: 97.1 % (ref 94–97)
OPERATOR ID: 1023
OPERATOR ID: 2863
OPERATOR ID: ABNORMAL
OPERATOR ID: ABNORMAL
OPIATES UR QL SCN: NEGATIVE
OPIATES UR QL SCN: NEGATIVE
OXYCODONE UR QL SCN: NEGATIVE
OXYCODONE UR QL SCN: POSITIVE
PATIENT TEMP: 37 C
PCO2: 47 MMHG (ref 35–45)
PCO2: 70 MMHG (ref 35–45)
PCO2: 72.2 MMHG (ref 35–45)
PCO2: 74.3 MMHG (ref 35–45)
PCP UR QL SCN: NEGATIVE
PCP UR QL SCN: NEGATIVE
PEEP/CPAP: 5 CMH2O
PEEP/CPAP: 5 CMH2O
PEEP/CPAP: 6 CMH2O
PFO2: 2.15 MMHG/%
PFO2: 2.42 MMHG/%
PH BLOOD GAS: 7.2 (ref 7.35–7.45)
PH BLOOD GAS: 7.21 (ref 7.35–7.45)
PH BLOOD GAS: 7.22 (ref 7.35–7.45)
PH BLOOD GAS: 7.34 (ref 7.35–7.45)
PH UR STRIP: 5.5 [PH] (ref 5–9)
PLATELET # BLD AUTO: 146 K/UL (ref 130–450)
PMV BLD AUTO: 12.9 FL (ref 7–12)
PO2: 100.3 MMHG (ref 75–100)
PO2: 107.7 MMHG (ref 75–100)
PO2: 145.2 MMHG (ref 75–100)
PO2: 89.2 MMHG (ref 75–100)
POTASSIUM SERPL-SCNC: 4.6 MMOL/L (ref 3.5–5)
PROT SERPL-MCNC: 7.8 G/DL (ref 6.4–8.3)
PROT UR STRIP-MCNC: NEGATIVE MG/DL
RBC # BLD AUTO: 4.54 M/UL (ref 3.8–5.8)
RBC #/AREA URNS HPF: ABNORMAL /HPF
RI(T): 1.3
RI(T): 1.46
RR MECHANICAL: 18 B/MIN
RR MECHANICAL: 20 B/MIN
RR MECHANICAL: 20 B/MIN
RSV RNA NPH QL NAA+NON-PROBE: NOT DETECTED
RV+EV RNA NPH QL NAA+NON-PROBE: NOT DETECTED
SARS-COV-2 RNA NPH QL NAA+NON-PROBE: NOT DETECTED
SODIUM SERPL-SCNC: 140 MMOL/L (ref 132–146)
SOURCE, BLOOD GAS: ABNORMAL
SP GR UR STRIP: >1.03 (ref 1–1.03)
SPECIMEN DESCRIPTION: NORMAL
TEST INFORMATION: ABNORMAL
TEST INFORMATION: ABNORMAL
THB: 12.1 G/DL (ref 11.5–16.5)
THB: 12.4 G/DL (ref 11.5–16.5)
THB: 12.4 G/DL (ref 11.5–16.5)
THB: 12.7 G/DL (ref 11.5–16.5)
TIME ANALYZED: 1325
TIME ANALYZED: 1546
TIME ANALYZED: 1809
TIME ANALYZED: 2315
TROPONIN I SERPL HS-MCNC: 50 NG/L (ref 0–11)
TROPONIN I SERPL HS-MCNC: 59 NG/L (ref 0–11)
TSH SERPL DL<=0.05 MIU/L-ACNC: 0.57 UIU/ML (ref 0.27–4.2)
UROBILINOGEN UR STRIP-ACNC: 0.2 EU/DL (ref 0–1)
VT MECHANICAL: 500 ML
VT MECHANICAL: 550 ML
VT MECHANICAL: 550 ML
WBC #/AREA URNS HPF: ABNORMAL /HPF
WBC OTHER # BLD: 6.8 K/UL (ref 4.5–11.5)

## 2024-04-19 PROCEDURE — 81001 URINALYSIS AUTO W/SCOPE: CPT

## 2024-04-19 PROCEDURE — 2580000003 HC RX 258: Performed by: HOSPITALIST

## 2024-04-19 PROCEDURE — 80053 COMPREHEN METABOLIC PANEL: CPT

## 2024-04-19 PROCEDURE — 87081 CULTURE SCREEN ONLY: CPT

## 2024-04-19 PROCEDURE — 85379 FIBRIN DEGRADATION QUANT: CPT

## 2024-04-19 PROCEDURE — 74177 CT ABD & PELVIS W/CONTRAST: CPT

## 2024-04-19 PROCEDURE — 93010 ELECTROCARDIOGRAM REPORT: CPT | Performed by: INTERNAL MEDICINE

## 2024-04-19 PROCEDURE — 70450 CT HEAD/BRAIN W/O DYE: CPT

## 2024-04-19 PROCEDURE — 82550 ASSAY OF CK (CPK): CPT

## 2024-04-19 PROCEDURE — 93005 ELECTROCARDIOGRAM TRACING: CPT

## 2024-04-19 PROCEDURE — 94664 DEMO&/EVAL PT USE INHALER: CPT

## 2024-04-19 PROCEDURE — 83605 ASSAY OF LACTIC ACID: CPT

## 2024-04-19 PROCEDURE — 94640 AIRWAY INHALATION TREATMENT: CPT

## 2024-04-19 PROCEDURE — 83735 ASSAY OF MAGNESIUM: CPT

## 2024-04-19 PROCEDURE — 96374 THER/PROPH/DIAG INJ IV PUSH: CPT

## 2024-04-19 PROCEDURE — 6360000002 HC RX W HCPCS

## 2024-04-19 PROCEDURE — 82962 GLUCOSE BLOOD TEST: CPT

## 2024-04-19 PROCEDURE — 2580000003 HC RX 258: Performed by: NURSE PRACTITIONER

## 2024-04-19 PROCEDURE — 2000000000 HC ICU R&B

## 2024-04-19 PROCEDURE — 87040 BLOOD CULTURE FOR BACTERIA: CPT

## 2024-04-19 PROCEDURE — 80307 DRUG TEST PRSMV CHEM ANLYZR: CPT

## 2024-04-19 PROCEDURE — 5A09457 ASSISTANCE WITH RESPIRATORY VENTILATION, 24-96 CONSECUTIVE HOURS, CONTINUOUS POSITIVE AIRWAY PRESSURE: ICD-10-PCS | Performed by: INTERNAL MEDICINE

## 2024-04-19 PROCEDURE — 99291 CRITICAL CARE FIRST HOUR: CPT | Performed by: HOSPITALIST

## 2024-04-19 PROCEDURE — 0202U NFCT DS 22 TRGT SARS-COV-2: CPT

## 2024-04-19 PROCEDURE — 2580000003 HC RX 258

## 2024-04-19 PROCEDURE — 82805 BLOOD GASES W/O2 SATURATION: CPT

## 2024-04-19 PROCEDURE — 6370000000 HC RX 637 (ALT 250 FOR IP)

## 2024-04-19 PROCEDURE — 72125 CT NECK SPINE W/O DYE: CPT

## 2024-04-19 PROCEDURE — 87449 NOS EACH ORGANISM AG IA: CPT

## 2024-04-19 PROCEDURE — 83880 ASSAY OF NATRIURETIC PEPTIDE: CPT

## 2024-04-19 PROCEDURE — 84484 ASSAY OF TROPONIN QUANT: CPT

## 2024-04-19 PROCEDURE — 71045 X-RAY EXAM CHEST 1 VIEW: CPT

## 2024-04-19 PROCEDURE — 87899 AGENT NOS ASSAY W/OPTIC: CPT

## 2024-04-19 PROCEDURE — 99285 EMERGENCY DEPT VISIT HI MDM: CPT

## 2024-04-19 PROCEDURE — 94660 CPAP INITIATION&MGMT: CPT

## 2024-04-19 PROCEDURE — 84443 ASSAY THYROID STIM HORMONE: CPT

## 2024-04-19 PROCEDURE — 6360000002 HC RX W HCPCS: Performed by: NURSE PRACTITIONER

## 2024-04-19 PROCEDURE — 71275 CT ANGIOGRAPHY CHEST: CPT

## 2024-04-19 PROCEDURE — 84145 PROCALCITONIN (PCT): CPT

## 2024-04-19 PROCEDURE — 85025 COMPLETE CBC W/AUTO DIFF WBC: CPT

## 2024-04-19 PROCEDURE — 6360000004 HC RX CONTRAST MEDICATION: Performed by: RADIOLOGY

## 2024-04-19 RX ORDER — ATORVASTATIN CALCIUM 40 MG/1
40 TABLET, FILM COATED ORAL DAILY
Status: DISCONTINUED | OUTPATIENT
Start: 2024-04-20 | End: 2024-04-24 | Stop reason: HOSPADM

## 2024-04-19 RX ORDER — MAGNESIUM SULFATE IN WATER 40 MG/ML
2000 INJECTION, SOLUTION INTRAVENOUS PRN
Status: DISCONTINUED | OUTPATIENT
Start: 2024-04-19 | End: 2024-04-24 | Stop reason: HOSPADM

## 2024-04-19 RX ORDER — 0.9 % SODIUM CHLORIDE 0.9 %
1000 INTRAVENOUS SOLUTION INTRAVENOUS ONCE
Status: COMPLETED | OUTPATIENT
Start: 2024-04-19 | End: 2024-04-19

## 2024-04-19 RX ORDER — ENOXAPARIN SODIUM 100 MG/ML
30 INJECTION SUBCUTANEOUS DAILY
Status: DISCONTINUED | OUTPATIENT
Start: 2024-04-20 | End: 2024-04-19

## 2024-04-19 RX ORDER — IPRATROPIUM BROMIDE AND ALBUTEROL SULFATE 2.5; .5 MG/3ML; MG/3ML
3 SOLUTION RESPIRATORY (INHALATION) ONCE
Status: COMPLETED | OUTPATIENT
Start: 2024-04-19 | End: 2024-04-19

## 2024-04-19 RX ORDER — SODIUM CHLORIDE 9 MG/ML
INJECTION, SOLUTION INTRAVENOUS PRN
Status: DISCONTINUED | OUTPATIENT
Start: 2024-04-19 | End: 2024-04-24 | Stop reason: HOSPADM

## 2024-04-19 RX ORDER — PROCHLORPERAZINE MALEATE 10 MG
10 TABLET ORAL EVERY 8 HOURS PRN
Status: DISCONTINUED | OUTPATIENT
Start: 2024-04-19 | End: 2024-04-24 | Stop reason: HOSPADM

## 2024-04-19 RX ORDER — SODIUM CHLORIDE 9 MG/ML
INJECTION, SOLUTION INTRAVENOUS PRN
Status: DISCONTINUED | OUTPATIENT
Start: 2024-04-19 | End: 2024-04-22

## 2024-04-19 RX ORDER — SODIUM CHLORIDE 0.9 % (FLUSH) 0.9 %
5-40 SYRINGE (ML) INJECTION PRN
Status: DISCONTINUED | OUTPATIENT
Start: 2024-04-19 | End: 2024-04-24 | Stop reason: HOSPADM

## 2024-04-19 RX ORDER — SODIUM CHLORIDE 0.9 % (FLUSH) 0.9 %
5-40 SYRINGE (ML) INJECTION PRN
Status: DISCONTINUED | OUTPATIENT
Start: 2024-04-19 | End: 2024-04-22

## 2024-04-19 RX ORDER — PROCHLORPERAZINE EDISYLATE 5 MG/ML
10 INJECTION INTRAMUSCULAR; INTRAVENOUS EVERY 6 HOURS PRN
Status: DISCONTINUED | OUTPATIENT
Start: 2024-04-19 | End: 2024-04-24 | Stop reason: HOSPADM

## 2024-04-19 RX ORDER — POLYETHYLENE GLYCOL 3350 17 G/17G
17 POWDER, FOR SOLUTION ORAL DAILY PRN
Status: DISCONTINUED | OUTPATIENT
Start: 2024-04-19 | End: 2024-04-24 | Stop reason: HOSPADM

## 2024-04-19 RX ORDER — ASPIRIN 81 MG/1
81 TABLET, CHEWABLE ORAL DAILY
Status: DISCONTINUED | OUTPATIENT
Start: 2024-04-20 | End: 2024-04-24 | Stop reason: HOSPADM

## 2024-04-19 RX ORDER — INSULIN LISPRO 100 [IU]/ML
0-4 INJECTION, SOLUTION INTRAVENOUS; SUBCUTANEOUS
Status: DISCONTINUED | OUTPATIENT
Start: 2024-04-20 | End: 2024-04-24 | Stop reason: HOSPADM

## 2024-04-19 RX ORDER — ACETAMINOPHEN 650 MG/1
650 SUPPOSITORY RECTAL EVERY 6 HOURS PRN
Status: DISCONTINUED | OUTPATIENT
Start: 2024-04-19 | End: 2024-04-24 | Stop reason: HOSPADM

## 2024-04-19 RX ORDER — SODIUM CHLORIDE 0.9 % (FLUSH) 0.9 %
5-40 SYRINGE (ML) INJECTION EVERY 12 HOURS SCHEDULED
Status: DISCONTINUED | OUTPATIENT
Start: 2024-04-19 | End: 2024-04-24 | Stop reason: HOSPADM

## 2024-04-19 RX ORDER — ARFORMOTEROL TARTRATE 15 UG/2ML
15 SOLUTION RESPIRATORY (INHALATION)
Status: DISCONTINUED | OUTPATIENT
Start: 2024-04-19 | End: 2024-04-24 | Stop reason: HOSPADM

## 2024-04-19 RX ORDER — SODIUM CHLORIDE 0.9 % (FLUSH) 0.9 %
5-40 SYRINGE (ML) INJECTION EVERY 12 HOURS SCHEDULED
Status: DISCONTINUED | OUTPATIENT
Start: 2024-04-19 | End: 2024-04-22

## 2024-04-19 RX ORDER — INSULIN LISPRO 100 [IU]/ML
0-4 INJECTION, SOLUTION INTRAVENOUS; SUBCUTANEOUS NIGHTLY
Status: DISCONTINUED | OUTPATIENT
Start: 2024-04-19 | End: 2024-04-24 | Stop reason: HOSPADM

## 2024-04-19 RX ORDER — ONDANSETRON 2 MG/ML
4 INJECTION INTRAMUSCULAR; INTRAVENOUS EVERY 6 HOURS PRN
Status: DISCONTINUED | OUTPATIENT
Start: 2024-04-19 | End: 2024-04-19 | Stop reason: ALTCHOICE

## 2024-04-19 RX ORDER — BUDESONIDE 0.5 MG/2ML
0.5 INHALANT ORAL
Status: DISCONTINUED | OUTPATIENT
Start: 2024-04-19 | End: 2024-04-24 | Stop reason: HOSPADM

## 2024-04-19 RX ORDER — IPRATROPIUM BROMIDE AND ALBUTEROL SULFATE 2.5; .5 MG/3ML; MG/3ML
1 SOLUTION RESPIRATORY (INHALATION)
Status: DISCONTINUED | OUTPATIENT
Start: 2024-04-20 | End: 2024-04-24 | Stop reason: HOSPADM

## 2024-04-19 RX ORDER — ONDANSETRON 4 MG/1
4 TABLET, ORALLY DISINTEGRATING ORAL EVERY 8 HOURS PRN
Status: DISCONTINUED | OUTPATIENT
Start: 2024-04-19 | End: 2024-04-19 | Stop reason: ALTCHOICE

## 2024-04-19 RX ORDER — SODIUM CHLORIDE 9 MG/ML
INJECTION, SOLUTION INTRAVENOUS CONTINUOUS
Status: DISCONTINUED | OUTPATIENT
Start: 2024-04-19 | End: 2024-04-21

## 2024-04-19 RX ORDER — HEPARIN SODIUM 5000 [USP'U]/ML
5000 INJECTION, SOLUTION INTRAVENOUS; SUBCUTANEOUS EVERY 8 HOURS
Status: DISCONTINUED | OUTPATIENT
Start: 2024-04-19 | End: 2024-04-24 | Stop reason: HOSPADM

## 2024-04-19 RX ORDER — IPRATROPIUM BROMIDE AND ALBUTEROL SULFATE 2.5; .5 MG/3ML; MG/3ML
1 SOLUTION RESPIRATORY (INHALATION)
Status: DISCONTINUED | OUTPATIENT
Start: 2024-04-20 | End: 2024-04-19

## 2024-04-19 RX ORDER — ACETAMINOPHEN 325 MG/1
650 TABLET ORAL EVERY 6 HOURS PRN
Status: DISCONTINUED | OUTPATIENT
Start: 2024-04-19 | End: 2024-04-24 | Stop reason: HOSPADM

## 2024-04-19 RX ORDER — ALBUTEROL SULFATE 90 UG/1
1 AEROSOL, METERED RESPIRATORY (INHALATION) EVERY 4 HOURS PRN
Status: DISCONTINUED | OUTPATIENT
Start: 2024-04-19 | End: 2024-04-20 | Stop reason: CLARIF

## 2024-04-19 RX ADMIN — SODIUM CHLORIDE 1000 ML: 9 INJECTION, SOLUTION INTRAVENOUS at 16:31

## 2024-04-19 RX ADMIN — IPRATROPIUM BROMIDE AND ALBUTEROL SULFATE 3 DOSE: 2.5; .5 SOLUTION RESPIRATORY (INHALATION) at 13:38

## 2024-04-19 RX ADMIN — WATER 60 MG: 1 INJECTION INTRAMUSCULAR; INTRAVENOUS; SUBCUTANEOUS at 13:42

## 2024-04-19 RX ADMIN — AZITHROMYCIN MONOHYDRATE 500 MG: 500 INJECTION, POWDER, LYOPHILIZED, FOR SOLUTION INTRAVENOUS at 23:36

## 2024-04-19 RX ADMIN — SODIUM CHLORIDE 1000 ML: 9 INJECTION, SOLUTION INTRAVENOUS at 13:43

## 2024-04-19 RX ADMIN — WATER 1000 MG: 1 INJECTION INTRAMUSCULAR; INTRAVENOUS; SUBCUTANEOUS at 23:36

## 2024-04-19 RX ADMIN — SODIUM CHLORIDE: 9 INJECTION, SOLUTION INTRAVENOUS at 23:33

## 2024-04-19 RX ADMIN — HEPARIN SODIUM 5000 UNITS: 5000 INJECTION INTRAVENOUS; SUBCUTANEOUS at 23:39

## 2024-04-19 RX ADMIN — WATER 40 MG: 1 INJECTION INTRAMUSCULAR; INTRAVENOUS; SUBCUTANEOUS at 23:38

## 2024-04-19 RX ADMIN — SODIUM CHLORIDE, PRESERVATIVE FREE 10 ML: 5 INJECTION INTRAVENOUS at 23:44

## 2024-04-19 RX ADMIN — IOPAMIDOL 75 ML: 755 INJECTION, SOLUTION INTRAVENOUS at 16:25

## 2024-04-19 ASSESSMENT — PAIN DESCRIPTION - LOCATION: LOCATION: BACK

## 2024-04-19 ASSESSMENT — PAIN - FUNCTIONAL ASSESSMENT: PAIN_FUNCTIONAL_ASSESSMENT: 0-10

## 2024-04-19 ASSESSMENT — PAIN DESCRIPTION - PAIN TYPE: TYPE: ACUTE PAIN

## 2024-04-19 ASSESSMENT — PAIN DESCRIPTION - ORIENTATION: ORIENTATION: LOWER

## 2024-04-19 ASSESSMENT — PAIN DESCRIPTION - FREQUENCY: FREQUENCY: CONTINUOUS

## 2024-04-19 ASSESSMENT — LIFESTYLE VARIABLES: HOW OFTEN DO YOU HAVE A DRINK CONTAINING ALCOHOL: MONTHLY OR LESS

## 2024-04-19 NOTE — ED PROVIDER NOTES
[facility] EMERGENCY DEPARTMENT  EMERGENCY DEPARTMENT ENCOUNTER        Pt Name: Osiel Mcnamara  MRN: [id number]  Birthdate 1940  Date of evaluation: 4/19/2024  Provider: Jesus Cuenca MD  Attending Provider: Geoff Wells MD  PCP: Ck Ventura DO  Note Started: 1:07 PM EDT 4/19/24    CHIEF COMPLAINT       Chief Complaint   Patient presents with    Fall     Patient rolled out of bed on accident. States his lower back hurts.  Patient does not normally wear O2 @ 6L N/C , SOB when EMS picked him up.    Shortness of Breath       HISTORY OF PRESENT ILLNESS: 1 or more Elements   History From: the patient/nursing report        Osiel Mcnamara is a 84 y.o. male with a past medical history of type 2 diabetes, congestive heart failure, hypertension, hyperlipidemia, chronic pain presenting after a fall and with shortness of breath.  Patient states that he was in bed and when he stood up, he fell backwards he states that he did hit his head but he did not lose consciousness.  Patient states that he feels fine at this time.  Per nursing report, patient was hypoxic for EMS and he was on 6 L nasal cannula.  He does not wear oxygen at baseline.      Nursing Notes were all reviewed and agreed with or any disagreements were addressed in the HPI.      REVIEW OF EXTERNAL NOTE :       ER visit on 2/13/2024 for abdominal pain    Discussion: Patient presents for constipation and abdominal pain though it sounds like it is more likely back pain. Please see the ED course above.  Essentially though, the patient has a history of significant opiate dependence and obtains appropriate prescriptions from both [place] through what sounds like slightly odd means as well as receiving prescriptions here in [place].  Patient used to follow with pain management here locally but stopped following them because he was unhappy with his care.  While the CT scanner without his daughters are present, the patient said

## 2024-04-19 NOTE — H&P
stress test 10/19/2018    lexiscan    Hyperlipidemia     Hypertension     Lymph node enlargement     seeing dr yates  coughing mucous    Partial small bowel obstruction (HCC) 04/14/2017       Past Surgical History:        Procedure Laterality Date    ACHILLES TENDON SURGERY      BREAST SURGERY Left 06/05/2014    Excision of left breast mass    COLONOSCOPY  04/24/2012    FRACTURE SURGERY      C-2    KNEE ARTHROTOMY      NERVE BLOCK  04/10/2012    lumbar epidural #1    PACEMAKER PLACEMENT         Medications Prior to Admission:      Prior to Admission medications    Medication Sig Start Date End Date Taking? Authorizing Provider   doxazosin (CARDURA) 2 MG tablet Take 1 tablet by mouth nightly 2/19/24   Ck Ventura DO   furosemide (LASIX) 20 MG tablet Take 1 tablet by mouth daily 2/19/24   Ck Ventura DO   HYDROcodone-acetaminophen (NORCO) 5-325 MG per tablet Take 1 tablet by mouth every 8 hours as needed for Pain.    ProviderDevante MD   naloxegol (MOVANTIK) 25 MG TABS tablet Take 1 tablet by mouth every morning (before breakfast) 2/12/24   Liudmila Steward APRN - CNP   carvedilol (COREG) 25 MG tablet Take 1 tablet by mouth 2 times daily (with meals) 2/12/24   Liudmila Steward APRN - CNP   aspirin 81 MG chewable tablet Take 1 tablet by mouth daily 10/12/23   Braden Alexander MD   spironolactone (ALDACTONE) 25 MG tablet Take 1 tablet by mouth daily 10/5/22   Michelle Ledbetter APRN - CNP   atorvastatin (LIPITOR) 40 MG tablet TAKE 1 TABLET BY MOUTH EVERY DAY  Patient taking differently: Take 1 tablet by mouth daily 1/6/21   Osiel Montes De Oca DO   amLODIPine-benazepril (LOTREL) 10-20 MG per capsule TAKE 1 CAPSULE BY MOUTH EVERY DAY 12/22/20 8/24/22  Osiel Montes De Oca DO   albuterol sulfate  (90 Base) MCG/ACT inhaler  3/3/20   ProviderDevante MD   vitamin D (ERGOCALCIFEROL) 1.25 MG (64850 UT) CAPS capsule Take 1 capsule by mouth once a week 1/10/20   Osiel Montes De Oca DO   metFORMIN (GLUCOPHAGE) 500 MG

## 2024-04-19 NOTE — ED NOTES
Patient ripping off bipap, states it is uncomfortable. This RN contacted respiratory to adjust the mask or get better padding.

## 2024-04-20 ENCOUNTER — APPOINTMENT (OUTPATIENT)
Dept: GENERAL RADIOLOGY | Age: 84
DRG: 189 | End: 2024-04-20
Payer: MEDICARE

## 2024-04-20 PROBLEM — J96.12 ACUTE HYPOXIC ON CHRONIC HYPERCAPNIC RESPIRATORY FAILURE (HCC): Status: ACTIVE | Noted: 2024-04-19

## 2024-04-20 LAB
AADO2: 123.4 MMHG
ALBUMIN SERPL-MCNC: 3.8 G/DL (ref 3.5–5.2)
ALP SERPL-CCNC: 64 U/L (ref 40–129)
ALT SERPL-CCNC: 12 U/L (ref 0–40)
ANION GAP SERPL CALCULATED.3IONS-SCNC: 15 MMOL/L (ref 7–16)
AST SERPL-CCNC: 39 U/L (ref 0–39)
B.E.: -1.6 MMOL/L (ref -3–3)
B.E.: -2.9 MMOL/L (ref -3–3)
BASOPHILS # BLD: 0 K/UL (ref 0–0.2)
BASOPHILS NFR BLD: 0 % (ref 0–2)
BILIRUB SERPL-MCNC: 0.3 MG/DL (ref 0–1.2)
BNP SERPL-MCNC: 688 PG/ML (ref 0–450)
BUN SERPL-MCNC: 28 MG/DL (ref 6–23)
CALCIUM SERPL-MCNC: 8.4 MG/DL (ref 8.6–10.2)
CHLORIDE SERPL-SCNC: 104 MMOL/L (ref 98–107)
CK SERPL-CCNC: 1802 U/L (ref 20–200)
CO2 SERPL-SCNC: 23 MMOL/L (ref 22–29)
COHB: 0.4 % (ref 0–1.5)
COHB: 0.9 % (ref 0–1.5)
CREAT SERPL-MCNC: 1.7 MG/DL (ref 0.7–1.2)
CRITICAL: ABNORMAL
CRITICAL: ABNORMAL
DATE ANALYZED: ABNORMAL
DATE ANALYZED: ABNORMAL
DATE OF COLLECTION: ABNORMAL
DATE OF COLLECTION: ABNORMAL
EOSINOPHIL # BLD: 0 K/UL (ref 0.05–0.5)
EOSINOPHILS RELATIVE PERCENT: 0 % (ref 0–6)
ERYTHROCYTE [DISTWIDTH] IN BLOOD BY AUTOMATED COUNT: 15.6 % (ref 11.5–15)
FIO2: 40 %
GFR SERPL CREATININE-BSD FRML MDRD: 38 ML/MIN/1.73M2
GLUCOSE BLD-MCNC: 137 MG/DL (ref 74–99)
GLUCOSE BLD-MCNC: 143 MG/DL (ref 74–99)
GLUCOSE BLD-MCNC: 160 MG/DL (ref 74–99)
GLUCOSE BLD-MCNC: 169 MG/DL (ref 74–99)
GLUCOSE SERPL-MCNC: 135 MG/DL (ref 74–99)
HCO3: 24.4 MMOL/L (ref 22–26)
HCO3: 26.6 MMOL/L (ref 22–26)
HCT VFR BLD AUTO: 36.9 % (ref 37–54)
HGB BLD-MCNC: 10.9 G/DL (ref 12.5–16.5)
HHB: 2.8 % (ref 0–5)
HHB: 4.5 % (ref 0–5)
IMM GRANULOCYTES # BLD AUTO: <0.03 K/UL (ref 0–0.58)
IMM GRANULOCYTES NFR BLD: 0 % (ref 0–5)
L PNEUMO1 AG UR QL IA.RAPID: NEGATIVE
LAB: ABNORMAL
LAB: ABNORMAL
LYMPHOCYTES NFR BLD: 0.36 K/UL (ref 1.5–4)
LYMPHOCYTES RELATIVE PERCENT: 7 % (ref 20–42)
Lab: 424
Lab: 940
MAGNESIUM SERPL-MCNC: 2 MG/DL (ref 1.6–2.6)
MCH RBC QN AUTO: 27.1 PG (ref 26–35)
MCHC RBC AUTO-ENTMCNC: 29.5 G/DL (ref 32–34.5)
MCV RBC AUTO: 91.8 FL (ref 80–99.9)
METHB: 0.5 % (ref 0–1.5)
METHB: 0.6 % (ref 0–1.5)
MODE: ABNORMAL
MODE: ABNORMAL
MONOCYTES NFR BLD: 0 % (ref 2–12)
MONOCYTES NFR BLD: 0.02 K/UL (ref 0.1–0.95)
NEUTROPHILS NFR BLD: 93 % (ref 43–80)
NEUTS SEG NFR BLD: 4.95 K/UL (ref 1.8–7.3)
O2 CONTENT: 15.5 ML/DL
O2 CONTENT: 16.2 ML/DL
O2 SATURATION: 95.5 % (ref 92–98.5)
O2 SATURATION: 97.2 % (ref 92–98.5)
O2HB: 94.5 % (ref 94–97)
O2HB: 95.8 % (ref 94–97)
OPERATOR ID: 1893
OPERATOR ID: 8214
PATIENT TEMP: 37 C
PATIENT TEMP: 37 C
PCO2: 53.9 MMHG (ref 35–45)
PCO2: 61.8 MMHG (ref 35–45)
PEEP/CPAP: 5 CMH2O
PFO2: 2.5 MMHG/%
PH BLOOD GAS: 7.25 (ref 7.35–7.45)
PH BLOOD GAS: 7.27 (ref 7.35–7.45)
PHOSPHATE SERPL-MCNC: 4.9 MG/DL (ref 2.5–4.5)
PLATELET, FLUORESCENCE: 118 K/UL (ref 130–450)
PMV BLD AUTO: 12.6 FL (ref 7–12)
PO2: 88.4 MMHG (ref 75–100)
PO2: 99.8 MMHG (ref 75–100)
POTASSIUM SERPL-SCNC: 5.1 MMOL/L (ref 3.5–5)
PROCALCITONIN SERPL-MCNC: 0.14 NG/ML (ref 0–0.08)
PROT SERPL-MCNC: 6.9 G/DL (ref 6.4–8.3)
RBC # BLD AUTO: 4.02 M/UL (ref 3.8–5.8)
RBC # BLD: ABNORMAL 10*6/UL
RI(T): 1.24
RR MECHANICAL: 20 B/MIN
S PNEUM AG SPEC QL: NEGATIVE
SODIUM SERPL-SCNC: 142 MMOL/L (ref 132–146)
SOURCE, BLOOD GAS: ABNORMAL
SOURCE, BLOOD GAS: ABNORMAL
SPECIMEN SOURCE: NORMAL
THB: 11.6 G/DL (ref 11.5–16.5)
THB: 11.9 G/DL (ref 11.5–16.5)
TIME ANALYZED: 430
TIME ANALYZED: 946
TROPONIN I SERPL HS-MCNC: 29 NG/L (ref 0–11)
TROPONIN I SERPL HS-MCNC: 35 NG/L (ref 0–11)
VT MECHANICAL: 550 ML
WBC OTHER # BLD: 5.4 K/UL (ref 4.5–11.5)

## 2024-04-20 PROCEDURE — 6360000002 HC RX W HCPCS: Performed by: NURSE PRACTITIONER

## 2024-04-20 PROCEDURE — 94660 CPAP INITIATION&MGMT: CPT

## 2024-04-20 PROCEDURE — 6360000002 HC RX W HCPCS: Performed by: INTERNAL MEDICINE

## 2024-04-20 PROCEDURE — 84100 ASSAY OF PHOSPHORUS: CPT

## 2024-04-20 PROCEDURE — 82962 GLUCOSE BLOOD TEST: CPT

## 2024-04-20 PROCEDURE — 71045 X-RAY EXAM CHEST 1 VIEW: CPT

## 2024-04-20 PROCEDURE — A4216 STERILE WATER/SALINE, 10 ML: HCPCS | Performed by: NURSE PRACTITIONER

## 2024-04-20 PROCEDURE — 2580000003 HC RX 258: Performed by: NURSE PRACTITIONER

## 2024-04-20 PROCEDURE — 99233 SBSQ HOSP IP/OBS HIGH 50: CPT | Performed by: STUDENT IN AN ORGANIZED HEALTH CARE EDUCATION/TRAINING PROGRAM

## 2024-04-20 PROCEDURE — 84484 ASSAY OF TROPONIN QUANT: CPT

## 2024-04-20 PROCEDURE — 94640 AIRWAY INHALATION TREATMENT: CPT

## 2024-04-20 PROCEDURE — 2000000000 HC ICU R&B

## 2024-04-20 PROCEDURE — 80053 COMPREHEN METABOLIC PANEL: CPT

## 2024-04-20 PROCEDURE — 85025 COMPLETE CBC W/AUTO DIFF WBC: CPT

## 2024-04-20 PROCEDURE — 99291 CRITICAL CARE FIRST HOUR: CPT | Performed by: INTERNAL MEDICINE

## 2024-04-20 PROCEDURE — 82805 BLOOD GASES W/O2 SATURATION: CPT

## 2024-04-20 PROCEDURE — 6370000000 HC RX 637 (ALT 250 FOR IP): Performed by: HOSPITALIST

## 2024-04-20 PROCEDURE — 2580000003 HC RX 258: Performed by: HOSPITALIST

## 2024-04-20 PROCEDURE — 83735 ASSAY OF MAGNESIUM: CPT

## 2024-04-20 PROCEDURE — 6370000000 HC RX 637 (ALT 250 FOR IP): Performed by: NURSE PRACTITIONER

## 2024-04-20 PROCEDURE — 83880 ASSAY OF NATRIURETIC PEPTIDE: CPT

## 2024-04-20 PROCEDURE — C9113 INJ PANTOPRAZOLE SODIUM, VIA: HCPCS | Performed by: NURSE PRACTITIONER

## 2024-04-20 RX ORDER — BUPRENORPHINE HYDROCHLORIDE AND NALOXONE HYDROCHLORIDE DIHYDRATE 2; .5 MG/1; MG/1
1 TABLET SUBLINGUAL NIGHTLY
Status: COMPLETED | OUTPATIENT
Start: 2024-04-20 | End: 2024-04-21

## 2024-04-20 RX ORDER — BUPRENORPHINE HYDROCHLORIDE AND NALOXONE HYDROCHLORIDE DIHYDRATE 2; .5 MG/1; MG/1
1 TABLET SUBLINGUAL EVERY MORNING
Status: COMPLETED | OUTPATIENT
Start: 2024-04-21 | End: 2024-04-22

## 2024-04-20 RX ORDER — DEXTROSE MONOHYDRATE 100 MG/ML
INJECTION, SOLUTION INTRAVENOUS CONTINUOUS PRN
Status: DISCONTINUED | OUTPATIENT
Start: 2024-04-20 | End: 2024-04-24 | Stop reason: HOSPADM

## 2024-04-20 RX ORDER — GLUCAGON 1 MG/ML
1 KIT INJECTION PRN
Status: DISCONTINUED | OUTPATIENT
Start: 2024-04-20 | End: 2024-04-24 | Stop reason: HOSPADM

## 2024-04-20 RX ORDER — BUPRENORPHINE HYDROCHLORIDE AND NALOXONE HYDROCHLORIDE DIHYDRATE 2; .5 MG/1; MG/1
2 TABLET SUBLINGUAL ONCE
Status: COMPLETED | OUTPATIENT
Start: 2024-04-20 | End: 2024-04-20

## 2024-04-20 RX ORDER — ALBUTEROL SULFATE 2.5 MG/3ML
2.5 SOLUTION RESPIRATORY (INHALATION) EVERY 4 HOURS PRN
Status: DISCONTINUED | OUTPATIENT
Start: 2024-04-20 | End: 2024-04-24 | Stop reason: HOSPADM

## 2024-04-20 RX ORDER — BUPRENORPHINE HYDROCHLORIDE AND NALOXONE HYDROCHLORIDE DIHYDRATE 2; .5 MG/1; MG/1
TABLET SUBLINGUAL
Status: ON HOLD | COMMUNITY
Start: 2024-04-16 | End: 2024-04-24 | Stop reason: HOSPADM

## 2024-04-20 RX ADMIN — BUPRENORPHINE HYDROCHLORIDE AND NALOXONE HYDROCHLORIDE DIHYDRATE 2 TABLET: 2; .5 TABLET SUBLINGUAL at 11:15

## 2024-04-20 RX ADMIN — IPRATROPIUM BROMIDE AND ALBUTEROL SULFATE 1 DOSE: .5; 2.5 SOLUTION RESPIRATORY (INHALATION) at 12:51

## 2024-04-20 RX ADMIN — WATER 40 MG: 1 INJECTION INTRAMUSCULAR; INTRAVENOUS; SUBCUTANEOUS at 06:16

## 2024-04-20 RX ADMIN — BUPRENORPHINE HYDROCHLORIDE AND NALOXONE HYDROCHLORIDE DIHYDRATE 1 TABLET: 2; .5 TABLET SUBLINGUAL at 20:08

## 2024-04-20 RX ADMIN — NALOXEGOL OXALATE 25 MG: 25 TABLET, FILM COATED ORAL at 10:07

## 2024-04-20 RX ADMIN — IPRATROPIUM BROMIDE AND ALBUTEROL SULFATE 1 DOSE: .5; 2.5 SOLUTION RESPIRATORY (INHALATION) at 03:39

## 2024-04-20 RX ADMIN — HEPARIN SODIUM 5000 UNITS: 5000 INJECTION INTRAVENOUS; SUBCUTANEOUS at 16:06

## 2024-04-20 RX ADMIN — SODIUM CHLORIDE, PRESERVATIVE FREE 10 ML: 5 INJECTION INTRAVENOUS at 08:02

## 2024-04-20 RX ADMIN — BUDESONIDE 500 MCG: 0.5 INHALANT RESPIRATORY (INHALATION) at 08:48

## 2024-04-20 RX ADMIN — SODIUM CHLORIDE, PRESERVATIVE FREE 10 ML: 5 INJECTION INTRAVENOUS at 20:09

## 2024-04-20 RX ADMIN — ARFORMOTEROL TARTRATE 15 MCG: 15 SOLUTION RESPIRATORY (INHALATION) at 20:13

## 2024-04-20 RX ADMIN — SODIUM CHLORIDE: 9 INJECTION, SOLUTION INTRAVENOUS at 13:34

## 2024-04-20 RX ADMIN — ASPIRIN 81 MG: 81 TABLET, CHEWABLE ORAL at 08:03

## 2024-04-20 RX ADMIN — AZITHROMYCIN MONOHYDRATE 500 MG: 500 INJECTION, POWDER, LYOPHILIZED, FOR SOLUTION INTRAVENOUS at 22:21

## 2024-04-20 RX ADMIN — HEPARIN SODIUM 5000 UNITS: 5000 INJECTION INTRAVENOUS; SUBCUTANEOUS at 06:15

## 2024-04-20 RX ADMIN — IPRATROPIUM BROMIDE AND ALBUTEROL SULFATE 1 DOSE: .5; 2.5 SOLUTION RESPIRATORY (INHALATION) at 08:48

## 2024-04-20 RX ADMIN — WATER 40 MG: 1 INJECTION INTRAMUSCULAR; INTRAVENOUS; SUBCUTANEOUS at 22:24

## 2024-04-20 RX ADMIN — SODIUM CHLORIDE, PRESERVATIVE FREE 10 ML: 5 INJECTION INTRAVENOUS at 10:07

## 2024-04-20 RX ADMIN — HEPARIN SODIUM 5000 UNITS: 5000 INJECTION INTRAVENOUS; SUBCUTANEOUS at 22:22

## 2024-04-20 RX ADMIN — WATER 1000 MG: 1 INJECTION INTRAMUSCULAR; INTRAVENOUS; SUBCUTANEOUS at 22:23

## 2024-04-20 RX ADMIN — SODIUM CHLORIDE, PRESERVATIVE FREE 40 MG: 5 INJECTION INTRAVENOUS at 08:02

## 2024-04-20 RX ADMIN — ARFORMOTEROL TARTRATE 15 MCG: 15 SOLUTION RESPIRATORY (INHALATION) at 08:48

## 2024-04-20 RX ADMIN — ATORVASTATIN CALCIUM 40 MG: 40 TABLET, FILM COATED ORAL at 08:03

## 2024-04-20 RX ADMIN — WATER 40 MG: 1 INJECTION INTRAMUSCULAR; INTRAVENOUS; SUBCUTANEOUS at 16:06

## 2024-04-20 RX ADMIN — IPRATROPIUM BROMIDE AND ALBUTEROL SULFATE 1 DOSE: .5; 2.5 SOLUTION RESPIRATORY (INHALATION) at 00:10

## 2024-04-20 RX ADMIN — BUDESONIDE 500 MCG: 0.5 INHALANT RESPIRATORY (INHALATION) at 20:13

## 2024-04-20 RX ADMIN — IPRATROPIUM BROMIDE AND ALBUTEROL SULFATE 1 DOSE: .5; 2.5 SOLUTION RESPIRATORY (INHALATION) at 20:13

## 2024-04-20 RX ADMIN — IPRATROPIUM BROMIDE AND ALBUTEROL SULFATE 1 DOSE: .5; 2.5 SOLUTION RESPIRATORY (INHALATION) at 15:58

## 2024-04-20 ASSESSMENT — PAIN SCALES - GENERAL
PAINLEVEL_OUTOF10: 0

## 2024-04-20 NOTE — CONSULTS
Critical Care Consult Note    Patient - Osiel Mcnamara   MRN -  [id number]   Acct # - [id number]   - 1940      Date of Admission -  2024  1:06 PM  Date of evaluation -  2024  4408/4408-A   Hospital Day - 0          ADMIT/CONSULT DETAILS     Reason for Admit/Consult   Acute respiratory failure with hypoxia and hypercapnia      Consulting Service/Physician   Consulting - Geoff Wells MD  Primary Care Physician - Ck Ventura,          HPI   The patient is a 84 y.o. male with significant past medical history of hypertension, hyperlipidemia, heart failure (EF improve from 30% to 55-60% in  post TAVR), aortic stenosis (post TAVR), pacemaker, type 2 diabetes, multinodular thyroid goiter, and COPD presented to the ER from a drug rehab center following a fall with head strike without loss of consciousness. The patient has been at the rehab facility since Monday for percocet abuse treatment. The patient stated that he has been having shortness of breath that began a few weeks ago, but has been worsening over the past few days. The patient also states that he has had diarrhea for the past week. The patient denies fever, chills, cough, and chest pain. The patient was hypoxic on 6L nasal cannula when EMS arrived.     Upon ED arrival, the patient was hypotensive and hypoxic. Vital signs BP 67/57, HR 61, and O2 saturation 73. Lab work was significant for elevated BUN 31, creatinine 2.8, glucose 148, pro-BNP 1,199, troponin 59 and 20, AST 60, and d-dimer 873. ABG showed pH 7.225, pCO2 of 74.3, pO2 89.2, and HCO3 30.1. Repeat ABG showed minimal improvement of hypercapnia after BiPAP initiation. The patient was easily arousable and did have improvement in BP and oxygen saturation. EKG showed normal sinus rhythm with a right bundle branch block. CT head showed no acute intracranial abnormality and moderate cerebral atrophy with periventricular white matter changes. CT cervical spine showed no acute

## 2024-04-20 NOTE — PLAN OF CARE
Problem: Chronic Conditions and Co-morbidities  Goal: Patient's chronic conditions and co-morbidity symptoms are monitored and maintained or improved  Outcome: Progressing  Flowsheets (Taken 4/19/2024 2210)  Care Plan - Patient's Chronic Conditions and Co-Morbidity Symptoms are Monitored and Maintained or Improved:   Monitor and assess patient's chronic conditions and comorbid symptoms for stability, deterioration, or improvement   Collaborate with multidisciplinary team to address chronic and comorbid conditions and prevent exacerbation or deterioration   Update acute care plan with appropriate goals if chronic or comorbid symptoms are exacerbated and prevent overall improvement and discharge     Problem: Pain  Goal: Verbalizes/displays adequate comfort level or baseline comfort level  Outcome: Progressing  Flowsheets (Taken 4/19/2024 2200)  Verbalizes/displays adequate comfort level or baseline comfort level:   Encourage patient to monitor pain and request assistance   Assess pain using appropriate pain scale   Administer analgesics based on type and severity of pain and evaluate response   Implement non-pharmacological measures as appropriate and evaluate response     Problem: Skin/Tissue Integrity  Goal: Absence of new skin breakdown  Description: 1.  Monitor for areas of redness and/or skin breakdown  2.  Assess vascular access sites hourly  3.  Every 4-6 hours minimum:  Change oxygen saturation probe site  4.  Every 4-6 hours:  If on nasal continuous positive airway pressure, respiratory therapy assess nares and determine need for appliance change or resting period.  Outcome: Progressing     Problem: Safety - Adult  Goal: Free from fall injury  Outcome: Progressing     Problem: ABCDS Injury Assessment  Goal: Absence of physical injury  Outcome: Progressing

## 2024-04-20 NOTE — PLAN OF CARE
Problem: Chronic Conditions and Co-morbidities  Goal: Patient's chronic conditions and co-morbidity symptoms are monitored and maintained or improved  4/20/2024 1053 by Selin Menezes, RN  Outcome: Not Progressing  4/20/2024 0055 by Nathan Yi, RN  Outcome: Progressing  Flowsheets (Taken 4/19/2024 2210)  Care Plan - Patient's Chronic Conditions and Co-Morbidity Symptoms are Monitored and Maintained or Improved:   Monitor and assess patient's chronic conditions and comorbid symptoms for stability, deterioration, or improvement   Collaborate with multidisciplinary team to address chronic and comorbid conditions and prevent exacerbation or deterioration   Update acute care plan with appropriate goals if chronic or comorbid symptoms are exacerbated and prevent overall improvement and discharge     Problem: Discharge Planning  Goal: Discharge to home or other facility with appropriate resources  Outcome: Not Progressing  Flowsheets (Taken 4/19/2024 2210 by Nathan Yi, RN)  Discharge to home or other facility with appropriate resources:   Identify barriers to discharge with patient and caregiver   Arrange for needed discharge resources and transportation as appropriate   Arrange for interpreters to assist at discharge as needed   Identify discharge learning needs (meds, wound care, etc)

## 2024-04-21 ENCOUNTER — APPOINTMENT (OUTPATIENT)
Dept: GENERAL RADIOLOGY | Age: 84
DRG: 189 | End: 2024-04-21
Payer: MEDICARE

## 2024-04-21 ENCOUNTER — APPOINTMENT (OUTPATIENT)
Dept: ULTRASOUND IMAGING | Age: 84
DRG: 189 | End: 2024-04-21
Payer: MEDICARE

## 2024-04-21 LAB
AADO2: 118.8 MMHG
AADO2: 119.5 MMHG
AADO2: 143.5 MMHG
ALBUMIN SERPL-MCNC: 3.8 G/DL (ref 3.5–5.2)
ALP SERPL-CCNC: 60 U/L (ref 40–129)
ALT SERPL-CCNC: 13 U/L (ref 0–40)
ANION GAP SERPL CALCULATED.3IONS-SCNC: 11 MMOL/L (ref 7–16)
AST SERPL-CCNC: 30 U/L (ref 0–39)
B.E.: -3.1 MMOL/L (ref -3–3)
B.E.: 0.5 MMOL/L (ref -3–3)
B.E.: 1.6 MMOL/L (ref -3–3)
BASOPHILS # BLD: 0 K/UL (ref 0–0.2)
BASOPHILS NFR BLD: 0 % (ref 0–2)
BILIRUB SERPL-MCNC: 0.2 MG/DL (ref 0–1.2)
BUN SERPL-MCNC: 32 MG/DL (ref 6–23)
CALCIUM SERPL-MCNC: 8.6 MG/DL (ref 8.6–10.2)
CHLORIDE SERPL-SCNC: 104 MMOL/L (ref 98–107)
CO2 SERPL-SCNC: 25 MMOL/L (ref 22–29)
COHB: 0.2 % (ref 0–1.5)
COHB: 0.4 % (ref 0–1.5)
COHB: 0.6 % (ref 0–1.5)
COMMENT: ABNORMAL
CREAT SERPL-MCNC: 1.4 MG/DL (ref 0.7–1.2)
CRITICAL: ABNORMAL
DATE ANALYZED: ABNORMAL
DATE OF COLLECTION: ABNORMAL
EOSINOPHIL # BLD: 0 K/UL (ref 0.05–0.5)
EOSINOPHILS RELATIVE PERCENT: 0 % (ref 0–6)
ERYTHROCYTE [DISTWIDTH] IN BLOOD BY AUTOMATED COUNT: 15.7 % (ref 11.5–15)
FIO2: 40 %
GFR SERPL CREATININE-BSD FRML MDRD: 50 ML/MIN/1.73M2
GLUCOSE BLD-MCNC: 120 MG/DL (ref 74–99)
GLUCOSE BLD-MCNC: 140 MG/DL (ref 74–99)
GLUCOSE BLD-MCNC: 174 MG/DL (ref 74–99)
GLUCOSE BLD-MCNC: 209 MG/DL (ref 74–99)
GLUCOSE SERPL-MCNC: 207 MG/DL (ref 74–99)
HCO3: 23.9 MMOL/L (ref 22–26)
HCO3: 27.6 MMOL/L (ref 22–26)
HCO3: 29 MMOL/L (ref 22–26)
HCT VFR BLD AUTO: 33.4 % (ref 37–54)
HGB BLD-MCNC: 9.8 G/DL (ref 12.5–16.5)
HHB: 2.7 % (ref 0–5)
HHB: 3.1 % (ref 0–5)
HHB: 4.5 % (ref 0–5)
INR PPP: 1.1
LAB: ABNORMAL
LYMPHOCYTES NFR BLD: 0.39 K/UL (ref 1.5–4)
LYMPHOCYTES RELATIVE PERCENT: 4 % (ref 20–42)
Lab: 1309
Lab: 1540
Lab: 415
MAGNESIUM SERPL-MCNC: 1.9 MG/DL (ref 1.6–2.6)
MCH RBC QN AUTO: 27.2 PG (ref 26–35)
MCHC RBC AUTO-ENTMCNC: 29.3 G/DL (ref 32–34.5)
MCV RBC AUTO: 92.8 FL (ref 80–99.9)
METHB: 0.6 % (ref 0–1.5)
METHB: 0.6 % (ref 0–1.5)
METHB: 0.7 % (ref 0–1.5)
MICROORGANISM SPEC CULT: NORMAL
MODE: ABNORMAL
MONOCYTES NFR BLD: 0 % (ref 2–12)
MONOCYTES NFR BLD: 0 K/UL (ref 0.1–0.95)
NEUTROPHILS NFR BLD: 96 % (ref 43–80)
NEUTS SEG NFR BLD: 8.51 K/UL (ref 1.8–7.3)
O2 CONTENT: 14.7 ML/DL
O2 CONTENT: 14.9 ML/DL
O2 CONTENT: 15.3 ML/DL
O2 SATURATION: 95.4 % (ref 92–98.5)
O2 SATURATION: 96.9 % (ref 92–98.5)
O2 SATURATION: 97.3 % (ref 92–98.5)
O2HB: 94.3 % (ref 94–97)
O2HB: 96 % (ref 94–97)
O2HB: 96.3 % (ref 94–97)
OPERATOR ID: 5323
OPERATOR ID: 8215
OPERATOR ID: 8219
PARTIAL THROMBOPLASTIN TIME: 25.7 SEC (ref 24.5–35.1)
PATIENT TEMP: 37 C
PCO2: 51.9 MMHG (ref 35–45)
PCO2: 56.7 MMHG (ref 35–45)
PCO2: 60 MMHG (ref 35–45)
PEEP/CPAP: 10 CMH2O
PEEP/CPAP: 5 CMH2O
PEEP/CPAP: 8 CMH2O
PFO2: 2.05 MMHG/%
PFO2: 2.43 MMHG/%
PFO2: 2.51 MMHG/%
PH BLOOD GAS: 7.28 (ref 7.35–7.45)
PH BLOOD GAS: 7.3 (ref 7.35–7.45)
PH BLOOD GAS: 7.3 (ref 7.35–7.45)
PHOSPHATE SERPL-MCNC: 2.5 MG/DL (ref 2.5–4.5)
PLATELET, FLUORESCENCE: 114 K/UL (ref 130–450)
PMV BLD AUTO: 13.3 FL (ref 7–12)
PO2: 100.5 MMHG (ref 75–100)
PO2: 82 MMHG (ref 75–100)
PO2: 97.4 MMHG (ref 75–100)
POTASSIUM SERPL-SCNC: 5 MMOL/L (ref 3.5–5)
PROT SERPL-MCNC: 6.6 G/DL (ref 6.4–8.3)
PROTHROMBIN TIME: 11.8 SEC (ref 9.3–12.4)
PS: 20 CMH20
RBC # BLD AUTO: 3.6 M/UL (ref 3.8–5.8)
RBC # BLD: ABNORMAL 10*6/UL
RI(T): 1.19
RI(T): 1.22
RI(T): 1.75
RR MECHANICAL: 16 B/MIN
RR MECHANICAL: 20 B/MIN
SODIUM SERPL-SCNC: 140 MMOL/L (ref 132–146)
SOURCE, BLOOD GAS: ABNORMAL
SPECIMEN DESCRIPTION: NORMAL
THB: 10.9 G/DL (ref 11.5–16.5)
THB: 11 G/DL (ref 11.5–16.5)
THB: 11.2 G/DL (ref 11.5–16.5)
TIME ANALYZED: 1340
TIME ANALYZED: 1556
TIME ANALYZED: 420
VT MECHANICAL: 350 ML
WBC OTHER # BLD: 8.9 K/UL (ref 4.5–11.5)

## 2024-04-21 PROCEDURE — 6360000002 HC RX W HCPCS: Performed by: NURSE PRACTITIONER

## 2024-04-21 PROCEDURE — 85610 PROTHROMBIN TIME: CPT

## 2024-04-21 PROCEDURE — 6370000000 HC RX 637 (ALT 250 FOR IP): Performed by: HOSPITALIST

## 2024-04-21 PROCEDURE — 2000000000 HC ICU R&B

## 2024-04-21 PROCEDURE — 94640 AIRWAY INHALATION TREATMENT: CPT

## 2024-04-21 PROCEDURE — 6370000000 HC RX 637 (ALT 250 FOR IP): Performed by: NURSE PRACTITIONER

## 2024-04-21 PROCEDURE — 71045 X-RAY EXAM CHEST 1 VIEW: CPT

## 2024-04-21 PROCEDURE — 99291 CRITICAL CARE FIRST HOUR: CPT | Performed by: INTERNAL MEDICINE

## 2024-04-21 PROCEDURE — 6360000002 HC RX W HCPCS: Performed by: INTERNAL MEDICINE

## 2024-04-21 PROCEDURE — 2580000003 HC RX 258: Performed by: HOSPITALIST

## 2024-04-21 PROCEDURE — 82962 GLUCOSE BLOOD TEST: CPT

## 2024-04-21 PROCEDURE — 84100 ASSAY OF PHOSPHORUS: CPT

## 2024-04-21 PROCEDURE — 80053 COMPREHEN METABOLIC PANEL: CPT

## 2024-04-21 PROCEDURE — 83735 ASSAY OF MAGNESIUM: CPT

## 2024-04-21 PROCEDURE — 2580000003 HC RX 258: Performed by: NURSE PRACTITIONER

## 2024-04-21 PROCEDURE — 94660 CPAP INITIATION&MGMT: CPT

## 2024-04-21 PROCEDURE — 76770 US EXAM ABDO BACK WALL COMP: CPT

## 2024-04-21 PROCEDURE — 85025 COMPLETE CBC W/AUTO DIFF WBC: CPT

## 2024-04-21 PROCEDURE — C9113 INJ PANTOPRAZOLE SODIUM, VIA: HCPCS | Performed by: NURSE PRACTITIONER

## 2024-04-21 PROCEDURE — 82805 BLOOD GASES W/O2 SATURATION: CPT

## 2024-04-21 PROCEDURE — 6360000002 HC RX W HCPCS: Performed by: HOSPITALIST

## 2024-04-21 PROCEDURE — 99232 SBSQ HOSP IP/OBS MODERATE 35: CPT | Performed by: STUDENT IN AN ORGANIZED HEALTH CARE EDUCATION/TRAINING PROGRAM

## 2024-04-21 PROCEDURE — A4216 STERILE WATER/SALINE, 10 ML: HCPCS | Performed by: NURSE PRACTITIONER

## 2024-04-21 PROCEDURE — 85730 THROMBOPLASTIN TIME PARTIAL: CPT

## 2024-04-21 RX ORDER — MAGNESIUM SULFATE 1 G/100ML
1000 INJECTION INTRAVENOUS ONCE
Status: COMPLETED | OUTPATIENT
Start: 2024-04-21 | End: 2024-04-21

## 2024-04-21 RX ORDER — LANOLIN ALCOHOL/MO/W.PET/CERES
3 CREAM (GRAM) TOPICAL NIGHTLY PRN
Status: DISCONTINUED | OUTPATIENT
Start: 2024-04-21 | End: 2024-04-24 | Stop reason: HOSPADM

## 2024-04-21 RX ADMIN — WATER 40 MG: 1 INJECTION INTRAMUSCULAR; INTRAVENOUS; SUBCUTANEOUS at 21:05

## 2024-04-21 RX ADMIN — ARFORMOTEROL TARTRATE 15 MCG: 15 SOLUTION RESPIRATORY (INHALATION) at 08:21

## 2024-04-21 RX ADMIN — HEPARIN SODIUM 5000 UNITS: 5000 INJECTION INTRAVENOUS; SUBCUTANEOUS at 21:06

## 2024-04-21 RX ADMIN — AZITHROMYCIN MONOHYDRATE 500 MG: 500 INJECTION, POWDER, LYOPHILIZED, FOR SOLUTION INTRAVENOUS at 21:08

## 2024-04-21 RX ADMIN — MAGNESIUM SULFATE HEPTAHYDRATE 1000 MG: 1 INJECTION, SOLUTION INTRAVENOUS at 06:20

## 2024-04-21 RX ADMIN — IPRATROPIUM BROMIDE AND ALBUTEROL SULFATE 1 DOSE: .5; 2.5 SOLUTION RESPIRATORY (INHALATION) at 00:21

## 2024-04-21 RX ADMIN — NALOXEGOL OXALATE 25 MG: 25 TABLET, FILM COATED ORAL at 06:16

## 2024-04-21 RX ADMIN — SODIUM CHLORIDE, PRESERVATIVE FREE 10 ML: 5 INJECTION INTRAVENOUS at 09:35

## 2024-04-21 RX ADMIN — IPRATROPIUM BROMIDE AND ALBUTEROL SULFATE 1 DOSE: .5; 2.5 SOLUTION RESPIRATORY (INHALATION) at 04:11

## 2024-04-21 RX ADMIN — SODIUM CHLORIDE, PRESERVATIVE FREE 40 MG: 5 INJECTION INTRAVENOUS at 09:35

## 2024-04-21 RX ADMIN — WATER 1000 MG: 1 INJECTION INTRAMUSCULAR; INTRAVENOUS; SUBCUTANEOUS at 21:04

## 2024-04-21 RX ADMIN — WATER 40 MG: 1 INJECTION INTRAMUSCULAR; INTRAVENOUS; SUBCUTANEOUS at 06:15

## 2024-04-21 RX ADMIN — BUDESONIDE 500 MCG: 0.5 INHALANT RESPIRATORY (INHALATION) at 20:00

## 2024-04-21 RX ADMIN — IPRATROPIUM BROMIDE AND ALBUTEROL SULFATE 1 DOSE: .5; 2.5 SOLUTION RESPIRATORY (INHALATION) at 13:13

## 2024-04-21 RX ADMIN — IPRATROPIUM BROMIDE AND ALBUTEROL SULFATE 1 DOSE: .5; 2.5 SOLUTION RESPIRATORY (INHALATION) at 08:21

## 2024-04-21 RX ADMIN — HEPARIN SODIUM 5000 UNITS: 5000 INJECTION INTRAVENOUS; SUBCUTANEOUS at 15:33

## 2024-04-21 RX ADMIN — ATORVASTATIN CALCIUM 40 MG: 40 TABLET, FILM COATED ORAL at 09:35

## 2024-04-21 RX ADMIN — ASPIRIN 81 MG: 81 TABLET, CHEWABLE ORAL at 09:35

## 2024-04-21 RX ADMIN — BUPRENORPHINE HYDROCHLORIDE AND NALOXONE HYDROCHLORIDE DIHYDRATE 1 TABLET: 2; .5 TABLET SUBLINGUAL at 09:35

## 2024-04-21 RX ADMIN — IPRATROPIUM BROMIDE AND ALBUTEROL SULFATE 1 DOSE: .5; 2.5 SOLUTION RESPIRATORY (INHALATION) at 20:00

## 2024-04-21 RX ADMIN — Medication 3 MG: at 21:04

## 2024-04-21 RX ADMIN — HEPARIN SODIUM 5000 UNITS: 5000 INJECTION INTRAVENOUS; SUBCUTANEOUS at 06:16

## 2024-04-21 RX ADMIN — BUDESONIDE 500 MCG: 0.5 INHALANT RESPIRATORY (INHALATION) at 08:21

## 2024-04-21 RX ADMIN — IPRATROPIUM BROMIDE AND ALBUTEROL SULFATE 1 DOSE: .5; 2.5 SOLUTION RESPIRATORY (INHALATION) at 16:49

## 2024-04-21 RX ADMIN — ARFORMOTEROL TARTRATE 15 MCG: 15 SOLUTION RESPIRATORY (INHALATION) at 20:00

## 2024-04-21 RX ADMIN — WATER 40 MG: 1 INJECTION INTRAMUSCULAR; INTRAVENOUS; SUBCUTANEOUS at 15:33

## 2024-04-21 RX ADMIN — BUPRENORPHINE HYDROCHLORIDE AND NALOXONE HYDROCHLORIDE DIHYDRATE 1 TABLET: 2; .5 TABLET SUBLINGUAL at 21:04

## 2024-04-21 ASSESSMENT — PAIN SCALES - GENERAL
PAINLEVEL_OUTOF10: 0
PAINLEVEL_OUTOF10: 0

## 2024-04-21 NOTE — PLAN OF CARE
Problem: Chronic Conditions and Co-morbidities  Goal: Patient's chronic conditions and co-morbidity symptoms are monitored and maintained or improved  4/21/2024 1038 by Selin Menezes RN  Outcome: Progressing     Problem: Discharge Planning  Goal: Discharge to home or other facility with appropriate resources  Outcome: Progressing     Problem: Pain  Goal: Verbalizes/displays adequate comfort level or baseline comfort level  4/21/2024 1038 by Selin Menezes RN  Outcome: Progressing     Problem: Skin/Tissue Integrity  Goal: Absence of new skin breakdown  Description: 1.  Monitor for areas of redness and/or skin breakdown  2.  Assess vascular access sites hourly  3.  Every 4-6 hours minimum:  Change oxygen saturation probe site  4.  Every 4-6 hours:  If on nasal continuous positive airway pressure, respiratory therapy assess nares and determine need for appliance change or resting period.  4/21/2024 1038 by Selin Menezes RN  Outcome: Progressing     Problem: Safety - Adult  Goal: Free from fall injury  4/21/2024 1038 by Selin Menezes RN  Outcome: Progressing     Problem: ABCDS Injury Assessment  Goal: Absence of physical injury  Outcome: Progressing

## 2024-04-21 NOTE — PLAN OF CARE
Problem: Chronic Conditions and Co-morbidities  Goal: Patient's chronic conditions and co-morbidity symptoms are monitored and maintained or improved  Outcome: Progressing  Care Plan - Patient's Chronic Conditions and Co-Morbidity Symptoms are Monitored and Maintained or Improved:   Monitor and assess patient's chronic conditions and comorbid symptoms for stability, deterioration, or improvement   Collaborate with multidisciplinary team to address chronic and comorbid conditions and prevent exacerbation or deterioration   Update acute care plan with appropriate goals if chronic or comorbid symptoms are exacerbated and prevent overall improvement and discharge     Problem: Discharge Planning  Goal: Discharge to home or other facility with appropriate resources  Discharge to home or other facility with appropriate resources:   Identify barriers to discharge with patient and caregiver   Arrange for needed discharge resources and transportation as appropriate           Identify discharge learning needs (meds, wound care, etc)   Arrange for interpreters to assist at discharge as needed  Outcome: Progressing     Problem: Pain  Goal: Verbalizes/displays adequate comfort level or baseline comfort level  Outcome: Progressing  Verbalizes/displays adequate comfort level or baseline comfort level:   Encourage patient to monitor pain and request assistance   Assess pain using appropriate pain scale   Administer analgesics based on type and severity of pain and evaluate response   Implement non-pharmacological measures as appropriate and evaluate response   Consider cultural and social influences on pain and pain management     Problem: Skin/Tissue Integrity  Goal: Absence of new skin breakdown  Description: 1.  Monitor for areas of redness and/or skin breakdown  2.  Assess vascular access sites hourly  3.  Every 4-6 hours minimum:  Change oxygen saturation probe site  4.  Every 4-6 hours:  If on nasal continuous positive

## 2024-04-22 ENCOUNTER — APPOINTMENT (OUTPATIENT)
Dept: GENERAL RADIOLOGY | Age: 84
DRG: 189 | End: 2024-04-22
Payer: MEDICARE

## 2024-04-22 ENCOUNTER — APPOINTMENT (OUTPATIENT)
Dept: CT IMAGING | Age: 84
DRG: 189 | End: 2024-04-22
Payer: MEDICARE

## 2024-04-22 LAB
AADO2: 132.1 MMHG
ALBUMIN SERPL-MCNC: 3.8 G/DL (ref 3.5–5.2)
ALP SERPL-CCNC: 58 U/L (ref 40–129)
ALT SERPL-CCNC: 11 U/L (ref 0–40)
ANION GAP SERPL CALCULATED.3IONS-SCNC: 6 MMOL/L (ref 7–16)
AST SERPL-CCNC: 24 U/L (ref 0–39)
B.E.: -1.9 MMOL/L (ref -3–3)
BASOPHILS # BLD: 0 K/UL (ref 0–0.2)
BASOPHILS NFR BLD: 0 % (ref 0–2)
BILIRUB SERPL-MCNC: 0.2 MG/DL (ref 0–1.2)
BUN SERPL-MCNC: 33 MG/DL (ref 6–23)
CALCIUM SERPL-MCNC: 8.8 MG/DL (ref 8.6–10.2)
CHLORIDE SERPL-SCNC: 106 MMOL/L (ref 98–107)
CO2 SERPL-SCNC: 30 MMOL/L (ref 22–29)
COHB: 0.5 % (ref 0–1.5)
CREAT SERPL-MCNC: 1.3 MG/DL (ref 0.7–1.2)
CRITICAL: ABNORMAL
DATE ANALYZED: ABNORMAL
DATE OF COLLECTION: ABNORMAL
EOSINOPHIL # BLD: 0 K/UL (ref 0.05–0.5)
EOSINOPHILS RELATIVE PERCENT: 0 % (ref 0–6)
ERYTHROCYTE [DISTWIDTH] IN BLOOD BY AUTOMATED COUNT: 15.9 % (ref 11.5–15)
FIO2: 40 %
GFR SERPL CREATININE-BSD FRML MDRD: 55 ML/MIN/1.73M2
GLUCOSE BLD-MCNC: 118 MG/DL (ref 74–99)
GLUCOSE BLD-MCNC: 132 MG/DL (ref 74–99)
GLUCOSE BLD-MCNC: 162 MG/DL (ref 74–99)
GLUCOSE BLD-MCNC: 177 MG/DL (ref 74–99)
GLUCOSE SERPL-MCNC: 138 MG/DL (ref 74–99)
HCO3: 24.7 MMOL/L (ref 22–26)
HCT VFR BLD AUTO: 33.5 % (ref 37–54)
HGB BLD-MCNC: 9.8 G/DL (ref 12.5–16.5)
HHB: 3.3 % (ref 0–5)
IMM GRANULOCYTES # BLD AUTO: 0.03 K/UL (ref 0–0.58)
IMM GRANULOCYTES NFR BLD: 0 % (ref 0–5)
INR PPP: 1.1
LAB: ABNORMAL
LYMPHOCYTES NFR BLD: 0.33 K/UL (ref 1.5–4)
LYMPHOCYTES RELATIVE PERCENT: 4 % (ref 20–42)
Lab: 417
MAGNESIUM SERPL-MCNC: 2 MG/DL (ref 1.6–2.6)
MCH RBC QN AUTO: 27.1 PG (ref 26–35)
MCHC RBC AUTO-ENTMCNC: 29.3 G/DL (ref 32–34.5)
MCV RBC AUTO: 92.8 FL (ref 80–99.9)
METHB: 0.5 % (ref 0–1.5)
MODE: ABNORMAL
MONOCYTES NFR BLD: 0.21 K/UL (ref 0.1–0.95)
MONOCYTES NFR BLD: 2 % (ref 2–12)
NEUTROPHILS NFR BLD: 94 % (ref 43–80)
NEUTS SEG NFR BLD: 8.66 K/UL (ref 1.8–7.3)
O2 CONTENT: 14.8 ML/DL
O2 SATURATION: 96.7 % (ref 92–98.5)
O2HB: 95.7 % (ref 94–97)
OPERATOR ID: 7221
PARTIAL THROMBOPLASTIN TIME: 25.7 SEC (ref 24.5–35.1)
PATIENT TEMP: 37 C
PCO2: 50 MMHG (ref 35–45)
PEEP/CPAP: 10 CMH2O
PFO2: 2.39 MMHG/%
PH BLOOD GAS: 7.31 (ref 7.35–7.45)
PHOSPHATE SERPL-MCNC: 2.2 MG/DL (ref 2.5–4.5)
PIP: 20 CMH2O
PLATELET # BLD AUTO: 118 K/UL (ref 130–450)
PMV BLD AUTO: 13 FL (ref 7–12)
PO2: 95.6 MMHG (ref 75–100)
POTASSIUM SERPL-SCNC: 5.2 MMOL/L (ref 3.5–5)
PROT SERPL-MCNC: 6.6 G/DL (ref 6.4–8.3)
PROTHROMBIN TIME: 12 SEC (ref 9.3–12.4)
RBC # BLD AUTO: 3.61 M/UL (ref 3.8–5.8)
RBC # BLD: ABNORMAL 10*6/UL
RI(T): 1.38
SODIUM SERPL-SCNC: 142 MMOL/L (ref 132–146)
SOURCE, BLOOD GAS: ABNORMAL
THB: 10.9 G/DL (ref 11.5–16.5)
TIME ANALYZED: 421
WBC OTHER # BLD: 9.2 K/UL (ref 4.5–11.5)

## 2024-04-22 PROCEDURE — C9113 INJ PANTOPRAZOLE SODIUM, VIA: HCPCS | Performed by: NURSE PRACTITIONER

## 2024-04-22 PROCEDURE — 97162 PT EVAL MOD COMPLEX 30 MIN: CPT

## 2024-04-22 PROCEDURE — 6370000000 HC RX 637 (ALT 250 FOR IP)

## 2024-04-22 PROCEDURE — 84100 ASSAY OF PHOSPHORUS: CPT

## 2024-04-22 PROCEDURE — 85025 COMPLETE CBC W/AUTO DIFF WBC: CPT

## 2024-04-22 PROCEDURE — 2580000003 HC RX 258

## 2024-04-22 PROCEDURE — 80053 COMPREHEN METABOLIC PANEL: CPT

## 2024-04-22 PROCEDURE — 94660 CPAP INITIATION&MGMT: CPT

## 2024-04-22 PROCEDURE — A4216 STERILE WATER/SALINE, 10 ML: HCPCS | Performed by: NURSE PRACTITIONER

## 2024-04-22 PROCEDURE — 71250 CT THORAX DX C-: CPT

## 2024-04-22 PROCEDURE — 2060000000 HC ICU INTERMEDIATE R&B

## 2024-04-22 PROCEDURE — 94640 AIRWAY INHALATION TREATMENT: CPT

## 2024-04-22 PROCEDURE — 71045 X-RAY EXAM CHEST 1 VIEW: CPT

## 2024-04-22 PROCEDURE — 6370000000 HC RX 637 (ALT 250 FOR IP): Performed by: NURSE PRACTITIONER

## 2024-04-22 PROCEDURE — 85610 PROTHROMBIN TIME: CPT

## 2024-04-22 PROCEDURE — 2580000003 HC RX 258: Performed by: HOSPITALIST

## 2024-04-22 PROCEDURE — 99291 CRITICAL CARE FIRST HOUR: CPT | Performed by: INTERNAL MEDICINE

## 2024-04-22 PROCEDURE — 97530 THERAPEUTIC ACTIVITIES: CPT

## 2024-04-22 PROCEDURE — 83735 ASSAY OF MAGNESIUM: CPT

## 2024-04-22 PROCEDURE — 2580000003 HC RX 258: Performed by: NURSE PRACTITIONER

## 2024-04-22 PROCEDURE — 6360000002 HC RX W HCPCS: Performed by: NURSE PRACTITIONER

## 2024-04-22 PROCEDURE — 6360000002 HC RX W HCPCS: Performed by: INTERNAL MEDICINE

## 2024-04-22 PROCEDURE — 99233 SBSQ HOSP IP/OBS HIGH 50: CPT | Performed by: INTERNAL MEDICINE

## 2024-04-22 PROCEDURE — 82805 BLOOD GASES W/O2 SATURATION: CPT

## 2024-04-22 PROCEDURE — 6370000000 HC RX 637 (ALT 250 FOR IP): Performed by: HOSPITALIST

## 2024-04-22 PROCEDURE — 82962 GLUCOSE BLOOD TEST: CPT

## 2024-04-22 PROCEDURE — 2500000003 HC RX 250 WO HCPCS

## 2024-04-22 PROCEDURE — 97166 OT EVAL MOD COMPLEX 45 MIN: CPT

## 2024-04-22 PROCEDURE — 85730 THROMBOPLASTIN TIME PARTIAL: CPT

## 2024-04-22 PROCEDURE — 6360000002 HC RX W HCPCS

## 2024-04-22 PROCEDURE — 97535 SELF CARE MNGMENT TRAINING: CPT

## 2024-04-22 RX ORDER — LIDOCAINE 4 G/G
1 PATCH TOPICAL DAILY
Status: DISCONTINUED | OUTPATIENT
Start: 2024-04-22 | End: 2024-04-24 | Stop reason: HOSPADM

## 2024-04-22 RX ORDER — HYDRALAZINE HYDROCHLORIDE 20 MG/ML
10 INJECTION INTRAMUSCULAR; INTRAVENOUS EVERY 6 HOURS PRN
Status: DISCONTINUED | OUTPATIENT
Start: 2024-04-22 | End: 2024-04-24 | Stop reason: HOSPADM

## 2024-04-22 RX ORDER — FENTANYL CITRATE 50 UG/ML
25 INJECTION, SOLUTION INTRAMUSCULAR; INTRAVENOUS EVERY 4 HOURS PRN
Status: DISCONTINUED | OUTPATIENT
Start: 2024-04-22 | End: 2024-04-24 | Stop reason: HOSPADM

## 2024-04-22 RX ADMIN — HEPARIN SODIUM 5000 UNITS: 5000 INJECTION INTRAVENOUS; SUBCUTANEOUS at 22:32

## 2024-04-22 RX ADMIN — FENTANYL CITRATE 25 MCG: 50 INJECTION INTRAMUSCULAR; INTRAVENOUS at 22:28

## 2024-04-22 RX ADMIN — ASPIRIN 81 MG: 81 TABLET, CHEWABLE ORAL at 08:28

## 2024-04-22 RX ADMIN — IPRATROPIUM BROMIDE AND ALBUTEROL SULFATE 1 DOSE: .5; 2.5 SOLUTION RESPIRATORY (INHALATION) at 21:36

## 2024-04-22 RX ADMIN — IPRATROPIUM BROMIDE AND ALBUTEROL SULFATE 1 DOSE: .5; 2.5 SOLUTION RESPIRATORY (INHALATION) at 15:26

## 2024-04-22 RX ADMIN — WATER 40 MG: 1 INJECTION INTRAMUSCULAR; INTRAVENOUS; SUBCUTANEOUS at 20:40

## 2024-04-22 RX ADMIN — ARFORMOTEROL TARTRATE 15 MCG: 15 SOLUTION RESPIRATORY (INHALATION) at 08:18

## 2024-04-22 RX ADMIN — SODIUM CHLORIDE, PRESERVATIVE FREE 40 MG: 5 INJECTION INTRAVENOUS at 08:29

## 2024-04-22 RX ADMIN — ARFORMOTEROL TARTRATE 15 MCG: 15 SOLUTION RESPIRATORY (INHALATION) at 21:36

## 2024-04-22 RX ADMIN — WATER 40 MG: 1 INJECTION INTRAMUSCULAR; INTRAVENOUS; SUBCUTANEOUS at 08:29

## 2024-04-22 RX ADMIN — HEPARIN SODIUM 5000 UNITS: 5000 INJECTION INTRAVENOUS; SUBCUTANEOUS at 15:40

## 2024-04-22 RX ADMIN — ATORVASTATIN CALCIUM 40 MG: 40 TABLET, FILM COATED ORAL at 08:28

## 2024-04-22 RX ADMIN — SODIUM PHOSPHATE, MONOBASIC, MONOHYDRATE AND SODIUM PHOSPHATE, DIBASIC, ANHYDROUS 15 MMOL: 142; 276 INJECTION, SOLUTION INTRAVENOUS at 11:35

## 2024-04-22 RX ADMIN — Medication 3 MG: at 22:55

## 2024-04-22 RX ADMIN — BUPRENORPHINE HYDROCHLORIDE AND NALOXONE HYDROCHLORIDE DIHYDRATE 1 TABLET: 2; .5 TABLET SUBLINGUAL at 08:28

## 2024-04-22 RX ADMIN — AZITHROMYCIN MONOHYDRATE 500 MG: 500 INJECTION, POWDER, LYOPHILIZED, FOR SOLUTION INTRAVENOUS at 22:49

## 2024-04-22 RX ADMIN — SODIUM CHLORIDE, PRESERVATIVE FREE 10 ML: 5 INJECTION INTRAVENOUS at 08:30

## 2024-04-22 RX ADMIN — ACETAMINOPHEN 650 MG: 325 TABLET ORAL at 20:41

## 2024-04-22 RX ADMIN — BUDESONIDE 500 MCG: 0.5 INHALANT RESPIRATORY (INHALATION) at 08:18

## 2024-04-22 RX ADMIN — SODIUM CHLORIDE, PRESERVATIVE FREE 10 ML: 5 INJECTION INTRAVENOUS at 08:31

## 2024-04-22 RX ADMIN — HEPARIN SODIUM 5000 UNITS: 5000 INJECTION INTRAVENOUS; SUBCUTANEOUS at 06:14

## 2024-04-22 RX ADMIN — IPRATROPIUM BROMIDE AND ALBUTEROL SULFATE 1 DOSE: .5; 2.5 SOLUTION RESPIRATORY (INHALATION) at 04:17

## 2024-04-22 RX ADMIN — HYDRALAZINE HYDROCHLORIDE 10 MG: 20 INJECTION INTRAMUSCULAR; INTRAVENOUS at 21:15

## 2024-04-22 RX ADMIN — IPRATROPIUM BROMIDE AND ALBUTEROL SULFATE 1 DOSE: .5; 2.5 SOLUTION RESPIRATORY (INHALATION) at 00:10

## 2024-04-22 RX ADMIN — HYDRALAZINE HYDROCHLORIDE 10 MG: 20 INJECTION INTRAMUSCULAR; INTRAVENOUS at 06:35

## 2024-04-22 RX ADMIN — SODIUM CHLORIDE, PRESERVATIVE FREE 10 ML: 5 INJECTION INTRAVENOUS at 21:16

## 2024-04-22 RX ADMIN — NALOXEGOL OXALATE 25 MG: 25 TABLET, FILM COATED ORAL at 06:17

## 2024-04-22 RX ADMIN — SODIUM CHLORIDE, PRESERVATIVE FREE 10 ML: 5 INJECTION INTRAVENOUS at 20:40

## 2024-04-22 RX ADMIN — BUDESONIDE 500 MCG: 0.5 INHALANT RESPIRATORY (INHALATION) at 21:36

## 2024-04-22 RX ADMIN — WATER 1000 MG: 1 INJECTION INTRAMUSCULAR; INTRAVENOUS; SUBCUTANEOUS at 22:32

## 2024-04-22 RX ADMIN — SODIUM CHLORIDE, PRESERVATIVE FREE 10 ML: 5 INJECTION INTRAVENOUS at 22:29

## 2024-04-22 RX ADMIN — IPRATROPIUM BROMIDE AND ALBUTEROL SULFATE 1 DOSE: .5; 2.5 SOLUTION RESPIRATORY (INHALATION) at 08:18

## 2024-04-22 ASSESSMENT — PAIN SCALES - GENERAL
PAINLEVEL_OUTOF10: 8
PAINLEVEL_OUTOF10: 3
PAINLEVEL_OUTOF10: 0

## 2024-04-22 ASSESSMENT — PAIN DESCRIPTION - ORIENTATION
ORIENTATION: LEFT
ORIENTATION: LEFT

## 2024-04-22 ASSESSMENT — PAIN DESCRIPTION - LOCATION
LOCATION: RIB CAGE
LOCATION: RIB CAGE

## 2024-04-22 ASSESSMENT — PAIN DESCRIPTION - DESCRIPTORS
DESCRIPTORS: ACHING;SHARP
DESCRIPTORS: ACHING;SHARP

## 2024-04-22 ASSESSMENT — PAIN DESCRIPTION - FREQUENCY: FREQUENCY: INTERMITTENT

## 2024-04-22 ASSESSMENT — PAIN DESCRIPTION - PAIN TYPE: TYPE: ACUTE PAIN

## 2024-04-22 ASSESSMENT — PAIN DESCRIPTION - ONSET: ONSET: ON-GOING

## 2024-04-22 NOTE — PLAN OF CARE
Problem: Chronic Conditions and Co-morbidities  Goal: Patient's chronic conditions and co-morbidity symptoms are monitored and maintained or improved  4/21/2024 2036 by Nathan iY RN  Outcome: Progressing  Flowsheets (Taken 4/21/2024 2000)  Care Plan - Patient's Chronic Conditions and Co-Morbidity Symptoms are Monitored and Maintained or Improved:   Monitor and assess patient's chronic conditions and comorbid symptoms for stability, deterioration, or improvement   Collaborate with multidisciplinary team to address chronic and comorbid conditions and prevent exacerbation or deterioration   Update acute care plan with appropriate goals if chronic or comorbid symptoms are exacerbated and prevent overall improvement and discharge     Problem: Pain  Goal: Verbalizes/displays adequate comfort level or baseline comfort level  4/21/2024 2036 by Nathan Yi RN  Outcome: Progressing  Flowsheets (Taken 4/21/2024 2000)  Verbalizes/displays adequate comfort level or baseline comfort level:   Encourage patient to monitor pain and request assistance   Implement non-pharmacological measures as appropriate and evaluate response   Consider cultural and social influences on pain and pain management   Assess pain using appropriate pain scale     Problem: Skin/Tissue Integrity  Goal: Absence of new skin breakdown  Description: 1.  Monitor for areas of redness and/or skin breakdown  2.  Assess vascular access sites hourly  3.  Every 4-6 hours minimum:  Change oxygen saturation probe site  4.  Every 4-6 hours:  If on nasal continuous positive airway pressure, respiratory therapy assess nares and determine need for appliance change or resting period.  4/21/2024 2036 by Nathan Yi, RN  Outcome: Progressing

## 2024-04-22 NOTE — PLAN OF CARE
Problem: Chronic Conditions and Co-morbidities  Goal: Patient's chronic conditions and co-morbidity symptoms are monitored and maintained or improved  Recent Flowsheet Documentation  Taken 4/22/2024 0800 by Crista King RN  Care Plan - Patient's Chronic Conditions and Co-Morbidity Symptoms are Monitored and Maintained or Improved: Monitor and assess patient's chronic conditions and comorbid symptoms for stability, deterioration, or improvement  4/21/2024 2036 by Nathan Yi RN  Outcome: Progressing  Flowsheets (Taken 4/21/2024 2000)  Care Plan - Patient's Chronic Conditions and Co-Morbidity Symptoms are Monitored and Maintained or Improved:   Monitor and assess patient's chronic conditions and comorbid symptoms for stability, deterioration, or improvement   Collaborate with multidisciplinary team to address chronic and comorbid conditions and prevent exacerbation or deterioration   Update acute care plan with appropriate goals if chronic or comorbid symptoms are exacerbated and prevent overall improvement and discharge     Problem: Pain  Goal: Verbalizes/displays adequate comfort level or baseline comfort level  4/21/2024 2036 by Nathan Yi RN  Outcome: Progressing  Flowsheets (Taken 4/21/2024 2000)  Verbalizes/displays adequate comfort level or baseline comfort level:   Encourage patient to monitor pain and request assistance   Implement non-pharmacological measures as appropriate and evaluate response   Consider cultural and social influences on pain and pain management   Assess pain using appropriate pain scale     Problem: Skin/Tissue Integrity  Goal: Absence of new skin breakdown  Description: 1.  Monitor for areas of redness and/or skin breakdown  2.  Assess vascular access sites hourly  3.  Every 4-6 hours minimum:  Change oxygen saturation probe site  4.  Every 4-6 hours:  If on nasal continuous positive airway pressure, respiratory therapy assess nares and determine need for appliance

## 2024-04-22 NOTE — CARE COORDINATION
Care Coordination:  LOS 3 day. Admit MICU, acute hypoxic, hypercapnic respiratory failure on NIV, acute metabolic encephalopathy, COPD, pneumonia.  Currently on 6 ltrn/ Avaps at night, Zithromax. Ptx 18/24. Met with pt at bedside to discuss transition of care needs. Lives at home with wife, first floor apt, bed and bath same floor. No hx of hhc, Dme or jes. If Dme is needed, no preference. Declines need for hhc, and plan is to return home at discharge. Wife or son will transport. Follows with Dr Ventura and uses [facility]. Wife is primary and son Osiel is 2nd Health care decision maker    Electronically signed by Viola Mckay RN on 4/22/2024 at 12:27 PM     The Plan for Transition of Care is related to the following treatment goals: dc    The Patient was provided with a choice of provider and agrees   with the discharge plan. [x] Yes [] No    Freedom of choice list was provided with basic dialogue that supports the patient's individualized plan of care/goals, treatment preferences and shares the quality data associated with the providers. [x] Yes [] No

## 2024-04-22 NOTE — ACP (ADVANCE CARE PLANNING)
Advance Care Planning   Healthcare Decision Maker:    Primary Decision Maker: AndersSlime - Spouse - [phone]    Secondary Decision Maker: Osiel Mcnamara Jr - Child - [phone]    Click here to complete Healthcare Decision Makers including selection of the Healthcare Decision Maker Relationship (ie \"Primary\").

## 2024-04-23 ENCOUNTER — APPOINTMENT (OUTPATIENT)
Dept: GENERAL RADIOLOGY | Age: 84
DRG: 189 | End: 2024-04-23
Payer: MEDICARE

## 2024-04-23 LAB
ALBUMIN SERPL-MCNC: 3.7 G/DL (ref 3.5–5.2)
ALP SERPL-CCNC: 55 U/L (ref 40–129)
ALT SERPL-CCNC: 12 U/L (ref 0–40)
ANION GAP SERPL CALCULATED.3IONS-SCNC: 8 MMOL/L (ref 7–16)
AST SERPL-CCNC: 23 U/L (ref 0–39)
BILIRUB SERPL-MCNC: 0.3 MG/DL (ref 0–1.2)
BUN SERPL-MCNC: 29 MG/DL (ref 6–23)
CALCIUM SERPL-MCNC: 9.1 MG/DL (ref 8.6–10.2)
CHLORIDE SERPL-SCNC: 107 MMOL/L (ref 98–107)
CO2 SERPL-SCNC: 29 MMOL/L (ref 22–29)
CREAT SERPL-MCNC: 1.2 MG/DL (ref 0.7–1.2)
ERYTHROCYTE [DISTWIDTH] IN BLOOD BY AUTOMATED COUNT: 16.3 % (ref 11.5–15)
GFR SERPL CREATININE-BSD FRML MDRD: 57 ML/MIN/1.73M2
GLUCOSE BLD-MCNC: 128 MG/DL (ref 74–99)
GLUCOSE BLD-MCNC: 156 MG/DL (ref 74–99)
GLUCOSE BLD-MCNC: 194 MG/DL (ref 74–99)
GLUCOSE SERPL-MCNC: 182 MG/DL (ref 74–99)
HCT VFR BLD AUTO: 35.5 % (ref 37–54)
HGB BLD-MCNC: 10.5 G/DL (ref 12.5–16.5)
MAGNESIUM SERPL-MCNC: 1.8 MG/DL (ref 1.6–2.6)
MCH RBC QN AUTO: 26.9 PG (ref 26–35)
MCHC RBC AUTO-ENTMCNC: 29.6 G/DL (ref 32–34.5)
MCV RBC AUTO: 90.8 FL (ref 80–99.9)
PHOSPHATE SERPL-MCNC: 2.1 MG/DL (ref 2.5–4.5)
PLATELET # BLD AUTO: 135 K/UL (ref 130–450)
PMV BLD AUTO: 12.7 FL (ref 7–12)
POTASSIUM SERPL-SCNC: 5 MMOL/L (ref 3.5–5)
PROT SERPL-MCNC: 6.6 G/DL (ref 6.4–8.3)
RBC # BLD AUTO: 3.91 M/UL (ref 3.8–5.8)
SODIUM SERPL-SCNC: 144 MMOL/L (ref 132–146)
WBC OTHER # BLD: 9 K/UL (ref 4.5–11.5)

## 2024-04-23 PROCEDURE — 6370000000 HC RX 637 (ALT 250 FOR IP): Performed by: NURSE PRACTITIONER

## 2024-04-23 PROCEDURE — 2060000000 HC ICU INTERMEDIATE R&B

## 2024-04-23 PROCEDURE — 94640 AIRWAY INHALATION TREATMENT: CPT

## 2024-04-23 PROCEDURE — 6370000000 HC RX 637 (ALT 250 FOR IP)

## 2024-04-23 PROCEDURE — 94660 CPAP INITIATION&MGMT: CPT

## 2024-04-23 PROCEDURE — 2700000000 HC OXYGEN THERAPY PER DAY

## 2024-04-23 PROCEDURE — 2500000003 HC RX 250 WO HCPCS

## 2024-04-23 PROCEDURE — 2580000003 HC RX 258: Performed by: NURSE PRACTITIONER

## 2024-04-23 PROCEDURE — 2580000003 HC RX 258

## 2024-04-23 PROCEDURE — 83735 ASSAY OF MAGNESIUM: CPT

## 2024-04-23 PROCEDURE — 84100 ASSAY OF PHOSPHORUS: CPT

## 2024-04-23 PROCEDURE — 99233 SBSQ HOSP IP/OBS HIGH 50: CPT | Performed by: INTERNAL MEDICINE

## 2024-04-23 PROCEDURE — 71045 X-RAY EXAM CHEST 1 VIEW: CPT

## 2024-04-23 PROCEDURE — 99232 SBSQ HOSP IP/OBS MODERATE 35: CPT | Performed by: INTERNAL MEDICINE

## 2024-04-23 PROCEDURE — 6360000002 HC RX W HCPCS: Performed by: NURSE PRACTITIONER

## 2024-04-23 PROCEDURE — C9113 INJ PANTOPRAZOLE SODIUM, VIA: HCPCS | Performed by: NURSE PRACTITIONER

## 2024-04-23 PROCEDURE — 2580000003 HC RX 258: Performed by: HOSPITALIST

## 2024-04-23 PROCEDURE — 85027 COMPLETE CBC AUTOMATED: CPT

## 2024-04-23 PROCEDURE — 6370000000 HC RX 637 (ALT 250 FOR IP): Performed by: HOSPITALIST

## 2024-04-23 PROCEDURE — 97530 THERAPEUTIC ACTIVITIES: CPT

## 2024-04-23 PROCEDURE — 80053 COMPREHEN METABOLIC PANEL: CPT

## 2024-04-23 PROCEDURE — 82962 GLUCOSE BLOOD TEST: CPT

## 2024-04-23 RX ORDER — AMLODIPINE BESYLATE 5 MG/1
5 TABLET ORAL DAILY
Status: DISCONTINUED | OUTPATIENT
Start: 2024-04-23 | End: 2024-04-24 | Stop reason: HOSPADM

## 2024-04-23 RX ORDER — CARVEDILOL 25 MG/1
25 TABLET ORAL 2 TIMES DAILY WITH MEALS
Status: DISCONTINUED | OUTPATIENT
Start: 2024-04-23 | End: 2024-04-24 | Stop reason: HOSPADM

## 2024-04-23 RX ADMIN — BUDESONIDE 500 MCG: 0.5 INHALANT RESPIRATORY (INHALATION) at 10:35

## 2024-04-23 RX ADMIN — IPRATROPIUM BROMIDE AND ALBUTEROL SULFATE 1 DOSE: .5; 2.5 SOLUTION RESPIRATORY (INHALATION) at 00:45

## 2024-04-23 RX ADMIN — CARVEDILOL 25 MG: 25 TABLET, FILM COATED ORAL at 15:58

## 2024-04-23 RX ADMIN — BUDESONIDE 500 MCG: 0.5 INHALANT RESPIRATORY (INHALATION) at 19:09

## 2024-04-23 RX ADMIN — AMLODIPINE BESYLATE 5 MG: 5 TABLET ORAL at 08:04

## 2024-04-23 RX ADMIN — WATER 1000 MG: 1 INJECTION INTRAMUSCULAR; INTRAVENOUS; SUBCUTANEOUS at 23:39

## 2024-04-23 RX ADMIN — HEPARIN SODIUM 5000 UNITS: 5000 INJECTION INTRAVENOUS; SUBCUTANEOUS at 15:58

## 2024-04-23 RX ADMIN — HYDRALAZINE HYDROCHLORIDE 10 MG: 20 INJECTION INTRAMUSCULAR; INTRAVENOUS at 23:49

## 2024-04-23 RX ADMIN — SODIUM CHLORIDE, PRESERVATIVE FREE 10 ML: 5 INJECTION INTRAVENOUS at 20:54

## 2024-04-23 RX ADMIN — IPRATROPIUM BROMIDE AND ALBUTEROL SULFATE 1 DOSE: .5; 2.5 SOLUTION RESPIRATORY (INHALATION) at 10:35

## 2024-04-23 RX ADMIN — ARFORMOTEROL TARTRATE 15 MCG: 15 SOLUTION RESPIRATORY (INHALATION) at 10:35

## 2024-04-23 RX ADMIN — SODIUM CHLORIDE, PRESERVATIVE FREE 10 ML: 5 INJECTION INTRAVENOUS at 08:04

## 2024-04-23 RX ADMIN — HEPARIN SODIUM 5000 UNITS: 5000 INJECTION INTRAVENOUS; SUBCUTANEOUS at 23:39

## 2024-04-23 RX ADMIN — NALOXEGOL OXALATE 25 MG: 25 TABLET, FILM COATED ORAL at 06:24

## 2024-04-23 RX ADMIN — IPRATROPIUM BROMIDE AND ALBUTEROL SULFATE 1 DOSE: .5; 2.5 SOLUTION RESPIRATORY (INHALATION) at 19:09

## 2024-04-23 RX ADMIN — SODIUM CHLORIDE, PRESERVATIVE FREE 40 MG: 5 INJECTION INTRAVENOUS at 08:04

## 2024-04-23 RX ADMIN — WATER 40 MG: 1 INJECTION INTRAMUSCULAR; INTRAVENOUS; SUBCUTANEOUS at 20:53

## 2024-04-23 RX ADMIN — Medication 3 MG: at 21:01

## 2024-04-23 RX ADMIN — POLYETHYLENE GLYCOL 3350 17 G: 17 POWDER, FOR SOLUTION ORAL at 02:59

## 2024-04-23 RX ADMIN — CARVEDILOL 25 MG: 25 TABLET, FILM COATED ORAL at 08:51

## 2024-04-23 RX ADMIN — HEPARIN SODIUM 5000 UNITS: 5000 INJECTION INTRAVENOUS; SUBCUTANEOUS at 06:24

## 2024-04-23 RX ADMIN — ATORVASTATIN CALCIUM 40 MG: 40 TABLET, FILM COATED ORAL at 08:04

## 2024-04-23 RX ADMIN — HYDRALAZINE HYDROCHLORIDE 10 MG: 20 INJECTION INTRAMUSCULAR; INTRAVENOUS at 07:44

## 2024-04-23 RX ADMIN — WATER 40 MG: 1 INJECTION INTRAMUSCULAR; INTRAVENOUS; SUBCUTANEOUS at 08:04

## 2024-04-23 RX ADMIN — ARFORMOTEROL TARTRATE 15 MCG: 15 SOLUTION RESPIRATORY (INHALATION) at 19:09

## 2024-04-23 RX ADMIN — ASPIRIN 81 MG: 81 TABLET, CHEWABLE ORAL at 08:04

## 2024-04-23 RX ADMIN — SODIUM PHOSPHATE, MONOBASIC, MONOHYDRATE AND SODIUM PHOSPHATE, DIBASIC, ANHYDROUS 20 MMOL: 276; 142 INJECTION, SOLUTION INTRAVENOUS at 13:52

## 2024-04-23 ASSESSMENT — PAIN SCALES - GENERAL
PAINLEVEL_OUTOF10: 0
PAINLEVEL_OUTOF10: 0

## 2024-04-23 NOTE — PLAN OF CARE

## 2024-04-24 VITALS
TEMPERATURE: 98.3 F | HEIGHT: 70 IN | WEIGHT: 224.4 LBS | HEART RATE: 100 BPM | SYSTOLIC BLOOD PRESSURE: 149 MMHG | BODY MASS INDEX: 32.13 KG/M2 | RESPIRATION RATE: 16 BRPM | OXYGEN SATURATION: 96 % | DIASTOLIC BLOOD PRESSURE: 85 MMHG

## 2024-04-24 LAB
ALBUMIN SERPL-MCNC: 3.7 G/DL (ref 3.5–5.2)
ALP SERPL-CCNC: 53 U/L (ref 40–129)
ALT SERPL-CCNC: 12 U/L (ref 0–40)
ANION GAP SERPL CALCULATED.3IONS-SCNC: 8 MMOL/L (ref 7–16)
AST SERPL-CCNC: 20 U/L (ref 0–39)
BILIRUB SERPL-MCNC: 0.5 MG/DL (ref 0–1.2)
BUN SERPL-MCNC: 26 MG/DL (ref 6–23)
CALCIUM SERPL-MCNC: 9.2 MG/DL (ref 8.6–10.2)
CHLORIDE SERPL-SCNC: 102 MMOL/L (ref 98–107)
CHP ED QC CHECK: NORMAL
CO2 SERPL-SCNC: 31 MMOL/L (ref 22–29)
CREAT SERPL-MCNC: 1.1 MG/DL (ref 0.7–1.2)
ERYTHROCYTE [DISTWIDTH] IN BLOOD BY AUTOMATED COUNT: 16.2 % (ref 11.5–15)
GFR SERPL CREATININE-BSD FRML MDRD: 69 ML/MIN/1.73M2
GLUCOSE BLD-MCNC: 121 MG/DL (ref 74–99)
GLUCOSE BLD-MCNC: 133 MG/DL
GLUCOSE BLD-MCNC: 182 MG/DL (ref 74–99)
GLUCOSE SERPL-MCNC: 150 MG/DL (ref 74–99)
HCT VFR BLD AUTO: 33.4 % (ref 37–54)
HGB BLD-MCNC: 10.3 G/DL (ref 12.5–16.5)
MAGNESIUM SERPL-MCNC: 1.7 MG/DL (ref 1.6–2.6)
MCH RBC QN AUTO: 27.5 PG (ref 26–35)
MCHC RBC AUTO-ENTMCNC: 30.8 G/DL (ref 32–34.5)
MCV RBC AUTO: 89.1 FL (ref 80–99.9)
MICROORGANISM SPEC CULT: NORMAL
MICROORGANISM SPEC CULT: NORMAL
PHOSPHATE SERPL-MCNC: 2.6 MG/DL (ref 2.5–4.5)
PLATELET, FLUORESCENCE: 127 K/UL (ref 130–450)
PMV BLD AUTO: 12.9 FL (ref 7–12)
POTASSIUM SERPL-SCNC: 4.5 MMOL/L (ref 3.5–5)
PROT SERPL-MCNC: 6.5 G/DL (ref 6.4–8.3)
RBC # BLD AUTO: 3.75 M/UL (ref 3.8–5.8)
SERVICE CMNT-IMP: NORMAL
SERVICE CMNT-IMP: NORMAL
SODIUM SERPL-SCNC: 141 MMOL/L (ref 132–146)
SPECIMEN DESCRIPTION: NORMAL
SPECIMEN DESCRIPTION: NORMAL
WBC OTHER # BLD: 6.4 K/UL (ref 4.5–11.5)

## 2024-04-24 PROCEDURE — 2580000003 HC RX 258: Performed by: NURSE PRACTITIONER

## 2024-04-24 PROCEDURE — C9113 INJ PANTOPRAZOLE SODIUM, VIA: HCPCS | Performed by: NURSE PRACTITIONER

## 2024-04-24 PROCEDURE — 84100 ASSAY OF PHOSPHORUS: CPT

## 2024-04-24 PROCEDURE — 83735 ASSAY OF MAGNESIUM: CPT

## 2024-04-24 PROCEDURE — 94669 MECHANICAL CHEST WALL OSCILL: CPT

## 2024-04-24 PROCEDURE — 6370000000 HC RX 637 (ALT 250 FOR IP): Performed by: NURSE PRACTITIONER

## 2024-04-24 PROCEDURE — 6370000000 HC RX 637 (ALT 250 FOR IP): Performed by: HOSPITALIST

## 2024-04-24 PROCEDURE — 6360000002 HC RX W HCPCS: Performed by: NURSE PRACTITIONER

## 2024-04-24 PROCEDURE — 2700000000 HC OXYGEN THERAPY PER DAY

## 2024-04-24 PROCEDURE — 99233 SBSQ HOSP IP/OBS HIGH 50: CPT | Performed by: INTERNAL MEDICINE

## 2024-04-24 PROCEDURE — 6370000000 HC RX 637 (ALT 250 FOR IP)

## 2024-04-24 PROCEDURE — 82962 GLUCOSE BLOOD TEST: CPT

## 2024-04-24 PROCEDURE — 94660 CPAP INITIATION&MGMT: CPT

## 2024-04-24 PROCEDURE — 80053 COMPREHEN METABOLIC PANEL: CPT

## 2024-04-24 PROCEDURE — 6360000002 HC RX W HCPCS

## 2024-04-24 PROCEDURE — 94640 AIRWAY INHALATION TREATMENT: CPT

## 2024-04-24 PROCEDURE — 36415 COLL VENOUS BLD VENIPUNCTURE: CPT

## 2024-04-24 PROCEDURE — 85027 COMPLETE CBC AUTOMATED: CPT

## 2024-04-24 RX ORDER — PANTOPRAZOLE SODIUM 40 MG/1
40 TABLET, DELAYED RELEASE ORAL
Qty: 30 TABLET | Refills: 0 | Status: SHIPPED | OUTPATIENT
Start: 2024-04-24 | End: 2024-04-26 | Stop reason: SDUPTHER

## 2024-04-24 RX ORDER — AMLODIPINE BESYLATE 5 MG/1
5 TABLET ORAL DAILY
Qty: 30 TABLET | Refills: 3 | Status: SHIPPED | OUTPATIENT
Start: 2024-04-25

## 2024-04-24 RX ORDER — PREDNISONE 20 MG/1
40 TABLET ORAL DAILY
Qty: 6 TABLET | Refills: 0 | Status: SHIPPED | OUTPATIENT
Start: 2024-04-24 | End: 2024-04-27

## 2024-04-24 RX ORDER — LIDOCAINE 4 G/G
1 PATCH TOPICAL DAILY
Qty: 30 EACH | Refills: 0 | Status: SHIPPED | OUTPATIENT
Start: 2024-04-25 | End: 2024-05-25

## 2024-04-24 RX ADMIN — ARFORMOTEROL TARTRATE 15 MCG: 15 SOLUTION RESPIRATORY (INHALATION) at 08:06

## 2024-04-24 RX ADMIN — IPRATROPIUM BROMIDE AND ALBUTEROL SULFATE 1 DOSE: .5; 2.5 SOLUTION RESPIRATORY (INHALATION) at 08:06

## 2024-04-24 RX ADMIN — HEPARIN SODIUM 5000 UNITS: 5000 INJECTION INTRAVENOUS; SUBCUTANEOUS at 06:33

## 2024-04-24 RX ADMIN — IPRATROPIUM BROMIDE AND ALBUTEROL SULFATE 1 DOSE: .5; 2.5 SOLUTION RESPIRATORY (INHALATION) at 11:02

## 2024-04-24 RX ADMIN — IPRATROPIUM BROMIDE AND ALBUTEROL SULFATE 1 DOSE: .5; 2.5 SOLUTION RESPIRATORY (INHALATION) at 05:00

## 2024-04-24 RX ADMIN — CARVEDILOL 25 MG: 25 TABLET, FILM COATED ORAL at 09:25

## 2024-04-24 RX ADMIN — IPRATROPIUM BROMIDE AND ALBUTEROL SULFATE 1 DOSE: .5; 2.5 SOLUTION RESPIRATORY (INHALATION) at 01:30

## 2024-04-24 RX ADMIN — AMLODIPINE BESYLATE 5 MG: 5 TABLET ORAL at 09:25

## 2024-04-24 RX ADMIN — SODIUM CHLORIDE, PRESERVATIVE FREE 40 MG: 5 INJECTION INTRAVENOUS at 09:26

## 2024-04-24 RX ADMIN — ASPIRIN 81 MG: 81 TABLET, CHEWABLE ORAL at 09:25

## 2024-04-24 RX ADMIN — ATORVASTATIN CALCIUM 40 MG: 40 TABLET, FILM COATED ORAL at 09:25

## 2024-04-24 RX ADMIN — FENTANYL CITRATE 25 MCG: 50 INJECTION INTRAMUSCULAR; INTRAVENOUS at 09:27

## 2024-04-24 RX ADMIN — NALOXEGOL OXALATE 25 MG: 25 TABLET, FILM COATED ORAL at 06:14

## 2024-04-24 RX ADMIN — BUDESONIDE 500 MCG: 0.5 INHALANT RESPIRATORY (INHALATION) at 08:06

## 2024-04-24 RX ADMIN — WATER 40 MG: 1 INJECTION INTRAMUSCULAR; INTRAVENOUS; SUBCUTANEOUS at 09:26

## 2024-04-24 RX ADMIN — HYDRALAZINE HYDROCHLORIDE 10 MG: 20 INJECTION INTRAMUSCULAR; INTRAVENOUS at 06:28

## 2024-04-24 RX ADMIN — CARVEDILOL 25 MG: 25 TABLET, FILM COATED ORAL at 17:05

## 2024-04-24 ASSESSMENT — PAIN SCALES - GENERAL
PAINLEVEL_OUTOF10: 7
PAINLEVEL_OUTOF10: 2

## 2024-04-24 ASSESSMENT — PAIN DESCRIPTION - LOCATION: LOCATION: BACK

## 2024-04-24 NOTE — CARE COORDINATION
Per attending pt to discharge today. Pt qualifies for home O2. Referral to [facility]. Per pt his family will transport home.Tana Penaloza, MSW, LSW

## 2024-04-24 NOTE — PROGRESS NOTES
HOSPITALIST PROGRESS NOTE    Patient's name: Osiel Mcnamara  : 1940  Admission date: 2024  Date of service: 2024   Primary care physician: Ck Ventura DO    Assessment   Osiel Mcnamara is a 84 y.o. male patient of Ck Ventura DO with history of hypertension, hyperlipidemia, CHFrEF, s/p TAVR in , pacemaker, multinodular thyroid goiter, COPD, type 2 diabetes mellitus presented to the ER on  from drug rehab center after having a fall.  Patient was at rehab facility since 4/15 for Percocet abuse treatment.  Patient also complained of having shortness of breath and diarrhea while in the ER.  While in the ER patient was found to be hypoxic, ABG is concerning for hypercapnic and hypoxic respiratory failure.  Patient was placed on AVAPS and admitted to ICU.  Oxygen requirements going down    Acute COPD exacerbation  Acute hypoxic and hypercapnic respiratory failure 2/2 above  Continue steroids and breathing treatment  Continue Flagyl and azithromycin  AVAPS/BiPAP as needed  Was currently on 6 L nasal cannula when seen.    URSZULA  Mild hyperkalemia 2/2 above  Baseline creatinine is around 1.1, creatinine elevated at 2.8 on admission now down to 1.3  Monitor potassium levels, treat as indicated  Avoid nephrotoxin, monitor renal function.    CAD  CHFrEF  Aortic stenosis s/p TAVR  PPM  Continue aspirin and statin    GERD  Continue Protonix    Follow labs   DVT prophylaxis Heparin  Please see orders for further management and care.  Discharge plan: Continue management as above.  Titrate down oxygen as tolerated.    Subjective  Osiel was seen and examined at bedside.  Patient is alert and oriented, states that his breathing feels improved.  Was on AVAPS when seen.  Remains in ICU.      Review of Systems  All systems reviewed and negative except mentioned above.       Objective  Most Recent Recorded Vitals  BP (!) 146/87   Pulse 94   Temp 98.4 °F (36.9 °C) (Temporal)   Resp 24   Ht 1.778 m 
      HOSPITALIST PROGRESS NOTE    Patient's name: Osiel Mcnamara  : 1940  Admission date: 2024  Date of service: 2024   Primary care physician: Ck Ventura DO    Assessment   Osiel Mcnamara is a 84 y.o. male patient of Ck Ventura DO with history of hypertension, hyperlipidemia, CHFrEF, s/p TAVR in , pacemaker, multinodular thyroid goiter, COPD, type 2 diabetes mellitus presented to the ER on  from drug rehab center after having a fall.  Patient was at rehab facility since 4/15 for Percocet abuse treatment.  Patient also complained of having shortness of breath and diarrhea while in the ER.  While in the ER patient was found to be hypoxic, ABG is concerning for hypercapnic and hypoxic respiratory failure.  Patient was placed on AVAPS and admitted to ICU.  Oxygen requirements going down    Acute COPD exacerbation  Acute hypoxic and hypercapnic respiratory failure 2/2 above  Continue steroids and breathing treatment  Continue Flagyl and azithromycin  AVAPS/BiPAP as needed  Was currently on 6 L nasal cannula when seen.    URSZULA  Mild hyperkalemia 2/2 above  Baseline creatinine is around 1.1, creatinine elevated at 2.8 on admission now down to 1.3  Monitor potassium levels, treat as indicated  Avoid nephrotoxin, monitor renal function.    CAD  CHFrEF  Aortic stenosis s/p TAVR  PPM  Continue aspirin and statin    GERD  Continue Protonix    Follow labs   DVT prophylaxis Heparin  Please see orders for further management and care.  Discharge plan: Continue management as above.  Titrate down oxygen as tolerated.    Subjective  Osiel was seen and examined at bedside.  Patient is alert and oriented, states that his breathing feels improved.  Was on AVAPS when seen.  Remains in ICU.      Review of Systems  All systems reviewed and negative except mentioned above.       Objective  Most Recent Recorded Vitals  BP (!) 156/81   Pulse 89   Temp 98 °F (36.7 °C)   Resp 21   Ht 1.778 m (5' 10\")   
   04/20/24 0010   NIV Type   NIV Started/Stopped On   Equipment Type v60   Mode AVAPS   Mask Type Full face mask   Mask Size Large   Assessment   Pulse 74   Respirations 21   SpO2 93 %   Level of Consciousness 0   Comfort Level Fair   Using Accessory Muscles No   Skin Assessment Clean, dry, & intact   Skin Protection for O2 Device Yes   Orientation Middle   Location Nose   Intervention(s) Skin Barrier   Breath Sounds   Breath Sounds Bilateral Diminished;Expiratory wheezing   Right Upper Lobe Diminished;Expiratory wheezes   Right Middle Lobe Diminished;Expiratory wheezes   Right Lower Lobe Diminished;Expiratory wheezes   Left Upper Lobe Diminished;Expiratory wheezes   Left Lower Lobe Diminished;Expiratory wheezes   Settings/Measurements   PIP Observed 31 cm H20   CPAP/EPAP 5 cmH2O   IPAP Min 27 cmH2O   IPAP Max   (35)   Vt (Set, mL) 550 mL   Vt (Measured) 564 mL   Rate Ordered 20   FiO2  40 %   I Time/ I Time % 0.8 s   Minute Volume (L/min) 8 Liters   Patient's Home Machine No   Alarm Settings   Alarms On Y   Apnea (secs) 20 secs     Date: 4/20/2024    Time: 12:21 AM    Patient Placed On BIPAP/CPAP/ Non-Invasive Ventilation?  No    If no must comment.  Facial area red/color change? No           If YES are Blister/Lesion present?No   If yes must notify nursing staff  BIPAP/CPAP skin barrier?  Yes    Skin barrier type:mepilexlite       Comments:Red disconnect line in place         Lor Pleitez RCP  
  [facility]  Department of Internal Medicine  Division of Pulmonary, Critical Care and Sleep Medicine  Consult Note      Chu Carey, APRN-CNP        Subjective: We are following Mr. Oisel Mcnamara for acute hypoxic respiratory failure 2/2 fractured ribs and pneumonia. He reports some continued shortness of breath, persistent pain to his left ribcage related to his recent fall.  Currently he is on room air.  Pulse oximetry today showed that he required 2 L nasal cannula with exertion.  He does not currently have a pulmonologist who he regularly sees.      OBJECTIVE:     PHYSICAL EXAM:   VITALS:   Vitals:    04/24/24 0806 04/24/24 0830 04/24/24 0957 04/24/24 1102   BP:  (!) 154/90     Pulse:  (!) 110     Resp:  16 16    Temp:  98.2 °F (36.8 °C)     TempSrc:  Temporal     SpO2: 95%   95%   Weight:       Height:            Intake/Output Summary (Last 24 hours) at 4/24/2024 1319  Last data filed at 4/24/2024 1116  Gross per 24 hour   Intake 240 ml   Output 500 ml   Net -260 ml        CONSTITUTIONAL:    A&O x 3, NAD  SKIN:     No rash, No suspicious lesions, No skin discoloration  HEENT:     EOMI, MMM, No thrush  NECK:     No bruits, No JVP appreciated  CV:      Sinus,  No murmur, No rubs, No gallops  PULMONARY:    Faint expiratory Wheezing, No Rales, No Rhonchi      No noted egophony  ABDOMEN:     Soft, non-tender. BS normal. No R/R/G  EXT:    No deformities .  No clubbing.       No lower extremity edema, No venous stasis  PULSE:    Appears equal and palpable.  PSYCHIATRIC:   Seems appropriate, No acute psychosis  MS:    No fractures, No gross weakness  NEUROLOGIC:   The clinical assessment is non-focal     DATA: IMAGING & TESTING:     LABORATORY TESTS:    CBC with Differential:    Lab Results   Component Value Date/Time    WBC 6.4 04/24/2024 05:26 AM    RBC 
  OCCUPATIONAL THERAPY INITIAL EVALUATION    [facility]  [address], [place]       Date:2024                                                               Patient Name: Osiel Mcnamara  MRN: [id number]  : 1940  Room: [address] [place] [address]    Evaluating OT: Carolin Lin, ANTONIOD,  OTR/L; [id number]    Referring Provider: Jaqueline Garcia APRN - CNP    Specific Provider Orders/Date: OT eval and treat (24)       Diagnosis: Acute respiratory failure with hypoxia and hypercapnia (HCC) [J96.01, J96.02]  Acute hypoxic on chronic hypercapnic respiratory failure (HCC) [J96.01, J96.12]     Reason for admission: Pt admitted with ARF after rolling out of bed.    Surgery/Procedures: None this admission     Pertinent Medical History:    Past Medical History:   Diagnosis Date    Aortic stenosis, moderate 10/2018    Arthritis     Diabetes mellitus (HCC)     Enlarged thyroid 2023    H/O cardiovascular stress test 10/19/2018    lexiscan    Hyperlipidemia     Hypertension     Lymph node enlargement     seeing dr yates  coughing mucous    Partial small bowel obstruction (HCC) 2017        *Precautions:  Fall Risk, L flank pain r/o L rib fx, O2, alarms+    Assessment of current deficits   [x] Functional mobility  [x]ADLs  [x] Strength               []Cognition   [x] Functional transfers   [x] IADLs         [x] Safety Awareness   [x]Endurance   [] Fine Coordination        [x] ROM     [] Vision/perception   []Sensation    []Gross Motor Coordination [x] Balance   [] Delirium                  []Motor Control     [] Communication    OT PLAN OF CARE   OT POC based on physician orders, patient diagnosis and results of clinical assessment.       Frequency/Duration: 1-3 days/wk for 1-2 weeks PRN    Specific OT Treatment Interventions to include:   * Instruction/training on adapted ADL techniques and AE recommendations to increase functional independence within 
  Radiology Procedure Waiver   Name: Osiel Mcnamara  : 1940  MRN: [id number]    Date:  24    Time: 3:55 PM EDT    Benefits of immediately proceeding with Radiology exam(s) without pre-testing outweigh the risks or are not indicated as specified below and therefore the following is/are being waived:    [] Pregnancy test   [] Patients LMP on-time and regular.   [] Patient had Tubal Ligation or has other Contraception Device.   [] Patient  is Menopausal or Premenarcheal.    [] Patient had Full or Partial Hysterectomy.    [] Protocol for Iodine allergy    [] MRI Questionnaire     [x] BUN/Creatinine   [] Patient age w/no hx of renal dysfunction.   [] Patient on Dialysis.   [] Recent Normal Labs.  Electronically signed by Jesus Cuenca MD on 24 at 3:55 PM EDT            
4 Eyes Skin Assessment     NAME:  Osiel Mcnamara  YOB: 1940  MEDICAL RECORD NUMBER:  [id number]    The patient is being assessed for  Transfer to New Unit    I agree that at least one RN has performed a thorough Head to Toe Skin Assessment on the patient. ALL assessment sites listed below have been assessed.      Areas assessed by both nurses:    Head, Face, Ears, Shoulders, Back, Chest, Arms, Elbows, Hands, Sacrum. Buttock, Coccyx, Ischium, and Legs. Feet and Heels        Does the Patient have a Wound? No noted wound(s)       Bran Prevention initiated by RN: Yes  Wound Care Orders initiated by RN: No    Pressure Injury (Stage 3,4, Unstageable, DTI, NWPT, and Complex wounds) if present, place Wound referral order by RN under : No    New Ostomies, if present place, Ostomy referral order under : No     Nurse 1 eSignature: Electronically signed by Radha Cartagena RN on 4/23/24 at 3:49 PM EDT    **SHARE this note so that the co-signing nurse can place an eSignature**    Nurse 2 eSignature: {Esignature:[id number]}   
4 Eyes Skin Assessment     NAME:  Osiel Mcnamara  YOB: 1940  MEDICAL RECORD NUMBER:  [id number]    The patient is being assessed for  Admission    I agree that at least one RN has performed a thorough Head to Toe Skin Assessment on the patient. ALL assessment sites listed below have been assessed.      Areas assessed by both nurses:    Head, Face, Ears, Shoulders, Back, Chest, Arms, Elbows, Hands, Sacrum. Buttock, Coccyx, Ischium, Legs. Feet and Heels, and Under Medical Devices         Does the Patient have a Wound? No noted wound(s)  Blanchable red areas bilateral knees  Scabbed area right lower leg  Bran Prevention initiated by RN: Yes  Wound Care Orders initiated by RN: No    Pressure Injury (Stage 3,4, Unstageable, DTI, NWPT, and Complex wounds) if present, place Wound referral order by RN under : No    New Ostomies, if present place, Ostomy referral order under : No     Nurse 1 eSignature: Electronically signed by Amaya Shafer RN on 4/19/24 at 9:40 PM EDT    **SHARE this note so that the co-signing nurse can place an eSignature**    Nurse 2 eSignature: Electronically signed by Nathan Yi RN on 4/20/24 at 7:31 AM EDT  
Discharge instructions given to patient and wife. All questions and concerns addressed. O2 tank brought up to bedside. Patient discharged to home with family.  
PULMONARY  CRITICAL CARE   SERVICE DAILY PROGRESS  NOTE     4/23/2024   Hospital  LOS: 4 days      Admit date- 4/19/2024       Initially admitted for -   Fall (Patient rolled out of bed on accident. States his lower back hurts.  Patient does not normally wear O2 @ 6L N/C , SOB when EMS picked him up.) and Shortness of Breath       Subjective:      Pain from L rib fractures better this am   No new complains       Review of Systems        General- No headaches , dizzinesss, syncope   Pulmonary - No chest pain , hemoptysis   Cardiovascular- No chest pain,   GI- No abd pain ,No difficulty swallowing, diarrhea , no vomiting   Musculoskeletal- L sided rib pain from fractures ++   Genitourinary- No burning urination, no dysuria, no incontinence  Neuro- NO syncope , AMS  Or weakness , No tingling , no numbness.   Skin- No rashes , no cyanosis.   Psychiatric/Behavioral: Negative for confusion, hallucinations and sleep disturbance. The patient is not nervous/anxious.                     Allergies:No Known Allergies  Home Meds:  Prior to Admission medications    Medication Sig Start Date End Date Taking? Authorizing Provider   buprenorphine-naloxone (SUBOXONE) 2-0.5 MG SUBL taper as directed days 1-3 take 2 tablets twice daily. day 4 2 tablets at 8am and 1 tablet at 5pm. day 5 1 tablet twice daily. Day 6 1 tablet at 8am. 4/16/24  Yes Provider, MD Devante   doxazosin (CARDURA) 2 MG tablet Take 1 tablet by mouth nightly 2/19/24   Ck Ventura DO   furosemide (LASIX) 20 MG tablet Take 1 tablet by mouth daily 2/19/24   Ck Ventura DO   HYDROcodone-acetaminophen (NORCO) 5-325 MG per tablet Take 1 tablet by mouth every 8 hours as needed for Pain.    ProviderDevante MD   naloxegol (MOVANTIK) 25 MG TABS tablet Take 1 tablet by mouth every morning (before breakfast) 2/12/24   Liudmila Steward APRN - CNP   carvedilol (COREG) 25 MG tablet Take 1 tablet by mouth 2 times daily (with meals) 2/12/24   Liudmila Steward APRN - 
PULSE OX ON ROOM AIR SITTING 92%  PULSE OX ON ROOM AIR AMBULATING 87%  PULSE OX ON  2 LITERS SITTING RECOVERY 93%  PULSE OX ON ___ LITERS AMBULATING RECOVERY ___%    IF Pt REQUIRES MORE THAN 4L O2 THEN NEED THE FOLLOWING STATEMENT ADDED TO THE ABOVE:    PULSE OX ON 4L SITTING RECOVERY AT _________%     
Patient admitted to MICU with the following belongings:  Shorts, socks, shirt, undergarment, slippers, blanket and gold colored wedding band. The following belongings -none- admitted with the patient, None, were sent home with the patient's family.   
Physical Therapy    Physical Therapy Initial Assessment     Name: Osiel Mcnamara  : 1940  MRN: [id number]      Date of Service: 2024    Evaluating PT:  Bijan Ugarte PT, DPT  [id number]     Room #:  4408/4408-A  Diagnosis:  Acute respiratory failure with hypoxia and hypercapnia (HCC) [J96.01, J96.02]  Acute hypoxic on chronic hypercapnic respiratory failure (HCC) [J96.01, J96.12]  PMHx/PSHx:   has a past medical history of Aortic stenosis, moderate, Arthritis, Diabetes mellitus (HCC), Enlarged thyroid, H/O cardiovascular stress test, Hyperlipidemia, Hypertension, Lymph node enlargement, and Partial small bowel obstruction (HCC).   Procedure/Surgery:  None  Precautions:  Falls, O2, L rib fx R/O  Equipment Needs:  TBD    SUBJECTIVE:    Pt lives with his wife in a 1 floor apartment with no stairs to enter.  Bedroom and bathroom are on the 1st level.  Pt ambulated with no AD, independent PTA.    OBJECTIVE:   Initial Evaluation  Date: 24 Treatment Short Term/ Long Term   Goals   AM-PAC 6 Clicks      Was pt agreeable to Eval/treatment? Yes      Does pt have pain? 8/10 L rib pain     Bed Mobility  Rolling: NT  Supine to sit: NT  Sit to supine: NT  Scooting: NT  Rolling: Independent   Supine to sit: Independent   Sit to supine: Independent   Scooting: Independent    Transfers Sit to stand: SBA  Stand to sit: SBA  Stand pivot: SBA  Sit to stand: Independent   Stand to sit: Independent   Stand pivot: Independent    Ambulation    250 feet with no AD SBA  >400 feet with no AD Independent    Stair negotiation: ascended and descended  NT  >4 steps with 1 rail Modified Independent     ROM BUE:  Per OT eval   BLE:  WFL     Strength BUE:  Per OT eval   BLE:  5/5     Balance Sitting EOB:  SBA  Dynamic Standing:  SBA  Sitting EOB:  Independent   Dynamic Standing:  Independent      Pt is A & O x 4  RASS:  0  CAM-ICU:  -  Sensation:  Pt denies numbness and tingling to extremities  Edema:  
Pulse ox sitting on RA 92%  Pulse ox ambulating on RA 87%  Pulse ox recovery ambulating on 2L 92%  Pulse ox recovery sitting on 2L 94%  
(ERGOCALCIFEROL) 1.25 MG (66491 UT) CAPS capsule Take 1 capsule by mouth once a week 1/10/20   Osiel Montes De Oca DO   metFORMIN (GLUCOPHAGE) 500 MG tablet Take 1 tablet by mouth 2 times daily (with meals)  Patient taking differently: Take 1 tablet by mouth daily (with breakfast) 10/15/18   Osiel Montes De Oca DO   VIAGRA 100 MG tablet Take 1 tablet by mouth as needed for Erectile Dysfunction 6/26/18   Osiel Montes De Oca DO     Scheduled Meds:   methylPREDNISolone  40 mg IntraVENous Q12H    lidocaine  1 patch TransDERmal Daily    sodium phosphate IVPB (PERIPHERAL line)  15 mmol IntraVENous Once    sodium chloride flush  5-40 mL IntraVENous 2 times per day    aspirin  81 mg Oral Daily    atorvastatin  40 mg Oral Daily    naloxegol  25 mg Oral QAM AC    insulin lispro  0-4 Units SubCUTAneous TID WC    insulin lispro  0-4 Units SubCUTAneous Nightly    azithromycin  500 mg IntraVENous Q24H    cefTRIAXone (ROCEPHIN) IV  1,000 mg IntraVENous Q24H    heparin (porcine)  5,000 Units SubCUTAneous Q8H    ipratropium 0.5 mg-albuterol 2.5 mg  1 Dose Inhalation Q4H RT    arformoterol tartrate  15 mcg Nebulization BID RT    budesonide  0.5 mg Nebulization BID RT    pantoprazole (PROTONIX) 40 mg in sodium chloride (PF) 0.9 % 10 mL injection  40 mg IntraVENous Daily     Continuous Infusions:   dextrose      sodium chloride         Vitals:  BP (!) 146/87   Pulse 85   Temp 97.6 °F (36.4 °C) (Temporal)   Resp 15   Ht 1.778 m (5' 10\")   Wt 101.8 kg (224 lb 6.3 oz)   SpO2 96%   BMI 32.20 kg/m²   Tmax:  CVP:        Intake/Output Summary (Last 24 hours) at 4/22/2024 1148  Last data filed at 4/22/2024 1032  Gross per 24 hour   Intake 423.4 ml   Output 650 ml   Net -226.6 ml       Date 04/21/24 0701 - 04/22/24 0700 04/22/24 0701 - 04/23/24 0700   Shift 6949-7709 0420-7805 24 Hour Total 0236-0583 9817-9898 24 Hour Total   INTAKE   P.O. 450  450      I.V. 173.4  173.4      Shift Total 623.4  623.4      OUTPUT   Urine 200 125 325 325  325 
IMAGING & TESTING:     LABORATORY TESTS:    CBC with Differential:    Lab Results   Component Value Date/Time    WBC 9.0 04/23/2024 04:34 AM    RBC 3.91 04/23/2024 04:34 AM    HGB 10.5 04/23/2024 04:34 AM    HCT 35.5 04/23/2024 04:34 AM     04/23/2024 04:34 AM    MCV 90.8 04/23/2024 04:34 AM    MCH 26.9 04/23/2024 04:34 AM    MCHC 29.6 04/23/2024 04:34 AM    RDW 16.3 04/23/2024 04:34 AM    NRBC 1.7 08/19/2022 10:37 AM    SEGSPCT 57 12/09/2013 12:50 PM    LYMPHOPCT 4 04/22/2024 03:55 AM    MONOPCT 2 04/22/2024 03:55 AM    BASOPCT 0 04/22/2024 03:55 AM    MONOSABS 0.21 04/22/2024 03:55 AM    LYMPHSABS 0.33 04/22/2024 03:55 AM    EOSABS 0.00 04/22/2024 03:55 AM    BASOSABS 0.00 04/22/2024 03:55 AM     CMP:    Lab Results   Component Value Date/Time     04/23/2024 04:34 AM    K 5.0 04/23/2024 04:34 AM    K 3.6 08/24/2022 04:32 AM     04/23/2024 04:34 AM    CO2 29 04/23/2024 04:34 AM    BUN 29 04/23/2024 04:34 AM    CREATININE 1.2 04/23/2024 04:34 AM    GFRAA >60 10/07/2022 09:40 AM    LABGLOM 57 04/23/2024 04:34 AM    GLUCOSE 182 04/23/2024 04:34 AM    GLUCOSE 106 05/01/2012 08:23 AM    PROT 6.6 04/23/2024 04:34 AM    CALCIUM 9.1 04/23/2024 04:34 AM    BILITOT 0.3 04/23/2024 04:34 AM    ALKPHOS 55 04/23/2024 04:34 AM    AST 23 04/23/2024 04:34 AM    ALT 12 04/23/2024 04:34 AM     Magnesium:    Lab Results   Component Value Date/Time    MG 1.8 04/23/2024 04:34 AM     Phosphorus:    Lab Results   Component Value Date/Time    PHOS 2.1 04/23/2024 04:34 AM     ABG:    Lab Results   Component Value Date/Time    PH 7.311 04/22/2024 04:17 AM    PCO2 50.0 04/22/2024 04:17 AM    PO2 95.6 04/22/2024 04:17 AM    HCO3 24.7 04/22/2024 04:17 AM    BE -1.9 04/22/2024 04:17 AM    O2SAT 96.7 04/22/2024 04:17 AM        PRO-BNP:   Lab Results   Component Value Date    PROBNP 688 (H) 04/20/2024    PROBNP 1,199 (H) 04/19/2024      ABGs:   Lab Results   Component Value Date/Time    PH 7.311 04/22/2024 04:17 AM    PO2 95.6 
Standing:  SBA Sitting EOB:  NT  Dynamic Standing:  SBA Sitting EOB:  Independent   Dynamic Standing:  Independent      Pt is A & O x 4  Sensation:  Pt denies numbness and tingling to extremities  Edema:  Unremarkable    Vitals:  Blood Pressure at rest 149/89 mmHg    Heart Rate at rest 104 bpm    SPO2 at rest 98% on 4 L         Therapeutic Exercises:    STS x 2 reps     Patient education  Pt educated on PT role, safety during functional mobility    Patient response to education:   Pt verbalized understanding Pt demonstrated skill Pt requires further education in this area   Yes  Yes  Reinforce      ASSESSMENT:    Conditions Requiring Skilled Therapeutic Intervention:    []Decreased strength     []Decreased ROM  [x]Decreased functional mobility  [x]Decreased balance   [x]Decreased endurance   []Decreased posture  []Decreased sensation  []Decreased coordination   []Decreased vision  []Decreased safety awareness   [x]Increased pain       Comments:  Pt received sitting in chair and agreeable to PT treatment.  Pt cleared for participation by RN prior to session.  Vitals monitored during session.   Pt reports improved pain this session.  Pt completed STS and ambulation in hallway with fair gait speed and balance.  Transport team arrived to transfer pt to new room.  Pt positioned in manual wheelchair and left under RN and transport team care.     Treatment:  Patient practiced and was instructed in the following treatment:    Therapeutic activity - STS from chair, STS from bed, pivot transfer, ambulation in hospital room, and ambulation in hallway with close monitoring of SpO2 & HR.  Vitals monitoring and safety cues as needed.     PHYSICAL THERAPY PLAN OF CARE:  Pt is making progress towards established goals.  Continue PT POC.     Specific instructions for next treatment:  gait training, stair training     Time in  0925  Time out  0937    Total Treatment Time  12 minutes     CPT codes:  [] Low Complexity PT evaluation 
dextrose bolus 10% 250 mL  250 mL IntraVENous PRN Geoff Wells MD        glucagon injection 1 mg  1 mg SubCUTAneous PRN Geoff Wells MD        dextrose 10 % infusion   IntraVENous Continuous PRN Geoff Wells MD        buprenorphine-naloxone (SUBOXONE) 2-0.5 MG SL tablet 1 tablet  1 tablet SubLINGual Diego Marvin MD   1 tablet at 04/21/24 0935    buprenorphine-naloxone (SUBOXONE) 2-0.5 MG SL tablet 1 tablet  1 tablet SubLINGual Nightly Diego Renteria MD   1 tablet at 04/20/24 2008    sodium chloride flush 0.9 % injection 5-40 mL  5-40 mL IntraVENous 2 times per day Geoff Wells MD   10 mL at 04/21/24 0935    sodium chloride flush 0.9 % injection 5-40 mL  5-40 mL IntraVENous PRN Geoff Wells MD        0.9 % sodium chloride infusion   IntraVENous PRN Geoff Wells MD        aspirin chewable tablet 81 mg  81 mg Oral Daily Geoff Wells MD   81 mg at 04/21/24 0935    atorvastatin (LIPITOR) tablet 40 mg  40 mg Oral Daily Geoff Wells MD   40 mg at 04/21/24 0935    naloxegol (MOVANTIK) tablet 25 mg  25 mg Oral QAM AC Geoff Wells MD   25 mg at 04/21/24 0616    sodium chloride flush 0.9 % injection 5-40 mL  5-40 mL IntraVENous 2 times per day Geoff Wells MD   10 mL at 04/21/24 0935    sodium chloride flush 0.9 % injection 5-40 mL  5-40 mL IntraVENous PRN Geoff Wells MD        0.9 % sodium chloride infusion   IntraVENous PRN Geoff Wells MD        polyethylene glycol (GLYCOLAX) packet 17 g  17 g Oral Daily PRN Geoff Wells MD        acetaminophen (TYLENOL) tablet 650 mg  650 mg Oral Q6H PRN Geoff Wells MD        Or    acetaminophen (TYLENOL) suppository 650 mg  650 mg Rectal Q6H PRN Geoff Wells MD        magnesium sulfate 2000 mg in 50 mL IVPB premix  2,000 mg IntraVENous PRN Geoff Wells MD        insulin lispro (HUMALOG) injection vial 0-4 Units  0-4 Units SubCUTAneous TID WC Geoff Wells MD        insulin lispro (HUMALOG) injection vial 0-4 Units  0-4 Units SubCUTAneous Nightly Geoff Wells MD        azithromycin (ZITHROMAX) 
(DUONEB) nebulizer solution 1 Dose  1 Dose Inhalation Q4H RT Jaqueline Garcia APRN - CNP   1 Dose at 04/20/24 0848    arformoterol tartrate (BROVANA) nebulizer solution 15 mcg  15 mcg Nebulization BID RT Jaqueline Garcia, APRN - CNP   15 mcg at 04/20/24 0848    budesonide (PULMICORT) nebulizer suspension 500 mcg  0.5 mg Nebulization BID RT Jaqueline Garcia, APRN - CNP   500 mcg at 04/20/24 0848    pantoprazole (PROTONIX) 40 mg in sodium chloride (PF) 0.9 % 10 mL injection  40 mg IntraVENous Daily Jaqueline Garcia, APRN - CNP   40 mg at 04/20/24 0802    prochlorperazine (COMPAZINE) tablet 10 mg  10 mg Oral Q8H PRN Geoff Wells MD        Or    prochlorperazine (COMPAZINE) injection 10 mg  10 mg IntraVENous Q6H PRN Geoff Wells MD           PRN Medications  albuterol, glucose, dextrose bolus **OR** dextrose bolus, glucagon (rDNA), dextrose, sodium chloride flush, sodium chloride, sodium chloride flush, sodium chloride, polyethylene glycol, acetaminophen **OR** acetaminophen, magnesium sulfate, prochlorperazine **OR** prochlorperazine    +++++++++++++++++++++++++++++++++++++++++++++++++  Edgar Doyle MD    [place], [place]  +++++++++++++++++++++++++++++++++++++++++++++++++  NOTE: This report was transcribed using voice recognition software. Every effort was made to ensure accuracy; however, inadvertent computerized transcription errors may be present.    I can be reached through [employer].    
pelvic abnormality is identified. 2. Multiple bilateral renal cortical cysts. All of the cysts exhibit increased attenuation, however, this may not be accurate since there is streak artifact noted traversing the posterior aspects of the patient's abdomen. Regardless, the presence of complex cysts, including hemorrhagic cysts cannot be excluded. Renal ultrasound can be performed for further evaluation. 3. Sigmoid colon diverticulosis. 4. Prostatomegaly. 5. Mild bilateral gynecomastia, right slightly greater than left.     CTA PULMONARY W CONTRAST    Result Date: 4/19/2024  EXAMINATION: CTA OF THE CHEST 4/19/2024 3:25 pm TECHNIQUE: CTA of the chest was performed after the administration of intravenous contrast.  Multiplanar reformatted images are provided for review.  MIP images are provided for review. Automated exposure control, iterative reconstruction, and/or weight based adjustment of the mA/kV was utilized to reduce the radiation dose to as low as reasonably achievable. COMPARISON: 12/13/2023 HISTORY: ORDERING SYSTEM PROVIDED HISTORY: fall, hypoxia TECHNOLOGIST PROVIDED HISTORY: Reason for exam:->fall, hypoxia Additional Contrast?->1 What reading provider will be dictating this exam?->CRC FINDINGS: Pulmonary Arteries: Pulmonary arteries are adequately opacified for evaluation.  No evidence of intraluminal filling defect to suggest pulmonary embolism.  Main pulmonary artery is normal in caliber. Mediastinum: No evidence of mediastinal lymphadenopathy. The heart and pericardium demonstrate no acute abnormality.  Again seen is the TAVR.  Left-sided pacemaker leads are again seen. There is no acute abnormality of the thoracic aorta. Lungs/pleura: There is increased linear atelectasis in the posterior right middle lobe and in the posterior left lower lobe extending to the lingula. New moderate linear atelectasis of the posterior right lung base. The chest is otherwise clear. There is no sign of pneumothorax, pleural 
      Assessment and Plan of care     Diagnosis:  Acute on chronic hypoxic and hypercapnic respiratory failure  Acute metabolic encephalopathy likely due to hypercapnia  HFpEF in exacerbation  Acute COPD exacerbation  Community-acquired pneumonia  URSZULA  Rhabdomyolysis  DM  Superficial venous thrombosis of saphenous vein    Plan:    Neuro: Following commands, no focal neurodeficit  Pulmonary: Use intermittent BiPAP during the day, continues to be hypercapnic.  Use continuous BiPAP during night. BIPAP new orders placed 16/8. DuoNebs, Brovana, Pulmicort, Solu-Medrol to continue.  Ceftriaxone and azithromycin to continue for community-acquired pneumonia. Repeat ABG.  Cardiovascular: Hemodynamically stable  Abdomen/GI: Start diet.    Renal: Monitor urine output  MSK: No active issues   Skin: No active issues   Hematology: No active issues  ID: Ceftriaxone azithromycin for community-acquired pneumonia  Endocrine: Monitor BS  DVT/GI: Prophylaxis: Heparin and Protonix  Code Status: Full Code  Disposition: ICU  Total Critical care time spent  35 mins. This did not include any procedures.      During multidisciplinary team rounds Osiel Mcnamara, was seen, examined and discussed. This is confirmation that I have personally seen and examined the patient and that the key elements of the encounter were performed by me (> 85 % time).  The medications & laboratory data and imagery was discussed and adjusted where necessary. Key issues of the case were discussed among consultants.     This patient has a high probability of sudden clinically significant deterioration. I managed/supervised life or organ supporting interventions that required frequent physician assessment. I devoted my full attention to the direct care of this patient for the period of time indicated below. In addition to above, time was devoted to teaching and to any procedure.     NOTE: This report, in part or full, may have been transcribed using voice recognition

## 2024-05-13 ENCOUNTER — HOSPITAL ENCOUNTER (OUTPATIENT)
Dept: NUCLEAR MEDICINE | Age: 84
Discharge: HOME | End: 2024-05-13
Payer: MEDICARE

## 2024-05-13 DIAGNOSIS — M89.8X5 LYTIC BONE LESION OF HIP: ICD-10-CM

## 2024-05-13 PROCEDURE — A9503 TC99M MEDRONATE: HCPCS | Performed by: RADIOLOGY

## 2024-05-13 PROCEDURE — 3430000000 HC RX DIAGNOSTIC RADIOPHARMACEUTICAL: Performed by: RADIOLOGY

## 2024-05-13 PROCEDURE — 78306 BONE IMAGING WHOLE BODY: CPT | Performed by: NURSE PRACTITIONER

## 2024-05-13 RX ORDER — TC 99M MEDRONATE 20 MG/10ML
25 INJECTION, POWDER, LYOPHILIZED, FOR SOLUTION INTRAVENOUS
Status: COMPLETED | OUTPATIENT
Start: 2024-05-13 | End: 2024-05-13

## 2024-05-13 RX ADMIN — TC 99M MEDRONATE 25 MILLICURIE: 20 INJECTION, POWDER, LYOPHILIZED, FOR SOLUTION INTRAVENOUS at 08:59

## 2024-06-11 ENCOUNTER — OFFICE VISIT (OUTPATIENT)
Dept: CARDIOLOGY CLINIC | Age: 84
End: 2024-06-11
Payer: MEDICARE

## 2024-06-11 VITALS
WEIGHT: 219 LBS | HEIGHT: 70 IN | DIASTOLIC BLOOD PRESSURE: 70 MMHG | RESPIRATION RATE: 18 BRPM | SYSTOLIC BLOOD PRESSURE: 122 MMHG | HEART RATE: 87 BPM | BODY MASS INDEX: 31.35 KG/M2

## 2024-06-11 DIAGNOSIS — I50.23 ACUTE ON CHRONIC SYSTOLIC CONGESTIVE HEART FAILURE (HCC): Primary | ICD-10-CM

## 2024-06-11 DIAGNOSIS — I50.9 CONGESTIVE HEART FAILURE (HCC): ICD-10-CM

## 2024-06-11 LAB
ANION GAP SERPL CALCULATED.3IONS-SCNC: 9 MMOL/L (ref 7–16)
BUN BLDV-MCNC: 13 MG/DL (ref 6–23)
CALCIUM SERPL-MCNC: 9.3 MG/DL (ref 8.6–10.2)
CHLORIDE BLD-SCNC: 102 MMOL/L (ref 98–107)
CO2: 34 MMOL/L (ref 22–29)
CREAT SERPL-MCNC: 1.1 MG/DL (ref 0.7–1.2)
GFR, ESTIMATED: 66 ML/MIN/1.73M2
GLUCOSE BLD-MCNC: 136 MG/DL (ref 74–99)
HCT VFR BLD CALC: 40.9 % (ref 37–54)
HEMOGLOBIN: 11.5 G/DL (ref 12.5–16.5)
MCH RBC QN AUTO: 26.9 PG (ref 26–35)
MCHC RBC AUTO-ENTMCNC: 28.1 G/DL (ref 32–34.5)
MCV RBC AUTO: 95.6 FL (ref 80–99.9)
PDW BLD-RTO: 17.2 % (ref 11.5–15)
PLATELET, FLUORESCENCE: 135 K/UL (ref 130–450)
PMV BLD AUTO: 12.9 FL (ref 7–12)
POTASSIUM SERPL-SCNC: 4.6 MMOL/L (ref 3.5–5)
PRO-BNP: 956 PG/ML (ref 0–450)
RBC # BLD: 4.28 M/UL (ref 3.8–5.8)
SODIUM BLD-SCNC: 145 MMOL/L (ref 132–146)
WBC # BLD: 3.9 K/UL (ref 4.5–11.5)

## 2024-06-11 PROCEDURE — 1036F TOBACCO NON-USER: CPT | Performed by: INTERNAL MEDICINE

## 2024-06-11 PROCEDURE — 3074F SYST BP LT 130 MM HG: CPT | Performed by: INTERNAL MEDICINE

## 2024-06-11 PROCEDURE — G8417 CALC BMI ABV UP PARAM F/U: HCPCS | Performed by: INTERNAL MEDICINE

## 2024-06-11 PROCEDURE — 99214 OFFICE O/P EST MOD 30 MIN: CPT | Performed by: INTERNAL MEDICINE

## 2024-06-11 PROCEDURE — G8427 DOCREV CUR MEDS BY ELIG CLIN: HCPCS | Performed by: INTERNAL MEDICINE

## 2024-06-11 PROCEDURE — 93000 ELECTROCARDIOGRAM COMPLETE: CPT | Performed by: INTERNAL MEDICINE

## 2024-06-11 PROCEDURE — 1123F ACP DISCUSS/DSCN MKR DOCD: CPT | Performed by: INTERNAL MEDICINE

## 2024-06-11 PROCEDURE — 3078F DIAST BP <80 MM HG: CPT | Performed by: INTERNAL MEDICINE

## 2024-06-11 RX ORDER — LANOLIN ALCOHOL/MO/W.PET/CERES
1000 CREAM (GRAM) TOPICAL DAILY
COMMUNITY

## 2024-06-11 RX ORDER — DIPHENHYDRAMINE HCL 25 MG
25 CAPSULE ORAL NIGHTLY PRN
COMMUNITY

## 2024-06-11 NOTE — PROGRESS NOTES
[facility] Cardiology progress note  Dr. Braden Alexander      Reason for visit: Shortness of breath  Referring Physician: Liudmila Steward APRN - CNP     CHIEF COMPLAINT:   Chief Complaint   Patient presents with    Congestive Heart Failure    Follow-up       HISTORY OF PRESENT ILLNESS:   Patient is an 84 years old man with a nonischemic cardiomyopathy, congestive heart failure, aortic valve stenosis, hypertension, hyperlipidemia, is here for follow-up appointment.  Shortness of breath, pedal edema , denies any chest pain, no palpitations, no lightheadedness or dizziness, no syncope, no presyncopal episodes.    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)     Partial small bowel obstruction (HCC) 04/14/2017         Past Surgical History:   Procedure Laterality Date    ACHILLES TENDON SURGERY      BREAST SURGERY Left 06/05/2014    Excision of left breast mass    COLONOSCOPY  04/24/2012    FRACTURE SURGERY      C-2    KNEE ARTHROTOMY      NERVE BLOCK  04/10/2012    lumbar epidural #1    PACEMAKER PLACEMENT           Current Outpatient Medications   Medication Sig Dispense Refill    diphenhydrAMINE (BENADRYL) 25 MG capsule Take 1 capsule by mouth nightly as needed for Sleep      vitamin B-12 (CYANOCOBALAMIN) 1000 MCG tablet Take 1 tablet by mouth daily      pantoprazole (PROTONIX) 40 MG tablet Take 1 tablet by mouth every morning (before breakfast) 30 tablet 0    furosemide (LASIX) 20 MG tablet Take 1 tablet by mouth daily 90 tablet 0    spironolactone (ALDACTONE) 25 MG tablet Take 1 tablet by mouth daily 30 tablet 3    carvedilol (COREG) 25 MG tablet Take 1 tablet by mouth 2 times daily (with meals) 60 tablet 3    atorvastatin

## 2024-06-24 ENCOUNTER — OFFICE VISIT (OUTPATIENT)
Dept: PULMONOLOGY | Age: 84
End: 2024-06-24
Payer: MEDICARE

## 2024-06-24 VITALS
TEMPERATURE: 97.6 F | OXYGEN SATURATION: 94 % | WEIGHT: 223 LBS | BODY MASS INDEX: 31.92 KG/M2 | DIASTOLIC BLOOD PRESSURE: 78 MMHG | HEART RATE: 89 BPM | HEIGHT: 70 IN | SYSTOLIC BLOOD PRESSURE: 126 MMHG

## 2024-06-24 DIAGNOSIS — E66.9 OBESITY (BMI 30-39.9): ICD-10-CM

## 2024-06-24 DIAGNOSIS — J96.11 CHRONIC HYPOXEMIC RESPIRATORY FAILURE (HCC): ICD-10-CM

## 2024-06-24 DIAGNOSIS — G47.33 OSA (OBSTRUCTIVE SLEEP APNEA): ICD-10-CM

## 2024-06-24 DIAGNOSIS — J44.9 CHRONIC OBSTRUCTIVE PULMONARY DISEASE, UNSPECIFIED COPD TYPE (HCC): Primary | ICD-10-CM

## 2024-06-24 DIAGNOSIS — J90 BILATERAL PLEURAL EFFUSION: ICD-10-CM

## 2024-06-24 DIAGNOSIS — Z87.01 HISTORY OF PNEUMONIA: ICD-10-CM

## 2024-06-24 PROBLEM — K64.9 UNSPECIFIED HEMORRHOIDS: Status: ACTIVE | Noted: 2022-02-25

## 2024-06-24 PROBLEM — D64.9 ANEMIA: Status: ACTIVE | Noted: 2022-07-25

## 2024-06-24 PROBLEM — E11.65 TYPE 2 DIABETES MELLITUS WITH HYPERGLYCEMIA (HCC): Status: ACTIVE | Noted: 2023-06-20

## 2024-06-24 PROBLEM — Z99.81 REQUIRES OXYGEN THERAPY: Status: ACTIVE | Noted: 2023-05-04

## 2024-06-24 PROBLEM — E04.1 NONTOXIC SINGLE THYROID NODULE: Status: ACTIVE | Noted: 2023-06-20

## 2024-06-24 PROBLEM — E11.21 MICROALBUMINURIC DIABETIC NEPHROPATHY (HCC): Status: ACTIVE | Noted: 2021-01-18

## 2024-06-24 PROBLEM — K26.3 PEPTIC ULCER OF DUODENUM: Status: ACTIVE | Noted: 2022-07-25

## 2024-06-24 PROBLEM — E55.9 VITAMIN D DEFICIENCY: Status: ACTIVE | Noted: 2022-07-25

## 2024-06-24 PROBLEM — R53.1 WEAKNESS: Status: ACTIVE | Noted: 2021-10-09

## 2024-06-24 PROBLEM — I45.2 BIFASCICULAR BLOCK: Status: ACTIVE | Noted: 2023-06-20

## 2024-06-24 PROBLEM — R09.02 HYPOXEMIA: Status: ACTIVE | Noted: 2023-06-20

## 2024-06-24 PROBLEM — K59.09 CHRONIC CONSTIPATION: Status: ACTIVE | Noted: 2021-02-09

## 2024-06-24 PROBLEM — I44.2 COMPLETE HEART BLOCK (HCC): Status: ACTIVE | Noted: 2023-08-21

## 2024-06-24 PROBLEM — Z91.190 PATIENT'S NONCOMPLIANCE WITH OTHER MEDICAL TREATMENT AND REGIMEN DUE TO FINANCIAL HARDSHIP: Status: ACTIVE | Noted: 2023-06-20

## 2024-06-24 PROBLEM — J98.11 ATELECTASIS: Status: ACTIVE | Noted: 2023-06-20

## 2024-06-24 PROBLEM — F17.200 SMOKER: Status: ACTIVE | Noted: 2022-07-25

## 2024-06-24 PROBLEM — J30.2 SEASONAL ALLERGIC RHINITIS: Status: ACTIVE | Noted: 2022-07-25

## 2024-06-24 PROBLEM — R97.20 RAISED PROSTATE SPECIFIC ANTIGEN: Status: ACTIVE | Noted: 2022-07-25

## 2024-06-24 PROBLEM — N20.0 CALCULUS OF KIDNEY: Status: ACTIVE | Noted: 2021-11-30

## 2024-06-24 PROBLEM — I25.10 CORONARY ARTERIOSCLEROSIS IN NATIVE ARTERY: Status: ACTIVE | Noted: 2022-07-25

## 2024-06-24 PROBLEM — R94.31 ABNORMAL ELECTROCARDIOGRAM (ECG) (EKG): Status: ACTIVE | Noted: 2023-06-20

## 2024-06-24 PROBLEM — I12.9 CHRONIC KIDNEY DISEASE DUE TO HYPERTENSION: Chronic | Status: ACTIVE | Noted: 2021-04-14

## 2024-06-24 PROBLEM — R09.81 NASAL CONGESTION: Status: ACTIVE | Noted: 2023-06-20

## 2024-06-24 PROBLEM — N28.1 CYST OF KIDNEY, ACQUIRED: Status: ACTIVE | Noted: 2021-11-30

## 2024-06-24 PROBLEM — E04.1 THYROID NODULE: Status: ACTIVE | Noted: 2022-07-25

## 2024-06-24 PROBLEM — G83.30: Status: ACTIVE | Noted: 2023-06-20

## 2024-06-24 PROBLEM — M25.551 PAIN IN RIGHT HIP: Status: ACTIVE | Noted: 2023-06-20

## 2024-06-24 PROBLEM — T21.64XA: Status: ACTIVE | Noted: 2020-12-16

## 2024-06-24 PROBLEM — K57.30 DIVERTICULOSIS OF LARGE INTESTINE WITHOUT PERFORATION OR ABSCESS WITHOUT BLEEDING: Status: ACTIVE | Noted: 2021-11-30

## 2024-06-24 PROBLEM — R92.1 MAMMOGRAPHIC CALCIFICATION FOUND ON DIAGNOSTIC IMAGING OF BREAST: Status: ACTIVE | Noted: 2021-11-30

## 2024-06-24 PROBLEM — E11.40 DIABETIC NEUROPATHY (HCC): Status: ACTIVE | Noted: 2022-07-25

## 2024-06-24 PROBLEM — D12.6 BENIGN NEOPLASM OF COLON, UNSPECIFIED: Status: ACTIVE | Noted: 2022-02-25

## 2024-06-24 PROCEDURE — 3074F SYST BP LT 130 MM HG: CPT | Performed by: INTERNAL MEDICINE

## 2024-06-24 PROCEDURE — 3023F SPIROM DOC REV: CPT | Performed by: INTERNAL MEDICINE

## 2024-06-24 PROCEDURE — G8417 CALC BMI ABV UP PARAM F/U: HCPCS | Performed by: INTERNAL MEDICINE

## 2024-06-24 PROCEDURE — 99204 OFFICE O/P NEW MOD 45 MIN: CPT | Performed by: INTERNAL MEDICINE

## 2024-06-24 PROCEDURE — 3078F DIAST BP <80 MM HG: CPT | Performed by: INTERNAL MEDICINE

## 2024-06-24 PROCEDURE — 1036F TOBACCO NON-USER: CPT | Performed by: INTERNAL MEDICINE

## 2024-06-24 PROCEDURE — 1123F ACP DISCUSS/DSCN MKR DOCD: CPT | Performed by: INTERNAL MEDICINE

## 2024-06-24 PROCEDURE — G8427 DOCREV CUR MEDS BY ELIG CLIN: HCPCS | Performed by: INTERNAL MEDICINE

## 2024-06-24 RX ORDER — FLUTICASONE FUROATE, UMECLIDINIUM BROMIDE AND VILANTEROL TRIFENATATE 100; 62.5; 25 UG/1; UG/1; UG/1
1 POWDER RESPIRATORY (INHALATION) DAILY
Qty: 2 EACH | Refills: 2 | Status: SHIPPED | OUTPATIENT
Start: 2024-06-24

## 2024-06-24 ASSESSMENT — ENCOUNTER SYMPTOMS
ALLERGIC/IMMUNOLOGIC NEGATIVE: 1
WHEEZING: 1
SHORTNESS OF BREATH: 1
EYES NEGATIVE: 1
GASTROINTESTINAL NEGATIVE: 1
COUGH: 1

## 2024-06-24 NOTE — PROGRESS NOTES
Patent to follow up with physician in 2 months. PFT (ABG's) Chest CT will be scheduled @ [facility]. Orders for Sleep Study will be sent to [facility]. Sample of Trelegy 100 mcg was given to the patient with instructions.   
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    Sexual activity: Not Currently     Partners: Female     Social Determinants of Health     Financial Resource Strain: Low Risk  (2022)    Overall Financial Resource Strain (CARDIA)     Difficulty of Paying Living Expenses: Not hard at all   Food Insecurity: No Food Insecurity (2024)    Hunger Vital Sign     Worried About Running Out of Food in the Last Year: Never true     Ran Out of Food in the Last Year: Never true   Transportation Needs: No Transportation Needs (2024)    PRAPARE - Transportation     Lack of Transportation (Medical): No     Lack of Transportation (Non-Medical): No   Physical Activity: Inactive (2024)    Exercise Vital Sign     Days of Exercise per Week: 0 days     Minutes of Exercise per Session: 0 min   Stress: No Stress Concern Present (2022)    [place] citizen [place] of Occupational [employer] - Occupational Stress Questionnaire     Feeling of Stress : Only a little   Social Connections: Moderately Integrated (2022)    Social Connection and Isolation Panel [NHANES]     Frequency of Communication with Friends and Family: More than three times a week     Frequency of Social Gatherings with Friends and Family: More than three times a week     Attends [employer] Services: 1 to 4 times per year     Active Member of Clubs or Organizations: No     Attends Club or Organization

## 2024-06-28 ENCOUNTER — TELEPHONE (OUTPATIENT)
Dept: SLEEP CENTER | Age: 84
End: 2024-06-28

## 2024-07-01 ENCOUNTER — HOSPITAL ENCOUNTER (OUTPATIENT)
Dept: SLEEP CENTER | Age: 84
Discharge: HOME | End: 2024-07-01
Payer: MEDICARE

## 2024-07-01 DIAGNOSIS — G47.33 OSA (OBSTRUCTIVE SLEEP APNEA): ICD-10-CM

## 2024-07-01 PROCEDURE — 95810 POLYSOM 6/> YRS 4/> PARAM: CPT

## 2024-07-25 ENCOUNTER — HOSPITAL ENCOUNTER (OUTPATIENT)
Dept: CT IMAGING | Age: 84
Discharge: HOME | End: 2024-07-25
Attending: INTERNAL MEDICINE
Payer: MEDICARE

## 2024-07-25 ENCOUNTER — HOSPITAL ENCOUNTER (OUTPATIENT)
Dept: PULMONOLOGY | Age: 84
Discharge: HOME | End: 2024-07-25
Attending: INTERNAL MEDICINE
Payer: MEDICARE

## 2024-07-25 DIAGNOSIS — J90 BILATERAL PLEURAL EFFUSION: ICD-10-CM

## 2024-07-25 DIAGNOSIS — J44.9 CHRONIC OBSTRUCTIVE PULMONARY DISEASE, UNSPECIFIED COPD TYPE (HCC): ICD-10-CM

## 2024-07-25 DIAGNOSIS — Z87.01 HISTORY OF PNEUMONIA: ICD-10-CM

## 2024-07-25 PROCEDURE — 71250 CT THORAX DX C-: CPT

## 2024-07-25 NOTE — PROGRESS NOTES
Pt. had checked in to registration desk @ 8:02 AM. He then proceeded to radiology and completed his CT scan @ 8:06 AM and then he left the hospital without ever coming to Cardiovascular for his PFT & ABG. Pt. was a NO SHOW for his testing appointment.

## 2024-08-02 ENCOUNTER — HOSPITAL ENCOUNTER (OUTPATIENT)
Dept: SLEEP CENTER | Age: 84
Discharge: HOME | End: 2024-08-02
Payer: MEDICARE

## 2024-08-02 DIAGNOSIS — G47.33 OSA (OBSTRUCTIVE SLEEP APNEA): ICD-10-CM

## 2024-08-02 PROCEDURE — 95810 POLYSOM 6/> YRS 4/> PARAM: CPT

## 2024-08-08 ENCOUNTER — OFFICE VISIT (OUTPATIENT)
Dept: PULMONOLOGY | Age: 84
End: 2024-08-08

## 2024-08-08 VITALS
OXYGEN SATURATION: 96 % | BODY MASS INDEX: 32.21 KG/M2 | TEMPERATURE: 96.8 F | SYSTOLIC BLOOD PRESSURE: 133 MMHG | WEIGHT: 225 LBS | DIASTOLIC BLOOD PRESSURE: 81 MMHG | HEART RATE: 83 BPM | HEIGHT: 70 IN | RESPIRATION RATE: 20 BRPM

## 2024-08-08 DIAGNOSIS — G47.33 OSA (OBSTRUCTIVE SLEEP APNEA): Primary | ICD-10-CM

## 2024-08-15 NOTE — PROGRESS NOTES
This note is to close the encounter.  There will be no charge for this visit.  The patient is already seeing one of the other pulmonologist in this group.  This note is simply to close the encounter.  Bethanie Webster, DO

## 2024-08-28 PROBLEM — I71.21 ANEURYSM OF ASCENDING AORTA WITHOUT RUPTURE (HCC): Status: ACTIVE | Noted: 2024-08-28

## 2024-08-29 ENCOUNTER — HOSPITAL ENCOUNTER (OUTPATIENT)
Dept: PULMONOLOGY | Age: 84
Discharge: HOME | End: 2024-08-29
Attending: INTERNAL MEDICINE
Payer: MEDICARE

## 2024-08-29 VITALS — BODY MASS INDEX: 32.93 KG/M2 | WEIGHT: 230 LBS | HEIGHT: 70 IN

## 2024-08-29 DIAGNOSIS — J44.9 CHRONIC OBSTRUCTIVE PULMONARY DISEASE, UNSPECIFIED COPD TYPE (HCC): ICD-10-CM

## 2024-08-29 LAB
B.E.: 3.2 MMOL/L (ref -3–3)
COHB: 1.2 % (ref 0–1.5)
CRITICAL: ABNORMAL
DATE ANALYZED: ABNORMAL
DATE OF COLLECTION: ABNORMAL
HCO3: 27.5 MMOL/L (ref 22–26)
HHB: 7.7 % (ref 0–5)
LAB: ABNORMAL
Lab: 1100
METHB: 0.1 % (ref 0–1.5)
MODE: ABNORMAL
O2 CONTENT: 17.4 ML/DL
O2 SATURATION: 92.2 % (ref 92–98.5)
O2HB: 91 % (ref 94–97)
OPERATOR ID: 2873
PATIENT TEMP: 37 C
PCO2: 40.8 MMHG (ref 35–45)
PH BLOOD GAS: 7.45 (ref 7.35–7.45)
PO2: 64.8 MMHG (ref 75–100)
SOURCE, BLOOD GAS: ABNORMAL
THB: 13.6 G/DL (ref 11.5–16.5)
TIME ANALYZED: 1112

## 2024-08-29 PROCEDURE — 36600 WITHDRAWAL OF ARTERIAL BLOOD: CPT

## 2024-08-29 PROCEDURE — 82805 BLOOD GASES W/O2 SATURATION: CPT

## 2024-08-29 PROCEDURE — 94726 PLETHYSMOGRAPHY LUNG VOLUMES: CPT

## 2024-08-29 PROCEDURE — 94729 DIFFUSING CAPACITY: CPT

## 2024-08-29 PROCEDURE — 94060 EVALUATION OF WHEEZING: CPT

## 2024-08-29 PROCEDURE — 94618 PULMONARY STRESS TESTING: CPT

## 2024-08-29 ASSESSMENT — 6 MINUTE WALK TEST (6MWT)
BORG DYSPNEA SCALE SCORE: VERY SLIGHT
O2 SATURATION: 92
HEART RATE: 84
O2 SATURATION: 95
O2 SATURATION 5: 95
O2 FLOW RATE (L/MIN): 2
O2 FLOW RATE 2 (L/MIN): 1
O2 FLOW RATE 6 (L/MIN): 2
O2 SATURATION 2: 95
O2 SATURATION 4: 88
ADDTIONAL O2 FLOW RATE (L/MIN): YES
HEART RATE: 69
BORG DYSPNEA SCALE SCORE: NOTHING AT ALL
O2 SATURATION 3: 92
O2 FLOW RATE 3 (L/MIN): 1
O2 SATURATION 6: 94
O2 FLOW RATE 4 (L/MIN): 1
BLOOD PRESSURE 1: 171/93
HEART RATE: 93
O2 FLOW RATE 5 (L/MIN): 2
TOTAL DISTANCE WALKED (M): 300
BLOOD PRESSURE: 178/85
% PREDICTED: 68.49
DID PATIENT STOP OR PAUSE BEFORE 6 MINUTES?: YES
O2 SATURATION: 85
OXYGEN DEVICE: ROOM AIR
BORG DYSPNEA SCALE SCORE: SLIGHT (LIGHT)

## 2024-08-29 NOTE — PROGRESS NOTES
08/29/24 1115   Data Measured Before Walk   Height 1.778 m (5' 10\")   Weight - Scale 104.3 kg (230 lb)   HR 69   Blood Pressure (!) 171/93   O2 Saturation 92   O2 Device Room air   Dmitriy Dyspnea Scale 0   Data Measured During the Walk   Heart Rate 84   O2 Saturation 85  (pt. was placed on 1 LPM and allowed to recover to 95%)   Need Additional O2 Flow Rate Rows Yes   O2 Flow Rate (l/min) 1 l/min   O2 Saturation 95   O2 Flow Rate (l/min) 1 l/min   O2 Saturation 92   O2 Flow Rate (l/min) 1 l/min   O2 Saturation 88  (pt. was inc to 2LPM and allowed to recover to 95%)   O2 Flow Rate (l/min) 2 l/min   O2 Saturation 95   O2 Flow Rate (l/min) 2 l/min   O2 Saturation 94   Any Problems While Exercising none   Dmitriy Dyspnea Scale 2   Data Measured Immediately After Walk   Did Patient Stop or Pause Before 6 Minutes Yes   Why Patient Stopped or Paused for recovery to allow spo2 to rise above 90% before beginning amb again   Predicted Distance (m) 438 Meters   Total Distance Walked (m) 300 Meters   % Predicted 68.49   Heart Rate 93   Blood Pressure 178/85   O2 Flow Rate (l/min) 2 l/min   O2 Saturation 95   Dmitriy Dyspnea Scale 1

## 2024-08-30 ENCOUNTER — TELEPHONE (OUTPATIENT)
Dept: PULMONOLOGY | Age: 84
End: 2024-08-30

## 2024-08-30 NOTE — PROCEDURES
[address] [address]                           PULMONARY FUNCTION      PATIENT NAME: NEGRA MARISCAL               : 1940  MED REC NO: [id number]                        ROOM:   ACCOUNT NO: [id number]                       ADMIT DATE: 2024  PROVIDER: Elizabeth Amaya MD      DATE OF PROCEDURE: 2024    The patient had a 6-minute walk test.    Before walking, the oxygen saturation was 92%, blood pressure 171/93, and the heart rate 69 per minute.    The patient walked a distance of 300 m, which was 68.49% of the predicted distance.  The patient's oxygen desaturated during walking, 88% and the patient received supplemental oxygen 2 L/min and the saturation improved to 95%.    The Dmitriy dyspnea scale was 2.    IMPRESSION:  The patient had mild exercise limitation and the patient desaturated to 88%.  Received supplemental oxygen 2 L/min.          ELIZABETH AMAYA MD      D:  2024 11:30:39     T:  2024 08:15:38     JIMENA/SAVAGE  Job #:  [id number]     Doc#:  [id number]

## 2024-08-30 NOTE — TELEPHONE ENCOUNTER
Patient's wife called regarding the price of Trelegy, she said its too expensive and would like to know it there is something else that you can phone into [facility] in [place].

## 2024-08-30 NOTE — PROCEDURES
[address] [address]                           PULMONARY FUNCTION      PATIENT NAME: NEGRA MARISCAL               : 1940  MED REC NO: [id number]                        ROOM:   ACCOUNT NO: [id number]                       ADMIT DATE: 2024  PROVIDER: Elizabeth Amaya MD      DATE OF PROCEDURE: 2024    The spirometry shows mild reduction in FVC and severe reduction in the FEV1.  Postbronchodilator, FEV1/FVC is moderately decreased.  The maximum voluntary ventilation is 32% of the predicted value.    According to Z-score criteria, FVC is under the area of curve, within standard deviation of -1.64, whereas the FEV1 and FEV1/FVC are outside the area of curve, more than standard deviation of -1.64.  After bronchodilator therapy, there is no improvement seen in spirometry.    The lung volume study shows increased TLC and RV.    The diffusion capacity is mildly decreased.    IMPRESSION:  The above study shows severe obstructive ventilatory impairment with air trapping and there is no improvement after bronchodilator therapy.    GOLD criteria stage III chronic obstructive pulmonary disease.          ELIZABETH AMAYA MD      D:  2024 11:28:56     T:  2024 08:11:34     JIMENA/SAVAGE  Job #:  [id number]     Doc#:  [id number]

## 2024-09-19 ENCOUNTER — HOSPITAL ENCOUNTER (OUTPATIENT)
Dept: ULTRASOUND IMAGING | Age: 84
Discharge: HOME | End: 2024-09-19
Payer: MEDICARE

## 2024-09-19 DIAGNOSIS — E04.1 THYROID NODULE: ICD-10-CM

## 2024-09-19 PROCEDURE — 76536 US EXAM OF HEAD AND NECK: CPT

## 2024-09-25 ENCOUNTER — OFFICE VISIT (OUTPATIENT)
Dept: PULMONOLOGY | Age: 84
End: 2024-09-25
Payer: MEDICARE

## 2024-09-25 VITALS
TEMPERATURE: 98.4 F | OXYGEN SATURATION: 92 % | HEART RATE: 91 BPM | WEIGHT: 236 LBS | BODY MASS INDEX: 33.79 KG/M2 | SYSTOLIC BLOOD PRESSURE: 138 MMHG | HEIGHT: 70 IN | DIASTOLIC BLOOD PRESSURE: 78 MMHG

## 2024-09-25 DIAGNOSIS — J96.11 CHRONIC HYPOXEMIC RESPIRATORY FAILURE: ICD-10-CM

## 2024-09-25 DIAGNOSIS — E66.9 OBESITY (BMI 30-39.9): ICD-10-CM

## 2024-09-25 DIAGNOSIS — G47.33 OSA (OBSTRUCTIVE SLEEP APNEA): ICD-10-CM

## 2024-09-25 DIAGNOSIS — J44.9 CHRONIC OBSTRUCTIVE PULMONARY DISEASE, UNSPECIFIED COPD TYPE (HCC): Primary | ICD-10-CM

## 2024-09-25 PROCEDURE — 3023F SPIROM DOC REV: CPT | Performed by: INTERNAL MEDICINE

## 2024-09-25 PROCEDURE — 3075F SYST BP GE 130 - 139MM HG: CPT | Performed by: INTERNAL MEDICINE

## 2024-09-25 PROCEDURE — G8427 DOCREV CUR MEDS BY ELIG CLIN: HCPCS | Performed by: INTERNAL MEDICINE

## 2024-09-25 PROCEDURE — 1123F ACP DISCUSS/DSCN MKR DOCD: CPT | Performed by: INTERNAL MEDICINE

## 2024-09-25 PROCEDURE — G8417 CALC BMI ABV UP PARAM F/U: HCPCS | Performed by: INTERNAL MEDICINE

## 2024-09-25 PROCEDURE — 1036F TOBACCO NON-USER: CPT | Performed by: INTERNAL MEDICINE

## 2024-09-25 PROCEDURE — 3078F DIAST BP <80 MM HG: CPT | Performed by: INTERNAL MEDICINE

## 2024-09-25 PROCEDURE — 99214 OFFICE O/P EST MOD 30 MIN: CPT | Performed by: INTERNAL MEDICINE

## 2024-09-25 RX ORDER — FLUTICASONE FUROATE, UMECLIDINIUM BROMIDE AND VILANTEROL TRIFENATATE 100; 62.5; 25 UG/1; UG/1; UG/1
1 POWDER RESPIRATORY (INHALATION) DAILY
Qty: 1 EACH | Refills: 4 | Status: SHIPPED | OUTPATIENT
Start: 2024-09-25

## 2024-12-03 LAB
ALBUMIN SERPL-MCNC: 4 G/DL (ref 3.5–5.2)
ALP SERPL-CCNC: 103 U/L (ref 40–129)
ALT SERPL-CCNC: 9 U/L (ref 0–40)
ANION GAP SERPL CALCULATED.3IONS-SCNC: 9 MMOL/L (ref 7–16)
AST SERPL-CCNC: 19 U/L (ref 0–39)
BILIRUB SERPL-MCNC: 0.5 MG/DL (ref 0–1.2)
BUN SERPL-MCNC: 15 MG/DL (ref 6–23)
CALCIUM SERPL-MCNC: 9.6 MG/DL (ref 8.6–10.2)
CHLORIDE SERPL-SCNC: 101 MMOL/L (ref 98–107)
CO2 SERPL-SCNC: 32 MMOL/L (ref 22–29)
CREAT SERPL-MCNC: 1.5 MG/DL (ref 0.7–1.2)
GFR, ESTIMATED: 46 ML/MIN/1.73M2
GLUCOSE SERPL-MCNC: 170 MG/DL (ref 74–99)
POTASSIUM SERPL-SCNC: 4.1 MMOL/L (ref 3.5–5)
PROT SERPL-MCNC: 7.9 G/DL (ref 6.4–8.3)
SODIUM SERPL-SCNC: 142 MMOL/L (ref 132–146)
URATE SERPL-MCNC: 9.7 MG/DL (ref 3.4–7)

## 2024-12-04 ENCOUNTER — HOSPITAL ENCOUNTER (OUTPATIENT)
Dept: ULTRASOUND IMAGING | Age: 84
Discharge: HOME | End: 2024-12-04
Payer: MEDICARE

## 2024-12-04 DIAGNOSIS — E04.2 MULTIPLE THYROID NODULES: ICD-10-CM

## 2024-12-04 PROCEDURE — 88305 TISSUE EXAM BY PATHOLOGIST: CPT

## 2024-12-04 PROCEDURE — 88172 CYTP DX EVAL FNA 1ST EA SITE: CPT

## 2024-12-04 PROCEDURE — 10006 FNA BX W/US GDN EA ADDL: CPT

## 2024-12-04 PROCEDURE — 10005 FNA BX W/US GDN 1ST LES: CPT

## 2024-12-04 PROCEDURE — 88173 CYTOPATH EVAL FNA REPORT: CPT

## 2024-12-04 PROCEDURE — 88177 CYTP FNA EVAL EA ADDL: CPT

## 2024-12-05 LAB — NON-GYN CYTOLOGY REPORT: NORMAL

## 2024-12-31 ENCOUNTER — APPOINTMENT (OUTPATIENT)
Dept: GENERAL RADIOLOGY | Age: 84
End: 2024-12-31
Payer: MEDICARE

## 2024-12-31 ENCOUNTER — APPOINTMENT (OUTPATIENT)
Dept: CT IMAGING | Age: 84
End: 2024-12-31
Payer: MEDICARE

## 2024-12-31 ENCOUNTER — HOSPITAL ENCOUNTER (EMERGENCY)
Age: 84
Discharge: HOME | End: 2024-12-31
Attending: STUDENT IN AN ORGANIZED HEALTH CARE EDUCATION/TRAINING PROGRAM
Payer: MEDICARE

## 2024-12-31 VITALS
RESPIRATION RATE: 15 BRPM | TEMPERATURE: 98.8 F | HEART RATE: 100 BPM | HEIGHT: 70 IN | SYSTOLIC BLOOD PRESSURE: 137 MMHG | DIASTOLIC BLOOD PRESSURE: 83 MMHG | OXYGEN SATURATION: 93 % | BODY MASS INDEX: 37.22 KG/M2 | WEIGHT: 260 LBS

## 2024-12-31 DIAGNOSIS — J44.1 COPD EXACERBATION (HCC): Primary | ICD-10-CM

## 2024-12-31 LAB
ALBUMIN SERPL-MCNC: 4.1 G/DL (ref 3.5–5.2)
ALP SERPL-CCNC: 105 U/L (ref 40–129)
ALT SERPL-CCNC: 12 U/L (ref 0–40)
ANION GAP SERPL CALCULATED.3IONS-SCNC: 10 MMOL/L (ref 7–16)
AST SERPL-CCNC: 22 U/L (ref 0–39)
BASOPHILS # BLD: 0.03 K/UL (ref 0–0.2)
BASOPHILS NFR BLD: 1 % (ref 0–2)
BILIRUB SERPL-MCNC: 0.8 MG/DL (ref 0–1.2)
BNP SERPL-MCNC: 1214 PG/ML (ref 0–450)
BUN SERPL-MCNC: 10 MG/DL (ref 6–23)
CALCIUM SERPL-MCNC: 9.1 MG/DL (ref 8.6–10.2)
CHLORIDE SERPL-SCNC: 104 MMOL/L (ref 98–107)
CO2 SERPL-SCNC: 29 MMOL/L (ref 22–29)
CREAT SERPL-MCNC: 1.4 MG/DL (ref 0.7–1.2)
D-DIMER QUANTITATIVE: 880 NG/ML DDU (ref 0–230)
EOSINOPHIL # BLD: 0.12 K/UL (ref 0.05–0.5)
EOSINOPHILS RELATIVE PERCENT: 2 % (ref 0–6)
ERYTHROCYTE [DISTWIDTH] IN BLOOD BY AUTOMATED COUNT: 14.3 % (ref 11.5–15)
FLUAV RNA RESP QL NAA+PROBE: NOT DETECTED
FLUBV RNA RESP QL NAA+PROBE: NOT DETECTED
GFR, ESTIMATED: 50 ML/MIN/1.73M2
GLUCOSE SERPL-MCNC: 143 MG/DL (ref 74–99)
HCT VFR BLD AUTO: 40.9 % (ref 37–54)
HGB BLD-MCNC: 13.2 G/DL (ref 12.5–16.5)
IMM GRANULOCYTES # BLD AUTO: <0.03 K/UL (ref 0–0.58)
IMM GRANULOCYTES NFR BLD: 0 % (ref 0–5)
LYMPHOCYTES NFR BLD: 0.91 K/UL (ref 1.5–4)
LYMPHOCYTES RELATIVE PERCENT: 18 % (ref 20–42)
MCH RBC QN AUTO: 28.3 PG (ref 26–35)
MCHC RBC AUTO-ENTMCNC: 32.3 G/DL (ref 32–34.5)
MCV RBC AUTO: 87.6 FL (ref 80–99.9)
MONOCYTES NFR BLD: 0.78 K/UL (ref 0.1–0.95)
MONOCYTES NFR BLD: 16 % (ref 2–12)
NEUTROPHILS NFR BLD: 63 % (ref 43–80)
NEUTS SEG NFR BLD: 3.16 K/UL (ref 1.8–7.3)
O2 DELIVERY DEVICE: ABNORMAL
PLATELET # BLD AUTO: 108 K/UL (ref 130–450)
PMV BLD AUTO: 12 FL (ref 7–12)
POC HCO3: 28.7 MMOL/L (ref 22–26)
POC O2 SATURATION: 91 % (ref 92–98.5)
POC PCO2: 44.1 MM HG (ref 35–45)
POC PH: 7.42 (ref 7.35–7.45)
POC PO2: 60.3 MM HG (ref 60–80)
POSITIVE BASE EXCESS, ART: 3.6 MMOL/L (ref 0–3)
POTASSIUM SERPL-SCNC: 3.8 MMOL/L (ref 3.5–5)
PROT SERPL-MCNC: 7.8 G/DL (ref 6.4–8.3)
RBC # BLD AUTO: 4.67 M/UL (ref 3.8–5.8)
SAMPLE SITE: ABNORMAL
SARS-COV-2 RNA RESP QL NAA+PROBE: NOT DETECTED
SODIUM SERPL-SCNC: 143 MMOL/L (ref 132–146)
SOURCE: NORMAL
SPECIMEN DESCRIPTION: NORMAL
TROPONIN I SERPL HS-MCNC: 28 NG/L (ref 0–11)
TROPONIN I SERPL HS-MCNC: 28 NG/L (ref 0–11)
WBC OTHER # BLD: 5 K/UL (ref 4.5–11.5)

## 2024-12-31 PROCEDURE — 2500000003 HC RX 250 WO HCPCS

## 2024-12-31 PROCEDURE — 6360000002 HC RX W HCPCS: Performed by: STUDENT IN AN ORGANIZED HEALTH CARE EDUCATION/TRAINING PROGRAM

## 2024-12-31 PROCEDURE — 71045 X-RAY EXAM CHEST 1 VIEW: CPT

## 2024-12-31 PROCEDURE — 99285 EMERGENCY DEPT VISIT HI MDM: CPT

## 2024-12-31 PROCEDURE — 96375 TX/PRO/DX INJ NEW DRUG ADDON: CPT

## 2024-12-31 PROCEDURE — 85379 FIBRIN DEGRADATION QUANT: CPT

## 2024-12-31 PROCEDURE — 82803 BLOOD GASES ANY COMBINATION: CPT

## 2024-12-31 PROCEDURE — 80053 COMPREHEN METABOLIC PANEL: CPT

## 2024-12-31 PROCEDURE — 83880 ASSAY OF NATRIURETIC PEPTIDE: CPT

## 2024-12-31 PROCEDURE — 87636 SARSCOV2 & INF A&B AMP PRB: CPT

## 2024-12-31 PROCEDURE — 6370000000 HC RX 637 (ALT 250 FOR IP)

## 2024-12-31 PROCEDURE — 84484 ASSAY OF TROPONIN QUANT: CPT

## 2024-12-31 PROCEDURE — 94640 AIRWAY INHALATION TREATMENT: CPT

## 2024-12-31 PROCEDURE — 85025 COMPLETE CBC W/AUTO DIFF WBC: CPT

## 2024-12-31 PROCEDURE — 0202U NFCT DS 22 TRGT SARS-COV-2: CPT

## 2024-12-31 PROCEDURE — 96374 THER/PROPH/DIAG INJ IV PUSH: CPT

## 2024-12-31 PROCEDURE — 6360000004 HC RX CONTRAST MEDICATION: Performed by: RADIOLOGY

## 2024-12-31 PROCEDURE — 6360000002 HC RX W HCPCS

## 2024-12-31 PROCEDURE — 71275 CT ANGIOGRAPHY CHEST: CPT

## 2024-12-31 RX ORDER — SODIUM CHLORIDE 0.9 % (FLUSH) 0.9 %
5-40 SYRINGE (ML) INJECTION EVERY 12 HOURS SCHEDULED
Status: DISCONTINUED | OUTPATIENT
Start: 2024-12-31 | End: 2024-12-31

## 2024-12-31 RX ORDER — LORAZEPAM 1 MG/1
3 TABLET ORAL
Status: DISCONTINUED | OUTPATIENT
Start: 2024-12-31 | End: 2024-12-31

## 2024-12-31 RX ORDER — LORAZEPAM 2 MG/ML
3 INJECTION INTRAMUSCULAR
Status: DISCONTINUED | OUTPATIENT
Start: 2024-12-31 | End: 2024-12-31

## 2024-12-31 RX ORDER — SODIUM CHLORIDE 0.9 % (FLUSH) 0.9 %
5-40 SYRINGE (ML) INJECTION PRN
Status: DISCONTINUED | OUTPATIENT
Start: 2024-12-31 | End: 2024-12-31

## 2024-12-31 RX ORDER — AZITHROMYCIN 250 MG/1
TABLET, FILM COATED ORAL
Qty: 6 TABLET | Refills: 0 | Status: SHIPPED | OUTPATIENT
Start: 2024-12-31 | End: 2025-01-10

## 2024-12-31 RX ORDER — PREDNISONE 20 MG/1
40 TABLET ORAL DAILY
Qty: 10 TABLET | Refills: 0 | Status: SHIPPED | OUTPATIENT
Start: 2024-12-31 | End: 2025-01-05

## 2024-12-31 RX ORDER — LORAZEPAM 1 MG/1
1 TABLET ORAL
Status: DISCONTINUED | OUTPATIENT
Start: 2024-12-31 | End: 2024-12-31

## 2024-12-31 RX ORDER — LORAZEPAM 2 MG/ML
1 INJECTION INTRAMUSCULAR
Status: DISCONTINUED | OUTPATIENT
Start: 2024-12-31 | End: 2024-12-31

## 2024-12-31 RX ORDER — SODIUM CHLORIDE 9 MG/ML
INJECTION, SOLUTION INTRAVENOUS
Status: DISCONTINUED
Start: 2024-12-31 | End: 2024-12-31 | Stop reason: HOSPADM

## 2024-12-31 RX ORDER — LORAZEPAM 2 MG/ML
2 INJECTION INTRAMUSCULAR
Status: DISCONTINUED | OUTPATIENT
Start: 2024-12-31 | End: 2024-12-31

## 2024-12-31 RX ORDER — IPRATROPIUM BROMIDE AND ALBUTEROL SULFATE 2.5; .5 MG/3ML; MG/3ML
1 SOLUTION RESPIRATORY (INHALATION) ONCE
Status: DISCONTINUED | OUTPATIENT
Start: 2024-12-31 | End: 2024-12-31

## 2024-12-31 RX ORDER — IOPAMIDOL 755 MG/ML
75 INJECTION, SOLUTION INTRAVASCULAR
Status: COMPLETED | OUTPATIENT
Start: 2024-12-31 | End: 2024-12-31

## 2024-12-31 RX ORDER — ONDANSETRON 2 MG/ML
4 INJECTION INTRAMUSCULAR; INTRAVENOUS ONCE
Status: COMPLETED | OUTPATIENT
Start: 2024-12-31 | End: 2024-12-31

## 2024-12-31 RX ORDER — IPRATROPIUM BROMIDE AND ALBUTEROL SULFATE 2.5; .5 MG/3ML; MG/3ML
3 SOLUTION RESPIRATORY (INHALATION)
Status: DISCONTINUED | OUTPATIENT
Start: 2024-12-31 | End: 2024-12-31

## 2024-12-31 RX ORDER — LORAZEPAM 1 MG/1
2 TABLET ORAL
Status: DISCONTINUED | OUTPATIENT
Start: 2024-12-31 | End: 2024-12-31

## 2024-12-31 RX ORDER — LORAZEPAM 2 MG/ML
4 INJECTION INTRAMUSCULAR
Status: DISCONTINUED | OUTPATIENT
Start: 2024-12-31 | End: 2024-12-31

## 2024-12-31 RX ORDER — THIAMINE HYDROCHLORIDE 100 MG/ML
100 INJECTION, SOLUTION INTRAMUSCULAR; INTRAVENOUS DAILY
Status: DISCONTINUED | OUTPATIENT
Start: 2024-12-31 | End: 2024-12-31

## 2024-12-31 RX ORDER — FOLIC ACID 1 MG/1
1 TABLET ORAL ONCE
Status: DISCONTINUED | OUTPATIENT
Start: 2024-12-31 | End: 2024-12-31

## 2024-12-31 RX ORDER — LORAZEPAM 1 MG/1
4 TABLET ORAL
Status: DISCONTINUED | OUTPATIENT
Start: 2024-12-31 | End: 2024-12-31

## 2024-12-31 RX ORDER — SODIUM CHLORIDE 9 MG/ML
INJECTION, SOLUTION INTRAVENOUS PRN
Status: DISCONTINUED | OUTPATIENT
Start: 2024-12-31 | End: 2024-12-31

## 2024-12-31 RX ADMIN — IPRATROPIUM BROMIDE AND ALBUTEROL SULFATE 3 DOSE: .5; 2.5 SOLUTION RESPIRATORY (INHALATION) at 16:05

## 2024-12-31 RX ADMIN — WATER 125 MG: 1 INJECTION INTRAMUSCULAR; INTRAVENOUS; SUBCUTANEOUS at 16:15

## 2024-12-31 RX ADMIN — ONDANSETRON 4 MG: 2 INJECTION, SOLUTION INTRAMUSCULAR; INTRAVENOUS at 16:42

## 2024-12-31 RX ADMIN — IOPAMIDOL 75 ML: 755 INJECTION, SOLUTION INTRAVENOUS at 16:56

## 2024-12-31 ASSESSMENT — PAIN SCALES - GENERAL: PAINLEVEL_OUTOF10: 8

## 2024-12-31 ASSESSMENT — PAIN DESCRIPTION - ORIENTATION: ORIENTATION: LEFT;RIGHT

## 2024-12-31 ASSESSMENT — PAIN DESCRIPTION - LOCATION: LOCATION: BACK

## 2024-12-31 NOTE — ED PROVIDER NOTES
[facility] EMERGENCY DEPARTMENT  EMERGENCY DEPARTMENT ENCOUNTER        Pt Name: Osiel Mcnamara  MRN: [id number]  Birthdate 1940  Date of evaluation: 12/31/2024  Provider: Ranjan Winn DO  PCP: Cameron Benson DO  Note Started: 3:41 PM EST 12/31/24    CHIEF COMPLAINT       Chief Complaint   Patient presents with    Illness    Shortness of Breath     X4 days feeling ill and SOB       HISTORY OF PRESENT ILLNESS: 1 or more Elements   History received from: Patient    Osiel Mcnamara is a 84 y.o. male who presents to the emergency department with chief complaint of shortness of breath.  Patient states his shortness of breath some gradually worsening over the past 4 days.  He states he is having generalized malaise and shortness of breath with a nonproductive cough.  States he has history for COPD and CHF.  Nothing makes symptoms better or worse.  Patient does not take any anticoagulation and has not had a history for blood clots or recent travel/surgeries.  He does mention that his wife has been sick with similar symptoms of cough and congestion at home as well.  Otherwise patient denies other symptoms including fever, chills, nausea, vomiting, chest pain, abdominal pain, hematuria or dysuria, constipation or diarrhea    Nursing Notes were all reviewed and agreed with or any disagreements were addressed in the HPI.    REVIEW OF SYSTEMS :    Positives and Pertinent negatives as per HPI.    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 
discussed)  None        I am the Primary Clinician of Record.    FINAL IMPRESSION      1. COPD exacerbation (HCC)          DISPOSITION/PLAN     DISPOSITION Decision To Discharge 12/31/2024 07:47:36 PM      PATIENT REFERRED TO:  Cameron Benson DO  [address] [facility] [id number] [phone]    Schedule an appointment as soon as possible for a visit       [facility] Emergency Department  [address] [facility] [address] [phone]  Go to   If symptoms worsen      DISCHARGE MEDICATIONS:  New Prescriptions    AZITHROMYCIN (ZITHROMAX) 250 MG TABLET    500mg on day 1 followed by 250mg on days 2 - 5    PREDNISONE (DELTASONE) 20 MG TABLET    Take 2 tablets by mouth daily for 5 days       DISCONTINUED MEDICATIONS:  Discontinued Medications    No medications on file              (Please note that portions of this note were completed with a voice recognition program.  Efforts were made to edit the dictations but occasionally words are mis-transcribed.)    Matheus Mills MD (electronically signed)            Matheus Mills MD  12/31/24 1956       Matheus Mills MD  12/31/24 1956

## 2025-01-01 LAB
B PARAP IS1001 DNA NPH QL NAA+NON-PROBE: NOT DETECTED
B PERT DNA SPEC QL NAA+PROBE: NOT DETECTED
C PNEUM DNA NPH QL NAA+NON-PROBE: NOT DETECTED
FLUAV RNA NPH QL NAA+NON-PROBE: NOT DETECTED
FLUBV RNA NPH QL NAA+NON-PROBE: NOT DETECTED
HADV DNA NPH QL NAA+NON-PROBE: NOT DETECTED
HCOV 229E RNA NPH QL NAA+NON-PROBE: NOT DETECTED
HCOV HKU1 RNA NPH QL NAA+NON-PROBE: NOT DETECTED
HCOV NL63 RNA NPH QL NAA+NON-PROBE: NOT DETECTED
HCOV OC43 RNA NPH QL NAA+NON-PROBE: NOT DETECTED
HMPV RNA NPH QL NAA+NON-PROBE: NOT DETECTED
HPIV1 RNA NPH QL NAA+NON-PROBE: NOT DETECTED
HPIV2 RNA NPH QL NAA+NON-PROBE: NOT DETECTED
HPIV3 RNA NPH QL NAA+NON-PROBE: NOT DETECTED
HPIV4 RNA NPH QL NAA+NON-PROBE: NOT DETECTED
M PNEUMO DNA NPH QL NAA+NON-PROBE: NOT DETECTED
RSV RNA NPH QL NAA+NON-PROBE: NOT DETECTED
RV+EV RNA NPH QL NAA+NON-PROBE: NOT DETECTED
SARS-COV-2 RNA NPH QL NAA+NON-PROBE: NOT DETECTED
SPECIMEN DESCRIPTION: NORMAL

## 2025-01-01 NOTE — DISCHARGE INSTRUCTIONS
Please call and follow-up with your family physician as soon as possible.  You received a dose of steroids in the emergency department and are being given a prescription for steroids for the next 5 days as well as azithromycin for COPD exacerbation.  Please keep an eye on your blood sugar during the course of the steroids that you are being given.  Return to emergency department if you develop worsening symptoms including shortness of breath, cough, fevers, or lightheadedness/dizziness.

## 2025-01-02 LAB
EKG ATRIAL RATE: 92 BPM
EKG P AXIS: 43 DEGREES
EKG P-R INTERVAL: 162 MS
EKG Q-T INTERVAL: 404 MS
EKG QRS DURATION: 152 MS
EKG QTC CALCULATION (BAZETT): 499 MS
EKG R AXIS: -85 DEGREES
EKG T AXIS: 40 DEGREES
EKG VENTRICULAR RATE: 92 BPM

## 2025-01-15 ENCOUNTER — APPOINTMENT (OUTPATIENT)
Dept: GENERAL RADIOLOGY | Age: 85
DRG: 177 | End: 2025-01-15
Payer: MEDICARE

## 2025-01-15 ENCOUNTER — HOSPITAL ENCOUNTER (INPATIENT)
Age: 85
LOS: 5 days | Discharge: HOME | DRG: 177 | End: 2025-01-20
Attending: EMERGENCY MEDICINE | Admitting: INTERNAL MEDICINE
Payer: MEDICARE

## 2025-01-15 DIAGNOSIS — Z95.2 S/P TAVR (TRANSCATHETER AORTIC VALVE REPLACEMENT): ICD-10-CM

## 2025-01-15 DIAGNOSIS — I50.33 ACUTE ON CHRONIC HEART FAILURE WITH PRESERVED EJECTION FRACTION (HFPEF) (HCC): ICD-10-CM

## 2025-01-15 DIAGNOSIS — J44.1 COPD EXACERBATION (HCC): ICD-10-CM

## 2025-01-15 DIAGNOSIS — J96.01 ACUTE HYPOXIC RESPIRATORY FAILURE: Primary | ICD-10-CM

## 2025-01-15 PROBLEM — J96.21 ACUTE ON CHRONIC RESPIRATORY FAILURE WITH HYPOXIA: Status: ACTIVE | Noted: 2025-01-15

## 2025-01-15 LAB
ALBUMIN SERPL-MCNC: 3.9 G/DL (ref 3.5–5.2)
ALP SERPL-CCNC: 91 U/L (ref 40–129)
ALT SERPL-CCNC: 10 U/L (ref 0–40)
ANION GAP SERPL CALCULATED.3IONS-SCNC: 7 MMOL/L (ref 7–16)
AST SERPL-CCNC: 20 U/L (ref 0–39)
BASOPHILS # BLD: 0 K/UL (ref 0–0.2)
BASOPHILS NFR BLD: 0 % (ref 0–2)
BILIRUB SERPL-MCNC: 0.5 MG/DL (ref 0–1.2)
BNP SERPL-MCNC: 940 PG/ML (ref 0–450)
BUN SERPL-MCNC: 13 MG/DL (ref 6–23)
CALCIUM SERPL-MCNC: 9 MG/DL (ref 8.6–10.2)
CHLORIDE SERPL-SCNC: 104 MMOL/L (ref 98–107)
CO2 SERPL-SCNC: 31 MMOL/L (ref 22–29)
CREAT SERPL-MCNC: 1.3 MG/DL (ref 0.7–1.2)
EOSINOPHIL # BLD: 0.12 K/UL (ref 0.05–0.5)
EOSINOPHILS RELATIVE PERCENT: 2 % (ref 0–6)
ERYTHROCYTE [DISTWIDTH] IN BLOOD BY AUTOMATED COUNT: 15.1 % (ref 11.5–15)
FLUAV RNA RESP QL NAA+PROBE: NOT DETECTED
FLUBV RNA RESP QL NAA+PROBE: NOT DETECTED
GFR, ESTIMATED: 55 ML/MIN/1.73M2
GLUCOSE SERPL-MCNC: 164 MG/DL (ref 74–99)
HCT VFR BLD AUTO: 42.7 % (ref 37–54)
HGB BLD-MCNC: 13.4 G/DL (ref 12.5–16.5)
LACTATE BLDV-SCNC: 1.2 MMOL/L (ref 0.5–2.2)
LIPASE SERPL-CCNC: 36 U/L (ref 13–60)
LYMPHOCYTES NFR BLD: 1.02 K/UL (ref 1.5–4)
LYMPHOCYTES RELATIVE PERCENT: 17 % (ref 20–42)
MCH RBC QN AUTO: 28.8 PG (ref 26–35)
MCHC RBC AUTO-ENTMCNC: 31.4 G/DL (ref 32–34.5)
MCV RBC AUTO: 91.8 FL (ref 80–99.9)
MONOCYTES NFR BLD: 0.36 K/UL (ref 0.1–0.95)
MONOCYTES NFR BLD: 6 % (ref 2–12)
NEUTROPHILS NFR BLD: 75 % (ref 43–80)
NEUTS SEG NFR BLD: 4.5 K/UL (ref 1.8–7.3)
PLATELET # BLD AUTO: 142 K/UL (ref 130–450)
PMV BLD AUTO: 12.8 FL (ref 7–12)
POTASSIUM SERPL-SCNC: 3.9 MMOL/L (ref 3.5–5)
PROT SERPL-MCNC: 7.5 G/DL (ref 6.4–8.3)
RBC # BLD AUTO: 4.65 M/UL (ref 3.8–5.8)
RBC # BLD: ABNORMAL 10*6/UL
RBC # BLD: ABNORMAL 10*6/UL
RSV BY PCR: NOT DETECTED
SARS-COV-2 RNA RESP QL NAA+PROBE: NOT DETECTED
SODIUM SERPL-SCNC: 142 MMOL/L (ref 132–146)
SOURCE: NORMAL
SPECIMEN DESCRIPTION: NORMAL
SPECIMEN SOURCE: NORMAL
TROPONIN I SERPL HS-MCNC: 27 NG/L (ref 0–11)
TROPONIN I SERPL HS-MCNC: 28 NG/L (ref 0–11)
WBC OTHER # BLD: 6 K/UL (ref 4.5–11.5)

## 2025-01-15 PROCEDURE — 83690 ASSAY OF LIPASE: CPT

## 2025-01-15 PROCEDURE — 71045 X-RAY EXAM CHEST 1 VIEW: CPT

## 2025-01-15 PROCEDURE — 85025 COMPLETE CBC W/AUTO DIFF WBC: CPT

## 2025-01-15 PROCEDURE — 6360000002 HC RX W HCPCS

## 2025-01-15 PROCEDURE — 6370000000 HC RX 637 (ALT 250 FOR IP)

## 2025-01-15 PROCEDURE — 83880 ASSAY OF NATRIURETIC PEPTIDE: CPT

## 2025-01-15 PROCEDURE — 1200000000 HC SEMI PRIVATE

## 2025-01-15 PROCEDURE — 96374 THER/PROPH/DIAG INJ IV PUSH: CPT

## 2025-01-15 PROCEDURE — 2500000003 HC RX 250 WO HCPCS

## 2025-01-15 PROCEDURE — 87636 SARSCOV2 & INF A&B AMP PRB: CPT

## 2025-01-15 PROCEDURE — 93005 ELECTROCARDIOGRAM TRACING: CPT

## 2025-01-15 PROCEDURE — 80053 COMPREHEN METABOLIC PANEL: CPT

## 2025-01-15 PROCEDURE — 2700000000 HC OXYGEN THERAPY PER DAY

## 2025-01-15 PROCEDURE — 2580000003 HC RX 258

## 2025-01-15 PROCEDURE — 84484 ASSAY OF TROPONIN QUANT: CPT

## 2025-01-15 PROCEDURE — 83605 ASSAY OF LACTIC ACID: CPT

## 2025-01-15 PROCEDURE — 99285 EMERGENCY DEPT VISIT HI MDM: CPT

## 2025-01-15 PROCEDURE — 87634 RSV DNA/RNA AMP PROBE: CPT

## 2025-01-15 PROCEDURE — 96375 TX/PRO/DX INJ NEW DRUG ADDON: CPT

## 2025-01-15 RX ORDER — ACETAMINOPHEN 325 MG/1
650 TABLET ORAL EVERY 6 HOURS PRN
Status: DISCONTINUED | OUTPATIENT
Start: 2025-01-15 | End: 2025-01-20 | Stop reason: HOSPADM

## 2025-01-15 RX ORDER — PROCHLORPERAZINE EDISYLATE 5 MG/ML
10 INJECTION INTRAMUSCULAR; INTRAVENOUS EVERY 6 HOURS PRN
Status: DISCONTINUED | OUTPATIENT
Start: 2025-01-15 | End: 2025-01-20 | Stop reason: HOSPADM

## 2025-01-15 RX ORDER — ARFORMOTEROL TARTRATE 15 UG/2ML
15 SOLUTION RESPIRATORY (INHALATION)
Status: DISCONTINUED | OUTPATIENT
Start: 2025-01-15 | End: 2025-01-20 | Stop reason: HOSPADM

## 2025-01-15 RX ORDER — FUROSEMIDE 20 MG/1
20 TABLET ORAL DAILY
Status: DISCONTINUED | OUTPATIENT
Start: 2025-01-18 | End: 2025-01-16

## 2025-01-15 RX ORDER — FUROSEMIDE 10 MG/ML
20 INJECTION INTRAMUSCULAR; INTRAVENOUS ONCE
Status: COMPLETED | OUTPATIENT
Start: 2025-01-15 | End: 2025-01-15

## 2025-01-15 RX ORDER — ATORVASTATIN CALCIUM 40 MG/1
40 TABLET, FILM COATED ORAL NIGHTLY
Status: DISCONTINUED | OUTPATIENT
Start: 2025-01-16 | End: 2025-01-20 | Stop reason: HOSPADM

## 2025-01-15 RX ORDER — HYDRALAZINE HYDROCHLORIDE 20 MG/ML
5 INJECTION INTRAMUSCULAR; INTRAVENOUS ONCE
Status: DISCONTINUED | OUTPATIENT
Start: 2025-01-15 | End: 2025-01-20 | Stop reason: HOSPADM

## 2025-01-15 RX ORDER — ERGOCALCIFEROL 1.25 MG/1
50000 CAPSULE, LIQUID FILLED ORAL WEEKLY
Status: DISCONTINUED | OUTPATIENT
Start: 2025-01-19 | End: 2025-01-20 | Stop reason: HOSPADM

## 2025-01-15 RX ORDER — MECOBALAMIN 5000 MCG
5 TABLET,DISINTEGRATING ORAL NIGHTLY PRN
Status: DISCONTINUED | OUTPATIENT
Start: 2025-01-15 | End: 2025-01-20 | Stop reason: HOSPADM

## 2025-01-15 RX ORDER — LABETALOL HYDROCHLORIDE 5 MG/ML
10 INJECTION, SOLUTION INTRAVENOUS EVERY 4 HOURS PRN
Status: DISCONTINUED | OUTPATIENT
Start: 2025-01-15 | End: 2025-01-20 | Stop reason: HOSPADM

## 2025-01-15 RX ORDER — GUAIFENESIN/DEXTROMETHORPHAN 100-10MG/5
10 SYRUP ORAL ONCE
Status: COMPLETED | OUTPATIENT
Start: 2025-01-15 | End: 2025-01-15

## 2025-01-15 RX ORDER — INSULIN LISPRO 100 [IU]/ML
0-8 INJECTION, SOLUTION INTRAVENOUS; SUBCUTANEOUS
Status: DISCONTINUED | OUTPATIENT
Start: 2025-01-16 | End: 2025-01-20 | Stop reason: HOSPADM

## 2025-01-15 RX ORDER — DIPHENHYDRAMINE HCL 25 MG
25 TABLET ORAL NIGHTLY PRN
Status: DISCONTINUED | OUTPATIENT
Start: 2025-01-15 | End: 2025-01-20 | Stop reason: HOSPADM

## 2025-01-15 RX ORDER — GLUCAGON 1 MG/ML
1 KIT INJECTION PRN
Status: DISCONTINUED | OUTPATIENT
Start: 2025-01-15 | End: 2025-01-20 | Stop reason: HOSPADM

## 2025-01-15 RX ORDER — AMLODIPINE BESYLATE 5 MG/1
5 TABLET ORAL DAILY
Status: DISCONTINUED | OUTPATIENT
Start: 2025-01-16 | End: 2025-01-20 | Stop reason: HOSPADM

## 2025-01-15 RX ORDER — PANTOPRAZOLE SODIUM 40 MG/1
40 TABLET, DELAYED RELEASE ORAL
Status: DISCONTINUED | OUTPATIENT
Start: 2025-01-16 | End: 2025-01-20 | Stop reason: HOSPADM

## 2025-01-15 RX ORDER — MAGNESIUM HYDROXIDE/ALUMINUM HYDROXICE/SIMETHICONE 120; 1200; 1200 MG/30ML; MG/30ML; MG/30ML
30 SUSPENSION ORAL ONCE
Status: COMPLETED | OUTPATIENT
Start: 2025-01-15 | End: 2025-01-15

## 2025-01-15 RX ORDER — LANOLIN ALCOHOL/MO/W.PET/CERES
1000 CREAM (GRAM) TOPICAL DAILY
Status: DISCONTINUED | OUTPATIENT
Start: 2025-01-16 | End: 2025-01-20 | Stop reason: HOSPADM

## 2025-01-15 RX ORDER — FUROSEMIDE 10 MG/ML
20 INJECTION INTRAMUSCULAR; INTRAVENOUS 2 TIMES DAILY
Status: DISCONTINUED | OUTPATIENT
Start: 2025-01-16 | End: 2025-01-16

## 2025-01-15 RX ORDER — IPRATROPIUM BROMIDE AND ALBUTEROL SULFATE 2.5; .5 MG/3ML; MG/3ML
1 SOLUTION RESPIRATORY (INHALATION) EVERY 4 HOURS PRN
Status: DISCONTINUED | OUTPATIENT
Start: 2025-01-15 | End: 2025-01-20 | Stop reason: HOSPADM

## 2025-01-15 RX ORDER — BUDESONIDE 0.5 MG/2ML
0.5 INHALANT ORAL
Status: DISCONTINUED | OUTPATIENT
Start: 2025-01-15 | End: 2025-01-20 | Stop reason: HOSPADM

## 2025-01-15 RX ORDER — MAGNESIUM SULFATE IN WATER 40 MG/ML
2000 INJECTION, SOLUTION INTRAVENOUS ONCE
Status: COMPLETED | OUTPATIENT
Start: 2025-01-15 | End: 2025-01-15

## 2025-01-15 RX ORDER — CARVEDILOL 25 MG/1
25 TABLET ORAL 2 TIMES DAILY WITH MEALS
Status: DISCONTINUED | OUTPATIENT
Start: 2025-01-16 | End: 2025-01-20 | Stop reason: HOSPADM

## 2025-01-15 RX ORDER — TAMSULOSIN HYDROCHLORIDE 0.4 MG/1
0.4 CAPSULE ORAL DAILY
Status: DISCONTINUED | OUTPATIENT
Start: 2025-01-16 | End: 2025-01-20 | Stop reason: HOSPADM

## 2025-01-15 RX ORDER — HYDRALAZINE HYDROCHLORIDE 20 MG/ML
10 INJECTION INTRAMUSCULAR; INTRAVENOUS EVERY 4 HOURS PRN
Status: DISCONTINUED | OUTPATIENT
Start: 2025-01-15 | End: 2025-01-20 | Stop reason: HOSPADM

## 2025-01-15 RX ORDER — ENOXAPARIN SODIUM 100 MG/ML
40 INJECTION SUBCUTANEOUS DAILY
Status: DISCONTINUED | OUTPATIENT
Start: 2025-01-15 | End: 2025-01-16 | Stop reason: DRUGHIGH

## 2025-01-15 RX ORDER — DEXTROSE MONOHYDRATE 100 MG/ML
INJECTION, SOLUTION INTRAVENOUS CONTINUOUS PRN
Status: DISCONTINUED | OUTPATIENT
Start: 2025-01-15 | End: 2025-01-20 | Stop reason: HOSPADM

## 2025-01-15 RX ORDER — SPIRONOLACTONE 25 MG/1
25 TABLET ORAL DAILY
Status: DISCONTINUED | OUTPATIENT
Start: 2025-01-16 | End: 2025-01-20 | Stop reason: HOSPADM

## 2025-01-15 RX ORDER — IPRATROPIUM BROMIDE AND ALBUTEROL SULFATE 2.5; .5 MG/3ML; MG/3ML
3 SOLUTION RESPIRATORY (INHALATION) ONCE
Status: COMPLETED | OUTPATIENT
Start: 2025-01-15 | End: 2025-01-15

## 2025-01-15 RX ADMIN — FAMOTIDINE 20 MG: 10 INJECTION, SOLUTION INTRAVENOUS at 18:37

## 2025-01-15 RX ADMIN — IPRATROPIUM BROMIDE AND ALBUTEROL SULFATE 3 DOSE: .5; 2.5 SOLUTION RESPIRATORY (INHALATION) at 17:57

## 2025-01-15 RX ADMIN — GUAIFENESIN AND DEXTROMETHORPHAN 10 ML: 100; 10 SYRUP ORAL at 18:45

## 2025-01-15 RX ADMIN — WATER 1000 MG: 1 INJECTION INTRAMUSCULAR; INTRAVENOUS; SUBCUTANEOUS at 18:35

## 2025-01-15 RX ADMIN — DOXYCYCLINE 100 MG: 100 INJECTION, POWDER, LYOPHILIZED, FOR SOLUTION INTRAVENOUS at 18:47

## 2025-01-15 RX ADMIN — ALUMINUM HYDROXIDE, MAGNESIUM HYDROXIDE, AND SIMETHICONE 30 ML: 200; 200; 20 SUSPENSION ORAL at 18:44

## 2025-01-15 RX ADMIN — HYDRALAZINE HYDROCHLORIDE 10 MG: 20 INJECTION INTRAMUSCULAR; INTRAVENOUS at 23:37

## 2025-01-15 RX ADMIN — MAGNESIUM SULFATE HEPTAHYDRATE 2000 MG: 40 INJECTION, SOLUTION INTRAVENOUS at 20:06

## 2025-01-15 RX ADMIN — FUROSEMIDE 20 MG: 10 INJECTION, SOLUTION INTRAMUSCULAR; INTRAVENOUS at 21:47

## 2025-01-15 RX ADMIN — WATER 40 MG: 1 INJECTION INTRAMUSCULAR; INTRAVENOUS; SUBCUTANEOUS at 21:46

## 2025-01-15 RX ADMIN — WATER 40 MG: 1 INJECTION INTRAMUSCULAR; INTRAVENOUS; SUBCUTANEOUS at 18:36

## 2025-01-15 ASSESSMENT — PAIN SCALES - GENERAL: PAINLEVEL_OUTOF10: 7

## 2025-01-15 ASSESSMENT — PAIN DESCRIPTION - ORIENTATION: ORIENTATION: RIGHT;LEFT

## 2025-01-15 ASSESSMENT — PAIN DESCRIPTION - LOCATION: LOCATION: ABDOMEN

## 2025-01-15 ASSESSMENT — PAIN DESCRIPTION - DESCRIPTORS: DESCRIPTORS: CRAMPING;ACHING;JABBING

## 2025-01-15 ASSESSMENT — PAIN - FUNCTIONAL ASSESSMENT: PAIN_FUNCTIONAL_ASSESSMENT: NONE - DENIES PAIN

## 2025-01-15 NOTE — ED PROVIDER NOTES
findings if present, in addition to providing specific details for the plan of care and counseling regarding the diagnosis and prognosis.  Questions are answered at this time and they are agreeable with the plan.     --------------------------------- IMPRESSION AND DISPOSITION ---------------------------------    IMPRESSION  1. Acute hypoxic respiratory failure    2. COPD exacerbation (HCC)        DISPOSITION  Disposition: Admit to telemetry  Patient condition is fair    NOTE: This report was transcribed using voice recognition software. Every effort was made to ensure accuracy; however, inadvertent computerized transcription errors may be present      Carlitos Alonzo MD

## 2025-01-16 PROBLEM — I50.33 ACUTE ON CHRONIC HEART FAILURE WITH PRESERVED EJECTION FRACTION (HFPEF) (HCC): Status: ACTIVE | Noted: 2025-01-16

## 2025-01-16 LAB
25(OH)D3 SERPL-MCNC: 23.2 NG/ML (ref 30–100)
ALBUMIN SERPL-MCNC: 3.9 G/DL (ref 3.5–5.2)
ALP SERPL-CCNC: 89 U/L (ref 40–129)
ALT SERPL-CCNC: 9 U/L (ref 0–40)
ANION GAP SERPL CALCULATED.3IONS-SCNC: 12 MMOL/L (ref 7–16)
AST SERPL-CCNC: 20 U/L (ref 0–39)
B PARAP IS1001 DNA NPH QL NAA+NON-PROBE: NOT DETECTED
B PERT DNA SPEC QL NAA+PROBE: NOT DETECTED
BASOPHILS # BLD: 0 K/UL (ref 0–0.2)
BASOPHILS NFR BLD: 0 % (ref 0–2)
BILIRUB SERPL-MCNC: 0.5 MG/DL (ref 0–1.2)
BILIRUB UR QL STRIP: NEGATIVE
BUN SERPL-MCNC: 15 MG/DL (ref 6–23)
C PNEUM DNA NPH QL NAA+NON-PROBE: NOT DETECTED
CALCIUM SERPL-MCNC: 9.1 MG/DL (ref 8.6–10.2)
CHLORIDE SERPL-SCNC: 103 MMOL/L (ref 98–107)
CHOLEST SERPL-MCNC: 163 MG/DL
CLARITY UR: CLEAR
CO2 SERPL-SCNC: 27 MMOL/L (ref 22–29)
COLOR UR: YELLOW
CREAT SERPL-MCNC: 1.2 MG/DL (ref 0.7–1.2)
EKG ATRIAL RATE: 99 BPM
EKG P AXIS: 35 DEGREES
EKG P-R INTERVAL: 196 MS
EKG Q-T INTERVAL: 390 MS
EKG QRS DURATION: 142 MS
EKG QTC CALCULATION (BAZETT): 500 MS
EKG R AXIS: -85 DEGREES
EKG T AXIS: 41 DEGREES
EKG VENTRICULAR RATE: 99 BPM
EOSINOPHIL # BLD: 0 K/UL (ref 0.05–0.5)
EOSINOPHILS RELATIVE PERCENT: 0 % (ref 0–6)
ERYTHROCYTE [DISTWIDTH] IN BLOOD BY AUTOMATED COUNT: 15 % (ref 11.5–15)
FLUAV H1 2009 PAN RNA NPH NAA+NON-PROBE: DETECTED
FLUAV RNA NPH QL NAA+NON-PROBE: DETECTED
FLUBV RNA NPH QL NAA+NON-PROBE: NOT DETECTED
FOLATE SERPL-MCNC: 12.3 NG/ML (ref 4.8–24.2)
GFR, ESTIMATED: 61 ML/MIN/1.73M2
GLUCOSE BLD-MCNC: 136 MG/DL (ref 74–99)
GLUCOSE BLD-MCNC: 166 MG/DL (ref 74–99)
GLUCOSE BLD-MCNC: 174 MG/DL (ref 74–99)
GLUCOSE BLD-MCNC: 197 MG/DL (ref 74–99)
GLUCOSE SERPL-MCNC: 180 MG/DL (ref 74–99)
GLUCOSE UR STRIP-MCNC: NEGATIVE MG/DL
HADV DNA NPH QL NAA+NON-PROBE: NOT DETECTED
HBA1C MFR BLD: 7.8 % (ref 4–5.6)
HCOV 229E RNA NPH QL NAA+NON-PROBE: NOT DETECTED
HCOV HKU1 RNA NPH QL NAA+NON-PROBE: NOT DETECTED
HCOV NL63 RNA NPH QL NAA+NON-PROBE: NOT DETECTED
HCOV OC43 RNA NPH QL NAA+NON-PROBE: NOT DETECTED
HCT VFR BLD AUTO: 43.9 % (ref 37–54)
HDLC SERPL-MCNC: 36 MG/DL
HGB BLD-MCNC: 14 G/DL (ref 12.5–16.5)
HGB UR QL STRIP.AUTO: NEGATIVE
HMPV RNA NPH QL NAA+NON-PROBE: NOT DETECTED
HPIV1 RNA NPH QL NAA+NON-PROBE: NOT DETECTED
HPIV2 RNA NPH QL NAA+NON-PROBE: NOT DETECTED
HPIV3 RNA NPH QL NAA+NON-PROBE: NOT DETECTED
HPIV4 RNA NPH QL NAA+NON-PROBE: NOT DETECTED
IRON SATN MFR SERPL: 15 % (ref 20–55)
IRON SERPL-MCNC: 48 UG/DL (ref 59–158)
KETONES UR STRIP-MCNC: NEGATIVE MG/DL
L PNEUMO1 AG UR QL IA.RAPID: NEGATIVE
LDLC SERPL CALC-MCNC: 103 MG/DL
LEUKOCYTE ESTERASE UR QL STRIP: NEGATIVE
LYMPHOCYTES NFR BLD: 0.52 K/UL (ref 1.5–4)
LYMPHOCYTES RELATIVE PERCENT: 10 % (ref 20–42)
M PNEUMO DNA NPH QL NAA+NON-PROBE: NOT DETECTED
MAGNESIUM SERPL-MCNC: 1.9 MG/DL (ref 1.6–2.6)
MCH RBC QN AUTO: 28.6 PG (ref 26–35)
MCHC RBC AUTO-ENTMCNC: 31.9 G/DL (ref 32–34.5)
MCV RBC AUTO: 89.8 FL (ref 80–99.9)
MICROORGANISM/AGENT SPEC: ABNORMAL
MONOCYTES NFR BLD: 0.09 K/UL (ref 0.1–0.95)
MONOCYTES NFR BLD: 2 % (ref 2–12)
NEUTROPHILS NFR BLD: 89 % (ref 43–80)
NEUTS SEG NFR BLD: 4.78 K/UL (ref 1.8–7.3)
NITRITE UR QL STRIP: NEGATIVE
PH UR STRIP: 5.5 [PH] (ref 5–9)
PHOSPHATE SERPL-MCNC: 2.6 MG/DL (ref 2.5–4.5)
PHOSPHATE SERPL-MCNC: 4.2 MG/DL (ref 2.5–4.5)
PLATELET, FLUORESCENCE: 149 K/UL (ref 130–450)
PMV BLD AUTO: 12.5 FL (ref 7–12)
POTASSIUM SERPL-SCNC: 4 MMOL/L (ref 3.5–5)
PROT SERPL-MCNC: 7.8 G/DL (ref 6.4–8.3)
PROT UR STRIP-MCNC: NEGATIVE MG/DL
RBC # BLD AUTO: 4.89 M/UL (ref 3.8–5.8)
RBC # BLD: ABNORMAL 10*6/UL
RBC #/AREA URNS HPF: NORMAL /HPF
RSV RNA NPH QL NAA+NON-PROBE: NOT DETECTED
RV+EV RNA NPH QL NAA+NON-PROBE: NOT DETECTED
S PNEUM AG SPEC QL: NEGATIVE
SARS-COV-2 RNA NPH QL NAA+NON-PROBE: DETECTED
SODIUM SERPL-SCNC: 142 MMOL/L (ref 132–146)
SP GR UR STRIP: 1.02 (ref 1–1.03)
SPECIMEN DESCRIPTION: ABNORMAL
SPECIMEN DESCRIPTION: ABNORMAL
SPECIMEN SOURCE: NORMAL
T4 FREE SERPL-MCNC: 1.6 NG/DL (ref 0.9–1.7)
TIBC SERPL-MCNC: 315 UG/DL (ref 250–450)
TRIGL SERPL-MCNC: 122 MG/DL
TROPONIN I SERPL HS-MCNC: 20 NG/L (ref 0–11)
TSH SERPL DL<=0.05 MIU/L-ACNC: 0.19 UIU/ML (ref 0.27–4.2)
UROBILINOGEN UR STRIP-ACNC: 0.2 EU/DL (ref 0–1)
VIT B12 SERPL-MCNC: 737 PG/ML (ref 211–946)
VLDLC SERPL CALC-MCNC: 24 MG/DL
WBC #/AREA URNS HPF: NORMAL /HPF
WBC OTHER # BLD: 5.4 K/UL (ref 4.5–11.5)

## 2025-01-16 PROCEDURE — 82962 GLUCOSE BLOOD TEST: CPT

## 2025-01-16 PROCEDURE — 99223 1ST HOSP IP/OBS HIGH 75: CPT | Performed by: INTERNAL MEDICINE

## 2025-01-16 PROCEDURE — 97165 OT EVAL LOW COMPLEX 30 MIN: CPT

## 2025-01-16 PROCEDURE — 6370000000 HC RX 637 (ALT 250 FOR IP)

## 2025-01-16 PROCEDURE — 2500000003 HC RX 250 WO HCPCS

## 2025-01-16 PROCEDURE — 82306 VITAMIN D 25 HYDROXY: CPT

## 2025-01-16 PROCEDURE — 82607 VITAMIN B-12: CPT

## 2025-01-16 PROCEDURE — 83735 ASSAY OF MAGNESIUM: CPT

## 2025-01-16 PROCEDURE — 82746 ASSAY OF FOLIC ACID SERUM: CPT

## 2025-01-16 PROCEDURE — 2700000000 HC OXYGEN THERAPY PER DAY

## 2025-01-16 PROCEDURE — 80053 COMPREHEN METABOLIC PANEL: CPT

## 2025-01-16 PROCEDURE — 87070 CULTURE OTHR SPECIMN AEROBIC: CPT

## 2025-01-16 PROCEDURE — 83550 IRON BINDING TEST: CPT

## 2025-01-16 PROCEDURE — 6360000002 HC RX W HCPCS

## 2025-01-16 PROCEDURE — 2580000003 HC RX 258

## 2025-01-16 PROCEDURE — 84443 ASSAY THYROID STIM HORMONE: CPT

## 2025-01-16 PROCEDURE — 94640 AIRWAY INHALATION TREATMENT: CPT

## 2025-01-16 PROCEDURE — 93010 ELECTROCARDIOGRAM REPORT: CPT | Performed by: INTERNAL MEDICINE

## 2025-01-16 PROCEDURE — 83036 HEMOGLOBIN GLYCOSYLATED A1C: CPT

## 2025-01-16 PROCEDURE — 97530 THERAPEUTIC ACTIVITIES: CPT | Performed by: PHYSICAL THERAPIST

## 2025-01-16 PROCEDURE — 83540 ASSAY OF IRON: CPT

## 2025-01-16 PROCEDURE — 87449 NOS EACH ORGANISM AG IA: CPT

## 2025-01-16 PROCEDURE — APPSS60 APP SPLIT SHARED TIME 46-60 MINUTES

## 2025-01-16 PROCEDURE — 97161 PT EVAL LOW COMPLEX 20 MIN: CPT | Performed by: PHYSICAL THERAPIST

## 2025-01-16 PROCEDURE — 84484 ASSAY OF TROPONIN QUANT: CPT

## 2025-01-16 PROCEDURE — 85025 COMPLETE CBC W/AUTO DIFF WBC: CPT

## 2025-01-16 PROCEDURE — 87205 SMEAR GRAM STAIN: CPT

## 2025-01-16 PROCEDURE — 87899 AGENT NOS ASSAY W/OPTIC: CPT

## 2025-01-16 PROCEDURE — 84100 ASSAY OF PHOSPHORUS: CPT

## 2025-01-16 PROCEDURE — 84439 ASSAY OF FREE THYROXINE: CPT

## 2025-01-16 PROCEDURE — 0202U NFCT DS 22 TRGT SARS-COV-2: CPT

## 2025-01-16 PROCEDURE — 87086 URINE CULTURE/COLONY COUNT: CPT

## 2025-01-16 PROCEDURE — 81001 URINALYSIS AUTO W/SCOPE: CPT

## 2025-01-16 PROCEDURE — 2060000000 HC ICU INTERMEDIATE R&B

## 2025-01-16 PROCEDURE — 80061 LIPID PANEL: CPT

## 2025-01-16 PROCEDURE — 36415 COLL VENOUS BLD VENIPUNCTURE: CPT

## 2025-01-16 RX ORDER — FUROSEMIDE 10 MG/ML
40 INJECTION INTRAMUSCULAR; INTRAVENOUS 2 TIMES DAILY
Status: DISCONTINUED | OUTPATIENT
Start: 2025-01-16 | End: 2025-01-16

## 2025-01-16 RX ORDER — ENOXAPARIN SODIUM 100 MG/ML
30 INJECTION SUBCUTANEOUS 2 TIMES DAILY
Status: DISCONTINUED | OUTPATIENT
Start: 2025-01-16 | End: 2025-01-20 | Stop reason: HOSPADM

## 2025-01-16 RX ORDER — DOXYCYCLINE 100 MG/1
100 CAPSULE ORAL EVERY 12 HOURS SCHEDULED
Status: DISCONTINUED | OUTPATIENT
Start: 2025-01-16 | End: 2025-01-20 | Stop reason: HOSPADM

## 2025-01-16 RX ORDER — OXYCODONE AND ACETAMINOPHEN 5; 325 MG/1; MG/1
1 TABLET ORAL EVERY 4 HOURS PRN
Status: DISCONTINUED | OUTPATIENT
Start: 2025-01-16 | End: 2025-01-20 | Stop reason: HOSPADM

## 2025-01-16 RX ORDER — MAGNESIUM SULFATE IN WATER 40 MG/ML
2000 INJECTION, SOLUTION INTRAVENOUS PRN
Status: DISCONTINUED | OUTPATIENT
Start: 2025-01-16 | End: 2025-01-20 | Stop reason: HOSPADM

## 2025-01-16 RX ORDER — POTASSIUM CHLORIDE 1500 MG/1
40 TABLET, EXTENDED RELEASE ORAL PRN
Status: DISCONTINUED | OUTPATIENT
Start: 2025-01-16 | End: 2025-01-20 | Stop reason: HOSPADM

## 2025-01-16 RX ORDER — POTASSIUM CHLORIDE 7.45 MG/ML
10 INJECTION INTRAVENOUS PRN
Status: DISCONTINUED | OUTPATIENT
Start: 2025-01-16 | End: 2025-01-20 | Stop reason: HOSPADM

## 2025-01-16 RX ORDER — FUROSEMIDE 10 MG/ML
40 INJECTION INTRAMUSCULAR; INTRAVENOUS 2 TIMES DAILY
Status: DISCONTINUED | OUTPATIENT
Start: 2025-01-16 | End: 2025-01-20 | Stop reason: HOSPADM

## 2025-01-16 RX ADMIN — FUROSEMIDE 20 MG: 10 INJECTION, SOLUTION INTRAMUSCULAR; INTRAVENOUS at 09:59

## 2025-01-16 RX ADMIN — CARVEDILOL 25 MG: 25 TABLET, FILM COATED ORAL at 17:21

## 2025-01-16 RX ADMIN — IPRATROPIUM BROMIDE 0.5 MG: 0.5 SOLUTION RESPIRATORY (INHALATION) at 17:11

## 2025-01-16 RX ADMIN — METFORMIN HYDROCHLORIDE 1000 MG: 1000 TABLET ORAL at 17:21

## 2025-01-16 RX ADMIN — WATER 80 MG: 1 INJECTION INTRAMUSCULAR; INTRAVENOUS; SUBCUTANEOUS at 05:31

## 2025-01-16 RX ADMIN — DOXYCYCLINE HYCLATE 100 MG: 100 CAPSULE ORAL at 19:50

## 2025-01-16 RX ADMIN — WATER 40 MG: 1 INJECTION INTRAMUSCULAR; INTRAVENOUS; SUBCUTANEOUS at 19:50

## 2025-01-16 RX ADMIN — AMLODIPINE BESYLATE 5 MG: 5 TABLET ORAL at 09:56

## 2025-01-16 RX ADMIN — OXYCODONE AND ACETAMINOPHEN 1 TABLET: 5; 325 TABLET ORAL at 15:07

## 2025-01-16 RX ADMIN — CARVEDILOL 25 MG: 25 TABLET, FILM COATED ORAL at 09:55

## 2025-01-16 RX ADMIN — INSULIN LISPRO 2 UNITS: 100 INJECTION, SOLUTION INTRAVENOUS; SUBCUTANEOUS at 10:28

## 2025-01-16 RX ADMIN — BUDESONIDE 500 MCG: 0.5 INHALANT RESPIRATORY (INHALATION) at 17:11

## 2025-01-16 RX ADMIN — TAMSULOSIN HYDROCHLORIDE 0.4 MG: 0.4 CAPSULE ORAL at 09:56

## 2025-01-16 RX ADMIN — ARFORMOTEROL TARTRATE 15 MCG: 15 SOLUTION RESPIRATORY (INHALATION) at 17:11

## 2025-01-16 RX ADMIN — ARFORMOTEROL TARTRATE 15 MCG: 15 SOLUTION RESPIRATORY (INHALATION) at 04:48

## 2025-01-16 RX ADMIN — SODIUM PHOSPHATE, MONOBASIC, MONOHYDRATE AND SODIUM PHOSPHATE, DIBASIC, ANHYDROUS 15 MMOL: 142; 276 INJECTION, SOLUTION INTRAVENOUS at 11:50

## 2025-01-16 RX ADMIN — PANTOPRAZOLE SODIUM 40 MG: 40 TABLET, DELAYED RELEASE ORAL at 05:31

## 2025-01-16 RX ADMIN — SPIRONOLACTONE 25 MG: 25 TABLET ORAL at 09:56

## 2025-01-16 RX ADMIN — DOXYCYCLINE 100 MG: 100 INJECTION, POWDER, LYOPHILIZED, FOR SOLUTION INTRAVENOUS at 05:35

## 2025-01-16 RX ADMIN — ATORVASTATIN CALCIUM 40 MG: 40 TABLET, FILM COATED ORAL at 19:50

## 2025-01-16 RX ADMIN — OXYCODONE AND ACETAMINOPHEN 1 TABLET: 5; 325 TABLET ORAL at 19:50

## 2025-01-16 RX ADMIN — LABETALOL HYDROCHLORIDE 10 MG: 5 INJECTION INTRAVENOUS at 01:47

## 2025-01-16 RX ADMIN — IPRATROPIUM BROMIDE 0.5 MG: 0.5 SOLUTION RESPIRATORY (INHALATION) at 04:48

## 2025-01-16 RX ADMIN — BUDESONIDE 500 MCG: 0.5 INHALANT RESPIRATORY (INHALATION) at 04:48

## 2025-01-16 RX ADMIN — METFORMIN HYDROCHLORIDE 1000 MG: 1000 TABLET ORAL at 09:57

## 2025-01-16 RX ADMIN — CYANOCOBALAMIN TAB 1000 MCG 1000 MCG: 1000 TAB at 09:57

## 2025-01-16 RX ADMIN — HYDRALAZINE HYDROCHLORIDE 10 MG: 20 INJECTION INTRAMUSCULAR; INTRAVENOUS at 03:54

## 2025-01-16 RX ADMIN — FUROSEMIDE 40 MG: 10 INJECTION, SOLUTION INTRAMUSCULAR; INTRAVENOUS at 17:23

## 2025-01-16 RX ADMIN — Medication 5 MG: at 01:05

## 2025-01-16 RX ADMIN — ENOXAPARIN SODIUM 30 MG: 100 INJECTION SUBCUTANEOUS at 19:50

## 2025-01-16 RX ADMIN — ACETAMINOPHEN 650 MG: 325 TABLET ORAL at 01:05

## 2025-01-16 RX ADMIN — IPRATROPIUM BROMIDE 0.5 MG: 0.5 SOLUTION RESPIRATORY (INHALATION) at 09:33

## 2025-01-16 RX ADMIN — ENOXAPARIN SODIUM 30 MG: 100 INJECTION SUBCUTANEOUS at 09:58

## 2025-01-16 ASSESSMENT — PAIN SCALES - GENERAL
PAINLEVEL_OUTOF10: 7
PAINLEVEL_OUTOF10: 7
PAINLEVEL_OUTOF10: 5
PAINLEVEL_OUTOF10: 4
PAINLEVEL_OUTOF10: 6

## 2025-01-16 ASSESSMENT — PAIN DESCRIPTION - LOCATION: LOCATION: ABDOMEN

## 2025-01-16 ASSESSMENT — PAIN DESCRIPTION - DESCRIPTORS: DESCRIPTORS: ACHING;CRAMPING

## 2025-01-16 ASSESSMENT — PAIN DESCRIPTION - ORIENTATION: ORIENTATION: RIGHT;LEFT

## 2025-01-16 NOTE — H&P
HISTORY: Shortness of Breath TECHNOLOGIST PROVIDED HISTORY: Reason for exam:->Shortness of Breath FINDINGS: The heart is mildly enlarged.  Pulmonary vessels are prominent.  Mild bibasilar opacities.  The upper lungs are clear.  There is no pneumothorax or large pleural effusion.  Pacemaker present.     1. Mild cardiomegaly with mild pulmonary vascular congestion. 2. Mild bibasilar opacities, likely atelectasis.     CTA PULMONARY W CONTRAST    Result Date: 12/31/2024  EXAMINATION: CTA OF THE CHEST 12/31/2024 4:56 pm TECHNIQUE: CTA of the chest was performed after the administration of intravenous contrast.  Multiplanar reformatted images are provided for review.  MIP images are provided for review. Automated exposure control, iterative reconstruction, and/or weight based adjustment of the mA/kV was utilized to reduce the radiation dose to as low as reasonably achievable. COMPARISON: None.  7/25/24 E HISTORY: ORDERING SYSTEM PROVIDED HISTORY: Cough, shortness of breath, upper respiratory infectious like symptoms, D-dimer was ordered, elevating, evaluate PE, pneumonia TECHNOLOGIST PROVIDED HISTORY: Reason for exam:->Cough, shortness of breath, upper respiratory infectious like symptoms, D-dimer was ordered, elevating, evaluate PE, pneumonia Additional Contrast?->1 FINDINGS: Pulmonary Arteries: Pulmonary arteries are adequately opacified for evaluation.  No evidence of intraluminal filling defect to suggest pulmonary embolism.  Main pulmonary artery is normal in caliber. Mediastinum: No evidence of mediastinal lymphadenopathy.  The heart and pericardium demonstrate no acute abnormality.  There is no acute abnormality of the thoracic aorta.  Stable bilateral enlarged thyroid gland with low-attenuation nodule right lobe. Lungs/pleura: Bilateral atelectasis..  The lungs are without acute process. No focal consolidation or pulmonary edema.  No evidence of pleural effusion or pneumothorax. Upper Abdomen: Bilateral renal

## 2025-01-16 NOTE — ACP (ADVANCE CARE PLANNING)
Advance Care Planning   Healthcare Decision Maker:    Primary Decision Maker: Slime Mcnamara - Spouse - [phone]    Secondary Decision Maker: Osiel Mcnamara Jr - Child - [phone]

## 2025-01-16 NOTE — CONSULTS
Inpatient Cardiology Consultation      Reason for Consult: CHF    Consulting Physician: Dr Barker    Requesting Physician:  Vignesh Parisi, DA - CNP    Date of Consultation: 1/16/2025    HISTORY OF PRESENT ILLNESS:   Patient is a 84-year-old male who is known to [facility] cardiology through Dr. Alexander.  Last seen outpatient 6/11/2024 by Dr. Alexander.    HPI:  Patient presented to ER 1/15/2025 with complaints of shortness of breath, cough, and congestion worsening for the last 3 weeks.  Most recently seen 12/31/2024 for COPD exacerbation in ER and was given azithromycin and prednisone.  EMS reporting patient was hypoxic on room air upon arrival 85%.  Patient was noted to have expiratory wheezing throughout and was complaining of mild epigastric abdominal pain.  Patient was given breathing treatments, steroids along with Pepcid and Maalox.  BNP was elevated at 940 and CXR showed mild pulmonary vascular congestion.  Concern for COPD exacerbation with infectious process and patient was covered with ATB.  Patient reportedly not been taking his water pills to urinary frequency at home.  VS upon arrival 98.4, 22 respirations, 90 pulse, 168/100, 94% on 2 L cannula  Labs: Potassium 4.0, BUN 13, creatinine 1.3 > 1.2, GFR 61, magnesium 1.9, glucose 197, calcium 9.1, phosphorus 2.6,  (12/31/2024: 1214), troponin 27 > 28 > 20 (12/31: 28), cholesterol 163, HDL 36, , triglycerides 122, albumin 3.9, TSH 0.19, T4 free 1.6, WBC 5.4, H&H 14/43.9, platelet 149, urine/respiratory culture pending, influenza/RSV/COVID-19/Legionella/strep negative.  UA noninfectious  CXR: Mild cardiomegaly with mild pulmonary vascular congestion.  Mild bibasilar opacities, likely atelectasis    Upon assessment today 1/16/2025 patient is sitting on edge of hospital bed on 3 L cannula.  Patient tells me for the last 2 weeks he has been having worsening SOB/PAT also reporting abdominal distention with abdominal and flank pain.  He reports he

## 2025-01-16 NOTE — CARE COORDINATION
Case Management Assessment  Initial Evaluation    Date/Time of Evaluation: 1/16/2025 3:45 PM  Assessment Completed by: Federica Rosen RN    If patient is discharged prior to next notation, then this note serves as note for discharge by case management.    Patient Name: Osiel Mcnamara                   YOB: 1940  Diagnosis: COPD exacerbation (HCC) [J44.1]  Acute on chronic respiratory failure with hypoxia [J96.21]  Acute hypoxic respiratory failure [J96.01]                   Date / Time: 1/15/2025  5:10 PM    Patient Admission Status: Inpatient   Readmission Risk (Low < 19, Mod (19-27), High > 27): Readmission Risk Score: 12.2    Current PCP: Cameron Benson, DO  PCP verified by CM? Yes    Chart Reviewed: Yes      History Provided by: Patient  Patient Orientation: Alert and Oriented, Person, Place, Situation, Self    Patient Cognition: Alert    Hospitalization in the last 30 days (Readmission):  No    If yes, Readmission Assessment in  Navigator will be completed.    Advance Directives:      Code Status: Full Code   Patient's Primary Decision Maker is: Legal Next of Kin    Primary Decision Maker: Slime Mcnamara - Spouse - [phone]    Secondary Decision Maker: Osiel Mcnamara Jr - Child - [phone]    Discharge Planning:    Patient lives with: Spouse/Significant Other Type of Home: House  Primary Care Giver: Self  Patient Support Systems include: Spouse/Significant Other, Children, Family Members   Current Financial resources:    Current community resources:    Current services prior to admission: None            Current DME:              Type of Home Care services:  None    ADLS  Prior functional level: Assistance with the following:, Cooking, Housework, Shopping  Current functional level: Assistance with the following:, Cooking, Housework, Shopping    PT AM-PAC: 18 /24  OT AM-PAC: 20 /24    Family can provide assistance at DC: Other (comment) (No needs)  Would you like Case Management to discuss the

## 2025-01-16 NOTE — PLAN OF CARE
Problem: Chronic Conditions and Co-morbidities  Goal: Patient's chronic conditions and co-morbidity symptoms are monitored and maintained or improved  1/16/2025 1234 by Tejas Smith, RN  Outcome: Progressing  Flowsheets (Taken 1/16/2025 0722)  Care Plan - Patient's Chronic Conditions and Co-Morbidity Symptoms are Monitored and Maintained or Improved:   Monitor and assess patient's chronic conditions and comorbid symptoms for stability, deterioration, or improvement   Collaborate with multidisciplinary team to address chronic and comorbid conditions and prevent exacerbation or deterioration   Update acute care plan with appropriate goals if chronic or comorbid symptoms are exacerbated and prevent overall improvement and discharge  1/16/2025 0136 by Truman Hope, RN  Outcome: Progressing     Problem: Discharge Planning  Goal: Discharge to home or other facility with appropriate resources  1/16/2025 1234 by Tejas Smith, RN  Outcome: Progressing  Flowsheets (Taken 1/16/2025 0722)  Discharge to home or other facility with appropriate resources:   Identify barriers to discharge with patient and caregiver   Arrange for needed discharge resources and transportation as appropriate   Identify discharge learning needs (meds, wound care, etc)  1/16/2025 0136 by Truman Hope, RN  Outcome: Progressing     Problem: Safety - Adult  Goal: Free from fall injury  1/16/2025 1234 by Tejas Smith, RN  Outcome: Progressing  1/16/2025 0136 by Truman Hope, RN  Outcome: Progressing     Problem: Pain  Goal: Verbalizes/displays adequate comfort level or baseline comfort level  Outcome: Progressing

## 2025-01-17 ENCOUNTER — APPOINTMENT (OUTPATIENT)
Age: 85
DRG: 177 | End: 2025-01-17
Attending: INTERNAL MEDICINE
Payer: MEDICARE

## 2025-01-17 LAB
ALBUMIN SERPL-MCNC: 3.9 G/DL (ref 3.5–5.2)
ALP SERPL-CCNC: 76 U/L (ref 40–129)
ALT SERPL-CCNC: 9 U/L (ref 0–40)
ANION GAP SERPL CALCULATED.3IONS-SCNC: 10 MMOL/L (ref 7–16)
AST SERPL-CCNC: 17 U/L (ref 0–39)
BASOPHILS # BLD: 0 K/UL (ref 0–0.2)
BASOPHILS NFR BLD: 0 % (ref 0–2)
BILIRUB SERPL-MCNC: 0.4 MG/DL (ref 0–1.2)
BUN SERPL-MCNC: 31 MG/DL (ref 6–23)
CALCIUM SERPL-MCNC: 8.9 MG/DL (ref 8.6–10.2)
CHLORIDE SERPL-SCNC: 104 MMOL/L (ref 98–107)
CO2 SERPL-SCNC: 29 MMOL/L (ref 22–29)
CREAT SERPL-MCNC: 1.8 MG/DL (ref 0.7–1.2)
EOSINOPHIL # BLD: 0 K/UL (ref 0.05–0.5)
EOSINOPHILS RELATIVE PERCENT: 0 % (ref 0–6)
ERYTHROCYTE [DISTWIDTH] IN BLOOD BY AUTOMATED COUNT: 15.7 % (ref 11.5–15)
GFR, ESTIMATED: 36 ML/MIN/1.73M2
GLUCOSE BLD-MCNC: 126 MG/DL (ref 74–99)
GLUCOSE BLD-MCNC: 154 MG/DL (ref 74–99)
GLUCOSE BLD-MCNC: 176 MG/DL (ref 74–99)
GLUCOSE BLD-MCNC: 194 MG/DL (ref 74–99)
GLUCOSE SERPL-MCNC: 171 MG/DL (ref 74–99)
HCT VFR BLD AUTO: 40.1 % (ref 37–54)
HGB BLD-MCNC: 12.7 G/DL (ref 12.5–16.5)
IMM GRANULOCYTES # BLD AUTO: <0.03 K/UL (ref 0–0.58)
IMM GRANULOCYTES NFR BLD: 0 % (ref 0–5)
LYMPHOCYTES NFR BLD: 0.85 K/UL (ref 1.5–4)
LYMPHOCYTES RELATIVE PERCENT: 12 % (ref 20–42)
MAGNESIUM SERPL-MCNC: 2 MG/DL (ref 1.6–2.6)
MCH RBC QN AUTO: 28.1 PG (ref 26–35)
MCHC RBC AUTO-ENTMCNC: 31.7 G/DL (ref 32–34.5)
MCV RBC AUTO: 88.7 FL (ref 80–99.9)
MICROORGANISM SPEC CULT: NO GROWTH
MONOCYTES NFR BLD: 0.28 K/UL (ref 0.1–0.95)
MONOCYTES NFR BLD: 4 % (ref 2–12)
NEUTROPHILS NFR BLD: 84 % (ref 43–80)
NEUTS SEG NFR BLD: 6.11 K/UL (ref 1.8–7.3)
PHOSPHATE SERPL-MCNC: 5 MG/DL (ref 2.5–4.5)
PLATELET # BLD AUTO: 155 K/UL (ref 130–450)
PMV BLD AUTO: 12.7 FL (ref 7–12)
POTASSIUM SERPL-SCNC: 4.4 MMOL/L (ref 3.5–5)
PROT SERPL-MCNC: 7.2 G/DL (ref 6.4–8.3)
RBC # BLD AUTO: 4.52 M/UL (ref 3.8–5.8)
SERVICE CMNT-IMP: NORMAL
SODIUM SERPL-SCNC: 143 MMOL/L (ref 132–146)
SPECIMEN DESCRIPTION: NORMAL
WBC OTHER # BLD: 7.3 K/UL (ref 4.5–11.5)

## 2025-01-17 PROCEDURE — 6360000002 HC RX W HCPCS

## 2025-01-17 PROCEDURE — 6370000000 HC RX 637 (ALT 250 FOR IP)

## 2025-01-17 PROCEDURE — 93306 TTE W/DOPPLER COMPLETE: CPT

## 2025-01-17 PROCEDURE — 99233 SBSQ HOSP IP/OBS HIGH 50: CPT | Performed by: INTERNAL MEDICINE

## 2025-01-17 PROCEDURE — 2700000000 HC OXYGEN THERAPY PER DAY

## 2025-01-17 PROCEDURE — 2060000000 HC ICU INTERMEDIATE R&B

## 2025-01-17 PROCEDURE — 83735 ASSAY OF MAGNESIUM: CPT

## 2025-01-17 PROCEDURE — 94640 AIRWAY INHALATION TREATMENT: CPT

## 2025-01-17 PROCEDURE — 85025 COMPLETE CBC W/AUTO DIFF WBC: CPT

## 2025-01-17 PROCEDURE — 2500000003 HC RX 250 WO HCPCS

## 2025-01-17 PROCEDURE — 36415 COLL VENOUS BLD VENIPUNCTURE: CPT

## 2025-01-17 PROCEDURE — 84100 ASSAY OF PHOSPHORUS: CPT

## 2025-01-17 PROCEDURE — 80053 COMPREHEN METABOLIC PANEL: CPT

## 2025-01-17 PROCEDURE — 82962 GLUCOSE BLOOD TEST: CPT

## 2025-01-17 RX ORDER — OSELTAMIVIR PHOSPHATE 30 MG/1
30 CAPSULE ORAL 2 TIMES DAILY
Status: DISCONTINUED | OUTPATIENT
Start: 2025-01-17 | End: 2025-01-20 | Stop reason: HOSPADM

## 2025-01-17 RX ADMIN — ARFORMOTEROL TARTRATE 15 MCG: 15 SOLUTION RESPIRATORY (INHALATION) at 04:51

## 2025-01-17 RX ADMIN — BUDESONIDE 500 MCG: 0.5 INHALANT RESPIRATORY (INHALATION) at 16:40

## 2025-01-17 RX ADMIN — ENOXAPARIN SODIUM 30 MG: 100 INJECTION SUBCUTANEOUS at 19:55

## 2025-01-17 RX ADMIN — INSULIN LISPRO 2 UNITS: 100 INJECTION, SOLUTION INTRAVENOUS; SUBCUTANEOUS at 10:48

## 2025-01-17 RX ADMIN — ATORVASTATIN CALCIUM 40 MG: 40 TABLET, FILM COATED ORAL at 19:56

## 2025-01-17 RX ADMIN — ARFORMOTEROL TARTRATE 15 MCG: 15 SOLUTION RESPIRATORY (INHALATION) at 16:40

## 2025-01-17 RX ADMIN — METFORMIN HYDROCHLORIDE 1000 MG: 1000 TABLET ORAL at 08:49

## 2025-01-17 RX ADMIN — DOXYCYCLINE HYCLATE 100 MG: 100 CAPSULE ORAL at 19:56

## 2025-01-17 RX ADMIN — IPRATROPIUM BROMIDE 0.5 MG: 0.5 SOLUTION RESPIRATORY (INHALATION) at 16:40

## 2025-01-17 RX ADMIN — SPIRONOLACTONE 25 MG: 25 TABLET ORAL at 08:51

## 2025-01-17 RX ADMIN — OXYCODONE AND ACETAMINOPHEN 1 TABLET: 5; 325 TABLET ORAL at 12:45

## 2025-01-17 RX ADMIN — OSELTAMIVIR PHOSPHATE 30 MG: 30 CAPSULE ORAL at 19:56

## 2025-01-17 RX ADMIN — FUROSEMIDE 40 MG: 10 INJECTION, SOLUTION INTRAMUSCULAR; INTRAVENOUS at 08:53

## 2025-01-17 RX ADMIN — DOXYCYCLINE HYCLATE 100 MG: 100 CAPSULE ORAL at 08:49

## 2025-01-17 RX ADMIN — IPRATROPIUM BROMIDE 0.5 MG: 0.5 SOLUTION RESPIRATORY (INHALATION) at 10:01

## 2025-01-17 RX ADMIN — OXYCODONE AND ACETAMINOPHEN 1 TABLET: 5; 325 TABLET ORAL at 23:02

## 2025-01-17 RX ADMIN — PANTOPRAZOLE SODIUM 40 MG: 40 TABLET, DELAYED RELEASE ORAL at 05:37

## 2025-01-17 RX ADMIN — WATER 40 MG: 1 INJECTION INTRAMUSCULAR; INTRAVENOUS; SUBCUTANEOUS at 05:37

## 2025-01-17 RX ADMIN — BUDESONIDE 500 MCG: 0.5 INHALANT RESPIRATORY (INHALATION) at 04:51

## 2025-01-17 RX ADMIN — CARVEDILOL 25 MG: 25 TABLET, FILM COATED ORAL at 17:21

## 2025-01-17 RX ADMIN — ENOXAPARIN SODIUM 30 MG: 100 INJECTION SUBCUTANEOUS at 08:52

## 2025-01-17 RX ADMIN — AMLODIPINE BESYLATE 5 MG: 5 TABLET ORAL at 08:48

## 2025-01-17 RX ADMIN — TAMSULOSIN HYDROCHLORIDE 0.4 MG: 0.4 CAPSULE ORAL at 08:50

## 2025-01-17 RX ADMIN — WATER 40 MG: 1 INJECTION INTRAMUSCULAR; INTRAVENOUS; SUBCUTANEOUS at 19:56

## 2025-01-17 RX ADMIN — IPRATROPIUM BROMIDE 0.5 MG: 0.5 SOLUTION RESPIRATORY (INHALATION) at 04:51

## 2025-01-17 RX ADMIN — CARVEDILOL 25 MG: 25 TABLET, FILM COATED ORAL at 08:50

## 2025-01-17 RX ADMIN — CYANOCOBALAMIN TAB 1000 MCG 1000 MCG: 1000 TAB at 08:50

## 2025-01-17 RX ADMIN — OSELTAMIVIR PHOSPHATE 30 MG: 30 CAPSULE ORAL at 12:42

## 2025-01-17 ASSESSMENT — PAIN SCALES - GENERAL
PAINLEVEL_OUTOF10: 2
PAINLEVEL_OUTOF10: 6
PAINLEVEL_OUTOF10: 0
PAINLEVEL_OUTOF10: 5

## 2025-01-17 NOTE — PLAN OF CARE
Problem: Chronic Conditions and Co-morbidities  Goal: Patient's chronic conditions and co-morbidity symptoms are monitored and maintained or improved  1/16/2025 2015 by Truman Hope RN  Outcome: Progressing  1/16/2025 1234 by Tejas Smith, RN  Outcome: Progressing  Flowsheets (Taken 1/16/2025 0722)  Care Plan - Patient's Chronic Conditions and Co-Morbidity Symptoms are Monitored and Maintained or Improved:   Monitor and assess patient's chronic conditions and comorbid symptoms for stability, deterioration, or improvement   Collaborate with multidisciplinary team to address chronic and comorbid conditions and prevent exacerbation or deterioration   Update acute care plan with appropriate goals if chronic or comorbid symptoms are exacerbated and prevent overall improvement and discharge     Problem: Discharge Planning  Goal: Discharge to home or other facility with appropriate resources  1/16/2025 2015 by Truman Hope RN  Outcome: Progressing  1/16/2025 1234 by Tejas Smith, RN  Outcome: Progressing  Flowsheets (Taken 1/16/2025 0722)  Discharge to home or other facility with appropriate resources:   Identify barriers to discharge with patient and caregiver   Arrange for needed discharge resources and transportation as appropriate   Identify discharge learning needs (meds, wound care, etc)     Problem: Safety - Adult  Goal: Free from fall injury  1/16/2025 2015 by Truman Hope RN  Outcome: Progressing  1/16/2025 1234 by Tejas Smith, RN  Outcome: Progressing     Problem: Pain  Goal: Verbalizes/displays adequate comfort level or baseline comfort level  1/16/2025 2015 by Truman Hope RN  Outcome: Progressing  1/16/2025 1234 by Tejas Smith, RN  Outcome: Progressing

## 2025-01-17 NOTE — CARE COORDINATION
1/17/2025 133p (sf)  NOTE: Pt in [facility] for Covid & Influenza A. Therapy evaluated pt and recommended HHC. However, pt is declining HHC stating he is ind with his ADL's. Pt has all needed DME: including cane, rollator and 02 @ 2L thru [employer]. 02 is prn, therefore if pt should need continuously, will need new testing and orders written.   Family will provide transportation home on dc.     Electronically signed by SHABBIR Weston on 1/17/2025 at 1:36 PM

## 2025-01-17 NOTE — PLAN OF CARE
Problem: Chronic Conditions and Co-morbidities  Goal: Patient's chronic conditions and co-morbidity symptoms are monitored and maintained or improved  Outcome: Progressing  Flowsheets (Taken 1/17/2025 6241)  Care Plan - Patient's Chronic Conditions and Co-Morbidity Symptoms are Monitored and Maintained or Improved:   Monitor and assess patient's chronic conditions and comorbid symptoms for stability, deterioration, or improvement   Collaborate with multidisciplinary team to address chronic and comorbid conditions and prevent exacerbation or deterioration   Update acute care plan with appropriate goals if chronic or comorbid symptoms are exacerbated and prevent overall improvement and discharge     Problem: Discharge Planning  Goal: Discharge to home or other facility with appropriate resources  Outcome: Progressing  Flowsheets (Taken 1/17/2025 0751)  Discharge to home or other facility with appropriate resources:   Identify barriers to discharge with patient and caregiver   Arrange for needed discharge resources and transportation as appropriate   Identify discharge learning needs (meds, wound care, etc)     Problem: Safety - Adult  Goal: Free from fall injury  Outcome: Progressing     Problem: Pain  Goal: Verbalizes/displays adequate comfort level or baseline comfort level  Outcome: Progressing  Flowsheets (Taken 1/17/2025 9992)  Verbalizes/displays adequate comfort level or baseline comfort level:   Encourage patient to monitor pain and request assistance   Assess pain using appropriate pain scale   Administer analgesics based on type and severity of pain and evaluate response

## 2025-01-18 LAB
ALBUMIN SERPL-MCNC: 3.8 G/DL (ref 3.5–5.2)
ALP SERPL-CCNC: 73 U/L (ref 40–129)
ALT SERPL-CCNC: 8 U/L (ref 0–40)
ANION GAP SERPL CALCULATED.3IONS-SCNC: 10 MMOL/L (ref 7–16)
AST SERPL-CCNC: 16 U/L (ref 0–39)
BASOPHILS # BLD: 0.01 K/UL (ref 0–0.2)
BASOPHILS NFR BLD: 0 % (ref 0–2)
BILIRUB SERPL-MCNC: 0.4 MG/DL (ref 0–1.2)
BUN SERPL-MCNC: 51 MG/DL (ref 6–23)
CALCIUM SERPL-MCNC: 9.1 MG/DL (ref 8.6–10.2)
CHLORIDE SERPL-SCNC: 103 MMOL/L (ref 98–107)
CO2 SERPL-SCNC: 28 MMOL/L (ref 22–29)
CREAT SERPL-MCNC: 1.9 MG/DL (ref 0.7–1.2)
ECHO AO ASC DIAM: 3.6 CM
ECHO AO ASCENDING AORTA INDEX: 1.63 CM/M2
ECHO AV AREA PEAK VELOCITY: 1.3 CM2
ECHO AV AREA VTI: 1.4 CM2
ECHO AV AREA/BSA PEAK VELOCITY: 0.6 CM2/M2
ECHO AV AREA/BSA VTI: 0.6 CM2/M2
ECHO AV MEAN GRADIENT: 15 MMHG
ECHO AV MEAN VELOCITY: 1.8 M/S
ECHO AV PEAK GRADIENT: 27 MMHG
ECHO AV PEAK VELOCITY: 2.6 M/S
ECHO AV VELOCITY RATIO: 0.38
ECHO AV VTI: 45.8 CM
ECHO BSA: 2.26 M2
ECHO LA DIAMETER INDEX: 1.4 CM/M2
ECHO LA DIAMETER: 3.1 CM
ECHO LA VOL A-L A2C: 34 ML (ref 18–58)
ECHO LA VOL A-L A4C: 28 ML (ref 18–58)
ECHO LA VOL BP: 28 ML (ref 18–58)
ECHO LA VOL MOD A2C: 31 ML (ref 18–58)
ECHO LA VOL MOD A4C: 25 ML (ref 18–58)
ECHO LA VOL/BSA BIPLANE: 13 ML/M2 (ref 16–34)
ECHO LA VOLUME AREA LENGTH: 31 ML
ECHO LA VOLUME INDEX A-L A2C: 15 ML/M2 (ref 16–34)
ECHO LA VOLUME INDEX A-L A4C: 13 ML/M2 (ref 16–34)
ECHO LA VOLUME INDEX AREA LENGTH: 14 ML/M2 (ref 16–34)
ECHO LA VOLUME INDEX MOD A2C: 14 ML/M2 (ref 16–34)
ECHO LA VOLUME INDEX MOD A4C: 11 ML/M2 (ref 16–34)
ECHO LV EDV A2C: 126 ML
ECHO LV EDV A4C: 110 ML
ECHO LV EDV BP: 118 ML (ref 67–155)
ECHO LV EDV INDEX A4C: 50 ML/M2
ECHO LV EDV INDEX BP: 53 ML/M2
ECHO LV EDV NDEX A2C: 57 ML/M2
ECHO LV EF PHYSICIAN: 50 %
ECHO LV EJECTION FRACTION A2C: 59 %
ECHO LV EJECTION FRACTION A4C: 52 %
ECHO LV EJECTION FRACTION BIPLANE: 56 % (ref 55–100)
ECHO LV ESV A2C: 51 ML
ECHO LV ESV A4C: 53 ML
ECHO LV ESV BP: 52 ML (ref 22–58)
ECHO LV ESV INDEX A2C: 23 ML/M2
ECHO LV ESV INDEX A4C: 24 ML/M2
ECHO LV ESV INDEX BP: 24 ML/M2
ECHO LV FRACTIONAL SHORTENING: 31 % (ref 28–44)
ECHO LV INTERNAL DIMENSION DIASTOLE INDEX: 2.04 CM/M2
ECHO LV INTERNAL DIMENSION DIASTOLIC: 4.5 CM (ref 4.2–5.9)
ECHO LV INTERNAL DIMENSION SYSTOLIC INDEX: 1.4 CM/M2
ECHO LV INTERNAL DIMENSION SYSTOLIC: 3.1 CM
ECHO LV ISOVOLUMETRIC RELAXATION TIME (IVRT): 87.5 MS
ECHO LV IVSD: 1.7 CM (ref 0.6–1)
ECHO LV MASS 2D: 319.6 G (ref 88–224)
ECHO LV MASS INDEX 2D: 144.6 G/M2 (ref 49–115)
ECHO LV POSTERIOR WALL DIASTOLIC: 1.6 CM (ref 0.6–1)
ECHO LV RELATIVE WALL THICKNESS RATIO: 0.71
ECHO LVOT AREA: 3.1 CM2
ECHO LVOT AV VTI INDEX: 0.43
ECHO LVOT DIAM: 2 CM
ECHO LVOT MEAN GRADIENT: 2 MMHG
ECHO LVOT PEAK GRADIENT: 4 MMHG
ECHO LVOT PEAK VELOCITY: 1 M/S
ECHO LVOT STROKE VOLUME INDEX: 28 ML/M2
ECHO LVOT SV: 61.9 ML
ECHO LVOT VTI: 19.7 CM
ECHO MV A VELOCITY: 1.57 M/S
ECHO MV AREA PHT: 6.4 CM2
ECHO MV AREA VTI: 1.8 CM2
ECHO MV E DECELERATION TIME (DT): 158.8 MS
ECHO MV E VELOCITY: 0.9 M/S
ECHO MV E/A RATIO: 0.57
ECHO MV LVOT VTI INDEX: 1.71
ECHO MV MAX VELOCITY: 1.8 M/S
ECHO MV MEAN GRADIENT: 5 MMHG
ECHO MV MEAN VELOCITY: 1 M/S
ECHO MV PEAK GRADIENT: 13 MMHG
ECHO MV PRESSURE HALF TIME (PHT): 34.2 MS
ECHO MV VTI: 33.7 CM
ECHO PV MAX VELOCITY: 1 M/S
ECHO PV MEAN GRADIENT: 2 MMHG
ECHO PV MEAN VELOCITY: 0.7 M/S
ECHO PV PEAK GRADIENT: 4 MMHG
ECHO PV VTI: 20.1 CM
ECHO RV FREE WALL PEAK S': 15 CM/S
ECHO RV INTERNAL DIMENSION: 3.9 CM
ECHO RV LONGITUDINAL DIMENSION: 5.4 CM
ECHO RV MID DIMENSION: 3.3 CM
ECHO RV TAPSE: 3 CM (ref 1.7–?)
ECHO TV REGURGITANT MAX VELOCITY: 2.03 M/S
ECHO TV REGURGITANT PEAK GRADIENT: 16 MMHG
EOSINOPHIL # BLD: 0 K/UL (ref 0.05–0.5)
EOSINOPHILS RELATIVE PERCENT: 0 % (ref 0–6)
ERYTHROCYTE [DISTWIDTH] IN BLOOD BY AUTOMATED COUNT: 15.7 % (ref 11.5–15)
GFR, ESTIMATED: 34 ML/MIN/1.73M2
GLUCOSE BLD-MCNC: 163 MG/DL (ref 74–99)
GLUCOSE BLD-MCNC: 183 MG/DL (ref 74–99)
GLUCOSE BLD-MCNC: 196 MG/DL (ref 74–99)
GLUCOSE BLD-MCNC: 222 MG/DL (ref 74–99)
GLUCOSE SERPL-MCNC: 166 MG/DL (ref 74–99)
HCT VFR BLD AUTO: 41.6 % (ref 37–54)
HGB BLD-MCNC: 12.9 G/DL (ref 12.5–16.5)
IMM GRANULOCYTES # BLD AUTO: 0.03 K/UL (ref 0–0.58)
IMM GRANULOCYTES NFR BLD: 0 % (ref 0–5)
LYMPHOCYTES NFR BLD: 0.64 K/UL (ref 1.5–4)
LYMPHOCYTES RELATIVE PERCENT: 7 % (ref 20–42)
MAGNESIUM SERPL-MCNC: 2.2 MG/DL (ref 1.6–2.6)
MCH RBC QN AUTO: 28 PG (ref 26–35)
MCHC RBC AUTO-ENTMCNC: 31 G/DL (ref 32–34.5)
MCV RBC AUTO: 90.2 FL (ref 80–99.9)
MONOCYTES NFR BLD: 0.25 K/UL (ref 0.1–0.95)
MONOCYTES NFR BLD: 3 % (ref 2–12)
NEUTROPHILS NFR BLD: 90 % (ref 43–80)
NEUTS SEG NFR BLD: 7.94 K/UL (ref 1.8–7.3)
PHOSPHATE SERPL-MCNC: 4.9 MG/DL (ref 2.5–4.5)
PLATELET # BLD AUTO: 146 K/UL (ref 130–450)
PMV BLD AUTO: 11.7 FL (ref 7–12)
POTASSIUM SERPL-SCNC: 4.6 MMOL/L (ref 3.5–5)
PROT SERPL-MCNC: 7.5 G/DL (ref 6.4–8.3)
RBC # BLD AUTO: 4.61 M/UL (ref 3.8–5.8)
SODIUM SERPL-SCNC: 141 MMOL/L (ref 132–146)
WBC OTHER # BLD: 8.9 K/UL (ref 4.5–11.5)

## 2025-01-18 PROCEDURE — 6370000000 HC RX 637 (ALT 250 FOR IP)

## 2025-01-18 PROCEDURE — 6360000002 HC RX W HCPCS

## 2025-01-18 PROCEDURE — 2060000000 HC ICU INTERMEDIATE R&B

## 2025-01-18 PROCEDURE — 2500000003 HC RX 250 WO HCPCS

## 2025-01-18 PROCEDURE — 93306 TTE W/DOPPLER COMPLETE: CPT | Performed by: INTERNAL MEDICINE

## 2025-01-18 PROCEDURE — 85025 COMPLETE CBC W/AUTO DIFF WBC: CPT

## 2025-01-18 PROCEDURE — 84100 ASSAY OF PHOSPHORUS: CPT

## 2025-01-18 PROCEDURE — 6370000000 HC RX 637 (ALT 250 FOR IP): Performed by: INTERNAL MEDICINE

## 2025-01-18 PROCEDURE — 99233 SBSQ HOSP IP/OBS HIGH 50: CPT | Performed by: INTERNAL MEDICINE

## 2025-01-18 PROCEDURE — 82962 GLUCOSE BLOOD TEST: CPT

## 2025-01-18 PROCEDURE — 36415 COLL VENOUS BLD VENIPUNCTURE: CPT

## 2025-01-18 PROCEDURE — 80053 COMPREHEN METABOLIC PANEL: CPT

## 2025-01-18 PROCEDURE — 2700000000 HC OXYGEN THERAPY PER DAY

## 2025-01-18 PROCEDURE — 94640 AIRWAY INHALATION TREATMENT: CPT

## 2025-01-18 PROCEDURE — 83735 ASSAY OF MAGNESIUM: CPT

## 2025-01-18 RX ADMIN — INSULIN LISPRO 2 UNITS: 100 INJECTION, SOLUTION INTRAVENOUS; SUBCUTANEOUS at 21:11

## 2025-01-18 RX ADMIN — OSELTAMIVIR PHOSPHATE 30 MG: 30 CAPSULE ORAL at 21:10

## 2025-01-18 RX ADMIN — WATER 40 MG: 1 INJECTION INTRAMUSCULAR; INTRAVENOUS; SUBCUTANEOUS at 05:18

## 2025-01-18 RX ADMIN — ARFORMOTEROL TARTRATE 15 MCG: 15 SOLUTION RESPIRATORY (INHALATION) at 18:43

## 2025-01-18 RX ADMIN — BUDESONIDE 500 MCG: 0.5 INHALANT RESPIRATORY (INHALATION) at 18:43

## 2025-01-18 RX ADMIN — ENOXAPARIN SODIUM 30 MG: 100 INJECTION SUBCUTANEOUS at 09:33

## 2025-01-18 RX ADMIN — DOXYCYCLINE HYCLATE 100 MG: 100 CAPSULE ORAL at 21:10

## 2025-01-18 RX ADMIN — AMLODIPINE BESYLATE 5 MG: 5 TABLET ORAL at 09:33

## 2025-01-18 RX ADMIN — ENOXAPARIN SODIUM 30 MG: 100 INJECTION SUBCUTANEOUS at 21:11

## 2025-01-18 RX ADMIN — OXYCODONE AND ACETAMINOPHEN 1 TABLET: 5; 325 TABLET ORAL at 18:37

## 2025-01-18 RX ADMIN — INSULIN LISPRO 2 UNITS: 100 INJECTION, SOLUTION INTRAVENOUS; SUBCUTANEOUS at 11:50

## 2025-01-18 RX ADMIN — ARFORMOTEROL TARTRATE 15 MCG: 15 SOLUTION RESPIRATORY (INHALATION) at 07:00

## 2025-01-18 RX ADMIN — INSULIN LISPRO 2 UNITS: 100 INJECTION, SOLUTION INTRAVENOUS; SUBCUTANEOUS at 18:28

## 2025-01-18 RX ADMIN — BUDESONIDE 500 MCG: 0.5 INHALANT RESPIRATORY (INHALATION) at 07:01

## 2025-01-18 RX ADMIN — OXYCODONE AND ACETAMINOPHEN 1 TABLET: 5; 325 TABLET ORAL at 09:33

## 2025-01-18 RX ADMIN — DOXYCYCLINE HYCLATE 100 MG: 100 CAPSULE ORAL at 11:58

## 2025-01-18 RX ADMIN — OSELTAMIVIR PHOSPHATE 30 MG: 30 CAPSULE ORAL at 09:33

## 2025-01-18 RX ADMIN — IPRATROPIUM BROMIDE 0.5 MG: 0.5 SOLUTION RESPIRATORY (INHALATION) at 18:43

## 2025-01-18 RX ADMIN — IPRATROPIUM BROMIDE 0.5 MG: 0.5 SOLUTION RESPIRATORY (INHALATION) at 07:01

## 2025-01-18 RX ADMIN — CARVEDILOL 25 MG: 25 TABLET, FILM COATED ORAL at 09:33

## 2025-01-18 RX ADMIN — CYANOCOBALAMIN TAB 1000 MCG 1000 MCG: 1000 TAB at 09:33

## 2025-01-18 RX ADMIN — TAMSULOSIN HYDROCHLORIDE 0.4 MG: 0.4 CAPSULE ORAL at 09:33

## 2025-01-18 RX ADMIN — SALINE NASAL SPRAY 1 SPRAY: 1.5 SOLUTION NASAL at 18:18

## 2025-01-18 RX ADMIN — PANTOPRAZOLE SODIUM 40 MG: 40 TABLET, DELAYED RELEASE ORAL at 05:18

## 2025-01-18 RX ADMIN — CARVEDILOL 25 MG: 25 TABLET, FILM COATED ORAL at 18:21

## 2025-01-18 RX ADMIN — IPRATROPIUM BROMIDE 0.5 MG: 0.5 SOLUTION RESPIRATORY (INHALATION) at 10:00

## 2025-01-18 RX ADMIN — WATER 40 MG: 1 INJECTION INTRAMUSCULAR; INTRAVENOUS; SUBCUTANEOUS at 18:21

## 2025-01-18 RX ADMIN — ATORVASTATIN CALCIUM 40 MG: 40 TABLET, FILM COATED ORAL at 21:10

## 2025-01-18 ASSESSMENT — PAIN SCALES - GENERAL
PAINLEVEL_OUTOF10: 0
PAINLEVEL_OUTOF10: 0
PAINLEVEL_OUTOF10: 7
PAINLEVEL_OUTOF10: 0

## 2025-01-18 ASSESSMENT — PAIN DESCRIPTION - DESCRIPTORS: DESCRIPTORS: ACHING;SORE;PRESSURE

## 2025-01-18 ASSESSMENT — PAIN DESCRIPTION - LOCATION: LOCATION: BACK

## 2025-01-18 NOTE — PLAN OF CARE
Problem: Chronic Conditions and Co-morbidities  Goal: Patient's chronic conditions and co-morbidity symptoms are monitored and maintained or improved  1/17/2025 2021 by Truman Hope RN  Outcome: Progressing  1/17/2025 1218 by Tejas Smith RN  Outcome: Progressing  Flowsheets (Taken 1/17/2025 0751)  Care Plan - Patient's Chronic Conditions and Co-Morbidity Symptoms are Monitored and Maintained or Improved:   Monitor and assess patient's chronic conditions and comorbid symptoms for stability, deterioration, or improvement   Collaborate with multidisciplinary team to address chronic and comorbid conditions and prevent exacerbation or deterioration   Update acute care plan with appropriate goals if chronic or comorbid symptoms are exacerbated and prevent overall improvement and discharge     Problem: Discharge Planning  Goal: Discharge to home or other facility with appropriate resources  1/17/2025 2021 by Truman Hope RN  Outcome: Progressing  1/17/2025 1218 by Tejas Smith, RN  Outcome: Progressing  Flowsheets (Taken 1/17/2025 0751)  Discharge to home or other facility with appropriate resources:   Identify barriers to discharge with patient and caregiver   Arrange for needed discharge resources and transportation as appropriate   Identify discharge learning needs (meds, wound care, etc)     Problem: Safety - Adult  Goal: Free from fall injury  1/17/2025 2021 by Truman Hope RN  Outcome: Progressing  1/17/2025 1218 by Tejas Smith, RN  Outcome: Progressing     Problem: Pain  Goal: Verbalizes/displays adequate comfort level or baseline comfort level  1/17/2025 2021 by Truman Hope RN  Outcome: Progressing  1/17/2025 1218 by Tejas Smith, RN  Outcome: Progressing  Flowsheets (Taken 1/17/2025 0745)  Verbalizes/displays adequate comfort level or baseline comfort level:   Encourage patient to monitor pain and request assistance   Assess pain using appropriate pain scale

## 2025-01-19 PROBLEM — U07.1 COVID-19: Status: ACTIVE | Noted: 2025-01-19

## 2025-01-19 LAB
ALBUMIN SERPL-MCNC: 4.1 G/DL (ref 3.5–5.2)
ALP SERPL-CCNC: 74 U/L (ref 40–129)
ALT SERPL-CCNC: 9 U/L (ref 0–40)
ANION GAP SERPL CALCULATED.3IONS-SCNC: 9 MMOL/L (ref 7–16)
AST SERPL-CCNC: 16 U/L (ref 0–39)
BASOPHILS # BLD: 0 K/UL (ref 0–0.2)
BASOPHILS NFR BLD: 0 % (ref 0–2)
BILIRUB SERPL-MCNC: 0.6 MG/DL (ref 0–1.2)
BUN SERPL-MCNC: 39 MG/DL (ref 6–23)
CALCIUM SERPL-MCNC: 9.1 MG/DL (ref 8.6–10.2)
CHLORIDE SERPL-SCNC: 102 MMOL/L (ref 98–107)
CO2 SERPL-SCNC: 30 MMOL/L (ref 22–29)
CREAT SERPL-MCNC: 1.5 MG/DL (ref 0.7–1.2)
EOSINOPHIL # BLD: 0 K/UL (ref 0.05–0.5)
EOSINOPHILS RELATIVE PERCENT: 0 % (ref 0–6)
ERYTHROCYTE [DISTWIDTH] IN BLOOD BY AUTOMATED COUNT: 15.1 % (ref 11.5–15)
GFR, ESTIMATED: 47 ML/MIN/1.73M2
GLUCOSE BLD-MCNC: 132 MG/DL (ref 74–99)
GLUCOSE BLD-MCNC: 165 MG/DL (ref 74–99)
GLUCOSE BLD-MCNC: 172 MG/DL (ref 74–99)
GLUCOSE BLD-MCNC: 203 MG/DL (ref 74–99)
GLUCOSE SERPL-MCNC: 238 MG/DL (ref 74–99)
HCT VFR BLD AUTO: 42.9 % (ref 37–54)
HGB BLD-MCNC: 13.3 G/DL (ref 12.5–16.5)
LYMPHOCYTES NFR BLD: 0.46 K/UL (ref 1.5–4)
LYMPHOCYTES RELATIVE PERCENT: 7 % (ref 20–42)
MCH RBC QN AUTO: 28.2 PG (ref 26–35)
MCHC RBC AUTO-ENTMCNC: 31 G/DL (ref 32–34.5)
MCV RBC AUTO: 90.9 FL (ref 80–99.9)
MONOCYTES NFR BLD: 0.13 K/UL (ref 0.1–0.95)
MONOCYTES NFR BLD: 2 % (ref 2–12)
NEUTROPHILS NFR BLD: 91 % (ref 43–80)
NEUTS SEG NFR BLD: 5.92 K/UL (ref 1.8–7.3)
PLATELET # BLD AUTO: 134 K/UL (ref 130–450)
PMV BLD AUTO: 13.1 FL (ref 7–12)
POTASSIUM SERPL-SCNC: 4.6 MMOL/L (ref 3.5–5)
PROT SERPL-MCNC: 7.3 G/DL (ref 6.4–8.3)
RBC # BLD AUTO: 4.72 M/UL (ref 3.8–5.8)
RBC # BLD: ABNORMAL 10*6/UL
SODIUM SERPL-SCNC: 141 MMOL/L (ref 132–146)
WBC OTHER # BLD: 6.5 K/UL (ref 4.5–11.5)

## 2025-01-19 PROCEDURE — 6360000002 HC RX W HCPCS

## 2025-01-19 PROCEDURE — 6370000000 HC RX 637 (ALT 250 FOR IP)

## 2025-01-19 PROCEDURE — 2500000003 HC RX 250 WO HCPCS

## 2025-01-19 PROCEDURE — 85025 COMPLETE CBC W/AUTO DIFF WBC: CPT

## 2025-01-19 PROCEDURE — 36415 COLL VENOUS BLD VENIPUNCTURE: CPT

## 2025-01-19 PROCEDURE — 2700000000 HC OXYGEN THERAPY PER DAY

## 2025-01-19 PROCEDURE — 82962 GLUCOSE BLOOD TEST: CPT

## 2025-01-19 PROCEDURE — 80053 COMPREHEN METABOLIC PANEL: CPT

## 2025-01-19 PROCEDURE — 94640 AIRWAY INHALATION TREATMENT: CPT

## 2025-01-19 PROCEDURE — 99233 SBSQ HOSP IP/OBS HIGH 50: CPT | Performed by: INTERNAL MEDICINE

## 2025-01-19 PROCEDURE — 2060000000 HC ICU INTERMEDIATE R&B

## 2025-01-19 RX ADMIN — IPRATROPIUM BROMIDE 0.5 MG: 0.5 SOLUTION RESPIRATORY (INHALATION) at 18:32

## 2025-01-19 RX ADMIN — OXYCODONE AND ACETAMINOPHEN 1 TABLET: 5; 325 TABLET ORAL at 05:37

## 2025-01-19 RX ADMIN — INSULIN LISPRO 2 UNITS: 100 INJECTION, SOLUTION INTRAVENOUS; SUBCUTANEOUS at 08:41

## 2025-01-19 RX ADMIN — BUDESONIDE 500 MCG: 0.5 INHALANT RESPIRATORY (INHALATION) at 18:32

## 2025-01-19 RX ADMIN — ENOXAPARIN SODIUM 30 MG: 100 INJECTION SUBCUTANEOUS at 21:20

## 2025-01-19 RX ADMIN — OXYCODONE AND ACETAMINOPHEN 1 TABLET: 5; 325 TABLET ORAL at 21:18

## 2025-01-19 RX ADMIN — BUDESONIDE 500 MCG: 0.5 INHALANT RESPIRATORY (INHALATION) at 06:28

## 2025-01-19 RX ADMIN — OXYCODONE AND ACETAMINOPHEN 1 TABLET: 5; 325 TABLET ORAL at 00:05

## 2025-01-19 RX ADMIN — WATER 20 MG: 1 INJECTION INTRAMUSCULAR; INTRAVENOUS; SUBCUTANEOUS at 21:19

## 2025-01-19 RX ADMIN — IPRATROPIUM BROMIDE 0.5 MG: 0.5 SOLUTION RESPIRATORY (INHALATION) at 10:56

## 2025-01-19 RX ADMIN — CARVEDILOL 25 MG: 25 TABLET, FILM COATED ORAL at 17:11

## 2025-01-19 RX ADMIN — ENOXAPARIN SODIUM 30 MG: 100 INJECTION SUBCUTANEOUS at 08:41

## 2025-01-19 RX ADMIN — PANTOPRAZOLE SODIUM 40 MG: 40 TABLET, DELAYED RELEASE ORAL at 05:27

## 2025-01-19 RX ADMIN — ARFORMOTEROL TARTRATE 15 MCG: 15 SOLUTION RESPIRATORY (INHALATION) at 06:28

## 2025-01-19 RX ADMIN — TAMSULOSIN HYDROCHLORIDE 0.4 MG: 0.4 CAPSULE ORAL at 08:41

## 2025-01-19 RX ADMIN — ATORVASTATIN CALCIUM 40 MG: 40 TABLET, FILM COATED ORAL at 21:18

## 2025-01-19 RX ADMIN — OSELTAMIVIR PHOSPHATE 30 MG: 30 CAPSULE ORAL at 21:18

## 2025-01-19 RX ADMIN — CARVEDILOL 25 MG: 25 TABLET, FILM COATED ORAL at 08:40

## 2025-01-19 RX ADMIN — WATER 40 MG: 1 INJECTION INTRAMUSCULAR; INTRAVENOUS; SUBCUTANEOUS at 05:27

## 2025-01-19 RX ADMIN — ERGOCALCIFEROL 50000 UNITS: 1.25 CAPSULE ORAL at 08:40

## 2025-01-19 RX ADMIN — DOXYCYCLINE HYCLATE 100 MG: 100 CAPSULE ORAL at 21:18

## 2025-01-19 RX ADMIN — DOXYCYCLINE HYCLATE 100 MG: 100 CAPSULE ORAL at 08:40

## 2025-01-19 RX ADMIN — AMLODIPINE BESYLATE 5 MG: 5 TABLET ORAL at 08:40

## 2025-01-19 RX ADMIN — CYANOCOBALAMIN TAB 1000 MCG 1000 MCG: 1000 TAB at 08:40

## 2025-01-19 RX ADMIN — OXYCODONE AND ACETAMINOPHEN 1 TABLET: 5; 325 TABLET ORAL at 17:13

## 2025-01-19 RX ADMIN — IPRATROPIUM BROMIDE 0.5 MG: 0.5 SOLUTION RESPIRATORY (INHALATION) at 06:28

## 2025-01-19 RX ADMIN — ARFORMOTEROL TARTRATE 15 MCG: 15 SOLUTION RESPIRATORY (INHALATION) at 18:32

## 2025-01-19 RX ADMIN — OSELTAMIVIR PHOSPHATE 30 MG: 30 CAPSULE ORAL at 08:40

## 2025-01-19 ASSESSMENT — PAIN DESCRIPTION - LOCATION
LOCATION: GENERALIZED
LOCATION: BACK
LOCATION: GENERALIZED
LOCATION: BACK

## 2025-01-19 ASSESSMENT — PAIN DESCRIPTION - ORIENTATION
ORIENTATION: LOWER
ORIENTATION: LOWER

## 2025-01-19 ASSESSMENT — PAIN DESCRIPTION - DESCRIPTORS
DESCRIPTORS: ACHING;THROBBING
DESCRIPTORS: ACHING
DESCRIPTORS: ACHING;DISCOMFORT;SORE
DESCRIPTORS: ACHING

## 2025-01-19 ASSESSMENT — PAIN SCALES - GENERAL
PAINLEVEL_OUTOF10: 4
PAINLEVEL_OUTOF10: 7
PAINLEVEL_OUTOF10: 5
PAINLEVEL_OUTOF10: 3
PAINLEVEL_OUTOF10: 5
PAINLEVEL_OUTOF10: 5
PAINLEVEL_OUTOF10: 7
PAINLEVEL_OUTOF10: 8

## 2025-01-19 ASSESSMENT — PAIN - FUNCTIONAL ASSESSMENT: PAIN_FUNCTIONAL_ASSESSMENT: ACTIVITIES ARE NOT PREVENTED

## 2025-01-19 NOTE — PROGRESS NOTES
INPATIENT CARDIOLOGY FOLLOW-UP    Name: Osiel Mcnamara    Age: 84 y.o.    Date of Admission: 1/15/2025  5:10 PM    Date of Service: 1/17/2025    Chief Complaint: Follow-up for CHF, VHD, acute on chronic hypoxic respiratory failure    Interim History:  Currently with no chest pain, respiratory distress, or palpitations. SR on EKG and telemetry. No new overnight cardiac complaints. COVID-19 and influenza A positive on 1/16/25.    Review of Systems:   Cardiac: As per HPI  General: No fever, chills  Pulmonary: As per HPI  HEENT: No visual disturbances, difficult swallowing  GI: No nausea, vomiting  : No dysuria, hematuria  Endocrine: No thyroid disease, +DM  Musculoskeletal: PROCTOR x 4, no focal motor deficits  Skin: Intact, no rashes  Neuro: No headache, seizures  Psych: Currently with no depression, anxiety    Problem List:  Patient Active Problem List   Diagnosis    DDD (degenerative disc disease), lumbosacral    Lumbago    Lumbar facet arthropathy    Type 2 diabetes mellitus with hyperglycemia, without long-term current use of insulin ([facility])    Hypertension    Arthritis    Hyperlipidemia    Benign prostatic hyperplasia    Primary localized osteoarthritis    Nocturia    Insomnia    Aortic stenosis, moderate    Bilateral carotid bruits    Acute on chronic systolic (congestive) heart failure (HCC)    Congestive heart failure (HCC)    CHF (congestive heart failure), NYHA class I, acute on chronic, combined ([facility])    SOB (shortness of breath)    Multinodular goiter    Chronic pain syndrome    Spinal stenosis of lumbar region without neurogenic claudication    Lumbar disc disorder    Lumbar spondylosis    Uncomplicated opioid dependence ([facility])    Type 2 diabetes mellitus with chronic kidney disease    Acute hypoxic on chronic hypercapnic respiratory failure    S/P TAVR (transcatheter aortic valve replacement)    Cardiac pacemaker in situ    COPD exacerbation (HCC)    URSZULA (acute kidney injury) ([facility])    Abnormal 
    INPATIENT CARDIOLOGY FOLLOW-UP    Name: Osiel Mcnamara    Age: 84 y.o.    Date of Admission: 1/15/2025  5:10 PM    Date of Service: 1/18/2025    Chief Complaint: Follow-up for CHF, VHD, acute on chronic hypoxic respiratory failure    Interim History:  Currently with no chest pain, respiratory distress, or palpitations. SR on EKG and telemetry. No new overnight cardiac complaints. COVID-19 and influenza A positive on 1/16/25. Most recent SPO2 92 on 2L NC.    Review of Systems:   Cardiac: As per HPI  General: No fever, chills  Pulmonary: As per HPI  HEENT: No visual disturbances, difficult swallowing  GI: No nausea, vomiting  : No dysuria, hematuria  Endocrine: No thyroid disease, +DM  Musculoskeletal: PROCTOR x 4, no focal motor deficits  Skin: Intact, no rashes  Neuro: No headache, seizures  Psych: Currently with no depression, anxiety    Problem List:  Patient Active Problem List   Diagnosis    DDD (degenerative disc disease), lumbosacral    Lumbago    Lumbar facet arthropathy    Type 2 diabetes mellitus with hyperglycemia, without long-term current use of insulin (HCC)    Hypertension    Arthritis    Hyperlipidemia    Benign prostatic hyperplasia    Primary localized osteoarthritis    Nocturia    Insomnia    Aortic stenosis, moderate    Bilateral carotid bruits    Acute on chronic systolic (congestive) heart failure (HCC)    Congestive heart failure (HCC)    CHF (congestive heart failure), NYHA class I, acute on chronic, combined (HCC)    SOB (shortness of breath)    Multinodular goiter    Chronic pain syndrome    Spinal stenosis of lumbar region without neurogenic claudication    Lumbar disc disorder    Lumbar spondylosis    Uncomplicated opioid dependence (HCC)    Type 2 diabetes mellitus with chronic kidney disease    Acute hypoxic on chronic hypercapnic respiratory failure    S/P TAVR (transcatheter aortic valve replacement)    Cardiac pacemaker in situ    COPD exacerbation (HCC)    URSZULA (acute kidney injury) 
    INPATIENT CARDIOLOGY FOLLOW-UP    Name: Osiel Mcnamara    Age: 84 y.o.    Date of Admission: 1/15/2025  5:10 PM    Date of Service: 1/19/2025    Chief Complaint: Follow-up for CHF, VHD, acute on chronic hypoxic respiratory failure    Interim History:  Currently with no chest pain, respiratory distress, or palpitations. SR on EKG and telemetry. No new overnight cardiac complaints. COVID-19 and influenza A positive on 1/16/25. Most recent SPO2 97 on 2L NC.    Review of Systems:   Cardiac: As per HPI  General: No fever, chills  Pulmonary: As per HPI  HEENT: No visual disturbances, difficult swallowing  GI: No nausea, vomiting  : No dysuria, hematuria  Endocrine: No thyroid disease, +DM  Musculoskeletal: PROCTOR x 4, no focal motor deficits  Skin: Intact, no rashes  Neuro: No headache, seizures  Psych: Currently with no depression, anxiety    Problem List:  Patient Active Problem List   Diagnosis    DDD (degenerative disc disease), lumbosacral    Lumbago    Lumbar facet arthropathy    Type 2 diabetes mellitus with hyperglycemia, without long-term current use of insulin (HCC)    Hypertension    Arthritis    Hyperlipidemia    Benign prostatic hyperplasia    Primary localized osteoarthritis    Nocturia    Insomnia    Aortic stenosis, moderate    Bilateral carotid bruits    Acute on chronic systolic (congestive) heart failure (HCC)    Congestive heart failure (HCC)    CHF (congestive heart failure), NYHA class I, acute on chronic, combined (HCC)    SOB (shortness of breath)    Multinodular goiter    Chronic pain syndrome    Spinal stenosis of lumbar region without neurogenic claudication    Lumbar disc disorder    Lumbar spondylosis    Uncomplicated opioid dependence (HCC)    Type 2 diabetes mellitus with chronic kidney disease    Acute hypoxic on chronic hypercapnic respiratory failure    S/P TAVR (transcatheter aortic valve replacement)    Cardiac pacemaker in situ    COPD exacerbation (HCC)    URSZULA (acute kidney injury) 
    This patient is on medication that requires renal, weight, and/or indication dose adjustment.      Date Body Weight IBW  Adjusted BW SCr  CrCl Dialysis status   1/16/2025 104.3 kg (230 lb) Ideal body weight: 73 kg (160 lb 15 oz)  Adjusted ideal body weight: 85.5 kg (188 lb 9 oz) Serum creatinine: 1.3 mg/dL (H) 01/15/25 1800  Estimated creatinine clearance: 51 mL/min (A) N/a       Pharmacy has dose-adjusted the following medication(s):    Date Previous Order Adjusted Order   1/16/2025 Enoxaparin 40mg daily Enoxaparin 30mg twice daily       These changes were made per protocol according to the [facility]   Automatic Renal Dose Adjustment Policy.     *Please note this dose may need readjusted if patient's condition changes.    Please contact pharmacy with any questions regarding these changes.    Semaj Zarate RPH  1/16/2025  1:35 AM   
4 Eyes Skin Assessment     NAME:  Osiel Mcnamara  YOB: 1940  MEDICAL RECORD NUMBER:  [id number]    The patient is being assessed for  Admission    I agree that at least one RN has performed a thorough Head to Toe Skin Assessment on the patient. ALL assessment sites listed below have been assessed.      Areas assessed by both nurses:    Head, Face, Ears, Shoulders, Back, Chest, Arms, Elbows, Hands, Sacrum. Buttock, Coccyx, Ischium, Legs. Feet and Heels, and Under Medical Devices         Does the Patient have a Wound? No noted wound(s)       Bran Prevention initiated by RN: No  Wound Care Orders initiated by RN: No    Pressure Injury (Stage 3,4, Unstageable, DTI, NWPT, and Complex wounds) if present, place Wound referral order by RN under : No    New Ostomies, if present place, Ostomy referral order under : No     Nurse 1 eSignature: Electronically signed by Truman Hope RN on 1/16/25 at 1:42 AM EST    **SHARE this note so that the co-signing nurse can place an eSignature**    Nurse 2 eSignature: Electronically signed by Slime Lopez RN on 1/16/25 at 2:28 AM EST  
Internal Medicine Consult Note    [facility]    Osiel Montes De Oca D.O., OTONIELOZhaoI.                    Jhon Wan D.O., OTONIELOZhaoI.                             Ck Ventura D.O.     Liudmila Steward, MSN, APRN, NP-C  Mathieu Mcnamara, MSN, APRN-CNP  Vignesh Parisi, MSN, APRN-CNP  Johana Lopez, MSN APRN-CNP  Erin Stokes, MSN. APRN-NP-C     Primary Care Physician: Cameron Benson DO   Admitting Physician:  Osiel Montes De Oca DO  Admission date and time: 1/15/2025  5:10 PM    Room:  [address] [place] [address]  Admitting diagnosis: COPD exacerbation (HCC) [J44.1]  Acute on chronic respiratory failure with hypoxia [J96.21]  Acute hypoxic respiratory failure [J96.01]    Patient Name: Osiel Mcnamara  MRN: [id number]    Date of Service: 1/18/2025     Subjective:  Osiel is a 84 y.o. male who was seen and examined today,1/18/2025, at the bedside.  Patient sitting on side of bed.  He remains on 2 L oxygen with nasal cannula.  He states that he is breathing little better today.  He did have a bump in his BUN and creatinine that is currently 51 and 1.9.  Patient states that he has been drinking fluids.  Will start IV fluids and monitor renal function.  No family present during my examination.    Review of System:   Constitutional:   Denies fever or chills, weight loss or gain, improving fatigue  HEENT:   Denies ear pain, sore throat, sinus or eye problems.  Cardiovascular:   Denies any chest pain, irregular heartbeats, or palpitations.   Respiratory:   Denies , coughing, sputum production, hemoptysis, or wheezing.  Positive shortness of breath  Gastrointestinal:   Denies nausea, vomiting, diarrhea, or constipation.  Denies any abdominal pain.  Genitourinary:    Denies any urgency, frequency, hematuria. Voiding  without difficulty.  Extremities:   Denies lower extremity swelling, edema or cyanosis.   Neurology:    Denies any headache or focal neurological deficits, Denies generalized weakness or memory difficulty. 
Internal Medicine Consult Note    [facility]    Osiel Montes De Oca D.O., OTONIELOZhaoI.                    John Wan D.O., OTONIELOZhaoI.                             Ck Ventura D.O.     Liudmila Steward, MSN, APRN, NP-C  Mathieu Mcnamara, MSN, APRN-CNP  Vignesh Parisi, MSN, APRN-CNP  Johana Lopez, MSN APRN-CNP  Erin Stokes, MSN. APRN-NP-C     Primary Care Physician: Cameron Benson DO   Admitting Physician:  Osiel Montes De Oca DO  Admission date and time: 1/15/2025  5:10 PM    Room:  [address] [place] [address]  Admitting diagnosis: COPD exacerbation (HCC) [J44.1]  Acute on chronic respiratory failure with hypoxia [J96.21]  Acute hypoxic respiratory failure [J96.01]    Patient Name: Osiel Mcnamara  MRN: [id number]    Date of Service: 1/16/2025     Subjective:  Osiel is a 84 y.o. male who was seen and examined today,1/16/2025, at the bedside.  Patient sitting on side of bed.  Requiring use of 3 L of oxygen by nasal cannula.  Patient appears dyspneic with general conversation.  Reported he stopped taking his Lasix approximately a week ago because it was making him urinate too much.  Will consult cardiology due to CHF.  The patient follows with Dr. Alexander as outpatient.    No family present during my examination.    Review of System:   Constitutional:   Denies fever or chills, weight loss or gain, positive fatigue  HEENT:   Denies ear pain, sore throat, sinus or eye problems.  Cardiovascular:   Denies any chest pain, irregular heartbeats, or palpitations.   Respiratory:   Denies , coughing, sputum production, hemoptysis, or wheezing.  Positive shortness of breath  Gastrointestinal:   Denies nausea, vomiting, diarrhea, or constipation.  Denies any abdominal pain.  Genitourinary:    Denies any urgency, frequency, hematuria. Voiding  without difficulty.  Extremities:   Denies lower extremity swelling, edema or cyanosis.   Neurology:    Denies any headache or focal neurological deficits, Denies generalized 
Internal Medicine Consult Note    [facility]    Osiel Montes De cOa D.O., OTONIELOZhaoI.                    John Wan D.O., OTONIELOZhaoI.                             Ck Ventura D.O.     Liudmila Steward, MSN, APRN, NP-C  Mathieu Mcnamara, MSN, APRN-CNP  Vignesh Parisi, MSN, APRN-CNP  Johana Lopez, MSN APRN-CNP  Erin Stokes, MSN. APRN-NP-C     Primary Care Physician: Cameron Benson DO   Admitting Physician:  Osiel Montes De Oca DO  Admission date and time: 1/15/2025  5:10 PM    Room:  [address] [place] [address]  Admitting diagnosis: COPD exacerbation (HCC) [J44.1]  Acute on chronic respiratory failure with hypoxia [J96.21]  Acute hypoxic respiratory failure [J96.01]    Patient Name: Osiel Mcnamara  MRN: [id number]    Date of Service: 1/19/2025     Subjective:  Osiel is a 84 y.o. male who was seen and examined today,1/19/2025, at the bedside.  Patient sitting on side of bed.  He remains on 2 L oxygen with nasal cannula.  He states he is feeling little bit better every day.  Still reports shortness of breath with exertion.  His renal function is trending upwards, a.m. lab work is still pending.  Will decrease steroids.  Review of System:   Constitutional:   Denies fever or chills, weight loss or gain, improving fatigue  HEENT:   Denies ear pain, sore throat, sinus or eye problems.  Cardiovascular:   Denies any chest pain, irregular heartbeats, or palpitations.   Respiratory:   Denies , coughing, sputum production, hemoptysis, or wheezing.  Positive shortness of breath  Gastrointestinal:   Denies nausea, vomiting, diarrhea, or constipation.  Denies any abdominal pain.  Genitourinary:    Denies any urgency, frequency, hematuria. Voiding  without difficulty.  Extremities:   Denies lower extremity swelling, edema or cyanosis.   Neurology:    Denies any headache or focal neurological deficits, Denies generalized weakness or memory difficulty.   Psch:   Denies being anxious or depressed.  Musculoskeletal:    Denies 
Physical Therapy  Physical Therapy Initial Evaluation/Plan of Care    Room #:  0621/0621-01  Patient Name: Osiel Mcnamara  YOB: 1940  MRN: [id number]    Date of Service: 1/16/2025     Tentative placement recommendation: Home with Home Health Physical Therapy  Equipment recommendation: Patient has needed equipment       Evaluating Physical Therapist: Enio Robert, PT, DPT #[id number]      Specific Provider Orders/Date/Referring Provider :     01/15/25 2015    PT eval and treat  Start:  01/15/25 2015,   End:  01/15/25 2015,   ONE TIME,   Standing Count:  1 Occurrences,   R       Erin Stokes, APRN - CNP Acknowledge New    Admitting Diagnosis:   COPD exacerbation (HCC) [J44.1]  Acute on chronic respiratory failure with hypoxia [J96.21]  Acute hypoxic respiratory failure [J96.01]      Surgery: none  Visit Diagnoses         Codes    Acute hypoxic respiratory failure    -  Primary J96.01    COPD exacerbation (HCC)     J44.1            Patient Active Problem List   Diagnosis    DDD (degenerative disc disease), lumbosacral    Lumbago    Lumbar facet arthropathy    Type 2 diabetes mellitus with hyperglycemia, without long-term current use of insulin (HCC)    Hypertension    Arthritis    Hyperlipidemia    Benign prostatic hyperplasia    Primary localized osteoarthritis    Nocturia    Insomnia    Aortic stenosis, moderate    Bilateral carotid bruits    Acute on chronic systolic (congestive) heart failure (HCC)    Congestive heart failure (HCC)    CHF (congestive heart failure), NYHA class I, acute on chronic, combined (HCC)    SOB (shortness of breath)    Multinodular goiter    Chronic pain syndrome    Spinal stenosis of lumbar region without neurogenic claudication    Lumbar disc disorder    Lumbar spondylosis    Uncomplicated opioid dependence (HCC)    Type 2 diabetes mellitus with chronic kidney disease    Acute hypoxic on chronic hypercapnic respiratory failure    History of transcatheter aortic valve 
Spiritual Health History and Assessment/Progress Note  [facility]    Spiritual/Emotional Needs,  ,  ,      Name: Osiel Mcnamara MRN: [id number]    Age: 84 y.o.     Sex: male   Language: English   Mormonism: Roman Catholic   Acute on chronic respiratory failure with hypoxia     Date: 1/17/2025                           Spiritual Assessment began in [facility]        Referral/Consult From: Rounding   Encounter Overview/Reason: Spiritual/Emotional Needs  Service Provided For: Patient and family together    Huma, Belief, Meaning:   Patient identifies as spiritual  Family/Friends identify as spiritual      Importance and Influence:  Patient has spiritual/personal beliefs that influence decisions regarding their health  Family/Friends have spiritual/personal beliefs that influence decisions regarding the patient's health    Community:  Patient feels well-supported. Support system includes: Spouse/Partner and Children  Family/Friends feel well-supported. Support system includes: Parent/s and Extended family    Assessment and Plan of Care:     Patient Interventions include: Facilitated expression of thoughts and feelings, Explored spiritual coping/struggle/distress, and Affirmed coping skills/support systems  Family/Friends Interventions include: Facilitated expression of thoughts and feelings, Explored spiritual coping/struggle/distress, and Affirmed coping skills/support systems    Patient Plan of Care: Spiritual Care available upon further referral  Family/Friends Plan of Care: Spiritual Care available upon further referral    Electronically signed by DIMA Leonardo on 1/17/2025 at 1:49 PM    
anxious or depressed.  Musculoskeletal:    Denies  myalgias, joint complaints or back pain.   Integumentary:   Denies any rashes, ulcers, or excoriations.  Denies bruising.  Hematologic/Lymphatic:  Denies bruising or bleeding.    Physical Exam:  I/O this shift:  In: -   Out: 225 [Urine:225]    Intake/Output Summary (Last 24 hours) at 1/17/2025 1735  Last data filed at 1/17/2025 0745  Gross per 24 hour   Intake --   Output 225 ml   Net -225 ml   I/O last 3 completed shifts:  In: 360 [P.O.:360]  Out: 175 [Urine:175]  Patient Vitals for the past 96 hrs (Last 3 readings):   Weight   01/17/25 1203 103.9 kg (229 lb)   01/16/25 0659 104.1 kg (229 lb 8 oz)   01/16/25 0128 104.3 kg (230 lb)     Vital Signs:   Blood pressure 131/77, pulse 83, temperature 97.5 °F (36.4 °C), temperature source Oral, resp. rate 16, height 1.778 m (5' 10\"), weight 103.9 kg (229 lb), SpO2 93%.    General appearance:  Alert, responsive, oriented to person, place, and time.  No acute distress.  Head:  Normocephalic. No masses, lesions or tenderness.  Eyes:  PERRLA.  EOMI.  Sclera clear.  Buccal mucosa moist.  ENT:  Ears normal. Mucosa normal.  Oxygen by nasal cannula  Neck:    Supple. Trachea midline. No thyromegaly. No JVD. No bruits.  Heart:    Rhythm regular. Rate controlled.  No murmurs.  Lungs:    Symmetrical.  Clear but diminished bilaterally.  No wheezes. No rhonchi. No rales.  Abdomen:   Soft. Non-tender. Non-distended. Bowel sounds positive. No organomegaly or masses.  No pain on palpation.  Obese  Extremities:    Peripheral pulses present.  1+ peripheral edema.  No ulcers. No cyanosis. No clubbing.  Neurologic:    Alert x 3.  No focal deficit.  Cranial nerves grossly intact. No focal weakness.  Psych:   Behavior is normal. Mood appears normal. Speech is not rapid and/or pressured.  Musculoskeletal:   Spine ROM normal. Muscular strength intact. Gait not assessed.  Integumentary:  No rashes  Skin normal color and 
during ADL tasks   Improve overall LUE strength  for participation in functional tasks      Vitals:   HR with activity: 103 bpm     SpO2 with activity: 91%      Hearing: WFL   Sensation:  No c/o numbness or tingling  Tone: WFL   Edema: None    Comments: Nursing approved therapy session. Upon arrival the patient was in bathroom upon arrival.  At end of session, patient was seated in bedside chair with call light and phone within reach, all lines and tubes intact.  Overall patient demonstrated decreased independence and safety during completion of ADL/functional transfer/mobility tasks.  Pt would benefit from continued skilled OT to increase safety and independence with completion of ADL/IADL tasks for functional independence and quality of life.    Treatment: OT treatment provided this date includes:   Instruction/training on safety and adapted techniques for completion of ADLs   Instruction/training on safe functional mobility/transfer techniques   Instruction/training on energy conservation/work simplification for completion of ADLs   Instruction/training on proper positioning/alignment to prevent contractures    Neuromuscular Reeducation to facilitate balance/righting reactions for increased function with ADLs tasks      Rehab Potential: Good for established goals.      Patient / Family Goal: Patient would like to return home and return to [facility] re: ADLs and IADLs        Patient and/or family were instructed on functional diagnosis, prognosis/goals and OT plan of care. Demonstrated good understanding.     Eval Complexity: Low  History: Brief review of medical records and additional review of physical, cognitive, or psychosocial history related to current functional performance  Exam: 3+ performance deficits  Assistance/Modification: Mod assistance or modifications required to perform tasks. May have comorbidities that affect occupational performance.    Time In: 1:14 PM   Time Out: 1:29 PM    Total Treatment Time:

## 2025-01-19 NOTE — PLAN OF CARE
Problem: Chronic Conditions and Co-morbidities  Goal: Patient's chronic conditions and co-morbidity symptoms are monitored and maintained or improved  Outcome: Progressing  Flowsheets (Taken 1/18/2025 2008)  Care Plan - Patient's Chronic Conditions and Co-Morbidity Symptoms are Monitored and Maintained or Improved: Monitor and assess patient's chronic conditions and comorbid symptoms for stability, deterioration, or improvement     Problem: Discharge Planning  Goal: Discharge to home or other facility with appropriate resources  Outcome: Progressing  Flowsheets (Taken 1/18/2025 2008)  Discharge to home or other facility with appropriate resources:   Identify barriers to discharge with patient and caregiver   Arrange for needed discharge resources and transportation as appropriate   Identify discharge learning needs (meds, wound care, etc)   Refer to discharge planning if patient needs post-hospital services based on physician order or complex needs related to functional status, cognitive ability or social support system     Problem: Safety - Adult  Goal: Free from fall injury  Outcome: Progressing     Problem: Pain  Goal: Verbalizes/displays adequate comfort level or baseline comfort level  Outcome: Progressing

## 2025-01-20 VITALS
WEIGHT: 222 LBS | SYSTOLIC BLOOD PRESSURE: 132 MMHG | HEIGHT: 70 IN | HEART RATE: 81 BPM | TEMPERATURE: 97.6 F | OXYGEN SATURATION: 98 % | DIASTOLIC BLOOD PRESSURE: 66 MMHG | BODY MASS INDEX: 31.78 KG/M2 | RESPIRATION RATE: 18 BRPM

## 2025-01-20 LAB
ALBUMIN SERPL-MCNC: 3.7 G/DL (ref 3.5–5.2)
ALP SERPL-CCNC: 68 U/L (ref 40–129)
ALT SERPL-CCNC: 9 U/L (ref 0–40)
ANION GAP SERPL CALCULATED.3IONS-SCNC: 7 MMOL/L (ref 7–16)
AST SERPL-CCNC: 18 U/L (ref 0–39)
BASOPHILS # BLD: 0 K/UL (ref 0–0.2)
BASOPHILS NFR BLD: 0 % (ref 0–2)
BILIRUB SERPL-MCNC: 0.5 MG/DL (ref 0–1.2)
BUN SERPL-MCNC: 36 MG/DL (ref 6–23)
CALCIUM SERPL-MCNC: 9.3 MG/DL (ref 8.6–10.2)
CHLORIDE SERPL-SCNC: 105 MMOL/L (ref 98–107)
CO2 SERPL-SCNC: 31 MMOL/L (ref 22–29)
CREAT SERPL-MCNC: 1.3 MG/DL (ref 0.7–1.2)
EOSINOPHIL # BLD: 0 K/UL (ref 0.05–0.5)
EOSINOPHILS RELATIVE PERCENT: 0 % (ref 0–6)
ERYTHROCYTE [DISTWIDTH] IN BLOOD BY AUTOMATED COUNT: 14.8 % (ref 11.5–15)
GFR, ESTIMATED: 53 ML/MIN/1.73M2
GLUCOSE BLD-MCNC: 167 MG/DL (ref 74–99)
GLUCOSE BLD-MCNC: 202 MG/DL (ref 74–99)
GLUCOSE SERPL-MCNC: 178 MG/DL (ref 74–99)
HCT VFR BLD AUTO: 41.1 % (ref 37–54)
HGB BLD-MCNC: 12.8 G/DL (ref 12.5–16.5)
LYMPHOCYTES NFR BLD: 0.38 K/UL (ref 1.5–4)
LYMPHOCYTES RELATIVE PERCENT: 6 % (ref 20–42)
MCH RBC QN AUTO: 28.8 PG (ref 26–35)
MCHC RBC AUTO-ENTMCNC: 31.1 G/DL (ref 32–34.5)
MCV RBC AUTO: 92.6 FL (ref 80–99.9)
MONOCYTES NFR BLD: 0.16 K/UL (ref 0.1–0.95)
MONOCYTES NFR BLD: 3 % (ref 2–12)
MYELOCYTES ABSOLUTE COUNT: 0.05 K/UL
MYELOCYTES: 1 %
NEUTROPHILS NFR BLD: 90 % (ref 43–80)
NEUTS SEG NFR BLD: 5.7 K/UL (ref 1.8–7.3)
PLATELET, FLUORESCENCE: 118 K/UL (ref 130–450)
PMV BLD AUTO: 13.1 FL (ref 7–12)
POTASSIUM SERPL-SCNC: 4.8 MMOL/L (ref 3.5–5)
PROT SERPL-MCNC: 6.9 G/DL (ref 6.4–8.3)
RBC # BLD AUTO: 4.44 M/UL (ref 3.8–5.8)
RBC # BLD: ABNORMAL 10*6/UL
RBC # BLD: ABNORMAL 10*6/UL
SODIUM SERPL-SCNC: 143 MMOL/L (ref 132–146)
WBC OTHER # BLD: 6.3 K/UL (ref 4.5–11.5)

## 2025-01-20 PROCEDURE — 6370000000 HC RX 637 (ALT 250 FOR IP)

## 2025-01-20 PROCEDURE — 2500000003 HC RX 250 WO HCPCS

## 2025-01-20 PROCEDURE — 6360000002 HC RX W HCPCS

## 2025-01-20 PROCEDURE — 94640 AIRWAY INHALATION TREATMENT: CPT

## 2025-01-20 PROCEDURE — 85025 COMPLETE CBC W/AUTO DIFF WBC: CPT

## 2025-01-20 PROCEDURE — 2700000000 HC OXYGEN THERAPY PER DAY

## 2025-01-20 PROCEDURE — 36415 COLL VENOUS BLD VENIPUNCTURE: CPT

## 2025-01-20 PROCEDURE — 82962 GLUCOSE BLOOD TEST: CPT

## 2025-01-20 PROCEDURE — 80053 COMPREHEN METABOLIC PANEL: CPT

## 2025-01-20 RX ORDER — MECOBALAMIN 5000 MCG
5 TABLET,DISINTEGRATING ORAL NIGHTLY PRN
COMMUNITY
Start: 2025-01-20

## 2025-01-20 RX ORDER — OSELTAMIVIR PHOSPHATE 30 MG/1
30 CAPSULE ORAL 2 TIMES DAILY
Qty: 3 CAPSULE | Refills: 0 | Status: SHIPPED | OUTPATIENT
Start: 2025-01-20 | End: 2025-01-22

## 2025-01-20 RX ORDER — PREDNISONE 20 MG/1
40 TABLET ORAL
Qty: 5 TABLET | Refills: 0 | Status: SHIPPED | OUTPATIENT
Start: 2025-01-20 | End: 2025-01-25

## 2025-01-20 RX ORDER — DOXYCYCLINE 100 MG/1
100 CAPSULE ORAL EVERY 12 HOURS SCHEDULED
Qty: 2 CAPSULE | Refills: 0 | Status: SHIPPED | OUTPATIENT
Start: 2025-01-20 | End: 2025-01-21

## 2025-01-20 RX ADMIN — CARVEDILOL 25 MG: 25 TABLET, FILM COATED ORAL at 10:43

## 2025-01-20 RX ADMIN — AMLODIPINE BESYLATE 5 MG: 5 TABLET ORAL at 10:43

## 2025-01-20 RX ADMIN — INSULIN LISPRO 2 UNITS: 100 INJECTION, SOLUTION INTRAVENOUS; SUBCUTANEOUS at 13:02

## 2025-01-20 RX ADMIN — BUDESONIDE 500 MCG: 0.5 INHALANT RESPIRATORY (INHALATION) at 05:31

## 2025-01-20 RX ADMIN — OSELTAMIVIR PHOSPHATE 30 MG: 30 CAPSULE ORAL at 10:43

## 2025-01-20 RX ADMIN — IPRATROPIUM BROMIDE 0.5 MG: 0.5 SOLUTION RESPIRATORY (INHALATION) at 09:12

## 2025-01-20 RX ADMIN — OXYCODONE AND ACETAMINOPHEN 1 TABLET: 5; 325 TABLET ORAL at 16:17

## 2025-01-20 RX ADMIN — TAMSULOSIN HYDROCHLORIDE 0.4 MG: 0.4 CAPSULE ORAL at 10:43

## 2025-01-20 RX ADMIN — WATER 20 MG: 1 INJECTION INTRAMUSCULAR; INTRAVENOUS; SUBCUTANEOUS at 10:44

## 2025-01-20 RX ADMIN — DOXYCYCLINE HYCLATE 100 MG: 100 CAPSULE ORAL at 10:43

## 2025-01-20 RX ADMIN — Medication 5 MG: at 01:46

## 2025-01-20 RX ADMIN — IPRATROPIUM BROMIDE 0.5 MG: 0.5 SOLUTION RESPIRATORY (INHALATION) at 05:30

## 2025-01-20 RX ADMIN — CYANOCOBALAMIN TAB 1000 MCG 1000 MCG: 1000 TAB at 10:43

## 2025-01-20 RX ADMIN — ARFORMOTEROL TARTRATE 15 MCG: 15 SOLUTION RESPIRATORY (INHALATION) at 05:31

## 2025-01-20 RX ADMIN — OXYCODONE AND ACETAMINOPHEN 1 TABLET: 5; 325 TABLET ORAL at 05:46

## 2025-01-20 RX ADMIN — OXYCODONE AND ACETAMINOPHEN 1 TABLET: 5; 325 TABLET ORAL at 01:46

## 2025-01-20 RX ADMIN — ENOXAPARIN SODIUM 30 MG: 100 INJECTION SUBCUTANEOUS at 10:44

## 2025-01-20 RX ADMIN — PANTOPRAZOLE SODIUM 40 MG: 40 TABLET, DELAYED RELEASE ORAL at 05:24

## 2025-01-20 ASSESSMENT — PAIN SCALES - GENERAL
PAINLEVEL_OUTOF10: 7
PAINLEVEL_OUTOF10: 0
PAINLEVEL_OUTOF10: 0
PAINLEVEL_OUTOF10: 3
PAINLEVEL_OUTOF10: 7

## 2025-01-20 ASSESSMENT — PAIN DESCRIPTION - LOCATION
LOCATION: GENERALIZED
LOCATION: GENERALIZED

## 2025-01-20 ASSESSMENT — PAIN DESCRIPTION - DESCRIPTORS
DESCRIPTORS: ACHING;DISCOMFORT
DESCRIPTORS: ACHING;DISCOMFORT;SORE

## 2025-01-20 NOTE — CARE COORDINATION
1/20/2025 1520 COVID+ Influenza A+ CM Note:  Pt is being discharged home today with no needs.  Confirmed with Lorri hubbard at [facility] that pt's order for O2 is 2 liters continuous and not PRN.  Pt's family will provide transportation home.  Electronically signed by Federica Rosen RN on 1/20/2025 at 3:35 PM

## 2025-01-20 NOTE — DISCHARGE SUMMARY
Internal Medicine Progress Note     [facility]     Osiel Montes De Oca D.O., OTONIELOZhaoI.                         John Wan D.O., OTONIELOZhaoIZhao Ventura D.O.     Liudmila Steward, MSN, APRN, NP-C  Mathieu Mcnamara, MSN, APRN-CNP  Vignesh Parisi, MSN, APRN, NP-C  Johana Lopez, MSN, APRN-CNP  Erin Stokes, MSN, APRN, NP-C       Internal Medicine  Discharge Summary    NAME: Osiel Mcnamara  :  1940  MRN:  [id number]  PCP:Cameron Benson DO  ADMITTED: 1/15/2025      DISCHARGED: 25    ADMITTING PHYSICIAN: Osiel Montes De Oca DO    CONSULTANT(S):   IP CONSULT TO SOCIAL WORK  IP CONSULT TO CARDIOLOGY     ADMITTING DIAGNOSIS:   COPD exacerbation (HCC) [J44.1]  Acute on chronic respiratory failure with hypoxia [J96.21]  Acute hypoxic respiratory failure [J96.01]     DISCHARGE DIAGNOSES:   Acute on chronic hypoxic respiratory failure secondary to COVID-19 and influenza  Atypical pneumonia with COVID-19 and Influenza  Decompensated heart failure with reduced ejection fraction complicated by medication noncompliance with diuretic  COPD on Trelegy Ellipta, chronic nasal cannula, albuterol sulfate  Type II non-insulin-dependent diabetes mellitus on metformin  Essential hypertension on carvedilol, amlodipine  Hyperlipidemia on atorvastatin  Acute kidney injury, multifactorial  BPH on tamsulosin  GERD on pantoprazole  Chronic constipation on Movantik (held)  History of pernicious anemia on cyanocobalamin  Vitamin D deficiency on ergocalciferol  Class I obesity BMI 33.00 kg/m²    BRIEF HISTORY OF PRESENT ILLNESS:   Patient states he has a history of COPD and wears 2 L nasal cannula as needed at home.  He states that on New 's Roberta he started having shortness of breath, wheezing, coughing and came to the emergency room and was given breathing treatments/antibiotics/steroids and discharged home.  He states that since then his symptoms have progressed; he is having shortness of breath with

## 2025-01-21 ENCOUNTER — CARE COORDINATION (OUTPATIENT)
Dept: CARE COORDINATION | Age: 85
End: 2025-01-21

## 2025-01-21 NOTE — CARE COORDINATION
Care Transitions Note    Initial Call - Call within 2 business days of discharge: Yes    Care Transition Nurse attempted initial outreach. Primary number was an immediate hang up. Left HIPAA VM to secondary outreach, purpose of call, my contact info and reminder to schedule appts.     Patient: Osiel Mcnamara      Patient : 1940   MRN: [id number]      Reason for Admission: 1/15/2025 - 2025 [facility] IP. Covid & Flu A pos 25, COPD.  Discharge Date: 25    RURS: Readmission Risk Score: 15.1  End date: 2025    Follow up with Cameron Benson DO (Family Medicine); primary care-- call for hospital follow up  Follow up with Liudmila Steward APRN - CNP (Nurse Practitioner)  Follow up with Nazia Tucker MD (Cardiology); [facility] cardiology-- call for hospital follow up    START taking:  doxycycline hyclate (VIBRAMYCIN)  melatonin  oseltamivir (TAMIFLU)  CHANGE how you take:  metFORMIN (GLUCOPHAGE)  STOP taking:  amLODIPine-benazepril 10-20 MG per  capsule (LOTREL)    02 @ 2L thru [facility]     Last Discharge Facility       Date Complaint Diagnosis Description Type Department Provider    1/15/25 Shortness of Breath Acute hypoxic respiratory failure ... ED to Hosp-Admission (Discharged) (ADMITTED[place] [facility] [place]; Dash Sofia...            Follow Up Appointment:     Future Appointments         Provider Specialty Dept Phone    2025 3:00 PM Liudmila Steward APRN - CNP Internal Medicine [phone]            Jen Cota RN

## 2025-01-22 ENCOUNTER — CARE COORDINATION (OUTPATIENT)
Dept: CARE COORDINATION | Age: 85
End: 2025-01-22

## 2025-03-26 ENCOUNTER — OFFICE VISIT (OUTPATIENT)
Dept: PULMONOLOGY | Age: 85
End: 2025-03-26
Payer: MEDICARE

## 2025-03-26 VITALS
BODY MASS INDEX: 34.79 KG/M2 | DIASTOLIC BLOOD PRESSURE: 80 MMHG | SYSTOLIC BLOOD PRESSURE: 126 MMHG | HEIGHT: 70 IN | HEART RATE: 89 BPM | TEMPERATURE: 98.2 F | OXYGEN SATURATION: 93 % | WEIGHT: 243 LBS

## 2025-03-26 DIAGNOSIS — J44.9 CHRONIC OBSTRUCTIVE PULMONARY DISEASE, UNSPECIFIED COPD TYPE (HCC): Primary | ICD-10-CM

## 2025-03-26 DIAGNOSIS — M35.1 OVERLAP SYNDROME: ICD-10-CM

## 2025-03-26 DIAGNOSIS — G47.33 OSA (OBSTRUCTIVE SLEEP APNEA): ICD-10-CM

## 2025-03-26 PROCEDURE — 3074F SYST BP LT 130 MM HG: CPT | Performed by: INTERNAL MEDICINE

## 2025-03-26 PROCEDURE — 3079F DIAST BP 80-89 MM HG: CPT | Performed by: INTERNAL MEDICINE

## 2025-03-26 PROCEDURE — 1036F TOBACCO NON-USER: CPT | Performed by: INTERNAL MEDICINE

## 2025-03-26 PROCEDURE — 1159F MED LIST DOCD IN RCRD: CPT | Performed by: INTERNAL MEDICINE

## 2025-03-26 PROCEDURE — 99214 OFFICE O/P EST MOD 30 MIN: CPT | Performed by: INTERNAL MEDICINE

## 2025-03-26 PROCEDURE — 1123F ACP DISCUSS/DSCN MKR DOCD: CPT | Performed by: INTERNAL MEDICINE

## 2025-03-26 PROCEDURE — 3023F SPIROM DOC REV: CPT | Performed by: INTERNAL MEDICINE

## 2025-03-26 PROCEDURE — G8417 CALC BMI ABV UP PARAM F/U: HCPCS | Performed by: INTERNAL MEDICINE

## 2025-03-26 PROCEDURE — G8427 DOCREV CUR MEDS BY ELIG CLIN: HCPCS | Performed by: INTERNAL MEDICINE

## 2025-03-26 RX ORDER — CYCLOBENZAPRINE HCL 10 MG
10 TABLET ORAL 3 TIMES DAILY PRN
COMMUNITY
Start: 2025-01-29

## 2025-03-26 RX ORDER — BROMPHENIRAMINE MALEATE, PSEUDOEPHEDRINE HYDROCHLORIDE, AND DEXTROMETHORPHAN HYDROBROMIDE 2; 30; 10 MG/5ML; MG/5ML; MG/5ML
5 SYRUP ORAL 4 TIMES DAILY PRN
COMMUNITY
Start: 2025-01-07

## 2025-03-26 RX ORDER — DOXYCYCLINE 100 MG/1
100 CAPSULE ORAL DAILY
COMMUNITY
Start: 2025-01-21

## 2025-03-26 NOTE — PROGRESS NOTES
Patient to follow up with physician in 4 months. Referral for Dr. Barnett will be sent to [place]. Patient to be enrolled with [place] medication.  
to Pay for Housing in the Last Year: No     Number of Times Moved in the Last Year: 0     Homeless in the Last Year: No        Patient has no known allergies.     Current Outpatient Medications:     cyclobenzaprine (FLEXERIL) 10 MG tablet, Take 1 tablet by mouth 3 times daily as needed for Muscle spasms, Disp: , Rfl:     doxycycline hyclate (VIBRAMYCIN) 100 MG capsule, Take 1 capsule by mouth daily, Disp: , Rfl:     brompheniramine-pseudoephedrine-DM 2-30-10 MG/5ML syrup, Take 5 mLs by mouth 4 times daily as needed for Congestion or Cough, Disp: , Rfl:     fluticasone-umeclidin-vilant (TRELEGY ELLIPTA) 100-62.5-25 MCG/ACT AEPB inhaler, Inhale 1 puff into the lungs daily, Disp: 1 each, Rfl: 4    vitamin D (ERGOCALCIFEROL) 1.25 MG (46526 UT) CAPS capsule, Take 1 capsule by mouth once a week, Disp: 12 capsule, Rfl: 3    metFORMIN (GLUCOPHAGE) 1000 MG tablet, Take 1 tablet by mouth 2 times daily (with meals), Disp: 180 tablet, Rfl: 1    furosemide (LASIX) 20 MG tablet, Take 1 tablet by mouth daily, Disp: 90 tablet, Rfl: 1    spironolactone (ALDACTONE) 25 MG tablet, Take 1 tablet by mouth daily, Disp: 30 tablet, Rfl: 3    diphenhydrAMINE (BENADRYL) 25 MG capsule, Take 1 capsule by mouth nightly as needed for Sleep, Disp: , Rfl:     carvedilol (COREG) 25 MG tablet, Take 1 tablet by mouth 2 times daily (with meals), Disp: 60 tablet, Rfl: 3    amLODIPine (NORVASC) 5 MG tablet, Take 1 tablet by mouth daily, Disp: 30 tablet, Rfl: 3    naloxegol (MOVANTIK) 25 MG TABS tablet, Take 1 tablet by mouth every morning (before breakfast), Disp: 30 tablet, Rfl: 1    atorvastatin (LIPITOR) 40 MG tablet, TAKE 1 TABLET BY MOUTH EVERY DAY, Disp: 90 tablet, Rfl: 1    albuterol sulfate  (90 Base) MCG/ACT inhaler, , Disp: , Rfl:     VIAGRA 100 MG tablet, Take 1 tablet by mouth as needed for Erectile Dysfunction, Disp: 30 tablet, Rfl: 3    melatonin 5 MG TBDP disintegrating tablet, Take 1 tablet by mouth nightly as needed (sleep)

## 2025-03-27 ENCOUNTER — TELEPHONE (OUTPATIENT)
Dept: PULMONOLOGY | Age: 85
End: 2025-03-27

## 2025-03-27 DIAGNOSIS — J44.9 CHRONIC OBSTRUCTIVE PULMONARY DISEASE, UNSPECIFIED COPD TYPE (HCC): Primary | ICD-10-CM

## 2025-03-27 NOTE — TELEPHONE ENCOUNTER
Mailed a letter to patient informing him that his CT Chest is scheduled for 5-6-24 at 4:00 pm at [facility]. He must arrive by 3:30 pm. There is no prep for this test

## 2025-03-27 NOTE — TELEPHONE ENCOUNTER
Call to advise pt that we have received a referral for him to have an evaluation for the [place] Valve procedure. Dr Barnett will need him to have a CT Chest Airway done prior to appt here in office to discuss evaluation criteria and pt is agreeable to this plan. Advised office will mail CT appt and office appt for May.Will also put info on [place] Valve procedure in the mail for pt to review.

## 2025-03-28 LAB
25(OH)D3 SERPL-MCNC: 25.9 NG/ML (ref 30–100)
ALBUMIN SERPL-MCNC: 4.2 G/DL (ref 3.5–5.2)
ALP SERPL-CCNC: 89 U/L (ref 40–129)
ALT SERPL-CCNC: 15 U/L (ref 0–40)
ANION GAP SERPL CALCULATED.3IONS-SCNC: 15 MMOL/L (ref 7–16)
AST SERPL-CCNC: 19 U/L (ref 0–39)
BILIRUB SERPL-MCNC: 0.4 MG/DL (ref 0–1.2)
BUN SERPL-MCNC: 12 MG/DL (ref 6–23)
CALCIUM SERPL-MCNC: 9.4 MG/DL (ref 8.6–10.2)
CHLORIDE SERPL-SCNC: 100 MMOL/L (ref 98–107)
CHOLEST SERPL-MCNC: 203 MG/DL
CO2 SERPL-SCNC: 24 MMOL/L (ref 22–29)
CREAT SERPL-MCNC: 1.1 MG/DL (ref 0.7–1.2)
ERYTHROCYTE [DISTWIDTH] IN BLOOD BY AUTOMATED COUNT: 15.5 % (ref 11.5–15)
GFR, ESTIMATED: 64 ML/MIN/1.73M2
GLUCOSE SERPL-MCNC: 166 MG/DL (ref 74–99)
HBA1C MFR BLD: 8 % (ref 4–5.6)
HCT VFR BLD AUTO: 37.4 % (ref 37–54)
HDLC SERPL-MCNC: 43 MG/DL
HGB BLD-MCNC: 11.6 G/DL (ref 12.5–16.5)
LDLC SERPL CALC-MCNC: 126 MG/DL
MCH RBC QN AUTO: 28.5 PG (ref 26–35)
MCHC RBC AUTO-ENTMCNC: 31 G/DL (ref 32–34.5)
MCV RBC AUTO: 91.9 FL (ref 80–99.9)
PLATELET # BLD AUTO: 117 K/UL (ref 130–450)
PMV BLD AUTO: 12.9 FL (ref 7–12)
POTASSIUM SERPL-SCNC: 3.8 MMOL/L (ref 3.5–5)
PROT SERPL-MCNC: 7.8 G/DL (ref 6.4–8.3)
PSA SERPL-MCNC: 8.08 NG/ML (ref 0–4)
RBC # BLD AUTO: 4.07 M/UL (ref 3.8–5.8)
SODIUM SERPL-SCNC: 139 MMOL/L (ref 132–146)
TRIGL SERPL-MCNC: 169 MG/DL
TSH SERPL DL<=0.05 MIU/L-ACNC: 2.15 UIU/ML (ref 0.27–4.2)
VLDLC SERPL CALC-MCNC: 34 MG/DL
WBC OTHER # BLD: 4.5 K/UL (ref 4.5–11.5)

## 2025-05-06 ENCOUNTER — HOSPITAL ENCOUNTER (OUTPATIENT)
Dept: CT IMAGING | Age: 85
Discharge: HOME | End: 2025-05-06
Attending: INTERNAL MEDICINE
Payer: MEDICARE

## 2025-05-06 DIAGNOSIS — J44.9 CHRONIC OBSTRUCTIVE PULMONARY DISEASE, UNSPECIFIED COPD TYPE (HCC): ICD-10-CM

## 2025-05-06 PROCEDURE — 71250 CT THORAX DX C-: CPT

## 2025-05-14 ENCOUNTER — OFFICE VISIT (OUTPATIENT)
Age: 85
End: 2025-05-14
Payer: MEDICARE

## 2025-05-14 VITALS
DIASTOLIC BLOOD PRESSURE: 78 MMHG | SYSTOLIC BLOOD PRESSURE: 152 MMHG | OXYGEN SATURATION: 96 % | TEMPERATURE: 97.7 F | RESPIRATION RATE: 17 BRPM | HEART RATE: 82 BPM

## 2025-05-14 DIAGNOSIS — J43.2 CENTRILOBULAR EMPHYSEMA (HCC): Primary | ICD-10-CM

## 2025-05-14 PROCEDURE — 99204 OFFICE O/P NEW MOD 45 MIN: CPT | Performed by: INTERNAL MEDICINE

## 2025-05-14 PROCEDURE — 3078F DIAST BP <80 MM HG: CPT | Performed by: INTERNAL MEDICINE

## 2025-05-14 PROCEDURE — 1159F MED LIST DOCD IN RCRD: CPT | Performed by: INTERNAL MEDICINE

## 2025-05-14 PROCEDURE — G8417 CALC BMI ABV UP PARAM F/U: HCPCS | Performed by: INTERNAL MEDICINE

## 2025-05-14 PROCEDURE — G8427 DOCREV CUR MEDS BY ELIG CLIN: HCPCS | Performed by: INTERNAL MEDICINE

## 2025-05-14 PROCEDURE — 1123F ACP DISCUSS/DSCN MKR DOCD: CPT | Performed by: INTERNAL MEDICINE

## 2025-05-14 PROCEDURE — 3077F SYST BP >= 140 MM HG: CPT | Performed by: INTERNAL MEDICINE

## 2025-05-14 PROCEDURE — 1036F TOBACCO NON-USER: CPT | Performed by: INTERNAL MEDICINE

## 2025-05-14 PROCEDURE — 3023F SPIROM DOC REV: CPT | Performed by: INTERNAL MEDICINE

## 2025-05-14 PROCEDURE — 99205 OFFICE O/P NEW HI 60 MIN: CPT | Performed by: INTERNAL MEDICINE

## 2025-05-14 NOTE — PROGRESS NOTES
Pulmonary Critical Care Medicine      NEW PATIENT VISIT-PULMONARY    5/14/2025      REFERRING PHYSICIAN:  Dr. Peñaloza     REASON FOR REFERRAL:    Evaluation for Endobronchial valve placement for bronchoscopic lung volume reduction.     History of Present Illness    Osiel Mcnamara is a 85 y.o. male,  former smoker Black / ,  who is here for Evaluation for Endobronchial valve placement for bronchoscopic lung volume reduction. He quit smoking in 2015.     His COPD is managed by Dr. Peñaloza  . I will defer this to Anabela . I will be evaluating the patient  for BLVR and re-refer the patient back to Dr. Peñaloza      PFTs reveal FEV1 of 41%( post BD) , TLC- 127%, RV- 242 %, DLCo- 67%  CT chest - B/L upper lobe predominant heterogenous centrilobular emphysema.   6 minute walk test - Pending    O2 use - 1 to 2  lpm      Current Baseline symptomatology-  Cough - moderate in severity , more in early am and gets better through the day , associated with whitish phlegm.   PAT - MMRC score of III, present for several years and gradually worsening over the years. Improved with rescue inhaler or rest.   Wheezing - comes with exertion frequently  , mild to moderate in severity , relieved with rest or albuterol ( has used in the past) .      Exercise tolerance/MMRC score( Bold )     MMRC Dyspnea Scale  Grade Description of Breathlessness   0 I only get breathless with strenuous exercise.   1 I get short of breath when hurrying on level ground or walking up a slight hill.   2 On level ground, I walk slower than people of the same age because of breathlessness, or have to stop for breath when walking at my own pace.   3 I stop for breath afterwalking about 100 yards or after a few minutes on level ground.   4 I am too breathless to leave the house or I am breathless when dressing.     Mallampati score- 2    Smoking history- former smoker , quit couple of years ago     Occupational exposure-  No history of exposure to any

## 2025-05-29 ENCOUNTER — HOSPITAL ENCOUNTER (OUTPATIENT)
Dept: PULMONOLOGY | Age: 85
Discharge: HOME | End: 2025-05-29
Attending: INTERNAL MEDICINE
Payer: MEDICARE

## 2025-05-29 ENCOUNTER — HOSPITAL ENCOUNTER (OUTPATIENT)
Age: 85
Discharge: HOME | End: 2025-05-29
Attending: INTERNAL MEDICINE
Payer: MEDICARE

## 2025-05-29 VITALS — BODY MASS INDEX: 34.36 KG/M2 | HEIGHT: 70 IN | WEIGHT: 240 LBS

## 2025-05-29 DIAGNOSIS — J43.2 CENTRILOBULAR EMPHYSEMA (HCC): ICD-10-CM

## 2025-05-29 PROCEDURE — 36415 COLL VENOUS BLD VENIPUNCTURE: CPT

## 2025-05-29 PROCEDURE — 94618 PULMONARY STRESS TESTING: CPT

## 2025-05-29 PROCEDURE — 82104 ALPHA-1-ANTITRYPSIN PHENO: CPT

## 2025-05-29 PROCEDURE — 82103 ALPHA-1-ANTITRYPSIN TOTAL: CPT

## 2025-05-29 ASSESSMENT — 6 MINUTE WALK TEST (6MWT)
O2 SATURATION 2: 93
BORG DYSPNEA SCALE SCORE: MODERATE
HEART RATE: 60
O2 SATURATION: 95
BLOOD PRESSURE 1: 142/80
O2 SATURATION 5: 93
O2 SATURATION 4: 93
TOTAL DISTANCE WALKED (M): 240
O2 FLOW RATE (L/MIN): 2
DID PATIENT STOP OR PAUSE BEFORE 6 MINUTES?: YES
O2 SATURATION: 93
OXYGEN DEVICE: NASAL CANNULA
BORG FATIGUE SCALE SCORE: MODERATE
% PREDICTED: 56.47
BLOOD PRESSURE: 160/80
O2 SATURATION 3: 92
BORG FATIGUE SCALE SCORE: SEVERE (HEAVY)
HEART RATE: 88
BORG DYSPNEA SCALE SCORE: VERY SLIGHT
O2 FLOW RATE (L/MIN): 2
ANY PROBLEMS WHILE EXERCISING?: BACK PAIN
O2 SATURATION: 97
BORG DYSPNEA SCALE SCORE: MODERATE
BORG FATIGUE SCALE SCORE: SEVERE (HEAVY)
ADDTIONAL O2 FLOW RATE (L/MIN): YES

## 2025-05-29 NOTE — PROGRESS NOTES
Dr. Mcmillan to read.     05/29/25 0958   Data Measured Before Walk   Height 1.778 m (5' 10\")   Weight - Scale 108.9 kg (240 lb)   HR 88   Blood Pressure 142/80   O2 Saturation 97   O2 Device Nasal cannula   O2 Flow Rate (l/min) 2 l/min   Dmitriy Dyspnea Scale 1   Dmitriy Fatigue Scale 3   Data Measured During the Walk   O2 Saturation 95   Need Additional O2 Flow Rate Rows Yes   O2 Saturation 93   O2 Saturation 92   O2 Saturation 93   O2 Saturation 93   Any Problems While Exercising back pain   Dmitriy Dyspnea Scale 3   Dmitriy Fatigue Scale 5   Data Measured Immediately After Walk   Did Patient Stop or Pause Before 6 Minutes Yes   Why Patient Stopped or Paused back pain, S.O.B   Predicted Distance (m) 425 Meters   Total Distance Walked (m) 240 Meters   % Predicted 56.47   Heart Rate 60   Blood Pressure 160/80   O2 Flow Rate (l/min) 2 l/min   O2 Saturation 93   Dmitriy Dyspnea Scale 3   Dmitriy Fatigue Scale 5

## 2025-06-02 LAB
ALPHA-1 ANTITRYPSIN PHENOTYPE: NORMAL
ALPHA-1 ANTITRYPSIN: 155 MG/DL (ref 90–200)

## 2025-06-25 ENCOUNTER — HOSPITAL ENCOUNTER (OUTPATIENT)
Dept: CARDIAC REHAB | Age: 85
Setting detail: THERAPIES SERIES
Discharge: HOME | End: 2025-06-25
Payer: MEDICARE

## 2025-06-25 VITALS — WEIGHT: 234.6 LBS | HEIGHT: 70 IN | OXYGEN SATURATION: 97 % | BODY MASS INDEX: 33.58 KG/M2

## 2025-06-25 PROCEDURE — 94626 PHY/QHP OP PULM RHB W/MNTR: CPT

## 2025-06-25 PROCEDURE — 94625 PHY/QHP OP PULM RHB W/O MNTR: CPT

## 2025-06-25 ASSESSMENT — PATIENT HEALTH QUESTIONNAIRE - PHQ9
SUM OF ALL RESPONSES TO PHQ QUESTIONS 1-9: 12
3. TROUBLE FALLING OR STAYING ASLEEP: MORE THAN HALF THE DAYS
1. LITTLE INTEREST OR PLEASURE IN DOING THINGS: MORE THAN HALF THE DAYS
SUM OF ALL RESPONSES TO PHQ QUESTIONS 1-9: 12
6. FEELING BAD ABOUT YOURSELF - OR THAT YOU ARE A FAILURE OR HAVE LET YOURSELF OR YOUR FAMILY DOWN: SEVERAL DAYS
9. THOUGHTS THAT YOU WOULD BE BETTER OFF DEAD, OR OF HURTING YOURSELF: NOT AT ALL
2. FEELING DOWN, DEPRESSED OR HOPELESS: MORE THAN HALF THE DAYS
8. MOVING OR SPEAKING SO SLOWLY THAT OTHER PEOPLE COULD HAVE NOTICED. OR THE OPPOSITE, BEING SO FIGETY OR RESTLESS THAT YOU HAVE BEEN MOVING AROUND A LOT MORE THAN USUAL: NOT AT ALL
SUM OF ALL RESPONSES TO PHQ QUESTIONS 1-9: 12
SUM OF ALL RESPONSES TO PHQ QUESTIONS 1-9: 12
7. TROUBLE CONCENTRATING ON THINGS, SUCH AS READING THE NEWSPAPER OR WATCHING TELEVISION: MORE THAN HALF THE DAYS
4. FEELING TIRED OR HAVING LITTLE ENERGY: MORE THAN HALF THE DAYS
5. POOR APPETITE OR OVEREATING: SEVERAL DAYS
10. IF YOU CHECKED OFF ANY PROBLEMS, HOW DIFFICULT HAVE THESE PROBLEMS MADE IT FOR YOU TO DO YOUR WORK, TAKE CARE OF THINGS AT HOME, OR GET ALONG WITH OTHER PEOPLE: NOT DIFFICULT AT ALL

## 2025-06-25 ASSESSMENT — LIFESTYLE VARIABLES: ALCOHOL_USE: NO

## 2025-06-25 ASSESSMENT — PULMONARY FUNCTION TESTS
FVC (LITERS): 2.2
FEV1 (%PREDICTED): 43
FEV1/FVC: 0.46

## 2025-06-25 ASSESSMENT — EJECTION FRACTION: EF_VALUE: 50

## 2025-06-25 NOTE — PROGRESS NOTES
depression using a valid screening tool   Patient Treatment Goal Patient to implement ways to manage stressors that impact health.   Goal Status Initial   Other Core Component - Tobacco Cessation   History of Tobacco/E-Cigarette/Chewing Tobacco/Other Yes   Current/Recent Use <6 mo. - Managed as Core Component No   Nutrition Assessment   Stages of Change Preparation   Nutrition Assessment Tool Rate Your Plate   Rate Your Plate Total Score 50   Alcohol No   Height  1.778 m (5' 10\")   Weight  106.4 kg (234 lb 9.6 oz)   BMI 33.73   Glucose   HgbA1c 8.0   HgbA1c Date 03/28/25   Lipids   Date Lipids Drawn 03/28/25   Total 203   HDL 43      Triglycerides 169   Lipid Med(s) Atorvastatin 40mg   Nutrition Intervention   Dietitian Consult   (To be scheduled)   Education   Learning Barrier Vision;Ready to learn   Pulmonary Knowledge Test Score 5   Education Schedule Given Yes   Education Breaking the dyspnea cycle;Heart/lung association;DM & lung disease;Nebulizer use;Preventing infection;Pulmonary A&P, lung/gas exchange;Pulmonary medications   Blood Pressure   Resting /80   Is BP WDL Yes   BP Meds Amlodipine 5mg, Carvedilol 25mg   Pulmonary Medications   Resource Information Provided Yes   Bronchodilator Prescribed Yes  (Albuterol sulfate  (90 Base) MCG/ACT inhaler)   Bronchodilator Adherent Yes   Corticosteriod Prescribed Yes   Corticosteroid Adherent Yes   Combination (Duo) Medication Prescribed Yes  (fluticasone-umeclidinium-vilanterol)   Combination Medication Adherent Yes   Anticholinergic Prescribed No   Vasodilator Prescribed No   Antifibrotic Agent Prescribed No   Exercise   DASI Total Score 18.95   BMI (Calculated) 33.66   Retired, Read Only Psychosocial   [facility] Total Score 30

## 2025-06-30 ENCOUNTER — HOSPITAL ENCOUNTER (OUTPATIENT)
Dept: CARDIAC REHAB | Age: 85
Setting detail: THERAPIES SERIES
Discharge: HOME | End: 2025-06-30
Payer: MEDICARE

## 2025-06-30 PROCEDURE — 94626 PHY/QHP OP PULM RHB W/MNTR: CPT

## 2025-07-02 ENCOUNTER — HOSPITAL ENCOUNTER (OUTPATIENT)
Dept: CARDIAC REHAB | Age: 85
Setting detail: THERAPIES SERIES
Discharge: HOME | End: 2025-07-02
Payer: MEDICARE

## 2025-07-02 PROCEDURE — 94626 PHY/QHP OP PULM RHB W/MNTR: CPT

## 2025-07-09 ENCOUNTER — HOSPITAL ENCOUNTER (OUTPATIENT)
Dept: CARDIAC REHAB | Age: 85
Setting detail: THERAPIES SERIES
Discharge: HOME | End: 2025-07-09
Payer: MEDICARE

## 2025-07-09 PROCEDURE — 94626 PHY/QHP OP PULM RHB W/MNTR: CPT

## 2025-07-14 ENCOUNTER — HOSPITAL ENCOUNTER (OUTPATIENT)
Dept: CARDIAC REHAB | Age: 85
Setting detail: THERAPIES SERIES
Discharge: HOME | End: 2025-07-14
Payer: MEDICARE

## 2025-07-14 PROCEDURE — 94626 PHY/QHP OP PULM RHB W/MNTR: CPT

## 2025-07-16 ENCOUNTER — HOSPITAL ENCOUNTER (OUTPATIENT)
Dept: CARDIAC REHAB | Age: 85
Setting detail: THERAPIES SERIES
Discharge: HOME | End: 2025-07-16
Payer: MEDICARE

## 2025-07-16 PROCEDURE — 94626 PHY/QHP OP PULM RHB W/MNTR: CPT

## 2025-07-16 ASSESSMENT — PATIENT HEALTH QUESTIONNAIRE - PHQ9
8. MOVING OR SPEAKING SO SLOWLY THAT OTHER PEOPLE COULD HAVE NOTICED. OR THE OPPOSITE, BEING SO FIGETY OR RESTLESS THAT YOU HAVE BEEN MOVING AROUND A LOT MORE THAN USUAL: NOT AT ALL
7. TROUBLE CONCENTRATING ON THINGS, SUCH AS READING THE NEWSPAPER OR WATCHING TELEVISION: NOT AT ALL
6. FEELING BAD ABOUT YOURSELF - OR THAT YOU ARE A FAILURE OR HAVE LET YOURSELF OR YOUR FAMILY DOWN: SEVERAL DAYS
9. THOUGHTS THAT YOU WOULD BE BETTER OFF DEAD, OR OF HURTING YOURSELF: NOT AT ALL
SUM OF ALL RESPONSES TO PHQ QUESTIONS 1-9: 6
5. POOR APPETITE OR OVEREATING: NOT AT ALL
SUM OF ALL RESPONSES TO PHQ QUESTIONS 1-9: 6
1. LITTLE INTEREST OR PLEASURE IN DOING THINGS: SEVERAL DAYS
3. TROUBLE FALLING OR STAYING ASLEEP: NEARLY EVERY DAY
2. FEELING DOWN, DEPRESSED OR HOPELESS: NOT AT ALL
4. FEELING TIRED OR HAVING LITTLE ENERGY: SEVERAL DAYS
SUM OF ALL RESPONSES TO PHQ QUESTIONS 1-9: 6
SUM OF ALL RESPONSES TO PHQ QUESTIONS 1-9: 6
10. IF YOU CHECKED OFF ANY PROBLEMS, HOW DIFFICULT HAVE THESE PROBLEMS MADE IT FOR YOU TO DO YOUR WORK, TAKE CARE OF THINGS AT HOME, OR GET ALONG WITH OTHER PEOPLE: NOT DIFFICULT AT ALL

## 2025-07-21 ENCOUNTER — HOSPITAL ENCOUNTER (OUTPATIENT)
Dept: CARDIAC REHAB | Age: 85
Setting detail: THERAPIES SERIES
Discharge: HOME | End: 2025-07-21
Payer: MEDICARE

## 2025-07-21 PROCEDURE — 93798 PHYS/QHP OP CAR RHAB W/ECG: CPT

## 2025-07-21 PROCEDURE — 94626 PHY/QHP OP PULM RHB W/MNTR: CPT

## 2025-07-23 ENCOUNTER — APPOINTMENT (OUTPATIENT)
Dept: CARDIAC REHAB | Age: 85
End: 2025-07-23
Payer: MEDICARE

## 2025-07-28 ENCOUNTER — HOSPITAL ENCOUNTER (OUTPATIENT)
Dept: CARDIAC REHAB | Age: 85
Setting detail: THERAPIES SERIES
Discharge: HOME | End: 2025-07-28
Payer: MEDICARE

## 2025-07-28 PROCEDURE — 94626 PHY/QHP OP PULM RHB W/MNTR: CPT

## 2025-07-30 ENCOUNTER — HOSPITAL ENCOUNTER (OUTPATIENT)
Dept: CARDIAC REHAB | Age: 85
Setting detail: THERAPIES SERIES
Discharge: HOME | End: 2025-07-30
Payer: MEDICARE

## 2025-07-30 PROCEDURE — 94626 PHY/QHP OP PULM RHB W/MNTR: CPT

## 2025-08-04 ENCOUNTER — HOSPITAL ENCOUNTER (OUTPATIENT)
Dept: CARDIAC REHAB | Age: 85
Setting detail: THERAPIES SERIES
Discharge: HOME | End: 2025-08-04
Payer: MEDICARE

## 2025-08-04 PROCEDURE — 94626 PHY/QHP OP PULM RHB W/MNTR: CPT

## 2025-08-06 ENCOUNTER — HOSPITAL ENCOUNTER (OUTPATIENT)
Dept: CARDIAC REHAB | Age: 85
Setting detail: THERAPIES SERIES
Discharge: HOME | End: 2025-08-06
Payer: MEDICARE

## 2025-08-06 PROCEDURE — 94626 PHY/QHP OP PULM RHB W/MNTR: CPT

## 2025-08-06 ASSESSMENT — PATIENT HEALTH QUESTIONNAIRE - PHQ9
8. MOVING OR SPEAKING SO SLOWLY THAT OTHER PEOPLE COULD HAVE NOTICED. OR THE OPPOSITE, BEING SO FIGETY OR RESTLESS THAT YOU HAVE BEEN MOVING AROUND A LOT MORE THAN USUAL: NOT AT ALL
SUM OF ALL RESPONSES TO PHQ QUESTIONS 1-9: 5
5. POOR APPETITE OR OVEREATING: NOT AT ALL
10. IF YOU CHECKED OFF ANY PROBLEMS, HOW DIFFICULT HAVE THESE PROBLEMS MADE IT FOR YOU TO DO YOUR WORK, TAKE CARE OF THINGS AT HOME, OR GET ALONG WITH OTHER PEOPLE: NOT DIFFICULT AT ALL
SUM OF ALL RESPONSES TO PHQ QUESTIONS 1-9: 5
SUM OF ALL RESPONSES TO PHQ QUESTIONS 1-9: 5
3. TROUBLE FALLING OR STAYING ASLEEP: NOT AT ALL
SUM OF ALL RESPONSES TO PHQ QUESTIONS 1-9: 5
1. LITTLE INTEREST OR PLEASURE IN DOING THINGS: MORE THAN HALF THE DAYS
7. TROUBLE CONCENTRATING ON THINGS, SUCH AS READING THE NEWSPAPER OR WATCHING TELEVISION: SEVERAL DAYS
9. THOUGHTS THAT YOU WOULD BE BETTER OFF DEAD, OR OF HURTING YOURSELF: NOT AT ALL
2. FEELING DOWN, DEPRESSED OR HOPELESS: MORE THAN HALF THE DAYS
4. FEELING TIRED OR HAVING LITTLE ENERGY: NOT AT ALL
6. FEELING BAD ABOUT YOURSELF - OR THAT YOU ARE A FAILURE OR HAVE LET YOURSELF OR YOUR FAMILY DOWN: NOT AT ALL

## 2025-08-06 ASSESSMENT — PULMONARY FUNCTION TESTS
FEV1/FVC: 0.46
FVC (LITERS): 2.2
FEV1 (%PREDICTED): 43

## 2025-08-06 ASSESSMENT — EJECTION FRACTION: EF_VALUE: 50

## 2025-08-06 ASSESSMENT — LIFESTYLE VARIABLES: ALCOHOL_USE: NO

## 2025-08-11 ENCOUNTER — APPOINTMENT (OUTPATIENT)
Dept: CARDIAC REHAB | Age: 85
End: 2025-08-11
Payer: MEDICARE

## 2025-08-11 LAB
ALBUMIN SERPL-MCNC: 4.1 G/DL (ref 3.5–5.2)
ALP SERPL-CCNC: 71 U/L (ref 40–129)
ALT SERPL-CCNC: 14 U/L (ref 0–50)
ANION GAP SERPL CALCULATED.3IONS-SCNC: 11 MMOL/L (ref 7–16)
AST SERPL-CCNC: 30 U/L (ref 0–50)
BILIRUB SERPL-MCNC: 0.8 MG/DL (ref 0–1.2)
BUN SERPL-MCNC: 12 MG/DL (ref 8–23)
CALCIUM SERPL-MCNC: 9.6 MG/DL (ref 8.8–10.2)
CHLORIDE SERPL-SCNC: 98 MMOL/L (ref 98–107)
CHOLEST SERPL-MCNC: 159 MG/DL
CO2 SERPL-SCNC: 35 MMOL/L (ref 22–29)
CREAT SERPL-MCNC: 1.1 MG/DL (ref 0.7–1.2)
ERYTHROCYTE [DISTWIDTH] IN BLOOD BY AUTOMATED COUNT: 14.1 % (ref 11.5–15)
GFR, ESTIMATED: 64 ML/MIN/1.73M2
GLUCOSE SERPL-MCNC: 180 MG/DL (ref 74–99)
HCT VFR BLD AUTO: 39.1 % (ref 37–54)
HDLC SERPL-MCNC: 41 MG/DL
HGB BLD-MCNC: 11.8 G/DL (ref 12.5–16.5)
LDLC SERPL CALC-MCNC: 89 MG/DL
MCH RBC QN AUTO: 29.2 PG (ref 26–35)
MCHC RBC AUTO-ENTMCNC: 30.2 G/DL (ref 32–34.5)
MCV RBC AUTO: 96.8 FL (ref 80–99.9)
PLATELET, FLUORESCENCE: 132 K/UL (ref 130–450)
PMV BLD AUTO: 13.1 FL (ref 7–12)
POTASSIUM SERPL-SCNC: 3.5 MMOL/L (ref 3.5–5.1)
PROT SERPL-MCNC: 7.5 G/DL (ref 6.4–8.3)
RBC # BLD AUTO: 4.04 M/UL (ref 3.8–5.8)
SODIUM SERPL-SCNC: 144 MMOL/L (ref 136–145)
TRIGL SERPL-MCNC: 149 MG/DL
TSH SERPL DL<=0.05 MIU/L-ACNC: 1.77 UIU/ML (ref 0.27–4.2)
VLDLC SERPL CALC-MCNC: 30 MG/DL
WBC OTHER # BLD: 4.8 K/UL (ref 4.5–11.5)

## 2025-08-13 ENCOUNTER — APPOINTMENT (OUTPATIENT)
Dept: CARDIAC REHAB | Age: 85
End: 2025-08-13
Payer: MEDICARE

## 2025-08-18 ENCOUNTER — APPOINTMENT (OUTPATIENT)
Dept: CT IMAGING | Age: 85
End: 2025-08-18
Payer: MEDICARE

## 2025-08-18 ENCOUNTER — APPOINTMENT (OUTPATIENT)
Dept: GENERAL RADIOLOGY | Age: 85
End: 2025-08-18
Payer: MEDICARE

## 2025-08-18 ENCOUNTER — APPOINTMENT (OUTPATIENT)
Dept: CARDIAC REHAB | Age: 85
End: 2025-08-18
Payer: MEDICARE

## 2025-08-18 ENCOUNTER — APPOINTMENT (OUTPATIENT)
Dept: ULTRASOUND IMAGING | Age: 85
End: 2025-08-18
Payer: MEDICARE

## 2025-08-18 ENCOUNTER — HOSPITAL ENCOUNTER (INPATIENT)
Age: 85
LOS: 4 days | Discharge: HOME | End: 2025-08-22
Attending: EMERGENCY MEDICINE | Admitting: INTERNAL MEDICINE
Payer: MEDICARE

## 2025-08-18 DIAGNOSIS — J96.01 ACUTE RESPIRATORY FAILURE WITH HYPOXIA (HCC): ICD-10-CM

## 2025-08-18 DIAGNOSIS — J44.1 COPD EXACERBATION (HCC): Primary | ICD-10-CM

## 2025-08-18 PROBLEM — J96.21 ACUTE ON CHRONIC RESPIRATORY FAILURE WITH HYPOXIA AND HYPERCAPNIA (HCC): Status: ACTIVE | Noted: 2025-08-18

## 2025-08-18 PROBLEM — J96.22 ACUTE ON CHRONIC RESPIRATORY FAILURE WITH HYPOXIA AND HYPERCAPNIA (HCC): Status: ACTIVE | Noted: 2025-08-18

## 2025-08-18 LAB
ALBUMIN SERPL-MCNC: 4 G/DL (ref 3.5–5.2)
ALLEN TEST: POSITIVE
ALP SERPL-CCNC: 73 U/L (ref 40–129)
ALT SERPL-CCNC: 7 U/L (ref 0–50)
ANION GAP SERPL CALCULATED.3IONS-SCNC: 9 MMOL/L (ref 7–16)
AST SERPL-CCNC: 22 U/L (ref 0–50)
BASOPHILS # BLD: 0.02 K/UL (ref 0–0.2)
BASOPHILS NFR BLD: 1 % (ref 0–2)
BILIRUB SERPL-MCNC: 0.8 MG/DL (ref 0–1.2)
BUN SERPL-MCNC: 13 MG/DL (ref 8–23)
CALCIUM SERPL-MCNC: 9.7 MG/DL (ref 8.8–10.2)
CHLORIDE SERPL-SCNC: 97 MMOL/L (ref 98–107)
CHP ED QC CHECK: YES
CO2 SERPL-SCNC: 38 MMOL/L (ref 22–29)
CREAT SERPL-MCNC: 1.1 MG/DL (ref 0.7–1.2)
EOSINOPHIL # BLD: 0.09 K/UL (ref 0.05–0.5)
EOSINOPHILS RELATIVE PERCENT: 2 % (ref 0–6)
ERYTHROCYTE [DISTWIDTH] IN BLOOD BY AUTOMATED COUNT: 13.8 % (ref 11.5–15)
FLOW RATE: 2
GFR, ESTIMATED: 65 ML/MIN/1.73M2
GLUCOSE BLD-MCNC: 155 MG/DL
GLUCOSE BLD-MCNC: 155 MG/DL (ref 74–99)
GLUCOSE BLD-MCNC: 225 MG/DL (ref 74–99)
GLUCOSE SERPL-MCNC: 164 MG/DL (ref 74–99)
HCT VFR BLD AUTO: 39.1 % (ref 37–54)
HGB BLD-MCNC: 11.8 G/DL (ref 12.5–16.5)
IMM GRANULOCYTES # BLD AUTO: <0.03 K/UL (ref 0–0.58)
IMM GRANULOCYTES NFR BLD: 0 % (ref 0–5)
LYMPHOCYTES NFR BLD: 0.78 K/UL (ref 1.5–4)
LYMPHOCYTES RELATIVE PERCENT: 19 % (ref 20–42)
MCH RBC QN AUTO: 28.7 PG (ref 26–35)
MCHC RBC AUTO-ENTMCNC: 30.2 G/DL (ref 32–34.5)
MCV RBC AUTO: 95.1 FL (ref 80–99.9)
MONOCYTES NFR BLD: 0.43 K/UL (ref 0.1–0.95)
MONOCYTES NFR BLD: 11 % (ref 2–12)
NEUTROPHILS NFR BLD: 68 % (ref 43–80)
NEUTS SEG NFR BLD: 2.78 K/UL (ref 1.8–7.3)
O2 DELIVERY DEVICE: ABNORMAL
PATIENT TEMP: 37
PLATELET # BLD AUTO: 124 K/UL (ref 130–450)
PMV BLD AUTO: 12.8 FL (ref 7–12)
POC HCO3: 42.2 MMOL/L (ref 22–26)
POC O2 SATURATION: 85.6 % (ref 92–98.5)
POC PCO2 TEMP: 62.3 MM HG
POC PCO2: 62.3 MM HG (ref 35–45)
POC PH TEMP: 7.44
POC PH: 7.44 (ref 7.35–7.45)
POC PO2 TEMP: 51.3 MM HG
POC PO2: 51.3 MM HG (ref 60–80)
POSITIVE BASE EXCESS, ART: 15 MMOL/L (ref 0–3)
POTASSIUM SERPL-SCNC: 3.5 MMOL/L (ref 3.5–5.1)
PROT SERPL-MCNC: 7.3 G/DL (ref 6.4–8.3)
RBC # BLD AUTO: 4.11 M/UL (ref 3.8–5.8)
SAMPLE SITE: ABNORMAL
SODIUM SERPL-SCNC: 145 MMOL/L (ref 136–145)
TROPONIN I SERPL HS-MCNC: 27 NG/L (ref 0–22)
TROPONIN I SERPL HS-MCNC: 31 NG/L (ref 0–22)
WBC OTHER # BLD: 4.1 K/UL (ref 4.5–11.5)

## 2025-08-18 PROCEDURE — 71045 X-RAY EXAM CHEST 1 VIEW: CPT

## 2025-08-18 PROCEDURE — 1200000000 HC SEMI PRIVATE

## 2025-08-18 PROCEDURE — 6360000002 HC RX W HCPCS: Performed by: INTERNAL MEDICINE

## 2025-08-18 PROCEDURE — 36600 WITHDRAWAL OF ARTERIAL BLOOD: CPT

## 2025-08-18 PROCEDURE — 94640 AIRWAY INHALATION TREATMENT: CPT

## 2025-08-18 PROCEDURE — 6370000000 HC RX 637 (ALT 250 FOR IP)

## 2025-08-18 PROCEDURE — 6370000000 HC RX 637 (ALT 250 FOR IP): Performed by: INTERNAL MEDICINE

## 2025-08-18 PROCEDURE — 93005 ELECTROCARDIOGRAM TRACING: CPT

## 2025-08-18 PROCEDURE — 96374 THER/PROPH/DIAG INJ IV PUSH: CPT

## 2025-08-18 PROCEDURE — 2500000003 HC RX 250 WO HCPCS

## 2025-08-18 PROCEDURE — 6360000002 HC RX W HCPCS

## 2025-08-18 PROCEDURE — 2500000003 HC RX 250 WO HCPCS: Performed by: INTERNAL MEDICINE

## 2025-08-18 PROCEDURE — 71250 CT THORAX DX C-: CPT

## 2025-08-18 PROCEDURE — 85025 COMPLETE CBC W/AUTO DIFF WBC: CPT

## 2025-08-18 PROCEDURE — 80053 COMPREHEN METABOLIC PANEL: CPT

## 2025-08-18 PROCEDURE — 70490 CT SOFT TISSUE NECK W/O DYE: CPT

## 2025-08-18 PROCEDURE — 82803 BLOOD GASES ANY COMBINATION: CPT

## 2025-08-18 PROCEDURE — 82962 GLUCOSE BLOOD TEST: CPT

## 2025-08-18 PROCEDURE — 84484 ASSAY OF TROPONIN QUANT: CPT

## 2025-08-18 PROCEDURE — 93971 EXTREMITY STUDY: CPT

## 2025-08-18 PROCEDURE — 94664 DEMO&/EVAL PT USE INHALER: CPT

## 2025-08-18 PROCEDURE — 0202U NFCT DS 22 TRGT SARS-COV-2: CPT

## 2025-08-18 PROCEDURE — 99285 EMERGENCY DEPT VISIT HI MDM: CPT

## 2025-08-18 RX ORDER — ACETAMINOPHEN 500 MG
500 TABLET ORAL EVERY 6 HOURS PRN
Status: DISCONTINUED | OUTPATIENT
Start: 2025-08-18 | End: 2025-08-18 | Stop reason: SDUPTHER

## 2025-08-18 RX ORDER — IPRATROPIUM BROMIDE AND ALBUTEROL SULFATE 2.5; .5 MG/3ML; MG/3ML
3 SOLUTION RESPIRATORY (INHALATION) ONCE
Status: COMPLETED | OUTPATIENT
Start: 2025-08-18 | End: 2025-08-18

## 2025-08-18 RX ORDER — POTASSIUM CHLORIDE 1500 MG/1
40 TABLET, EXTENDED RELEASE ORAL PRN
Status: DISCONTINUED | OUTPATIENT
Start: 2025-08-18 | End: 2025-08-22 | Stop reason: HOSPADM

## 2025-08-18 RX ORDER — FUROSEMIDE 20 MG/1
20 TABLET ORAL DAILY
Status: DISCONTINUED | OUTPATIENT
Start: 2025-08-19 | End: 2025-08-18

## 2025-08-18 RX ORDER — PROCHLORPERAZINE EDISYLATE 5 MG/ML
10 INJECTION INTRAMUSCULAR; INTRAVENOUS EVERY 6 HOURS PRN
Status: DISCONTINUED | OUTPATIENT
Start: 2025-08-18 | End: 2025-08-22 | Stop reason: HOSPADM

## 2025-08-18 RX ORDER — LANOLIN ALCOHOL/MO/W.PET/CERES
200 CREAM (GRAM) TOPICAL NIGHTLY PRN
Status: DISCONTINUED | OUTPATIENT
Start: 2025-08-18 | End: 2025-08-22 | Stop reason: HOSPADM

## 2025-08-18 RX ORDER — ATORVASTATIN CALCIUM 40 MG/1
40 TABLET, FILM COATED ORAL NIGHTLY
Status: DISCONTINUED | OUTPATIENT
Start: 2025-08-18 | End: 2025-08-22 | Stop reason: HOSPADM

## 2025-08-18 RX ORDER — ACETAMINOPHEN 325 MG/1
650 TABLET ORAL EVERY 6 HOURS PRN
Status: DISCONTINUED | OUTPATIENT
Start: 2025-08-18 | End: 2025-08-22 | Stop reason: HOSPADM

## 2025-08-18 RX ORDER — POTASSIUM CHLORIDE 7.45 MG/ML
10 INJECTION INTRAVENOUS PRN
Status: DISCONTINUED | OUTPATIENT
Start: 2025-08-18 | End: 2025-08-22 | Stop reason: HOSPADM

## 2025-08-18 RX ORDER — ENOXAPARIN SODIUM 100 MG/ML
30 INJECTION SUBCUTANEOUS 2 TIMES DAILY
Status: DISCONTINUED | OUTPATIENT
Start: 2025-08-18 | End: 2025-08-22 | Stop reason: HOSPADM

## 2025-08-18 RX ORDER — FUROSEMIDE 20 MG/1
20 TABLET ORAL DAILY
Status: DISCONTINUED | OUTPATIENT
Start: 2025-08-18 | End: 2025-08-22 | Stop reason: HOSPADM

## 2025-08-18 RX ORDER — SPIRONOLACTONE 25 MG/1
25 TABLET ORAL DAILY
Status: DISCONTINUED | OUTPATIENT
Start: 2025-08-18 | End: 2025-08-22 | Stop reason: HOSPADM

## 2025-08-18 RX ORDER — MAGNESIUM SULFATE IN WATER 40 MG/ML
2000 INJECTION, SOLUTION INTRAVENOUS PRN
Status: DISCONTINUED | OUTPATIENT
Start: 2025-08-18 | End: 2025-08-22 | Stop reason: HOSPADM

## 2025-08-18 RX ORDER — DIPHENHYDRAMINE HCL 25 MG
12.5 TABLET ORAL NIGHTLY PRN
Status: DISCONTINUED | OUTPATIENT
Start: 2025-08-18 | End: 2025-08-18 | Stop reason: SDUPTHER

## 2025-08-18 RX ORDER — SODIUM CHLORIDE 0.9 % (FLUSH) 0.9 %
5-40 SYRINGE (ML) INJECTION EVERY 12 HOURS SCHEDULED
Status: DISCONTINUED | OUTPATIENT
Start: 2025-08-18 | End: 2025-08-22 | Stop reason: HOSPADM

## 2025-08-18 RX ORDER — GLUCAGON 1 MG/ML
1 KIT INJECTION PRN
Status: DISCONTINUED | OUTPATIENT
Start: 2025-08-18 | End: 2025-08-22 | Stop reason: HOSPADM

## 2025-08-18 RX ORDER — BUDESONIDE 0.5 MG/2ML
500 INHALANT ORAL
Status: DISCONTINUED | OUTPATIENT
Start: 2025-08-18 | End: 2025-08-22 | Stop reason: HOSPADM

## 2025-08-18 RX ORDER — CYCLOBENZAPRINE HCL 5 MG
10 TABLET ORAL 3 TIMES DAILY PRN
Status: DISCONTINUED | OUTPATIENT
Start: 2025-08-18 | End: 2025-08-22 | Stop reason: HOSPADM

## 2025-08-18 RX ORDER — POLYETHYLENE GLYCOL 3350 17 G/17G
17 POWDER, FOR SOLUTION ORAL DAILY PRN
Status: DISCONTINUED | OUTPATIENT
Start: 2025-08-18 | End: 2025-08-22 | Stop reason: HOSPADM

## 2025-08-18 RX ORDER — SODIUM CHLORIDE 9 MG/ML
INJECTION, SOLUTION INTRAVENOUS PRN
Status: DISCONTINUED | OUTPATIENT
Start: 2025-08-18 | End: 2025-08-22 | Stop reason: HOSPADM

## 2025-08-18 RX ORDER — TAMSULOSIN HYDROCHLORIDE 0.4 MG/1
0.4 CAPSULE ORAL DAILY
Status: DISCONTINUED | OUTPATIENT
Start: 2025-08-18 | End: 2025-08-22 | Stop reason: HOSPADM

## 2025-08-18 RX ORDER — DEXTROSE MONOHYDRATE 100 MG/ML
INJECTION, SOLUTION INTRAVENOUS CONTINUOUS PRN
Status: DISCONTINUED | OUTPATIENT
Start: 2025-08-18 | End: 2025-08-22 | Stop reason: HOSPADM

## 2025-08-18 RX ORDER — SODIUM CHLORIDE 0.9 % (FLUSH) 0.9 %
5-40 SYRINGE (ML) INJECTION PRN
Status: DISCONTINUED | OUTPATIENT
Start: 2025-08-18 | End: 2025-08-22 | Stop reason: HOSPADM

## 2025-08-18 RX ORDER — DIPHENHYDRAMINE HCL 25 MG
25 TABLET ORAL NIGHTLY PRN
Status: DISCONTINUED | OUTPATIENT
Start: 2025-08-18 | End: 2025-08-22 | Stop reason: HOSPADM

## 2025-08-18 RX ORDER — ERGOCALCIFEROL 1.25 MG/1
50000 CAPSULE, LIQUID FILLED ORAL WEEKLY
Status: DISCONTINUED | OUTPATIENT
Start: 2025-08-25 | End: 2025-08-22 | Stop reason: HOSPADM

## 2025-08-18 RX ORDER — IPRATROPIUM BROMIDE AND ALBUTEROL SULFATE 2.5; .5 MG/3ML; MG/3ML
1 SOLUTION RESPIRATORY (INHALATION)
Status: DISCONTINUED | OUTPATIENT
Start: 2025-08-18 | End: 2025-08-19

## 2025-08-18 RX ORDER — ACETAMINOPHEN 650 MG/1
650 SUPPOSITORY RECTAL EVERY 6 HOURS PRN
Status: DISCONTINUED | OUTPATIENT
Start: 2025-08-18 | End: 2025-08-22 | Stop reason: HOSPADM

## 2025-08-18 RX ORDER — MECOBALAMIN 5000 MCG
5 TABLET,DISINTEGRATING ORAL NIGHTLY PRN
Status: DISCONTINUED | OUTPATIENT
Start: 2025-08-18 | End: 2025-08-22 | Stop reason: HOSPADM

## 2025-08-18 RX ORDER — AMLODIPINE BESYLATE 5 MG/1
5 TABLET ORAL DAILY
Status: DISCONTINUED | OUTPATIENT
Start: 2025-08-19 | End: 2025-08-22 | Stop reason: HOSPADM

## 2025-08-18 RX ORDER — CARVEDILOL 25 MG/1
25 TABLET ORAL 2 TIMES DAILY WITH MEALS
Status: DISCONTINUED | OUTPATIENT
Start: 2025-08-18 | End: 2025-08-22 | Stop reason: HOSPADM

## 2025-08-18 RX ORDER — DOXYCYCLINE 100 MG/1
100 CAPSULE ORAL ONCE
Status: COMPLETED | OUTPATIENT
Start: 2025-08-18 | End: 2025-08-18

## 2025-08-18 RX ORDER — POLYETHYLENE GLYCOL 3350 17 G/17G
17 POWDER, FOR SOLUTION ORAL DAILY PRN
Status: DISCONTINUED | OUTPATIENT
Start: 2025-08-18 | End: 2025-08-18 | Stop reason: SDUPTHER

## 2025-08-18 RX ORDER — MECOBALAMIN 5000 MCG
5 TABLET,DISINTEGRATING ORAL NIGHTLY PRN
Status: DISCONTINUED | OUTPATIENT
Start: 2025-08-18 | End: 2025-08-18 | Stop reason: SDUPTHER

## 2025-08-18 RX ADMIN — CARVEDILOL 25 MG: 25 TABLET, FILM COATED ORAL at 18:04

## 2025-08-18 RX ADMIN — TAMSULOSIN HYDROCHLORIDE 0.4 MG: 0.4 CAPSULE ORAL at 18:10

## 2025-08-18 RX ADMIN — IPRATROPIUM BROMIDE AND ALBUTEROL SULFATE 1 DOSE: .5; 3 SOLUTION RESPIRATORY (INHALATION) at 21:13

## 2025-08-18 RX ADMIN — IPRATROPIUM BROMIDE AND ALBUTEROL SULFATE 3 DOSE: 2.5; .5 SOLUTION RESPIRATORY (INHALATION) at 10:53

## 2025-08-18 RX ADMIN — WATER 125 MG: 1 INJECTION INTRAMUSCULAR; INTRAVENOUS; SUBCUTANEOUS at 11:16

## 2025-08-18 RX ADMIN — WATER 80 MG: 1 INJECTION INTRAMUSCULAR; INTRAVENOUS; SUBCUTANEOUS at 18:50

## 2025-08-18 RX ADMIN — Medication 10 ML: at 22:10

## 2025-08-18 RX ADMIN — DOXYCYCLINE HYCLATE 100 MG: 100 CAPSULE ORAL at 14:02

## 2025-08-18 RX ADMIN — SPIRONOLACTONE 25 MG: 25 TABLET ORAL at 18:10

## 2025-08-18 RX ADMIN — CYCLOBENZAPRINE HYDROCHLORIDE 10 MG: 5 TABLET, FILM COATED ORAL at 21:41

## 2025-08-18 RX ADMIN — Medication 5 MG: at 21:41

## 2025-08-18 RX ADMIN — BUDESONIDE 500 MCG: 0.5 SUSPENSION RESPIRATORY (INHALATION) at 18:28

## 2025-08-18 RX ADMIN — ATORVASTATIN CALCIUM 40 MG: 40 TABLET, FILM COATED ORAL at 21:41

## 2025-08-18 RX ADMIN — FUROSEMIDE 20 MG: 20 TABLET ORAL at 18:04

## 2025-08-18 RX ADMIN — METFORMIN HYDROCHLORIDE 1000 MG: 1000 TABLET ORAL at 18:04

## 2025-08-18 RX ADMIN — ENOXAPARIN SODIUM 30 MG: 100 INJECTION SUBCUTANEOUS at 21:41

## 2025-08-18 RX ADMIN — IPRATROPIUM BROMIDE AND ALBUTEROL SULFATE 1 DOSE: .5; 3 SOLUTION RESPIRATORY (INHALATION) at 18:27

## 2025-08-18 ASSESSMENT — PAIN - FUNCTIONAL ASSESSMENT: PAIN_FUNCTIONAL_ASSESSMENT: 0-10

## 2025-08-18 ASSESSMENT — PAIN DESCRIPTION - FREQUENCY: FREQUENCY: CONTINUOUS

## 2025-08-18 ASSESSMENT — PAIN DESCRIPTION - DESCRIPTORS: DESCRIPTORS: DISCOMFORT;ACHING

## 2025-08-18 ASSESSMENT — PAIN DESCRIPTION - PAIN TYPE: TYPE: CHRONIC PAIN

## 2025-08-18 ASSESSMENT — PAIN SCALES - GENERAL: PAINLEVEL_OUTOF10: 6

## 2025-08-18 ASSESSMENT — PAIN DESCRIPTION - LOCATION: LOCATION: BACK

## 2025-08-19 LAB
ALBUMIN SERPL-MCNC: 3.8 G/DL (ref 3.5–5.2)
ALP SERPL-CCNC: 64 U/L (ref 40–129)
ALT SERPL-CCNC: 7 U/L (ref 0–50)
ANION GAP SERPL CALCULATED.3IONS-SCNC: 11 MMOL/L (ref 7–16)
AST SERPL-CCNC: 20 U/L (ref 0–50)
B PARAP IS1001 DNA NPH QL NAA+NON-PROBE: NOT DETECTED
B PERT DNA SPEC QL NAA+PROBE: NOT DETECTED
BASOPHILS # BLD: 0 K/UL (ref 0–0.2)
BASOPHILS NFR BLD: 0 % (ref 0–2)
BILIRUB SERPL-MCNC: 0.5 MG/DL (ref 0–1.2)
BUN SERPL-MCNC: 16 MG/DL (ref 8–23)
C PNEUM DNA NPH QL NAA+NON-PROBE: NOT DETECTED
CALCIUM SERPL-MCNC: 9.5 MG/DL (ref 8.8–10.2)
CHLORIDE SERPL-SCNC: 97 MMOL/L (ref 98–107)
CHOLEST SERPL-MCNC: 132 MG/DL
CO2 SERPL-SCNC: 36 MMOL/L (ref 22–29)
CREAT SERPL-MCNC: 1.1 MG/DL (ref 0.7–1.2)
EKG ATRIAL RATE: 81 BPM
EKG P AXIS: 41 DEGREES
EKG P-R INTERVAL: 194 MS
EKG Q-T INTERVAL: 424 MS
EKG QRS DURATION: 160 MS
EKG QTC CALCULATION (BAZETT): 492 MS
EKG R AXIS: -80 DEGREES
EKG T AXIS: 44 DEGREES
EKG VENTRICULAR RATE: 81 BPM
EOSINOPHIL # BLD: 0 K/UL (ref 0.05–0.5)
EOSINOPHILS RELATIVE PERCENT: 0 % (ref 0–6)
ERYTHROCYTE [DISTWIDTH] IN BLOOD BY AUTOMATED COUNT: 13.9 % (ref 11.5–15)
FLUAV RNA NPH QL NAA+NON-PROBE: NOT DETECTED
FLUBV RNA NPH QL NAA+NON-PROBE: NOT DETECTED
GFR, ESTIMATED: 64 ML/MIN/1.73M2
GLUCOSE SERPL-MCNC: 197 MG/DL (ref 74–99)
HADV DNA NPH QL NAA+NON-PROBE: NOT DETECTED
HBA1C MFR BLD: 7 % (ref 4–5.6)
HCOV 229E RNA NPH QL NAA+NON-PROBE: NOT DETECTED
HCOV HKU1 RNA NPH QL NAA+NON-PROBE: NOT DETECTED
HCOV NL63 RNA NPH QL NAA+NON-PROBE: NOT DETECTED
HCOV OC43 RNA NPH QL NAA+NON-PROBE: NOT DETECTED
HCT VFR BLD AUTO: 36.8 % (ref 37–54)
HDLC SERPL-MCNC: 39 MG/DL
HGB BLD-MCNC: 11.2 G/DL (ref 12.5–16.5)
HMPV RNA NPH QL NAA+NON-PROBE: NOT DETECTED
HPIV1 RNA NPH QL NAA+NON-PROBE: NOT DETECTED
HPIV2 RNA NPH QL NAA+NON-PROBE: NOT DETECTED
HPIV3 RNA NPH QL NAA+NON-PROBE: NOT DETECTED
HPIV4 RNA NPH QL NAA+NON-PROBE: NOT DETECTED
LDLC SERPL CALC-MCNC: 77 MG/DL
LYMPHOCYTES NFR BLD: 0.17 K/UL (ref 1.5–4)
LYMPHOCYTES RELATIVE PERCENT: 4 % (ref 20–42)
M PNEUMO DNA NPH QL NAA+NON-PROBE: NOT DETECTED
MAGNESIUM SERPL-MCNC: 1.2 MG/DL (ref 1.6–2.4)
MCH RBC QN AUTO: 28.7 PG (ref 26–35)
MCHC RBC AUTO-ENTMCNC: 30.4 G/DL (ref 32–34.5)
MCV RBC AUTO: 94.4 FL (ref 80–99.9)
MONOCYTES NFR BLD: 0 % (ref 2–12)
MONOCYTES NFR BLD: 0 K/UL (ref 0.1–0.95)
NEUTROPHILS NFR BLD: 96 % (ref 43–80)
NEUTS SEG NFR BLD: 4.33 K/UL (ref 1.8–7.3)
PHOSPHATE SERPL-MCNC: 1.9 MG/DL (ref 2.5–4.5)
PLATELET, FLUORESCENCE: 130 K/UL (ref 130–450)
PMV BLD AUTO: 13.4 FL (ref 7–12)
POTASSIUM SERPL-SCNC: 3.7 MMOL/L (ref 3.5–5.1)
PROCALCITONIN SERPL-MCNC: 0.05 NG/ML (ref 0–0.08)
PROT SERPL-MCNC: 6.8 G/DL (ref 6.4–8.3)
RBC # BLD AUTO: 3.9 M/UL (ref 3.8–5.8)
RBC # BLD: ABNORMAL 10*6/UL
RBC # BLD: ABNORMAL 10*6/UL
RSV RNA NPH QL NAA+NON-PROBE: NOT DETECTED
RV+EV RNA NPH QL NAA+NON-PROBE: NOT DETECTED
SARS-COV-2 RNA NPH QL NAA+NON-PROBE: NOT DETECTED
SODIUM SERPL-SCNC: 144 MMOL/L (ref 136–145)
SPECIMEN DESCRIPTION: NORMAL
TRIGL SERPL-MCNC: 81 MG/DL
TSH SERPL DL<=0.05 MIU/L-ACNC: 0.29 UIU/ML (ref 0.27–4.2)
VLDLC SERPL CALC-MCNC: 16 MG/DL
WBC OTHER # BLD: 4.5 K/UL (ref 4.5–11.5)

## 2025-08-19 PROCEDURE — 6370000000 HC RX 637 (ALT 250 FOR IP): Performed by: INTERNAL MEDICINE

## 2025-08-19 PROCEDURE — 93010 ELECTROCARDIOGRAM REPORT: CPT | Performed by: INTERNAL MEDICINE

## 2025-08-19 PROCEDURE — 6360000002 HC RX W HCPCS: Performed by: INTERNAL MEDICINE

## 2025-08-19 PROCEDURE — 84443 ASSAY THYROID STIM HORMONE: CPT

## 2025-08-19 PROCEDURE — 80061 LIPID PANEL: CPT

## 2025-08-19 PROCEDURE — 83036 HEMOGLOBIN GLYCOSYLATED A1C: CPT

## 2025-08-19 PROCEDURE — 84100 ASSAY OF PHOSPHORUS: CPT

## 2025-08-19 PROCEDURE — 1200000000 HC SEMI PRIVATE

## 2025-08-19 PROCEDURE — 84145 PROCALCITONIN (PCT): CPT

## 2025-08-19 PROCEDURE — 94640 AIRWAY INHALATION TREATMENT: CPT

## 2025-08-19 PROCEDURE — 2500000003 HC RX 250 WO HCPCS: Performed by: INTERNAL MEDICINE

## 2025-08-19 PROCEDURE — 36415 COLL VENOUS BLD VENIPUNCTURE: CPT

## 2025-08-19 PROCEDURE — 83735 ASSAY OF MAGNESIUM: CPT

## 2025-08-19 PROCEDURE — 85025 COMPLETE CBC W/AUTO DIFF WBC: CPT

## 2025-08-19 PROCEDURE — 80053 COMPREHEN METABOLIC PANEL: CPT

## 2025-08-19 RX ORDER — IPRATROPIUM BROMIDE AND ALBUTEROL SULFATE 2.5; .5 MG/3ML; MG/3ML
1 SOLUTION RESPIRATORY (INHALATION)
Status: DISCONTINUED | OUTPATIENT
Start: 2025-08-19 | End: 2025-08-22 | Stop reason: HOSPADM

## 2025-08-19 RX ORDER — GUAIFENESIN/DEXTROMETHORPHAN 100-10MG/5
5 SYRUP ORAL EVERY 4 HOURS PRN
Status: DISCONTINUED | OUTPATIENT
Start: 2025-08-19 | End: 2025-08-22 | Stop reason: HOSPADM

## 2025-08-19 RX ORDER — GUAIFENESIN 600 MG/1
600 TABLET, EXTENDED RELEASE ORAL 2 TIMES DAILY
Status: DISCONTINUED | OUTPATIENT
Start: 2025-08-19 | End: 2025-08-22 | Stop reason: HOSPADM

## 2025-08-19 RX ORDER — AZITHROMYCIN 250 MG/1
500 TABLET, FILM COATED ORAL DAILY
Status: DISCONTINUED | OUTPATIENT
Start: 2025-08-19 | End: 2025-08-22 | Stop reason: HOSPADM

## 2025-08-19 RX ORDER — BENZONATATE 100 MG/1
100 CAPSULE ORAL 3 TIMES DAILY
Status: DISCONTINUED | OUTPATIENT
Start: 2025-08-19 | End: 2025-08-20

## 2025-08-19 RX ADMIN — WATER 80 MG: 1 INJECTION INTRAMUSCULAR; INTRAVENOUS; SUBCUTANEOUS at 03:48

## 2025-08-19 RX ADMIN — BUDESONIDE 500 MCG: 0.5 SUSPENSION RESPIRATORY (INHALATION) at 06:12

## 2025-08-19 RX ADMIN — METFORMIN HYDROCHLORIDE 1000 MG: 1000 TABLET ORAL at 18:19

## 2025-08-19 RX ADMIN — IPRATROPIUM BROMIDE AND ALBUTEROL SULFATE 1 DOSE: 2.5; .5 SOLUTION RESPIRATORY (INHALATION) at 14:20

## 2025-08-19 RX ADMIN — TAMSULOSIN HYDROCHLORIDE 0.4 MG: 0.4 CAPSULE ORAL at 08:47

## 2025-08-19 RX ADMIN — Medication 10 ML: at 20:58

## 2025-08-19 RX ADMIN — GUAIFENESIN 600 MG: 600 TABLET, EXTENDED RELEASE ORAL at 20:56

## 2025-08-19 RX ADMIN — BENZONATATE 100 MG: 100 CAPSULE ORAL at 20:56

## 2025-08-19 RX ADMIN — Medication 5 MG: at 20:56

## 2025-08-19 RX ADMIN — DIPHENHYDRAMINE HYDROCHLORIDE 25 MG: 25 TABLET ORAL at 00:55

## 2025-08-19 RX ADMIN — IPRATROPIUM BROMIDE AND ALBUTEROL SULFATE 1 DOSE: 2.5; .5 SOLUTION RESPIRATORY (INHALATION) at 09:47

## 2025-08-19 RX ADMIN — Medication 10 ML: at 08:48

## 2025-08-19 RX ADMIN — AMLODIPINE BESYLATE 5 MG: 5 TABLET ORAL at 08:47

## 2025-08-19 RX ADMIN — METFORMIN HYDROCHLORIDE 1000 MG: 1000 TABLET ORAL at 08:47

## 2025-08-19 RX ADMIN — ENOXAPARIN SODIUM 30 MG: 100 INJECTION SUBCUTANEOUS at 08:48

## 2025-08-19 RX ADMIN — MAGNESIUM SULFATE HEPTAHYDRATE 2000 MG: 40 INJECTION, SOLUTION INTRAVENOUS at 08:58

## 2025-08-19 RX ADMIN — FUROSEMIDE 20 MG: 20 TABLET ORAL at 08:47

## 2025-08-19 RX ADMIN — ENOXAPARIN SODIUM 30 MG: 100 INJECTION SUBCUTANEOUS at 20:56

## 2025-08-19 RX ADMIN — BENZONATATE 100 MG: 100 CAPSULE ORAL at 15:45

## 2025-08-19 RX ADMIN — AZITHROMYCIN DIHYDRATE 500 MG: 250 TABLET ORAL at 15:45

## 2025-08-19 RX ADMIN — PROCHLORPERAZINE EDISYLATE 10 MG: 5 INJECTION INTRAMUSCULAR; INTRAVENOUS at 18:19

## 2025-08-19 RX ADMIN — IPRATROPIUM BROMIDE AND ALBUTEROL SULFATE 1 DOSE: 2.5; .5 SOLUTION RESPIRATORY (INHALATION) at 18:45

## 2025-08-19 RX ADMIN — SPIRONOLACTONE 25 MG: 25 TABLET ORAL at 08:47

## 2025-08-19 RX ADMIN — BUDESONIDE 500 MCG: 0.5 SUSPENSION RESPIRATORY (INHALATION) at 18:45

## 2025-08-19 RX ADMIN — WATER 80 MG: 1 INJECTION INTRAMUSCULAR; INTRAVENOUS; SUBCUTANEOUS at 20:56

## 2025-08-19 RX ADMIN — IPRATROPIUM BROMIDE AND ALBUTEROL SULFATE 1 DOSE: .5; 3 SOLUTION RESPIRATORY (INHALATION) at 06:12

## 2025-08-19 RX ADMIN — CARVEDILOL 25 MG: 25 TABLET, FILM COATED ORAL at 08:47

## 2025-08-19 RX ADMIN — CARVEDILOL 25 MG: 25 TABLET, FILM COATED ORAL at 18:18

## 2025-08-19 RX ADMIN — ATORVASTATIN CALCIUM 40 MG: 40 TABLET, FILM COATED ORAL at 20:56

## 2025-08-19 RX ADMIN — GUAIFENESIN AND DEXTROMETHORPHAN 5 ML: 100; 10 SYRUP ORAL at 18:19

## 2025-08-19 RX ADMIN — WATER 80 MG: 1 INJECTION INTRAMUSCULAR; INTRAVENOUS; SUBCUTANEOUS at 10:44

## 2025-08-19 ASSESSMENT — PAIN SCALES - GENERAL: PAINLEVEL_OUTOF10: 0

## 2025-08-20 ENCOUNTER — HOSPITAL ENCOUNTER (OUTPATIENT)
Dept: CARDIAC REHAB | Age: 85
Setting detail: THERAPIES SERIES
Discharge: HOME | End: 2025-08-20
Payer: MEDICARE

## 2025-08-20 LAB
ALBUMIN SERPL-MCNC: 3.8 G/DL (ref 3.5–5.2)
ALP SERPL-CCNC: 59 U/L (ref 40–129)
ALT SERPL-CCNC: 6 U/L (ref 0–50)
ANION GAP SERPL CALCULATED.3IONS-SCNC: 12 MMOL/L (ref 7–16)
AST SERPL-CCNC: 22 U/L (ref 0–50)
BASOPHILS # BLD: 0 K/UL (ref 0–0.2)
BASOPHILS NFR BLD: 0 % (ref 0–2)
BILIRUB SERPL-MCNC: 0.5 MG/DL (ref 0–1.2)
BUN SERPL-MCNC: 21 MG/DL (ref 8–23)
CALCIUM SERPL-MCNC: 9.5 MG/DL (ref 8.8–10.2)
CHLORIDE SERPL-SCNC: 98 MMOL/L (ref 98–107)
CO2 SERPL-SCNC: 35 MMOL/L (ref 22–29)
CREAT SERPL-MCNC: 1.1 MG/DL (ref 0.7–1.2)
EOSINOPHIL # BLD: 0 K/UL (ref 0.05–0.5)
EOSINOPHILS RELATIVE PERCENT: 0 % (ref 0–6)
ERYTHROCYTE [DISTWIDTH] IN BLOOD BY AUTOMATED COUNT: 14.4 % (ref 11.5–15)
GFR, ESTIMATED: 63 ML/MIN/1.73M2
GLUCOSE BLD-MCNC: 158 MG/DL (ref 74–99)
GLUCOSE BLD-MCNC: 239 MG/DL (ref 74–99)
GLUCOSE SERPL-MCNC: 152 MG/DL (ref 74–99)
HCT VFR BLD AUTO: 35.1 % (ref 37–54)
HGB BLD-MCNC: 10.9 G/DL (ref 12.5–16.5)
IMM GRANULOCYTES # BLD AUTO: 0.04 K/UL (ref 0–0.58)
IMM GRANULOCYTES NFR BLD: 1 % (ref 0–5)
LYMPHOCYTES NFR BLD: 0.57 K/UL (ref 1.5–4)
LYMPHOCYTES RELATIVE PERCENT: 7 % (ref 20–42)
MAGNESIUM SERPL-MCNC: 1.6 MG/DL (ref 1.6–2.4)
MCH RBC QN AUTO: 29.3 PG (ref 26–35)
MCHC RBC AUTO-ENTMCNC: 31.1 G/DL (ref 32–34.5)
MCV RBC AUTO: 94.4 FL (ref 80–99.9)
MICROORGANISM/AGENT SPEC: ABNORMAL
MONOCYTES NFR BLD: 0.2 K/UL (ref 0.1–0.95)
MONOCYTES NFR BLD: 3 % (ref 2–12)
NEUTROPHILS NFR BLD: 90 % (ref 43–80)
NEUTS SEG NFR BLD: 7.06 K/UL (ref 1.8–7.3)
PHOSPHATE SERPL-MCNC: 2.9 MG/DL (ref 2.5–4.5)
PLATELET, FLUORESCENCE: 128 K/UL (ref 130–450)
PMV BLD AUTO: 13.3 FL (ref 7–12)
POTASSIUM SERPL-SCNC: 3.6 MMOL/L (ref 3.5–5.1)
PROT SERPL-MCNC: 6.8 G/DL (ref 6.4–8.3)
RBC # BLD AUTO: 3.72 M/UL (ref 3.8–5.8)
SODIUM SERPL-SCNC: 145 MMOL/L (ref 136–145)
SPECIMEN DESCRIPTION: ABNORMAL
WBC OTHER # BLD: 7.9 K/UL (ref 4.5–11.5)

## 2025-08-20 PROCEDURE — 94640 AIRWAY INHALATION TREATMENT: CPT

## 2025-08-20 PROCEDURE — 85025 COMPLETE CBC W/AUTO DIFF WBC: CPT

## 2025-08-20 PROCEDURE — 87070 CULTURE OTHR SPECIMN AEROBIC: CPT

## 2025-08-20 PROCEDURE — 82962 GLUCOSE BLOOD TEST: CPT

## 2025-08-20 PROCEDURE — 6370000000 HC RX 637 (ALT 250 FOR IP): Performed by: INTERNAL MEDICINE

## 2025-08-20 PROCEDURE — 6360000002 HC RX W HCPCS: Performed by: INTERNAL MEDICINE

## 2025-08-20 PROCEDURE — 2700000000 HC OXYGEN THERAPY PER DAY

## 2025-08-20 PROCEDURE — 83735 ASSAY OF MAGNESIUM: CPT

## 2025-08-20 PROCEDURE — 2500000003 HC RX 250 WO HCPCS: Performed by: INTERNAL MEDICINE

## 2025-08-20 PROCEDURE — 87205 SMEAR GRAM STAIN: CPT

## 2025-08-20 PROCEDURE — 84100 ASSAY OF PHOSPHORUS: CPT

## 2025-08-20 PROCEDURE — 36415 COLL VENOUS BLD VENIPUNCTURE: CPT

## 2025-08-20 PROCEDURE — 80053 COMPREHEN METABOLIC PANEL: CPT

## 2025-08-20 PROCEDURE — 1200000000 HC SEMI PRIVATE

## 2025-08-20 RX ORDER — CETIRIZINE HYDROCHLORIDE 5 MG/1
5 TABLET ORAL DAILY
Status: DISCONTINUED | OUTPATIENT
Start: 2025-08-20 | End: 2025-08-22 | Stop reason: HOSPADM

## 2025-08-20 RX ORDER — BENZONATATE 100 MG/1
200 CAPSULE ORAL 3 TIMES DAILY
Status: DISCONTINUED | OUTPATIENT
Start: 2025-08-20 | End: 2025-08-22 | Stop reason: HOSPADM

## 2025-08-20 RX ORDER — BENZONATATE 100 MG/1
200 CAPSULE ORAL 3 TIMES DAILY
Status: DISCONTINUED | OUTPATIENT
Start: 2025-08-20 | End: 2025-08-20 | Stop reason: CLARIF

## 2025-08-20 RX ADMIN — METFORMIN HYDROCHLORIDE 1000 MG: 1000 TABLET ORAL at 16:54

## 2025-08-20 RX ADMIN — CETIRIZINE HYDROCHLORIDE 5 MG: 5 SOLUTION ORAL at 14:59

## 2025-08-20 RX ADMIN — WATER 80 MG: 1 INJECTION INTRAMUSCULAR; INTRAVENOUS; SUBCUTANEOUS at 05:04

## 2025-08-20 RX ADMIN — ATORVASTATIN CALCIUM 40 MG: 40 TABLET, FILM COATED ORAL at 20:45

## 2025-08-20 RX ADMIN — ACETAMINOPHEN 650 MG: 325 TABLET ORAL at 18:48

## 2025-08-20 RX ADMIN — BUDESONIDE 500 MCG: 0.5 SUSPENSION RESPIRATORY (INHALATION) at 18:32

## 2025-08-20 RX ADMIN — IPRATROPIUM BROMIDE AND ALBUTEROL SULFATE 1 DOSE: 2.5; .5 SOLUTION RESPIRATORY (INHALATION) at 06:02

## 2025-08-20 RX ADMIN — ENOXAPARIN SODIUM 30 MG: 100 INJECTION SUBCUTANEOUS at 08:41

## 2025-08-20 RX ADMIN — BENZONATATE 200 MG: 100 CAPSULE ORAL at 14:18

## 2025-08-20 RX ADMIN — AZITHROMYCIN DIHYDRATE 500 MG: 250 TABLET ORAL at 08:41

## 2025-08-20 RX ADMIN — Medication 10 ML: at 08:41

## 2025-08-20 RX ADMIN — CARVEDILOL 25 MG: 25 TABLET, FILM COATED ORAL at 08:42

## 2025-08-20 RX ADMIN — Medication 5 MG: at 21:58

## 2025-08-20 RX ADMIN — GUAIFENESIN 600 MG: 600 TABLET, EXTENDED RELEASE ORAL at 20:45

## 2025-08-20 RX ADMIN — GUAIFENESIN 600 MG: 600 TABLET, EXTENDED RELEASE ORAL at 08:41

## 2025-08-20 RX ADMIN — CARVEDILOL 25 MG: 25 TABLET, FILM COATED ORAL at 16:55

## 2025-08-20 RX ADMIN — IPRATROPIUM BROMIDE AND ALBUTEROL SULFATE 1 DOSE: 2.5; .5 SOLUTION RESPIRATORY (INHALATION) at 14:35

## 2025-08-20 RX ADMIN — CYCLOBENZAPRINE HYDROCHLORIDE 10 MG: 5 TABLET, FILM COATED ORAL at 18:48

## 2025-08-20 RX ADMIN — FUROSEMIDE 20 MG: 20 TABLET ORAL at 08:41

## 2025-08-20 RX ADMIN — IPRATROPIUM BROMIDE AND ALBUTEROL SULFATE 1 DOSE: 2.5; .5 SOLUTION RESPIRATORY (INHALATION) at 09:28

## 2025-08-20 RX ADMIN — BENZONATATE 200 MG: 100 CAPSULE ORAL at 20:45

## 2025-08-20 RX ADMIN — Medication 10 ML: at 20:46

## 2025-08-20 RX ADMIN — METFORMIN HYDROCHLORIDE 1000 MG: 1000 TABLET ORAL at 08:42

## 2025-08-20 RX ADMIN — ACETAMINOPHEN 650 MG: 325 TABLET ORAL at 11:27

## 2025-08-20 RX ADMIN — ENOXAPARIN SODIUM 30 MG: 100 INJECTION SUBCUTANEOUS at 20:46

## 2025-08-20 RX ADMIN — BENZONATATE 100 MG: 100 CAPSULE ORAL at 08:42

## 2025-08-20 RX ADMIN — WATER 40 MG: 1 INJECTION INTRAMUSCULAR; INTRAVENOUS; SUBCUTANEOUS at 18:42

## 2025-08-20 RX ADMIN — WATER 80 MG: 1 INJECTION INTRAMUSCULAR; INTRAVENOUS; SUBCUTANEOUS at 11:27

## 2025-08-20 RX ADMIN — TAMSULOSIN HYDROCHLORIDE 0.4 MG: 0.4 CAPSULE ORAL at 08:41

## 2025-08-20 RX ADMIN — BUDESONIDE 500 MCG: 0.5 SUSPENSION RESPIRATORY (INHALATION) at 06:03

## 2025-08-20 RX ADMIN — IPRATROPIUM BROMIDE AND ALBUTEROL SULFATE 1 DOSE: 2.5; .5 SOLUTION RESPIRATORY (INHALATION) at 18:32

## 2025-08-20 RX ADMIN — SPIRONOLACTONE 25 MG: 25 TABLET ORAL at 08:42

## 2025-08-20 RX ADMIN — AMLODIPINE BESYLATE 5 MG: 5 TABLET ORAL at 08:42

## 2025-08-20 ASSESSMENT — PAIN SCALES - GENERAL
PAINLEVEL_OUTOF10: 7
PAINLEVEL_OUTOF10: 8
PAINLEVEL_OUTOF10: 7

## 2025-08-20 ASSESSMENT — PAIN - FUNCTIONAL ASSESSMENT
PAIN_FUNCTIONAL_ASSESSMENT: 0-10
PAIN_FUNCTIONAL_ASSESSMENT: ACTIVITIES ARE NOT PREVENTED
PAIN_FUNCTIONAL_ASSESSMENT: 0-10

## 2025-08-20 ASSESSMENT — PAIN DESCRIPTION - LOCATION
LOCATION: BACK
LOCATION: BACK

## 2025-08-20 ASSESSMENT — PAIN DESCRIPTION - DESCRIPTORS: DESCRIPTORS: ACHING;CRAMPING

## 2025-08-20 ASSESSMENT — PAIN DESCRIPTION - ORIENTATION: ORIENTATION: POSTERIOR;MID;LOWER

## 2025-08-21 LAB
ALBUMIN SERPL-MCNC: 3.7 G/DL (ref 3.5–5.2)
ALP SERPL-CCNC: 56 U/L (ref 40–129)
ALT SERPL-CCNC: 7 U/L (ref 0–50)
ANION GAP SERPL CALCULATED.3IONS-SCNC: 11 MMOL/L (ref 7–16)
AST SERPL-CCNC: 20 U/L (ref 0–50)
BASOPHILS # BLD: 0 K/UL (ref 0–0.2)
BASOPHILS NFR BLD: 0 % (ref 0–2)
BILIRUB SERPL-MCNC: 0.5 MG/DL (ref 0–1.2)
BUN SERPL-MCNC: 28 MG/DL (ref 8–23)
CALCIUM SERPL-MCNC: 9.6 MG/DL (ref 8.8–10.2)
CHLORIDE SERPL-SCNC: 98 MMOL/L (ref 98–107)
CO2 SERPL-SCNC: 34 MMOL/L (ref 22–29)
CREAT SERPL-MCNC: 1.1 MG/DL (ref 0.7–1.2)
EOSINOPHIL # BLD: 0 K/UL (ref 0.05–0.5)
EOSINOPHILS RELATIVE PERCENT: 0 % (ref 0–6)
ERYTHROCYTE [DISTWIDTH] IN BLOOD BY AUTOMATED COUNT: 14.5 % (ref 11.5–15)
GFR, ESTIMATED: 65 ML/MIN/1.73M2
GLUCOSE BLD-MCNC: 179 MG/DL (ref 74–99)
GLUCOSE SERPL-MCNC: 172 MG/DL (ref 74–99)
HCT VFR BLD AUTO: 34.3 % (ref 37–54)
HGB BLD-MCNC: 10.8 G/DL (ref 12.5–16.5)
LYMPHOCYTES NFR BLD: 0.47 K/UL (ref 1.5–4)
LYMPHOCYTES RELATIVE PERCENT: 6 % (ref 20–42)
MAGNESIUM SERPL-MCNC: 1.6 MG/DL (ref 1.6–2.4)
MCH RBC QN AUTO: 29.7 PG (ref 26–35)
MCHC RBC AUTO-ENTMCNC: 31.5 G/DL (ref 32–34.5)
MCV RBC AUTO: 94.2 FL (ref 80–99.9)
MONOCYTES NFR BLD: 0.14 K/UL (ref 0.1–0.95)
MONOCYTES NFR BLD: 2 % (ref 2–12)
NEUTROPHILS NFR BLD: 92 % (ref 43–80)
NEUTS SEG NFR BLD: 7.09 K/UL (ref 1.8–7.3)
PHOSPHATE SERPL-MCNC: 3 MG/DL (ref 2.5–4.5)
PLATELET # BLD AUTO: 124 K/UL (ref 130–450)
PMV BLD AUTO: 12.7 FL (ref 7–12)
POTASSIUM SERPL-SCNC: 3.7 MMOL/L (ref 3.5–5.1)
PROT SERPL-MCNC: 6.6 G/DL (ref 6.4–8.3)
RBC # BLD AUTO: 3.64 M/UL (ref 3.8–5.8)
RBC # BLD: NORMAL 10*6/UL
SODIUM SERPL-SCNC: 143 MMOL/L (ref 136–145)
WBC OTHER # BLD: 7.7 K/UL (ref 4.5–11.5)

## 2025-08-21 PROCEDURE — 94640 AIRWAY INHALATION TREATMENT: CPT

## 2025-08-21 PROCEDURE — 6370000000 HC RX 637 (ALT 250 FOR IP): Performed by: INTERNAL MEDICINE

## 2025-08-21 PROCEDURE — 83735 ASSAY OF MAGNESIUM: CPT

## 2025-08-21 PROCEDURE — 6360000002 HC RX W HCPCS: Performed by: INTERNAL MEDICINE

## 2025-08-21 PROCEDURE — 80053 COMPREHEN METABOLIC PANEL: CPT

## 2025-08-21 PROCEDURE — 2500000003 HC RX 250 WO HCPCS: Performed by: INTERNAL MEDICINE

## 2025-08-21 PROCEDURE — 85025 COMPLETE CBC W/AUTO DIFF WBC: CPT

## 2025-08-21 PROCEDURE — 84100 ASSAY OF PHOSPHORUS: CPT

## 2025-08-21 PROCEDURE — 2700000000 HC OXYGEN THERAPY PER DAY

## 2025-08-21 PROCEDURE — 82962 GLUCOSE BLOOD TEST: CPT

## 2025-08-21 PROCEDURE — 1200000000 HC SEMI PRIVATE

## 2025-08-21 PROCEDURE — 36415 COLL VENOUS BLD VENIPUNCTURE: CPT

## 2025-08-21 RX ORDER — TRAMADOL HYDROCHLORIDE 50 MG/1
50 TABLET ORAL EVERY 6 HOURS PRN
Status: DISCONTINUED | OUTPATIENT
Start: 2025-08-21 | End: 2025-08-22 | Stop reason: HOSPADM

## 2025-08-21 RX ADMIN — GUAIFENESIN 600 MG: 600 TABLET, EXTENDED RELEASE ORAL at 08:13

## 2025-08-21 RX ADMIN — CARVEDILOL 25 MG: 25 TABLET, FILM COATED ORAL at 16:46

## 2025-08-21 RX ADMIN — TRAMADOL HYDROCHLORIDE 50 MG: 50 TABLET, COATED ORAL at 20:04

## 2025-08-21 RX ADMIN — TRAMADOL HYDROCHLORIDE 50 MG: 50 TABLET, COATED ORAL at 13:51

## 2025-08-21 RX ADMIN — BUDESONIDE 500 MCG: 0.5 SUSPENSION RESPIRATORY (INHALATION) at 06:43

## 2025-08-21 RX ADMIN — Medication 10 ML: at 08:14

## 2025-08-21 RX ADMIN — WATER 40 MG: 1 INJECTION INTRAMUSCULAR; INTRAVENOUS; SUBCUTANEOUS at 02:29

## 2025-08-21 RX ADMIN — AMLODIPINE BESYLATE 5 MG: 5 TABLET ORAL at 08:13

## 2025-08-21 RX ADMIN — TAMSULOSIN HYDROCHLORIDE 0.4 MG: 0.4 CAPSULE ORAL at 08:14

## 2025-08-21 RX ADMIN — IPRATROPIUM BROMIDE AND ALBUTEROL SULFATE 1 DOSE: 2.5; .5 SOLUTION RESPIRATORY (INHALATION) at 06:43

## 2025-08-21 RX ADMIN — IPRATROPIUM BROMIDE AND ALBUTEROL SULFATE 1 DOSE: 2.5; .5 SOLUTION RESPIRATORY (INHALATION) at 18:11

## 2025-08-21 RX ADMIN — ENOXAPARIN SODIUM 30 MG: 100 INJECTION SUBCUTANEOUS at 08:12

## 2025-08-21 RX ADMIN — ACETAMINOPHEN 650 MG: 325 TABLET ORAL at 08:14

## 2025-08-21 RX ADMIN — ATORVASTATIN CALCIUM 40 MG: 40 TABLET, FILM COATED ORAL at 20:05

## 2025-08-21 RX ADMIN — WATER 40 MG: 1 INJECTION INTRAMUSCULAR; INTRAVENOUS; SUBCUTANEOUS at 18:42

## 2025-08-21 RX ADMIN — BENZONATATE 200 MG: 100 CAPSULE ORAL at 08:13

## 2025-08-21 RX ADMIN — CETIRIZINE HYDROCHLORIDE 5 MG: 5 SOLUTION ORAL at 08:14

## 2025-08-21 RX ADMIN — AZITHROMYCIN DIHYDRATE 500 MG: 250 TABLET ORAL at 08:13

## 2025-08-21 RX ADMIN — CYCLOBENZAPRINE HYDROCHLORIDE 10 MG: 5 TABLET, FILM COATED ORAL at 08:13

## 2025-08-21 RX ADMIN — CARVEDILOL 25 MG: 25 TABLET, FILM COATED ORAL at 08:14

## 2025-08-21 RX ADMIN — CYCLOBENZAPRINE HYDROCHLORIDE 10 MG: 5 TABLET, FILM COATED ORAL at 16:47

## 2025-08-21 RX ADMIN — BENZONATATE 200 MG: 100 CAPSULE ORAL at 20:05

## 2025-08-21 RX ADMIN — ENOXAPARIN SODIUM 30 MG: 100 INJECTION SUBCUTANEOUS at 20:05

## 2025-08-21 RX ADMIN — BUDESONIDE 500 MCG: 0.5 SUSPENSION RESPIRATORY (INHALATION) at 18:11

## 2025-08-21 RX ADMIN — METFORMIN HYDROCHLORIDE 1000 MG: 1000 TABLET ORAL at 16:47

## 2025-08-21 RX ADMIN — Medication 10 ML: at 20:05

## 2025-08-21 RX ADMIN — FUROSEMIDE 20 MG: 20 TABLET ORAL at 08:13

## 2025-08-21 RX ADMIN — SPIRONOLACTONE 25 MG: 25 TABLET ORAL at 08:15

## 2025-08-21 RX ADMIN — METFORMIN HYDROCHLORIDE 1000 MG: 1000 TABLET ORAL at 08:13

## 2025-08-21 RX ADMIN — WATER 40 MG: 1 INJECTION INTRAMUSCULAR; INTRAVENOUS; SUBCUTANEOUS at 11:10

## 2025-08-21 RX ADMIN — BENZONATATE 200 MG: 100 CAPSULE ORAL at 13:51

## 2025-08-21 RX ADMIN — ACETAMINOPHEN 650 MG: 325 TABLET ORAL at 18:42

## 2025-08-21 RX ADMIN — IPRATROPIUM BROMIDE AND ALBUTEROL SULFATE 1 DOSE: 2.5; .5 SOLUTION RESPIRATORY (INHALATION) at 12:11

## 2025-08-21 RX ADMIN — IPRATROPIUM BROMIDE AND ALBUTEROL SULFATE 1 DOSE: 2.5; .5 SOLUTION RESPIRATORY (INHALATION) at 08:36

## 2025-08-21 RX ADMIN — GUAIFENESIN 600 MG: 600 TABLET, EXTENDED RELEASE ORAL at 20:04

## 2025-08-21 ASSESSMENT — PAIN DESCRIPTION - DESCRIPTORS
DESCRIPTORS: ACHING
DESCRIPTORS: ACHING
DESCRIPTORS: ACHING;NAGGING
DESCRIPTORS: ACHING
DESCRIPTORS: ACHING

## 2025-08-21 ASSESSMENT — PAIN DESCRIPTION - ORIENTATION
ORIENTATION: RIGHT;LEFT
ORIENTATION: POSTERIOR;LOWER
ORIENTATION: RIGHT;LEFT;LOWER
ORIENTATION: POSTERIOR;MID

## 2025-08-21 ASSESSMENT — PAIN - FUNCTIONAL ASSESSMENT
PAIN_FUNCTIONAL_ASSESSMENT: 0-10
PAIN_FUNCTIONAL_ASSESSMENT: 0-10
PAIN_FUNCTIONAL_ASSESSMENT: ACTIVITIES ARE NOT PREVENTED
PAIN_FUNCTIONAL_ASSESSMENT: 0-10

## 2025-08-21 ASSESSMENT — PAIN DESCRIPTION - LOCATION
LOCATION: BACK

## 2025-08-21 ASSESSMENT — PAIN SCALES - GENERAL
PAINLEVEL_OUTOF10: 7
PAINLEVEL_OUTOF10: 3
PAINLEVEL_OUTOF10: 2
PAINLEVEL_OUTOF10: 2
PAINLEVEL_OUTOF10: 7

## 2025-08-22 VITALS
OXYGEN SATURATION: 98 % | BODY MASS INDEX: 33.81 KG/M2 | SYSTOLIC BLOOD PRESSURE: 182 MMHG | DIASTOLIC BLOOD PRESSURE: 100 MMHG | HEART RATE: 85 BPM | RESPIRATION RATE: 20 BRPM | TEMPERATURE: 96.8 F | WEIGHT: 236.19 LBS | HEIGHT: 70 IN

## 2025-08-22 LAB
ALBUMIN SERPL-MCNC: 3.8 G/DL (ref 3.5–5.2)
ALP SERPL-CCNC: 57 U/L (ref 40–129)
ALT SERPL-CCNC: 8 U/L (ref 0–50)
ANION GAP SERPL CALCULATED.3IONS-SCNC: 13 MMOL/L (ref 7–16)
AST SERPL-CCNC: 21 U/L (ref 0–50)
BASOPHILS # BLD: 0.01 K/UL (ref 0–0.2)
BASOPHILS NFR BLD: 0 % (ref 0–2)
BILIRUB SERPL-MCNC: 0.6 MG/DL (ref 0–1.2)
BUN SERPL-MCNC: 32 MG/DL (ref 8–23)
CALCIUM SERPL-MCNC: 9.5 MG/DL (ref 8.8–10.2)
CHLORIDE SERPL-SCNC: 99 MMOL/L (ref 98–107)
CO2 SERPL-SCNC: 31 MMOL/L (ref 22–29)
CREAT SERPL-MCNC: 1.1 MG/DL (ref 0.7–1.2)
EOSINOPHIL # BLD: 0.02 K/UL (ref 0.05–0.5)
EOSINOPHILS RELATIVE PERCENT: 0 % (ref 0–6)
ERYTHROCYTE [DISTWIDTH] IN BLOOD BY AUTOMATED COUNT: 14.4 % (ref 11.5–15)
GFR, ESTIMATED: 67 ML/MIN/1.73M2
GLUCOSE SERPL-MCNC: 158 MG/DL (ref 74–99)
HCT VFR BLD AUTO: 36.1 % (ref 37–54)
HGB BLD-MCNC: 11.2 G/DL (ref 12.5–16.5)
IMM GRANULOCYTES # BLD AUTO: 0.06 K/UL (ref 0–0.58)
IMM GRANULOCYTES NFR BLD: 1 % (ref 0–5)
LYMPHOCYTES NFR BLD: 0.42 K/UL (ref 1.5–4)
LYMPHOCYTES RELATIVE PERCENT: 6 % (ref 20–42)
MCH RBC QN AUTO: 29.5 PG (ref 26–35)
MCHC RBC AUTO-ENTMCNC: 31 G/DL (ref 32–34.5)
MCV RBC AUTO: 95 FL (ref 80–99.9)
MICROORGANISM SPEC CULT: NORMAL
MICROORGANISM/AGENT SPEC: NORMAL
MONOCYTES NFR BLD: 0.2 K/UL (ref 0.1–0.95)
MONOCYTES NFR BLD: 3 % (ref 2–12)
NEUTROPHILS NFR BLD: 90 % (ref 43–80)
NEUTS SEG NFR BLD: 6.11 K/UL (ref 1.8–7.3)
PLATELET CONFIRMATION: NORMAL
PLATELET, FLUORESCENCE: 93 K/UL (ref 130–450)
PMV BLD AUTO: 13.5 FL (ref 7–12)
POTASSIUM SERPL-SCNC: 4.1 MMOL/L (ref 3.5–5.1)
PROT SERPL-MCNC: 6.7 G/DL (ref 6.4–8.3)
RBC # BLD AUTO: 3.8 M/UL (ref 3.8–5.8)
RBC # BLD: ABNORMAL 10*6/UL
SERVICE CMNT-IMP: NORMAL
SODIUM SERPL-SCNC: 143 MMOL/L (ref 136–145)
SPECIMEN DESCRIPTION: NORMAL
WBC OTHER # BLD: 6.8 K/UL (ref 4.5–11.5)

## 2025-08-22 PROCEDURE — 94640 AIRWAY INHALATION TREATMENT: CPT

## 2025-08-22 PROCEDURE — 80053 COMPREHEN METABOLIC PANEL: CPT

## 2025-08-22 PROCEDURE — 85025 COMPLETE CBC W/AUTO DIFF WBC: CPT

## 2025-08-22 PROCEDURE — 6370000000 HC RX 637 (ALT 250 FOR IP): Performed by: INTERNAL MEDICINE

## 2025-08-22 PROCEDURE — 6360000002 HC RX W HCPCS: Performed by: INTERNAL MEDICINE

## 2025-08-22 PROCEDURE — 36415 COLL VENOUS BLD VENIPUNCTURE: CPT

## 2025-08-22 PROCEDURE — 2500000003 HC RX 250 WO HCPCS: Performed by: INTERNAL MEDICINE

## 2025-08-22 PROCEDURE — 2700000000 HC OXYGEN THERAPY PER DAY

## 2025-08-22 RX ORDER — AZITHROMYCIN 500 MG/1
500 TABLET, FILM COATED ORAL DAILY
Qty: 1 TABLET | Refills: 0 | Status: SHIPPED | OUTPATIENT
Start: 2025-08-23 | End: 2025-08-24

## 2025-08-22 RX ORDER — METHYLPREDNISOLONE 4 MG/1
TABLET ORAL
Qty: 1 KIT | Refills: 0 | Status: SHIPPED | OUTPATIENT
Start: 2025-08-22 | End: 2025-08-28

## 2025-08-22 RX ADMIN — SPIRONOLACTONE 25 MG: 25 TABLET ORAL at 08:56

## 2025-08-22 RX ADMIN — GUAIFENESIN 600 MG: 600 TABLET, EXTENDED RELEASE ORAL at 08:56

## 2025-08-22 RX ADMIN — TRAMADOL HYDROCHLORIDE 50 MG: 50 TABLET, COATED ORAL at 10:18

## 2025-08-22 RX ADMIN — WATER 40 MG: 1 INJECTION INTRAMUSCULAR; INTRAVENOUS; SUBCUTANEOUS at 03:22

## 2025-08-22 RX ADMIN — BENZONATATE 200 MG: 100 CAPSULE ORAL at 13:26

## 2025-08-22 RX ADMIN — TRAMADOL HYDROCHLORIDE 50 MG: 50 TABLET, COATED ORAL at 02:41

## 2025-08-22 RX ADMIN — TAMSULOSIN HYDROCHLORIDE 0.4 MG: 0.4 CAPSULE ORAL at 08:56

## 2025-08-22 RX ADMIN — METFORMIN HYDROCHLORIDE 1000 MG: 1000 TABLET ORAL at 08:56

## 2025-08-22 RX ADMIN — IPRATROPIUM BROMIDE AND ALBUTEROL SULFATE 1 DOSE: 2.5; .5 SOLUTION RESPIRATORY (INHALATION) at 07:09

## 2025-08-22 RX ADMIN — CARVEDILOL 25 MG: 25 TABLET, FILM COATED ORAL at 08:55

## 2025-08-22 RX ADMIN — FUROSEMIDE 20 MG: 20 TABLET ORAL at 08:56

## 2025-08-22 RX ADMIN — AZITHROMYCIN DIHYDRATE 500 MG: 250 TABLET ORAL at 08:56

## 2025-08-22 RX ADMIN — BENZONATATE 200 MG: 100 CAPSULE ORAL at 08:55

## 2025-08-22 RX ADMIN — ENOXAPARIN SODIUM 30 MG: 100 INJECTION SUBCUTANEOUS at 08:55

## 2025-08-22 RX ADMIN — BUDESONIDE 500 MCG: 0.5 SUSPENSION RESPIRATORY (INHALATION) at 07:09

## 2025-08-22 RX ADMIN — AMLODIPINE BESYLATE 5 MG: 5 TABLET ORAL at 08:56

## 2025-08-22 RX ADMIN — IPRATROPIUM BROMIDE AND ALBUTEROL SULFATE 1 DOSE: 2.5; .5 SOLUTION RESPIRATORY (INHALATION) at 09:40

## 2025-08-22 RX ADMIN — CETIRIZINE HYDROCHLORIDE 5 MG: 5 SOLUTION ORAL at 08:55

## 2025-08-22 RX ADMIN — Medication 10 ML: at 08:56

## 2025-08-22 RX ADMIN — WATER 40 MG: 1 INJECTION INTRAMUSCULAR; INTRAVENOUS; SUBCUTANEOUS at 10:15

## 2025-08-22 ASSESSMENT — PAIN DESCRIPTION - LOCATION: LOCATION: BACK

## 2025-08-22 ASSESSMENT — PAIN DESCRIPTION - DESCRIPTORS: DESCRIPTORS: ACHING;NAGGING

## 2025-08-22 ASSESSMENT — PAIN SCALES - GENERAL
PAINLEVEL_OUTOF10: 7
PAINLEVEL_OUTOF10: 6

## 2025-08-22 ASSESSMENT — PAIN - FUNCTIONAL ASSESSMENT
PAIN_FUNCTIONAL_ASSESSMENT: 0-10
PAIN_FUNCTIONAL_ASSESSMENT: 0-10

## 2025-08-25 ENCOUNTER — CARE COORDINATION (OUTPATIENT)
Dept: CARE COORDINATION | Age: 85
End: 2025-08-25

## 2025-08-25 ENCOUNTER — HOSPITAL ENCOUNTER (OUTPATIENT)
Dept: CARDIAC REHAB | Age: 85
Setting detail: THERAPIES SERIES
End: 2025-08-25
Payer: MEDICARE

## 2025-08-26 ENCOUNTER — CARE COORDINATION (OUTPATIENT)
Dept: CARE COORDINATION | Age: 85
End: 2025-08-26

## 2025-08-27 ENCOUNTER — APPOINTMENT (OUTPATIENT)
Dept: CARDIAC REHAB | Age: 85
End: 2025-08-27
Payer: MEDICARE

## (undated) DEVICE — SLIM BODY SKIN STAPLER: Brand: APPOSE ULC

## (undated) DEVICE — DRAIN SURG 19FR SIL RND HUBLESS W/ 0.25IN BEND TRCR BLAK

## (undated) DEVICE — Z CONVERTED USE 2274305 CUFF BLD PRSS AD L SZ 12 FOR 32-43CM LIMB VLY SFT W/O TUBE

## (undated) DEVICE — STERILE POLYISOPRENE POWDER-FREE SURGICAL GLOVES WITH EMOLLIENT COATING: Brand: PROTEXIS

## (undated) DEVICE — BLADE ASSEMB CLP HAIR FINE --

## (undated) DEVICE — SOLUTION IV 1000ML 0.9% SOD CHL

## (undated) DEVICE — CATH SUC CTRL PRT TRIFLO 14FR --

## (undated) DEVICE — INFECTION CONTROL KIT SYS

## (undated) DEVICE — SET ADMIN 16ML TBNG L100IN 2 Y INJ SITE IV PIGGY BK DISP

## (undated) DEVICE — SYR IRR CATH TIP LR ADPT 70ML -- CONVERT TO ITEM 363120

## (undated) DEVICE — BIPOLAR FORCEPS CORD: Brand: VALLEYLAB

## (undated) DEVICE — ENDOBRONCHIAL ULTRASOUND TRANSBRONCHIAL ASPIRATION NEEDLE: Brand: EXPECT PULMONARY

## (undated) DEVICE — Z DISCONTINUED USE 2599823 ELECTRODE ENDO 27FR 0.3MM MPLR LOOP DISP

## (undated) DEVICE — TOWEL SURG W17XL27IN STD BLU COT NONFENESTRATED PREWASHED

## (undated) DEVICE — KENDALL SCD EXPRESS SLEEVES, KNEE LENGTH, MEDIUM: Brand: KENDALL SCD

## (undated) DEVICE — BASIN SPNG 32OZ BLU STRL --

## (undated) DEVICE — SOLUTION IV 250ML 0.9% SOD CHL CLR INJ FLX BG CONT PRT CLSR

## (undated) DEVICE — SYRINGE MED 20ML STD CLR PLAS LUERSLIP TIP N CTRL DISP

## (undated) DEVICE — FILTER CLP DISP FOR 5513E CLIPVAC

## (undated) DEVICE — SUT CHRMC 0 54IN REEL BRN --

## (undated) DEVICE — REM POLYHESIVE ADULT PATIENT RETURN ELECTRODE: Brand: VALLEYLAB

## (undated) DEVICE — SINGLE USE SUCTION VALVE MAJ-209: Brand: SINGLE USE SUCTION VALVE (STERILE)

## (undated) DEVICE — SYR 3ML LL TIP 1/10ML GRAD --

## (undated) DEVICE — SINGLE BASIN: Brand: CARDINAL HEALTH

## (undated) DEVICE — SYR 10ML LUER LOK 1/5ML GRAD --

## (undated) DEVICE — UROSTOMY KIT, FLEXTEND: Brand: NEW IMAGE

## (undated) DEVICE — SPONGE LAP 18X18IN STRL -- 5/PK

## (undated) DEVICE — 3M™ DURAPORE™ SURGICAL TAPE 2 INCHES X 10YARDS (5.0CM X 9.1M) 6ROLLS/CARTON 10CARTONS/CASE 1538-2: Brand: 3M™ DURAPORE™

## (undated) DEVICE — NEONATAL-ADULT SPO2 SENSOR: Brand: NELLCOR

## (undated) DEVICE — SUTURE PDS II SZ 1 L36IN ABSRB VLT L48MM CTX 1/2 CIR Z371T

## (undated) DEVICE — LOADING UNIT WITH DST SERIES TECHNOLOGY: Brand: GIA

## (undated) DEVICE — 3M™ DURAPORE™ SURGICAL TAPE 1538-3, 3 INCH X 10 YARD (7,5CM X 9,1M), 4 ROLLS/BOX: Brand: 3M™ DURAPORE™

## (undated) DEVICE — TIDISHIELD UROLOGY DRAIN BAGS FROSTY VINYL STERILE FITS SIEMENS UROSKOP ACCESS 20 PER CASE: Brand: TIDISHIELD

## (undated) DEVICE — KIT COLON W/ 1.1OZ LUB AND 2 END

## (undated) DEVICE — CONTAINER SPEC 20 ML LID NEUT BUFF FORMALIN 10 % POLYPR STS

## (undated) DEVICE — SUT CHRMC 4-0 27IN SH BRN --

## (undated) DEVICE — SOLIDIFIER FLD 3.2OZ 3000CC TRAD IN BTL LIQUI-LOC

## (undated) DEVICE — BITE BLK ENDOSCP AD 54FR GRN POLYETH ENDOSCP W STRP SLD

## (undated) DEVICE — SOL IRRIGATION INJ NACL 0.9% 500ML BTL

## (undated) DEVICE — BAG SPEC BIOHZRD 10 X 10 IN --

## (undated) DEVICE — SURGICAL PROCEDURE PACK BASIN MAJ SET CUST NO CAUT

## (undated) DEVICE — (D)SUT MCRYL 4 36IN RB1 CUST -- DISC BY MFR

## (undated) DEVICE — SYR 5ML 1/5 GRAD LL NSAF LF --

## (undated) DEVICE — CATHETER PLUG WITH CAP: Brand: DOVER

## (undated) DEVICE — CYSTOSCOPY PACK: Brand: CONVERTORS

## (undated) DEVICE — QUILTED PREMIUM COMFORT UNDERPAD,EXTRA HEAVY: Brand: WINGS

## (undated) DEVICE — SKIN TEMPERATURE SENSOR: Brand: DEROYAL

## (undated) DEVICE — DEVON™ KNEE AND BODY STRAP 60" X 3" (1.5 M X 7.6 CM): Brand: DEVON

## (undated) DEVICE — CATH URETH INTMIT ROB 14FR FUN -- USE ITEM 179521

## (undated) DEVICE — CUFF BLD PRSS AD L SZ 12L FOR 32-43CM LIMB LNG VYN SFT W/O

## (undated) DEVICE — PACK,AURORA,LAVH: Brand: MEDLINE

## (undated) DEVICE — TUBING, SUCTION, 3/16" X 6', STRAIGHT: Brand: MEDLINE

## (undated) DEVICE — SYRINGE INSULIN 1ML LUERSLIP TIP W/O SFTY U100 BLISTER PK

## (undated) DEVICE — AIRLIFE™ ADULT CUSHION NASAL CANNULA 14 FOOT (4.3) CRUSH-RESISTANT OXYGEN TUBING, AND U/CONNECT-IT ADAPTER: Brand: AIRLIFE™

## (undated) DEVICE — SOL IRR GLYC 1.5 % 3000ML --

## (undated) DEVICE — WRAP SURG W1.31XL1.34M CARD FOR PT 165-172CM THERMOWRP

## (undated) DEVICE — CATH URETH FOL 2W MED 22FRX5 --

## (undated) DEVICE — 1200 GUARD II KIT W/5MM TUBE W/O VAC TUBE: Brand: GUARDIAN

## (undated) DEVICE — TRAY PREP DRY W/ PREM GLV 2 APPL 6 SPNG 2 UNDPD 1 OVERWRAP

## (undated) DEVICE — GOWN,SIRUS,FABRNF,XL,20/CS: Brand: MEDLINE

## (undated) DEVICE — (D)SYR 10ML 1/5ML GRAD NSAF -- PKGING CHANGE USE ITEM 338027

## (undated) DEVICE — SUT CHRMC 4-0 27IN RB1 BRN --

## (undated) DEVICE — SOLUTION IV STRL H2O 500 ML AQUALITE POUR BTL

## (undated) DEVICE — ZINACTIVE USE 2641837 CLIP LIG M BLU TI HRT SHP WIRE HORZ 600 PER BX

## (undated) DEVICE — Y-TYPE TUR/BLADDER IRRIGATION SET, REGULATING CLAMP

## (undated) DEVICE — SKIN MARKER,REGULAR TIP WITH RULER AND LABELS: Brand: DEVON

## (undated) DEVICE — STAPLER WITH DST SERIES TECHNOLOGY: Brand: GIA

## (undated) DEVICE — Z INACTIVE NO USAGE TURNOVER KIT RM CLEANOP

## (undated) DEVICE — ELECTRODE,RADIOTRANSLUCENT,FOAM,3PK: Brand: MEDLINE

## (undated) DEVICE — SOLUTION IRRIG 1000ML H2O STRL BLT

## (undated) DEVICE — CHEMO SPILL KIT: Brand: CHEMOPLUS

## (undated) DEVICE — 3M™ CUROS™ DISINFECTING CAP FOR NEEDLELESS CONNECTORS 270/CARTON 20 CARTONS/CASE CFF1-270: Brand: CUROS™

## (undated) DEVICE — ROCKER SWITCH PENCIL BLADE ELECTRODE, HOLSTER: Brand: EDGE

## (undated) DEVICE — NDL FLTR TIP 5 MIC 18GX1.5IN --

## (undated) DEVICE — STERILE POLYISOPRENE POWDER-FREE SURGICAL GLOVES: Brand: PROTEXIS

## (undated) DEVICE — SUT CHRMC 3-0 27IN SH BRN --

## (undated) DEVICE — DRAPE FLD WRM W44XL66IN C6L FOR INTRATEMP SYS THERMABASIN

## (undated) DEVICE — SUTURE PERMAHAND SZ 3-0 L18IN NONABSORBABLE BLK L26MM SH C013D

## (undated) DEVICE — Z DISCONTINUEDSOLUTION PREP 2OZ 10% POVIDONE IOD SCR CAP BTL

## (undated) DEVICE — Device

## (undated) DEVICE — Z INACTIVE USE 2527070 DRAPE SURG W40XL44IN UNDERBUTTOCK SMS POLYPR W/ PCH BK DISP

## (undated) DEVICE — SUTURE VCRL SZ 3-0 L27IN ABSRB VLT L26MM SH 1/2 CIR J316H

## (undated) DEVICE — SUT ETHLN 3-0 18IN FS1 BLK --

## (undated) DEVICE — 3M™ TEGADERM™ TRANSPARENT FILM DRESSING FRAME STYLE, 1624W, 2-3/8 IN X 2-3/4 IN (6 CM X 7 CM), 100/CT 4CT/CASE: Brand: 3M™ TEGADERM™

## (undated) DEVICE — HIGH FLOW RATE EXTENSION SET, LUER LOCK ADAPTERS

## (undated) DEVICE — SUTURE VCRL SZ 4-0 L27IN ABSRB UD L17MM RB-1 1/2 CIR J214H

## (undated) DEVICE — BANDAGE COMPR SELF ADH 5 YDX4 IN TAN STRL PREMIERPRO LF

## (undated) DEVICE — ELECTRODE BLDE L4IN NONINSULATED EDGE

## (undated) DEVICE — CATHETER URETH 22FR BLLN 5CC STD LTX 3 W F TWO OPP DRNGE

## (undated) DEVICE — JELLY,LUBE,STERILE,FLIP TOP,TUBE,4-OZ: Brand: MEDLINE

## (undated) DEVICE — SINGLE USE BIOPSY VALVE MAJ-210: Brand: SINGLE USE BIOPSY VALVE (STERILE)

## (undated) DEVICE — 3000CC GUARDIAN II: Brand: GUARDIAN

## (undated) DEVICE — SUTURE CHROMIC GUT SZ 2-0 L27IN ABSRB BRN L26MM SH 1/2 CIR G123H

## (undated) DEVICE — GUIDEWIRE URO L150CM DIA0.035IN STD NIT HYDRPHLC STR TIP

## (undated) DEVICE — BRUSH CLEANING PKG/5 CLEANING

## (undated) DEVICE — NDL PRT INJ NSAF BLNT 18GX1.5 --

## (undated) DEVICE — Device: Brand: MEDICAL ACTION INDUSTRIES

## (undated) DEVICE — CURVED, LARGE JAW, OPEN SEALER/DIVIDER NANO-COATED: Brand: LIGASURE IMPACT

## (undated) DEVICE — SUT CHRMC 0 27IN CT1 BRN --

## (undated) DEVICE — SUTURE ETHLN SZ 2-0 L18IN NONABSORBABLE BLK L26MM FS 3/8 664G

## (undated) DEVICE — CATH URETH FOL 2W SH 22FRX5ML -- CONVERT TO ITEM 363075

## (undated) DEVICE — INTENDED USED TO PROTECT, TAG AND HELP LOCATED SUTURES DURING SURGERY: Brand: STERION®SUTURE AID BOOTIES

## (undated) DEVICE — TRAP,MUCUS SPECIMEN, 80CC: Brand: MEDLINE

## (undated) DEVICE — SURGICAL PROCEDURE PACK CATRCT CUST

## (undated) DEVICE — DBD-PACK,LAPAROTOMY,2 REINFORCED GOWNS: Brand: MEDLINE

## (undated) DEVICE — PREP SKN CHLRAPRP SNGL 1.75ML --

## (undated) DEVICE — BRUSH CYTO BRONCHSCP 1.5/140MM -- CELLEBRITY

## (undated) DEVICE — CATHETER URETH 20FR BLLN 5CC STD LTX HYDRGEL 2 W F BARDX

## (undated) DEVICE — THE MONARCH® "D" CARTRIDGE IS A SINGLE-USE POLYPROPYLENE CARTRIDGE FOR POSTERIOR CHAMBER IOL DELIVERY: Brand: MONARCH® III

## (undated) DEVICE — SYR LR LCK 1ML GRAD NSAF 30ML --

## (undated) DEVICE — ASTOUND STANDARD SURGICAL GOWN, XL: Brand: CONVERTORS

## (undated) DEVICE — BAG DRAIN URIN 2000ML LF STRL -- CONVERT TO ITEM 363123

## (undated) DEVICE — CLIP LIG L TI MTL LIG SYS 24 COUNT HORZ

## (undated) DEVICE — CATHETER IV 20GA L1IN FEP STR HUB INTROCAN SFTY

## (undated) DEVICE — SYRINGE MED 10CC ECC TIP W/O NDL

## (undated) DEVICE — BULB SYRINGE, IRRIGATION WITH PROTECTIVE CAP, 60 CC, INDIVIDUALLY WRAPPED: Brand: DOVER

## (undated) DEVICE — TIP SUCT BLU PLAS BLB W/O CTRL VENT YANK